# Patient Record
Sex: MALE | Race: WHITE | Employment: OTHER
[De-identification: names, ages, dates, MRNs, and addresses within clinical notes are randomized per-mention and may not be internally consistent; named-entity substitution may affect disease eponyms.]

---

## 2017-01-13 ENCOUNTER — CARE COORDINATION (OUTPATIENT)
Dept: CARE COORDINATION | Facility: CLINIC | Age: 62
End: 2017-01-13

## 2017-01-26 RX ORDER — AMLODIPINE BESYLATE 10 MG/1
TABLET ORAL
Qty: 30 TABLET | Refills: 11 | Status: SHIPPED | OUTPATIENT
Start: 2017-01-26 | End: 2017-12-01 | Stop reason: SDUPTHER

## 2017-01-26 RX ORDER — BENAZEPRIL HYDROCHLORIDE 10 MG/1
TABLET ORAL
Qty: 30 TABLET | Refills: 11 | Status: SHIPPED | OUTPATIENT
Start: 2017-01-26 | End: 2017-12-01 | Stop reason: SDUPTHER

## 2017-02-08 ENCOUNTER — CARE COORDINATION (OUTPATIENT)
Dept: CARE COORDINATION | Facility: CLINIC | Age: 62
End: 2017-02-08

## 2017-02-15 ENCOUNTER — HOSPITAL ENCOUNTER (OUTPATIENT)
Dept: INFUSION THERAPY | Facility: MEDICAL CENTER | Age: 62
Discharge: HOME | End: 2017-02-15
Payer: MEDICARE

## 2017-02-15 VITALS
DIASTOLIC BLOOD PRESSURE: 71 MMHG | HEART RATE: 84 BPM | TEMPERATURE: 98 F | RESPIRATION RATE: 20 BRPM | SYSTOLIC BLOOD PRESSURE: 114 MMHG

## 2017-02-15 DIAGNOSIS — D83.9 COMMON VARIABLE IMMUNODEFICIENCY (HCC): ICD-10-CM

## 2017-02-15 DIAGNOSIS — D80.1 COMMON VARIABLE HYPOGAMMAGLOBULINEMIA (HCC): ICD-10-CM

## 2017-02-15 PROCEDURE — 96365 THER/PROPH/DIAG IV INF INIT: CPT

## 2017-02-15 PROCEDURE — 2580000003 HC RX 258: Performed by: ALLERGY & IMMUNOLOGY

## 2017-02-15 PROCEDURE — 96366 THER/PROPH/DIAG IV INF ADDON: CPT

## 2017-02-15 PROCEDURE — 6370000000 HC RX 637 (ALT 250 FOR IP): Performed by: ALLERGY & IMMUNOLOGY

## 2017-02-15 PROCEDURE — 96360 HYDRATION IV INFUSION INIT: CPT

## 2017-02-15 PROCEDURE — 6360000002 HC RX W HCPCS: Performed by: ALLERGY & IMMUNOLOGY

## 2017-02-15 PROCEDURE — 96361 HYDRATE IV INFUSION ADD-ON: CPT

## 2017-02-15 RX ORDER — 0.9 % SODIUM CHLORIDE 0.9 %
10 VIAL (ML) INJECTION PRN
Status: CANCELLED | OUTPATIENT
Start: 2017-02-15

## 2017-02-15 RX ORDER — DIPHENHYDRAMINE HCL 25 MG
25 TABLET ORAL ONCE
Status: CANCELLED | OUTPATIENT
Start: 2017-02-15 | End: 2017-02-15

## 2017-02-15 RX ORDER — HEPARIN SODIUM (PORCINE) LOCK FLUSH IV SOLN 100 UNIT/ML 100 UNIT/ML
500 SOLUTION INTRAVENOUS PRN
Status: DISCONTINUED | OUTPATIENT
Start: 2017-02-15 | End: 2017-02-16 | Stop reason: HOSPADM

## 2017-02-15 RX ORDER — HEPARIN SODIUM (PORCINE) LOCK FLUSH IV SOLN 100 UNIT/ML 100 UNIT/ML
500 SOLUTION INTRAVENOUS PRN
Status: CANCELLED
Start: 2017-02-15

## 2017-02-15 RX ORDER — DEXTROSE MONOHYDRATE 50 MG/ML
INJECTION, SOLUTION INTRAVENOUS CONTINUOUS
Status: DISCONTINUED | OUTPATIENT
Start: 2017-02-15 | End: 2017-02-16 | Stop reason: HOSPADM

## 2017-02-15 RX ORDER — DIPHENHYDRAMINE HCL 25 MG
25 TABLET ORAL EVERY 4 HOURS PRN
Status: DISCONTINUED | OUTPATIENT
Start: 2017-02-15 | End: 2017-02-16 | Stop reason: HOSPADM

## 2017-02-15 RX ORDER — DEXTROSE AND SODIUM CHLORIDE 5; .33 G/100ML; G/100ML
INJECTION, SOLUTION INTRAVENOUS CONTINUOUS
Status: CANCELLED
Start: 2017-02-15

## 2017-02-15 RX ORDER — DEXTROSE MONOHYDRATE 50 MG/ML
INJECTION, SOLUTION INTRAVENOUS CONTINUOUS
Status: CANCELLED
Start: 2017-02-15

## 2017-02-15 RX ORDER — DEXTROSE AND SODIUM CHLORIDE 5; .33 G/100ML; G/100ML
INJECTION, SOLUTION INTRAVENOUS CONTINUOUS
Status: DISCONTINUED | OUTPATIENT
Start: 2017-02-15 | End: 2017-02-16 | Stop reason: HOSPADM

## 2017-02-15 RX ORDER — DIPHENHYDRAMINE HCL 25 MG
25 TABLET ORAL EVERY 4 HOURS PRN
Status: CANCELLED
Start: 2017-02-15

## 2017-02-15 RX ORDER — 0.9 % SODIUM CHLORIDE 0.9 %
10 VIAL (ML) INJECTION PRN
Status: DISCONTINUED | OUTPATIENT
Start: 2017-02-15 | End: 2017-02-16 | Stop reason: HOSPADM

## 2017-02-15 RX ORDER — DIPHENHYDRAMINE HCL 25 MG
25 TABLET ORAL ONCE
Status: COMPLETED | OUTPATIENT
Start: 2017-02-15 | End: 2017-02-15

## 2017-02-15 RX ADMIN — Medication 20 ML: at 15:38

## 2017-02-15 RX ADMIN — DIPHENHYDRAMINE HCL 25 MG: 25 TABLET ORAL at 14:12

## 2017-02-15 RX ADMIN — DEXTROSE AND SODIUM CHLORIDE: 5; 330 INJECTION, SOLUTION INTRAVENOUS at 08:59

## 2017-02-15 RX ADMIN — DEXTROSE MONOHYDRATE: 50 INJECTION, SOLUTION INTRAVENOUS at 09:21

## 2017-02-15 RX ADMIN — SODIUM CHLORIDE, PRESERVATIVE FREE 500 UNITS: 5 INJECTION INTRAVENOUS at 15:39

## 2017-02-15 RX ADMIN — IMMUNE GLOBULIN INTRAVENOUS (HUMAN): 5 INJECTION, SOLUTION INTRAVENOUS at 09:21

## 2017-02-15 RX ADMIN — DIPHENHYDRAMINE HCL 25 MG: 25 TABLET ORAL at 09:01

## 2017-02-15 NOTE — IP AVS SNAPSHOT
After Visit Summary    Medication List for Home    Based on the information you provided to us as well as any changes during this visit, the following is your updated medication list.  Compare this with your prescription bottles at home. If you have any questions or concerns, contact your primary care physician's office. Daily Medication List (This medication list can be shared with any healthcare provider who is helping you manage your medications)      ASK your doctor about these medications if you have questions        Last Dose    Next Dose Due AM NOON PM NIGHT    ALPRAZolam 0.5 MG tablet   Commonly known as:  XANAX   TAKE 1 TABLET 3 to 4 TIMES A DAY AS NEEDED FOR SLEEP or anxiety                                         amLODIPine 10 MG tablet   Commonly known as:  NORVASC   take 1 tablet by mouth once daily                                         benazepril 10 MG tablet   Commonly known as:  LOTENSIN   take 1 tablet by mouth once daily                                         EPINEPHRINE IJ   Inject 0.3 mLs as directed. escitalopram 10 MG tablet   Commonly known as:  LEXAPRO   Take 1 tablet by mouth daily                                         fluticasone 50 MCG/ACT nasal spray   Commonly known as:  FLONASE   1 spray by Nasal route daily. ibuprofen 800 MG tablet   Commonly known as:  ADVIL;MOTRIN   Take 800 mg by mouth every 6 hours as needed for Pain Pt would like refill                                         Immune Globulin (Human) 10 GM/100ML Soln IV solution   Infuse 10 g intravenously every 21 days                2/15/2017  9:21 AM                            montelukast 10 MG tablet   Commonly known as:  SINGULAIR   Take 1 tablet by mouth nightly.                                          omega-3 acid ethyl esters 1 G capsule   Commonly known as:  LOVAZA   take 2 capsules twice a day omeprazole 20 MG delayed release capsule   Commonly known as:  PRILOSEC   take 1 capsule by mouth once daily                                         predniSONE 10 MG tablet   Commonly known as:  DELTASONE                                         predniSONE 10 MG tablet   Commonly known as:  DELTASONE   TAKE 40MGS FOR 3 DAYS, 30MGS FOR 3 DAYS, 20MGS FOR 3 DAYS AND THEN 10MGS FOR 3 DAY. PROAIR HFA IN   Inhale 2 puffs into the lungs every 4 hours as needed. albuterol (2.5 MG/3ML) 0.083% nebulizer solution   Commonly known as:  PROVENTIL   Take 3 mLs by nebulization every 4 hours as needed for Wheezing. PROAIR  (90 BASE) MCG/ACT inhaler   Generic drug:  albuterol sulfate HFA                                         SYMBICORT 160-4.5 MCG/ACT Aero   Generic drug:  budesonide-formoterol   INHALE 2 PUFFS TWICE A DAY AS DIRECTED                                         tiotropium 18 MCG inhalation capsule   Commonly known as:  SPIRIVA   Inhale 18 mcg into the lungs daily. Allergies as of 2/15/2017     No Known Allergies      Immunizations as of 2/15/2017     Name Date Dose VIS Date Route    Influenza Virus Vaccine 10/5/2015 -- -- --    External: Patient reported    Influenza Virus Vaccine 10/7/2014 0.5 mL 8/19/2014 Intramuscular    Pneumococcal 13-valent Conjugate (Verneice Nicholas) 10/5/2015 -- -- --    External: Patient reported    Pneumococcal Polysaccharide ([id number]) 12/2/2016 0.5 mL 4/24/2015 Intramuscular      Last Vitals          Most Recent Value    Temp  98 °F (36.7 °C)    Pulse  84    Resp  20    BP  114/71         After Visit Summary    This summary was created for you. Thank you for entrusting your care to us.   The following information includes details about your hospital/visit stay along with steps you should take to help with your recovery once you leave the hospital.  In this packet, you will find information about the topics listed below:    · Instructions about your medications including a list of your home medications  · A summary of your hospital visit  · Follow-up appointments once you have left the hospital  · Your care plan at home      You may receive a survey regarding the care you received during your stay. Your input is valuable to us. We encourage you to complete and return your survey in the envelope provided. We hope you will choose us in the future for your healthcare needs. Patient Information     Patient Name NOLBERTO Joshi Cough 1955      Care Provided at:     Name             [address] [facility] [address]              Your Visit    Here you will find information about your visit, including the reason for your visit. Please take this sheet with you when you visit your doctor or other health care provider in the future. It will help determine the best possible medical care for you at that time. If you have any questions once you leave the hospital, please call the department phone number listed below. Why you were here     Your primary diagnosis was:  Not on File      Visit Information     Date & Time Department Dept. Phone    2/15/2017 Roxy Cook 47 [phone]       Follow-up Appointments    Below is a list of your follow-up and future appointments. This may not be a complete list as you may have made appointments directly with providers that we are not aware of or your providers may have made some for you. Please call your providers to confirm appointments. It is important to keep your appointments. Please bring your current insurance card, photo ID, co-pay, and all medication bottles to your appointment. If self-pay, payment is expected at the time of service.         Future Appointments     3/8/2017 8:30 AM Appointment with CLEMENTINA STA CHAIR 15 at [facility] [place] [address] [phone] [place]       3/8/2017 2:45 PM     Appointment with Margarita Downs MD at [address] [facility] [id number] ([phone])   Please arrive 15 minutes prior to appointment time, bring insurance card and photo ID.    [address].  [address] [place] [address]       3/29/2017 8:30 AM     Appointment with CLEMENTINA STA CHAIR 15 at . [place] [address] ([phone])   [facility] Once You Return Home    This section includes instructions you will need to follow once you leave the hospital.  Your care team will discuss these with you, so you and those caring for you know how to best care for your health needs at home. This section may also include educational information about certain health topics that may be of help to you. Important information for a smoker       SMOKING: QUIT SMOKING. THIS IS THE MOST IMPORTANT ACTION YOU CAN TAKE TO IMPROVE YOUR CURRENT AND FUTURE HEALTH. Call the [address] [place] [address] at [place] NOW ([phone])    Smoking harms nonsmokers. When nonsmokers are around people who smoke, they absorb nicotine, carbon monoxide, and other ingredients of tobacco smoke. DO NOT SMOKE AROUND CHILDREN     Children exposed to secondhand smoke are at an increased risk of:  Sudden Infant Death Syndrome (SIDS), acute respiratory infections, inflammation of the middle ear, and severe asthma. Over a longer time, it causes heart disease and lung cancer. There is no safe level of exposure to secondhand smoke. [employer] Signup     Our records indicate that you have an active [facility] account. You can view your After Visit Summary by going to [email or web address] and logging in with your [facility] username and password.       If you don't have a [facility] username and password but a parent or guardian has access to your record, the parent or guardian should login with their own [employer] username and password and access your record to view the After Visit Summary. Additional Information  If you have questions, please contact the physician practice where you receive care. Remember, [employer] is NOT to be used for urgent needs. For medical emergencies, dial 911. For questions regarding your MyChart account call [phone]. If you have a clinical question, please call your doctor's office. View your information online  ? Review your current list of  medications, immunization, and allergies. ? Review your future test results online . ? Review your discharge instructions provided by your caregivers at discharge    Certain functionality such as prescription refills, scheduling appointments or sending messages to your provider are not activated if your provider does not use [employer] in his/her office    For questions regarding your MyChart account call [phone]. If you have a clinical question, please call your doctor's office. The information on all pages of the After Visit Summary has been reviewed with me, the patient and/or responsible adult, by my health care provider(s). I had the opportunity to ask questions regarding this information. I understand I should dispose of my armband safely at home to protect my health information. A complete copy of the After Visit Summary has been given to me, the patient and/or responsible adult.            Patient Signature/Responsible Adult:____________________    Clinician Signature:_____________________    Date:_____________________    Time:_____________________

## 2017-02-15 NOTE — PROGRESS NOTES
Arrives  For infusion see mar. Port accessed and patient hydrated with d5 -1/3 250 ml  Prior to infusion , and post infusion 250 ml. .  2861 IVIG  Hung  And  infusing at 20 ml/hr for 20 minutes. V/s 114/71, 84, 18, 98.0.  0940 IVIG increased To 40 ml/hr For 20 minutes. V/s 133/79, 83.  1000 IVIG increased To 80 ml/hr For 20 minutes. V/s 111/69, 77.  1020 IVIG increased To 160 ml/hr For 20 minutes. V/s109/63, 72.  1040 IVIG increased To 250 ml/hr For remainder of infusion. V/s119/72, 81.  1100 V/s 107/66, 91.  1130 v/s 114/60, 84.  1200 v/s 121/70, 78  1230 v/s 108/63, 93.  1300 v/s 113/68, 87.  1330 v/s 124/59, 93.  1400 v/s 126/63, 92.  1430 v/s 126/64, 94.  1511 IVIG finished infusing  And  Line flushed with D5-1/3  For 30 minutes and Patient discharged to  Home per self. Next  R T C is 3/8/17.

## 2017-02-15 NOTE — IP AVS SNAPSHOT
Patient Information     Patient Name NOLBERTO Lucio 1955         This is your updated medication list to keep with you all times      ASK your doctor about these medications     ALPRAZolam 0.5 MG tablet   Commonly known as:  XANAX   TAKE 1 TABLET 3 to 4 TIMES A DAY AS NEEDED FOR SLEEP or anxiety       amLODIPine 10 MG tablet   Commonly known as:  NORVASC   take 1 tablet by mouth once daily       benazepril 10 MG tablet   Commonly known as:  LOTENSIN   take 1 tablet by mouth once daily       EPINEPHRINE IJ       escitalopram 10 MG tablet   Commonly known as:  LEXAPRO   Take 1 tablet by mouth daily       fluticasone 50 MCG/ACT nasal spray   Commonly known as:  FLONASE       ibuprofen 800 MG tablet   Commonly known as:  ADVIL;MOTRIN       Immune Globulin (Human) 10 GM/100ML Soln IV solution       montelukast 10 MG tablet   Commonly known as:  SINGULAIR   Take 1 tablet by mouth nightly. omega-3 acid ethyl esters 1 G capsule   Commonly known as:  LOVAZA   take 2 capsules twice a day       omeprazole 20 MG delayed release capsule   Commonly known as:  PRILOSEC   take 1 capsule by mouth once daily       * predniSONE 10 MG tablet   Commonly known as:  DELTASONE       * predniSONE 10 MG tablet   Commonly known as:  DELTASONE   TAKE 40MGS FOR 3 DAYS, 30MGS FOR 3 DAYS, 20MGS FOR 3 DAYS AND THEN 10MGS FOR 3 DAY. * PROAIR HFA IN       * albuterol (2.5 MG/3ML) 0.083% nebulizer solution   Commonly known as:  PROVENTIL   Take 3 mLs by nebulization every 4 hours as needed for Wheezing. * PROAIR  (90 BASE) MCG/ACT inhaler   Generic drug:  albuterol sulfate HFA       SYMBICORT 160-4.5 MCG/ACT Aero   Generic drug:  budesonide-formoterol   INHALE 2 PUFFS TWICE A DAY AS DIRECTED       tiotropium 18 MCG inhalation capsule   Commonly known as:  SPIRIVA       * Notice: This list has 5 medication(s) that are the same as other medications prescribed for you.  Read the directions carefully, and ask your doctor or other care provider to review them with you.

## 2017-03-08 ENCOUNTER — HOSPITAL ENCOUNTER (OUTPATIENT)
Dept: INFUSION THERAPY | Facility: MEDICAL CENTER | Age: 62
Discharge: HOME | End: 2017-03-08
Payer: MEDICARE

## 2017-03-08 VITALS
HEART RATE: 68 BPM | SYSTOLIC BLOOD PRESSURE: 109 MMHG | TEMPERATURE: 97.5 F | RESPIRATION RATE: 20 BRPM | DIASTOLIC BLOOD PRESSURE: 66 MMHG

## 2017-03-08 DIAGNOSIS — D83.9 COMMON VARIABLE IMMUNODEFICIENCY (HCC): ICD-10-CM

## 2017-03-08 DIAGNOSIS — D80.1 COMMON VARIABLE HYPOGAMMAGLOBULINEMIA (HCC): ICD-10-CM

## 2017-03-08 PROCEDURE — 96361 HYDRATE IV INFUSION ADD-ON: CPT

## 2017-03-08 PROCEDURE — 96366 THER/PROPH/DIAG IV INF ADDON: CPT

## 2017-03-08 PROCEDURE — 96365 THER/PROPH/DIAG IV INF INIT: CPT

## 2017-03-08 PROCEDURE — 6370000000 HC RX 637 (ALT 250 FOR IP): Performed by: ALLERGY & IMMUNOLOGY

## 2017-03-08 PROCEDURE — 2580000003 HC RX 258: Performed by: ALLERGY & IMMUNOLOGY

## 2017-03-08 PROCEDURE — 6360000002 HC RX W HCPCS: Performed by: ALLERGY & IMMUNOLOGY

## 2017-03-08 RX ORDER — DIPHENHYDRAMINE HCL 25 MG
25 TABLET ORAL EVERY 4 HOURS PRN
Status: DISCONTINUED | OUTPATIENT
Start: 2017-03-08 | End: 2017-03-09 | Stop reason: HOSPADM

## 2017-03-08 RX ORDER — HEPARIN SODIUM (PORCINE) LOCK FLUSH IV SOLN 100 UNIT/ML 100 UNIT/ML
500 SOLUTION INTRAVENOUS PRN
Status: CANCELLED
Start: 2017-03-08

## 2017-03-08 RX ORDER — DEXTROSE MONOHYDRATE 50 MG/ML
INJECTION, SOLUTION INTRAVENOUS CONTINUOUS
Status: DISCONTINUED | OUTPATIENT
Start: 2017-03-08 | End: 2017-03-09 | Stop reason: HOSPADM

## 2017-03-08 RX ORDER — HEPARIN SODIUM (PORCINE) LOCK FLUSH IV SOLN 100 UNIT/ML 100 UNIT/ML
500 SOLUTION INTRAVENOUS PRN
Status: DISCONTINUED | OUTPATIENT
Start: 2017-03-08 | End: 2017-03-09 | Stop reason: HOSPADM

## 2017-03-08 RX ORDER — DEXTROSE AND SODIUM CHLORIDE 5; .33 G/100ML; G/100ML
INJECTION, SOLUTION INTRAVENOUS CONTINUOUS
Status: CANCELLED
Start: 2017-03-08

## 2017-03-08 RX ORDER — 0.9 % SODIUM CHLORIDE 0.9 %
10 VIAL (ML) INJECTION PRN
Status: DISCONTINUED | OUTPATIENT
Start: 2017-03-08 | End: 2017-03-09 | Stop reason: HOSPADM

## 2017-03-08 RX ORDER — DIPHENHYDRAMINE HCL 25 MG
25 TABLET ORAL ONCE
Status: CANCELLED | OUTPATIENT
Start: 2017-03-08 | End: 2017-03-08

## 2017-03-08 RX ORDER — DIPHENHYDRAMINE HCL 25 MG
25 TABLET ORAL EVERY 4 HOURS PRN
Status: CANCELLED
Start: 2017-03-08

## 2017-03-08 RX ORDER — 0.9 % SODIUM CHLORIDE 0.9 %
10 VIAL (ML) INJECTION PRN
Status: CANCELLED | OUTPATIENT
Start: 2017-03-08

## 2017-03-08 RX ORDER — DEXTROSE MONOHYDRATE 50 MG/ML
INJECTION, SOLUTION INTRAVENOUS CONTINUOUS
Status: CANCELLED
Start: 2017-03-08

## 2017-03-08 RX ORDER — DEXTROSE AND SODIUM CHLORIDE 5; .33 G/100ML; G/100ML
INJECTION, SOLUTION INTRAVENOUS CONTINUOUS
Status: DISCONTINUED | OUTPATIENT
Start: 2017-03-08 | End: 2017-03-09 | Stop reason: HOSPADM

## 2017-03-08 RX ORDER — DIPHENHYDRAMINE HCL 25 MG
25 TABLET ORAL ONCE
Status: COMPLETED | OUTPATIENT
Start: 2017-03-08 | End: 2017-03-08

## 2017-03-08 RX ADMIN — DIPHENHYDRAMINE HCL 25 MG: 25 TABLET ORAL at 08:48

## 2017-03-08 RX ADMIN — SODIUM CHLORIDE, PRESERVATIVE FREE 500 UNITS: 5 INJECTION INTRAVENOUS at 16:24

## 2017-03-08 RX ADMIN — Medication 10 ML: at 16:24

## 2017-03-08 RX ADMIN — IMMUNE GLOBULIN INTRAVENOUS (HUMAN): 5 INJECTION, SOLUTION INTRAVENOUS at 09:35

## 2017-03-08 RX ADMIN — DIPHENHYDRAMINE HCL 25 MG: 25 TABLET ORAL at 14:00

## 2017-03-08 RX ADMIN — Medication 10 ML: at 09:06

## 2017-03-08 RX ADMIN — DEXTROSE MONOHYDRATE: 50 INJECTION, SOLUTION INTRAVENOUS at 09:06

## 2017-03-08 RX ADMIN — DEXTROSE AND SODIUM CHLORIDE: 5; 330 INJECTION, SOLUTION INTRAVENOUS at 09:02

## 2017-03-08 NOTE — FLOWSHEET NOTE
rounding; offered support and presence. Chaplains will remain available to offer spiritual and emotional support as needed.      03/08/17 1414   Encounter Summary   Services provided to: Patient   Referral/Consult From: Rounding   Continue Visiting (03/08/17)   Complexity of Encounter Low   Length of Encounter 15 minutes   Routine   Type Follow up   Assessment Approachable   Intervention Active listening;Explored feelings, thoughts, concerns;Explored coping resources;Nurtured hope;Sustaining presence/ Ministry of presence   Outcome Engaged in conversation;Expressed feelings/needs/concerns;Coping

## 2017-03-08 NOTE — PROGRESS NOTES
Pt arrives and orders reviewed , labs due with next infusion. Pt given calendars with appts to June. Pt notified of next appts and labs with Q 4 th infusion. Pt tolerated premeds and D5 1/3  ml prior and 250 ml post and IVIG well. Blood return present throughout infusions. IVIG infusing at 20 ml/hr for 20 min, 40 ml/hr for 20 min, 80 ml/hr for 20 min, 160 ml/hr for 30 min, and 250 ml/hr for remainder. BP checked Q 15 min as follows: 109/66, 111/58, 115/61, 111/61, 114/68, 115/62, 111/66, 112/59, 115/66, 122/81, 147/59, 113/61, 102/66, 111/68, 118/71, 124/67, 117/70, 118/74, 107/62, 116/73, 116/57, 111/65, 124/66, 119/65, 132/71. No reactions or complaints. Pt discharged per ambulatory per self without problem and o2 in use at 3L/NC throughout day.

## 2017-03-24 ENCOUNTER — OFFICE VISIT (OUTPATIENT)
Dept: FAMILY MEDICINE CLINIC | Age: 62
End: 2017-03-24
Payer: MEDICARE

## 2017-03-24 VITALS
WEIGHT: 277 LBS | HEART RATE: 70 BPM | BODY MASS INDEX: 39.65 KG/M2 | OXYGEN SATURATION: 97 % | SYSTOLIC BLOOD PRESSURE: 138 MMHG | HEIGHT: 70 IN | DIASTOLIC BLOOD PRESSURE: 72 MMHG

## 2017-03-24 DIAGNOSIS — F41.9 ANXIETY: Primary | Chronic | ICD-10-CM

## 2017-03-24 DIAGNOSIS — H61.21 IMPACTED CERUMEN OF RIGHT EAR: ICD-10-CM

## 2017-03-24 PROCEDURE — 99213 OFFICE O/P EST LOW 20 MIN: CPT | Performed by: PHYSICIAN ASSISTANT

## 2017-03-24 PROCEDURE — 69210 REMOVE IMPACTED EAR WAX UNI: CPT | Performed by: PHYSICIAN ASSISTANT

## 2017-03-24 PROCEDURE — 3017F COLORECTAL CA SCREEN DOC REV: CPT | Performed by: PHYSICIAN ASSISTANT

## 2017-03-24 PROCEDURE — G8419 CALC BMI OUT NRM PARAM NOF/U: HCPCS | Performed by: PHYSICIAN ASSISTANT

## 2017-03-24 PROCEDURE — G8484 FLU IMMUNIZE NO ADMIN: HCPCS | Performed by: PHYSICIAN ASSISTANT

## 2017-03-24 PROCEDURE — 1036F TOBACCO NON-USER: CPT | Performed by: PHYSICIAN ASSISTANT

## 2017-03-24 PROCEDURE — G8427 DOCREV CUR MEDS BY ELIG CLIN: HCPCS | Performed by: PHYSICIAN ASSISTANT

## 2017-03-24 ASSESSMENT — ENCOUNTER SYMPTOMS
SINUS PRESSURE: 0
COLOR CHANGE: 0
COUGH: 0
VOMITING: 0
WHEEZING: 0
SORE THROAT: 0
CONSTIPATION: 0
DIARRHEA: 0
NAUSEA: 0
SHORTNESS OF BREATH: 0
ABDOMINAL PAIN: 0
RHINORRHEA: 0

## 2017-03-29 ENCOUNTER — HOSPITAL ENCOUNTER (OUTPATIENT)
Dept: INFUSION THERAPY | Facility: MEDICAL CENTER | Age: 62
Discharge: HOME | End: 2017-03-29
Payer: MEDICARE

## 2017-03-29 VITALS
HEART RATE: 80 BPM | TEMPERATURE: 97.5 F | RESPIRATION RATE: 18 BRPM | DIASTOLIC BLOOD PRESSURE: 77 MMHG | SYSTOLIC BLOOD PRESSURE: 127 MMHG

## 2017-03-29 DIAGNOSIS — D83.9 COMMON VARIABLE IMMUNODEFICIENCY (HCC): Primary | Chronic | ICD-10-CM

## 2017-03-29 DIAGNOSIS — D83.9 COMMON VARIABLE IMMUNODEFICIENCY (HCC): Chronic | ICD-10-CM

## 2017-03-29 DIAGNOSIS — D80.1 COMMON VARIABLE HYPOGAMMAGLOBULINEMIA (HCC): ICD-10-CM

## 2017-03-29 LAB
ABSOLUTE EOS #: 0.1 K/UL (ref 0–0.4)
ABSOLUTE LYMPH #: 2.5 K/UL (ref 1–4.8)
ABSOLUTE MONO #: 0.5 K/UL (ref 0.2–0.8)
ALT SERPL-CCNC: 34 U/L (ref 5–41)
BASOPHILS # BLD: 0 % (ref 0–2)
BASOPHILS ABSOLUTE: 0 K/UL (ref 0–0.2)
CREAT SERPL-MCNC: 0.94 MG/DL (ref 0.7–1.2)
DIFFERENTIAL TYPE: NORMAL
EOSINOPHILS RELATIVE PERCENT: 1 % (ref 1–4)
GFR AFRICAN AMERICAN: >60 ML/MIN
GFR NON-AFRICAN AMERICAN: >60 ML/MIN
GFR SERPL CREATININE-BSD FRML MDRD: NORMAL ML/MIN/{1.73_M2}
GFR SERPL CREATININE-BSD FRML MDRD: NORMAL ML/MIN/{1.73_M2}
HCT VFR BLD CALC: 45.6 % (ref 41–53)
HEMOGLOBIN: 15.2 G/DL (ref 13.5–17.5)
IGG: 924 MG/DL (ref 700–1600)
LYMPHOCYTES # BLD: 30 % (ref 24–44)
MCH RBC QN AUTO: 28.3 PG (ref 26–34)
MCHC RBC AUTO-ENTMCNC: 33.4 G/DL (ref 31–37)
MCV RBC AUTO: 84.6 FL (ref 80–100)
MONOCYTES # BLD: 6 % (ref 1–7)
PDW BLD-RTO: 12.4 % (ref 11.5–14.5)
PLATELET # BLD: 273 K/UL (ref 130–400)
PLATELET ESTIMATE: NORMAL
PMV BLD AUTO: NORMAL FL (ref 6–12)
RBC # BLD: 5.39 M/UL (ref 4.5–5.9)
RBC # BLD: NORMAL 10*6/UL
SEG NEUTROPHILS: 63 % (ref 36–66)
SEGMENTED NEUTROPHILS ABSOLUTE COUNT: 5.3 K/UL (ref 1.8–7.7)
WBC # BLD: 8.4 K/UL (ref 3.5–11)
WBC # BLD: NORMAL 10*3/UL

## 2017-03-29 PROCEDURE — 96365 THER/PROPH/DIAG IV INF INIT: CPT

## 2017-03-29 PROCEDURE — 82565 ASSAY OF CREATININE: CPT

## 2017-03-29 PROCEDURE — 6360000002 HC RX W HCPCS: Performed by: ALLERGY & IMMUNOLOGY

## 2017-03-29 PROCEDURE — 85025 COMPLETE CBC W/AUTO DIFF WBC: CPT

## 2017-03-29 PROCEDURE — 84460 ALANINE AMINO (ALT) (SGPT): CPT

## 2017-03-29 PROCEDURE — 96366 THER/PROPH/DIAG IV INF ADDON: CPT

## 2017-03-29 PROCEDURE — 6370000000 HC RX 637 (ALT 250 FOR IP): Performed by: ALLERGY & IMMUNOLOGY

## 2017-03-29 PROCEDURE — 82784 ASSAY IGA/IGD/IGG/IGM EACH: CPT

## 2017-03-29 PROCEDURE — 2580000003 HC RX 258: Performed by: ALLERGY & IMMUNOLOGY

## 2017-03-29 RX ORDER — HEPARIN SODIUM (PORCINE) LOCK FLUSH IV SOLN 100 UNIT/ML 100 UNIT/ML
500 SOLUTION INTRAVENOUS PRN
Status: CANCELLED
Start: 2017-03-29

## 2017-03-29 RX ORDER — DEXTROSE AND SODIUM CHLORIDE 5; .33 G/100ML; G/100ML
INJECTION, SOLUTION INTRAVENOUS CONTINUOUS
Status: CANCELLED
Start: 2017-03-29

## 2017-03-29 RX ORDER — 0.9 % SODIUM CHLORIDE 0.9 %
10 VIAL (ML) INJECTION PRN
Status: DISCONTINUED | OUTPATIENT
Start: 2017-03-29 | End: 2017-03-30 | Stop reason: HOSPADM

## 2017-03-29 RX ORDER — DIPHENHYDRAMINE HCL 25 MG
25 TABLET ORAL ONCE
Status: CANCELLED | OUTPATIENT
Start: 2017-03-29 | End: 2017-03-29

## 2017-03-29 RX ORDER — DEXTROSE AND SODIUM CHLORIDE 5; .33 G/100ML; G/100ML
INJECTION, SOLUTION INTRAVENOUS CONTINUOUS
Status: DISCONTINUED | OUTPATIENT
Start: 2017-03-29 | End: 2017-03-30 | Stop reason: HOSPADM

## 2017-03-29 RX ORDER — DIPHENHYDRAMINE HCL 25 MG
25 TABLET ORAL EVERY 4 HOURS PRN
Status: CANCELLED
Start: 2017-03-29

## 2017-03-29 RX ORDER — DIPHENHYDRAMINE HCL 25 MG
25 TABLET ORAL EVERY 4 HOURS PRN
Status: DISCONTINUED | OUTPATIENT
Start: 2017-03-29 | End: 2017-03-30 | Stop reason: HOSPADM

## 2017-03-29 RX ORDER — 0.9 % SODIUM CHLORIDE 0.9 %
10 VIAL (ML) INJECTION PRN
Status: CANCELLED | OUTPATIENT
Start: 2017-03-29

## 2017-03-29 RX ORDER — DEXTROSE MONOHYDRATE 50 MG/ML
INJECTION, SOLUTION INTRAVENOUS CONTINUOUS
Status: DISCONTINUED | OUTPATIENT
Start: 2017-03-29 | End: 2017-03-30 | Stop reason: HOSPADM

## 2017-03-29 RX ORDER — DEXTROSE MONOHYDRATE 50 MG/ML
INJECTION, SOLUTION INTRAVENOUS CONTINUOUS
Status: CANCELLED
Start: 2017-03-29

## 2017-03-29 RX ORDER — HEPARIN SODIUM (PORCINE) LOCK FLUSH IV SOLN 100 UNIT/ML 100 UNIT/ML
500 SOLUTION INTRAVENOUS PRN
Status: DISCONTINUED | OUTPATIENT
Start: 2017-03-29 | End: 2017-03-30 | Stop reason: HOSPADM

## 2017-03-29 RX ORDER — DIPHENHYDRAMINE HCL 25 MG
25 TABLET ORAL ONCE
Status: COMPLETED | OUTPATIENT
Start: 2017-03-29 | End: 2017-03-29

## 2017-03-29 RX ADMIN — SODIUM CHLORIDE, PRESERVATIVE FREE 500 UNITS: 5 INJECTION INTRAVENOUS at 16:21

## 2017-03-29 RX ADMIN — DIPHENHYDRAMINE HCL 25 MG: 25 TABLET ORAL at 09:02

## 2017-03-29 RX ADMIN — Medication 10 ML: at 16:21

## 2017-03-29 RX ADMIN — Medication 10 ML: at 09:02

## 2017-03-29 RX ADMIN — IMMUNE GLOBULIN INTRAVENOUS (HUMAN): 5 INJECTION, SOLUTION INTRAVENOUS at 09:39

## 2017-03-29 RX ADMIN — DEXTROSE AND SODIUM CHLORIDE: 5; 330 INJECTION, SOLUTION INTRAVENOUS at 09:02

## 2017-03-29 RX ADMIN — DEXTROSE MONOHYDRATE: 50 INJECTION, SOLUTION INTRAVENOUS at 09:01

## 2017-03-29 RX ADMIN — DIPHENHYDRAMINE HCL 25 MG: 25 TABLET ORAL at 14:21

## 2017-03-29 NOTE — PROGRESS NOTES
Pt arrives and Orders reviewed and labs today (after Q 4th infusion) . Pt tolerated blood draw well and hydration and premed. Blood return present throughout infusion. No reactions or complaints. Pt tolerated hydration well also with D5 1/3NS 250 ml prior and 250 ml after. Benadryl 30 min prior and about 4 hours into infusion. States no headache with last infusion. IVIG at 20 ml/hr for 20 min, 40 ml/hr for 20 min, 80 ml/hr for 20 min, 160 ml/hr for 30 min, 250 ml/hr for remainder. Bp checked Q 15 min as follows : 120/70, 122/70, 120/67, 118/67, 115/75, 108/71, 112/71, Q 30 min as follows: 117/76, 113/70, 119/65, 115/65, 118/69, 130/70, 130/77. Pt discharged per ambulatory with O2 per self. Pt has next appts. Pt discharged per ambulatory per self with O2. Emily Hendricks calendar with next appt.

## 2017-03-30 NOTE — FLOWSHEET NOTE
Patient alert and engaging. Patient shared that he had spouse were preparing to go out of town for a short vacation.  offered support and presence. Chaplains will remain available to offer spiritual and emotional support as needed.      03/29/17 1011   Encounter Summary   Services provided to: Patient   Referral/Consult From: Johny Ball Visiting (03/29/17)   Complexity of Encounter Low   Length of Encounter 15 minutes   Routine   Type Follow up   Assessment Approachable   Intervention Active listening;Explored feelings, thoughts, concerns;Explored coping resources;Nurtured hope;Provided reading materials/devotional materials;Sustaining presence/ Ministry of presence;[place]   Outcome Engaged in conversation;Expressed feelings/needs/concerns;Coping

## 2017-04-10 ENCOUNTER — TELEPHONE (OUTPATIENT)
Dept: PULMONOLOGY | Age: 62
End: 2017-04-10

## 2017-04-10 RX ORDER — BENZONATATE 100 MG/1
100 CAPSULE ORAL 3 TIMES DAILY PRN
Qty: 30 CAPSULE | Refills: 1 | Status: SHIPPED | OUTPATIENT
Start: 2017-04-10 | End: 2019-01-11 | Stop reason: SDUPTHER

## 2017-04-10 RX ORDER — LEVOFLOXACIN 500 MG/1
500 TABLET, FILM COATED ORAL DAILY
Qty: 10 TABLET | Refills: 0 | Status: SHIPPED | OUTPATIENT
Start: 2017-04-10 | End: 2017-04-20

## 2017-04-19 ENCOUNTER — HOSPITAL ENCOUNTER (OUTPATIENT)
Dept: INFUSION THERAPY | Facility: MEDICAL CENTER | Age: 62
Discharge: HOME | End: 2017-04-19
Payer: MEDICARE

## 2017-04-19 VITALS
SYSTOLIC BLOOD PRESSURE: 125 MMHG | DIASTOLIC BLOOD PRESSURE: 76 MMHG | TEMPERATURE: 97.5 F | HEART RATE: 72 BPM | RESPIRATION RATE: 20 BRPM

## 2017-04-19 DIAGNOSIS — D80.1 COMMON VARIABLE HYPOGAMMAGLOBULINEMIA (HCC): ICD-10-CM

## 2017-04-19 DIAGNOSIS — D83.9 COMMON VARIABLE IMMUNODEFICIENCY (HCC): ICD-10-CM

## 2017-04-19 PROCEDURE — 96361 HYDRATE IV INFUSION ADD-ON: CPT

## 2017-04-19 PROCEDURE — 6370000000 HC RX 637 (ALT 250 FOR IP): Performed by: ALLERGY & IMMUNOLOGY

## 2017-04-19 PROCEDURE — 96366 THER/PROPH/DIAG IV INF ADDON: CPT

## 2017-04-19 PROCEDURE — 2580000003 HC RX 258: Performed by: ALLERGY & IMMUNOLOGY

## 2017-04-19 PROCEDURE — 96365 THER/PROPH/DIAG IV INF INIT: CPT

## 2017-04-19 PROCEDURE — 6360000002 HC RX W HCPCS: Performed by: ALLERGY & IMMUNOLOGY

## 2017-04-19 PROCEDURE — 96523 IRRIG DRUG DELIVERY DEVICE: CPT

## 2017-04-19 RX ORDER — DEXTROSE AND SODIUM CHLORIDE 5; .33 G/100ML; G/100ML
INJECTION, SOLUTION INTRAVENOUS CONTINUOUS
Status: CANCELLED
Start: 2017-04-19

## 2017-04-19 RX ORDER — 0.9 % SODIUM CHLORIDE 0.9 %
10 VIAL (ML) INJECTION PRN
Status: DISCONTINUED | OUTPATIENT
Start: 2017-04-19 | End: 2017-04-20 | Stop reason: HOSPADM

## 2017-04-19 RX ORDER — DIPHENHYDRAMINE HCL 25 MG
25 TABLET ORAL ONCE
Status: CANCELLED | OUTPATIENT
Start: 2017-04-19 | End: 2017-04-19

## 2017-04-19 RX ORDER — DEXTROSE MONOHYDRATE 50 MG/ML
INJECTION, SOLUTION INTRAVENOUS CONTINUOUS
Status: CANCELLED
Start: 2017-04-19

## 2017-04-19 RX ORDER — DIPHENHYDRAMINE HCL 25 MG
25 TABLET ORAL ONCE
Status: COMPLETED | OUTPATIENT
Start: 2017-04-19 | End: 2017-04-19

## 2017-04-19 RX ORDER — DIPHENHYDRAMINE HCL 25 MG
25 TABLET ORAL EVERY 4 HOURS PRN
Status: DISCONTINUED | OUTPATIENT
Start: 2017-04-19 | End: 2017-04-20 | Stop reason: HOSPADM

## 2017-04-19 RX ORDER — DIPHENHYDRAMINE HCL 25 MG
25 TABLET ORAL EVERY 4 HOURS PRN
Status: CANCELLED
Start: 2017-04-19

## 2017-04-19 RX ORDER — 0.9 % SODIUM CHLORIDE 0.9 %
10 VIAL (ML) INJECTION PRN
Status: CANCELLED | OUTPATIENT
Start: 2017-04-19

## 2017-04-19 RX ORDER — HEPARIN SODIUM (PORCINE) LOCK FLUSH IV SOLN 100 UNIT/ML 100 UNIT/ML
500 SOLUTION INTRAVENOUS PRN
Status: DISCONTINUED | OUTPATIENT
Start: 2017-04-19 | End: 2017-04-20 | Stop reason: HOSPADM

## 2017-04-19 RX ORDER — DEXTROSE MONOHYDRATE 50 MG/ML
INJECTION, SOLUTION INTRAVENOUS CONTINUOUS
Status: DISCONTINUED | OUTPATIENT
Start: 2017-04-19 | End: 2017-04-20 | Stop reason: HOSPADM

## 2017-04-19 RX ORDER — DEXTROSE AND SODIUM CHLORIDE 5; .33 G/100ML; G/100ML
INJECTION, SOLUTION INTRAVENOUS CONTINUOUS
Status: DISCONTINUED | OUTPATIENT
Start: 2017-04-19 | End: 2017-04-20 | Stop reason: HOSPADM

## 2017-04-19 RX ORDER — HEPARIN SODIUM (PORCINE) LOCK FLUSH IV SOLN 100 UNIT/ML 100 UNIT/ML
500 SOLUTION INTRAVENOUS PRN
Status: CANCELLED
Start: 2017-04-19

## 2017-04-19 RX ADMIN — IMMUNE GLOBULIN INTRAVENOUS (HUMAN): 5 INJECTION, SOLUTION INTRAVENOUS at 09:23

## 2017-04-19 RX ADMIN — SODIUM CHLORIDE 10 ML: 9 INJECTION, SOLUTION INTRAMUSCULAR; INTRAVENOUS; SUBCUTANEOUS at 16:00

## 2017-04-19 RX ADMIN — DIPHENHYDRAMINE HCL 25 MG: 25 TABLET ORAL at 13:01

## 2017-04-19 RX ADMIN — DEXTROSE MONOHYDRATE: 50 INJECTION, SOLUTION INTRAVENOUS at 09:24

## 2017-04-19 RX ADMIN — DEXTROSE AND SODIUM CHLORIDE: 5; 330 INJECTION, SOLUTION INTRAVENOUS at 08:53

## 2017-04-19 RX ADMIN — DEXTROSE AND SODIUM CHLORIDE: 5; 330 INJECTION, SOLUTION INTRAVENOUS at 09:24

## 2017-04-19 RX ADMIN — DIPHENHYDRAMINE HCL 25 MG: 25 TABLET ORAL at 08:54

## 2017-04-19 RX ADMIN — SODIUM CHLORIDE, PRESERVATIVE FREE 500 UNITS: 5 INJECTION INTRAVENOUS at 16:00

## 2017-04-19 NOTE — PROGRESS NOTES
Patient arrives to the clinic for IVIG. Patient denies any discomforts today. Port accessed per policy   Pre hydration administered. Pre medication administered. IVIG initiated at 20 ml x 20 min. No adverse effects noted. Rate increased to 40 mlx 20 min. No adverse effects noted. Rate increased to 80 ml x 20 min. No adverse effects noted. Rates increased to 160 ml x 30 min. No adverse effects noted. Rate increased to 250 ml remainder of infusion. Patient tolerated. Post hydration 250 ml over 30 min. Vital signs per policy: 311/51, hr 64. 116/65, hr 60. 110/66, hr 64. 103/61, hr 70. 107/62, hr 66. 120/87, hr 78. 113/65, hr 73. 112/69, hr 72. 111/63, hr 75. 113/63, hr 76. 108/58, hr 79. 121/70, hr 72. 124/68, hr 68. Port flushed per policy and gripper needle removed. Patient leaves in stable condition.

## 2017-04-26 ENCOUNTER — OFFICE VISIT (OUTPATIENT)
Dept: PULMONOLOGY | Age: 62
End: 2017-04-26
Payer: MEDICARE

## 2017-04-26 VITALS
HEART RATE: 93 BPM | BODY MASS INDEX: 38.45 KG/M2 | WEIGHT: 268 LBS | RESPIRATION RATE: 20 BRPM | TEMPERATURE: 97.3 F | OXYGEN SATURATION: 97 % | DIASTOLIC BLOOD PRESSURE: 80 MMHG | SYSTOLIC BLOOD PRESSURE: 124 MMHG

## 2017-04-26 DIAGNOSIS — G47.33 OSA (OBSTRUCTIVE SLEEP APNEA): ICD-10-CM

## 2017-04-26 DIAGNOSIS — D83.9 CVID (COMMON VARIABLE IMMUNODEFICIENCY) (HCC): ICD-10-CM

## 2017-04-26 DIAGNOSIS — J45.50 UNCOMPLICATED SEVERE PERSISTENT ASTHMA: ICD-10-CM

## 2017-04-26 DIAGNOSIS — J44.9 CHRONIC OBSTRUCTIVE PULMONARY DISEASE, UNSPECIFIED COPD TYPE (HCC): Primary | ICD-10-CM

## 2017-04-26 DIAGNOSIS — K21.9 GASTROESOPHAGEAL REFLUX DISEASE WITHOUT ESOPHAGITIS: ICD-10-CM

## 2017-04-26 DIAGNOSIS — J30.9 ALLERGIC RHINITIS, UNSPECIFIED ALLERGIC RHINITIS TRIGGER, UNSPECIFIED RHINITIS SEASONALITY: ICD-10-CM

## 2017-04-26 PROCEDURE — 99214 OFFICE O/P EST MOD 30 MIN: CPT | Performed by: INTERNAL MEDICINE

## 2017-04-26 PROCEDURE — G8427 DOCREV CUR MEDS BY ELIG CLIN: HCPCS | Performed by: INTERNAL MEDICINE

## 2017-04-26 PROCEDURE — 3017F COLORECTAL CA SCREEN DOC REV: CPT | Performed by: INTERNAL MEDICINE

## 2017-04-26 PROCEDURE — G8926 SPIRO NO PERF OR DOC: HCPCS | Performed by: INTERNAL MEDICINE

## 2017-04-26 PROCEDURE — 1036F TOBACCO NON-USER: CPT | Performed by: INTERNAL MEDICINE

## 2017-04-26 PROCEDURE — G8417 CALC BMI ABV UP PARAM F/U: HCPCS | Performed by: INTERNAL MEDICINE

## 2017-04-26 PROCEDURE — 3023F SPIROM DOC REV: CPT | Performed by: INTERNAL MEDICINE

## 2017-04-26 RX ORDER — PREDNISONE 10 MG/1
40 TABLET ORAL DAILY
Qty: 28 TABLET | Refills: 0 | Status: SHIPPED | OUTPATIENT
Start: 2017-04-26 | End: 2017-05-03

## 2017-04-26 RX ORDER — ALBUTEROL SULFATE 2.5 MG/3ML
2.5 SOLUTION RESPIRATORY (INHALATION) EVERY 4 HOURS PRN
Qty: 120 EACH | Refills: 5 | Status: SHIPPED | OUTPATIENT
Start: 2017-04-26 | End: 2018-10-03 | Stop reason: SDUPTHER

## 2017-05-10 ENCOUNTER — HOSPITAL ENCOUNTER (OUTPATIENT)
Dept: INFUSION THERAPY | Facility: MEDICAL CENTER | Age: 62
Discharge: HOME | End: 2017-05-10
Payer: MEDICARE

## 2017-05-10 VITALS
TEMPERATURE: 97.9 F | HEART RATE: 74 BPM | SYSTOLIC BLOOD PRESSURE: 116 MMHG | RESPIRATION RATE: 20 BRPM | DIASTOLIC BLOOD PRESSURE: 67 MMHG

## 2017-05-10 DIAGNOSIS — D83.9 COMMON VARIABLE IMMUNODEFICIENCY (HCC): ICD-10-CM

## 2017-05-10 DIAGNOSIS — D80.1 COMMON VARIABLE HYPOGAMMAGLOBULINEMIA (HCC): ICD-10-CM

## 2017-05-10 PROCEDURE — 96365 THER/PROPH/DIAG IV INF INIT: CPT

## 2017-05-10 PROCEDURE — 96366 THER/PROPH/DIAG IV INF ADDON: CPT

## 2017-05-10 PROCEDURE — 6360000002 HC RX W HCPCS: Performed by: ALLERGY & IMMUNOLOGY

## 2017-05-10 PROCEDURE — 96361 HYDRATE IV INFUSION ADD-ON: CPT

## 2017-05-10 PROCEDURE — 6370000000 HC RX 637 (ALT 250 FOR IP): Performed by: ALLERGY & IMMUNOLOGY

## 2017-05-10 PROCEDURE — 2580000003 HC RX 258: Performed by: ALLERGY & IMMUNOLOGY

## 2017-05-10 RX ORDER — DEXTROSE AND SODIUM CHLORIDE 5; .33 G/100ML; G/100ML
INJECTION, SOLUTION INTRAVENOUS CONTINUOUS
Status: DISCONTINUED | OUTPATIENT
Start: 2017-05-10 | End: 2017-05-11 | Stop reason: HOSPADM

## 2017-05-10 RX ORDER — DEXTROSE MONOHYDRATE 50 MG/ML
INJECTION, SOLUTION INTRAVENOUS CONTINUOUS
Status: CANCELLED
Start: 2017-05-10

## 2017-05-10 RX ORDER — DIPHENHYDRAMINE HCL 25 MG
25 TABLET ORAL EVERY 4 HOURS PRN
Status: DISCONTINUED | OUTPATIENT
Start: 2017-05-10 | End: 2017-05-11 | Stop reason: HOSPADM

## 2017-05-10 RX ORDER — HEPARIN SODIUM (PORCINE) LOCK FLUSH IV SOLN 100 UNIT/ML 100 UNIT/ML
500 SOLUTION INTRAVENOUS PRN
Status: CANCELLED
Start: 2017-05-10

## 2017-05-10 RX ORDER — HEPARIN SODIUM (PORCINE) LOCK FLUSH IV SOLN 100 UNIT/ML 100 UNIT/ML
500 SOLUTION INTRAVENOUS PRN
Status: DISCONTINUED | OUTPATIENT
Start: 2017-05-10 | End: 2017-05-11 | Stop reason: HOSPADM

## 2017-05-10 RX ORDER — DIPHENHYDRAMINE HCL 25 MG
25 TABLET ORAL EVERY 4 HOURS PRN
Status: CANCELLED
Start: 2017-05-10

## 2017-05-10 RX ORDER — 0.9 % SODIUM CHLORIDE 0.9 %
10 VIAL (ML) INJECTION PRN
Status: CANCELLED | OUTPATIENT
Start: 2017-05-10

## 2017-05-10 RX ORDER — DIPHENHYDRAMINE HCL 25 MG
25 TABLET ORAL ONCE
Status: CANCELLED | OUTPATIENT
Start: 2017-05-10 | End: 2017-05-10

## 2017-05-10 RX ORDER — DIPHENHYDRAMINE HCL 25 MG
25 TABLET ORAL ONCE
Status: COMPLETED | OUTPATIENT
Start: 2017-05-10 | End: 2017-05-10

## 2017-05-10 RX ORDER — DEXTROSE AND SODIUM CHLORIDE 5; .33 G/100ML; G/100ML
INJECTION, SOLUTION INTRAVENOUS CONTINUOUS
Status: CANCELLED
Start: 2017-05-10

## 2017-05-10 RX ORDER — 0.9 % SODIUM CHLORIDE 0.9 %
10 VIAL (ML) INJECTION PRN
Status: DISCONTINUED | OUTPATIENT
Start: 2017-05-10 | End: 2017-05-11 | Stop reason: HOSPADM

## 2017-05-10 RX ADMIN — DIPHENHYDRAMINE HCL 25 MG: 25 TABLET ORAL at 13:47

## 2017-05-10 RX ADMIN — Medication 10 ML: at 16:25

## 2017-05-10 RX ADMIN — DEXTROSE AND SODIUM CHLORIDE: 5; 330 INJECTION, SOLUTION INTRAVENOUS at 09:00

## 2017-05-10 RX ADMIN — SODIUM CHLORIDE, PRESERVATIVE FREE 500 UNITS: 5 INJECTION INTRAVENOUS at 16:25

## 2017-05-10 RX ADMIN — IMMUNE GLOBULIN INTRAVENOUS (HUMAN): 5 INJECTION, SOLUTION INTRAVENOUS at 09:36

## 2017-05-10 RX ADMIN — DIPHENHYDRAMINE HCL 25 MG: 25 TABLET ORAL at 09:01

## 2017-05-10 NOTE — FLOWSHEET NOTE
rounding; offered support and presence. Patient alert and engaging. Patient shared that he had a good time in [place] over the Easter vacation and is looking forward to another trip at the end of the month. Patient shared that he is feeling pretty good and will probably host a bbq for his wife and children this weekend. Chaplains will remain available to offer spiritual and emotional support as needed.      05/10/17 1220   Encounter Summary   Services provided to: Patient   Referral/Consult From: Rounding   Continue Visiting (05/10/17)   Complexity of Encounter Low   Length of Encounter 30 minutes   Routine   Type Follow up   Assessment Approachable   Intervention Active listening;Explored feelings, thoughts, concerns;Explored coping resources;Nurtured hope;Sustaining presence/ Ministry of presence   Outcome Engaged in conversation;Expressed feelings/needs/concerns;Receptive

## 2017-05-10 NOTE — PROGRESS NOTES
Pt. Arrives for IVIG infusion, denies c/o. IV started without difficulty. Pt. Premedicated. IVIG started and titrated as follows; 20ml x 20 min, 40ml x 20min, 80ml x 20min, 160 x 20 min, 250ml for remaining infusion.   Vitalts throughout as follows:  138/69  122/89413/67  110/47  104/62  117/64  117/58  117/51  103/61  117/70  IVIG completed without diifficulty

## 2017-05-23 RX ORDER — ALPRAZOLAM 0.5 MG/1
TABLET ORAL
Qty: 90 TABLET | Refills: 1 | Status: SHIPPED | OUTPATIENT
Start: 2017-05-23 | End: 2017-07-25 | Stop reason: SDUPTHER

## 2017-06-01 ENCOUNTER — HOSPITAL ENCOUNTER (OUTPATIENT)
Dept: INFUSION THERAPY | Facility: MEDICAL CENTER | Age: 62
Discharge: HOME | End: 2017-06-01
Payer: MEDICARE

## 2017-06-01 VITALS
DIASTOLIC BLOOD PRESSURE: 69 MMHG | SYSTOLIC BLOOD PRESSURE: 115 MMHG | HEART RATE: 57 BPM | TEMPERATURE: 97.5 F | RESPIRATION RATE: 16 BRPM

## 2017-06-01 DIAGNOSIS — D80.1 COMMON VARIABLE HYPOGAMMAGLOBULINEMIA (HCC): ICD-10-CM

## 2017-06-01 DIAGNOSIS — D83.9 COMMON VARIABLE IMMUNODEFICIENCY (HCC): ICD-10-CM

## 2017-06-01 PROCEDURE — 96361 HYDRATE IV INFUSION ADD-ON: CPT

## 2017-06-01 PROCEDURE — 96366 THER/PROPH/DIAG IV INF ADDON: CPT

## 2017-06-01 PROCEDURE — 6360000002 HC RX W HCPCS: Performed by: ALLERGY & IMMUNOLOGY

## 2017-06-01 PROCEDURE — 2580000003 HC RX 258: Performed by: ALLERGY & IMMUNOLOGY

## 2017-06-01 PROCEDURE — 6370000000 HC RX 637 (ALT 250 FOR IP): Performed by: ALLERGY & IMMUNOLOGY

## 2017-06-01 PROCEDURE — 96365 THER/PROPH/DIAG IV INF INIT: CPT

## 2017-06-01 RX ORDER — DIPHENHYDRAMINE HCL 25 MG
25 TABLET ORAL EVERY 4 HOURS PRN
Status: CANCELLED
Start: 2017-06-01

## 2017-06-01 RX ORDER — DIPHENHYDRAMINE HCL 25 MG
25 TABLET ORAL ONCE
Status: COMPLETED | OUTPATIENT
Start: 2017-06-01 | End: 2017-06-01

## 2017-06-01 RX ORDER — DEXTROSE MONOHYDRATE 50 MG/ML
INJECTION, SOLUTION INTRAVENOUS CONTINUOUS
Status: CANCELLED
Start: 2017-06-01

## 2017-06-01 RX ORDER — HEPARIN SODIUM (PORCINE) LOCK FLUSH IV SOLN 100 UNIT/ML 100 UNIT/ML
500 SOLUTION INTRAVENOUS PRN
Status: CANCELLED
Start: 2017-06-01

## 2017-06-01 RX ORDER — DIPHENHYDRAMINE HCL 25 MG
25 TABLET ORAL EVERY 4 HOURS PRN
Status: DISCONTINUED | OUTPATIENT
Start: 2017-06-01 | End: 2017-06-02 | Stop reason: HOSPADM

## 2017-06-01 RX ORDER — HEPARIN SODIUM (PORCINE) LOCK FLUSH IV SOLN 100 UNIT/ML 100 UNIT/ML
500 SOLUTION INTRAVENOUS PRN
Status: DISCONTINUED | OUTPATIENT
Start: 2017-06-01 | End: 2017-06-02 | Stop reason: HOSPADM

## 2017-06-01 RX ORDER — DEXTROSE AND SODIUM CHLORIDE 5; .33 G/100ML; G/100ML
INJECTION, SOLUTION INTRAVENOUS CONTINUOUS
Status: CANCELLED
Start: 2017-06-01

## 2017-06-01 RX ORDER — DEXTROSE AND SODIUM CHLORIDE 5; .33 G/100ML; G/100ML
INJECTION, SOLUTION INTRAVENOUS CONTINUOUS
Status: DISCONTINUED | OUTPATIENT
Start: 2017-06-01 | End: 2017-06-02 | Stop reason: HOSPADM

## 2017-06-01 RX ORDER — DIPHENHYDRAMINE HCL 25 MG
25 TABLET ORAL ONCE
Status: CANCELLED | OUTPATIENT
Start: 2017-06-01 | End: 2017-06-01

## 2017-06-01 RX ORDER — 0.9 % SODIUM CHLORIDE 0.9 %
10 VIAL (ML) INJECTION PRN
Status: CANCELLED | OUTPATIENT
Start: 2017-06-01

## 2017-06-01 RX ORDER — 0.9 % SODIUM CHLORIDE 0.9 %
10 VIAL (ML) INJECTION PRN
Status: DISCONTINUED | OUTPATIENT
Start: 2017-06-01 | End: 2017-06-02 | Stop reason: HOSPADM

## 2017-06-01 RX ADMIN — Medication 10 ML: at 16:09

## 2017-06-01 RX ADMIN — DIPHENHYDRAMINE HCL 25 MG: 25 TABLET ORAL at 13:59

## 2017-06-01 RX ADMIN — IMMUNE GLOBULIN INTRAVENOUS (HUMAN): 5 INJECTION, SOLUTION INTRAVENOUS at 09:27

## 2017-06-01 RX ADMIN — DEXTROSE AND SODIUM CHLORIDE: 5; 330 INJECTION, SOLUTION INTRAVENOUS at 08:58

## 2017-06-01 RX ADMIN — SODIUM CHLORIDE, PRESERVATIVE FREE 500 UNITS: 5 INJECTION INTRAVENOUS at 16:09

## 2017-06-01 RX ADMIN — DIPHENHYDRAMINE HCL 25 MG: 25 TABLET ORAL at 08:59

## 2017-06-01 ASSESSMENT — PAIN SCALES - GENERAL: PAINLEVEL_OUTOF10: 0

## 2017-06-01 NOTE — PLAN OF CARE
Problem: Safety:  Goal: Free from accidental physical injury  Free from accidental physical injury  Outcome: Completed Date Met:  06/01/17

## 2017-06-01 NOTE — IP AVS SNAPSHOT
After Visit Summary  (Discharge Instructions)    Medication List for Home    Based on the information you provided to us as well as any changes during this visit, the following is your updated medication list.  Compare this with your prescription bottles at home. If you have any questions or concerns, contact your primary care physician's office. Daily Medication List (This medication list can be shared with any healthcare provider who is helping you manage your medications)      ASK your doctor about these medications if you have questions        Last Dose    Next Dose Due AM NOON PM NIGHT    ALPRAZolam 0.5 MG tablet   Commonly known as:  XANAX   TAKE 1 TABLET BY MOUTH 3 TIMES A DAY AS NEEDED                                         amLODIPine 10 MG tablet   Commonly known as:  NORVASC   take 1 tablet by mouth once daily                                         benazepril 10 MG tablet   Commonly known as:  LOTENSIN   take 1 tablet by mouth once daily                                         EPINEPHRINE IJ   Inject 0.3 mLs as directed. fluticasone 50 MCG/ACT nasal spray   Commonly known as:  FLONASE   1 spray by Nasal route daily. ibuprofen 800 MG tablet   Commonly known as:  ADVIL;MOTRIN   Take 800 mg by mouth every 6 hours as needed for Pain Pt would like refill                                         Immune Globulin (Human) 10 GM/100ML Soln IV solution   Infuse 10 g intravenously every 21 days                                         montelukast 10 MG tablet   Commonly known as:  SINGULAIR   Take 1 tablet by mouth nightly.                                          omega-3 acid ethyl esters 1 G capsule   Commonly known as:  LOVAZA   take 2 capsules twice a day                                         omeprazole 20 MG delayed release capsule   Commonly known as:  PRILOSEC   take 1 capsule by mouth once daily PROAIR HFA IN   Inhale 2 puffs into the lungs every 4 hours as needed. PROAIR  (90 BASE) MCG/ACT inhaler   Generic drug:  albuterol sulfate HFA                                         albuterol (2.5 MG/3ML) 0.083% nebulizer solution   Commonly known as:  PROVENTIL   Take 3 mLs by nebulization every 4 hours as needed for Wheezing                                         SYMBICORT 160-4.5 MCG/ACT Aero   Generic drug:  budesonide-formoterol   INHALE 2 PUFFS TWICE A DAY AS DIRECTED                                         tiotropium 18 MCG inhalation capsule   Commonly known as:  SPIRIVA   Inhale 18 mcg into the lungs daily. Allergies as of 6/1/2017     No Known Allergies      Immunizations as of 6/1/2017     Name Date Dose VIS Date Route    Influenza Virus Vaccine 10/5/2015 -- -- --    External: Patient reported    Influenza Virus Vaccine 10/7/2014 0.5 mL 8/19/2014 Intramuscular    Pneumococcal 13-valent Conjugate (Anand Monique) 10/5/2015 -- -- --    External: Patient reported    Pneumococcal Polysaccharide ([id number]) 12/2/2016 0.5 mL 4/24/2015 Intramuscular      Last Vitals          Most Recent Value    Temp  97.5 °F (36.4 °C)    Pulse  57    Resp  16    BP  115/69         After Visit Summary    This summary was created for you. Thank you for entrusting your care to us. The following information includes details about your hospital/visit stay along with steps you should take to help with your recovery once you leave the hospital.  In this packet, you will find information about the topics listed below:    · Instructions about your medications including a list of your home medications  · A summary of your hospital visit  · Follow-up appointments once you have left the hospital  · Your care plan at home      You may receive a survey regarding the care you received during your stay. Your input is valuable to us. We encourage you to complete and return your survey in the envelope provided. We hope you will choose us in the future for your healthcare needs. Patient Information     Patient Name NOLBERTO Ramírez 1955      Care Provided at:     Name             [facility]              Your Visit    Here you will find information about your visit, including the reason for your visit. Please take this sheet with you when you visit your doctor or other health care provider in the future. It will help determine the best possible medical care for you at that time. If you have any questions once you leave the hospital, please call the department phone number listed below. Why you were here     Your primary diagnosis was:  Not on File      Visit Information     Date & Time Department Dept. Phone    2017 [facility]  [phone]       Follow-up Appointments    Below is a list of your follow-up and future appointments. This may not be a complete list as you may have made appointments directly with providers that we are not aware of or your providers may have made some for you. Please call your providers to confirm appointments. It is important to keep your appointments. Please bring your current insurance card, photo ID, co-pay, and all medication bottles to your appointment. If self-pay, payment is expected at the time of service.         Future Appointments     2017 8:30 AM     Appointment with CLEMENTINA STA CHAIR 18 at [facility]  [phone] [facility]       2017 8:30 AM     [facility]oint[facility] with [facility] ([phone][facility]   [address] [place] [address]       2017 9:45 AM     Appointment with Sheila Hawkins MD at [address] [facility] [address] ([phone])   Please arrive 15 minutes prior to appointment time, bring insurance card and photo ID.    [address] [facility] Once You Return Home    This section includes instructions you will need to follow once you leave the hospital.  Your care team will discuss these with you, so you and those caring for you know how to best care for your health needs at home. This section may also include educational information about certain health topics that may be of help to you. Discharge Instructions       Verbalized understanding of discharge instructions, follow-up appointments, and when to call the physician. Important information for a smoker       SMOKING: QUIT SMOKING. THIS IS THE MOST IMPORTANT ACTION YOU CAN TAKE TO IMPROVE YOUR CURRENT AND FUTURE HEALTH. Call the [address] [place] [address] at [place] NOW ([phone])    Smoking harms nonsmokers. When nonsmokers are around people who smoke, they absorb nicotine, carbon monoxide, and other ingredients of tobacco smoke. DO NOT SMOKE AROUND CHILDREN     Children exposed to secondhand smoke are at an increased risk of:  Sudden Infant Death Syndrome (SIDS), acute respiratory infections, inflammation of the middle ear, and severe asthma. Over a longer time, it causes heart disease and lung cancer. There is no safe level of exposure to secondhand smoke. [employer] Signup     Our records indicate that you have an active [employer] account. You can view your After Visit Summary by going to [email or web address] and logging in with your [employer] username and password. If you don't have a [employer] username and password but a parent or guardian has access to your record, the parent or guardian should login with their own [employer] username and password and access your record to view the After Visit Summary.      Additional Information  If you have questions, please contact the physician practice where you receive care. Remember, MyChart is NOT to be used for urgent needs. For medical emergencies, dial 911. For questions regarding your MyChart account call [phone]. If you have a clinical question, please call your doctor's office. View your information online  ? Review your current list of  medications, immunization, and allergies. ? Review your future test results online . ? Review your discharge instructions provided by your caregivers at discharge    Certain functionality such as prescription refills, scheduling appointments or sending messages to your provider are not activated if your provider does not use [employer] in his/her office    For questions regarding your MyChart account call [phone]. If you have a clinical question, please call your doctor's office. The information on all pages of the After Visit Summary has been reviewed with me, the patient and/or responsible adult, by my health care provider(s). I had the opportunity to ask questions regarding this information. I understand I should dispose of my armband safely at home to protect my health information. A complete copy of the After Visit Summary has been given to me, the patient and/or responsible adult.            Patient Signature/Responsible Adult:____________________    Clinician Signature:_____________________    Date:_____________________    Time:_____________________

## 2017-06-01 NOTE — IP AVS SNAPSHOT
Patient Information     Patient Name NOLBERTO Mike 1955         This is your updated medication list to keep with you all times      ASK your doctor about these medications     ALPRAZolam 0.5 MG tablet   Commonly known as:  XANAX   TAKE 1 TABLET BY MOUTH 3 TIMES A DAY AS NEEDED       amLODIPine 10 MG tablet   Commonly known as:  NORVASC   take 1 tablet by mouth once daily       benazepril 10 MG tablet   Commonly known as:  LOTENSIN   take 1 tablet by mouth once daily       EPINEPHRINE IJ       fluticasone 50 MCG/ACT nasal spray   Commonly known as:  FLONASE       ibuprofen 800 MG tablet   Commonly known as:  ADVIL;MOTRIN       Immune Globulin (Human) 10 GM/100ML Soln IV solution       montelukast 10 MG tablet   Commonly known as:  SINGULAIR   Take 1 tablet by mouth nightly. omega-3 acid ethyl esters 1 G capsule   Commonly known as:  LOVAZA   take 2 capsules twice a day       omeprazole 20 MG delayed release capsule   Commonly known as:  PRILOSEC   take 1 capsule by mouth once daily       * PROAIR HFA IN       * PROAIR  (90 BASE) MCG/ACT inhaler   Generic drug:  albuterol sulfate HFA       * albuterol (2.5 MG/3ML) 0.083% nebulizer solution   Commonly known as:  PROVENTIL   Take 3 mLs by nebulization every 4 hours as needed for Wheezing       SYMBICORT 160-4.5 MCG/ACT Aero   Generic drug:  budesonide-formoterol   INHALE 2 PUFFS TWICE A DAY AS DIRECTED       tiotropium 18 MCG inhalation capsule   Commonly known as:  SPIRIVA       * Notice: This list has 3 medication(s) that are the same as other medications prescribed for you. Read the directions carefully, and ask your doctor or other care provider to review them with you.

## 2017-06-01 NOTE — PROGRESS NOTES
Patient arrives per ambulation for IVIG infusion. Orders reviewed. Right chest port accessed as per policy without difficulty with brisk, excellent blood return, no signs of infections, flushes easily. No complaints voiced. Denies recent fever. D5 1/3 normal saline hung and 250 ml infused over 30 minutes pre- IVIG. See MAR for pre-medication. 0927-IVIG 60 grams hung. Rate initiated at 20 ml for 20 minutes, then 40 ml for 20 minutes, then 80 ml for 20 minutes, then 160 ml for 20 minutes, then increased to and maintained at 250 ml until completed at 1532. Tolerated IVIG well, no complaints voiced, no reaction. D5 1/3 normal saline, 250 ml infused over 30 minutes post-IVIG. Vital signs every 15 minutes as follows until 1056:  116/62   71  115/69   73  110/68   70  117/95   78  120/82   83  129/66   55  108/57   67  109/66   70  Vital signs every 30 minutes starting at 1057 as follows:  111/69   71  114/77   91  113/85   87  120/59   83  111/70   79  120/71   84  125/79   85  119/68   83  115/66   91  132/80   88  Discharged per ambulation.   RTC 6/21/17

## 2017-06-08 RX ORDER — TRAMADOL HYDROCHLORIDE 50 MG/1
50 TABLET ORAL EVERY 6 HOURS PRN
Qty: 120 TABLET | Refills: 1 | OUTPATIENT
Start: 2017-06-08 | End: 2017-07-08

## 2017-06-13 RX ORDER — TRAMADOL HYDROCHLORIDE 50 MG/1
50 TABLET ORAL EVERY 6 HOURS PRN
Qty: 120 TABLET | Refills: 1 | Status: SHIPPED | OUTPATIENT
Start: 2017-06-13 | End: 2017-07-13

## 2017-06-16 DIAGNOSIS — D83.9 COMMON VARIABLE IMMUNODEFICIENCY (HCC): Primary | Chronic | ICD-10-CM

## 2017-06-21 ENCOUNTER — HOSPITAL ENCOUNTER (OUTPATIENT)
Dept: INFUSION THERAPY | Facility: MEDICAL CENTER | Age: 62
Discharge: HOME | End: 2017-06-21
Payer: MEDICARE

## 2017-06-21 VITALS
TEMPERATURE: 97.7 F | HEART RATE: 79 BPM | RESPIRATION RATE: 16 BRPM | DIASTOLIC BLOOD PRESSURE: 66 MMHG | SYSTOLIC BLOOD PRESSURE: 122 MMHG

## 2017-06-21 DIAGNOSIS — D80.1 COMMON VARIABLE HYPOGAMMAGLOBULINEMIA (HCC): ICD-10-CM

## 2017-06-21 DIAGNOSIS — D83.9 COMMON VARIABLE IMMUNODEFICIENCY (HCC): Chronic | ICD-10-CM

## 2017-06-21 LAB
ABSOLUTE EOS #: 0.1 K/UL (ref 0–0.4)
ABSOLUTE LYMPH #: 1.5 K/UL (ref 1–4.8)
ABSOLUTE MONO #: 0.3 K/UL (ref 0.2–0.8)
ALT SERPL-CCNC: 31 U/L (ref 5–41)
BASOPHILS # BLD: 1 %
BASOPHILS ABSOLUTE: 0 K/UL (ref 0–0.2)
CREAT SERPL-MCNC: 0.93 MG/DL (ref 0.7–1.2)
DIFFERENTIAL TYPE: NORMAL
EOSINOPHILS RELATIVE PERCENT: 2 %
GFR AFRICAN AMERICAN: >60 ML/MIN
GFR NON-AFRICAN AMERICAN: >60 ML/MIN
GFR SERPL CREATININE-BSD FRML MDRD: NORMAL ML/MIN/{1.73_M2}
GFR SERPL CREATININE-BSD FRML MDRD: NORMAL ML/MIN/{1.73_M2}
HCT VFR BLD CALC: 42.2 % (ref 41–53)
HEMOGLOBIN: 14.5 G/DL (ref 13.5–17.5)
IGA: 72 MG/DL (ref 70–400)
IGG: 787 MG/DL (ref 700–1600)
IGM: 99 MG/DL (ref 40–230)
LYMPHOCYTES # BLD: 22 %
MCH RBC QN AUTO: 29.4 PG (ref 26–34)
MCHC RBC AUTO-ENTMCNC: 34.4 G/DL (ref 31–37)
MCV RBC AUTO: 85.6 FL (ref 80–100)
MONOCYTES # BLD: 5 %
PDW BLD-RTO: 13.2 % (ref 11.5–14.5)
PLATELET # BLD: 247 K/UL (ref 130–400)
PLATELET ESTIMATE: NORMAL
PMV BLD AUTO: 8.1 FL (ref 6–12)
RBC # BLD: 4.93 M/UL (ref 4.5–5.9)
RBC # BLD: NORMAL 10*6/UL
SEG NEUTROPHILS: 70 %
SEGMENTED NEUTROPHILS ABSOLUTE COUNT: 4.8 K/UL (ref 1.8–7.7)
WBC # BLD: 6.7 K/UL (ref 3.5–11)
WBC # BLD: NORMAL 10*3/UL

## 2017-06-21 PROCEDURE — 2580000003 HC RX 258: Performed by: ALLERGY & IMMUNOLOGY

## 2017-06-21 PROCEDURE — 36591 DRAW BLOOD OFF VENOUS DEVICE: CPT

## 2017-06-21 PROCEDURE — 96361 HYDRATE IV INFUSION ADD-ON: CPT

## 2017-06-21 PROCEDURE — 82784 ASSAY IGA/IGD/IGG/IGM EACH: CPT

## 2017-06-21 PROCEDURE — 96366 THER/PROPH/DIAG IV INF ADDON: CPT

## 2017-06-21 PROCEDURE — 6360000002 HC RX W HCPCS: Performed by: ALLERGY & IMMUNOLOGY

## 2017-06-21 PROCEDURE — 82565 ASSAY OF CREATININE: CPT

## 2017-06-21 PROCEDURE — 85025 COMPLETE CBC W/AUTO DIFF WBC: CPT

## 2017-06-21 PROCEDURE — 84460 ALANINE AMINO (ALT) (SGPT): CPT

## 2017-06-21 PROCEDURE — 96360 HYDRATION IV INFUSION INIT: CPT

## 2017-06-21 PROCEDURE — 6370000000 HC RX 637 (ALT 250 FOR IP): Performed by: ALLERGY & IMMUNOLOGY

## 2017-06-21 PROCEDURE — 96365 THER/PROPH/DIAG IV INF INIT: CPT

## 2017-06-21 RX ORDER — 0.9 % SODIUM CHLORIDE 0.9 %
10 VIAL (ML) INJECTION PRN
Status: DISCONTINUED | OUTPATIENT
Start: 2017-06-21 | End: 2017-06-22 | Stop reason: HOSPADM

## 2017-06-21 RX ORDER — DEXTROSE MONOHYDRATE 50 MG/ML
INJECTION, SOLUTION INTRAVENOUS CONTINUOUS
Status: CANCELLED
Start: 2017-06-21

## 2017-06-21 RX ORDER — DIPHENHYDRAMINE HCL 25 MG
25 TABLET ORAL ONCE
Status: COMPLETED | OUTPATIENT
Start: 2017-06-21 | End: 2017-06-21

## 2017-06-21 RX ORDER — 0.9 % SODIUM CHLORIDE 0.9 %
10 VIAL (ML) INJECTION PRN
Status: CANCELLED | OUTPATIENT
Start: 2017-06-21

## 2017-06-21 RX ORDER — DEXTROSE AND SODIUM CHLORIDE 5; .33 G/100ML; G/100ML
INJECTION, SOLUTION INTRAVENOUS CONTINUOUS
Status: CANCELLED
Start: 2017-06-21

## 2017-06-21 RX ORDER — DEXTROSE AND SODIUM CHLORIDE 5; .33 G/100ML; G/100ML
INJECTION, SOLUTION INTRAVENOUS CONTINUOUS
Status: DISCONTINUED | OUTPATIENT
Start: 2017-06-21 | End: 2017-06-22 | Stop reason: HOSPADM

## 2017-06-21 RX ORDER — DIPHENHYDRAMINE HCL 25 MG
25 TABLET ORAL EVERY 4 HOURS PRN
Status: DISCONTINUED | OUTPATIENT
Start: 2017-06-21 | End: 2017-06-22 | Stop reason: HOSPADM

## 2017-06-21 RX ORDER — HEPARIN SODIUM (PORCINE) LOCK FLUSH IV SOLN 100 UNIT/ML 100 UNIT/ML
500 SOLUTION INTRAVENOUS PRN
Status: DISCONTINUED | OUTPATIENT
Start: 2017-06-21 | End: 2017-06-22 | Stop reason: HOSPADM

## 2017-06-21 RX ORDER — DIPHENHYDRAMINE HCL 25 MG
25 TABLET ORAL ONCE
Status: CANCELLED | OUTPATIENT
Start: 2017-06-21 | End: 2017-06-21

## 2017-06-21 RX ORDER — DIPHENHYDRAMINE HCL 25 MG
25 TABLET ORAL EVERY 4 HOURS PRN
Status: CANCELLED
Start: 2017-06-21

## 2017-06-21 RX ORDER — HEPARIN SODIUM (PORCINE) LOCK FLUSH IV SOLN 100 UNIT/ML 100 UNIT/ML
500 SOLUTION INTRAVENOUS PRN
Status: CANCELLED
Start: 2017-06-21

## 2017-06-21 RX ADMIN — DIPHENHYDRAMINE HCL 25 MG: 25 TABLET ORAL at 09:17

## 2017-06-21 RX ADMIN — Medication 10 ML: at 16:08

## 2017-06-21 RX ADMIN — SODIUM CHLORIDE, PRESERVATIVE FREE 500 UNITS: 5 INJECTION INTRAVENOUS at 16:08

## 2017-06-21 RX ADMIN — DIPHENHYDRAMINE HCL 25 MG: 25 TABLET ORAL at 14:12

## 2017-06-21 RX ADMIN — DEXTROSE AND SODIUM CHLORIDE: 5; 330 INJECTION, SOLUTION INTRAVENOUS at 09:11

## 2017-06-21 RX ADMIN — IMMUNE GLOBULIN INTRAVENOUS (HUMAN): 5 INJECTION, SOLUTION INTRAVENOUS at 09:39

## 2017-06-21 NOTE — PLAN OF CARE
Problem: Safety:  Goal: Free from intentional harm  Free from intentional harm  Outcome: Met This Shift

## 2017-06-21 NOTE — PLAN OF CARE
Problem: Safety:  Goal: Free from accidental physical injury  Free from accidental physical injury  Outcome: Met This Shift

## 2017-06-21 NOTE — PROGRESS NOTES
Melinda Bowling arrives ambulatory alone  Order reviewed for IVIG infusion  Rt chest port accessed per protocol with #20 G 3/4 L Carlos needle   Good blood return noted and blood work obtained and sent to lab  Pre hydration completed  See MAR for pre medications  940 IVIG 60 gms initiated at 20ml/hr x 20 minutes  109/64  68  18   No reaction noted  955 iv rate increased to 40ml/hr x 20 minutes  109/67  67  18  1010 iv rate increased to 80ml/hr x 20 minutes  108/66  68  18  No reaction noted  1025 iv rate increased to 160ml/hr x 30 minutes  105/68  76  18     110/68  69  18   No reaction noted  1055 iv rate increased to 250ml/hr until completion of infusion  109/68  67  18  Vital signs taken every 15 minutes until completion of infusion and are:  105/69  65  18  114/71  65  18  126/65  83 18  117/78  89  18  115/71  85  18  101/61  74  18  111/79  86  18  120/73  77  16  112/68  76  16  123/67  69  16  127/71  76  18  144/81  76  18  129/68  77  18  120/69  78  18  131/73  74  18  116/64  74  18  134/74  89  18  119/71  75  18 at completion of infusion  Post hydration completed and carlos needle flushed per protocol  Carlos needle removed with needle intact  Band aid applied  Discharged per ambulatory

## 2017-06-22 ENCOUNTER — TELEPHONE (OUTPATIENT)
Dept: PULMONOLOGY | Age: 62
End: 2017-06-22

## 2017-07-09 DIAGNOSIS — J44.9 COPD (CHRONIC OBSTRUCTIVE PULMONARY DISEASE) (HCC): ICD-10-CM

## 2017-07-10 RX ORDER — BUDESONIDE AND FORMOTEROL FUMARATE DIHYDRATE 160; 4.5 UG/1; UG/1
AEROSOL RESPIRATORY (INHALATION)
Qty: 10.2 INHALER | Refills: 11 | Status: SHIPPED | OUTPATIENT
Start: 2017-07-10 | End: 2018-07-20 | Stop reason: ALTCHOICE

## 2017-07-10 RX ORDER — FLUTICASONE PROPIONATE 50 MCG
SPRAY, SUSPENSION (ML) NASAL
Qty: 1 BOTTLE | Refills: 11 | Status: SHIPPED | OUTPATIENT
Start: 2017-07-10 | End: 2018-10-30 | Stop reason: SDUPTHER

## 2017-07-10 RX ORDER — TIOTROPIUM BROMIDE 18 UG/1
CAPSULE ORAL; RESPIRATORY (INHALATION)
Qty: 30 CAPSULE | Refills: 11 | Status: SHIPPED | OUTPATIENT
Start: 2017-07-10 | End: 2020-01-02 | Stop reason: SDUPTHER

## 2017-07-10 RX ORDER — MONTELUKAST SODIUM 10 MG/1
TABLET ORAL
Qty: 30 TABLET | Refills: 11 | Status: SHIPPED | OUTPATIENT
Start: 2017-07-10 | End: 2018-05-08 | Stop reason: ALTCHOICE

## 2017-07-11 ENCOUNTER — TELEPHONE (OUTPATIENT)
Dept: ONCOLOGY | Age: 62
End: 2017-07-11

## 2017-07-11 DIAGNOSIS — J98.4 LUNG DISEASE: Chronic | ICD-10-CM

## 2017-07-11 DIAGNOSIS — R89.4 ABNORMALITY OF IMMUNE SYSTEM: Chronic | ICD-10-CM

## 2017-07-12 ENCOUNTER — HOSPITAL ENCOUNTER (OUTPATIENT)
Dept: INFUSION THERAPY | Facility: MEDICAL CENTER | Age: 62
Discharge: HOME | End: 2017-07-12
Payer: MEDICARE

## 2017-07-12 VITALS
HEART RATE: 76 BPM | SYSTOLIC BLOOD PRESSURE: 131 MMHG | DIASTOLIC BLOOD PRESSURE: 78 MMHG | RESPIRATION RATE: 20 BRPM | TEMPERATURE: 97.4 F

## 2017-07-12 DIAGNOSIS — D83.9 COMMON VARIABLE IMMUNODEFICIENCY (HCC): Chronic | ICD-10-CM

## 2017-07-12 DIAGNOSIS — D80.1 COMMON VARIABLE HYPOGAMMAGLOBULINEMIA (HCC): ICD-10-CM

## 2017-07-12 LAB
IGA: 76 MG/DL (ref 70–400)
IGG: 839 MG/DL (ref 700–1600)
IGM: 112 MG/DL (ref 40–230)

## 2017-07-12 PROCEDURE — 6370000000 HC RX 637 (ALT 250 FOR IP): Performed by: ALLERGY & IMMUNOLOGY

## 2017-07-12 PROCEDURE — 6360000002 HC RX W HCPCS: Performed by: ALLERGY & IMMUNOLOGY

## 2017-07-12 PROCEDURE — 2580000003 HC RX 258: Performed by: ALLERGY & IMMUNOLOGY

## 2017-07-12 PROCEDURE — 82784 ASSAY IGA/IGD/IGG/IGM EACH: CPT

## 2017-07-12 PROCEDURE — 96365 THER/PROPH/DIAG IV INF INIT: CPT

## 2017-07-12 PROCEDURE — 36591 DRAW BLOOD OFF VENOUS DEVICE: CPT

## 2017-07-12 PROCEDURE — 96366 THER/PROPH/DIAG IV INF ADDON: CPT

## 2017-07-12 RX ORDER — DIPHENHYDRAMINE HCL 25 MG
25 TABLET ORAL ONCE
Status: COMPLETED | OUTPATIENT
Start: 2017-07-12 | End: 2017-07-12

## 2017-07-12 RX ORDER — 0.9 % SODIUM CHLORIDE 0.9 %
10 VIAL (ML) INJECTION PRN
Status: CANCELLED | OUTPATIENT
Start: 2017-07-12

## 2017-07-12 RX ORDER — DEXTROSE AND SODIUM CHLORIDE 5; .33 G/100ML; G/100ML
INJECTION, SOLUTION INTRAVENOUS CONTINUOUS
Status: DISCONTINUED | OUTPATIENT
Start: 2017-07-12 | End: 2017-07-13 | Stop reason: HOSPADM

## 2017-07-12 RX ORDER — HEPARIN SODIUM (PORCINE) LOCK FLUSH IV SOLN 100 UNIT/ML 100 UNIT/ML
500 SOLUTION INTRAVENOUS PRN
Status: DISCONTINUED | OUTPATIENT
Start: 2017-07-12 | End: 2017-07-13 | Stop reason: HOSPADM

## 2017-07-12 RX ORDER — 0.9 % SODIUM CHLORIDE 0.9 %
10 VIAL (ML) INJECTION PRN
Status: DISCONTINUED | OUTPATIENT
Start: 2017-07-12 | End: 2017-07-13 | Stop reason: HOSPADM

## 2017-07-12 RX ORDER — DIPHENHYDRAMINE HCL 25 MG
25 TABLET ORAL EVERY 4 HOURS PRN
Status: DISCONTINUED | OUTPATIENT
Start: 2017-07-12 | End: 2017-07-13 | Stop reason: HOSPADM

## 2017-07-12 RX ORDER — DIPHENHYDRAMINE HCL 25 MG
25 TABLET ORAL ONCE
Status: CANCELLED | OUTPATIENT
Start: 2017-07-12 | End: 2017-07-12

## 2017-07-12 RX ORDER — DIPHENHYDRAMINE HCL 25 MG
25 TABLET ORAL EVERY 4 HOURS PRN
Status: CANCELLED
Start: 2017-07-12

## 2017-07-12 RX ORDER — HEPARIN SODIUM (PORCINE) LOCK FLUSH IV SOLN 100 UNIT/ML 100 UNIT/ML
500 SOLUTION INTRAVENOUS PRN
Status: CANCELLED
Start: 2017-07-12

## 2017-07-12 RX ORDER — DEXTROSE MONOHYDRATE 50 MG/ML
INJECTION, SOLUTION INTRAVENOUS CONTINUOUS
Status: CANCELLED
Start: 2017-07-12

## 2017-07-12 RX ORDER — DEXTROSE AND SODIUM CHLORIDE 5; .33 G/100ML; G/100ML
INJECTION, SOLUTION INTRAVENOUS CONTINUOUS
Status: CANCELLED
Start: 2017-07-12

## 2017-07-12 RX ADMIN — DEXTROSE AND SODIUM CHLORIDE: 5; 330 INJECTION, SOLUTION INTRAVENOUS at 08:54

## 2017-07-12 RX ADMIN — SODIUM CHLORIDE, PRESERVATIVE FREE 500 UNITS: 5 INJECTION INTRAVENOUS at 16:03

## 2017-07-12 RX ADMIN — Medication 20 ML: at 08:38

## 2017-07-12 RX ADMIN — DIPHENHYDRAMINE HCL 25 MG: 25 TABLET ORAL at 08:54

## 2017-07-12 RX ADMIN — DIPHENHYDRAMINE HCL 25 MG: 25 TABLET ORAL at 13:59

## 2017-07-12 RX ADMIN — IMMUNE GLOBULIN INTRAVENOUS (HUMAN): 5 INJECTION, SOLUTION INTRAVENOUS at 09:15

## 2017-07-12 NOTE — IP AVS SNAPSHOT
After Visit Summary  (Discharge Instructions)    Medication List for Home    Based on the information you provided to us as well as any changes during this visit, the following is your updated medication list.  Compare this with your prescription bottles at home. If you have any questions or concerns, contact your primary care physician's office. Daily Medication List (This medication list can be shared with any healthcare provider who is helping you manage your medications)      ASK your doctor about these medications if you have questions        Last Dose    Next Dose Due AM NOON PM NIGHT    ALPRAZolam 0.5 MG tablet   Commonly known as:  XANAX   TAKE 1 TABLET BY MOUTH 3 TIMES A DAY AS NEEDED                                         amLODIPine 10 MG tablet   Commonly known as:  NORVASC   take 1 tablet by mouth once daily                                         benazepril 10 MG tablet   Commonly known as:  LOTENSIN   take 1 tablet by mouth once daily                                         EPINEPHRINE IJ   Inject 0.3 mLs as directed.                                          fluticasone 50 MCG/ACT nasal spray   Commonly known as:  FLONASE   instill 2 sprays into each nostril twice a day                                         ibuprofen 800 MG tablet   Commonly known as:  ADVIL;MOTRIN   Take 800 mg by mouth every 6 hours as needed for Pain Pt would like refill                                         Immune Globulin (Human) 10 GM/100ML Soln IV solution   Infuse 10 g intravenously every 21 days                7/12/2017  9:15 AM                            montelukast 10 MG tablet   Commonly known as:  SINGULAIR   take 1 tablet by mouth at bedtime                                         omega-3 acid ethyl esters 1 g capsule   Commonly known as:  LOVAZA   take 2 capsules twice a day                                         omeprazole 20 MG delayed release capsule   Commonly known as:  PRILOSEC take 1 capsule by mouth once daily                                         PROAIR HFA IN   Inhale 2 puffs into the lungs every 4 hours as needed. PROAIR  (90 Base) MCG/ACT inhaler   Generic drug:  albuterol sulfate HFA                                         albuterol (2.5 MG/3ML) 0.083% nebulizer solution   Commonly known as:  PROVENTIL   Take 3 mLs by nebulization every 4 hours as needed for Wheezing                                         SPIRIVA HANDIHALER 18 MCG inhalation capsule   Generic drug:  tiotropium   inhale contents of 1 capsule by mouth once daily                                         SYMBICORT 160-4.5 MCG/ACT Aero   Generic drug:  budesonide-formoterol   inhale 2 puffs by mouth twice a day as directed                                         theophylline 300 MG extended release capsule   Commonly known as:  YOLANDA-24   Take 300 mg by mouth daily                                         traMADol 50 MG tablet   Commonly known as:  ULTRAM   Take 1 tablet by mouth every 6 hours as needed for Pain                                                 Allergies as of 7/12/2017     No Known Allergies      Immunizations as of 7/12/2017     Name Date Dose VIS Date Route    Influenza Virus Vaccine 10/5/2015 -- -- --    External: Patient reported    Influenza Virus Vaccine 10/7/2014 0.5 mL 8/19/2014 Intramuscular    Pneumococcal 13-valent Conjugate (Moe Reyes) 10/5/2015 -- -- --    External: Patient reported    Pneumococcal Polysaccharide ([id number]) 12/2/2016 0.5 mL 4/24/2015 Intramuscular      Last Vitals          Most Recent Value    Temp  97.4 °F (36.3 °C)    Pulse  76    Resp  20    BP  131/78         After Visit Summary    This summary was created for you. Thank you for entrusting your care to us.   The following information includes details about your hospital/visit stay along with steps you should take to help with your recovery once you leave the hospital.  In this packet, you will find information about the topics listed below:    · Instructions about your medications including a list of your home medications  · A summary of your hospital visit  · Follow-up appointments once you have left the hospital  · Your care plan at home      You may receive a survey regarding the care you received during your stay. Your input is valuable to us. We encourage you to complete and return your survey in the envelope provided. We hope you will choose us in the future for your healthcare needs. Patient Information     Patient Name NOLBERTO Lauren 1955      Care Provided at:     Name             [address] [facility]              Your Visit    Here you will find information about your visit, including the reason for your visit. Please take this sheet with you when you visit your doctor or other health care provider in the future. It will help determine the best possible medical care for you at that time. If you have any questions once you leave the hospital, please call the department phone number listed below. Why you were here     Your primary diagnosis was:  Not on File      Visit Information     Date & Time Department Dept. Phone    2017 Roxy Cook 47 [phone]       Follow-up Appointments    Below is a list of your follow-up and future appointments. This may not be a complete list as you may have made appointments directly with providers that we are not aware of or your providers may have made some for you. Please call your providers to confirm appointments. It is important to keep your appointments. Please bring your current insurance card, photo ID, co-pay, and all medication bottles to your appointment. If self-pay, payment is expected at the time of service.         Future Appointments     2017 3:45 PM Appointment with Samuel Mercado MD at [address] [facility] [address] ([phone])   Please arrive 15 minutes prior to appointment time, bring insurance card and photo ID.    Earnest Friedman.  [address] [place] [address] [address]       8/2/2017 8:30 AM     Appointment with STV STA CHAIR 11 at [place] [address] [phone] [facility]       8/23/2017 8:30 AM     Appointment with STV STA CHAIR 07 at . [place] [address] ([phone])   [facility]        Date Due    Tetanus Combination Vaccine (1 - Tdap) 8/29/1976    Yearly Flu Vaccine (1) 8/1/2017    Colonoscopy 1/1/2019    Diabetes Screening 3/1/2019    Cholesterol Screening 3/4/2020    Pneumococcal Vaccines (two) for adults aged 19-64  years who are immunocompromised or whose spleen is missing or not working  (3 of 3 - PPSV23) 12/2/2021                 Care Plan Once You Return Home    This section includes instructions you will need to follow once you leave the hospital.  Your care team will discuss these with you, so you and those caring for you know how to best care for your health needs at home. This section may also include educational information about certain health topics that may be of help to you. Discharge Instructions       Verbalized understanding of discharge instructions, follow-up appointments, and when to call the physician. Important information for a smoker       SMOKING: QUIT SMOKING. THIS IS THE MOST IMPORTANT ACTION YOU CAN TAKE TO IMPROVE YOUR CURRENT AND FUTURE HEALTH. Call the [facility] NOW ([phone])    Smoking harms nonsmokers. When nonsmokers are around people who smoke, they absorb nicotine, carbon monoxide, and other ingredients of tobacco smoke.      DO NOT SMOKE AROUND CHILDREN     Children exposed to secondhand smoke are at an increased risk of:  Sudden Infant Death Syndrome (SIDS), acute respiratory infections, inflammation of the middle ear, and severe asthma. Over a longer time, it causes heart disease and lung cancer. There is no safe level of exposure to secondhand smoke. MyChart Signup     Our records indicate that you have an active MyChart account. You can view your After Visit Summary by going to [email or web address] and logging in with your [employer] username and password. If you don't have a [employer] username and password but a parent or guardian has access to your record, the parent or guardian should login with their own [employer] username and password and access your record to view the After Visit Summary. Additional Information  If you have questions, please contact the physician practice where you receive care. Remember, MyChart is NOT to be used for urgent needs. For medical emergencies, dial 911. For questions regarding your MyChart account call [phone]. If you have a clinical question, please call your doctor's office. View your information online  ? Review your current list of  medications, immunization, and allergies. ? Review your future test results online . ? Review your discharge instructions provided by your caregivers at discharge    Certain functionality such as prescription refills, scheduling appointments or sending messages to your provider are not activated if your provider does not use [employer] in his/her office    For questions regarding your MyChart account call [phone]. If you have a clinical question, please call your doctor's office. The information on all pages of the After Visit Summary has been reviewed with me, the patient and/or responsible adult, by my health care provider(s). I had the opportunity to ask questions regarding this information.  I understand I should dispose of my armband safely at home to protect my health information. A complete copy of the After Visit Summary has been given to me, the patient and/or responsible adult.            Patient Signature/Responsible Adult:____________________    Clinician Signature:_____________________    Date:_____________________    Time:_____________________

## 2017-07-12 NOTE — IP AVS SNAPSHOT
Patient Information     Patient Name NOLBERTO Bernal 1955         This is your updated medication list to keep with you all times      ASK your doctor about these medications     ALPRAZolam 0.5 MG tablet   Commonly known as:  XANAX   TAKE 1 TABLET BY MOUTH 3 TIMES A DAY AS NEEDED       amLODIPine 10 MG tablet   Commonly known as:  NORVASC   take 1 tablet by mouth once daily       benazepril 10 MG tablet   Commonly known as:  LOTENSIN   take 1 tablet by mouth once daily       EPINEPHRINE IJ       fluticasone 50 MCG/ACT nasal spray   Commonly known as:  FLONASE   instill 2 sprays into each nostril twice a day       ibuprofen 800 MG tablet   Commonly known as:  ADVIL;MOTRIN       Immune Globulin (Human) 10 GM/100ML Soln IV solution       montelukast 10 MG tablet   Commonly known as:  SINGULAIR   take 1 tablet by mouth at bedtime       omega-3 acid ethyl esters 1 g capsule   Commonly known as:  LOVAZA   take 2 capsules twice a day       omeprazole 20 MG delayed release capsule   Commonly known as:  PRILOSEC   take 1 capsule by mouth once daily       * PROAIR HFA IN       * PROAIR  (90 Base) MCG/ACT inhaler   Generic drug:  albuterol sulfate HFA       * albuterol (2.5 MG/3ML) 0.083% nebulizer solution   Commonly known as:  PROVENTIL   Take 3 mLs by nebulization every 4 hours as needed for Wheezing       SPIRIVA HANDIHALER 18 MCG inhalation capsule   Generic drug:  tiotropium   inhale contents of 1 capsule by mouth once daily       SYMBICORT 160-4.5 MCG/ACT Aero   Generic drug:  budesonide-formoterol   inhale 2 puffs by mouth twice a day as directed       theophylline 300 MG extended release capsule   Commonly known as:  YOLANDA-24       traMADol 50 MG tablet   Commonly known as:  ULTRAM   Take 1 tablet by mouth every 6 hours as needed for Pain       * Notice: This list has 3 medication(s) that are the same as other medications prescribed for you.  Read the directions carefully, and ask your doctor or other care provider to review them with you.

## 2017-07-12 NOTE — PROGRESS NOTES
Patient here for labs and IVIG infusion. Feels well today. Vitals obtained. Port accessed and labs drawn and sent. IV hydration hung. Premeds given per [facility]. IVIG hung at 20 ml/hr x 20 minutes, AS=325/74. Rate increased to 40 ml/hr x 20 minutes, FO=177/69. Rate increased to 80 ml/hr x 20 minutes, UR=918/73. Patient sleeping. Rate increased to 160 ml/hr x 30 minutes, XJ=617/67. Rate increased to 250 ml/hr for remainder of the infusion. BP's as follows:  117/67.  111/69.  137/68.  114/62.  121/66.  125/68.  122/59.  113/68. Post hydration infused. Port flushed and gripper removed intact, band aid to site. Has a return visit scheduled. Discharged ambulatory per self.

## 2017-07-12 NOTE — FLOWSHEET NOTE
07/12/17 1405   Encounter Summary   Services provided to: Patient   Referral/Consult From: Rounding   Continue Visiting (07/12/17)   Complexity of Encounter Low   Length of Encounter 15 minutes   Routine   Type Follow up   Assessment Approachable   Intervention Active listening;Explored feelings, thoughts, concerns;Explored coping resources;Nurtured hope;Sustaining presence/ Ministry of presence   Outcome Engaged in conversation    rounding; offered support and presence. Chaplains will remain available to offer spiritual and emotional support as needed.

## 2017-07-19 ENCOUNTER — OFFICE VISIT (OUTPATIENT)
Dept: PULMONOLOGY | Age: 62
End: 2017-07-19
Payer: MEDICARE

## 2017-07-19 VITALS
HEART RATE: 91 BPM | RESPIRATION RATE: 15 BRPM | WEIGHT: 216 LBS | TEMPERATURE: 97.1 F | DIASTOLIC BLOOD PRESSURE: 82 MMHG | BODY MASS INDEX: 30.92 KG/M2 | SYSTOLIC BLOOD PRESSURE: 141 MMHG | HEIGHT: 70 IN | OXYGEN SATURATION: 98 %

## 2017-07-19 DIAGNOSIS — D83.9 CVID (COMMON VARIABLE IMMUNODEFICIENCY) (HCC): ICD-10-CM

## 2017-07-19 DIAGNOSIS — J44.9 CHRONIC OBSTRUCTIVE PULMONARY DISEASE, UNSPECIFIED COPD TYPE (HCC): Primary | ICD-10-CM

## 2017-07-19 DIAGNOSIS — G47.33 OSA ON CPAP: ICD-10-CM

## 2017-07-19 DIAGNOSIS — Z99.89 OSA ON CPAP: ICD-10-CM

## 2017-07-19 DIAGNOSIS — J45.40 MODERATE PERSISTENT ASTHMA WITHOUT COMPLICATION: ICD-10-CM

## 2017-07-19 DIAGNOSIS — R91.1 LUNG NODULE: ICD-10-CM

## 2017-07-19 DIAGNOSIS — R06.09 DYSPNEA ON EXERTION: ICD-10-CM

## 2017-07-19 PROCEDURE — G8926 SPIRO NO PERF OR DOC: HCPCS | Performed by: INTERNAL MEDICINE

## 2017-07-19 PROCEDURE — 3023F SPIROM DOC REV: CPT | Performed by: INTERNAL MEDICINE

## 2017-07-19 PROCEDURE — 1036F TOBACCO NON-USER: CPT | Performed by: INTERNAL MEDICINE

## 2017-07-19 PROCEDURE — 3017F COLORECTAL CA SCREEN DOC REV: CPT | Performed by: INTERNAL MEDICINE

## 2017-07-19 PROCEDURE — G8428 CUR MEDS NOT DOCUMENT: HCPCS | Performed by: INTERNAL MEDICINE

## 2017-07-19 PROCEDURE — G8417 CALC BMI ABV UP PARAM F/U: HCPCS | Performed by: INTERNAL MEDICINE

## 2017-07-19 PROCEDURE — 99214 OFFICE O/P EST MOD 30 MIN: CPT | Performed by: INTERNAL MEDICINE

## 2017-07-24 ENCOUNTER — HOSPITAL ENCOUNTER (OUTPATIENT)
Dept: NON INVASIVE DIAGNOSTICS | Age: 62
Discharge: HOME | End: 2017-07-24
Payer: MEDICARE

## 2017-07-24 ENCOUNTER — OFFICE VISIT (OUTPATIENT)
Dept: PULMONOLOGY | Age: 62
End: 2017-07-24
Payer: MEDICARE

## 2017-07-24 VITALS — HEIGHT: 68 IN | WEIGHT: 236 LBS | OXYGEN SATURATION: 99 % | HEART RATE: 84 BPM | BODY MASS INDEX: 35.77 KG/M2

## 2017-07-24 DIAGNOSIS — R06.09 DYSPNEA ON EXERTION: ICD-10-CM

## 2017-07-24 DIAGNOSIS — R09.02 HYPOXIA: ICD-10-CM

## 2017-07-24 DIAGNOSIS — J98.4 LUNG DISEASE: Primary | ICD-10-CM

## 2017-07-24 LAB
LV EF: 65 %
LVEF MODALITY: NORMAL

## 2017-07-24 PROCEDURE — 94375 RESPIRATORY FLOW VOLUME LOOP: CPT | Performed by: INTERNAL MEDICINE

## 2017-07-24 PROCEDURE — 93306 TTE W/DOPPLER COMPLETE: CPT

## 2017-07-24 PROCEDURE — 94726 PLETHYSMOGRAPHY LUNG VOLUMES: CPT | Performed by: INTERNAL MEDICINE

## 2017-07-24 PROCEDURE — 1036F TOBACCO NON-USER: CPT | Performed by: INTERNAL MEDICINE

## 2017-07-24 PROCEDURE — 94729 DIFFUSING CAPACITY: CPT | Performed by: INTERNAL MEDICINE

## 2017-07-25 ENCOUNTER — TELEPHONE (OUTPATIENT)
Dept: PULMONOLOGY | Age: 62
End: 2017-07-25

## 2017-07-27 RX ORDER — ALPRAZOLAM 0.5 MG/1
TABLET ORAL
Qty: 90 TABLET | Refills: 1 | Status: SHIPPED | OUTPATIENT
Start: 2017-07-27 | End: 2017-09-15 | Stop reason: SDUPTHER

## 2017-08-02 ENCOUNTER — HOSPITAL ENCOUNTER (OUTPATIENT)
Dept: INFUSION THERAPY | Facility: MEDICAL CENTER | Age: 62
Discharge: HOME | End: 2017-08-02
Payer: MEDICARE

## 2017-08-02 VITALS
TEMPERATURE: 97.8 F | RESPIRATION RATE: 18 BRPM | HEART RATE: 82 BPM | DIASTOLIC BLOOD PRESSURE: 70 MMHG | SYSTOLIC BLOOD PRESSURE: 126 MMHG

## 2017-08-02 DIAGNOSIS — D80.1 COMMON VARIABLE HYPOGAMMAGLOBULINEMIA (HCC): ICD-10-CM

## 2017-08-02 DIAGNOSIS — D83.9 COMMON VARIABLE IMMUNODEFICIENCY (HCC): ICD-10-CM

## 2017-08-02 PROCEDURE — 96366 THER/PROPH/DIAG IV INF ADDON: CPT

## 2017-08-02 PROCEDURE — 96361 HYDRATE IV INFUSION ADD-ON: CPT

## 2017-08-02 PROCEDURE — 96365 THER/PROPH/DIAG IV INF INIT: CPT

## 2017-08-02 PROCEDURE — 2580000003 HC RX 258: Performed by: ALLERGY & IMMUNOLOGY

## 2017-08-02 PROCEDURE — 6370000000 HC RX 637 (ALT 250 FOR IP): Performed by: ALLERGY & IMMUNOLOGY

## 2017-08-02 PROCEDURE — 6360000002 HC RX W HCPCS: Performed by: ALLERGY & IMMUNOLOGY

## 2017-08-02 RX ORDER — DIPHENHYDRAMINE HCL 25 MG
25 TABLET ORAL EVERY 4 HOURS PRN
Status: CANCELLED
Start: 2017-08-02

## 2017-08-02 RX ORDER — DEXTROSE AND SODIUM CHLORIDE 5; .33 G/100ML; G/100ML
INJECTION, SOLUTION INTRAVENOUS CONTINUOUS
Status: DISCONTINUED | OUTPATIENT
Start: 2017-08-02 | End: 2017-08-03 | Stop reason: HOSPADM

## 2017-08-02 RX ORDER — PREDNISONE 10 MG/1
10 TABLET ORAL SEE ADMIN INSTRUCTIONS
COMMUNITY
End: 2019-02-13

## 2017-08-02 RX ORDER — DIPHENHYDRAMINE HCL 25 MG
25 TABLET ORAL EVERY 4 HOURS PRN
Status: DISCONTINUED | OUTPATIENT
Start: 2017-08-02 | End: 2017-08-03 | Stop reason: HOSPADM

## 2017-08-02 RX ORDER — DIPHENHYDRAMINE HCL 25 MG
25 TABLET ORAL ONCE
Status: COMPLETED | OUTPATIENT
Start: 2017-08-02 | End: 2017-08-02

## 2017-08-02 RX ORDER — DEXTROSE MONOHYDRATE 50 MG/ML
INJECTION, SOLUTION INTRAVENOUS CONTINUOUS
Status: CANCELLED
Start: 2017-08-02

## 2017-08-02 RX ORDER — 0.9 % SODIUM CHLORIDE 0.9 %
10 VIAL (ML) INJECTION PRN
Status: DISCONTINUED | OUTPATIENT
Start: 2017-08-02 | End: 2017-08-03 | Stop reason: HOSPADM

## 2017-08-02 RX ORDER — 0.9 % SODIUM CHLORIDE 0.9 %
10 VIAL (ML) INJECTION PRN
Status: CANCELLED | OUTPATIENT
Start: 2017-08-02

## 2017-08-02 RX ORDER — DEXTROSE AND SODIUM CHLORIDE 5; .33 G/100ML; G/100ML
INJECTION, SOLUTION INTRAVENOUS CONTINUOUS
Status: CANCELLED
Start: 2017-08-02

## 2017-08-02 RX ORDER — HEPARIN SODIUM (PORCINE) LOCK FLUSH IV SOLN 100 UNIT/ML 100 UNIT/ML
500 SOLUTION INTRAVENOUS PRN
Status: CANCELLED
Start: 2017-08-02

## 2017-08-02 RX ORDER — DIPHENHYDRAMINE HCL 25 MG
25 TABLET ORAL ONCE
Status: CANCELLED | OUTPATIENT
Start: 2017-08-02 | End: 2017-08-02

## 2017-08-02 RX ORDER — HEPARIN SODIUM (PORCINE) LOCK FLUSH IV SOLN 100 UNIT/ML 100 UNIT/ML
500 SOLUTION INTRAVENOUS PRN
Status: DISCONTINUED | OUTPATIENT
Start: 2017-08-02 | End: 2017-08-03 | Stop reason: HOSPADM

## 2017-08-02 NOTE — PROGRESS NOTES
Pt arrives and Orders reviewed , no labs due today and pt tolerated prehydration, D5 1/3/ ml prior and premed well. O2 at 3L/NC. IVIG infusing at 20 ml/hr for 20 min, 40 ml/hr for 20 min, 80 ml/hr for 20 min, 160 ml/hr for 30 min, 250 ml/hr for remainder. BP checked Q 30 min as follows:  121/63, 115/71, 105/69, 134/72, 110/64, 112/80, 126/75, 125/72, 136/67, 113/70, 125/65, 126/73, 115/67, 124/79. Blood return present throughout infusion. No reactions or complaints. D5 1/3 NS  250 ml infusing after and pt observed for 30 min an dthen discharged per ambulatory per self with o2, portable.

## 2017-08-23 ENCOUNTER — HOSPITAL ENCOUNTER (OUTPATIENT)
Dept: INFUSION THERAPY | Facility: MEDICAL CENTER | Age: 62
Discharge: HOME | End: 2017-08-23
Payer: MEDICARE

## 2017-08-23 VITALS
TEMPERATURE: 98.1 F | SYSTOLIC BLOOD PRESSURE: 121 MMHG | DIASTOLIC BLOOD PRESSURE: 77 MMHG | RESPIRATION RATE: 18 BRPM | HEART RATE: 80 BPM

## 2017-08-23 DIAGNOSIS — D83.9 COMMON VARIABLE IMMUNODEFICIENCY (HCC): ICD-10-CM

## 2017-08-23 DIAGNOSIS — D80.1 COMMON VARIABLE HYPOGAMMAGLOBULINEMIA (HCC): ICD-10-CM

## 2017-08-23 PROCEDURE — 96368 THER/DIAG CONCURRENT INF: CPT

## 2017-08-23 PROCEDURE — 96360 HYDRATION IV INFUSION INIT: CPT

## 2017-08-23 PROCEDURE — 96365 THER/PROPH/DIAG IV INF INIT: CPT

## 2017-08-23 PROCEDURE — 6370000000 HC RX 637 (ALT 250 FOR IP): Performed by: ALLERGY & IMMUNOLOGY

## 2017-08-23 PROCEDURE — 96366 THER/PROPH/DIAG IV INF ADDON: CPT

## 2017-08-23 PROCEDURE — 6360000002 HC RX W HCPCS: Performed by: ALLERGY & IMMUNOLOGY

## 2017-08-23 PROCEDURE — 2580000003 HC RX 258: Performed by: ALLERGY & IMMUNOLOGY

## 2017-08-23 RX ORDER — DIPHENHYDRAMINE HCL 25 MG
25 TABLET ORAL EVERY 4 HOURS PRN
Status: CANCELLED
Start: 2017-08-23

## 2017-08-23 RX ORDER — HEPARIN SODIUM (PORCINE) LOCK FLUSH IV SOLN 100 UNIT/ML 100 UNIT/ML
500 SOLUTION INTRAVENOUS PRN
Status: DISCONTINUED | OUTPATIENT
Start: 2017-08-23 | End: 2017-08-24 | Stop reason: HOSPADM

## 2017-08-23 RX ORDER — DEXTROSE AND SODIUM CHLORIDE 5; .33 G/100ML; G/100ML
INJECTION, SOLUTION INTRAVENOUS CONTINUOUS
Status: DISCONTINUED | OUTPATIENT
Start: 2017-08-23 | End: 2017-08-24 | Stop reason: HOSPADM

## 2017-08-23 RX ORDER — DIPHENHYDRAMINE HCL 25 MG
25 TABLET ORAL ONCE
Status: COMPLETED | OUTPATIENT
Start: 2017-08-23 | End: 2017-08-23

## 2017-08-23 RX ORDER — DIPHENHYDRAMINE HCL 25 MG
25 TABLET ORAL ONCE
Status: CANCELLED | OUTPATIENT
Start: 2017-08-23 | End: 2017-08-23

## 2017-08-23 RX ORDER — DEXTROSE MONOHYDRATE 50 MG/ML
INJECTION, SOLUTION INTRAVENOUS CONTINUOUS
Status: CANCELLED
Start: 2017-08-23

## 2017-08-23 RX ORDER — DIPHENHYDRAMINE HCL 25 MG
25 TABLET ORAL EVERY 4 HOURS PRN
Status: DISCONTINUED | OUTPATIENT
Start: 2017-08-23 | End: 2017-08-24 | Stop reason: HOSPADM

## 2017-08-23 RX ORDER — 0.9 % SODIUM CHLORIDE 0.9 %
10 VIAL (ML) INJECTION PRN
Status: DISCONTINUED | OUTPATIENT
Start: 2017-08-23 | End: 2017-08-24 | Stop reason: HOSPADM

## 2017-08-23 RX ORDER — HEPARIN SODIUM (PORCINE) LOCK FLUSH IV SOLN 100 UNIT/ML 100 UNIT/ML
500 SOLUTION INTRAVENOUS PRN
Status: CANCELLED
Start: 2017-08-23

## 2017-08-23 RX ORDER — 0.9 % SODIUM CHLORIDE 0.9 %
10 VIAL (ML) INJECTION PRN
Status: CANCELLED | OUTPATIENT
Start: 2017-08-23

## 2017-08-23 RX ORDER — DEXTROSE AND SODIUM CHLORIDE 5; .33 G/100ML; G/100ML
INJECTION, SOLUTION INTRAVENOUS CONTINUOUS
Status: CANCELLED
Start: 2017-08-23

## 2017-08-23 RX ADMIN — DIPHENHYDRAMINE HCL 25 MG: 25 TABLET ORAL at 08:56

## 2017-08-23 RX ADMIN — DIPHENHYDRAMINE HCL 25 MG: 25 TABLET ORAL at 12:59

## 2017-08-23 RX ADMIN — Medication 10 ML: at 16:10

## 2017-08-23 RX ADMIN — DEXTROSE AND SODIUM CHLORIDE: 5; 330 INJECTION, SOLUTION INTRAVENOUS at 08:54

## 2017-08-23 RX ADMIN — IMMUNE GLOBULIN INTRAVENOUS (HUMAN): 5 INJECTION, SOLUTION INTRAVENOUS at 09:27

## 2017-08-23 RX ADMIN — SODIUM CHLORIDE, PRESERVATIVE FREE 500 UNITS: 5 INJECTION INTRAVENOUS at 16:10

## 2017-08-23 RX ADMIN — Medication 10 ML: at 08:30

## 2017-08-23 ASSESSMENT — PAIN SCALES - GENERAL: PAINLEVEL_OUTOF10: 0

## 2017-08-23 NOTE — FLOWSHEET NOTE
rounding; offered support and presence. Chaplains will remain available to offer spiritual and emotional support as needed.      08/23/17 1441   Encounter Summary   Services provided to: Patient   Referral/Consult From: Rounding   Continue Visiting (08/23/17)   Complexity of Encounter Low   Length of Encounter 15 minutes   Routine   Type Follow up   Assessment Approachable   Intervention Active listening;Explored feelings, thoughts, concerns;Explored coping resources;Nurtured hope;Sustaining presence/ Ministry of presence   Outcome Engaged in conversation;Expressed feelings/needs/concerns;Coping

## 2017-08-23 NOTE — IP AVS SNAPSHOT
Patient Information     Patient Name NOLBERTO Tompkins 1955         This is your updated medication list to keep with you all times      ASK your doctor about these medications     ALPRAZolam 0.5 MG tablet   Commonly known as:  XANAX   TAKE 1 TABLET BY MOUTH 3 TIMES A DAY AS NEEDED       amLODIPine 10 MG tablet   Commonly known as:  NORVASC   take 1 tablet by mouth once daily       benazepril 10 MG tablet   Commonly known as:  LOTENSIN   take 1 tablet by mouth once daily       fluticasone 50 MCG/ACT nasal spray   Commonly known as:  FLONASE   instill 2 sprays into each nostril twice a day       ibuprofen 800 MG tablet   Commonly known as:  ADVIL;MOTRIN       Immune Globulin (Human) 10 GM/100ML Soln IV solution       montelukast 10 MG tablet   Commonly known as:  SINGULAIR   take 1 tablet by mouth at bedtime       omega-3 acid ethyl esters 1 g capsule   Commonly known as:  LOVAZA   take 2 capsules twice a day       omeprazole 20 MG delayed release capsule   Commonly known as:  PRILOSEC   take 1 capsule by mouth once daily       predniSONE 10 MG tablet   Commonly known as:  DELTASONE       * PROAIR HFA IN       * albuterol (2.5 MG/3ML) 0.083% nebulizer solution   Commonly known as:  PROVENTIL   Take 3 mLs by nebulization every 4 hours as needed for Wheezing       * PROAIR  (90 Base) MCG/ACT inhaler   Generic drug:  albuterol sulfate HFA   inhale 2 puffs by mouth if needed       SPIRIVA HANDIHALER 18 MCG inhalation capsule   Generic drug:  tiotropium   inhale contents of 1 capsule by mouth once daily       SYMBICORT 160-4.5 MCG/ACT Aero   Generic drug:  budesonide-formoterol   inhale 2 puffs by mouth twice a day as directed       * Notice: This list has 3 medication(s) that are the same as other medications prescribed for you. Read the directions carefully, and ask your doctor or other care provider to review them with you.

## 2017-08-23 NOTE — IP AVS SNAPSHOT
After Visit Summary  (Discharge Instructions)    Medication List for Home    Based on the information you provided to us as well as any changes during this visit, the following is your updated medication list.  Compare this with your prescription bottles at home. If you have any questions or concerns, contact your primary care physician's office.              Daily Medication List (This medication list can be shared with any healthcare provider who is helping you manage your medications)      ASK your doctor about these medications if you have questions        Last Dose    Next Dose Due AM NOON PM NIGHT    ALPRAZolam 0.5 MG tablet   Commonly known as:  XANAX   TAKE 1 TABLET BY MOUTH 3 TIMES A DAY AS NEEDED                                         amLODIPine 10 MG tablet   Commonly known as:  NORVASC   take 1 tablet by mouth once daily                                         benazepril 10 MG tablet   Commonly known as:  LOTENSIN   take 1 tablet by mouth once daily                                         fluticasone 50 MCG/ACT nasal spray   Commonly known as:  FLONASE   instill 2 sprays into each nostril twice a day                                         ibuprofen 800 MG tablet   Commonly known as:  ADVIL;MOTRIN   Take 800 mg by mouth every 6 hours as needed for Pain Pt would like refill                                         Immune Globulin (Human) 10 GM/100ML Soln IV solution   Infuse 10 g intravenously every 21 days                8/23/2017  9:27 AM                            montelukast 10 MG tablet   Commonly known as:  SINGULAIR   take 1 tablet by mouth at bedtime                                         omega-3 acid ethyl esters 1 g capsule   Commonly known as:  LOVAZA   take 2 capsules twice a day                                         omeprazole 20 MG delayed release capsule   Commonly known as:  PRILOSEC   take 1 capsule by mouth once daily predniSONE 10 MG tablet   Commonly known as:  DELTASONE   Take 10 mg by mouth 2 times daily Taper dose for 3days                                         PROAIR HFA IN   Inhale 2 puffs into the lungs every 4 hours as needed. albuterol (2.5 MG/3ML) 0.083% nebulizer solution   Commonly known as:  PROVENTIL   Take 3 mLs by nebulization every 4 hours as needed for Wheezing                                         PROAIR  (90 Base) MCG/ACT inhaler   Generic drug:  albuterol sulfate HFA   inhale 2 puffs by mouth if needed                                         SPIRIVA HANDIHALER 18 MCG inhalation capsule   Generic drug:  tiotropium   inhale contents of 1 capsule by mouth once daily                                         SYMBICORT 160-4.5 MCG/ACT Aero   Generic drug:  budesonide-formoterol   inhale 2 puffs by mouth twice a day as directed                                                 Allergies as of 8/23/2017     No Known Allergies      Immunizations as of 8/23/2017     Name Date Dose VIS Date Route    Influenza Virus Vaccine 10/5/2015 -- -- --    External: Patient reported    Influenza Virus Vaccine 10/7/2014 0.5 mL 8/19/2014 Intramuscular    Pneumococcal 13-valent Conjugate ([place]) 10/5/2015 -- -- --    External: Patient reported    Pneumococcal Polysaccharide ([id number]) 12/2/2016 0.5 mL 4/24/2015 Intramuscular      Last Vitals          Most Recent Value    Temp  98.1 °F (36.7 °C)    Pulse  80    Resp  18    BP  121/77         After Visit Summary    This summary was created for you. Thank you for entrusting your care to us.   The following information includes details about your hospital/visit stay along with steps you should take to help with your recovery once you leave the hospital.  In this packet, you will find information about the topics listed below:    · Instructions about your medications including a list of your home medications NPO TO SOLIDS 2 HOURS PRIOR TO STUDY. DRINKING CLEAR FLUIDS, WATER, TEA, BLACK COFFEE IS   ALLOWED AND ENCOURAGED UP TO 1HR PRIOR TO EXAM.    PATIENT REPORTS TO REGISTRATION 15MINS PRIOR TO EXAM.    DEPT CONTACT: [address] [address]       10/17/2017 10:30 AM     Appointment with Kathren Brunner, MD at [address] [facility] [id number] ([phone])   Please arrive 15 minutes prior to appointment time, bring insurance card and photo ID.    Earnest Friedman.  [facility] [address]         Preventive Care        Date Due    Tetanus Combination Vaccine (1 - Tdap) 8/29/1976    Yearly Flu Vaccine (1) 8/1/2017    Colonoscopy 1/1/2019    Diabetes Screening 3/1/2019    Cholesterol Screening 3/4/2020    Pneumococcal Vaccines (two) for adults aged 19-64  years who are immunocompromised or whose spleen is missing or not working  (3 of 3 - PPSV23) 12/2/2021                 Care Plan Once You Return Home    This section includes instructions you will need to follow once you leave the hospital.  Your care team will discuss these with you, so you and those caring for you know how to best care for your health needs at home. This section may also include educational information about certain health topics that may be of help to you. Important information for a smoker       SMOKING: QUIT SMOKING. THIS IS THE MOST IMPORTANT ACTION YOU CAN TAKE TO IMPROVE YOUR CURRENT AND FUTURE HEALTH. Call the [address] [place] [address] at [place] NOW ([phone])    Smoking harms nonsmokers. When nonsmokers are around people who smoke, they absorb nicotine, carbon monoxide, and other ingredients of tobacco smoke. DO NOT SMOKE AROUND CHILDREN     Children exposed to secondhand smoke are at an increased risk of:  Sudden Infant Death Syndrome (SIDS), acute respiratory infections, inflammation of the middle ear, and severe asthma.   Over a longer time, it causes heart disease and lung cancer. There is no safe level of exposure to secondhand smoke. MyChart Signup     Our records indicate that you have an active [employer] account. You can view your After Visit Summary by going to [email or web address] and logging in with your [employer] username and password. If you don't have a [employer] username and password but a parent or guardian has access to your record, the parent or guardian should login with their own [employer] username and password and access your record to view the After Visit Summary. Additional Information  If you have questions, please contact the physician practice where you receive care. Remember, [employer] is NOT to be used for urgent needs. For medical emergencies, dial 911. For questions regarding your [employer] account call [phone]. If you have a clinical question, please call your doctor's office. View your information online  ? Review your current list of  medications, immunization, and allergies. ? Review your future test results online . ? Review your discharge instructions provided by your caregivers at discharge    Certain functionality such as prescription refills, scheduling appointments or sending messages to your provider are not activated if your provider does not use [employer] in his/her office    For questions regarding your MyChart account call [phone]. If you have a clinical question, please call your doctor's office. The information on all pages of the After Visit Summary has been reviewed with me, the patient and/or responsible adult, by my health care provider(s). I had the opportunity to ask questions regarding this information. I understand I should dispose of my armband safely at home to protect my health information. A complete copy of the After Visit Summary has been given to me, the patient and/or responsible adult. Patient Signature/Responsible Adult:____________________    Clinician Signature:_____________________    Date:_____________________    Time:_____________________

## 2017-08-23 NOTE — PROGRESS NOTES
Arrives  For ivig infusion see mar. Patient to  Treatment area for  IVIG. Vitals  Obtained  And patient connected  To  D 5 -1/3 250 ml pre and post infusion over 1/2 hour each pre and post infusion. Patient premedicated with see mar. d5-1/3 to run continuously during infusion at 20 ml/hr via pump. 0930 IVIG Hung  And  infusing at 20 ml/hr for 20 minutes. V/s 121/77, 18, 80, 98.1.  0950 IVIG increased To 40 ml/hr For 20 minutes. V/s 138/68, 77.  1020 IVIG increased To 80 ml/hr For 20 minutes. V/s 106/66, 72.  1050 IVIG increased To 160 ml/hr For 30 minutes. V/s97/67, 72.  1120 IVIG increased To 250 ml/hr For remainder of infusion. V/s 109/68, 72.  1135  V/s 114/64, 65.  1150 v/s 122/78, 78.  1205 v/s 120/71, 76.  1220 v/s 119/66, 77.   1235 v/s 108/68,75.  1250 v/s 112/65, 77.  1305 v/s 126/76, 79.  1320 v/s 121/70, 78.  1335 v/s 130/72, 81.  1350 v/s 113/66, 77.  1405 v/s 109/66, 73.  1420 v/s 108/63, 75.  1435 v/s 111/58, 71.  1450 v/s 112/65, 76.  1505 v/s 100/61, 80.  1520 v/s 111/66, 79.  1535 v/s 115/60, 76. IVIG finished infusing  And  Line flushed with D5- 1/3  For 30 minutes   And  Patient discharged to  Home . Next  R T C is 09/13/17 at 0830 am..

## 2017-09-12 ENCOUNTER — HOSPITAL ENCOUNTER (OUTPATIENT)
Facility: MEDICAL CENTER | Age: 62
End: 2017-09-12
Payer: MEDICARE

## 2017-09-13 ENCOUNTER — HOSPITAL ENCOUNTER (OUTPATIENT)
Dept: INFUSION THERAPY | Facility: MEDICAL CENTER | Age: 62
Discharge: HOME | End: 2017-09-13
Payer: MEDICARE

## 2017-09-13 VITALS
RESPIRATION RATE: 18 BRPM | DIASTOLIC BLOOD PRESSURE: 57 MMHG | SYSTOLIC BLOOD PRESSURE: 111 MMHG | HEART RATE: 67 BPM | TEMPERATURE: 97.6 F

## 2017-09-13 DIAGNOSIS — D80.1 COMMON VARIABLE HYPOGAMMAGLOBULINEMIA (HCC): ICD-10-CM

## 2017-09-13 DIAGNOSIS — D83.9 COMMON VARIABLE IMMUNODEFICIENCY (HCC): Chronic | ICD-10-CM

## 2017-09-13 LAB
ABSOLUTE EOS #: 0 K/UL (ref 0–0.4)
ABSOLUTE LYMPH #: 1 K/UL (ref 1–4.8)
ABSOLUTE MONO #: 0.2 K/UL (ref 0.2–0.8)
ALT SERPL-CCNC: 28 U/L (ref 5–41)
BASOPHILS # BLD: 0 %
BASOPHILS ABSOLUTE: 0 K/UL (ref 0–0.2)
CREAT SERPL-MCNC: 0.82 MG/DL (ref 0.7–1.2)
DIFFERENTIAL TYPE: NORMAL
EOSINOPHILS RELATIVE PERCENT: 1 %
GFR AFRICAN AMERICAN: >60 ML/MIN
GFR NON-AFRICAN AMERICAN: >60 ML/MIN
GFR SERPL CREATININE-BSD FRML MDRD: NORMAL ML/MIN/{1.73_M2}
GFR SERPL CREATININE-BSD FRML MDRD: NORMAL ML/MIN/{1.73_M2}
HCT VFR BLD CALC: 41.3 % (ref 41–53)
HEMOGLOBIN: 14.1 G/DL (ref 13.5–17.5)
IGA: 73 MG/DL (ref 70–400)
IGG: 795 MG/DL (ref 700–1600)
IGM: 102 MG/DL (ref 40–230)
LYMPHOCYTES # BLD: 17 %
MCH RBC QN AUTO: 29.2 PG (ref 26–34)
MCHC RBC AUTO-ENTMCNC: 34 G/DL (ref 31–37)
MCV RBC AUTO: 85.9 FL (ref 80–100)
MONOCYTES # BLD: 4 %
PDW BLD-RTO: 13.4 % (ref 11.5–14.5)
PLATELET # BLD: 230 K/UL (ref 130–400)
PLATELET ESTIMATE: NORMAL
PMV BLD AUTO: 8.5 FL (ref 6–12)
RBC # BLD: 4.81 M/UL (ref 4.5–5.9)
RBC # BLD: NORMAL 10*6/UL
SEG NEUTROPHILS: 78 %
SEGMENTED NEUTROPHILS ABSOLUTE COUNT: 4.7 K/UL (ref 1.8–7.7)
WBC # BLD: 5.9 K/UL (ref 3.5–11)
WBC # BLD: NORMAL 10*3/UL

## 2017-09-13 PROCEDURE — 82784 ASSAY IGA/IGD/IGG/IGM EACH: CPT

## 2017-09-13 PROCEDURE — 2580000003 HC RX 258: Performed by: ALLERGY & IMMUNOLOGY

## 2017-09-13 PROCEDURE — 36591 DRAW BLOOD OFF VENOUS DEVICE: CPT

## 2017-09-13 PROCEDURE — 6370000000 HC RX 637 (ALT 250 FOR IP): Performed by: ALLERGY & IMMUNOLOGY

## 2017-09-13 PROCEDURE — 85025 COMPLETE CBC W/AUTO DIFF WBC: CPT

## 2017-09-13 PROCEDURE — 96361 HYDRATE IV INFUSION ADD-ON: CPT

## 2017-09-13 PROCEDURE — 82565 ASSAY OF CREATININE: CPT

## 2017-09-13 PROCEDURE — 96365 THER/PROPH/DIAG IV INF INIT: CPT

## 2017-09-13 PROCEDURE — 96366 THER/PROPH/DIAG IV INF ADDON: CPT

## 2017-09-13 PROCEDURE — 6360000002 HC RX W HCPCS: Performed by: ALLERGY & IMMUNOLOGY

## 2017-09-13 PROCEDURE — 84460 ALANINE AMINO (ALT) (SGPT): CPT

## 2017-09-13 RX ORDER — DIPHENHYDRAMINE HCL 25 MG
25 TABLET ORAL EVERY 4 HOURS PRN
Status: CANCELLED
Start: 2017-09-13

## 2017-09-13 RX ORDER — HEPARIN SODIUM (PORCINE) LOCK FLUSH IV SOLN 100 UNIT/ML 100 UNIT/ML
500 SOLUTION INTRAVENOUS PRN
Status: DISCONTINUED | OUTPATIENT
Start: 2017-09-13 | End: 2017-09-14 | Stop reason: HOSPADM

## 2017-09-13 RX ORDER — HEPARIN SODIUM (PORCINE) LOCK FLUSH IV SOLN 100 UNIT/ML 100 UNIT/ML
500 SOLUTION INTRAVENOUS PRN
Status: CANCELLED
Start: 2017-09-13

## 2017-09-13 RX ORDER — DEXTROSE MONOHYDRATE 50 MG/ML
INJECTION, SOLUTION INTRAVENOUS CONTINUOUS
Status: CANCELLED
Start: 2017-09-13

## 2017-09-13 RX ORDER — DEXTROSE AND SODIUM CHLORIDE 5; .33 G/100ML; G/100ML
INJECTION, SOLUTION INTRAVENOUS CONTINUOUS
Status: CANCELLED
Start: 2017-09-13

## 2017-09-13 RX ORDER — 0.9 % SODIUM CHLORIDE 0.9 %
10 VIAL (ML) INJECTION PRN
Status: DISCONTINUED | OUTPATIENT
Start: 2017-09-13 | End: 2017-09-14 | Stop reason: HOSPADM

## 2017-09-13 RX ORDER — DIPHENHYDRAMINE HCL 25 MG
25 TABLET ORAL ONCE
Status: CANCELLED | OUTPATIENT
Start: 2017-09-13 | End: 2017-09-13

## 2017-09-13 RX ORDER — 0.9 % SODIUM CHLORIDE 0.9 %
10 VIAL (ML) INJECTION PRN
Status: CANCELLED | OUTPATIENT
Start: 2017-09-13

## 2017-09-13 RX ORDER — DIPHENHYDRAMINE HCL 25 MG
25 TABLET ORAL EVERY 4 HOURS PRN
Status: DISCONTINUED | OUTPATIENT
Start: 2017-09-13 | End: 2017-09-14 | Stop reason: HOSPADM

## 2017-09-13 RX ORDER — DEXTROSE AND SODIUM CHLORIDE 5; .33 G/100ML; G/100ML
INJECTION, SOLUTION INTRAVENOUS CONTINUOUS
Status: DISCONTINUED | OUTPATIENT
Start: 2017-09-13 | End: 2017-09-14 | Stop reason: HOSPADM

## 2017-09-13 RX ORDER — DIPHENHYDRAMINE HCL 25 MG
25 TABLET ORAL ONCE
Status: COMPLETED | OUTPATIENT
Start: 2017-09-13 | End: 2017-09-13

## 2017-09-13 RX ADMIN — Medication 10 ML: at 16:41

## 2017-09-13 RX ADMIN — SODIUM CHLORIDE, PRESERVATIVE FREE 500 UNITS: 5 INJECTION INTRAVENOUS at 16:41

## 2017-09-13 RX ADMIN — ALTEPLASE 2 MG: 2.2 INJECTION, POWDER, LYOPHILIZED, FOR SOLUTION INTRAVENOUS at 09:36

## 2017-09-13 RX ADMIN — WATER 2.2 ML: 1 INJECTION INTRAMUSCULAR; INTRAVENOUS; SUBCUTANEOUS at 09:36

## 2017-09-13 RX ADMIN — DEXTROSE AND SODIUM CHLORIDE: 5; 330 INJECTION, SOLUTION INTRAVENOUS at 09:15

## 2017-09-13 RX ADMIN — Medication 10 ML: at 08:30

## 2017-09-13 RX ADMIN — IMMUNE GLOBULIN INTRAVENOUS (HUMAN): 5 INJECTION, SOLUTION INTRAVENOUS at 10:00

## 2017-09-13 RX ADMIN — DIPHENHYDRAMINE HCL 25 MG: 25 TABLET ORAL at 09:26

## 2017-09-13 RX ADMIN — DIPHENHYDRAMINE HCL 25 MG: 25 TABLET ORAL at 14:47

## 2017-09-13 NOTE — FLOWSHEET NOTE
rounding; offered support and presence. Patient talked about going fishing in [place], [place] for his birthday ( a present from his son). Patient stated that he feels \"really good. \" Trina Ritesh will remain available to offer spiritual and emotional support as needed.      09/13/17 1303   Encounter Summary   Services provided to: Patient   Referral/Consult From: Johny   Continue Visiting (09/13/17)   Complexity of Encounter Low   Length of Encounter 15 minutes   Routine   Type Follow up   Assessment Approachable   Intervention Active listening;Explored feelings, thoughts, concerns;Explored coping resources;Nurtured hope;Provided reading materials/devotional materials;Sustaining presence/ Ministry of presence   Outcome Engaged in conversation;Expressed feelings/needs/concerns;Coping

## 2017-09-14 ENCOUNTER — HOSPITAL ENCOUNTER (OUTPATIENT)
Facility: MEDICAL CENTER | Age: 62
End: 2017-09-14

## 2017-09-19 ENCOUNTER — HOSPITAL ENCOUNTER (OUTPATIENT)
Dept: CT IMAGING | Age: 62
Discharge: HOME | End: 2017-09-19
Payer: MEDICARE

## 2017-09-19 DIAGNOSIS — R91.1 LUNG NODULE: ICD-10-CM

## 2017-09-19 PROCEDURE — 71250 CT THORAX DX C-: CPT

## 2017-09-19 RX ORDER — ALPRAZOLAM 0.5 MG/1
TABLET ORAL
Qty: 90 TABLET | Refills: 1 | Status: SHIPPED | OUTPATIENT
Start: 2017-09-19 | End: 2017-11-15 | Stop reason: SDUPTHER

## 2017-10-02 ENCOUNTER — HOSPITAL ENCOUNTER (OUTPATIENT)
Facility: MEDICAL CENTER | Age: 62
End: 2017-10-02
Payer: MEDICARE

## 2017-10-04 ENCOUNTER — HOSPITAL ENCOUNTER (OUTPATIENT)
Dept: INFUSION THERAPY | Facility: MEDICAL CENTER | Age: 62
Discharge: HOME | End: 2017-10-04
Payer: MEDICARE

## 2017-10-04 VITALS
SYSTOLIC BLOOD PRESSURE: 128 MMHG | DIASTOLIC BLOOD PRESSURE: 71 MMHG | RESPIRATION RATE: 20 BRPM | TEMPERATURE: 97.6 F | HEART RATE: 69 BPM

## 2017-10-04 DIAGNOSIS — D83.9 COMMON VARIABLE IMMUNODEFICIENCY (HCC): ICD-10-CM

## 2017-10-04 DIAGNOSIS — D80.1 COMMON VARIABLE HYPOGAMMAGLOBULINEMIA (HCC): ICD-10-CM

## 2017-10-04 PROCEDURE — 96366 THER/PROPH/DIAG IV INF ADDON: CPT

## 2017-10-04 PROCEDURE — 2580000003 HC RX 258: Performed by: ALLERGY & IMMUNOLOGY

## 2017-10-04 PROCEDURE — 6370000000 HC RX 637 (ALT 250 FOR IP): Performed by: ALLERGY & IMMUNOLOGY

## 2017-10-04 PROCEDURE — 96523 IRRIG DRUG DELIVERY DEVICE: CPT

## 2017-10-04 PROCEDURE — 6360000002 HC RX W HCPCS: Performed by: ALLERGY & IMMUNOLOGY

## 2017-10-04 PROCEDURE — 96361 HYDRATE IV INFUSION ADD-ON: CPT

## 2017-10-04 PROCEDURE — 96365 THER/PROPH/DIAG IV INF INIT: CPT

## 2017-10-04 RX ORDER — DEXTROSE AND SODIUM CHLORIDE 5; .33 G/100ML; G/100ML
INJECTION, SOLUTION INTRAVENOUS CONTINUOUS
Status: DISCONTINUED | OUTPATIENT
Start: 2017-10-04 | End: 2017-10-05 | Stop reason: HOSPADM

## 2017-10-04 RX ORDER — DIPHENHYDRAMINE HCL 25 MG
25 TABLET ORAL ONCE
Status: CANCELLED | OUTPATIENT
Start: 2017-10-04 | End: 2017-10-04

## 2017-10-04 RX ORDER — DIPHENHYDRAMINE HCL 25 MG
25 TABLET ORAL EVERY 4 HOURS PRN
Status: DISCONTINUED | OUTPATIENT
Start: 2017-10-04 | End: 2017-10-05 | Stop reason: HOSPADM

## 2017-10-04 RX ORDER — HEPARIN SODIUM (PORCINE) LOCK FLUSH IV SOLN 100 UNIT/ML 100 UNIT/ML
500 SOLUTION INTRAVENOUS PRN
Status: CANCELLED
Start: 2017-10-04

## 2017-10-04 RX ORDER — 0.9 % SODIUM CHLORIDE 0.9 %
10 VIAL (ML) INJECTION PRN
Status: DISCONTINUED | OUTPATIENT
Start: 2017-10-04 | End: 2017-10-05 | Stop reason: HOSPADM

## 2017-10-04 RX ORDER — HEPARIN SODIUM (PORCINE) LOCK FLUSH IV SOLN 100 UNIT/ML 100 UNIT/ML
500 SOLUTION INTRAVENOUS PRN
Status: DISCONTINUED | OUTPATIENT
Start: 2017-10-04 | End: 2017-10-05 | Stop reason: HOSPADM

## 2017-10-04 RX ORDER — 0.9 % SODIUM CHLORIDE 0.9 %
10 VIAL (ML) INJECTION PRN
Status: CANCELLED | OUTPATIENT
Start: 2017-10-04

## 2017-10-04 RX ORDER — DEXTROSE MONOHYDRATE 50 MG/ML
INJECTION, SOLUTION INTRAVENOUS CONTINUOUS
Status: CANCELLED
Start: 2017-10-04

## 2017-10-04 RX ORDER — DIPHENHYDRAMINE HCL 25 MG
25 TABLET ORAL EVERY 4 HOURS PRN
Status: CANCELLED
Start: 2017-10-04

## 2017-10-04 RX ORDER — DEXTROSE AND SODIUM CHLORIDE 5; .33 G/100ML; G/100ML
INJECTION, SOLUTION INTRAVENOUS CONTINUOUS
Status: CANCELLED
Start: 2017-10-04

## 2017-10-04 RX ORDER — DIPHENHYDRAMINE HCL 25 MG
25 TABLET ORAL ONCE
Status: COMPLETED | OUTPATIENT
Start: 2017-10-04 | End: 2017-10-04

## 2017-10-04 RX ADMIN — SODIUM CHLORIDE, PRESERVATIVE FREE 500 UNITS: 5 INJECTION INTRAVENOUS at 15:51

## 2017-10-04 RX ADMIN — DIPHENHYDRAMINE HCL 25 MG: 25 TABLET ORAL at 08:51

## 2017-10-04 RX ADMIN — DIPHENHYDRAMINE HCL 25 MG: 25 TABLET ORAL at 14:03

## 2017-10-04 RX ADMIN — DEXTROSE AND SODIUM CHLORIDE: 5; 330 INJECTION, SOLUTION INTRAVENOUS at 08:48

## 2017-10-04 RX ADMIN — Medication 10 ML: at 15:51

## 2017-10-04 RX ADMIN — IMMUNE GLOBULIN INTRAVENOUS (HUMAN): 5 INJECTION, SOLUTION INTRAVENOUS at 09:18

## 2017-10-04 NOTE — FLOWSHEET NOTE
rounding; offered support, presence, and hospitality. Chaplains will remain available to offer spiritual and emotional support as needed.      10/04/17 1308   Encounter Summary   Services provided to: Patient   Referral/Consult From: Rounding   Continue Visiting (10/4/17)   Complexity of Encounter Low   Length of Encounter 15 minutes   Routine   Type Follow up   Assessment Approachable   Intervention Active listening;Explored feelings, thoughts, concerns;Explored coping resources;Nurtured hope;Sustaining presence/ Ministry of presence   Outcome Engaged in conversation;Expressed feelings/needs/concerns;Coping

## 2017-10-04 NOTE — PROGRESS NOTES
Patient arrives to the clinic for infusion. No complaints. Patient is wearing oxygen today. Port accessed per policy. Good blood return obtained. Pre hydration administered. Pre medication administered. IVIG started at 20 ml x 20 min. Rate increased to 40 ml x 20 min. Rate increased to 80 ml x 20 min. Patient tolerated. Rate increased to 160 ml x 30 min. Patient tolerated. Rate increased to 250 ml remainder of infusion. Patient tolerated. Post hydration administered over 30 min. Port flushed per policy and gripper needle removed. Vital signs per policy:  506/89, hr 56. 121/68, hr 63. 108/59, hr 79. 111/65, hr 72. 108/67, hr 69.94/63, hr 67. 102/63, hr 72. 105/66, hr 66. 109/65, hr 72. 112/71, hr 67. 110/65, hr 63. Patient leaves in stable condition.

## 2017-10-17 ENCOUNTER — TELEPHONE (OUTPATIENT)
Dept: PULMONOLOGY | Age: 62
End: 2017-10-17

## 2017-10-17 ENCOUNTER — OFFICE VISIT (OUTPATIENT)
Dept: PULMONOLOGY | Age: 62
End: 2017-10-17
Payer: MEDICARE

## 2017-10-17 VITALS
HEIGHT: 68 IN | DIASTOLIC BLOOD PRESSURE: 75 MMHG | OXYGEN SATURATION: 98 % | SYSTOLIC BLOOD PRESSURE: 116 MMHG | HEART RATE: 74 BPM | BODY MASS INDEX: 39.25 KG/M2 | WEIGHT: 259 LBS | RESPIRATION RATE: 20 BRPM

## 2017-10-17 DIAGNOSIS — G47.33 OBSTRUCTIVE SLEEP APNEA: ICD-10-CM

## 2017-10-17 DIAGNOSIS — J45.40 MODERATE PERSISTENT ASTHMA WITHOUT COMPLICATION: ICD-10-CM

## 2017-10-17 DIAGNOSIS — D83.9 CVID (COMMON VARIABLE IMMUNODEFICIENCY) (HCC): ICD-10-CM

## 2017-10-17 DIAGNOSIS — J44.9 CHRONIC OBSTRUCTIVE PULMONARY DISEASE, UNSPECIFIED COPD TYPE (HCC): Primary | ICD-10-CM

## 2017-10-17 DIAGNOSIS — J30.89 NON-SEASONAL ALLERGIC RHINITIS, UNSPECIFIED CHRONICITY, UNSPECIFIED TRIGGER: ICD-10-CM

## 2017-10-17 DIAGNOSIS — K21.9 GASTROESOPHAGEAL REFLUX DISEASE, ESOPHAGITIS PRESENCE NOT SPECIFIED: ICD-10-CM

## 2017-10-17 PROCEDURE — 3017F COLORECTAL CA SCREEN DOC REV: CPT | Performed by: INTERNAL MEDICINE

## 2017-10-17 PROCEDURE — 3023F SPIROM DOC REV: CPT | Performed by: INTERNAL MEDICINE

## 2017-10-17 PROCEDURE — 1036F TOBACCO NON-USER: CPT | Performed by: INTERNAL MEDICINE

## 2017-10-17 PROCEDURE — G8417 CALC BMI ABV UP PARAM F/U: HCPCS | Performed by: INTERNAL MEDICINE

## 2017-10-17 PROCEDURE — 99214 OFFICE O/P EST MOD 30 MIN: CPT | Performed by: INTERNAL MEDICINE

## 2017-10-17 PROCEDURE — G8427 DOCREV CUR MEDS BY ELIG CLIN: HCPCS | Performed by: INTERNAL MEDICINE

## 2017-10-17 PROCEDURE — G8484 FLU IMMUNIZE NO ADMIN: HCPCS | Performed by: INTERNAL MEDICINE

## 2017-10-17 PROCEDURE — G8926 SPIRO NO PERF OR DOC: HCPCS | Performed by: INTERNAL MEDICINE

## 2017-10-17 RX ORDER — LEVOFLOXACIN 500 MG/1
500 TABLET, FILM COATED ORAL DAILY
Qty: 7 TABLET | Refills: 0 | Status: SHIPPED | OUTPATIENT
Start: 2017-10-17 | End: 2017-10-27

## 2017-10-17 NOTE — TELEPHONE ENCOUNTER
[facility] CALLED WANTING TO VERIFY LEVAQUIN MEDICATION. IT IS FOR 7 TABLETS BUT LENGTH IS 10 DAYS. PLEASE VERIFY.

## 2017-10-17 NOTE — PROGRESS NOTES
(1.727 m)   Wt 259 lb (117.5 kg)   SpO2 98% Comment: 3 lpm pulse  BMI 39.38 kg/m²   Last 3 weights: Wt Readings from Last 3 Encounters:   10/17/17 259 lb (117.5 kg)   07/24/17 236 lb (107 kg)   07/19/17 216 lb (98 kg)     Body mass index is 39.38 kg/m². Physical Examination:   General appearance - alert, over weight not tachypnic and in no distress  Mental status - alert, oriented to person, place, and time  Eyes - pupils equal and reactive, extraocular eye movements intact  Nose - normal and patent, no erythema, discharge or polyps  Mouth - mucous membranes moist, pharynx normal without lesions  Neck - supple, no significant adenopathy, very short and thick  Chest - Bilateral symmetrical chest movements, distant breath sounds bilaterally but fair air entry, no wheezes, rales or rhonchi, symmetric air entry  Heart - normal rate, regular rhythm, normal S1, S2, no murmurs, rubs, clicks or gallops  Abdomen - soft, nontender, nondistended, no masses or organomegaly  Neurological - alert, oriented, normal speech, no focal findings or movement disorder noted  Extremities - peripheral pulses normal, no pedal edema, no clubbing or cyanosis  Skin - normal coloration and turgor, no rashes, no suspicious skin lesions noted     Labs:  None    CBC:   WBC   Date Value Ref Range Status   09/13/2017 5.9 3.5 - 11.0 k/uL Final     Hemoglobin   Date Value Ref Range Status   09/13/2017 14.1 13.5 - 17.5 g/dL Final     Platelet Count   Date Value Ref Range Status   03/01/2012 202 140 - 450 k/uL Final     Platelets   Date Value Ref Range Status   09/13/2017 230 130 - 400 k/uL Final     BMP:   Sodium   Date Value Ref Range Status   03/04/2016 131 (L) 135 - 144 mmol/L Final     Potassium   Date Value Ref Range Status   03/04/2016 4.8 3.7 - 5.3 mmol/L Final     Comment:     SPECIMEN SLIGHTLY HEMOLYZED, RESULTS MAY BE ADVERSELY AFFECTED.      Chloride   Date Value Ref Range Status   03/04/2016 96 (L) 98 - 107 mmol/L Final     CO2 Date Value Ref Range Status   03/04/2016 20 20 - 31 mmol/L Final     BUN   Date Value Ref Range Status   03/04/2016 27 (H) 6 - 20 mg/dL Final     CREATININE   Date Value Ref Range Status   09/13/2017 0.82 0.70 - 1.20 mg/dL Final   06/21/2017 0.93 0.70 - 1.20 mg/dL Final   03/29/2017 0.94 0.70 - 1.20 mg/dL Final     Glucose   Date Value Ref Range Status   03/04/2016 163 (H) 70 - 99 mg/dL Final   06/04/2012 86 74 - 106 mg/dL Final     Comment:     [facility] [address] [place] [address] 3 [phone]     S. Calcium:   Calcium   Date Value Ref Range Status   03/04/2016 8.8 8.6 - 10.4 mg/dL Final     S. Ionized Calcium: No results found for: IONCA  S. Magnesium:   Magnesium   Date Value Ref Range Status   06/12/2014 2.7 (H) 1.6 - 2.6 mg/dL Final     Comment:     [address] 3 [phone]     S. Phosphorus:   Phosphorus   Date Value Ref Range Status   01/08/2013 3.4 2.5 - 4.5 mg/dL Final     Comment:     [address] 3 [phone]     S. Glucose:   POC Glucose   Date Value Ref Range Status   03/04/2016 175 (H) 75 - 110 mg/dL Final   03/04/2016 162 (H) 75 - 110 mg/dL Final   03/04/2016 176 (H) 75 - 110 mg/dL Final     Glycosylated hemoglobin A1C:   Hemoglobin A1C   Date Value Ref Range Status   03/01/2016 5.7 4.0 - 6.0 % Final       Pulmonary Functions Testing Results:    9/6/2016:  FEV1 2.78 86%, FVC 3.64 90%, CCP1DGM 95% , TLC 7.02  114%, DLCO 20.32  77%  8/26/2015: FEV1 2.30 69%, FVC 3.00 64%, COA6VYF, TLC 6.64  95%, DLCO 21.54  79%    Blood Gases: No results found for: PH, PCO2, PO2, HCO3, O2SAT    Radiological reports:    CXR ( seen)  From feb 2016 seen and no new infiltrates      CT Scans 5/3/15 ( SEEN)  Seen and no infiltrates and no acute changes at that time    CT Scan Low dose     9/19/17  *Stable 5 mm nodular thickening along the minor fissure.  No new or enlarging   nodule. *Paraseptal emphysema.        9/27/16  Mild

## 2017-10-23 ENCOUNTER — OFFICE VISIT (OUTPATIENT)
Dept: FAMILY MEDICINE CLINIC | Age: 62
End: 2017-10-23
Payer: MEDICARE

## 2017-10-23 VITALS
SYSTOLIC BLOOD PRESSURE: 136 MMHG | HEIGHT: 68 IN | DIASTOLIC BLOOD PRESSURE: 72 MMHG | WEIGHT: 255.6 LBS | HEART RATE: 74 BPM | BODY MASS INDEX: 38.74 KG/M2 | OXYGEN SATURATION: 99 % | TEMPERATURE: 97.4 F

## 2017-10-23 DIAGNOSIS — M54.50 ACUTE RIGHT-SIDED LOW BACK PAIN WITHOUT SCIATICA: Primary | ICD-10-CM

## 2017-10-23 PROCEDURE — 1036F TOBACCO NON-USER: CPT | Performed by: PHYSICIAN ASSISTANT

## 2017-10-23 PROCEDURE — 99213 OFFICE O/P EST LOW 20 MIN: CPT | Performed by: PHYSICIAN ASSISTANT

## 2017-10-23 PROCEDURE — G8484 FLU IMMUNIZE NO ADMIN: HCPCS | Performed by: PHYSICIAN ASSISTANT

## 2017-10-23 PROCEDURE — G8427 DOCREV CUR MEDS BY ELIG CLIN: HCPCS | Performed by: PHYSICIAN ASSISTANT

## 2017-10-23 PROCEDURE — 3017F COLORECTAL CA SCREEN DOC REV: CPT | Performed by: PHYSICIAN ASSISTANT

## 2017-10-23 PROCEDURE — G8417 CALC BMI ABV UP PARAM F/U: HCPCS | Performed by: PHYSICIAN ASSISTANT

## 2017-10-23 ASSESSMENT — ENCOUNTER SYMPTOMS
SHORTNESS OF BREATH: 0
DIARRHEA: 0
ABDOMINAL PAIN: 0
COUGH: 0
VOMITING: 0
BACK PAIN: 1
CONSTIPATION: 0
COLOR CHANGE: 0
NAUSEA: 0
BOWEL INCONTINENCE: 0

## 2017-10-23 NOTE — PROGRESS NOTES
nebulizer solution Take 3 mLs by nebulization every 4 hours as needed for Wheezing 120 each 5    amLODIPine (NORVASC) 10 MG tablet take 1 tablet by mouth once daily 30 tablet 11    benazepril (LOTENSIN) 10 MG tablet take 1 tablet by mouth once daily 30 tablet 11    omeprazole (PRILOSEC) 20 MG delayed release capsule take 1 capsule by mouth once daily 30 capsule 11    Immune Globulin, Human, 10 GM/100ML SOLN IV solution Infuse 10 g intravenously every 21 days       Albuterol Sulfate (PROAIR HFA IN) Inhale 2 puffs into the lungs every 4 hours as needed. No current facility-administered medications for this visit. No Known Allergies    Health Maintenance   Topic Date Due    DTaP/Tdap/Td vaccine (1 - Tdap) 08/29/1976    Colon cancer screen colonoscopy  01/01/2019    Diabetes screen  03/01/2019    Lipid screen  03/04/2020    Pneumococcal highest risk (3 of 3 - PPSV23) 12/02/2021    Flu vaccine  Completed    Hepatitis C screen  Completed    HIV screen  Completed       Subjective:      Review of Systems   Constitutional: Negative for activity change, appetite change, fatigue and fever. /72 (Site: Left Arm, Position: Sitting, Cuff Size: Medium Adult)   Pulse 74   Temp 97.4 °F (36.3 °C) (Tympanic)   Ht 5' 7.99\" (1.727 m)   Wt 255 lb 9.6 oz (115.9 kg)   SpO2 99%   BMI 38.87 kg/m²    Respiratory: Negative for cough and shortness of breath. Cardiovascular: Negative for chest pain. Gastrointestinal: Negative for abdominal pain, bowel incontinence, constipation, diarrhea, nausea and vomiting. Genitourinary: Negative for bladder incontinence and dysuria. Musculoskeletal: Positive for back pain and myalgias. Negative for arthralgias, gait problem and joint swelling. Skin: Negative for color change, pallor, rash and wound. Neurological: Negative for tingling, weakness and numbness. Hematological: Negative for adenopathy.    Psychiatric/Behavioral: The patient is not

## 2017-10-25 ENCOUNTER — HOSPITAL ENCOUNTER (OUTPATIENT)
Dept: INFUSION THERAPY | Facility: MEDICAL CENTER | Age: 62
Discharge: HOME | End: 2017-10-25
Payer: MEDICARE

## 2017-10-25 VITALS
TEMPERATURE: 97.6 F | HEART RATE: 62 BPM | DIASTOLIC BLOOD PRESSURE: 67 MMHG | RESPIRATION RATE: 16 BRPM | SYSTOLIC BLOOD PRESSURE: 99 MMHG

## 2017-10-25 DIAGNOSIS — D80.1 COMMON VARIABLE HYPOGAMMAGLOBULINEMIA (HCC): ICD-10-CM

## 2017-10-25 DIAGNOSIS — D83.9 COMMON VARIABLE IMMUNODEFICIENCY (HCC): ICD-10-CM

## 2017-10-25 PROCEDURE — 2580000003 HC RX 258: Performed by: ALLERGY & IMMUNOLOGY

## 2017-10-25 PROCEDURE — 6370000000 HC RX 637 (ALT 250 FOR IP): Performed by: ALLERGY & IMMUNOLOGY

## 2017-10-25 PROCEDURE — 96361 HYDRATE IV INFUSION ADD-ON: CPT

## 2017-10-25 PROCEDURE — 36591 DRAW BLOOD OFF VENOUS DEVICE: CPT

## 2017-10-25 PROCEDURE — 96366 THER/PROPH/DIAG IV INF ADDON: CPT

## 2017-10-25 PROCEDURE — 6360000002 HC RX W HCPCS: Performed by: ALLERGY & IMMUNOLOGY

## 2017-10-25 PROCEDURE — 96365 THER/PROPH/DIAG IV INF INIT: CPT

## 2017-10-25 RX ORDER — DIPHENHYDRAMINE HCL 25 MG
25 TABLET ORAL ONCE
Status: COMPLETED | OUTPATIENT
Start: 2017-10-25 | End: 2017-10-25

## 2017-10-25 RX ORDER — HEPARIN SODIUM (PORCINE) LOCK FLUSH IV SOLN 100 UNIT/ML 100 UNIT/ML
500 SOLUTION INTRAVENOUS PRN
Status: CANCELLED
Start: 2017-10-25

## 2017-10-25 RX ORDER — DIPHENHYDRAMINE HCL 25 MG
25 TABLET ORAL EVERY 4 HOURS PRN
Status: CANCELLED
Start: 2017-10-25

## 2017-10-25 RX ORDER — DIPHENHYDRAMINE HCL 25 MG
25 TABLET ORAL ONCE
Status: CANCELLED | OUTPATIENT
Start: 2017-10-25 | End: 2017-10-25

## 2017-10-25 RX ORDER — DIPHENHYDRAMINE HCL 25 MG
25 TABLET ORAL EVERY 4 HOURS PRN
Status: DISCONTINUED | OUTPATIENT
Start: 2017-10-25 | End: 2017-10-26 | Stop reason: HOSPADM

## 2017-10-25 RX ORDER — 0.9 % SODIUM CHLORIDE 0.9 %
10 VIAL (ML) INJECTION PRN
Status: CANCELLED | OUTPATIENT
Start: 2017-10-25

## 2017-10-25 RX ORDER — DEXTROSE MONOHYDRATE 50 MG/ML
INJECTION, SOLUTION INTRAVENOUS CONTINUOUS
Status: DISCONTINUED | OUTPATIENT
Start: 2017-10-25 | End: 2017-10-26 | Stop reason: HOSPADM

## 2017-10-25 RX ORDER — DEXTROSE AND SODIUM CHLORIDE 5; .33 G/100ML; G/100ML
INJECTION, SOLUTION INTRAVENOUS CONTINUOUS
Status: DISCONTINUED | OUTPATIENT
Start: 2017-10-25 | End: 2017-10-26 | Stop reason: HOSPADM

## 2017-10-25 RX ORDER — DEXTROSE MONOHYDRATE 50 MG/ML
INJECTION, SOLUTION INTRAVENOUS CONTINUOUS
Status: CANCELLED
Start: 2017-10-25

## 2017-10-25 RX ORDER — HEPARIN SODIUM (PORCINE) LOCK FLUSH IV SOLN 100 UNIT/ML 100 UNIT/ML
500 SOLUTION INTRAVENOUS PRN
Status: DISCONTINUED | OUTPATIENT
Start: 2017-10-25 | End: 2017-10-26 | Stop reason: HOSPADM

## 2017-10-25 RX ORDER — DEXTROSE AND SODIUM CHLORIDE 5; .33 G/100ML; G/100ML
INJECTION, SOLUTION INTRAVENOUS CONTINUOUS
Status: CANCELLED
Start: 2017-10-25

## 2017-10-25 RX ORDER — SODIUM CHLORIDE 0.9 % (FLUSH) 0.9 %
10 SYRINGE (ML) INJECTION PRN
Status: DISCONTINUED | OUTPATIENT
Start: 2017-10-25 | End: 2017-10-26 | Stop reason: HOSPADM

## 2017-10-25 RX ADMIN — DIPHENHYDRAMINE HCL 25 MG: 25 TABLET ORAL at 13:43

## 2017-10-25 RX ADMIN — DIPHENHYDRAMINE HCL 25 MG: 25 TABLET ORAL at 08:37

## 2017-10-25 RX ADMIN — DEXTROSE AND SODIUM CHLORIDE: 5; 330 INJECTION, SOLUTION INTRAVENOUS at 08:32

## 2017-10-25 RX ADMIN — DEXTROSE MONOHYDRATE: 50 INJECTION, SOLUTION INTRAVENOUS at 09:01

## 2017-10-25 RX ADMIN — IMMUNE GLOBULIN INTRAVENOUS (HUMAN): 5 INJECTION, SOLUTION INTRAVENOUS at 09:01

## 2017-10-25 NOTE — FLOWSHEET NOTE
rounding; patient alert and engaging.  offered support and presence. Chaplains will remain available to offer spiritual and emotional support as needed.      10/25/17 1437   Encounter Summary   Services provided to: Patient   Referral/Consult From: Rounding   Continue Visiting (10/25/17)   Complexity of Encounter Low   Length of Encounter 30 minutes   Routine   Type Follow up   Assessment Approachable   Intervention Active listening;Explored feelings, thoughts, concerns;Explored coping resources;Nurtured hope;Sustaining presence/ Ministry of presence   Outcome Engaged in conversation;Expressed feelings/needs/concerns;Coping;Receptive

## 2017-10-25 NOTE — PROGRESS NOTES
Janice Dennis arrives ambulatory alone  Orders reviewed for ivig infusion  Rt chest port accessed per protocol with #20 G 3/4 L Carlos needle   Good blood return noted  See [facility] for pre medications  Pre hydration completed  900 IVIG 60 gm initiated at 20 ml x 20 minutes  112/64  60  18  920 iv rate increased to 40ml x 20 minutes  106/79  65  18  940 iv rate increased to 80ml x 20 minutes  106/63  58  18   No reaction noted  1000 iv rate increased to 160ml x 20 minutes  100/66  64  18  1020 iv rate increased to 250ml until completion of infusion  Vital signs taken every 15 minutes and are as follows:  118/64  58  18  119/78  69  18  102/45  62  18  117/67  68  18  117/54  64  18  127/64  63  18  122/55  68  18  120/66  68  18  119/61  67  18  108/62 69  18  119/68  65  18  116/68 65  18  127/74  82  18  115/68  65  18  121/71   80  18  121/66  73  18 at completion of infusion  Post hydration completed   Carlos needle flushed and carlos needle removed with needle intact  Band aid applied  Discharged per ambulatory

## 2017-10-31 ENCOUNTER — TELEPHONE (OUTPATIENT)
Dept: FAMILY MEDICINE CLINIC | Age: 62
End: 2017-10-31

## 2017-10-31 DIAGNOSIS — R10.11 RIGHT UPPER QUADRANT ABDOMINAL PAIN: Primary | ICD-10-CM

## 2017-10-31 NOTE — TELEPHONE ENCOUNTER
Patient informed and will call and schedule a follow up   appointment as soon as he gets the us scheduled

## 2017-10-31 NOTE — TELEPHONE ENCOUNTER
Patient has lost 21 lbs since March  and he is having upper quadrant pain, nausea,explosive diarrhea and the same symptoms as she had with her gall bladder. Do you think it could be gb and would you order gb us  He is not eating very much. You saw him last week so they are asking for your advice.   Please call them at [phone]

## 2017-11-02 ENCOUNTER — HOSPITAL ENCOUNTER (OUTPATIENT)
Dept: ULTRASOUND IMAGING | Facility: CLINIC | Age: 62
Discharge: HOME | End: 2017-11-02
Payer: MEDICARE

## 2017-11-02 DIAGNOSIS — R10.11 RIGHT UPPER QUADRANT ABDOMINAL PAIN: ICD-10-CM

## 2017-11-02 PROCEDURE — 76705 ECHO EXAM OF ABDOMEN: CPT

## 2017-11-13 RX ORDER — ALPRAZOLAM 0.5 MG/1
TABLET ORAL
Qty: 90 TABLET | Refills: 1 | OUTPATIENT
Start: 2017-11-13

## 2017-11-15 ENCOUNTER — HOSPITAL ENCOUNTER (OUTPATIENT)
Dept: INFUSION THERAPY | Facility: MEDICAL CENTER | Age: 62
Discharge: HOME | End: 2017-11-15
Payer: MEDICARE

## 2017-11-15 VITALS
RESPIRATION RATE: 71 BRPM | HEART RATE: 71 BPM | DIASTOLIC BLOOD PRESSURE: 72 MMHG | SYSTOLIC BLOOD PRESSURE: 110 MMHG | TEMPERATURE: 97.5 F

## 2017-11-15 DIAGNOSIS — D80.1 COMMON VARIABLE HYPOGAMMAGLOBULINEMIA (HCC): ICD-10-CM

## 2017-11-15 DIAGNOSIS — D83.9 COMMON VARIABLE IMMUNODEFICIENCY (HCC): Chronic | ICD-10-CM

## 2017-11-15 PROCEDURE — 96365 THER/PROPH/DIAG IV INF INIT: CPT

## 2017-11-15 PROCEDURE — 96366 THER/PROPH/DIAG IV INF ADDON: CPT

## 2017-11-15 PROCEDURE — 6360000002 HC RX W HCPCS: Performed by: ALLERGY & IMMUNOLOGY

## 2017-11-15 PROCEDURE — 96368 THER/DIAG CONCURRENT INF: CPT

## 2017-11-15 PROCEDURE — 6370000000 HC RX 637 (ALT 250 FOR IP): Performed by: ALLERGY & IMMUNOLOGY

## 2017-11-15 PROCEDURE — 2580000003 HC RX 258: Performed by: ALLERGY & IMMUNOLOGY

## 2017-11-15 RX ORDER — DIPHENHYDRAMINE HCL 25 MG
25 TABLET ORAL EVERY 4 HOURS PRN
Status: CANCELLED
Start: 2017-11-15

## 2017-11-15 RX ORDER — DEXTROSE AND SODIUM CHLORIDE 5; .33 G/100ML; G/100ML
INJECTION, SOLUTION INTRAVENOUS CONTINUOUS
Status: DISCONTINUED | OUTPATIENT
Start: 2017-11-15 | End: 2017-11-16 | Stop reason: HOSPADM

## 2017-11-15 RX ORDER — HEPARIN SODIUM (PORCINE) LOCK FLUSH IV SOLN 100 UNIT/ML 100 UNIT/ML
500 SOLUTION INTRAVENOUS PRN
Status: CANCELLED
Start: 2017-11-15

## 2017-11-15 RX ORDER — DIPHENHYDRAMINE HCL 25 MG
25 TABLET ORAL ONCE
Status: CANCELLED | OUTPATIENT
Start: 2017-11-15 | End: 2017-11-15

## 2017-11-15 RX ORDER — DIPHENHYDRAMINE HCL 25 MG
25 TABLET ORAL EVERY 4 HOURS PRN
Status: DISCONTINUED | OUTPATIENT
Start: 2017-11-15 | End: 2017-11-16 | Stop reason: HOSPADM

## 2017-11-15 RX ORDER — DEXTROSE MONOHYDRATE 50 MG/ML
INJECTION, SOLUTION INTRAVENOUS CONTINUOUS
Status: CANCELLED
Start: 2017-11-15

## 2017-11-15 RX ORDER — DIPHENHYDRAMINE HCL 25 MG
25 TABLET ORAL ONCE
Status: COMPLETED | OUTPATIENT
Start: 2017-11-15 | End: 2017-11-15

## 2017-11-15 RX ORDER — 0.9 % SODIUM CHLORIDE 0.9 %
10 VIAL (ML) INJECTION PRN
Status: CANCELLED | OUTPATIENT
Start: 2017-11-15

## 2017-11-15 RX ORDER — DEXTROSE AND SODIUM CHLORIDE 5; .33 G/100ML; G/100ML
INJECTION, SOLUTION INTRAVENOUS CONTINUOUS
Status: CANCELLED
Start: 2017-11-15

## 2017-11-15 RX ORDER — HEPARIN SODIUM (PORCINE) LOCK FLUSH IV SOLN 100 UNIT/ML 100 UNIT/ML
500 SOLUTION INTRAVENOUS PRN
Status: DISCONTINUED | OUTPATIENT
Start: 2017-11-15 | End: 2017-11-16 | Stop reason: HOSPADM

## 2017-11-15 RX ORDER — 0.9 % SODIUM CHLORIDE 0.9 %
10 VIAL (ML) INJECTION PRN
Status: DISCONTINUED | OUTPATIENT
Start: 2017-11-15 | End: 2017-11-16 | Stop reason: HOSPADM

## 2017-11-15 RX ADMIN — SODIUM CHLORIDE, PRESERVATIVE FREE 500 UNITS: 5 INJECTION INTRAVENOUS at 15:41

## 2017-11-15 RX ADMIN — DIPHENHYDRAMINE HCL 25 MG: 25 TABLET ORAL at 08:35

## 2017-11-15 RX ADMIN — IMMUNE GLOBULIN INTRAVENOUS (HUMAN): 5 INJECTION, SOLUTION INTRAVENOUS at 09:07

## 2017-11-15 RX ADMIN — DEXTROSE AND SODIUM CHLORIDE: 5; 330 INJECTION, SOLUTION INTRAVENOUS at 08:31

## 2017-11-15 RX ADMIN — DIPHENHYDRAMINE HCL 25 MG: 25 TABLET ORAL at 12:57

## 2017-11-15 NOTE — PROGRESS NOTES
Patient here for IVIG infusion. No complaints today. Vitals obtained. Port accessed with brisk blood flow obtained. Premeds given per MAr.  IVIG hung at 20 ml/hr x 30 minutes, PN=028/73. Rate increased to 40 ml/hr x 30 minutes, LF=741/79. Rate increased to 80 ml/hr x 30 minuts, FG=631/75. Rate increased to 160 ml/hr  30 minutes, YT=757/80. Rate increased to and maintained at 250 ml/hr throughout remainder of infusion. Patient sleeping on and off. Completed. Tolerated well. Final  EA=754/72. Port flushed and gripper removed intact, band aid to site. Has a return visit scheduled. Discharged ambulatory per self.

## 2017-11-16 RX ORDER — ALPRAZOLAM 0.5 MG/1
TABLET ORAL
Qty: 90 TABLET | Refills: 0 | Status: SHIPPED | OUTPATIENT
Start: 2017-11-16 | End: 2017-12-14 | Stop reason: SDUPTHER

## 2017-12-01 RX ORDER — BENAZEPRIL HYDROCHLORIDE 10 MG/1
TABLET ORAL
Qty: 90 TABLET | Refills: 3 | Status: SHIPPED | OUTPATIENT
Start: 2017-12-01 | End: 2018-12-14 | Stop reason: SDUPTHER

## 2017-12-01 RX ORDER — OMEPRAZOLE 20 MG/1
CAPSULE, DELAYED RELEASE ORAL
Qty: 90 CAPSULE | Refills: 3 | Status: SHIPPED | OUTPATIENT
Start: 2017-12-01 | End: 2018-12-07 | Stop reason: SDUPTHER

## 2017-12-01 RX ORDER — AMLODIPINE BESYLATE 10 MG/1
TABLET ORAL
Qty: 90 TABLET | Refills: 3 | Status: SHIPPED | OUTPATIENT
Start: 2017-12-01 | End: 2018-12-07 | Stop reason: SDUPTHER

## 2017-12-04 ENCOUNTER — HOSPITAL ENCOUNTER (OUTPATIENT)
Facility: MEDICAL CENTER | Age: 62
End: 2017-12-04
Payer: MEDICARE

## 2017-12-06 ENCOUNTER — HOSPITAL ENCOUNTER (OUTPATIENT)
Dept: INFUSION THERAPY | Facility: MEDICAL CENTER | Age: 62
Discharge: HOME | End: 2017-12-06
Payer: MEDICARE

## 2017-12-06 VITALS
RESPIRATION RATE: 18 BRPM | DIASTOLIC BLOOD PRESSURE: 79 MMHG | SYSTOLIC BLOOD PRESSURE: 128 MMHG | HEART RATE: 70 BPM | TEMPERATURE: 97.8 F

## 2017-12-06 DIAGNOSIS — D83.9 COMMON VARIABLE IMMUNODEFICIENCY (HCC): Primary | Chronic | ICD-10-CM

## 2017-12-06 DIAGNOSIS — D80.1 COMMON VARIABLE HYPOGAMMAGLOBULINEMIA (HCC): ICD-10-CM

## 2017-12-06 DIAGNOSIS — D83.9 COMMON VARIABLE IMMUNODEFICIENCY (HCC): ICD-10-CM

## 2017-12-06 LAB
ABSOLUTE EOS #: 0.1 K/UL (ref 0–0.4)
ABSOLUTE IMMATURE GRANULOCYTE: ABNORMAL K/UL (ref 0–0.3)
ABSOLUTE LYMPH #: 1.1 K/UL (ref 1–4.8)
ABSOLUTE MONO #: 0.3 K/UL (ref 0.2–0.8)
ALT SERPL-CCNC: 24 U/L (ref 5–41)
BASOPHILS # BLD: 0 % (ref 0–2)
BASOPHILS ABSOLUTE: 0 K/UL (ref 0–0.2)
CREAT SERPL-MCNC: 0.81 MG/DL (ref 0.7–1.2)
DIFFERENTIAL TYPE: ABNORMAL
EOSINOPHILS RELATIVE PERCENT: 1 % (ref 1–4)
GFR AFRICAN AMERICAN: >60 ML/MIN
GFR NON-AFRICAN AMERICAN: >60 ML/MIN
GFR SERPL CREATININE-BSD FRML MDRD: NORMAL ML/MIN/{1.73_M2}
GFR SERPL CREATININE-BSD FRML MDRD: NORMAL ML/MIN/{1.73_M2}
HCT VFR BLD CALC: 43.4 % (ref 41–53)
HEMOGLOBIN: 14.4 G/DL (ref 13.5–17.5)
IGG: 839 MG/DL (ref 700–1600)
IMMATURE GRANULOCYTES: ABNORMAL %
LYMPHOCYTES # BLD: 17 % (ref 24–44)
MCH RBC QN AUTO: 29 PG (ref 26–34)
MCHC RBC AUTO-ENTMCNC: 33.3 G/DL (ref 31–37)
MCV RBC AUTO: 87.2 FL (ref 80–100)
MONOCYTES # BLD: 4 % (ref 1–7)
PDW BLD-RTO: 13 % (ref 11.5–14.5)
PLATELET # BLD: 258 K/UL (ref 130–400)
PLATELET ESTIMATE: ABNORMAL
PMV BLD AUTO: 8.2 FL (ref 6–12)
RBC # BLD: 4.97 M/UL (ref 4.5–5.9)
RBC # BLD: ABNORMAL 10*6/UL
SEG NEUTROPHILS: 78 % (ref 36–66)
SEGMENTED NEUTROPHILS ABSOLUTE COUNT: 5.2 K/UL (ref 1.8–7.7)
WBC # BLD: 6.7 K/UL (ref 3.5–11)
WBC # BLD: ABNORMAL 10*3/UL

## 2017-12-06 PROCEDURE — 84460 ALANINE AMINO (ALT) (SGPT): CPT

## 2017-12-06 PROCEDURE — 82784 ASSAY IGA/IGD/IGG/IGM EACH: CPT

## 2017-12-06 PROCEDURE — 96366 THER/PROPH/DIAG IV INF ADDON: CPT

## 2017-12-06 PROCEDURE — 2580000003 HC RX 258: Performed by: ALLERGY & IMMUNOLOGY

## 2017-12-06 PROCEDURE — 96361 HYDRATE IV INFUSION ADD-ON: CPT

## 2017-12-06 PROCEDURE — 85025 COMPLETE CBC W/AUTO DIFF WBC: CPT

## 2017-12-06 PROCEDURE — 36591 DRAW BLOOD OFF VENOUS DEVICE: CPT

## 2017-12-06 PROCEDURE — 96368 THER/DIAG CONCURRENT INF: CPT

## 2017-12-06 PROCEDURE — 6360000002 HC RX W HCPCS: Performed by: ALLERGY & IMMUNOLOGY

## 2017-12-06 PROCEDURE — 6370000000 HC RX 637 (ALT 250 FOR IP): Performed by: ALLERGY & IMMUNOLOGY

## 2017-12-06 PROCEDURE — 82565 ASSAY OF CREATININE: CPT

## 2017-12-06 PROCEDURE — 96365 THER/PROPH/DIAG IV INF INIT: CPT

## 2017-12-06 RX ORDER — DIPHENHYDRAMINE HCL 25 MG
25 TABLET ORAL EVERY 4 HOURS PRN
Status: CANCELLED
Start: 2017-12-06

## 2017-12-06 RX ORDER — HEPARIN SODIUM (PORCINE) LOCK FLUSH IV SOLN 100 UNIT/ML 100 UNIT/ML
500 SOLUTION INTRAVENOUS PRN
Status: DISCONTINUED | OUTPATIENT
Start: 2017-12-06 | End: 2017-12-07 | Stop reason: HOSPADM

## 2017-12-06 RX ORDER — DEXTROSE AND SODIUM CHLORIDE 5; .33 G/100ML; G/100ML
INJECTION, SOLUTION INTRAVENOUS CONTINUOUS
Status: DISCONTINUED | OUTPATIENT
Start: 2017-12-06 | End: 2017-12-07 | Stop reason: HOSPADM

## 2017-12-06 RX ORDER — 0.9 % SODIUM CHLORIDE 0.9 %
10 VIAL (ML) INJECTION PRN
Status: DISCONTINUED | OUTPATIENT
Start: 2017-12-06 | End: 2017-12-07 | Stop reason: HOSPADM

## 2017-12-06 RX ORDER — DIPHENHYDRAMINE HCL 25 MG
25 TABLET ORAL ONCE
Status: COMPLETED | OUTPATIENT
Start: 2017-12-06 | End: 2017-12-06

## 2017-12-06 RX ORDER — HEPARIN SODIUM (PORCINE) LOCK FLUSH IV SOLN 100 UNIT/ML 100 UNIT/ML
500 SOLUTION INTRAVENOUS PRN
Status: CANCELLED
Start: 2017-12-06

## 2017-12-06 RX ORDER — 0.9 % SODIUM CHLORIDE 0.9 %
10 VIAL (ML) INJECTION PRN
Status: CANCELLED | OUTPATIENT
Start: 2017-12-06

## 2017-12-06 RX ORDER — DEXTROSE MONOHYDRATE 50 MG/ML
INJECTION, SOLUTION INTRAVENOUS CONTINUOUS
Status: CANCELLED
Start: 2017-12-06

## 2017-12-06 RX ORDER — DIPHENHYDRAMINE HCL 25 MG
25 TABLET ORAL EVERY 4 HOURS PRN
Status: DISCONTINUED | OUTPATIENT
Start: 2017-12-06 | End: 2017-12-07 | Stop reason: HOSPADM

## 2017-12-06 RX ORDER — DEXTROSE AND SODIUM CHLORIDE 5; .33 G/100ML; G/100ML
INJECTION, SOLUTION INTRAVENOUS CONTINUOUS
Status: CANCELLED
Start: 2017-12-06

## 2017-12-06 RX ORDER — DIPHENHYDRAMINE HCL 25 MG
25 TABLET ORAL ONCE
Status: CANCELLED | OUTPATIENT
Start: 2017-12-06 | End: 2017-12-06

## 2017-12-06 RX ADMIN — DIPHENHYDRAMINE HCL 25 MG: 25 TABLET ORAL at 08:53

## 2017-12-06 RX ADMIN — DEXTROSE AND SODIUM CHLORIDE: 5; 330 INJECTION, SOLUTION INTRAVENOUS at 08:53

## 2017-12-06 RX ADMIN — DIPHENHYDRAMINE HCL 25 MG: 25 TABLET ORAL at 14:33

## 2017-12-06 RX ADMIN — Medication 20 ML: at 16:27

## 2017-12-06 RX ADMIN — IMMUNE GLOBULIN INTRAVENOUS (HUMAN): 5 INJECTION, SOLUTION INTRAVENOUS at 09:35

## 2017-12-06 RX ADMIN — SODIUM CHLORIDE, PRESERVATIVE FREE 500 UNITS: 5 INJECTION INTRAVENOUS at 16:27

## 2017-12-06 RX ADMIN — Medication 10 ML: at 08:51

## 2017-12-06 NOTE — PLAN OF CARE
Problem: Safety:  Intervention: Ensure Patient identification  Lala Ila wheels locked and no injuries, pt uses home o2 and brought with him and then to 3L/NC per wall o2.

## 2017-12-06 NOTE — PROGRESS NOTES
Pt arrives and orders reviewed and labs sent today. D5 1/3 NS infusing at 500 for 30 min, to infuse 250 ml prior and benadryl 25 mg po. Then after 30 min , IVIG infusing at 20 ml/hr for 20 min, 40 ml/hr for 20 min, 80 ml/hr for 20 min, 160 ml/hr for 30 min, and 250 ml/hr for remainder. BP checked Q 15 min and then 30 min as follows: 119/70, 114/68, 119/64, 120/72, 108/68, 104/71, 117/75, 123/69, 112/70, 111/69, 111/69, 113/65, 111/72, 114/68, 137/71. Then 250 ml D5 1/3 NS given post IVIG. No reactions or complaints and blood return present throughout infusion. Pt has next appt per calendar. Pt discharged pre ambulatory per self.

## 2017-12-11 DIAGNOSIS — J42 CHRONIC BRONCHITIS, UNSPECIFIED CHRONIC BRONCHITIS TYPE (HCC): ICD-10-CM

## 2017-12-11 RX ORDER — BUDESONIDE AND FORMOTEROL FUMARATE DIHYDRATE 160; 4.5 UG/1; UG/1
2 AEROSOL RESPIRATORY (INHALATION) 2 TIMES DAILY
Qty: 3 INHALER | Refills: 3 | Status: SHIPPED | OUTPATIENT
Start: 2017-12-11 | End: 2018-10-05 | Stop reason: CLARIF

## 2017-12-11 RX ORDER — MONTELUKAST SODIUM 10 MG/1
10 TABLET ORAL DAILY
Qty: 90 TABLET | Refills: 3 | Status: SHIPPED | OUTPATIENT
Start: 2017-12-11 | End: 2019-07-31

## 2017-12-11 RX ORDER — ALBUTEROL SULFATE 90 UG/1
2 AEROSOL, METERED RESPIRATORY (INHALATION) EVERY 6 HOURS PRN
Qty: 3 INHALER | Refills: 3 | Status: SHIPPED | OUTPATIENT
Start: 2017-12-11 | End: 2019-07-31

## 2017-12-15 RX ORDER — ALPRAZOLAM 0.5 MG/1
TABLET ORAL
Qty: 90 TABLET | Refills: 0 | Status: SHIPPED | OUTPATIENT
Start: 2017-12-15 | End: 2018-01-19 | Stop reason: SDUPTHER

## 2017-12-27 ENCOUNTER — HOSPITAL ENCOUNTER (OUTPATIENT)
Dept: INFUSION THERAPY | Facility: MEDICAL CENTER | Age: 62
Discharge: HOME | End: 2017-12-27
Payer: MEDICARE

## 2017-12-27 VITALS
DIASTOLIC BLOOD PRESSURE: 66 MMHG | SYSTOLIC BLOOD PRESSURE: 120 MMHG | TEMPERATURE: 98.4 F | RESPIRATION RATE: 20 BRPM | HEART RATE: 75 BPM

## 2017-12-27 DIAGNOSIS — D80.1 COMMON VARIABLE HYPOGAMMAGLOBULINEMIA (HCC): ICD-10-CM

## 2017-12-27 DIAGNOSIS — D83.9 COMMON VARIABLE IMMUNODEFICIENCY (HCC): Chronic | ICD-10-CM

## 2017-12-27 LAB
ABSOLUTE EOS #: 0.1 K/UL (ref 0–0.4)
ABSOLUTE IMMATURE GRANULOCYTE: ABNORMAL K/UL (ref 0–0.3)
ABSOLUTE LYMPH #: 1.5 K/UL (ref 1–4.8)
ABSOLUTE MONO #: 0.3 K/UL (ref 0.2–0.8)
ALT SERPL-CCNC: 27 U/L (ref 5–41)
BASOPHILS # BLD: 1 % (ref 0–2)
BASOPHILS ABSOLUTE: 0.1 K/UL (ref 0–0.2)
CREAT SERPL-MCNC: 0.84 MG/DL (ref 0.7–1.2)
DIFFERENTIAL TYPE: ABNORMAL
EOSINOPHILS RELATIVE PERCENT: 1 % (ref 1–4)
GFR AFRICAN AMERICAN: >60 ML/MIN
GFR NON-AFRICAN AMERICAN: >60 ML/MIN
GFR SERPL CREATININE-BSD FRML MDRD: NORMAL ML/MIN/{1.73_M2}
GFR SERPL CREATININE-BSD FRML MDRD: NORMAL ML/MIN/{1.73_M2}
HCT VFR BLD CALC: 43.2 % (ref 41–53)
HEMOGLOBIN: 14.5 G/DL (ref 13.5–17.5)
IGA: 83 MG/DL (ref 70–400)
IGG: 796 MG/DL (ref 700–1600)
IGM: 109 MG/DL (ref 40–230)
IMMATURE GRANULOCYTES: ABNORMAL %
LYMPHOCYTES # BLD: 18 % (ref 24–44)
MCH RBC QN AUTO: 28.6 PG (ref 26–34)
MCHC RBC AUTO-ENTMCNC: 33.6 G/DL (ref 31–37)
MCV RBC AUTO: 85.2 FL (ref 80–100)
MONOCYTES # BLD: 4 % (ref 1–7)
PDW BLD-RTO: 12.6 % (ref 11.5–14.5)
PLATELET # BLD: 235 K/UL (ref 130–400)
PLATELET ESTIMATE: ABNORMAL
PMV BLD AUTO: ABNORMAL FL (ref 6–12)
RBC # BLD: 5.07 M/UL (ref 4.5–5.9)
RBC # BLD: ABNORMAL 10*6/UL
SEG NEUTROPHILS: 76 % (ref 36–66)
SEGMENTED NEUTROPHILS ABSOLUTE COUNT: 6.1 K/UL (ref 1.8–7.7)
WBC # BLD: 8.1 K/UL (ref 3.5–11)
WBC # BLD: ABNORMAL 10*3/UL

## 2017-12-27 PROCEDURE — 82565 ASSAY OF CREATININE: CPT

## 2017-12-27 PROCEDURE — 36591 DRAW BLOOD OFF VENOUS DEVICE: CPT

## 2017-12-27 PROCEDURE — 2580000003 HC RX 258: Performed by: ALLERGY & IMMUNOLOGY

## 2017-12-27 PROCEDURE — 84460 ALANINE AMINO (ALT) (SGPT): CPT

## 2017-12-27 PROCEDURE — 85025 COMPLETE CBC W/AUTO DIFF WBC: CPT

## 2017-12-27 PROCEDURE — 96368 THER/DIAG CONCURRENT INF: CPT

## 2017-12-27 PROCEDURE — 82784 ASSAY IGA/IGD/IGG/IGM EACH: CPT

## 2017-12-27 PROCEDURE — 6370000000 HC RX 637 (ALT 250 FOR IP): Performed by: ALLERGY & IMMUNOLOGY

## 2017-12-27 PROCEDURE — 96365 THER/PROPH/DIAG IV INF INIT: CPT

## 2017-12-27 PROCEDURE — 96366 THER/PROPH/DIAG IV INF ADDON: CPT

## 2017-12-27 PROCEDURE — 6360000002 HC RX W HCPCS: Performed by: ALLERGY & IMMUNOLOGY

## 2017-12-27 RX ORDER — DEXTROSE MONOHYDRATE 50 MG/ML
INJECTION, SOLUTION INTRAVENOUS CONTINUOUS
Status: CANCELLED
Start: 2017-12-27

## 2017-12-27 RX ORDER — 0.9 % SODIUM CHLORIDE 0.9 %
10 VIAL (ML) INJECTION PRN
Status: CANCELLED | OUTPATIENT
Start: 2017-12-27

## 2017-12-27 RX ORDER — DIPHENHYDRAMINE HCL 25 MG
25 TABLET ORAL ONCE
Status: COMPLETED | OUTPATIENT
Start: 2017-12-27 | End: 2017-12-27

## 2017-12-27 RX ORDER — DEXTROSE AND SODIUM CHLORIDE 5; .33 G/100ML; G/100ML
INJECTION, SOLUTION INTRAVENOUS CONTINUOUS
Status: CANCELLED
Start: 2017-12-27

## 2017-12-27 RX ORDER — HEPARIN SODIUM (PORCINE) LOCK FLUSH IV SOLN 100 UNIT/ML 100 UNIT/ML
500 SOLUTION INTRAVENOUS PRN
Status: CANCELLED
Start: 2017-12-27

## 2017-12-27 RX ORDER — DIPHENHYDRAMINE HCL 25 MG
25 TABLET ORAL EVERY 4 HOURS PRN
Status: DISCONTINUED | OUTPATIENT
Start: 2017-12-27 | End: 2017-12-28 | Stop reason: HOSPADM

## 2017-12-27 RX ORDER — DEXTROSE AND SODIUM CHLORIDE 5; .33 G/100ML; G/100ML
INJECTION, SOLUTION INTRAVENOUS CONTINUOUS
Status: DISCONTINUED | OUTPATIENT
Start: 2017-12-27 | End: 2017-12-28 | Stop reason: HOSPADM

## 2017-12-27 RX ORDER — DIPHENHYDRAMINE HCL 25 MG
25 TABLET ORAL EVERY 4 HOURS PRN
Status: CANCELLED
Start: 2017-12-27

## 2017-12-27 RX ORDER — DIPHENHYDRAMINE HCL 25 MG
25 TABLET ORAL ONCE
Status: CANCELLED | OUTPATIENT
Start: 2017-12-27 | End: 2017-12-27

## 2017-12-27 RX ADMIN — IMMUNE GLOBULIN INTRAVENOUS (HUMAN): 5 INJECTION, SOLUTION INTRAVENOUS at 09:27

## 2017-12-27 RX ADMIN — DEXTROSE AND SODIUM CHLORIDE: 5; 330 INJECTION, SOLUTION INTRAVENOUS at 08:44

## 2017-12-27 RX ADMIN — DIPHENHYDRAMINE HCL 25 MG: 25 TABLET ORAL at 08:44

## 2017-12-27 RX ADMIN — DIPHENHYDRAMINE HCL 25 MG: 25 TABLET ORAL at 14:10

## 2017-12-27 NOTE — PROGRESS NOTES
Patient here for IVIG and labs. Feels well today. Port accessed and labs drawn and sent. IV hydration hung per MAR. Premeds given per [facility]. IVIG hung at 20 ml/hr x 30 minutes, UB=865/77. Rate increased to 40 ml/hr x 30 minutes, EC=738/67. Rate increased to 80 ml/hr x 30 minutes,LA=427/65. Rate increased to 160 ml/hr x 30 minutes, TS=031/68. Sleeping on and off. Rate increased to and maintained at 250 ml/hr throughout remainder of infusion. .  BP's as follows;  110/71  122/65  121/65  114/67  122/68  123/69  122/63  120/62  Final BP at lvwajluxjk=334/68. Post hydration completed as ordered. Port flushed and gripper removed intact, band aid to site. Has a return visit scheduled. Discharged ambulatory per self.

## 2018-01-17 ENCOUNTER — HOSPITAL ENCOUNTER (OUTPATIENT)
Dept: INFUSION THERAPY | Facility: MEDICAL CENTER | Age: 63
Discharge: HOME | End: 2018-01-17
Payer: MEDICARE

## 2018-01-17 VITALS
HEART RATE: 67 BPM | TEMPERATURE: 97.6 F | SYSTOLIC BLOOD PRESSURE: 121 MMHG | RESPIRATION RATE: 20 BRPM | DIASTOLIC BLOOD PRESSURE: 70 MMHG

## 2018-01-17 DIAGNOSIS — D83.9 COMMON VARIABLE IMMUNODEFICIENCY (HCC): ICD-10-CM

## 2018-01-17 DIAGNOSIS — D80.1 COMMON VARIABLE HYPOGAMMAGLOBULINEMIA (HCC): ICD-10-CM

## 2018-01-17 PROCEDURE — 96366 THER/PROPH/DIAG IV INF ADDON: CPT

## 2018-01-17 PROCEDURE — 96365 THER/PROPH/DIAG IV INF INIT: CPT

## 2018-01-17 PROCEDURE — 2580000003 HC RX 258: Performed by: ALLERGY & IMMUNOLOGY

## 2018-01-17 PROCEDURE — 6360000002 HC RX W HCPCS: Performed by: ALLERGY & IMMUNOLOGY

## 2018-01-17 PROCEDURE — 96361 HYDRATE IV INFUSION ADD-ON: CPT

## 2018-01-17 PROCEDURE — 6370000000 HC RX 637 (ALT 250 FOR IP): Performed by: ALLERGY & IMMUNOLOGY

## 2018-01-17 RX ORDER — DIPHENHYDRAMINE HCL 25 MG
25 TABLET ORAL ONCE
Status: CANCELLED | OUTPATIENT
Start: 2018-01-17 | End: 2018-01-17

## 2018-01-17 RX ORDER — DIPHENHYDRAMINE HCL 25 MG
25 TABLET ORAL EVERY 4 HOURS PRN
Status: CANCELLED
Start: 2018-01-17

## 2018-01-17 RX ORDER — DEXTROSE MONOHYDRATE 50 MG/ML
INJECTION, SOLUTION INTRAVENOUS CONTINUOUS
Status: CANCELLED
Start: 2018-01-17

## 2018-01-17 RX ORDER — HEPARIN SODIUM (PORCINE) LOCK FLUSH IV SOLN 100 UNIT/ML 100 UNIT/ML
500 SOLUTION INTRAVENOUS PRN
Status: DISCONTINUED | OUTPATIENT
Start: 2018-01-17 | End: 2018-01-18 | Stop reason: HOSPADM

## 2018-01-17 RX ORDER — 0.9 % SODIUM CHLORIDE 0.9 %
10 VIAL (ML) INJECTION PRN
Status: ACTIVE | OUTPATIENT
Start: 2018-01-17 | End: 2018-01-18

## 2018-01-17 RX ORDER — DIPHENHYDRAMINE HCL 25 MG
25 TABLET ORAL ONCE
Status: COMPLETED | OUTPATIENT
Start: 2018-01-17 | End: 2018-01-17

## 2018-01-17 RX ORDER — DIPHENHYDRAMINE HCL 25 MG
25 TABLET ORAL EVERY 4 HOURS PRN
Status: DISCONTINUED | OUTPATIENT
Start: 2018-01-17 | End: 2018-01-18 | Stop reason: HOSPADM

## 2018-01-17 RX ORDER — DEXTROSE MONOHYDRATE 50 MG/ML
INJECTION, SOLUTION INTRAVENOUS CONTINUOUS
Status: DISCONTINUED | OUTPATIENT
Start: 2018-01-17 | End: 2018-01-18 | Stop reason: HOSPADM

## 2018-01-17 RX ORDER — DEXTROSE AND SODIUM CHLORIDE 5; .33 G/100ML; G/100ML
INJECTION, SOLUTION INTRAVENOUS CONTINUOUS
Status: CANCELLED
Start: 2018-01-17

## 2018-01-17 RX ORDER — 0.9 % SODIUM CHLORIDE 0.9 %
10 VIAL (ML) INJECTION PRN
Status: CANCELLED | OUTPATIENT
Start: 2018-01-17

## 2018-01-17 RX ORDER — DEXTROSE AND SODIUM CHLORIDE 5; .33 G/100ML; G/100ML
INJECTION, SOLUTION INTRAVENOUS CONTINUOUS
Status: DISCONTINUED | OUTPATIENT
Start: 2018-01-17 | End: 2018-01-18 | Stop reason: HOSPADM

## 2018-01-17 RX ORDER — HEPARIN SODIUM (PORCINE) LOCK FLUSH IV SOLN 100 UNIT/ML 100 UNIT/ML
500 SOLUTION INTRAVENOUS PRN
Status: CANCELLED
Start: 2018-01-17

## 2018-01-17 RX ADMIN — DEXTROSE MONOHYDRATE: 50 INJECTION, SOLUTION INTRAVENOUS at 08:49

## 2018-01-17 RX ADMIN — DIPHENHYDRAMINE HCL 25 MG: 25 TABLET ORAL at 08:45

## 2018-01-17 RX ADMIN — SODIUM CHLORIDE, PRESERVATIVE FREE 500 UNITS: 5 INJECTION INTRAVENOUS at 17:39

## 2018-01-17 RX ADMIN — IMMUNE GLOBULIN INTRAVENOUS (HUMAN): 5 INJECTION, SOLUTION INTRAVENOUS at 09:27

## 2018-01-17 RX ADMIN — Medication 10 ML: at 08:49

## 2018-01-17 RX ADMIN — Medication 10 ML: at 17:39

## 2018-01-17 RX ADMIN — DIPHENHYDRAMINE HCL 25 MG: 25 TABLET ORAL at 13:40

## 2018-01-17 RX ADMIN — DEXTROSE AND SODIUM CHLORIDE: 5; 330 INJECTION, SOLUTION INTRAVENOUS at 08:49

## 2018-01-17 NOTE — PROGRESS NOTES
Pt arrives per ambulatory per self and last labs done 12/27/18, needed Q 4th infusion, not due until March. Pt given appts per calendar. Pt given premeds and prehydration and tolerated well and then IVIG infusing at 20 ml/hr for 20 min, 40 ml/hr for 20 min , 80 ml/hr for 20 min, 160 ml/hr for 30 min, then 250 ml/hr for remainder and then pt given posthydration, pt tolerated well and no reactions or complaints Blood return present throughout infusion. BP checked Q 30 min as follows: 104/66, 113/65, 109/66, 120/84, 123/65, 126/67, 118/70, 130/78, 126/82, 119/71, 115/66, 111/63, 112/66, 129/82, 121/69, 125/70. Then pt discharged per ambulatory per self .

## 2018-01-19 RX ORDER — ALPRAZOLAM 0.5 MG/1
TABLET ORAL
Qty: 90 TABLET | Refills: 0 | Status: SHIPPED | OUTPATIENT
Start: 2018-01-19 | End: 2018-02-14 | Stop reason: SDUPTHER

## 2018-01-19 NOTE — TELEPHONE ENCOUNTER
I sent over xanax refill, use sparingly. Recommend apt next month if more able. [id number] [address] for note stating wife is caretaker.

## 2018-02-07 ENCOUNTER — HOSPITAL ENCOUNTER (OUTPATIENT)
Dept: INFUSION THERAPY | Facility: MEDICAL CENTER | Age: 63
Discharge: HOME | End: 2018-02-07
Payer: MEDICARE

## 2018-02-07 VITALS
HEART RATE: 76 BPM | TEMPERATURE: 98.6 F | RESPIRATION RATE: 20 BRPM | DIASTOLIC BLOOD PRESSURE: 66 MMHG | SYSTOLIC BLOOD PRESSURE: 128 MMHG

## 2018-02-07 DIAGNOSIS — D83.9 COMMON VARIABLE IMMUNODEFICIENCY (HCC): ICD-10-CM

## 2018-02-07 DIAGNOSIS — D80.1 COMMON VARIABLE HYPOGAMMAGLOBULINEMIA (HCC): ICD-10-CM

## 2018-02-07 PROCEDURE — 6370000000 HC RX 637 (ALT 250 FOR IP): Performed by: ALLERGY & IMMUNOLOGY

## 2018-02-07 PROCEDURE — 96365 THER/PROPH/DIAG IV INF INIT: CPT

## 2018-02-07 PROCEDURE — 96361 HYDRATE IV INFUSION ADD-ON: CPT

## 2018-02-07 PROCEDURE — 2580000003 HC RX 258: Performed by: ALLERGY & IMMUNOLOGY

## 2018-02-07 PROCEDURE — 6360000002 HC RX W HCPCS: Performed by: ALLERGY & IMMUNOLOGY

## 2018-02-07 PROCEDURE — 96366 THER/PROPH/DIAG IV INF ADDON: CPT

## 2018-02-07 RX ORDER — DIPHENHYDRAMINE HCL 25 MG
25 TABLET ORAL EVERY 4 HOURS PRN
Status: DISCONTINUED | OUTPATIENT
Start: 2018-02-07 | End: 2018-02-08 | Stop reason: HOSPADM

## 2018-02-07 RX ORDER — DEXTROSE MONOHYDRATE 50 MG/ML
INJECTION, SOLUTION INTRAVENOUS CONTINUOUS
Status: DISCONTINUED | OUTPATIENT
Start: 2018-02-07 | End: 2018-02-08 | Stop reason: HOSPADM

## 2018-02-07 RX ORDER — DEXTROSE AND SODIUM CHLORIDE 5; .33 G/100ML; G/100ML
INJECTION, SOLUTION INTRAVENOUS CONTINUOUS
Status: DISCONTINUED | OUTPATIENT
Start: 2018-02-07 | End: 2018-02-08 | Stop reason: HOSPADM

## 2018-02-07 RX ORDER — DEXTROSE AND SODIUM CHLORIDE 5; .33 G/100ML; G/100ML
INJECTION, SOLUTION INTRAVENOUS CONTINUOUS
Status: CANCELLED
Start: 2018-02-07

## 2018-02-07 RX ORDER — 0.9 % SODIUM CHLORIDE 0.9 %
10 VIAL (ML) INJECTION PRN
Status: DISCONTINUED | OUTPATIENT
Start: 2018-02-07 | End: 2018-02-08 | Stop reason: HOSPADM

## 2018-02-07 RX ORDER — DEXTROSE MONOHYDRATE 50 MG/ML
INJECTION, SOLUTION INTRAVENOUS CONTINUOUS
Status: CANCELLED
Start: 2018-02-07

## 2018-02-07 RX ORDER — DIPHENHYDRAMINE HCL 25 MG
25 TABLET ORAL ONCE
Status: COMPLETED | OUTPATIENT
Start: 2018-02-07 | End: 2018-02-07

## 2018-02-07 RX ORDER — DIPHENHYDRAMINE HCL 25 MG
25 TABLET ORAL ONCE
Status: CANCELLED | OUTPATIENT
Start: 2018-02-07 | End: 2018-02-07

## 2018-02-07 RX ORDER — DIPHENHYDRAMINE HCL 25 MG
25 TABLET ORAL EVERY 4 HOURS PRN
Status: CANCELLED
Start: 2018-02-07

## 2018-02-07 RX ORDER — HEPARIN SODIUM (PORCINE) LOCK FLUSH IV SOLN 100 UNIT/ML 100 UNIT/ML
500 SOLUTION INTRAVENOUS PRN
Status: DISCONTINUED | OUTPATIENT
Start: 2018-02-07 | End: 2018-02-08 | Stop reason: HOSPADM

## 2018-02-07 RX ORDER — HEPARIN SODIUM (PORCINE) LOCK FLUSH IV SOLN 100 UNIT/ML 100 UNIT/ML
500 SOLUTION INTRAVENOUS PRN
Status: CANCELLED
Start: 2018-02-07

## 2018-02-07 RX ORDER — 0.9 % SODIUM CHLORIDE 0.9 %
10 VIAL (ML) INJECTION PRN
Status: CANCELLED | OUTPATIENT
Start: 2018-02-07

## 2018-02-07 RX ADMIN — Medication 10 ML: at 16:07

## 2018-02-07 RX ADMIN — IMMUNE GLOBULIN INTRAVENOUS (HUMAN): 5 INJECTION, SOLUTION INTRAVENOUS at 09:26

## 2018-02-07 RX ADMIN — DEXTROSE AND SODIUM CHLORIDE: 5; 330 INJECTION, SOLUTION INTRAVENOUS at 09:01

## 2018-02-07 RX ADMIN — SODIUM CHLORIDE, PRESERVATIVE FREE 500 UNITS: 5 INJECTION INTRAVENOUS at 16:07

## 2018-02-07 RX ADMIN — DIPHENHYDRAMINE HCL 25 MG: 25 TABLET ORAL at 13:01

## 2018-02-07 RX ADMIN — DIPHENHYDRAMINE HCL 25 MG: 25 TABLET ORAL at 09:03

## 2018-02-07 NOTE — FLOWSHEET NOTE
rounding; offered support and presence. Chaplains will remain available to offer spiritual and emotional support as needed.      02/07/18 1446   Encounter Summary   Services provided to: Patient   Referral/Consult From: Rounding   Continue Visiting (02/07/18)   Complexity of Encounter Low   Length of Encounter 15 minutes   Routine   Type Follow up   Assessment Approachable   Intervention Active listening;Explored feelings, thoughts, concerns;Explored coping resources;Nurtured hope;Sustaining presence/ Ministry of presence   Outcome Engaged in conversation;Expressed feelings/needs/concerns;Coping

## 2018-02-07 NOTE — PROGRESS NOTES
Frances Hem here per ambulatory with O2 at 3 liters per portable oxygen tamk per nasal cannula. Pt is infection free, accessed port per policy, excellent blood return noted, started hydration of D 5 1/3 NS at 500 ml/hr for 250 ml. Benadryl 25 mg po, given. Started IVIG 60 grams at 20 ml/hr for 20 minutes after completion of pre hydration, b/p 135/73. Increased rate to 40 ml/hr for 20 min utes , b/p 116/ 68. Rate increased to 60 ml/hr for 20 minutes, b/p 121/70. Increased rate to 80 ml/hr for 20 minutes, 115/53. Increased rate to 160 for 20 minutes, 116/71 for 20 min  Utes. Increased rate to 250 ml/hr for the remainder of the treatment. 120/6, 119/70, 127/77, 108/68, 112/72, 129/73, 125/72, 115/75, 119/82,   126/67, 111/64, 109/66. Removed b/p cuff, treatment continues at 250 ml/hr. Pt completed IVIG at 1540, started post hydration of 250 ml for 30 minutes. Finished hydration, b/p 133/67. Flushed line and heparinized port. Pt back in three weeks for next treatment.

## 2018-02-14 RX ORDER — ALPRAZOLAM 0.5 MG/1
TABLET ORAL
Qty: 90 TABLET | Refills: 0 | Status: SHIPPED | OUTPATIENT
Start: 2018-02-14 | End: 2018-03-13 | Stop reason: SDUPTHER

## 2018-02-14 NOTE — TELEPHONE ENCOUNTER
LOV 10/23. Patients wife called stating she knows patient needs to be seen, but according to his lung doctor he states he doesn't want him to go anywhere because the flu is bad and he is doing really well. Please advise.  Thank you

## 2018-02-17 ENCOUNTER — APPOINTMENT (OUTPATIENT)
Dept: GENERAL RADIOLOGY | Age: 63
End: 2018-02-17
Payer: MEDICARE

## 2018-02-17 ENCOUNTER — HOSPITAL ENCOUNTER (EMERGENCY)
Age: 63
Discharge: HOME | End: 2018-02-17
Attending: EMERGENCY MEDICINE
Payer: MEDICARE

## 2018-02-17 VITALS
HEART RATE: 88 BPM | WEIGHT: 260 LBS | TEMPERATURE: 97.8 F | OXYGEN SATURATION: 98 % | DIASTOLIC BLOOD PRESSURE: 73 MMHG | BODY MASS INDEX: 38.51 KG/M2 | HEIGHT: 69 IN | SYSTOLIC BLOOD PRESSURE: 130 MMHG | RESPIRATION RATE: 20 BRPM

## 2018-02-17 DIAGNOSIS — M87.00 AVASCULAR NECROSIS (HCC): Primary | ICD-10-CM

## 2018-02-17 PROCEDURE — 73502 X-RAY EXAM HIP UNI 2-3 VIEWS: CPT

## 2018-02-17 PROCEDURE — 96372 THER/PROPH/DIAG INJ SC/IM: CPT

## 2018-02-17 PROCEDURE — 99283 EMERGENCY DEPT VISIT LOW MDM: CPT

## 2018-02-17 PROCEDURE — 72100 X-RAY EXAM L-S SPINE 2/3 VWS: CPT

## 2018-02-17 PROCEDURE — 6360000002 HC RX W HCPCS: Performed by: EMERGENCY MEDICINE

## 2018-02-17 RX ORDER — TRAMADOL HYDROCHLORIDE 50 MG/1
50 TABLET ORAL EVERY 6 HOURS PRN
Qty: 20 TABLET | Refills: 0 | Status: SHIPPED | OUTPATIENT
Start: 2018-02-17 | End: 2018-02-22

## 2018-02-17 RX ORDER — KETOROLAC TROMETHAMINE 30 MG/ML
60 INJECTION, SOLUTION INTRAMUSCULAR; INTRAVENOUS ONCE
Status: COMPLETED | OUTPATIENT
Start: 2018-02-17 | End: 2018-02-17

## 2018-02-17 RX ADMIN — KETOROLAC TROMETHAMINE 60 MG: 30 INJECTION, SOLUTION INTRAMUSCULAR at 14:13

## 2018-02-17 ASSESSMENT — ENCOUNTER SYMPTOMS
COLOR CHANGE: 0
CONSTIPATION: 0
FACIAL SWELLING: 0
EYE DISCHARGE: 0
COUGH: 0
EYE REDNESS: 0
SHORTNESS OF BREATH: 0
DIARRHEA: 0
VOMITING: 0
BACK PAIN: 1
ABDOMINAL PAIN: 0

## 2018-02-17 ASSESSMENT — PAIN SCALES - GENERAL: PAINLEVEL_OUTOF10: 10

## 2018-02-17 NOTE — ED PROVIDER NOTES
[address] [place] [address] ED  eMERGENCY dEPARTMENT eNCOUnter      Pt Name: Yaritza Mcnamara  MRN: [id number]  [place] 1955  Date of evaluation: 2/17/2018  Provider: Kelsey Peck MD    [address] [place] [address]       Chief Complaint   Patient presents with    Back Pain    Leg Pain         HISTORY OF PRESENT ILLNESS  (Location/Symptom, Timing/Onset, Context/Setting, Quality, Duration, Modifying Factors, Severity.)   Yaritza Mcnamara is a 63 y.o. male who presents to the emergency department For pain in his lower back, left hip, and left groin. He has fallen a few times since December, the last 2 months. He has been able to walk. His wife was concerned about a fracture. He rated the pain as a 10 and it's worse when he moves. Sometimes it goes down the left leg as well. Nursing Notes were reviewed. ALLERGIES     Review of patient's allergies indicates no known allergies. CURRENT MEDICATIONS       Previous Medications    ALBUTEROL (PROVENTIL) (2.5 MG/3ML) 0.083% NEBULIZER SOLUTION    Take 3 mLs by nebulization every 4 hours as needed for Wheezing    ALBUTEROL SULFATE (PROAIR HFA IN)    Inhale 2 puffs into the lungs every 4 hours as needed.     ALBUTEROL SULFATE HFA (PROAIR HFA) 108 (90 BASE) MCG/ACT INHALER    Inhale 2 puffs into the lungs every 6 hours as needed for Wheezing    ALPRAZOLAM (XANAX) 0.5 MG TABLET    take 1 tablet by mouth three times a day if needed    AMLODIPINE (NORVASC) 10 MG TABLET    take 1 tablet by mouth once daily    BENAZEPRIL (LOTENSIN) 10 MG TABLET    take 1 tablet by mouth once daily    BUDESONIDE-FORMOTEROL (SYMBICORT) 160-4.5 MCG/ACT AERO    Inhale 2 puffs into the lungs 2 times daily    FLUTICASONE (FLONASE) 50 MCG/ACT NASAL SPRAY    instill 2 sprays into each nostril twice a day    IMMUNE GLOBULIN, HUMAN, 10 GM/100ML SOLN IV SOLUTION    Infuse 10 g intravenously every 21 days     MONTELUKAST (SINGULAIR) 10 MG TABLET    take 1 tablet by mouth at bedtime    MONTELUKAST (SINGULAIR) 10 MG which may represent underlying AVN of the femoral heads. Both femoral heads project over the bilateral acetabula. No acute fracture or gross dislocation identified at the left hip. Phleboliths project over the pelvis. Iliac wings and pubic rami symmetric in appearance. Mild stool burden. Mild degenerative changes in the lower lumbar/lumbosacral spine. Visualized sacral arcuate lines appear intact. 1. Findings suspicious for bilateral femoral head AVN, left greater than right. This should be further assessed with nonemergent MR. 2. No acute fracture or gross dislocation. LABS:  Labs Reviewed - No data to display    All other labs were within normal range or not returned as of this dictation. EMERGENCY DEPARTMENT COURSE and DIFFERENTIAL DIAGNOSIS/MDM:   Vitals:    Vitals:    02/17/18 1221   BP: 130/73   Pulse: 88   Resp: 20   Temp: 97.8 °F (36.6 °C)   TempSrc: Oral   SpO2: 98%   Weight: 260 lb (117.9 kg)   Height: 5' 9\" (1.753 m)       Orders Placed This Encounter   Medications    ketorolac (TORADOL) injection 60 mg    traMADol (ULTRAM) 50 MG tablet     Sig: Take 1 tablet by mouth every 6 hours as needed for Pain for up to 5 days. Dispense:  20 tablet     Refill:  0       Medical Decision Making: The patient appears to have avascular necrosis, symptomatic and the left hip. I spoken to the patient regarding his diagnosis and he will be discharged home. He was given a prescription for a walker and as well as Ultram which she can tolerate. He cannot tolerate oral narcotics. I've also spoken to Dr. Janie Mcgarry on-call for orthopedics and follow-up is arranged. Treatment diagnosis and follow up were discussed with the patient and his wife. CONSULTS:  None    PROCEDURES:  None    FINAL IMPRESSION      1. Avascular necrosis [facility])          DISPOSITION/PLAN   DISPOSITION Decision To Discharge 02/17/2018 02:09:18 PM      PATIENT REFERRED TO:   Gio Welsh PA-C  [id number]

## 2018-02-17 NOTE — ED NOTES
Pt to room by wheelchair. Pt c/o back pain that radiates down left leg causing his leg to give out on him. Pts family states pt feel about 2 months ago and has had pain and difficulty walking since. Pt denies any recent injury. Pt A&O x3, skin warm and dry, respirations equal and unlabored bilat, tenderness to back.      Kathleen Sanabria RN  02/17/18 6085

## 2018-02-17 NOTE — ED NOTES
Bed: 11  Expected date: 2/17/18  Expected time: 11:23 AM  Means of arrival:   Comments:  M 20     Alley Diaz RN  02/17/18 1200

## 2018-02-19 ENCOUNTER — TELEPHONE (OUTPATIENT)
Dept: PULMONOLOGY | Age: 63
End: 2018-02-19

## 2018-02-28 ENCOUNTER — HOSPITAL ENCOUNTER (OUTPATIENT)
Dept: INFUSION THERAPY | Facility: MEDICAL CENTER | Age: 63
Discharge: HOME | End: 2018-02-28
Payer: MEDICARE

## 2018-02-28 ENCOUNTER — HOSPITAL ENCOUNTER (OUTPATIENT)
Age: 63
Discharge: HOME | End: 2018-02-28
Payer: MEDICARE

## 2018-02-28 VITALS
RESPIRATION RATE: 20 BRPM | SYSTOLIC BLOOD PRESSURE: 130 MMHG | HEART RATE: 79 BPM | TEMPERATURE: 98.1 F | DIASTOLIC BLOOD PRESSURE: 83 MMHG

## 2018-02-28 DIAGNOSIS — D83.9 COMMON VARIABLE IMMUNODEFICIENCY (HCC): ICD-10-CM

## 2018-02-28 DIAGNOSIS — D80.1 COMMON VARIABLE HYPOGAMMAGLOBULINEMIA (HCC): ICD-10-CM

## 2018-02-28 LAB
CALCIUM SERPL-MCNC: 9.1 MG/DL (ref 8.6–10.4)
VITAMIN D 25-HYDROXY: 17.4 NG/ML (ref 30–100)

## 2018-02-28 PROCEDURE — 82306 VITAMIN D 25 HYDROXY: CPT

## 2018-02-28 PROCEDURE — 36591 DRAW BLOOD OFF VENOUS DEVICE: CPT

## 2018-02-28 PROCEDURE — 96366 THER/PROPH/DIAG IV INF ADDON: CPT

## 2018-02-28 PROCEDURE — 6360000002 HC RX W HCPCS: Performed by: ALLERGY & IMMUNOLOGY

## 2018-02-28 PROCEDURE — 82310 ASSAY OF CALCIUM: CPT

## 2018-02-28 PROCEDURE — 6370000000 HC RX 637 (ALT 250 FOR IP): Performed by: ALLERGY & IMMUNOLOGY

## 2018-02-28 PROCEDURE — 96361 HYDRATE IV INFUSION ADD-ON: CPT

## 2018-02-28 PROCEDURE — 2580000003 HC RX 258: Performed by: ALLERGY & IMMUNOLOGY

## 2018-02-28 PROCEDURE — 96365 THER/PROPH/DIAG IV INF INIT: CPT

## 2018-02-28 RX ORDER — DEXTROSE MONOHYDRATE 50 MG/ML
INJECTION, SOLUTION INTRAVENOUS CONTINUOUS
Status: CANCELLED
Start: 2018-02-28

## 2018-02-28 RX ORDER — 0.9 % SODIUM CHLORIDE 0.9 %
10 VIAL (ML) INJECTION PRN
Status: DISCONTINUED | OUTPATIENT
Start: 2018-02-28 | End: 2018-03-01 | Stop reason: HOSPADM

## 2018-02-28 RX ORDER — TRAMADOL HYDROCHLORIDE 50 MG/1
50 TABLET ORAL EVERY 6 HOURS PRN
COMMUNITY
End: 2018-04-09 | Stop reason: SDUPTHER

## 2018-02-28 RX ORDER — IBUPROFEN 600 MG/1
600 TABLET ORAL EVERY 6 HOURS PRN
COMMUNITY
End: 2018-05-08 | Stop reason: ALTCHOICE

## 2018-02-28 RX ORDER — DEXTROSE MONOHYDRATE 50 MG/ML
INJECTION, SOLUTION INTRAVENOUS CONTINUOUS
Status: DISCONTINUED | OUTPATIENT
Start: 2018-02-28 | End: 2018-03-01 | Stop reason: HOSPADM

## 2018-02-28 RX ORDER — DIPHENHYDRAMINE HCL 25 MG
25 TABLET ORAL EVERY 4 HOURS PRN
Status: CANCELLED
Start: 2018-02-28

## 2018-02-28 RX ORDER — HEPARIN SODIUM (PORCINE) LOCK FLUSH IV SOLN 100 UNIT/ML 100 UNIT/ML
500 SOLUTION INTRAVENOUS PRN
Status: CANCELLED
Start: 2018-02-28

## 2018-02-28 RX ORDER — HEPARIN SODIUM (PORCINE) LOCK FLUSH IV SOLN 100 UNIT/ML 100 UNIT/ML
500 SOLUTION INTRAVENOUS PRN
Status: DISCONTINUED | OUTPATIENT
Start: 2018-02-28 | End: 2018-03-01 | Stop reason: HOSPADM

## 2018-02-28 RX ORDER — DEXTROSE AND SODIUM CHLORIDE 5; .33 G/100ML; G/100ML
INJECTION, SOLUTION INTRAVENOUS CONTINUOUS
Status: DISCONTINUED | OUTPATIENT
Start: 2018-02-28 | End: 2018-03-01 | Stop reason: HOSPADM

## 2018-02-28 RX ORDER — DEXTROSE AND SODIUM CHLORIDE 5; .33 G/100ML; G/100ML
INJECTION, SOLUTION INTRAVENOUS CONTINUOUS
Status: CANCELLED
Start: 2018-02-28

## 2018-02-28 RX ORDER — DIPHENHYDRAMINE HCL 25 MG
25 TABLET ORAL EVERY 4 HOURS PRN
Status: DISCONTINUED | OUTPATIENT
Start: 2018-02-28 | End: 2018-03-01 | Stop reason: HOSPADM

## 2018-02-28 RX ORDER — DIPHENHYDRAMINE HCL 25 MG
25 TABLET ORAL ONCE
Status: CANCELLED | OUTPATIENT
Start: 2018-02-28 | End: 2018-02-28

## 2018-02-28 RX ORDER — DIPHENHYDRAMINE HCL 25 MG
25 TABLET ORAL ONCE
Status: COMPLETED | OUTPATIENT
Start: 2018-02-28 | End: 2018-02-28

## 2018-02-28 RX ORDER — 0.9 % SODIUM CHLORIDE 0.9 %
10 VIAL (ML) INJECTION PRN
Status: CANCELLED | OUTPATIENT
Start: 2018-02-28

## 2018-02-28 RX ADMIN — DEXTROSE AND SODIUM CHLORIDE: 5; 330 INJECTION, SOLUTION INTRAVENOUS at 08:45

## 2018-02-28 RX ADMIN — DIPHENHYDRAMINE HCL 25 MG: 25 TABLET ORAL at 08:45

## 2018-02-28 RX ADMIN — Medication 10 ML: at 16:14

## 2018-02-28 RX ADMIN — SODIUM CHLORIDE, PRESERVATIVE FREE 500 UNITS: 5 INJECTION INTRAVENOUS at 16:15

## 2018-02-28 RX ADMIN — DIPHENHYDRAMINE HCL 25 MG: 25 TABLET ORAL at 13:18

## 2018-02-28 RX ADMIN — DEXTROSE MONOHYDRATE: 50 INJECTION, SOLUTION INTRAVENOUS at 08:45

## 2018-02-28 RX ADMIN — IMMUNE GLOBULIN INTRAVENOUS (HUMAN): 5 INJECTION, SOLUTION INTRAVENOUS at 09:22

## 2018-02-28 ASSESSMENT — PAIN SCALES - GENERAL: PAINLEVEL_OUTOF10: 8

## 2018-02-28 ASSESSMENT — PAIN DESCRIPTION - ORIENTATION: ORIENTATION: LEFT

## 2018-02-28 ASSESSMENT — PAIN DESCRIPTION - LOCATION: LOCATION: HIP

## 2018-02-28 ASSESSMENT — PAIN DESCRIPTION - PAIN TYPE: TYPE: CHRONIC PAIN

## 2018-02-28 NOTE — PROGRESS NOTES
Pt arrives per wheelchair with O 2 at 3 L/NC for IVIG. Order reviewed and pt has order from Dr Axel Robertson for lab draw today. Writer spoke per phone with registration and order faxed to them and then stickers for lab tubes brought to unit per registration. At end of treatment labs sent . Pt tolerated premeds well. D5W with 1/3 NS infusing at 500 ml/hr for 30 min prior and post IVIG. Pt tolerated IVIG well and infusing at 20 ml/hr for 20 min, 40 ml/hr for 20 min, 80 ml/hr for 20 min, 160 ml/hr for 30 min, 250 ml/hr for remainder. Pt observed and no reactions or complaints and blood return present throughout infusions. BP checked Q 30 min as follows : 122/64, 115/70, 107/75, 117/88, 118/77, 110/66, 112/68, 119/70, 114/72, 111/67, 122/73, 122/69. Pt voiding per urinal today due to states not allowed to bear weight to lt hip due to avascular necrosis, r/t steroid use for his COPD. Pt transported to front door per wheelchair per writer and assisted to car, pt has walker with him. WIfe driving pt home. O2 in use throughout treatment today and then returned to home O2 tank for trip home. Pt has calendar with next appt.

## 2018-02-28 NOTE — FLOWSHEET NOTE
Patient shared that he has some \"hip problems\" and is unable to walk without the aid of assistive device. Patient shared that he was told to [facility] off for three weeks. \" Patient also stated that he may have to have surgery and he expressed deep concern over his health being \"good enough\" or him being \"up to surgery. \" Valdez Encarnacion was a listening presence. Patient also shared some life adjustment issues regarding his move, selling his home in [place] and moving into a rented house in [place]. Patient states he knows that he was not \"up to the work\" in his home and that they \"were living in only 1/3 of the home. \" Patient shared that he needed to downsize but is missing having his [place] property, yard work, etc.\"     Patient expressed hope that his hip would improve and that he will be able to plant flowers and do gardening. Patient shared that his landlord is very kind and is giving him creative freedom at his new home. Patient also expressed a desire to put in a deck with his son and other family members.  offered listening presence and support.  will remain available to offer support as needed.  will check in with patient on next visit (in three weeks). 02/28/18 1514   Encounter Summary   Services provided to: Patient   Referral/Consult From: Johny Ball Visiting (02/28/18)   Complexity of Encounter High   Length of Encounter 30 minutes   Spiritual/Nondenominational   Type Spiritual support   Assessment Anxious; Fearful;Coping   Intervention Active listening;Explored feelings, thoughts, concerns;Nurtured hope;Sustaining presence/ Ministry of presence; Discussed illness/injury and it's impact; Discussed relationship with God;Discussed meaning/purpose   Outcome Grieving;Coping;Expressed feelings/needs/concerns; Connection/belonging;Engaged in conversation

## 2018-03-05 ENCOUNTER — TELEPHONE (OUTPATIENT)
Dept: PULMONOLOGY | Age: 63
End: 2018-03-05

## 2018-03-06 ENCOUNTER — TELEPHONE (OUTPATIENT)
Dept: FAMILY MEDICINE CLINIC | Age: 63
End: 2018-03-06

## 2018-03-06 DIAGNOSIS — E55.9 VITAMIN D DEFICIENCY: Primary | ICD-10-CM

## 2018-03-06 RX ORDER — ERGOCALCIFEROL 1.25 MG/1
50000 CAPSULE ORAL WEEKLY
Qty: 4 CAPSULE | Refills: 1 | Status: SHIPPED | OUTPATIENT
Start: 2018-03-06 | End: 2018-05-08 | Stop reason: ALTCHOICE

## 2018-03-06 NOTE — TELEPHONE ENCOUNTER
Dr Klarissa Mcdaniel 's office called them and told them that they sent over a request that Dorothy Velasquez be put on Vit D because his d25 is low  She stated that they faxed over a report.   Please send script to pharmacy

## 2018-03-06 NOTE — TELEPHONE ENCOUNTER
Advise patient/wife we will start 1 pill a week for 8 weeks then lets plan to repeat lab level.  rx sent to pharmacy

## 2018-03-08 ENCOUNTER — TELEPHONE (OUTPATIENT)
Dept: PULMONOLOGY | Age: 63
End: 2018-03-08

## 2018-03-14 ENCOUNTER — HOSPITAL ENCOUNTER (OUTPATIENT)
Dept: MAMMOGRAPHY | Age: 63
Discharge: HOME | End: 2018-03-16
Payer: MEDICARE

## 2018-03-14 DIAGNOSIS — M87.052 AVASCULAR NECROSIS OF HIP, LEFT (HCC): ICD-10-CM

## 2018-03-14 PROCEDURE — 77080 DXA BONE DENSITY AXIAL: CPT

## 2018-03-14 RX ORDER — ALPRAZOLAM 0.5 MG/1
TABLET ORAL
Qty: 90 TABLET | Refills: 0 | Status: SHIPPED | OUTPATIENT
Start: 2018-03-14 | End: 2018-04-09 | Stop reason: SDUPTHER

## 2018-03-20 ENCOUNTER — HOSPITAL ENCOUNTER (OUTPATIENT)
Facility: MEDICAL CENTER | Age: 63
End: 2018-03-20
Payer: MEDICARE

## 2018-03-21 ENCOUNTER — HOSPITAL ENCOUNTER (OUTPATIENT)
Dept: INFUSION THERAPY | Facility: MEDICAL CENTER | Age: 63
Discharge: HOME | End: 2018-03-21
Payer: MEDICARE

## 2018-03-21 DIAGNOSIS — D83.9 COMMON VARIABLE IMMUNODEFICIENCY (HCC): Chronic | ICD-10-CM

## 2018-03-21 DIAGNOSIS — D80.1 COMMON VARIABLE HYPOGAMMAGLOBULINEMIA (HCC): ICD-10-CM

## 2018-03-21 LAB
ABSOLUTE EOS #: 0.2 K/UL (ref 0–0.4)
ABSOLUTE IMMATURE GRANULOCYTE: ABNORMAL K/UL (ref 0–0.3)
ABSOLUTE LYMPH #: 2.3 K/UL (ref 1–4.8)
ABSOLUTE MONO #: 0.5 K/UL (ref 0.2–0.8)
ALT SERPL-CCNC: 29 U/L (ref 5–41)
BASOPHILS # BLD: 1 % (ref 0–2)
BASOPHILS ABSOLUTE: 0 K/UL (ref 0–0.2)
CREAT SERPL-MCNC: 0.83 MG/DL (ref 0.7–1.2)
DIFFERENTIAL TYPE: ABNORMAL
EOSINOPHILS RELATIVE PERCENT: 2 % (ref 1–4)
GFR AFRICAN AMERICAN: >60 ML/MIN
GFR NON-AFRICAN AMERICAN: >60 ML/MIN
GFR SERPL CREATININE-BSD FRML MDRD: NORMAL ML/MIN/{1.73_M2}
GFR SERPL CREATININE-BSD FRML MDRD: NORMAL ML/MIN/{1.73_M2}
HCT VFR BLD CALC: 42.5 % (ref 41–53)
HEMOGLOBIN: 14.4 G/DL (ref 13.5–17.5)
IGA: 81 MG/DL (ref 70–400)
IGG: 801 MG/DL (ref 700–1600)
IGM: 118 MG/DL (ref 40–230)
IMMATURE GRANULOCYTES: ABNORMAL %
LYMPHOCYTES # BLD: 32 % (ref 24–44)
MCH RBC QN AUTO: 29.1 PG (ref 26–34)
MCHC RBC AUTO-ENTMCNC: 33.8 G/DL (ref 31–37)
MCV RBC AUTO: 86.1 FL (ref 80–100)
MONOCYTES # BLD: 8 % (ref 1–7)
NRBC AUTOMATED: ABNORMAL PER 100 WBC
PDW BLD-RTO: 13.5 % (ref 11.5–14.5)
PLATELET # BLD: 247 K/UL (ref 130–400)
PLATELET ESTIMATE: ABNORMAL
PMV BLD AUTO: 8.1 FL (ref 6–12)
RBC # BLD: 4.94 M/UL (ref 4.5–5.9)
RBC # BLD: ABNORMAL 10*6/UL
SEG NEUTROPHILS: 57 % (ref 36–66)
SEGMENTED NEUTROPHILS ABSOLUTE COUNT: 4.2 K/UL (ref 1.8–7.7)
WBC # BLD: 7.2 K/UL (ref 3.5–11)
WBC # BLD: ABNORMAL 10*3/UL

## 2018-03-21 PROCEDURE — 2580000003 HC RX 258: Performed by: ALLERGY & IMMUNOLOGY

## 2018-03-21 PROCEDURE — 96365 THER/PROPH/DIAG IV INF INIT: CPT

## 2018-03-21 PROCEDURE — 6370000000 HC RX 637 (ALT 250 FOR IP): Performed by: ALLERGY & IMMUNOLOGY

## 2018-03-21 PROCEDURE — 82784 ASSAY IGA/IGD/IGG/IGM EACH: CPT

## 2018-03-21 PROCEDURE — 82565 ASSAY OF CREATININE: CPT

## 2018-03-21 PROCEDURE — 84460 ALANINE AMINO (ALT) (SGPT): CPT

## 2018-03-21 PROCEDURE — 85025 COMPLETE CBC W/AUTO DIFF WBC: CPT

## 2018-03-21 PROCEDURE — 96368 THER/DIAG CONCURRENT INF: CPT

## 2018-03-21 PROCEDURE — 36591 DRAW BLOOD OFF VENOUS DEVICE: CPT

## 2018-03-21 PROCEDURE — 6360000002 HC RX W HCPCS: Performed by: ALLERGY & IMMUNOLOGY

## 2018-03-21 PROCEDURE — 96366 THER/PROPH/DIAG IV INF ADDON: CPT

## 2018-03-21 RX ORDER — DEXTROSE AND SODIUM CHLORIDE 5; .33 G/100ML; G/100ML
INJECTION, SOLUTION INTRAVENOUS CONTINUOUS
Status: DISCONTINUED | OUTPATIENT
Start: 2018-03-21 | End: 2018-03-22 | Stop reason: HOSPADM

## 2018-03-21 RX ORDER — DIPHENHYDRAMINE HCL 25 MG
25 TABLET ORAL EVERY 4 HOURS PRN
Status: CANCELLED
Start: 2018-03-21

## 2018-03-21 RX ORDER — DEXTROSE AND SODIUM CHLORIDE 5; .33 G/100ML; G/100ML
INJECTION, SOLUTION INTRAVENOUS CONTINUOUS
Status: CANCELLED
Start: 2018-03-21

## 2018-03-21 RX ORDER — HEPARIN SODIUM (PORCINE) LOCK FLUSH IV SOLN 100 UNIT/ML 100 UNIT/ML
500 SOLUTION INTRAVENOUS PRN
Status: CANCELLED
Start: 2018-03-21

## 2018-03-21 RX ORDER — DIPHENHYDRAMINE HCL 25 MG
25 TABLET ORAL ONCE
Status: COMPLETED | OUTPATIENT
Start: 2018-03-21 | End: 2018-03-21

## 2018-03-21 RX ORDER — DIPHENHYDRAMINE HCL 25 MG
25 TABLET ORAL EVERY 4 HOURS PRN
Status: DISCONTINUED | OUTPATIENT
Start: 2018-03-21 | End: 2018-03-22 | Stop reason: HOSPADM

## 2018-03-21 RX ORDER — 0.9 % SODIUM CHLORIDE 0.9 %
10 VIAL (ML) INJECTION PRN
Status: CANCELLED | OUTPATIENT
Start: 2018-03-21

## 2018-03-21 RX ORDER — 0.9 % SODIUM CHLORIDE 0.9 %
10 VIAL (ML) INJECTION PRN
Status: DISCONTINUED | OUTPATIENT
Start: 2018-03-21 | End: 2018-03-22 | Stop reason: HOSPADM

## 2018-03-21 RX ORDER — DIPHENHYDRAMINE HCL 25 MG
25 TABLET ORAL ONCE
Status: CANCELLED | OUTPATIENT
Start: 2018-03-21 | End: 2018-03-21

## 2018-03-21 RX ORDER — HEPARIN SODIUM (PORCINE) LOCK FLUSH IV SOLN 100 UNIT/ML 100 UNIT/ML
500 SOLUTION INTRAVENOUS PRN
Status: DISCONTINUED | OUTPATIENT
Start: 2018-03-21 | End: 2018-03-22 | Stop reason: HOSPADM

## 2018-03-21 RX ORDER — DEXTROSE MONOHYDRATE 50 MG/ML
INJECTION, SOLUTION INTRAVENOUS CONTINUOUS
Status: CANCELLED
Start: 2018-03-21

## 2018-03-21 RX ADMIN — DIPHENHYDRAMINE HCL 25 MG: 25 TABLET ORAL at 09:07

## 2018-03-21 RX ADMIN — Medication 10 ML: at 16:10

## 2018-03-21 RX ADMIN — DIPHENHYDRAMINE HCL 25 MG: 25 TABLET ORAL at 15:12

## 2018-03-21 RX ADMIN — DEXTROSE AND SODIUM CHLORIDE: 5; 330 INJECTION, SOLUTION INTRAVENOUS at 08:49

## 2018-03-21 RX ADMIN — SODIUM CHLORIDE, PRESERVATIVE FREE 500 UNITS: 5 INJECTION INTRAVENOUS at 16:10

## 2018-03-21 RX ADMIN — IMMUNE GLOBULIN INTRAVENOUS (HUMAN): 5 INJECTION, SOLUTION INTRAVENOUS at 09:41

## 2018-03-21 NOTE — PROGRESS NOTES
Pt. Arrives for IVIG infusion, several visits to doctors lately due to left hip problems. Pt. Unable to bare weight in WC. O2 placed on pt. @ 3 liters per NC. Pt. Given urinal. Port with blood return,but not enough for lab sample. Hydration started then attempted to get blood sample and blood retrun brisk. Hydration completed . Pt. premedicated and prehydration completed. IVIG started @ 20ml x 20, 40mlx 20, 80mlx 20, then 160ml x 30 and 250ml for remaining infusion. Benadryl repeated x one  Port flushed per policy.

## 2018-03-21 NOTE — PROGRESS NOTES
Vitals throughout IVIG infusion  121/78-75  109/67-60  110/70-67  114/72-61  125/74-68  124/63-70  726/48-17  121/67-80  103/62-75  115/71-75  879/62-49  118/70-69

## 2018-03-27 ENCOUNTER — OFFICE VISIT (OUTPATIENT)
Dept: ORTHOPEDIC SURGERY | Age: 63
End: 2018-03-27
Payer: MEDICARE

## 2018-03-27 VITALS — HEIGHT: 69 IN | BODY MASS INDEX: 38.5 KG/M2 | WEIGHT: 259.92 LBS

## 2018-03-27 DIAGNOSIS — M87.052 AVASCULAR NECROSIS OF BONE OF LEFT HIP (HCC): Primary | ICD-10-CM

## 2018-03-27 PROCEDURE — G8484 FLU IMMUNIZE NO ADMIN: HCPCS | Performed by: ORTHOPAEDIC SURGERY

## 2018-03-27 PROCEDURE — G8427 DOCREV CUR MEDS BY ELIG CLIN: HCPCS | Performed by: ORTHOPAEDIC SURGERY

## 2018-03-27 PROCEDURE — 3017F COLORECTAL CA SCREEN DOC REV: CPT | Performed by: ORTHOPAEDIC SURGERY

## 2018-03-27 PROCEDURE — G8417 CALC BMI ABV UP PARAM F/U: HCPCS | Performed by: ORTHOPAEDIC SURGERY

## 2018-03-27 PROCEDURE — 1036F TOBACCO NON-USER: CPT | Performed by: ORTHOPAEDIC SURGERY

## 2018-03-27 PROCEDURE — 99204 OFFICE O/P NEW MOD 45 MIN: CPT | Performed by: ORTHOPAEDIC SURGERY

## 2018-03-27 NOTE — PROGRESS NOTES
This 68-year-old patient is seen here complaining of pain in the left hip. The patient has previously been seen by Nikky Veloz and was diagnosed as having avascular necrosis of the left femoral head. Patient has had MRI done there. Initial conservative treatment needs to improve his situation. He was not even able to flex his hip before but this was able to do now. He has been using a walker for ambulation. He says that if he has been sitting this okay but when he gets up he is a startup pain and then it takes a little while before he is able to walk little normally. The patient has had DEXA scan done and was found to have osteoporosis. He was told that he needs to have the hip fixed so to prevent any possibility of for him falling and sustaining fracture. Apparently the patient has fallen before. The patient has continued pain in the left hip and only slight pain in the right side. The pain in the left leg he feels mostly in the buttock which is almost like a constant ache but then the pain radiates down towards the medial as side of the knee as well as the above the patella. Occasionally the pain also radiates down the lateral aspect of the left leg distal to the knee. He denies any numbness or tingling associated with this. Patient denies having any significant pain in his back and says that he has had x-rays carried out which showed no abnormality and he says he also has had an MRI of the lumbar spine showing no obvious gross abnormality. The patient is walking with a walker. His gait is slightly antalgic. The major concern the patient are those of severe respiratory compromise with him having to use the oxygen tank all the time. The second concern is that patient has a immune deficiency. His hip range of motion is are slightly painful.     I reviewed the report of the MRI from the [facility]  facilities which show that the patient has a grade 3 AVN left side and between grade 2 and 3

## 2018-03-28 ENCOUNTER — OFFICE VISIT (OUTPATIENT)
Dept: PULMONOLOGY | Age: 63
End: 2018-03-28
Payer: MEDICARE

## 2018-03-28 VITALS
TEMPERATURE: 95.7 F | DIASTOLIC BLOOD PRESSURE: 68 MMHG | SYSTOLIC BLOOD PRESSURE: 110 MMHG | BODY MASS INDEX: 41.04 KG/M2 | HEART RATE: 92 BPM | OXYGEN SATURATION: 99 % | HEIGHT: 68 IN | WEIGHT: 270.8 LBS | RESPIRATION RATE: 16 BRPM

## 2018-03-28 DIAGNOSIS — G47.33 OSA ON CPAP: ICD-10-CM

## 2018-03-28 DIAGNOSIS — Z99.89 OSA ON CPAP: ICD-10-CM

## 2018-03-28 DIAGNOSIS — J44.9 CHRONIC OBSTRUCTIVE PULMONARY DISEASE, UNSPECIFIED COPD TYPE (HCC): ICD-10-CM

## 2018-03-28 DIAGNOSIS — J30.9 ALLERGIC RHINITIS, UNSPECIFIED CHRONICITY, UNSPECIFIED SEASONALITY, UNSPECIFIED TRIGGER: ICD-10-CM

## 2018-03-28 DIAGNOSIS — K21.9 GASTROESOPHAGEAL REFLUX DISEASE WITHOUT ESOPHAGITIS: ICD-10-CM

## 2018-03-28 DIAGNOSIS — D83.9 CVID (COMMON VARIABLE IMMUNODEFICIENCY) (HCC): ICD-10-CM

## 2018-03-28 DIAGNOSIS — J45.50 SEVERE PERSISTENT ASTHMA WITHOUT COMPLICATION: Primary | ICD-10-CM

## 2018-03-28 PROCEDURE — G8484 FLU IMMUNIZE NO ADMIN: HCPCS | Performed by: INTERNAL MEDICINE

## 2018-03-28 PROCEDURE — 99215 OFFICE O/P EST HI 40 MIN: CPT | Performed by: INTERNAL MEDICINE

## 2018-03-28 PROCEDURE — G8427 DOCREV CUR MEDS BY ELIG CLIN: HCPCS | Performed by: INTERNAL MEDICINE

## 2018-03-28 PROCEDURE — 3023F SPIROM DOC REV: CPT | Performed by: INTERNAL MEDICINE

## 2018-03-28 PROCEDURE — G8926 SPIRO NO PERF OR DOC: HCPCS | Performed by: INTERNAL MEDICINE

## 2018-03-28 PROCEDURE — 3017F COLORECTAL CA SCREEN DOC REV: CPT | Performed by: INTERNAL MEDICINE

## 2018-03-28 PROCEDURE — 1036F TOBACCO NON-USER: CPT | Performed by: INTERNAL MEDICINE

## 2018-03-28 PROCEDURE — G8417 CALC BMI ABV UP PARAM F/U: HCPCS | Performed by: INTERNAL MEDICINE

## 2018-03-28 ASSESSMENT — SLEEP AND FATIGUE QUESTIONNAIRES
HOW LIKELY ARE YOU TO NOD OFF OR FALL ASLEEP WHILE SITTING AND READING: 0
HOW LIKELY ARE YOU TO NOD OFF OR FALL ASLEEP WHILE SITTING AND TALKING TO SOMEONE: 0
HOW LIKELY ARE YOU TO NOD OFF OR FALL ASLEEP IN A CAR, WHILE STOPPED FOR A FEW MINUTES IN TRAFFIC: 0
ESS TOTAL SCORE: 2
HOW LIKELY ARE YOU TO NOD OFF OR FALL ASLEEP WHEN YOU ARE A PASSENGER IN A CAR FOR AN HOUR WITHOUT A BREAK: 0
HOW LIKELY ARE YOU TO NOD OFF OR FALL ASLEEP WHILE SITTING INACTIVE IN A PUBLIC PLACE: 0
HOW LIKELY ARE YOU TO NOD OFF OR FALL ASLEEP WHILE WATCHING TV: 0
HOW LIKELY ARE YOU TO NOD OFF OR FALL ASLEEP WHILE SITTING QUIETLY AFTER LUNCH WITHOUT ALCOHOL: 0
HOW LIKELY ARE YOU TO NOD OFF OR FALL ASLEEP WHILE LYING DOWN TO REST IN THE AFTERNOON WHEN CIRCUMSTANCES PERMIT: 2

## 2018-03-28 NOTE — PROGRESS NOTES
weakness, dizziness, diplopia, syncope  Hem/Onc: negative for bleeding and bruising, petechiae   Skin: negative for rash and skin lesions  : negative for hematuria, dysuria    Sleep Medicine 3/28/2018 12/6/2016   Sitting and reading 0 3   Watching TV 0 0   Sitting, inactive in a public place (e.g. a theatre or a meeting) 0 0   As a passenger in a car for an hour without a break 0 2   Lying down to rest in the afternoon when circumstances permit 2 3   Sitting and talking to someone 0 0   Sitting quietly after a lunch without alcohol 0 3   In a car, while stopped for a few minutes in traffic 0 0   Total score 2 11       Allergies:  No Known Allergies    Medications:  Outpatient Encounter Prescriptions as of 3/28/2018   Medication Sig Dispense Refill    ALPRAZolam (XANAX) 0.5 MG tablet take 1 tablet by mouth three times a day if needed 90 tablet 0    vitamin D (ERGOCALCIFEROL) 11392 units CAPS capsule Take 1 capsule by mouth once a week 4 capsule 1    traMADol (ULTRAM) 50 MG tablet Take 50 mg by mouth every 6 hours as needed for Pain.       ibuprofen (ADVIL;MOTRIN) 600 MG tablet Take 600 mg by mouth every 6 hours as needed for Pain      omeprazole (PRILOSEC) 20 MG delayed release capsule take 1 capsule by mouth once daily 90 capsule 3    amLODIPine (NORVASC) 10 MG tablet take 1 tablet by mouth once daily 90 tablet 3    benazepril (LOTENSIN) 10 MG tablet take 1 tablet by mouth once daily 90 tablet 3    predniSONE (DELTASONE) 10 MG tablet Take 10 mg by mouth 2 times daily Taper dose for 3days      SYMBICORT 160-4.5 MCG/ACT AERO inhale 2 puffs by mouth twice a day as directed 10.2 Inhaler 11    SPIRIVA HANDIHALER 18 MCG inhalation capsule inhale contents of 1 capsule by mouth once daily 30 capsule 11    montelukast (SINGULAIR) 10 MG tablet take 1 tablet by mouth at bedtime 30 tablet 11    fluticasone (FLONASE) 50 MCG/ACT nasal spray instill 2 sprays into each nostril twice a day 1 Bottle 11    albuterol

## 2018-04-09 ENCOUNTER — OFFICE VISIT (OUTPATIENT)
Dept: FAMILY MEDICINE CLINIC | Age: 63
End: 2018-04-09
Payer: MEDICARE

## 2018-04-09 ENCOUNTER — TELEPHONE (OUTPATIENT)
Dept: FAMILY MEDICINE CLINIC | Age: 63
End: 2018-04-09

## 2018-04-09 VITALS
SYSTOLIC BLOOD PRESSURE: 128 MMHG | HEIGHT: 68 IN | WEIGHT: 271 LBS | TEMPERATURE: 98 F | BODY MASS INDEX: 41.07 KG/M2 | DIASTOLIC BLOOD PRESSURE: 72 MMHG | HEART RATE: 94 BPM | OXYGEN SATURATION: 99 %

## 2018-04-09 DIAGNOSIS — E66.01 MORBID OBESITY WITH BMI OF 40.0-44.9, ADULT (HCC): ICD-10-CM

## 2018-04-09 DIAGNOSIS — J96.11 HYPOXEMIC RESPIRATORY FAILURE, CHRONIC (HCC): ICD-10-CM

## 2018-04-09 DIAGNOSIS — E55.9 VITAMIN D DEFICIENCY: ICD-10-CM

## 2018-04-09 DIAGNOSIS — E55.9 VITAMIN D DEFICIENCY: Primary | ICD-10-CM

## 2018-04-09 DIAGNOSIS — M25.552 CHRONIC LEFT HIP PAIN: ICD-10-CM

## 2018-04-09 DIAGNOSIS — F41.9 ANXIETY: Chronic | ICD-10-CM

## 2018-04-09 DIAGNOSIS — M81.0 AGE-RELATED OSTEOPOROSIS WITHOUT CURRENT PATHOLOGICAL FRACTURE: Primary | ICD-10-CM

## 2018-04-09 DIAGNOSIS — M81.6 LOCALIZED OSTEOPOROSIS WITHOUT CURRENT PATHOLOGICAL FRACTURE: ICD-10-CM

## 2018-04-09 DIAGNOSIS — G89.29 CHRONIC LEFT HIP PAIN: ICD-10-CM

## 2018-04-09 PROCEDURE — 99213 OFFICE O/P EST LOW 20 MIN: CPT | Performed by: PHYSICIAN ASSISTANT

## 2018-04-09 PROCEDURE — G8417 CALC BMI ABV UP PARAM F/U: HCPCS | Performed by: PHYSICIAN ASSISTANT

## 2018-04-09 PROCEDURE — G8427 DOCREV CUR MEDS BY ELIG CLIN: HCPCS | Performed by: PHYSICIAN ASSISTANT

## 2018-04-09 PROCEDURE — 3017F COLORECTAL CA SCREEN DOC REV: CPT | Performed by: PHYSICIAN ASSISTANT

## 2018-04-09 PROCEDURE — 1036F TOBACCO NON-USER: CPT | Performed by: PHYSICIAN ASSISTANT

## 2018-04-09 RX ORDER — TRAMADOL HYDROCHLORIDE 50 MG/1
50 TABLET ORAL EVERY 6 HOURS PRN
Qty: 60 TABLET | Refills: 0 | Status: SHIPPED | OUTPATIENT
Start: 2018-04-09 | End: 2018-05-07 | Stop reason: SDUPTHER

## 2018-04-09 RX ORDER — ALPRAZOLAM 0.5 MG/1
TABLET ORAL
Qty: 90 TABLET | Refills: 0 | Status: SHIPPED | OUTPATIENT
Start: 2018-04-09 | End: 2018-05-07 | Stop reason: SDUPTHER

## 2018-04-09 ASSESSMENT — ENCOUNTER SYMPTOMS
COUGH: 0
NAUSEA: 0
ABDOMINAL PAIN: 0
CONSTIPATION: 0
WHEEZING: 0
VOMITING: 0
DIARRHEA: 0
SHORTNESS OF BREATH: 0
COLOR CHANGE: 0

## 2018-04-09 ASSESSMENT — PATIENT HEALTH QUESTIONNAIRE - PHQ9
SUM OF ALL RESPONSES TO PHQ9 QUESTIONS 1 & 2: 0
1. LITTLE INTEREST OR PLEASURE IN DOING THINGS: 0
SUM OF ALL RESPONSES TO PHQ QUESTIONS 1-9: 0
2. FEELING DOWN, DEPRESSED OR HOPELESS: 0

## 2018-04-10 ENCOUNTER — HOSPITAL ENCOUNTER (OUTPATIENT)
Facility: MEDICAL CENTER | Age: 63
End: 2018-04-10
Payer: MEDICARE

## 2018-04-11 ENCOUNTER — HOSPITAL ENCOUNTER (OUTPATIENT)
Dept: INFUSION THERAPY | Facility: MEDICAL CENTER | Age: 63
Discharge: HOME | End: 2018-04-11
Payer: MEDICARE

## 2018-04-11 VITALS
HEART RATE: 80 BPM | SYSTOLIC BLOOD PRESSURE: 135 MMHG | RESPIRATION RATE: 20 BRPM | DIASTOLIC BLOOD PRESSURE: 74 MMHG | TEMPERATURE: 98 F

## 2018-04-11 DIAGNOSIS — D80.1 COMMON VARIABLE HYPOGAMMAGLOBULINEMIA (HCC): ICD-10-CM

## 2018-04-11 DIAGNOSIS — D83.9 COMMON VARIABLE IMMUNODEFICIENCY (HCC): ICD-10-CM

## 2018-04-11 PROCEDURE — 96366 THER/PROPH/DIAG IV INF ADDON: CPT

## 2018-04-11 PROCEDURE — 96361 HYDRATE IV INFUSION ADD-ON: CPT

## 2018-04-11 PROCEDURE — 6370000000 HC RX 637 (ALT 250 FOR IP): Performed by: ALLERGY & IMMUNOLOGY

## 2018-04-11 PROCEDURE — 2580000003 HC RX 258: Performed by: ALLERGY & IMMUNOLOGY

## 2018-04-11 PROCEDURE — 96365 THER/PROPH/DIAG IV INF INIT: CPT

## 2018-04-11 PROCEDURE — 96523 IRRIG DRUG DELIVERY DEVICE: CPT

## 2018-04-11 PROCEDURE — 6360000002 HC RX W HCPCS: Performed by: ALLERGY & IMMUNOLOGY

## 2018-04-11 RX ORDER — DIPHENHYDRAMINE HCL 25 MG
25 TABLET ORAL ONCE
Status: COMPLETED | OUTPATIENT
Start: 2018-04-11 | End: 2018-04-11

## 2018-04-11 RX ORDER — DEXTROSE AND SODIUM CHLORIDE 5; .33 G/100ML; G/100ML
INJECTION, SOLUTION INTRAVENOUS CONTINUOUS
Status: DISCONTINUED | OUTPATIENT
Start: 2018-04-11 | End: 2018-04-12 | Stop reason: HOSPADM

## 2018-04-11 RX ORDER — DIPHENHYDRAMINE HCL 25 MG
25 TABLET ORAL EVERY 4 HOURS PRN
Status: DISCONTINUED | OUTPATIENT
Start: 2018-04-11 | End: 2018-04-12 | Stop reason: HOSPADM

## 2018-04-11 RX ORDER — 0.9 % SODIUM CHLORIDE 0.9 %
10 VIAL (ML) INJECTION PRN
Status: DISCONTINUED | OUTPATIENT
Start: 2018-04-11 | End: 2018-04-12 | Stop reason: HOSPADM

## 2018-04-11 RX ORDER — HEPARIN SODIUM (PORCINE) LOCK FLUSH IV SOLN 100 UNIT/ML 100 UNIT/ML
500 SOLUTION INTRAVENOUS PRN
Status: DISCONTINUED | OUTPATIENT
Start: 2018-04-11 | End: 2018-04-12 | Stop reason: HOSPADM

## 2018-04-11 RX ORDER — DIPHENHYDRAMINE HCL 25 MG
25 TABLET ORAL EVERY 4 HOURS PRN
Status: CANCELLED
Start: 2018-04-11

## 2018-04-11 RX ORDER — DEXTROSE MONOHYDRATE 50 MG/ML
INJECTION, SOLUTION INTRAVENOUS CONTINUOUS
Status: CANCELLED
Start: 2018-04-11

## 2018-04-11 RX ORDER — DIPHENHYDRAMINE HCL 25 MG
25 TABLET ORAL ONCE
Status: CANCELLED | OUTPATIENT
Start: 2018-04-11 | End: 2018-04-11

## 2018-04-11 RX ORDER — DEXTROSE AND SODIUM CHLORIDE 5; .33 G/100ML; G/100ML
INJECTION, SOLUTION INTRAVENOUS CONTINUOUS
Status: CANCELLED
Start: 2018-04-11

## 2018-04-11 RX ORDER — 0.9 % SODIUM CHLORIDE 0.9 %
10 VIAL (ML) INJECTION PRN
Status: CANCELLED | OUTPATIENT
Start: 2018-04-11

## 2018-04-11 RX ORDER — HEPARIN SODIUM (PORCINE) LOCK FLUSH IV SOLN 100 UNIT/ML 100 UNIT/ML
500 SOLUTION INTRAVENOUS PRN
Status: CANCELLED
Start: 2018-04-11

## 2018-04-11 RX ADMIN — DIPHENHYDRAMINE HCL 25 MG: 25 TABLET ORAL at 08:50

## 2018-04-11 RX ADMIN — Medication 10 ML: at 16:10

## 2018-04-11 RX ADMIN — DIPHENHYDRAMINE HCL 25 MG: 25 TABLET ORAL at 13:32

## 2018-04-11 RX ADMIN — SODIUM CHLORIDE, PRESERVATIVE FREE 500 UNITS: 5 INJECTION INTRAVENOUS at 16:10

## 2018-04-11 RX ADMIN — IMMUNE GLOBULIN INTRAVENOUS (HUMAN): 5 INJECTION, SOLUTION INTRAVENOUS at 09:20

## 2018-04-11 RX ADMIN — DEXTROSE AND SODIUM CHLORIDE: 5; 330 INJECTION, SOLUTION INTRAVENOUS at 08:50

## 2018-04-11 NOTE — PROGRESS NOTES
Patient arrives to the clinic for infusion today. Patient states that he had a recent hospitalization. Patient voices recent diagnosis of osteoporosis. Port accessed per policy, good blood return obtained. Patient has o2 on. Patient put to wall oxygen at 3 liters per minute. Pre hydration administered over D5 1/3 solution 250 ml infused over 30 min. Pre medications administered. IVIG initiated at 20 ml x 20 min. Patient tolerated. Rate increased to 40 ml x 20 min. Patient tolerated. Rate increased to 80 ml x 20 min. Patient tolerated. Rate increased to 160 ml x 30 min. Patient tolerated rate increased to 160 ml x 30 min. Rate increased to 250 ml remainder of infusion. Patient tolerated. Post hydration administered 250 ml over 30 min. Port flushed per policy and gripper needle removed. Vital sign per policy:  877/00. Hr 76. 117/76, hr 78. 110/69, hr 79. 106/68, hr 72. 130/80, hr 80. 114/69, hr 83. 136/70, hr 85. 114/73, hr 85. 126/78, hr 87. 124/75, hr 98. 133/74, hr 91. 128/70, hr 91. 133/75, hr 85. Patient leaves in stable condition.

## 2018-04-17 DIAGNOSIS — M25.552 CHRONIC LEFT HIP PAIN: ICD-10-CM

## 2018-04-17 DIAGNOSIS — F41.9 ANXIETY: Chronic | ICD-10-CM

## 2018-04-17 DIAGNOSIS — G89.29 CHRONIC LEFT HIP PAIN: ICD-10-CM

## 2018-04-23 DIAGNOSIS — M25.552 LEFT HIP PAIN: Primary | ICD-10-CM

## 2018-04-25 ENCOUNTER — OFFICE VISIT (OUTPATIENT)
Dept: ORTHOPEDIC SURGERY | Age: 63
End: 2018-04-25
Payer: MEDICARE

## 2018-04-25 VITALS
DIASTOLIC BLOOD PRESSURE: 76 MMHG | WEIGHT: 272 LBS | SYSTOLIC BLOOD PRESSURE: 134 MMHG | HEIGHT: 69 IN | BODY MASS INDEX: 40.29 KG/M2 | HEART RATE: 86 BPM

## 2018-04-25 DIAGNOSIS — M87.052 AVASCULAR NECROSIS OF BONE OF LEFT HIP (HCC): Primary | ICD-10-CM

## 2018-04-25 DIAGNOSIS — D80.1 COMMON VARIABLE HYPOGAMMAGLOBULINEMIA (HCC): ICD-10-CM

## 2018-04-25 DIAGNOSIS — R89.4 ABNORMALITY OF IMMUNE SYSTEM: ICD-10-CM

## 2018-04-25 PROCEDURE — 99213 OFFICE O/P EST LOW 20 MIN: CPT | Performed by: ORTHOPAEDIC SURGERY

## 2018-04-25 PROCEDURE — G8417 CALC BMI ABV UP PARAM F/U: HCPCS | Performed by: ORTHOPAEDIC SURGERY

## 2018-04-25 PROCEDURE — 3017F COLORECTAL CA SCREEN DOC REV: CPT | Performed by: ORTHOPAEDIC SURGERY

## 2018-04-25 PROCEDURE — G8427 DOCREV CUR MEDS BY ELIG CLIN: HCPCS | Performed by: ORTHOPAEDIC SURGERY

## 2018-04-25 ASSESSMENT — ENCOUNTER SYMPTOMS
COUGH: 0
CONSTIPATION: 0
NAUSEA: 0
DIARRHEA: 0

## 2018-04-30 RX ORDER — ALENDRONATE SODIUM 70 MG/1
70 TABLET ORAL
Qty: 4 TABLET | Refills: 3 | Status: SHIPPED | OUTPATIENT
Start: 2018-04-30 | End: 2019-08-28 | Stop reason: ALTCHOICE

## 2018-05-02 ENCOUNTER — HOSPITAL ENCOUNTER (OUTPATIENT)
Dept: INFUSION THERAPY | Facility: MEDICAL CENTER | Age: 63
Discharge: HOME | End: 2018-05-02
Payer: MEDICARE

## 2018-05-02 VITALS
SYSTOLIC BLOOD PRESSURE: 120 MMHG | TEMPERATURE: 98.4 F | RESPIRATION RATE: 20 BRPM | DIASTOLIC BLOOD PRESSURE: 76 MMHG | HEART RATE: 93 BPM

## 2018-05-02 DIAGNOSIS — D83.9 COMMON VARIABLE IMMUNODEFICIENCY (HCC): Chronic | ICD-10-CM

## 2018-05-02 DIAGNOSIS — D80.1 COMMON VARIABLE HYPOGAMMAGLOBULINEMIA (HCC): ICD-10-CM

## 2018-05-02 PROCEDURE — 96361 HYDRATE IV INFUSION ADD-ON: CPT

## 2018-05-02 PROCEDURE — 6360000002 HC RX W HCPCS: Performed by: ALLERGY & IMMUNOLOGY

## 2018-05-02 PROCEDURE — 6370000000 HC RX 637 (ALT 250 FOR IP): Performed by: ALLERGY & IMMUNOLOGY

## 2018-05-02 PROCEDURE — 96366 THER/PROPH/DIAG IV INF ADDON: CPT

## 2018-05-02 PROCEDURE — 2580000003 HC RX 258: Performed by: ALLERGY & IMMUNOLOGY

## 2018-05-02 PROCEDURE — 96365 THER/PROPH/DIAG IV INF INIT: CPT

## 2018-05-02 RX ORDER — DIPHENHYDRAMINE HCL 25 MG
25 TABLET ORAL EVERY 4 HOURS PRN
Status: CANCELLED
Start: 2018-05-02

## 2018-05-02 RX ORDER — DIPHENHYDRAMINE HCL 25 MG
25 TABLET ORAL EVERY 4 HOURS PRN
Status: DISCONTINUED | OUTPATIENT
Start: 2018-05-02 | End: 2018-05-03 | Stop reason: HOSPADM

## 2018-05-02 RX ORDER — 0.9 % SODIUM CHLORIDE 0.9 %
10 VIAL (ML) INJECTION PRN
Status: DISCONTINUED | OUTPATIENT
Start: 2018-05-02 | End: 2018-05-03 | Stop reason: HOSPADM

## 2018-05-02 RX ORDER — DEXTROSE AND SODIUM CHLORIDE 5; .33 G/100ML; G/100ML
INJECTION, SOLUTION INTRAVENOUS CONTINUOUS
Status: CANCELLED
Start: 2018-05-02

## 2018-05-02 RX ORDER — DEXTROSE MONOHYDRATE 50 MG/ML
INJECTION, SOLUTION INTRAVENOUS CONTINUOUS
Status: DISCONTINUED | OUTPATIENT
Start: 2018-05-02 | End: 2018-05-03 | Stop reason: HOSPADM

## 2018-05-02 RX ORDER — DEXTROSE AND SODIUM CHLORIDE 5; .33 G/100ML; G/100ML
INJECTION, SOLUTION INTRAVENOUS CONTINUOUS
Status: DISCONTINUED | OUTPATIENT
Start: 2018-05-02 | End: 2018-05-03 | Stop reason: HOSPADM

## 2018-05-02 RX ORDER — DIPHENHYDRAMINE HCL 25 MG
25 TABLET ORAL ONCE
Status: CANCELLED | OUTPATIENT
Start: 2018-05-02 | End: 2018-05-02

## 2018-05-02 RX ORDER — DEXTROSE MONOHYDRATE 50 MG/ML
INJECTION, SOLUTION INTRAVENOUS CONTINUOUS
Status: CANCELLED
Start: 2018-05-02

## 2018-05-02 RX ORDER — 0.9 % SODIUM CHLORIDE 0.9 %
10 VIAL (ML) INJECTION PRN
Status: CANCELLED | OUTPATIENT
Start: 2018-05-02

## 2018-05-02 RX ORDER — DIPHENHYDRAMINE HCL 25 MG
25 TABLET ORAL ONCE
Status: COMPLETED | OUTPATIENT
Start: 2018-05-02 | End: 2018-05-02

## 2018-05-02 RX ORDER — HEPARIN SODIUM (PORCINE) LOCK FLUSH IV SOLN 100 UNIT/ML 100 UNIT/ML
500 SOLUTION INTRAVENOUS PRN
Status: CANCELLED
Start: 2018-05-02

## 2018-05-02 RX ORDER — HEPARIN SODIUM (PORCINE) LOCK FLUSH IV SOLN 100 UNIT/ML 100 UNIT/ML
500 SOLUTION INTRAVENOUS PRN
Status: DISCONTINUED | OUTPATIENT
Start: 2018-05-02 | End: 2018-05-03 | Stop reason: HOSPADM

## 2018-05-02 RX ADMIN — DEXTROSE AND SODIUM CHLORIDE: 5; 330 INJECTION, SOLUTION INTRAVENOUS at 08:35

## 2018-05-02 RX ADMIN — DEXTROSE MONOHYDRATE: 50 INJECTION, SOLUTION INTRAVENOUS at 09:15

## 2018-05-02 RX ADMIN — Medication 10 ML: at 16:00

## 2018-05-02 RX ADMIN — DIPHENHYDRAMINE HCL 25 MG: 25 TABLET ORAL at 14:19

## 2018-05-02 RX ADMIN — IMMUNE GLOBULIN INTRAVENOUS (HUMAN): 5 INJECTION, SOLUTION INTRAVENOUS at 09:15

## 2018-05-02 RX ADMIN — HEPARIN SODIUM (PORCINE) LOCK FLUSH IV SOLN 100 UNIT/ML 500 UNITS: 100 SOLUTION at 16:00

## 2018-05-02 RX ADMIN — DIPHENHYDRAMINE HCL 25 MG: 25 TABLET ORAL at 08:45

## 2018-05-02 ASSESSMENT — PAIN DESCRIPTION - DESCRIPTORS: DESCRIPTORS: ACHING

## 2018-05-02 ASSESSMENT — PAIN DESCRIPTION - ORIENTATION: ORIENTATION: RIGHT

## 2018-05-02 ASSESSMENT — PROMIS GLOBAL HEALTH SCALE
WHO IS THE PERSON COMPLETING THE PROMIS V1.1 SURVEY?: 1
IN THE PAST 7 DAYS, HOW OFTEN HAVE YOU BEEN BOTHERED BY EMOTIONAL PROBLEMS, SUCH AS FEELING ANXIOUS, DEPRESSED, OR IRRITABLE [ON A SCALE FROM 1 (NEVER) TO 5 (ALWAYS)]?: 4
IN GENERAL, HOW WOULD YOU RATE YOUR MENTAL HEALTH, INCLUDING YOUR MOOD AND YOUR ABILITY TO THINK [ON A SCALE OF 1 (POOR) TO 5 (EXCELLENT)]?: 4
IN GENERAL, WOULD YOU SAY YOUR QUALITY OF LIFE IS...[ON A SCALE OF 1 (POOR) TO 5 (EXCELLENT)]: 5
IN GENERAL, PLEASE RATE HOW WELL YOU CARRY OUT YOUR USUAL SOCIAL ACTIVITIES (INCLUDES ACTIVITIES AT HOME, AT WORK, AND IN YOUR COMMUNITY, AND RESPONSIBILITIES AS A PARENT, CHILD, SPOUSE, EMPLOYEE, FRIEND, ETC) [ON A SCALE OF 1 (POOR) TO 5 (EXCELLENT)]?: 2
IN GENERAL, HOW WOULD YOU RATE YOUR SATISFACTION WITH YOUR SOCIAL ACTIVITIES AND RELATIONSHIPS [ON A SCALE OF 1 (POOR) TO 5 (EXCELLENT)]?: 4
IN THE PAST 7 DAYS, HOW WOULD YOU RATE YOUR PAIN ON AVERAGE [ON A SCALE FROM 0 (NO PAIN) TO 10 (WORST IMAGINABLE PAIN)]?: 5
HOW IS THE PROMIS V1.1 BEING ADMINISTERED?: 2
IN GENERAL, HOW WOULD YOU RATE YOUR PHYSICAL HEALTH [ON A SCALE OF 1 (POOR) TO 5 (EXCELLENT)]?: 2
TO WHAT EXTENT ARE YOU ABLE TO CARRY OUT YOUR EVERYDAY PHYSICAL ACTIVITIES SUCH AS WALKING, CLIMBING STAIRS, CARRYING GROCERIES, OR MOVING A CHAIR [ON A SCALE OF 1 (NOT AT ALL) TO 5 (COMPLETELY)]?: 2
IN GENERAL, WOULD YOU SAY YOUR HEALTH IS...[ON A SCALE OF 1 (POOR) TO 5 (EXCELLENT)]: 1
IN THE PAST 7 DAYS, HOW WOULD YOU RATE YOUR FATIGUE ON AVERAGE [ON A SCALE FROM 1 (NONE) TO 5 (VERY SEVERE)]?: 3

## 2018-05-02 ASSESSMENT — PAIN SCALES - GENERAL: PAINLEVEL_OUTOF10: 2

## 2018-05-02 ASSESSMENT — PAIN DESCRIPTION - ONSET: ONSET: ON-GOING

## 2018-05-02 ASSESSMENT — HOOS JR
SITTING: 1
LYING IN BED (TURNING OVER, MAINTAINING HIP POSITION): 2
RISING FROM SITTING: 3
BENDING TO THE FLOOR TO PICK UP OBJECT: 4
HOOS JR RAW SCORE: 15
GOING UP OR DOWN STAIRS: 3
WALKING ON UNEVEN SURFACE: 2

## 2018-05-02 ASSESSMENT — PAIN DESCRIPTION - LOCATION: LOCATION: HIP

## 2018-05-02 ASSESSMENT — PAIN DESCRIPTION - FREQUENCY: FREQUENCY: CONTINUOUS

## 2018-05-02 ASSESSMENT — PAIN DESCRIPTION - PAIN TYPE: TYPE: CHRONIC PAIN

## 2018-05-02 ASSESSMENT — PAIN DESCRIPTION - PROGRESSION: CLINICAL_PROGRESSION: NOT CHANGED

## 2018-05-02 NOTE — PLAN OF CARE
Problem: Pain:  Goal: Control of chronic pain  Control of chronic pain   Outcome: Met This Shift  Pt reports \"I have chronic right hip pain and I'm supposed to have surgery for this on May 15th. \"  Pt rates a \"2\" on a 0-10 pain scale and denies need for pain medications. Pt takes Tramadol at home already for this.

## 2018-05-02 NOTE — PROGRESS NOTES
0825:  Pt arrives ambulatory with cane assist on supplemental [email or web address] via nasal cannula in no apparent resp distress for IVIG infuson. Pt c/o's mild Rt hip pain but denies need for pain medication. Orders reviewed. 3609:  D5W0.33%NaCl IVF begun @500ml/hr for x30min for prehydration as per MD orders. See MAR for pre-medications given to patient. 0915:  IVIG infusion 60g hung. Rate initiated @20ml/hr for x20min, then rate increased to 40ml/hr x20min, then rate increased to 80ml/hr x20min, then rate increased to 160ml/hr x30min, then rate increased to and maintained @250ml/hr until completion time of 1525. VS taken at start of infusion, before each infusion rate increase, and then every 30min during infusion and 30min after infusion completed as follows:     120/76   93     103/57   95     109/62   96     111/64   89     105/64   89     112/63   83     125/70   97     113/67   95     100/60   89     100/66   88     118/73   89     111/67   64     116/77   92     111/71   90     108/65   90     121/67   98     119/66   92     114/72   90     125/72   90     128/70   93  Pt tolerated IVIG infusion well without complaints. No s/s adverse reactions noted. D5W0.33%NaCl IVF restarted @500ml/hr for x30min as ordered for posthydration post IVIG infusion as per MD orders. Appointment calendar given to patient. RTC on 5/23/18. Pt denies headaches. Pt discharged home ambulatory without complaints and/or no complaints voiced.

## 2018-05-04 DIAGNOSIS — Z01.818 PRE-OP TESTING: Primary | ICD-10-CM

## 2018-05-04 DIAGNOSIS — M87.052 AVASCULAR NECROSIS OF BONE OF LEFT HIP (HCC): ICD-10-CM

## 2018-05-07 DIAGNOSIS — F41.9 ANXIETY: Chronic | ICD-10-CM

## 2018-05-07 DIAGNOSIS — M25.552 CHRONIC LEFT HIP PAIN: ICD-10-CM

## 2018-05-07 DIAGNOSIS — G89.29 CHRONIC LEFT HIP PAIN: ICD-10-CM

## 2018-05-08 ENCOUNTER — OFFICE VISIT (OUTPATIENT)
Dept: INFECTIOUS DISEASES | Age: 63
End: 2018-05-08
Payer: MEDICARE

## 2018-05-08 ENCOUNTER — HOSPITAL ENCOUNTER (OUTPATIENT)
Age: 63
Discharge: HOME | End: 2018-05-08
Payer: MEDICARE

## 2018-05-08 ENCOUNTER — HOSPITAL ENCOUNTER (OUTPATIENT)
Dept: GENERAL RADIOLOGY | Age: 63
Discharge: HOME | End: 2018-05-10
Payer: MEDICARE

## 2018-05-08 ENCOUNTER — HOSPITAL ENCOUNTER (OUTPATIENT)
Dept: PREADMISSION TESTING | Age: 63
Discharge: HOME | End: 2018-05-12
Payer: MEDICARE

## 2018-05-08 VITALS
OXYGEN SATURATION: 96 % | TEMPERATURE: 97 F | WEIGHT: 266 LBS | HEIGHT: 68 IN | SYSTOLIC BLOOD PRESSURE: 131 MMHG | HEART RATE: 85 BPM | BODY MASS INDEX: 40.32 KG/M2 | RESPIRATION RATE: 14 BRPM | DIASTOLIC BLOOD PRESSURE: 77 MMHG

## 2018-05-08 VITALS
WEIGHT: 266 LBS | RESPIRATION RATE: 16 BRPM | BODY MASS INDEX: 40.32 KG/M2 | OXYGEN SATURATION: 98 % | HEIGHT: 68 IN | DIASTOLIC BLOOD PRESSURE: 72 MMHG | HEART RATE: 76 BPM | SYSTOLIC BLOOD PRESSURE: 115 MMHG | TEMPERATURE: 97.6 F

## 2018-05-08 DIAGNOSIS — M87.052 AVASCULAR NECROSIS OF BONE OF HIP, LEFT (HCC): Primary | ICD-10-CM

## 2018-05-08 DIAGNOSIS — D81.9 COMBINED VARIABLE IMMUNODEFICIENCY (HCC): ICD-10-CM

## 2018-05-08 DIAGNOSIS — E55.9 VITAMIN D DEFICIENCY: ICD-10-CM

## 2018-05-08 DIAGNOSIS — M81.0 AGE-RELATED OSTEOPOROSIS WITHOUT CURRENT PATHOLOGICAL FRACTURE: ICD-10-CM

## 2018-05-08 DIAGNOSIS — D84.9 IMMUNE DEFICIENCY DISORDER (HCC): ICD-10-CM

## 2018-05-08 DIAGNOSIS — Z01.818 PRE-OP TESTING: ICD-10-CM

## 2018-05-08 DIAGNOSIS — J42 CHRONIC BRONCHITIS, UNSPECIFIED CHRONIC BRONCHITIS TYPE (HCC): ICD-10-CM

## 2018-05-08 DIAGNOSIS — M81.6 LOCALIZED OSTEOPOROSIS WITHOUT CURRENT PATHOLOGICAL FRACTURE: ICD-10-CM

## 2018-05-08 PROBLEM — M87.051 AVASCULAR NECROSIS OF BONE OF HIP, RIGHT (HCC): Status: ACTIVE | Noted: 2018-05-08

## 2018-05-08 LAB
ALBUMIN SERPL-MCNC: 4.1 G/DL (ref 3.5–5.2)
ALBUMIN/GLOBULIN RATIO: 1.1 (ref 1–2.5)
ALP BLD-CCNC: 100 U/L (ref 40–129)
ALT SERPL-CCNC: 36 U/L (ref 5–41)
ANION GAP SERPL CALCULATED.3IONS-SCNC: 11 MMOL/L (ref 9–17)
AST SERPL-CCNC: 41 U/L
BILIRUB SERPL-MCNC: 0.4 MG/DL (ref 0.3–1.2)
BILIRUBIN URINE: NEGATIVE
BUN BLDV-MCNC: 12 MG/DL (ref 8–23)
BUN/CREAT BLD: ABNORMAL (ref 9–20)
CALCIUM SERPL-MCNC: 8.9 MG/DL (ref 8.6–10.4)
CHLORIDE BLD-SCNC: 98 MMOL/L (ref 98–107)
CO2: 25 MMOL/L (ref 20–31)
COLOR: YELLOW
COMMENT UA: NORMAL
CREAT SERPL-MCNC: 0.79 MG/DL (ref 0.7–1.2)
EKG ATRIAL RATE: 66 BPM
EKG P AXIS: 45 DEGREES
EKG P-R INTERVAL: 152 MS
EKG Q-T INTERVAL: 404 MS
EKG QRS DURATION: 90 MS
EKG QTC CALCULATION (BAZETT): 423 MS
EKG R AXIS: 53 DEGREES
EKG T AXIS: 30 DEGREES
EKG VENTRICULAR RATE: 66 BPM
GFR AFRICAN AMERICAN: >60 ML/MIN
GFR NON-AFRICAN AMERICAN: >60 ML/MIN
GFR SERPL CREATININE-BSD FRML MDRD: ABNORMAL ML/MIN/{1.73_M2}
GFR SERPL CREATININE-BSD FRML MDRD: ABNORMAL ML/MIN/{1.73_M2}
GLUCOSE BLD-MCNC: 96 MG/DL (ref 70–99)
GLUCOSE URINE: NEGATIVE
HCT VFR BLD CALC: 43.9 % (ref 40.7–50.3)
HEMOGLOBIN: 14.3 G/DL (ref 13–17)
KETONES, URINE: NEGATIVE
LEUKOCYTE ESTERASE, URINE: NEGATIVE
MCH RBC QN AUTO: 28.2 PG (ref 25.2–33.5)
MCHC RBC AUTO-ENTMCNC: 32.6 G/DL (ref 28.4–34.8)
MCV RBC AUTO: 86.6 FL (ref 82.6–102.9)
MRSA, DNA, NASAL: NORMAL
NITRITE, URINE: NEGATIVE
NRBC AUTOMATED: 0 PER 100 WBC
PDW BLD-RTO: 13 % (ref 11.8–14.4)
PH UA: 8 (ref 5–8)
PLATELET # BLD: 247 K/UL (ref 138–453)
PMV BLD AUTO: 10.5 FL (ref 8.1–13.5)
POTASSIUM SERPL-SCNC: 4.5 MMOL/L (ref 3.7–5.3)
PROTEIN UA: NEGATIVE
RBC # BLD: 5.07 M/UL (ref 4.21–5.77)
SODIUM BLD-SCNC: 134 MMOL/L (ref 135–144)
SPECIFIC GRAVITY UA: 1.01 (ref 1–1.03)
SPECIMEN DESCRIPTION: NORMAL
TOTAL PROTEIN: 7.7 G/DL (ref 6.4–8.3)
TURBIDITY: CLEAR
URINE HGB: NEGATIVE
UROBILINOGEN, URINE: NORMAL
VITAMIN D 25-HYDROXY: 20.8 NG/ML (ref 30–100)
WBC # BLD: 6.5 K/UL (ref 3.5–11.3)

## 2018-05-08 PROCEDURE — 81003 URINALYSIS AUTO W/O SCOPE: CPT

## 2018-05-08 PROCEDURE — 87641 MR-STAPH DNA AMP PROBE: CPT

## 2018-05-08 PROCEDURE — 3017F COLORECTAL CA SCREEN DOC REV: CPT | Performed by: INTERNAL MEDICINE

## 2018-05-08 PROCEDURE — G8417 CALC BMI ABV UP PARAM F/U: HCPCS | Performed by: INTERNAL MEDICINE

## 2018-05-08 PROCEDURE — 80053 COMPREHEN METABOLIC PANEL: CPT

## 2018-05-08 PROCEDURE — G8926 SPIRO NO PERF OR DOC: HCPCS | Performed by: INTERNAL MEDICINE

## 2018-05-08 PROCEDURE — 71046 X-RAY EXAM CHEST 2 VIEWS: CPT

## 2018-05-08 PROCEDURE — 99205 OFFICE O/P NEW HI 60 MIN: CPT | Performed by: INTERNAL MEDICINE

## 2018-05-08 PROCEDURE — 82306 VITAMIN D 25 HYDROXY: CPT

## 2018-05-08 PROCEDURE — G8427 DOCREV CUR MEDS BY ELIG CLIN: HCPCS | Performed by: INTERNAL MEDICINE

## 2018-05-08 PROCEDURE — 3023F SPIROM DOC REV: CPT | Performed by: INTERNAL MEDICINE

## 2018-05-08 PROCEDURE — 1036F TOBACCO NON-USER: CPT | Performed by: INTERNAL MEDICINE

## 2018-05-08 PROCEDURE — 85027 COMPLETE CBC AUTOMATED: CPT

## 2018-05-08 PROCEDURE — 93005 ELECTROCARDIOGRAM TRACING: CPT

## 2018-05-08 PROCEDURE — 36415 COLL VENOUS BLD VENIPUNCTURE: CPT

## 2018-05-08 RX ORDER — SODIUM CHLORIDE, SODIUM LACTATE, POTASSIUM CHLORIDE, CALCIUM CHLORIDE 600; 310; 30; 20 MG/100ML; MG/100ML; MG/100ML; MG/100ML
1000 INJECTION, SOLUTION INTRAVENOUS CONTINUOUS
Status: CANCELLED | OUTPATIENT
Start: 2018-05-08

## 2018-05-08 ASSESSMENT — PAIN SCALES - GENERAL: PAINLEVEL_OUTOF10: 6

## 2018-05-08 ASSESSMENT — PAIN DESCRIPTION - ORIENTATION: ORIENTATION: LEFT

## 2018-05-08 ASSESSMENT — PAIN DESCRIPTION - LOCATION: LOCATION: HIP

## 2018-05-08 ASSESSMENT — PAIN DESCRIPTION - PROGRESSION: CLINICAL_PROGRESSION: GRADUALLY WORSENING

## 2018-05-08 ASSESSMENT — PAIN DESCRIPTION - PAIN TYPE: TYPE: CHRONIC PAIN

## 2018-05-08 ASSESSMENT — PAIN DESCRIPTION - DESCRIPTORS: DESCRIPTORS: CONSTANT;ACHING;BURNING

## 2018-05-08 ASSESSMENT — PAIN DESCRIPTION - FREQUENCY: FREQUENCY: CONTINUOUS

## 2018-05-08 ASSESSMENT — PAIN DESCRIPTION - ONSET: ONSET: ON-GOING

## 2018-05-09 ENCOUNTER — TELEPHONE (OUTPATIENT)
Dept: FAMILY MEDICINE CLINIC | Age: 63
End: 2018-05-09

## 2018-05-09 DIAGNOSIS — E55.9 VITAMIN D DEFICIENCY: ICD-10-CM

## 2018-05-09 RX ORDER — ERGOCALCIFEROL 1.25 MG/1
50000 CAPSULE ORAL WEEKLY
Qty: 4 CAPSULE | Refills: 1 | Status: ON HOLD | OUTPATIENT
Start: 2018-05-09 | End: 2019-01-14

## 2018-05-10 ENCOUNTER — CLINICAL DOCUMENTATION (OUTPATIENT)
Dept: INFECTIOUS DISEASES | Age: 63
End: 2018-05-10

## 2018-05-14 ENCOUNTER — TELEPHONE (OUTPATIENT)
Dept: ONCOLOGY | Age: 63
End: 2018-05-14

## 2018-05-14 RX ORDER — ALPRAZOLAM 0.5 MG/1
TABLET ORAL
Qty: 90 TABLET | Refills: 0 | Status: SHIPPED | OUTPATIENT
Start: 2018-05-14 | End: 2018-06-12 | Stop reason: SDUPTHER

## 2018-05-14 RX ORDER — TRAMADOL HYDROCHLORIDE 50 MG/1
TABLET ORAL
Qty: 60 TABLET | Refills: 0 | Status: SHIPPED | OUTPATIENT
Start: 2018-05-14 | End: 2018-06-14

## 2018-05-16 ENCOUNTER — TELEPHONE (OUTPATIENT)
Dept: INFECTIOUS DISEASES | Age: 63
End: 2018-05-16

## 2018-05-17 ENCOUNTER — OFFICE VISIT (OUTPATIENT)
Dept: ORTHOPEDIC SURGERY | Age: 63
End: 2018-05-17

## 2018-05-17 VITALS — BODY MASS INDEX: 39.98 KG/M2 | WEIGHT: 263.8 LBS | HEIGHT: 68 IN

## 2018-05-17 DIAGNOSIS — J96.22 ACUTE ON CHRONIC RESPIRATORY FAILURE WITH HYPOXIA AND HYPERCAPNIA (HCC): ICD-10-CM

## 2018-05-17 DIAGNOSIS — J96.21 ACUTE ON CHRONIC RESPIRATORY FAILURE WITH HYPOXIA AND HYPERCAPNIA (HCC): ICD-10-CM

## 2018-05-17 DIAGNOSIS — M87.052 AVASCULAR NECROSIS OF BONE OF LEFT HIP (HCC): Primary | ICD-10-CM

## 2018-05-17 DIAGNOSIS — G47.30 SLEEP APNEA, UNSPECIFIED TYPE: ICD-10-CM

## 2018-05-17 DIAGNOSIS — D80.1 COMMON VARIABLE HYPOGAMMAGLOBULINEMIA (HCC): ICD-10-CM

## 2018-05-17 DIAGNOSIS — J98.4 LUNG DISEASE: ICD-10-CM

## 2018-05-17 PROCEDURE — 99024 POSTOP FOLLOW-UP VISIT: CPT | Performed by: ORTHOPAEDIC SURGERY

## 2018-05-17 ASSESSMENT — ENCOUNTER SYMPTOMS
NAUSEA: 0
DIARRHEA: 0
CONSTIPATION: 0
COUGH: 0

## 2018-05-18 ENCOUNTER — HOSPITAL ENCOUNTER (OUTPATIENT)
Facility: MEDICAL CENTER | Age: 63
End: 2018-05-18
Payer: MEDICARE

## 2018-05-21 ENCOUNTER — HOSPITAL ENCOUNTER (OUTPATIENT)
Dept: INFUSION THERAPY | Facility: MEDICAL CENTER | Age: 63
Discharge: HOME | End: 2018-05-21
Payer: MEDICARE

## 2018-05-21 ENCOUNTER — TELEPHONE (OUTPATIENT)
Dept: FAMILY MEDICINE CLINIC | Age: 63
End: 2018-05-21

## 2018-05-21 DIAGNOSIS — D83.9 COMMON VARIABLE IMMUNODEFICIENCY (HCC): ICD-10-CM

## 2018-05-21 DIAGNOSIS — D80.1 COMMON VARIABLE HYPOGAMMAGLOBULINEMIA (HCC): ICD-10-CM

## 2018-05-21 PROCEDURE — 96365 THER/PROPH/DIAG IV INF INIT: CPT

## 2018-05-21 PROCEDURE — 96375 TX/PRO/DX INJ NEW DRUG ADDON: CPT

## 2018-05-21 PROCEDURE — 96366 THER/PROPH/DIAG IV INF ADDON: CPT

## 2018-05-21 PROCEDURE — 6370000000 HC RX 637 (ALT 250 FOR IP): Performed by: ALLERGY & IMMUNOLOGY

## 2018-05-21 PROCEDURE — 96361 HYDRATE IV INFUSION ADD-ON: CPT

## 2018-05-21 PROCEDURE — 96360 HYDRATION IV INFUSION INIT: CPT

## 2018-05-21 PROCEDURE — 6360000002 HC RX W HCPCS: Performed by: ALLERGY & IMMUNOLOGY

## 2018-05-21 PROCEDURE — 2580000003 HC RX 258: Performed by: ALLERGY & IMMUNOLOGY

## 2018-05-21 RX ORDER — HEPARIN SODIUM (PORCINE) LOCK FLUSH IV SOLN 100 UNIT/ML 100 UNIT/ML
500 SOLUTION INTRAVENOUS PRN
Status: DISCONTINUED | OUTPATIENT
Start: 2018-05-21 | End: 2018-05-22 | Stop reason: HOSPADM

## 2018-05-21 RX ORDER — DIPHENHYDRAMINE HCL 25 MG
25 TABLET ORAL ONCE
Status: COMPLETED | OUTPATIENT
Start: 2018-05-21 | End: 2018-05-21

## 2018-05-21 RX ORDER — DIPHENHYDRAMINE HCL 25 MG
25 TABLET ORAL EVERY 4 HOURS PRN
Status: CANCELLED
Start: 2018-05-21

## 2018-05-21 RX ORDER — DEXTROSE AND SODIUM CHLORIDE 5; .33 G/100ML; G/100ML
INJECTION, SOLUTION INTRAVENOUS CONTINUOUS
Status: DISCONTINUED | OUTPATIENT
Start: 2018-05-21 | End: 2018-05-22 | Stop reason: HOSPADM

## 2018-05-21 RX ORDER — DEXTROSE MONOHYDRATE 50 MG/ML
INJECTION, SOLUTION INTRAVENOUS CONTINUOUS
Status: CANCELLED
Start: 2018-05-21

## 2018-05-21 RX ORDER — DIPHENHYDRAMINE HCL 25 MG
25 TABLET ORAL ONCE
Status: CANCELLED | OUTPATIENT
Start: 2018-05-21 | End: 2018-05-21

## 2018-05-21 RX ORDER — DIPHENHYDRAMINE HCL 25 MG
25 TABLET ORAL EVERY 4 HOURS PRN
Status: DISCONTINUED | OUTPATIENT
Start: 2018-05-21 | End: 2018-05-22 | Stop reason: HOSPADM

## 2018-05-21 RX ORDER — HEPARIN SODIUM (PORCINE) LOCK FLUSH IV SOLN 100 UNIT/ML 100 UNIT/ML
500 SOLUTION INTRAVENOUS PRN
Status: CANCELLED
Start: 2018-05-21

## 2018-05-21 RX ORDER — 0.9 % SODIUM CHLORIDE 0.9 %
10 VIAL (ML) INJECTION PRN
Status: CANCELLED | OUTPATIENT
Start: 2018-05-21

## 2018-05-21 RX ORDER — 0.9 % SODIUM CHLORIDE 0.9 %
10 VIAL (ML) INJECTION PRN
Status: DISCONTINUED | OUTPATIENT
Start: 2018-05-21 | End: 2018-05-22 | Stop reason: HOSPADM

## 2018-05-21 RX ORDER — DEXTROSE AND SODIUM CHLORIDE 5; .33 G/100ML; G/100ML
INJECTION, SOLUTION INTRAVENOUS CONTINUOUS
Status: CANCELLED
Start: 2018-05-21

## 2018-05-21 RX ADMIN — DIPHENHYDRAMINE HCL 25 MG: 25 TABLET ORAL at 09:13

## 2018-05-21 RX ADMIN — HEPARIN SODIUM (PORCINE) LOCK FLUSH IV SOLN 100 UNIT/ML 500 UNITS: 100 SOLUTION at 16:39

## 2018-05-21 RX ADMIN — DIPHENHYDRAMINE HCL 25 MG: 25 TABLET ORAL at 14:40

## 2018-05-21 RX ADMIN — Medication 10 ML: at 16:39

## 2018-05-21 RX ADMIN — DEXTROSE AND SODIUM CHLORIDE: 5; 330 INJECTION, SOLUTION INTRAVENOUS at 09:08

## 2018-05-21 RX ADMIN — IMMUNE GLOBULIN INTRAVENOUS (HUMAN): 5 INJECTION, SOLUTION INTRAVENOUS at 10:00

## 2018-05-21 NOTE — PROGRESS NOTES
Pt. Arrives for IVIG infusion, denies c/o. Pt. Having hip surgery in AM.  Pt. Premedicated  IVIG started and titrated as follows:   20ml x 20, 40ml x 20, 80ml x 20, 160ml x 30, 240ml for duration. Vitals throughout infusion as follows:  535/38-94  813/70-92  055/61-78  126/65-72  615/02-10  105/68-79  974/17-67  892/10-26  110/60-79  106/61  362/49-58  927/59-74  Pre and post hydration completed.

## 2018-05-22 ENCOUNTER — APPOINTMENT (OUTPATIENT)
Dept: GENERAL RADIOLOGY | Age: 63
DRG: 470 | End: 2018-05-22
Attending: ORTHOPAEDIC SURGERY
Payer: MEDICARE

## 2018-05-22 ENCOUNTER — HOSPITAL ENCOUNTER (INPATIENT)
Age: 63
LOS: 2 days | Discharge: HOME HEALTH | DRG: 470 | End: 2018-05-24
Attending: ORTHOPAEDIC SURGERY | Admitting: ORTHOPAEDIC SURGERY
Payer: MEDICARE

## 2018-05-22 ENCOUNTER — ANESTHESIA EVENT (OUTPATIENT)
Dept: OPERATING ROOM | Age: 63
DRG: 470 | End: 2018-05-22
Payer: MEDICARE

## 2018-05-22 ENCOUNTER — ANESTHESIA (OUTPATIENT)
Dept: OPERATING ROOM | Age: 63
DRG: 470 | End: 2018-05-22
Payer: MEDICARE

## 2018-05-22 VITALS — SYSTOLIC BLOOD PRESSURE: 145 MMHG | TEMPERATURE: 88.8 F | DIASTOLIC BLOOD PRESSURE: 96 MMHG | OXYGEN SATURATION: 97 %

## 2018-05-22 DIAGNOSIS — Z96.642 STATUS POST TOTAL REPLACEMENT OF LEFT HIP: Primary | ICD-10-CM

## 2018-05-22 PROBLEM — M87.052 AVASCULAR NECROSIS OF BONE OF LEFT HIP (HCC): Status: ACTIVE | Noted: 2018-05-22

## 2018-05-22 PROCEDURE — G8979 MOBILITY GOAL STATUS: HCPCS

## 2018-05-22 PROCEDURE — 6360000002 HC RX W HCPCS: Performed by: INTERNAL MEDICINE

## 2018-05-22 PROCEDURE — 97166 OT EVAL MOD COMPLEX 45 MIN: CPT

## 2018-05-22 PROCEDURE — 2580000003 HC RX 258: Performed by: NURSE ANESTHETIST, CERTIFIED REGISTERED

## 2018-05-22 PROCEDURE — 7100000001 HC PACU RECOVERY - ADDTL 15 MIN: Performed by: ORTHOPAEDIC SURGERY

## 2018-05-22 PROCEDURE — 6370000000 HC RX 637 (ALT 250 FOR IP): Performed by: STUDENT IN AN ORGANIZED HEALTH CARE EDUCATION/TRAINING PROGRAM

## 2018-05-22 PROCEDURE — 2500000003 HC RX 250 WO HCPCS: Performed by: ANESTHESIOLOGY

## 2018-05-22 PROCEDURE — 97530 THERAPEUTIC ACTIVITIES: CPT

## 2018-05-22 PROCEDURE — 0SRB02A REPLACEMENT OF LEFT HIP JOINT WITH METAL ON POLYETHYLENE SYNTHETIC SUBSTITUTE, UNCEMENTED, OPEN APPROACH: ICD-10-PCS | Performed by: ORTHOPAEDIC SURGERY

## 2018-05-22 PROCEDURE — 73502 X-RAY EXAM HIP UNI 2-3 VIEWS: CPT

## 2018-05-22 PROCEDURE — 2500000003 HC RX 250 WO HCPCS: Performed by: NURSE ANESTHETIST, CERTIFIED REGISTERED

## 2018-05-22 PROCEDURE — 6360000002 HC RX W HCPCS

## 2018-05-22 PROCEDURE — C1776 JOINT DEVICE (IMPLANTABLE): HCPCS | Performed by: ORTHOPAEDIC SURGERY

## 2018-05-22 PROCEDURE — 2580000003 HC RX 258: Performed by: STUDENT IN AN ORGANIZED HEALTH CARE EDUCATION/TRAINING PROGRAM

## 2018-05-22 PROCEDURE — 6360000002 HC RX W HCPCS: Performed by: NURSE ANESTHETIST, CERTIFIED REGISTERED

## 2018-05-22 PROCEDURE — 6360000002 HC RX W HCPCS: Performed by: ANESTHESIOLOGY

## 2018-05-22 PROCEDURE — 2500000003 HC RX 250 WO HCPCS: Performed by: STUDENT IN AN ORGANIZED HEALTH CARE EDUCATION/TRAINING PROGRAM

## 2018-05-22 PROCEDURE — G8978 MOBILITY CURRENT STATUS: HCPCS

## 2018-05-22 PROCEDURE — 64450 NJX AA&/STRD OTHER PN/BRANCH: CPT | Performed by: ANESTHESIOLOGY

## 2018-05-22 PROCEDURE — 2580000003 HC RX 258: Performed by: ANESTHESIOLOGY

## 2018-05-22 PROCEDURE — 99222 1ST HOSP IP/OBS MODERATE 55: CPT | Performed by: INTERNAL MEDICINE

## 2018-05-22 PROCEDURE — G8988 SELF CARE GOAL STATUS: HCPCS

## 2018-05-22 PROCEDURE — G8987 SELF CARE CURRENT STATUS: HCPCS

## 2018-05-22 PROCEDURE — 3E0T3BZ INTRODUCTION OF ANESTHETIC AGENT INTO PERIPHERAL NERVES AND PLEXI, PERCUTANEOUS APPROACH: ICD-10-PCS | Performed by: ANESTHESIOLOGY

## 2018-05-22 PROCEDURE — 97162 PT EVAL MOD COMPLEX 30 MIN: CPT

## 2018-05-22 PROCEDURE — 2720000010 HC SURG SUPPLY STERILE: Performed by: ORTHOPAEDIC SURGERY

## 2018-05-22 PROCEDURE — 2580000003 HC RX 258: Performed by: ORTHOPAEDIC SURGERY

## 2018-05-22 PROCEDURE — 3700000000 HC ANESTHESIA ATTENDED CARE: Performed by: ORTHOPAEDIC SURGERY

## 2018-05-22 PROCEDURE — 3600000014 HC SURGERY LEVEL 4 ADDTL 15MIN: Performed by: ORTHOPAEDIC SURGERY

## 2018-05-22 PROCEDURE — 3600000004 HC SURGERY LEVEL 4 BASE: Performed by: ORTHOPAEDIC SURGERY

## 2018-05-22 PROCEDURE — 27130 TOTAL HIP ARTHROPLASTY: CPT | Performed by: ORTHOPAEDIC SURGERY

## 2018-05-22 PROCEDURE — 97535 SELF CARE MNGMENT TRAINING: CPT

## 2018-05-22 PROCEDURE — 2580000003 HC RX 258: Performed by: INTERNAL MEDICINE

## 2018-05-22 PROCEDURE — 6360000002 HC RX W HCPCS: Performed by: STUDENT IN AN ORGANIZED HEALTH CARE EDUCATION/TRAINING PROGRAM

## 2018-05-22 PROCEDURE — 3700000001 HC ADD 15 MINUTES (ANESTHESIA): Performed by: ORTHOPAEDIC SURGERY

## 2018-05-22 PROCEDURE — 7100000000 HC PACU RECOVERY - FIRST 15 MIN: Performed by: ORTHOPAEDIC SURGERY

## 2018-05-22 PROCEDURE — 1200000000 HC SEMI PRIVATE

## 2018-05-22 DEVICE — FLAT PE  HC LINER Ø 36 / F
Type: IMPLANTABLE DEVICE | Site: KNEE | Status: FUNCTIONAL
Brand: MPACT ACETABULAR SYSTEM

## 2018-05-22 DEVICE — ACETABULAR SHELL Ø56 TWO HOLES
Type: IMPLANTABLE DEVICE | Site: KNEE | Status: FUNCTIONAL
Brand: MPACT ACETABULAR SYSTEM

## 2018-05-22 DEVICE — CEMENTLESS ANATOMICAL STEM LEFT SIZE 6
Type: IMPLANTABLE DEVICE | Site: KNEE | Status: FUNCTIONAL
Brand: MINIMAX STEMS

## 2018-05-22 DEVICE — FEMORAL HEAD Ø 36 SIZE M
Type: IMPLANTABLE DEVICE | Site: KNEE | Status: FUNCTIONAL
Brand: COCR FEMORAL BALL HEAD

## 2018-05-22 DEVICE — COMPONENT TOT HIP CAPPED STD HD LNR COCR POLYETH: Type: IMPLANTABLE DEVICE | Status: FUNCTIONAL

## 2018-05-22 RX ORDER — AMLODIPINE BESYLATE 10 MG/1
10 TABLET ORAL DAILY
Status: DISCONTINUED | OUTPATIENT
Start: 2018-05-22 | End: 2018-05-24 | Stop reason: HOSPADM

## 2018-05-22 RX ORDER — OXYCODONE HYDROCHLORIDE 5 MG/1
5 TABLET ORAL EVERY 4 HOURS PRN
Status: DISCONTINUED | OUTPATIENT
Start: 2018-05-22 | End: 2018-05-24 | Stop reason: HOSPADM

## 2018-05-22 RX ORDER — MONTELUKAST SODIUM 10 MG/1
10 TABLET ORAL NIGHTLY
Status: DISCONTINUED | OUTPATIENT
Start: 2018-05-22 | End: 2018-05-24 | Stop reason: HOSPADM

## 2018-05-22 RX ORDER — DEXAMETHASONE SODIUM PHOSPHATE 10 MG/ML
10 INJECTION INTRAMUSCULAR; INTRAVENOUS ONCE
Status: CANCELLED | OUTPATIENT
Start: 2018-05-22 | End: 2018-05-22

## 2018-05-22 RX ORDER — PREDNISONE 10 MG/1
10 TABLET ORAL
Status: DISCONTINUED | OUTPATIENT
Start: 2018-06-11 | End: 2018-05-24 | Stop reason: HOSPADM

## 2018-05-22 RX ORDER — ONDANSETRON 2 MG/ML
INJECTION INTRAMUSCULAR; INTRAVENOUS PRN
Status: DISCONTINUED | OUTPATIENT
Start: 2018-05-22 | End: 2018-05-22 | Stop reason: SDUPTHER

## 2018-05-22 RX ORDER — KETOROLAC TROMETHAMINE 30 MG/ML
30 INJECTION, SOLUTION INTRAMUSCULAR; INTRAVENOUS EVERY 6 HOURS
Status: DISPENSED | OUTPATIENT
Start: 2018-05-22 | End: 2018-05-24

## 2018-05-22 RX ORDER — KETAMINE HYDROCHLORIDE 10 MG/ML
INJECTION, SOLUTION INTRAMUSCULAR; INTRAVENOUS PRN
Status: DISCONTINUED | OUTPATIENT
Start: 2018-05-22 | End: 2018-05-22 | Stop reason: SDUPTHER

## 2018-05-22 RX ORDER — FENTANYL CITRATE 50 UG/ML
100 INJECTION, SOLUTION INTRAMUSCULAR; INTRAVENOUS ONCE
Status: COMPLETED | OUTPATIENT
Start: 2018-05-22 | End: 2018-05-22

## 2018-05-22 RX ORDER — FENTANYL CITRATE 50 UG/ML
25 INJECTION, SOLUTION INTRAMUSCULAR; INTRAVENOUS EVERY 5 MIN PRN
Status: DISCONTINUED | OUTPATIENT
Start: 2018-05-22 | End: 2018-05-22 | Stop reason: HOSPADM

## 2018-05-22 RX ORDER — GABAPENTIN 600 MG/1
600 TABLET ORAL ONCE
Status: CANCELLED | OUTPATIENT
Start: 2018-05-22 | End: 2018-05-22

## 2018-05-22 RX ORDER — SODIUM CHLORIDE 0.9 % (FLUSH) 0.9 %
10 SYRINGE (ML) INJECTION EVERY 12 HOURS SCHEDULED
Status: DISCONTINUED | OUTPATIENT
Start: 2018-05-22 | End: 2018-05-24 | Stop reason: HOSPADM

## 2018-05-22 RX ORDER — MORPHINE SULFATE 2 MG/ML
INJECTION, SOLUTION INTRAMUSCULAR; INTRAVENOUS
Status: COMPLETED
Start: 2018-05-22 | End: 2018-05-22

## 2018-05-22 RX ORDER — LIDOCAINE HYDROCHLORIDE 10 MG/ML
INJECTION, SOLUTION EPIDURAL; INFILTRATION; INTRACAUDAL; PERINEURAL PRN
Status: DISCONTINUED | OUTPATIENT
Start: 2018-05-22 | End: 2018-05-22 | Stop reason: SDUPTHER

## 2018-05-22 RX ORDER — PROPOFOL 10 MG/ML
INJECTION, EMULSION INTRAVENOUS PRN
Status: DISCONTINUED | OUTPATIENT
Start: 2018-05-22 | End: 2018-05-22 | Stop reason: SDUPTHER

## 2018-05-22 RX ORDER — ALENDRONATE SODIUM 70 MG/1
70 TABLET ORAL
Status: DISCONTINUED | OUTPATIENT
Start: 2018-05-22 | End: 2018-05-22

## 2018-05-22 RX ORDER — CEFEPIME HYDROCHLORIDE 2 G/1
INJECTION, POWDER, FOR SOLUTION INTRAVENOUS PRN
Status: DISCONTINUED | OUTPATIENT
Start: 2018-05-22 | End: 2018-05-22 | Stop reason: SDUPTHER

## 2018-05-22 RX ORDER — SODIUM CHLORIDE, SODIUM LACTATE, POTASSIUM CHLORIDE, CALCIUM CHLORIDE 600; 310; 30; 20 MG/100ML; MG/100ML; MG/100ML; MG/100ML
1000 INJECTION, SOLUTION INTRAVENOUS CONTINUOUS
Status: DISCONTINUED | OUTPATIENT
Start: 2018-05-22 | End: 2018-05-22

## 2018-05-22 RX ORDER — FLUTICASONE PROPIONATE 50 MCG
2 SPRAY, SUSPENSION (ML) NASAL 2 TIMES DAILY
Status: DISCONTINUED | OUTPATIENT
Start: 2018-05-22 | End: 2018-05-24 | Stop reason: HOSPADM

## 2018-05-22 RX ORDER — GABAPENTIN 300 MG/1
300 CAPSULE ORAL NIGHTLY
Status: DISCONTINUED | OUTPATIENT
Start: 2018-05-22 | End: 2018-05-24 | Stop reason: HOSPADM

## 2018-05-22 RX ORDER — ALBUTEROL SULFATE 2.5 MG/3ML
2.5 SOLUTION RESPIRATORY (INHALATION) EVERY 4 HOURS PRN
Status: DISCONTINUED | OUTPATIENT
Start: 2018-05-22 | End: 2018-05-24 | Stop reason: HOSPADM

## 2018-05-22 RX ORDER — KETOROLAC TROMETHAMINE 30 MG/ML
INJECTION, SOLUTION INTRAMUSCULAR; INTRAVENOUS
Status: DISPENSED
Start: 2018-05-22 | End: 2018-05-23

## 2018-05-22 RX ORDER — OXYCODONE HYDROCHLORIDE 5 MG/1
10 TABLET ORAL EVERY 4 HOURS PRN
Status: DISCONTINUED | OUTPATIENT
Start: 2018-05-22 | End: 2018-05-24 | Stop reason: HOSPADM

## 2018-05-22 RX ORDER — ACETAMINOPHEN 500 MG
1000 TABLET ORAL ONCE
Status: COMPLETED | OUTPATIENT
Start: 2018-05-22 | End: 2018-05-22

## 2018-05-22 RX ORDER — PROPOFOL 10 MG/ML
INJECTION, EMULSION INTRAVENOUS CONTINUOUS PRN
Status: DISCONTINUED | OUTPATIENT
Start: 2018-05-22 | End: 2018-05-22 | Stop reason: SDUPTHER

## 2018-05-22 RX ORDER — ALPRAZOLAM 0.5 MG/1
0.5 TABLET ORAL 3 TIMES DAILY PRN
Status: DISCONTINUED | OUTPATIENT
Start: 2018-05-22 | End: 2018-05-24 | Stop reason: HOSPADM

## 2018-05-22 RX ORDER — DOCUSATE SODIUM 100 MG/1
100 CAPSULE, LIQUID FILLED ORAL 2 TIMES DAILY
Status: DISCONTINUED | OUTPATIENT
Start: 2018-05-22 | End: 2018-05-24 | Stop reason: HOSPADM

## 2018-05-22 RX ORDER — GABAPENTIN 400 MG/1
800 CAPSULE ORAL ONCE
Status: COMPLETED | OUTPATIENT
Start: 2018-05-22 | End: 2018-05-22

## 2018-05-22 RX ORDER — MORPHINE SULFATE 2 MG/ML
INJECTION, SOLUTION INTRAMUSCULAR; INTRAVENOUS
Status: DISPENSED
Start: 2018-05-22 | End: 2018-05-23

## 2018-05-22 RX ORDER — MIDAZOLAM HYDROCHLORIDE 1 MG/ML
2 INJECTION INTRAMUSCULAR; INTRAVENOUS ONCE
Status: COMPLETED | OUTPATIENT
Start: 2018-05-22 | End: 2018-05-22

## 2018-05-22 RX ORDER — SODIUM CHLORIDE 0.9 % (FLUSH) 0.9 %
10 SYRINGE (ML) INJECTION PRN
Status: DISCONTINUED | OUTPATIENT
Start: 2018-05-22 | End: 2018-05-24 | Stop reason: HOSPADM

## 2018-05-22 RX ORDER — MORPHINE SULFATE 2 MG/ML
4 INJECTION, SOLUTION INTRAMUSCULAR; INTRAVENOUS
Status: DISCONTINUED | OUTPATIENT
Start: 2018-05-22 | End: 2018-05-22

## 2018-05-22 RX ORDER — SODIUM CHLORIDE 9 MG/ML
INJECTION, SOLUTION INTRAVENOUS CONTINUOUS
Status: DISCONTINUED | OUTPATIENT
Start: 2018-05-22 | End: 2018-05-24 | Stop reason: HOSPADM

## 2018-05-22 RX ORDER — LISINOPRIL 10 MG/1
10 TABLET ORAL DAILY
Status: DISCONTINUED | OUTPATIENT
Start: 2018-05-22 | End: 2018-05-24 | Stop reason: HOSPADM

## 2018-05-22 RX ORDER — MAGNESIUM HYDROXIDE 1200 MG/15ML
LIQUID ORAL CONTINUOUS PRN
Status: COMPLETED | OUTPATIENT
Start: 2018-05-22 | End: 2018-05-22

## 2018-05-22 RX ORDER — ACETAMINOPHEN 500 MG
1000 TABLET ORAL EVERY 8 HOURS SCHEDULED
Status: DISCONTINUED | OUTPATIENT
Start: 2018-05-22 | End: 2018-05-24 | Stop reason: HOSPADM

## 2018-05-22 RX ORDER — FENTANYL CITRATE 50 UG/ML
INJECTION, SOLUTION INTRAMUSCULAR; INTRAVENOUS
Status: DISPENSED
Start: 2018-05-22 | End: 2018-05-22

## 2018-05-22 RX ORDER — FENTANYL CITRATE 50 UG/ML
25 INJECTION, SOLUTION INTRAMUSCULAR; INTRAVENOUS
Status: DISCONTINUED | OUTPATIENT
Start: 2018-05-22 | End: 2018-05-24 | Stop reason: HOSPADM

## 2018-05-22 RX ORDER — ALBUTEROL SULFATE 90 UG/1
2 AEROSOL, METERED RESPIRATORY (INHALATION) EVERY 6 HOURS PRN
Status: DISCONTINUED | OUTPATIENT
Start: 2018-05-22 | End: 2018-05-24 | Stop reason: HOSPADM

## 2018-05-22 RX ORDER — SODIUM CHLORIDE 9 MG/ML
INJECTION, SOLUTION INTRAVENOUS CONTINUOUS PRN
Status: DISCONTINUED | OUTPATIENT
Start: 2018-05-22 | End: 2018-05-22 | Stop reason: SDUPTHER

## 2018-05-22 RX ORDER — BUPIVACAINE HYDROCHLORIDE 2.5 MG/ML
30 INJECTION, SOLUTION INFILTRATION; PERINEURAL ONCE
Status: COMPLETED | OUTPATIENT
Start: 2018-05-22 | End: 2018-05-22

## 2018-05-22 RX ORDER — PROMETHAZINE HYDROCHLORIDE 25 MG/ML
12.5 INJECTION, SOLUTION INTRAMUSCULAR; INTRAVENOUS ONCE
Status: COMPLETED | OUTPATIENT
Start: 2018-05-22 | End: 2018-05-22

## 2018-05-22 RX ORDER — MORPHINE SULFATE 2 MG/ML
2 INJECTION, SOLUTION INTRAMUSCULAR; INTRAVENOUS
Status: DISCONTINUED | OUTPATIENT
Start: 2018-05-22 | End: 2018-05-22

## 2018-05-22 RX ORDER — BUDESONIDE AND FORMOTEROL FUMARATE DIHYDRATE 160; 4.5 UG/1; UG/1
2 AEROSOL RESPIRATORY (INHALATION) 2 TIMES DAILY
Status: DISCONTINUED | OUTPATIENT
Start: 2018-05-22 | End: 2018-05-22

## 2018-05-22 RX ORDER — FENTANYL CITRATE 50 UG/ML
50 INJECTION, SOLUTION INTRAMUSCULAR; INTRAVENOUS EVERY 5 MIN PRN
Status: COMPLETED | OUTPATIENT
Start: 2018-05-22 | End: 2018-05-22

## 2018-05-22 RX ORDER — FENTANYL CITRATE 50 UG/ML
50 INJECTION, SOLUTION INTRAMUSCULAR; INTRAVENOUS
Status: DISCONTINUED | OUTPATIENT
Start: 2018-05-22 | End: 2018-05-24 | Stop reason: HOSPADM

## 2018-05-22 RX ORDER — ONDANSETRON 2 MG/ML
4 INJECTION INTRAMUSCULAR; INTRAVENOUS EVERY 6 HOURS PRN
Status: DISCONTINUED | OUTPATIENT
Start: 2018-05-22 | End: 2018-05-24 | Stop reason: HOSPADM

## 2018-05-22 RX ORDER — DEXAMETHASONE SODIUM PHOSPHATE 10 MG/ML
10 INJECTION INTRAMUSCULAR; INTRAVENOUS ONCE
Status: COMPLETED | OUTPATIENT
Start: 2018-05-22 | End: 2018-05-22

## 2018-05-22 RX ORDER — PROMETHAZINE HYDROCHLORIDE 25 MG/ML
INJECTION, SOLUTION INTRAMUSCULAR; INTRAVENOUS
Status: DISPENSED
Start: 2018-05-22 | End: 2018-05-23

## 2018-05-22 RX ORDER — SCOLOPAMINE TRANSDERMAL SYSTEM 1 MG/1
1 PATCH, EXTENDED RELEASE TRANSDERMAL ONCE
Status: DISCONTINUED | OUTPATIENT
Start: 2018-05-22 | End: 2018-05-22

## 2018-05-22 RX ADMIN — FENTANYL CITRATE 50 MCG: 50 INJECTION INTRAMUSCULAR; INTRAVENOUS at 12:00

## 2018-05-22 RX ADMIN — KETOROLAC TROMETHAMINE 30 MG: 30 INJECTION, SOLUTION INTRAMUSCULAR at 13:25

## 2018-05-22 RX ADMIN — PROPOFOL 30 MCG/KG/MIN: 10 INJECTION, EMULSION INTRAVENOUS at 09:16

## 2018-05-22 RX ADMIN — VANCOMYCIN HYDROCHLORIDE 1750 MG: 1 INJECTION, POWDER, LYOPHILIZED, FOR SOLUTION INTRAVENOUS at 08:47

## 2018-05-22 RX ADMIN — ACETAMINOPHEN 1000 MG: 500 TABLET ORAL at 07:15

## 2018-05-22 RX ADMIN — MONTELUKAST SODIUM 10 MG: 10 TABLET, FILM COATED ORAL at 21:08

## 2018-05-22 RX ADMIN — OXYCODONE HYDROCHLORIDE 10 MG: 5 TABLET ORAL at 16:29

## 2018-05-22 RX ADMIN — ONDANSETRON 4 MG: 2 INJECTION, SOLUTION INTRAMUSCULAR; INTRAVENOUS at 09:42

## 2018-05-22 RX ADMIN — SODIUM CHLORIDE: 9 INJECTION, SOLUTION INTRAVENOUS at 16:26

## 2018-05-22 RX ADMIN — PROMETHAZINE HYDROCHLORIDE 12.5 MG: 25 INJECTION INTRAMUSCULAR; INTRAVENOUS at 15:20

## 2018-05-22 RX ADMIN — ACETAMINOPHEN 1000 MG: 500 TABLET ORAL at 16:26

## 2018-05-22 RX ADMIN — FLUTICASONE PROPIONATE 2 SPRAY: 50 SPRAY, METERED NASAL at 21:07

## 2018-05-22 RX ADMIN — MIDAZOLAM HYDROCHLORIDE 2 MG: 1 INJECTION, SOLUTION INTRAMUSCULAR; INTRAVENOUS at 08:14

## 2018-05-22 RX ADMIN — LIDOCAINE HYDROCHLORIDE 50 MG: 10 INJECTION, SOLUTION EPIDURAL; INFILTRATION; INTRACAUDAL; PERINEURAL at 09:16

## 2018-05-22 RX ADMIN — TRANEXAMIC ACID 1000 MG: 100 INJECTION, SOLUTION INTRAVENOUS at 07:22

## 2018-05-22 RX ADMIN — CEFEPIME HYDROCHLORIDE 2 G: 2 INJECTION, POWDER, FOR SOLUTION INTRAVENOUS at 23:12

## 2018-05-22 RX ADMIN — CEFEPIME 1 G: 2 INJECTION, POWDER, FOR SOLUTION INTRAVENOUS at 09:28

## 2018-05-22 RX ADMIN — KETAMINE HYDROCHLORIDE 59 MG: 10 INJECTION, SOLUTION INTRAMUSCULAR; INTRAVENOUS at 11:27

## 2018-05-22 RX ADMIN — PROPOFOL 50 MG: 10 INJECTION, EMULSION INTRAVENOUS at 10:23

## 2018-05-22 RX ADMIN — KETAMINE HYDROCHLORIDE 30 MG: 10 INJECTION, SOLUTION INTRAMUSCULAR; INTRAVENOUS at 09:25

## 2018-05-22 RX ADMIN — GABAPENTIN 300 MG: 300 CAPSULE ORAL at 21:08

## 2018-05-22 RX ADMIN — PHENYLEPHRINE HYDROCHLORIDE 100 MCG: 10 INJECTION INTRAVENOUS at 10:41

## 2018-05-22 RX ADMIN — DEXAMETHASONE SODIUM PHOSPHATE 10 MG: 10 INJECTION INTRAMUSCULAR; INTRAVENOUS at 07:16

## 2018-05-22 RX ADMIN — OXYCODONE HYDROCHLORIDE 5 MG: 5 TABLET ORAL at 22:51

## 2018-05-22 RX ADMIN — BUPIVACAINE HYDROCHLORIDE 40 ML: 2.5 INJECTION, SOLUTION INFILTRATION; PERINEURAL at 08:40

## 2018-05-22 RX ADMIN — FENTANYL CITRATE 100 MCG: 50 INJECTION, SOLUTION INTRAMUSCULAR; INTRAVENOUS at 08:14

## 2018-05-22 RX ADMIN — FENTANYL CITRATE 100 MCG: 50 INJECTION, SOLUTION INTRAMUSCULAR; INTRAVENOUS at 08:28

## 2018-05-22 RX ADMIN — DOCUSATE SODIUM 100 MG: 100 CAPSULE ORAL at 21:08

## 2018-05-22 RX ADMIN — FENTANYL CITRATE 50 MCG: 50 INJECTION INTRAMUSCULAR; INTRAVENOUS at 12:45

## 2018-05-22 RX ADMIN — FENTANYL CITRATE 50 MCG: 50 INJECTION INTRAMUSCULAR; INTRAVENOUS at 13:07

## 2018-05-22 RX ADMIN — MORPHINE SULFATE 2 MG: 2 INJECTION, SOLUTION INTRAMUSCULAR; INTRAVENOUS at 14:57

## 2018-05-22 RX ADMIN — TRANEXAMIC ACID 1 G: 100 INJECTION, SOLUTION INTRAVENOUS at 10:49

## 2018-05-22 RX ADMIN — KETOROLAC TROMETHAMINE 30 MG: 30 INJECTION, SOLUTION INTRAMUSCULAR at 21:04

## 2018-05-22 RX ADMIN — ALPRAZOLAM 0.5 MG: 0.5 TABLET ORAL at 22:51

## 2018-05-22 RX ADMIN — KETAMINE HYDROCHLORIDE 1 MG: 10 INJECTION, SOLUTION INTRAMUSCULAR; INTRAVENOUS at 09:16

## 2018-05-22 RX ADMIN — FENTANYL CITRATE 50 MCG: 50 INJECTION INTRAMUSCULAR; INTRAVENOUS at 12:25

## 2018-05-22 RX ADMIN — ACETAMINOPHEN 1000 MG: 500 TABLET ORAL at 21:08

## 2018-05-22 RX ADMIN — SODIUM CHLORIDE, POTASSIUM CHLORIDE, SODIUM LACTATE AND CALCIUM CHLORIDE 1000 ML: 600; 310; 30; 20 INJECTION, SOLUTION INTRAVENOUS at 08:14

## 2018-05-22 RX ADMIN — SODIUM CHLORIDE: 9 INJECTION, SOLUTION INTRAVENOUS at 11:29

## 2018-05-22 RX ADMIN — SODIUM CHLORIDE: 9 INJECTION, SOLUTION INTRAVENOUS at 09:22

## 2018-05-22 RX ADMIN — VANCOMYCIN HYDROCHLORIDE 1750 MG: 10 INJECTION, POWDER, LYOPHILIZED, FOR SOLUTION INTRAVENOUS at 21:10

## 2018-05-22 RX ADMIN — GABAPENTIN 800 MG: 400 CAPSULE ORAL at 07:15

## 2018-05-22 ASSESSMENT — PULMONARY FUNCTION TESTS
PIF_VALUE: 0
PIF_VALUE: 1
PIF_VALUE: 0
PIF_VALUE: 1
PIF_VALUE: 0
PIF_VALUE: 1
PIF_VALUE: 0
PIF_VALUE: 1
PIF_VALUE: 0
PIF_VALUE: 1
PIF_VALUE: 0
PIF_VALUE: 1
PIF_VALUE: 0
PIF_VALUE: 1
PIF_VALUE: 3
PIF_VALUE: 1
PIF_VALUE: 0
PIF_VALUE: 1
PIF_VALUE: 0
PIF_VALUE: 1
PIF_VALUE: 0
PIF_VALUE: 1
PIF_VALUE: 0
PIF_VALUE: 1
PIF_VALUE: 0
PIF_VALUE: 0
PIF_VALUE: 1
PIF_VALUE: 0
PIF_VALUE: 1
PIF_VALUE: 0
PIF_VALUE: 1
PIF_VALUE: 0
PIF_VALUE: 1
PIF_VALUE: 0
PIF_VALUE: 0
PIF_VALUE: 1
PIF_VALUE: 0
PIF_VALUE: 1
PIF_VALUE: 0
PIF_VALUE: 1
PIF_VALUE: 0
PIF_VALUE: 1
PIF_VALUE: 0
PIF_VALUE: 1
PIF_VALUE: 2
PIF_VALUE: 1
PIF_VALUE: 1
PIF_VALUE: 0
PIF_VALUE: 1

## 2018-05-22 ASSESSMENT — PAIN DESCRIPTION - ORIENTATION
ORIENTATION: LEFT

## 2018-05-22 ASSESSMENT — PAIN DESCRIPTION - PROGRESSION
CLINICAL_PROGRESSION: GRADUALLY IMPROVING

## 2018-05-22 ASSESSMENT — PAIN SCALES - GENERAL
PAINLEVEL_OUTOF10: 3
PAINLEVEL_OUTOF10: 3
PAINLEVEL_OUTOF10: 8
PAINLEVEL_OUTOF10: 6
PAINLEVEL_OUTOF10: 8
PAINLEVEL_OUTOF10: 7
PAINLEVEL_OUTOF10: 5
PAINLEVEL_OUTOF10: 3
PAINLEVEL_OUTOF10: 8
PAINLEVEL_OUTOF10: 7
PAINLEVEL_OUTOF10: 3
PAINLEVEL_OUTOF10: 10
PAINLEVEL_OUTOF10: 5
PAINLEVEL_OUTOF10: 6

## 2018-05-22 ASSESSMENT — PAIN DESCRIPTION - ONSET: ONSET: ON-GOING

## 2018-05-22 ASSESSMENT — PAIN DESCRIPTION - PAIN TYPE
TYPE: SURGICAL PAIN
TYPE: SURGICAL PAIN

## 2018-05-22 ASSESSMENT — ENCOUNTER SYMPTOMS: SHORTNESS OF BREATH: 1

## 2018-05-22 ASSESSMENT — PAIN DESCRIPTION - LOCATION
LOCATION: HIP

## 2018-05-22 ASSESSMENT — PAIN DESCRIPTION - DESCRIPTORS: DESCRIPTORS: ACHING

## 2018-05-22 ASSESSMENT — PAIN - FUNCTIONAL ASSESSMENT: PAIN_FUNCTIONAL_ASSESSMENT: 0-10

## 2018-05-22 ASSESSMENT — PAIN DESCRIPTION - FREQUENCY: FREQUENCY: CONTINUOUS

## 2018-05-23 ENCOUNTER — HOSPITAL ENCOUNTER (OUTPATIENT)
Dept: INFUSION THERAPY | Facility: MEDICAL CENTER | Age: 63
End: 2018-05-23

## 2018-05-23 LAB
ABSOLUTE EOS #: <0.03 K/UL (ref 0–0.44)
ABSOLUTE IMMATURE GRANULOCYTE: 0.05 K/UL (ref 0–0.3)
ABSOLUTE LYMPH #: 0.87 K/UL (ref 1.1–3.7)
ABSOLUTE MONO #: 0.6 K/UL (ref 0.1–1.2)
ANION GAP SERPL CALCULATED.3IONS-SCNC: 12 MMOL/L (ref 9–17)
BASOPHILS # BLD: 0 % (ref 0–2)
BASOPHILS ABSOLUTE: <0.03 K/UL (ref 0–0.2)
BUN BLDV-MCNC: 17 MG/DL (ref 8–23)
BUN/CREAT BLD: ABNORMAL (ref 9–20)
CALCIUM SERPL-MCNC: 8.3 MG/DL (ref 8.6–10.4)
CHLORIDE BLD-SCNC: 105 MMOL/L (ref 98–107)
CO2: 20 MMOL/L (ref 20–31)
CREAT SERPL-MCNC: 0.85 MG/DL (ref 0.7–1.2)
DIFFERENTIAL TYPE: ABNORMAL
EOSINOPHILS RELATIVE PERCENT: 0 % (ref 1–4)
GFR AFRICAN AMERICAN: >60 ML/MIN
GFR NON-AFRICAN AMERICAN: >60 ML/MIN
GFR SERPL CREATININE-BSD FRML MDRD: ABNORMAL ML/MIN/{1.73_M2}
GFR SERPL CREATININE-BSD FRML MDRD: ABNORMAL ML/MIN/{1.73_M2}
GLUCOSE BLD-MCNC: 134 MG/DL (ref 75–110)
GLUCOSE BLD-MCNC: 185 MG/DL (ref 70–99)
HCT VFR BLD CALC: 35.3 % (ref 40.7–50.3)
HEMOGLOBIN: 11.5 G/DL (ref 13–17)
IMMATURE GRANULOCYTES: 1 %
LYMPHOCYTES # BLD: 10 % (ref 24–43)
MCH RBC QN AUTO: 28.8 PG (ref 25.2–33.5)
MCHC RBC AUTO-ENTMCNC: 32.6 G/DL (ref 28.4–34.8)
MCV RBC AUTO: 88.3 FL (ref 82.6–102.9)
MONOCYTES # BLD: 7 % (ref 3–12)
NRBC AUTOMATED: 0 PER 100 WBC
PDW BLD-RTO: 13.1 % (ref 11.8–14.4)
PLATELET # BLD: 206 K/UL (ref 138–453)
PLATELET ESTIMATE: ABNORMAL
PMV BLD AUTO: 10.9 FL (ref 8.1–13.5)
POTASSIUM SERPL-SCNC: 4.2 MMOL/L (ref 3.7–5.3)
RBC # BLD: 4 M/UL (ref 4.21–5.77)
RBC # BLD: ABNORMAL 10*6/UL
SEG NEUTROPHILS: 82 % (ref 36–65)
SEGMENTED NEUTROPHILS ABSOLUTE COUNT: 7.43 K/UL (ref 1.5–8.1)
SODIUM BLD-SCNC: 137 MMOL/L (ref 135–144)
WBC # BLD: 9 K/UL (ref 3.5–11.3)
WBC # BLD: ABNORMAL 10*3/UL

## 2018-05-23 PROCEDURE — 94640 AIRWAY INHALATION TREATMENT: CPT

## 2018-05-23 PROCEDURE — 2500000003 HC RX 250 WO HCPCS: Performed by: INTERNAL MEDICINE

## 2018-05-23 PROCEDURE — 6370000000 HC RX 637 (ALT 250 FOR IP): Performed by: STUDENT IN AN ORGANIZED HEALTH CARE EDUCATION/TRAINING PROGRAM

## 2018-05-23 PROCEDURE — 97535 SELF CARE MNGMENT TRAINING: CPT

## 2018-05-23 PROCEDURE — 2580000003 HC RX 258: Performed by: INTERNAL MEDICINE

## 2018-05-23 PROCEDURE — 85025 COMPLETE CBC W/AUTO DIFF WBC: CPT

## 2018-05-23 PROCEDURE — 6360000002 HC RX W HCPCS: Performed by: STUDENT IN AN ORGANIZED HEALTH CARE EDUCATION/TRAINING PROGRAM

## 2018-05-23 PROCEDURE — 97116 GAIT TRAINING THERAPY: CPT

## 2018-05-23 PROCEDURE — 80048 BASIC METABOLIC PNL TOTAL CA: CPT

## 2018-05-23 PROCEDURE — 97110 THERAPEUTIC EXERCISES: CPT

## 2018-05-23 PROCEDURE — 99232 SBSQ HOSP IP/OBS MODERATE 35: CPT | Performed by: INTERNAL MEDICINE

## 2018-05-23 PROCEDURE — 97530 THERAPEUTIC ACTIVITIES: CPT

## 2018-05-23 PROCEDURE — 94762 N-INVAS EAR/PLS OXIMTRY CONT: CPT

## 2018-05-23 PROCEDURE — 36415 COLL VENOUS BLD VENIPUNCTURE: CPT

## 2018-05-23 PROCEDURE — 2580000003 HC RX 258: Performed by: STUDENT IN AN ORGANIZED HEALTH CARE EDUCATION/TRAINING PROGRAM

## 2018-05-23 PROCEDURE — 1200000000 HC SEMI PRIVATE

## 2018-05-23 PROCEDURE — 6360000002 HC RX W HCPCS: Performed by: INTERNAL MEDICINE

## 2018-05-23 PROCEDURE — 6370000000 HC RX 637 (ALT 250 FOR IP): Performed by: INTERNAL MEDICINE

## 2018-05-23 PROCEDURE — 99233 SBSQ HOSP IP/OBS HIGH 50: CPT | Performed by: INTERNAL MEDICINE

## 2018-05-23 PROCEDURE — 82947 ASSAY GLUCOSE BLOOD QUANT: CPT

## 2018-05-23 RX ORDER — DEXTROSE MONOHYDRATE 50 MG/ML
100 INJECTION, SOLUTION INTRAVENOUS PRN
Status: DISCONTINUED | OUTPATIENT
Start: 2018-05-23 | End: 2018-05-24 | Stop reason: HOSPADM

## 2018-05-23 RX ORDER — DEXTROSE MONOHYDRATE 25 G/50ML
12.5 INJECTION, SOLUTION INTRAVENOUS PRN
Status: DISCONTINUED | OUTPATIENT
Start: 2018-05-23 | End: 2018-05-24 | Stop reason: HOSPADM

## 2018-05-23 RX ORDER — PREDNISONE 20 MG/1
40 TABLET ORAL DAILY
Status: DISCONTINUED | OUTPATIENT
Start: 2018-05-23 | End: 2018-05-24 | Stop reason: HOSPADM

## 2018-05-23 RX ORDER — DOCUSATE SODIUM 100 MG/1
100 CAPSULE, LIQUID FILLED ORAL 2 TIMES DAILY PRN
Qty: 60 CAPSULE | Refills: 0 | Status: SHIPPED | OUTPATIENT
Start: 2018-05-23 | End: 2018-07-20 | Stop reason: ALTCHOICE

## 2018-05-23 RX ORDER — GLUCAGON 1 MG/ML
1 KIT INJECTION PRN
Status: DISCONTINUED | OUTPATIENT
Start: 2018-05-23 | End: 2018-05-24 | Stop reason: HOSPADM

## 2018-05-23 RX ORDER — PANTOPRAZOLE SODIUM 40 MG/1
40 TABLET, DELAYED RELEASE ORAL
Status: DISCONTINUED | OUTPATIENT
Start: 2018-05-24 | End: 2018-05-24 | Stop reason: HOSPADM

## 2018-05-23 RX ORDER — OXYCODONE HYDROCHLORIDE AND ACETAMINOPHEN 5; 325 MG/1; MG/1
1-2 TABLET ORAL EVERY 6 HOURS PRN
Qty: 42 TABLET | Refills: 0 | Status: SHIPPED | OUTPATIENT
Start: 2018-05-23 | End: 2018-05-30

## 2018-05-23 RX ORDER — SENNOSIDES 8.6 MG
1 TABLET ORAL 2 TIMES DAILY
Qty: 60 TABLET | Refills: 0 | Status: SHIPPED | OUTPATIENT
Start: 2018-05-23 | End: 2018-07-20 | Stop reason: ALTCHOICE

## 2018-05-23 RX ORDER — NICOTINE POLACRILEX 4 MG
15 LOZENGE BUCCAL PRN
Status: DISCONTINUED | OUTPATIENT
Start: 2018-05-23 | End: 2018-05-24 | Stop reason: HOSPADM

## 2018-05-23 RX ADMIN — TIOTROPIUM BROMIDE 18 MCG: 18 CAPSULE ORAL; RESPIRATORY (INHALATION) at 08:49

## 2018-05-23 RX ADMIN — ACETAMINOPHEN 1000 MG: 500 TABLET ORAL at 04:50

## 2018-05-23 RX ADMIN — OXYCODONE HYDROCHLORIDE 5 MG: 5 TABLET ORAL at 17:03

## 2018-05-23 RX ADMIN — GABAPENTIN 300 MG: 300 CAPSULE ORAL at 20:47

## 2018-05-23 RX ADMIN — VANCOMYCIN HYDROCHLORIDE 1750 MG: 1 INJECTION, POWDER, LYOPHILIZED, FOR SOLUTION INTRAVENOUS at 08:46

## 2018-05-23 RX ADMIN — CEFEPIME HYDROCHLORIDE 2 G: 2 INJECTION, POWDER, FOR SOLUTION INTRAVENOUS at 12:17

## 2018-05-23 RX ADMIN — DOCUSATE SODIUM 100 MG: 100 CAPSULE ORAL at 09:03

## 2018-05-23 RX ADMIN — ACETAMINOPHEN 1000 MG: 500 TABLET ORAL at 22:16

## 2018-05-23 RX ADMIN — APIXABAN 2.5 MG: 2.5 TABLET, FILM COATED ORAL at 09:04

## 2018-05-23 RX ADMIN — APIXABAN 2.5 MG: 2.5 TABLET, FILM COATED ORAL at 20:47

## 2018-05-23 RX ADMIN — MOMETASONE FUROATE AND FORMOTEROL FUMARATE DIHYDRATE 2 PUFF: 200; 5 AEROSOL RESPIRATORY (INHALATION) at 21:32

## 2018-05-23 RX ADMIN — PREDNISONE 40 MG: 20 TABLET ORAL at 17:18

## 2018-05-23 RX ADMIN — FLUTICASONE PROPIONATE 2 SPRAY: 50 SPRAY, METERED NASAL at 20:48

## 2018-05-23 RX ADMIN — SODIUM CHLORIDE: 9 INJECTION, SOLUTION INTRAVENOUS at 04:55

## 2018-05-23 RX ADMIN — VANCOMYCIN HYDROCHLORIDE 1750 MG: 1 INJECTION, POWDER, LYOPHILIZED, FOR SOLUTION INTRAVENOUS at 20:47

## 2018-05-23 RX ADMIN — ACETAMINOPHEN 1000 MG: 500 TABLET ORAL at 13:16

## 2018-05-23 RX ADMIN — ALPRAZOLAM 0.5 MG: 0.5 TABLET ORAL at 22:15

## 2018-05-23 RX ADMIN — OXYCODONE HYDROCHLORIDE 10 MG: 5 TABLET ORAL at 07:31

## 2018-05-23 RX ADMIN — MOMETASONE FUROATE AND FORMOTEROL FUMARATE DIHYDRATE 2 PUFF: 200; 5 AEROSOL RESPIRATORY (INHALATION) at 08:49

## 2018-05-23 RX ADMIN — FLUTICASONE PROPIONATE 2 SPRAY: 50 SPRAY, METERED NASAL at 09:04

## 2018-05-23 RX ADMIN — KETOROLAC TROMETHAMINE 30 MG: 30 INJECTION, SOLUTION INTRAMUSCULAR at 09:02

## 2018-05-23 RX ADMIN — LISINOPRIL 10 MG: 10 TABLET ORAL at 09:03

## 2018-05-23 RX ADMIN — KETOROLAC TROMETHAMINE 30 MG: 30 INJECTION, SOLUTION INTRAMUSCULAR at 20:47

## 2018-05-23 RX ADMIN — MONTELUKAST SODIUM 10 MG: 10 TABLET, FILM COATED ORAL at 20:47

## 2018-05-23 RX ADMIN — AMLODIPINE BESYLATE 10 MG: 10 TABLET ORAL at 09:03

## 2018-05-23 RX ADMIN — OXYCODONE HYDROCHLORIDE 10 MG: 5 TABLET ORAL at 20:48

## 2018-05-23 RX ADMIN — DOCUSATE SODIUM 100 MG: 100 CAPSULE ORAL at 20:47

## 2018-05-23 RX ADMIN — OXYCODONE HYDROCHLORIDE 10 MG: 5 TABLET ORAL at 13:15

## 2018-05-23 RX ADMIN — KETOROLAC TROMETHAMINE 30 MG: 30 INJECTION, SOLUTION INTRAMUSCULAR at 01:40

## 2018-05-23 RX ADMIN — KETOROLAC TROMETHAMINE 30 MG: 30 INJECTION, SOLUTION INTRAMUSCULAR at 13:17

## 2018-05-23 ASSESSMENT — PAIN DESCRIPTION - LOCATION
LOCATION: HIP
LOCATION: HIP

## 2018-05-23 ASSESSMENT — PAIN SCALES - GENERAL
PAINLEVEL_OUTOF10: 5
PAINLEVEL_OUTOF10: 5
PAINLEVEL_OUTOF10: 4
PAINLEVEL_OUTOF10: 6
PAINLEVEL_OUTOF10: 5
PAINLEVEL_OUTOF10: 7
PAINLEVEL_OUTOF10: 5
PAINLEVEL_OUTOF10: 7
PAINLEVEL_OUTOF10: 4
PAINLEVEL_OUTOF10: 5
PAINLEVEL_OUTOF10: 5
PAINLEVEL_OUTOF10: 7

## 2018-05-23 ASSESSMENT — PAIN DESCRIPTION - FREQUENCY
FREQUENCY: CONTINUOUS
FREQUENCY: CONTINUOUS

## 2018-05-23 ASSESSMENT — PAIN DESCRIPTION - PAIN TYPE: TYPE: SURGICAL PAIN

## 2018-05-23 ASSESSMENT — PAIN DESCRIPTION - ORIENTATION
ORIENTATION: LEFT
ORIENTATION: LEFT

## 2018-05-23 ASSESSMENT — PAIN DESCRIPTION - DESCRIPTORS
DESCRIPTORS: DISCOMFORT
DESCRIPTORS: ACHING;DISCOMFORT

## 2018-05-24 VITALS
DIASTOLIC BLOOD PRESSURE: 74 MMHG | SYSTOLIC BLOOD PRESSURE: 111 MMHG | HEART RATE: 89 BPM | TEMPERATURE: 97.6 F | HEIGHT: 68 IN | BODY MASS INDEX: 39.86 KG/M2 | OXYGEN SATURATION: 96 % | WEIGHT: 263 LBS | RESPIRATION RATE: 16 BRPM

## 2018-05-24 LAB
ABSOLUTE EOS #: <0.03 K/UL (ref 0–0.44)
ABSOLUTE IMMATURE GRANULOCYTE: 0.11 K/UL (ref 0–0.3)
ABSOLUTE LYMPH #: 1.31 K/UL (ref 1.1–3.7)
ABSOLUTE MONO #: 0.99 K/UL (ref 0.1–1.2)
ANION GAP SERPL CALCULATED.3IONS-SCNC: 8 MMOL/L (ref 9–17)
BASOPHILS # BLD: 0 % (ref 0–2)
BASOPHILS ABSOLUTE: <0.03 K/UL (ref 0–0.2)
BUN BLDV-MCNC: 16 MG/DL (ref 8–23)
BUN/CREAT BLD: ABNORMAL (ref 9–20)
CALCIUM SERPL-MCNC: 8.4 MG/DL (ref 8.6–10.4)
CHLORIDE BLD-SCNC: 103 MMOL/L (ref 98–107)
CO2: 25 MMOL/L (ref 20–31)
CREAT SERPL-MCNC: 0.77 MG/DL (ref 0.7–1.2)
DIFFERENTIAL TYPE: ABNORMAL
EOSINOPHILS RELATIVE PERCENT: 0 % (ref 1–4)
GFR AFRICAN AMERICAN: >60 ML/MIN
GFR NON-AFRICAN AMERICAN: >60 ML/MIN
GFR SERPL CREATININE-BSD FRML MDRD: ABNORMAL ML/MIN/{1.73_M2}
GFR SERPL CREATININE-BSD FRML MDRD: ABNORMAL ML/MIN/{1.73_M2}
GLUCOSE BLD-MCNC: 111 MG/DL (ref 75–110)
GLUCOSE BLD-MCNC: 146 MG/DL (ref 70–99)
GLUCOSE BLD-MCNC: 150 MG/DL (ref 75–110)
GLUCOSE BLD-MCNC: 175 MG/DL (ref 75–110)
HCT VFR BLD CALC: 34.5 % (ref 40.7–50.3)
HEMOGLOBIN: 11 G/DL (ref 13–17)
IMMATURE GRANULOCYTES: 1 %
LYMPHOCYTES # BLD: 12 % (ref 24–43)
MCH RBC QN AUTO: 28.4 PG (ref 25.2–33.5)
MCHC RBC AUTO-ENTMCNC: 31.9 G/DL (ref 28.4–34.8)
MCV RBC AUTO: 89.1 FL (ref 82.6–102.9)
MONOCYTES # BLD: 9 % (ref 3–12)
NRBC AUTOMATED: 0 PER 100 WBC
PDW BLD-RTO: 13.4 % (ref 11.8–14.4)
PLATELET # BLD: 193 K/UL (ref 138–453)
PLATELET ESTIMATE: ABNORMAL
PMV BLD AUTO: 11.2 FL (ref 8.1–13.5)
POTASSIUM SERPL-SCNC: 4.5 MMOL/L (ref 3.7–5.3)
RBC # BLD: 3.87 M/UL (ref 4.21–5.77)
RBC # BLD: ABNORMAL 10*6/UL
SEG NEUTROPHILS: 78 % (ref 36–65)
SEGMENTED NEUTROPHILS ABSOLUTE COUNT: 8.16 K/UL (ref 1.5–8.1)
SODIUM BLD-SCNC: 136 MMOL/L (ref 135–144)
WBC # BLD: 10.6 K/UL (ref 3.5–11.3)
WBC # BLD: ABNORMAL 10*3/UL

## 2018-05-24 PROCEDURE — 85025 COMPLETE CBC W/AUTO DIFF WBC: CPT

## 2018-05-24 PROCEDURE — 97535 SELF CARE MNGMENT TRAINING: CPT

## 2018-05-24 PROCEDURE — 80048 BASIC METABOLIC PNL TOTAL CA: CPT

## 2018-05-24 PROCEDURE — 6370000000 HC RX 637 (ALT 250 FOR IP): Performed by: INTERNAL MEDICINE

## 2018-05-24 PROCEDURE — 94762 N-INVAS EAR/PLS OXIMTRY CONT: CPT

## 2018-05-24 PROCEDURE — 82947 ASSAY GLUCOSE BLOOD QUANT: CPT

## 2018-05-24 PROCEDURE — 97530 THERAPEUTIC ACTIVITIES: CPT

## 2018-05-24 PROCEDURE — 94640 AIRWAY INHALATION TREATMENT: CPT

## 2018-05-24 PROCEDURE — 2500000003 HC RX 250 WO HCPCS: Performed by: INTERNAL MEDICINE

## 2018-05-24 PROCEDURE — 6370000000 HC RX 637 (ALT 250 FOR IP): Performed by: STUDENT IN AN ORGANIZED HEALTH CARE EDUCATION/TRAINING PROGRAM

## 2018-05-24 PROCEDURE — 6360000002 HC RX W HCPCS: Performed by: INTERNAL MEDICINE

## 2018-05-24 PROCEDURE — 2580000003 HC RX 258: Performed by: STUDENT IN AN ORGANIZED HEALTH CARE EDUCATION/TRAINING PROGRAM

## 2018-05-24 PROCEDURE — 99231 SBSQ HOSP IP/OBS SF/LOW 25: CPT | Performed by: INTERNAL MEDICINE

## 2018-05-24 PROCEDURE — 6360000002 HC RX W HCPCS: Performed by: STUDENT IN AN ORGANIZED HEALTH CARE EDUCATION/TRAINING PROGRAM

## 2018-05-24 PROCEDURE — 99233 SBSQ HOSP IP/OBS HIGH 50: CPT | Performed by: INTERNAL MEDICINE

## 2018-05-24 PROCEDURE — 6360000002 HC RX W HCPCS: Performed by: ORTHOPAEDIC SURGERY

## 2018-05-24 PROCEDURE — 97116 GAIT TRAINING THERAPY: CPT

## 2018-05-24 PROCEDURE — 36415 COLL VENOUS BLD VENIPUNCTURE: CPT

## 2018-05-24 RX ORDER — DOXYCYCLINE HYCLATE 100 MG
100 TABLET ORAL EVERY 12 HOURS SCHEDULED
Qty: 20 TABLET | Refills: 0 | Status: SHIPPED | OUTPATIENT
Start: 2018-05-25 | End: 2018-06-04

## 2018-05-24 RX ORDER — DOXYCYCLINE HYCLATE 100 MG
100 TABLET ORAL EVERY 12 HOURS SCHEDULED
Status: DISCONTINUED | OUTPATIENT
Start: 2018-05-25 | End: 2018-05-24 | Stop reason: HOSPADM

## 2018-05-24 RX ORDER — HEPARIN SODIUM (PORCINE) LOCK FLUSH IV SOLN 100 UNIT/ML 100 UNIT/ML
300 SOLUTION INTRAVENOUS PRN
Status: DISCONTINUED | OUTPATIENT
Start: 2018-05-24 | End: 2018-05-24 | Stop reason: HOSPADM

## 2018-05-24 RX ADMIN — SODIUM CHLORIDE, PRESERVATIVE FREE 300 UNITS: 5 INJECTION INTRAVENOUS at 15:12

## 2018-05-24 RX ADMIN — CEFEPIME HYDROCHLORIDE 2 G: 2 INJECTION, POWDER, FOR SOLUTION INTRAVENOUS at 00:28

## 2018-05-24 RX ADMIN — Medication 10 ML: at 09:23

## 2018-05-24 RX ADMIN — OXYCODONE HYDROCHLORIDE 10 MG: 5 TABLET ORAL at 00:36

## 2018-05-24 RX ADMIN — ALPRAZOLAM 0.5 MG: 0.5 TABLET ORAL at 10:37

## 2018-05-24 RX ADMIN — PREDNISONE 40 MG: 20 TABLET ORAL at 09:21

## 2018-05-24 RX ADMIN — MOMETASONE FUROATE AND FORMOTEROL FUMARATE DIHYDRATE 2 PUFF: 200; 5 AEROSOL RESPIRATORY (INHALATION) at 09:53

## 2018-05-24 RX ADMIN — OXYCODONE HYDROCHLORIDE 10 MG: 5 TABLET ORAL at 04:45

## 2018-05-24 RX ADMIN — TIOTROPIUM BROMIDE 18 MCG: 18 CAPSULE ORAL; RESPIRATORY (INHALATION) at 09:53

## 2018-05-24 RX ADMIN — KETOROLAC TROMETHAMINE 30 MG: 30 INJECTION, SOLUTION INTRAMUSCULAR at 09:22

## 2018-05-24 RX ADMIN — ACETAMINOPHEN 1000 MG: 500 TABLET ORAL at 07:37

## 2018-05-24 RX ADMIN — OXYCODONE HYDROCHLORIDE 10 MG: 5 TABLET ORAL at 09:21

## 2018-05-24 RX ADMIN — FLUTICASONE PROPIONATE 2 SPRAY: 50 SPRAY, METERED NASAL at 09:23

## 2018-05-24 RX ADMIN — APIXABAN 2.5 MG: 2.5 TABLET, FILM COATED ORAL at 13:18

## 2018-05-24 RX ADMIN — DOCUSATE SODIUM 100 MG: 100 CAPSULE ORAL at 09:21

## 2018-05-24 RX ADMIN — ACETAMINOPHEN 1000 MG: 500 TABLET ORAL at 13:22

## 2018-05-24 RX ADMIN — PANTOPRAZOLE SODIUM 40 MG: 40 TABLET, DELAYED RELEASE ORAL at 07:37

## 2018-05-24 RX ADMIN — OXYCODONE HYDROCHLORIDE 10 MG: 5 TABLET ORAL at 13:21

## 2018-05-24 RX ADMIN — LISINOPRIL 10 MG: 10 TABLET ORAL at 09:22

## 2018-05-24 RX ADMIN — AMLODIPINE BESYLATE 10 MG: 10 TABLET ORAL at 09:22

## 2018-05-24 ASSESSMENT — PAIN SCALES - GENERAL
PAINLEVEL_OUTOF10: 4
PAINLEVEL_OUTOF10: 4
PAINLEVEL_OUTOF10: 5
PAINLEVEL_OUTOF10: 5
PAINLEVEL_OUTOF10: 7
PAINLEVEL_OUTOF10: 5
PAINLEVEL_OUTOF10: 5
PAINLEVEL_OUTOF10: 7
PAINLEVEL_OUTOF10: 8
PAINLEVEL_OUTOF10: 8
PAINLEVEL_OUTOF10: 7
PAINLEVEL_OUTOF10: 7
PAINLEVEL_OUTOF10: 4
PAINLEVEL_OUTOF10: 7
PAINLEVEL_OUTOF10: 5

## 2018-05-24 ASSESSMENT — PAIN DESCRIPTION - DESCRIPTORS: DESCRIPTORS: DISCOMFORT

## 2018-05-24 ASSESSMENT — PAIN DESCRIPTION - PAIN TYPE
TYPE: SURGICAL PAIN

## 2018-05-24 ASSESSMENT — PAIN DESCRIPTION - ORIENTATION
ORIENTATION: RIGHT
ORIENTATION: LEFT

## 2018-05-24 ASSESSMENT — PAIN DESCRIPTION - LOCATION
LOCATION: HIP
LOCATION: HIP

## 2018-05-24 ASSESSMENT — PAIN DESCRIPTION - FREQUENCY: FREQUENCY: CONTINUOUS

## 2018-05-25 ENCOUNTER — TELEPHONE (OUTPATIENT)
Dept: CASE MANAGEMENT | Age: 63
End: 2018-05-25

## 2018-05-25 ENCOUNTER — CARE COORDINATION (OUTPATIENT)
Dept: CASE MANAGEMENT | Age: 63
End: 2018-05-25

## 2018-05-25 DIAGNOSIS — Z96.642 STATUS POST TOTAL REPLACEMENT OF LEFT HIP: Primary | ICD-10-CM

## 2018-05-25 PROCEDURE — 1111F DSCHRG MED/CURRENT MED MERGE: CPT | Performed by: PHYSICIAN ASSISTANT

## 2018-05-27 ENCOUNTER — HOSPITAL ENCOUNTER (EMERGENCY)
Age: 63
Discharge: HOME | End: 2018-05-27
Attending: EMERGENCY MEDICINE
Payer: MEDICARE

## 2018-05-27 ENCOUNTER — APPOINTMENT (OUTPATIENT)
Dept: GENERAL RADIOLOGY | Age: 63
End: 2018-05-27
Payer: MEDICARE

## 2018-05-27 VITALS
SYSTOLIC BLOOD PRESSURE: 149 MMHG | RESPIRATION RATE: 19 BRPM | HEART RATE: 98 BPM | DIASTOLIC BLOOD PRESSURE: 83 MMHG | TEMPERATURE: 98.5 F | BODY MASS INDEX: 39.99 KG/M2 | WEIGHT: 263 LBS | OXYGEN SATURATION: 97 %

## 2018-05-27 DIAGNOSIS — M25.552 PAIN OF LEFT HIP JOINT: ICD-10-CM

## 2018-05-27 DIAGNOSIS — G89.18 POST-OP PAIN: Primary | ICD-10-CM

## 2018-05-27 LAB
ABSOLUTE EOS #: 0.13 K/UL (ref 0–0.44)
ABSOLUTE IMMATURE GRANULOCYTE: 0.12 K/UL (ref 0–0.3)
ABSOLUTE LYMPH #: 1.72 K/UL (ref 1.1–3.7)
ABSOLUTE MONO #: 0.8 K/UL (ref 0.1–1.2)
ANION GAP SERPL CALCULATED.3IONS-SCNC: 10 MMOL/L (ref 9–17)
BASOPHILS # BLD: 1 % (ref 0–2)
BASOPHILS ABSOLUTE: 0.04 K/UL (ref 0–0.2)
BUN BLDV-MCNC: 15 MG/DL (ref 8–23)
BUN/CREAT BLD: ABNORMAL (ref 9–20)
C-REACTIVE PROTEIN: 108.8 MG/L (ref 0–5)
CALCIUM SERPL-MCNC: 8.3 MG/DL (ref 8.6–10.4)
CHLORIDE BLD-SCNC: 98 MMOL/L (ref 98–107)
CO2: 25 MMOL/L (ref 20–31)
CREAT SERPL-MCNC: 0.72 MG/DL (ref 0.7–1.2)
DIFFERENTIAL TYPE: ABNORMAL
EOSINOPHILS RELATIVE PERCENT: 2 % (ref 1–4)
GFR AFRICAN AMERICAN: >60 ML/MIN
GFR NON-AFRICAN AMERICAN: >60 ML/MIN
GFR SERPL CREATININE-BSD FRML MDRD: ABNORMAL ML/MIN/{1.73_M2}
GFR SERPL CREATININE-BSD FRML MDRD: ABNORMAL ML/MIN/{1.73_M2}
GLUCOSE BLD-MCNC: 81 MG/DL (ref 70–99)
HCT VFR BLD CALC: 35.9 % (ref 40.7–50.3)
HEMOGLOBIN: 11.7 G/DL (ref 13–17)
IMMATURE GRANULOCYTES: 2 %
LYMPHOCYTES # BLD: 22 % (ref 24–43)
MCH RBC QN AUTO: 28.9 PG (ref 25.2–33.5)
MCHC RBC AUTO-ENTMCNC: 32.6 G/DL (ref 28.4–34.8)
MCV RBC AUTO: 88.6 FL (ref 82.6–102.9)
MONOCYTES # BLD: 10 % (ref 3–12)
NRBC AUTOMATED: 0 PER 100 WBC
PDW BLD-RTO: 13.2 % (ref 11.8–14.4)
PLATELET # BLD: 253 K/UL (ref 138–453)
PLATELET ESTIMATE: ABNORMAL
PMV BLD AUTO: 10.2 FL (ref 8.1–13.5)
POTASSIUM SERPL-SCNC: 4.2 MMOL/L (ref 3.7–5.3)
RBC # BLD: 4.05 M/UL (ref 4.21–5.77)
RBC # BLD: ABNORMAL 10*6/UL
SEDIMENTATION RATE, ERYTHROCYTE: 55 MM (ref 0–10)
SEG NEUTROPHILS: 63 % (ref 36–65)
SEGMENTED NEUTROPHILS ABSOLUTE COUNT: 5.01 K/UL (ref 1.5–8.1)
SODIUM BLD-SCNC: 133 MMOL/L (ref 135–144)
WBC # BLD: 7.8 K/UL (ref 3.5–11.3)
WBC # BLD: ABNORMAL 10*3/UL

## 2018-05-27 PROCEDURE — 99283 EMERGENCY DEPT VISIT LOW MDM: CPT

## 2018-05-27 PROCEDURE — 85651 RBC SED RATE NONAUTOMATED: CPT

## 2018-05-27 PROCEDURE — 85025 COMPLETE CBC W/AUTO DIFF WBC: CPT

## 2018-05-27 PROCEDURE — 73502 X-RAY EXAM HIP UNI 2-3 VIEWS: CPT

## 2018-05-27 PROCEDURE — 96374 THER/PROPH/DIAG INJ IV PUSH: CPT

## 2018-05-27 PROCEDURE — 6360000002 HC RX W HCPCS: Performed by: EMERGENCY MEDICINE

## 2018-05-27 PROCEDURE — 86140 C-REACTIVE PROTEIN: CPT

## 2018-05-27 PROCEDURE — 80048 BASIC METABOLIC PNL TOTAL CA: CPT

## 2018-05-27 RX ORDER — HEPARIN SODIUM (PORCINE) LOCK FLUSH IV SOLN 100 UNIT/ML 100 UNIT/ML
300 SOLUTION INTRAVENOUS ONCE
Status: COMPLETED | OUTPATIENT
Start: 2018-05-27 | End: 2018-05-27

## 2018-05-27 RX ORDER — MORPHINE SULFATE 4 MG/ML
4 INJECTION, SOLUTION INTRAMUSCULAR; INTRAVENOUS ONCE
Status: COMPLETED | OUTPATIENT
Start: 2018-05-27 | End: 2018-05-27

## 2018-05-27 RX ORDER — MORPHINE SULFATE 30 MG/1
30 TABLET, FILM COATED, EXTENDED RELEASE ORAL 2 TIMES DAILY
Qty: 10 TABLET | Refills: 0 | Status: SHIPPED | OUTPATIENT
Start: 2018-05-27 | End: 2018-06-01

## 2018-05-27 RX ADMIN — SODIUM CHLORIDE, PRESERVATIVE FREE 300 UNITS: 5 INJECTION INTRAVENOUS at 16:10

## 2018-05-27 RX ADMIN — MORPHINE SULFATE 4 MG: 4 INJECTION INTRAVENOUS at 14:33

## 2018-05-27 ASSESSMENT — ENCOUNTER SYMPTOMS
SHORTNESS OF BREATH: 0
ABDOMINAL PAIN: 0
NAUSEA: 0
COLOR CHANGE: 1
EYE DISCHARGE: 0
DIARRHEA: 0
COUGH: 0
SORE THROAT: 0
VOMITING: 0

## 2018-05-27 ASSESSMENT — PAIN SCALES - GENERAL
PAINLEVEL_OUTOF10: 6
PAINLEVEL_OUTOF10: 6

## 2018-05-27 ASSESSMENT — PAIN DESCRIPTION - ORIENTATION: ORIENTATION: LEFT

## 2018-05-27 ASSESSMENT — PAIN DESCRIPTION - DESCRIPTORS: DESCRIPTORS: BURNING

## 2018-05-27 ASSESSMENT — PAIN DESCRIPTION - LOCATION: LOCATION: HIP

## 2018-05-29 ENCOUNTER — TELEPHONE (OUTPATIENT)
Dept: ORTHOPEDIC SURGERY | Age: 63
End: 2018-05-29

## 2018-05-29 DIAGNOSIS — M87.052 AVASCULAR NECROSIS OF BONE OF LEFT HIP (HCC): Primary | ICD-10-CM

## 2018-05-29 DIAGNOSIS — Z96.642 STATUS POST TOTAL HIP REPLACEMENT, LEFT: ICD-10-CM

## 2018-05-29 RX ORDER — OXYCODONE HYDROCHLORIDE AND ACETAMINOPHEN 5; 325 MG/1; MG/1
1 TABLET ORAL EVERY 4 HOURS PRN
Qty: 42 TABLET | Refills: 0 | Status: SHIPPED | OUTPATIENT
Start: 2018-05-29 | End: 2018-06-05

## 2018-06-01 DIAGNOSIS — M87.052 AVASCULAR NECROSIS OF BONE OF LEFT HIP (HCC): Primary | ICD-10-CM

## 2018-06-04 ENCOUNTER — OFFICE VISIT (OUTPATIENT)
Dept: ORTHOPEDIC SURGERY | Age: 63
End: 2018-06-04

## 2018-06-04 VITALS
HEIGHT: 68 IN | WEIGHT: 263.01 LBS | SYSTOLIC BLOOD PRESSURE: 112 MMHG | DIASTOLIC BLOOD PRESSURE: 63 MMHG | HEART RATE: 89 BPM | BODY MASS INDEX: 39.86 KG/M2

## 2018-06-04 DIAGNOSIS — M87.052 AVASCULAR NECROSIS OF BONE OF LEFT HIP (HCC): Primary | ICD-10-CM

## 2018-06-04 DIAGNOSIS — Z96.642 STATUS POST TOTAL HIP REPLACEMENT, LEFT: ICD-10-CM

## 2018-06-04 PROCEDURE — 99024 POSTOP FOLLOW-UP VISIT: CPT | Performed by: ORTHOPAEDIC SURGERY

## 2018-06-04 RX ORDER — TRAMADOL HYDROCHLORIDE 50 MG/1
50 TABLET ORAL EVERY 6 HOURS PRN
Qty: 28 TABLET | Refills: 0 | Status: SHIPPED | OUTPATIENT
Start: 2018-06-04 | End: 2018-06-28 | Stop reason: SDUPTHER

## 2018-06-04 ASSESSMENT — ENCOUNTER SYMPTOMS
APNEA: 0
VOMITING: 0
COUGH: 0
ABDOMINAL PAIN: 0
CHEST TIGHTNESS: 0

## 2018-06-07 ENCOUNTER — CARE COORDINATION (OUTPATIENT)
Dept: CASE MANAGEMENT | Age: 63
End: 2018-06-07

## 2018-06-11 ENCOUNTER — HOSPITAL ENCOUNTER (OUTPATIENT)
Facility: MEDICAL CENTER | Age: 63
End: 2018-06-11
Payer: MEDICARE

## 2018-06-12 ENCOUNTER — HOSPITAL ENCOUNTER (OUTPATIENT)
Dept: PHYSICAL THERAPY | Facility: CLINIC | Age: 63
Setting detail: THERAPIES SERIES
Discharge: HOME | End: 2018-06-12
Payer: MEDICARE

## 2018-06-12 DIAGNOSIS — F41.9 ANXIETY: Chronic | ICD-10-CM

## 2018-06-12 PROCEDURE — 97161 PT EVAL LOW COMPLEX 20 MIN: CPT

## 2018-06-12 PROCEDURE — G8978 MOBILITY CURRENT STATUS: HCPCS

## 2018-06-12 PROCEDURE — 97110 THERAPEUTIC EXERCISES: CPT

## 2018-06-12 PROCEDURE — G8979 MOBILITY GOAL STATUS: HCPCS

## 2018-06-13 ENCOUNTER — HOSPITAL ENCOUNTER (OUTPATIENT)
Dept: INFUSION THERAPY | Facility: MEDICAL CENTER | Age: 63
Discharge: HOME | End: 2018-06-13
Payer: MEDICARE

## 2018-06-13 VITALS
RESPIRATION RATE: 20 BRPM | TEMPERATURE: 98.3 F | HEART RATE: 90 BPM | DIASTOLIC BLOOD PRESSURE: 84 MMHG | SYSTOLIC BLOOD PRESSURE: 146 MMHG

## 2018-06-13 DIAGNOSIS — D83.9 COMMON VARIABLE IMMUNODEFICIENCY (HCC): Chronic | ICD-10-CM

## 2018-06-13 DIAGNOSIS — D80.1 COMMON VARIABLE HYPOGAMMAGLOBULINEMIA (HCC): ICD-10-CM

## 2018-06-13 DIAGNOSIS — F41.9 ANXIETY: Chronic | ICD-10-CM

## 2018-06-13 DIAGNOSIS — D83.9 COMMON VARIABLE IMMUNODEFICIENCY (HCC): Primary | ICD-10-CM

## 2018-06-13 LAB
ABSOLUTE EOS #: 0.1 K/UL (ref 0–0.4)
ABSOLUTE IMMATURE GRANULOCYTE: ABNORMAL K/UL (ref 0–0.3)
ABSOLUTE LYMPH #: 1.3 K/UL (ref 1–4.8)
ABSOLUTE MONO #: 0.3 K/UL (ref 0.2–0.8)
ALT SERPL-CCNC: 17 U/L (ref 5–41)
BASOPHILS # BLD: 0 % (ref 0–2)
BASOPHILS ABSOLUTE: 0 K/UL (ref 0–0.2)
CREAT SERPL-MCNC: 0.83 MG/DL (ref 0.7–1.2)
DIFFERENTIAL TYPE: ABNORMAL
EOSINOPHILS RELATIVE PERCENT: 1 % (ref 1–4)
GFR AFRICAN AMERICAN: >60 ML/MIN
GFR NON-AFRICAN AMERICAN: >60 ML/MIN
GFR SERPL CREATININE-BSD FRML MDRD: NORMAL ML/MIN/{1.73_M2}
GFR SERPL CREATININE-BSD FRML MDRD: NORMAL ML/MIN/{1.73_M2}
HCT VFR BLD CALC: 39 % (ref 41–53)
HEMOGLOBIN: 12.8 G/DL (ref 13.5–17.5)
IGA: 117 MG/DL (ref 70–400)
IGG: 737 MG/DL (ref 700–1600)
IGM: 95 MG/DL (ref 40–230)
IMMATURE GRANULOCYTES: ABNORMAL %
LYMPHOCYTES # BLD: 15 % (ref 24–44)
MCH RBC QN AUTO: 28.2 PG (ref 26–34)
MCHC RBC AUTO-ENTMCNC: 32.8 G/DL (ref 31–37)
MCV RBC AUTO: 86 FL (ref 80–100)
MONOCYTES # BLD: 4 % (ref 1–7)
NRBC AUTOMATED: ABNORMAL PER 100 WBC
PDW BLD-RTO: 13.2 % (ref 11.5–14.5)
PLATELET # BLD: 482 K/UL (ref 130–400)
PLATELET ESTIMATE: ABNORMAL
PMV BLD AUTO: 7.9 FL (ref 6–12)
RBC # BLD: 4.53 M/UL (ref 4.5–5.9)
RBC # BLD: ABNORMAL 10*6/UL
SEG NEUTROPHILS: 80 % (ref 36–66)
SEGMENTED NEUTROPHILS ABSOLUTE COUNT: 7.4 K/UL (ref 1.8–7.7)
WBC # BLD: 9.1 K/UL (ref 3.5–11)
WBC # BLD: ABNORMAL 10*3/UL

## 2018-06-13 PROCEDURE — 96366 THER/PROPH/DIAG IV INF ADDON: CPT

## 2018-06-13 PROCEDURE — 82565 ASSAY OF CREATININE: CPT

## 2018-06-13 PROCEDURE — 96365 THER/PROPH/DIAG IV INF INIT: CPT

## 2018-06-13 PROCEDURE — 82784 ASSAY IGA/IGD/IGG/IGM EACH: CPT

## 2018-06-13 PROCEDURE — 2580000003 HC RX 258: Performed by: ALLERGY & IMMUNOLOGY

## 2018-06-13 PROCEDURE — 96361 HYDRATE IV INFUSION ADD-ON: CPT

## 2018-06-13 PROCEDURE — 6360000002 HC RX W HCPCS: Performed by: ALLERGY & IMMUNOLOGY

## 2018-06-13 PROCEDURE — 85025 COMPLETE CBC W/AUTO DIFF WBC: CPT

## 2018-06-13 PROCEDURE — 36591 DRAW BLOOD OFF VENOUS DEVICE: CPT

## 2018-06-13 PROCEDURE — 6370000000 HC RX 637 (ALT 250 FOR IP): Performed by: ALLERGY & IMMUNOLOGY

## 2018-06-13 PROCEDURE — 84460 ALANINE AMINO (ALT) (SGPT): CPT

## 2018-06-13 RX ORDER — DEXTROSE AND SODIUM CHLORIDE 5; .33 G/100ML; G/100ML
INJECTION, SOLUTION INTRAVENOUS CONTINUOUS
Status: CANCELLED
Start: 2018-06-13

## 2018-06-13 RX ORDER — HEPARIN SODIUM (PORCINE) LOCK FLUSH IV SOLN 100 UNIT/ML 100 UNIT/ML
500 SOLUTION INTRAVENOUS PRN
Status: CANCELLED
Start: 2018-06-13

## 2018-06-13 RX ORDER — DEXTROSE AND SODIUM CHLORIDE 5; .33 G/100ML; G/100ML
INJECTION, SOLUTION INTRAVENOUS CONTINUOUS
Status: DISCONTINUED | OUTPATIENT
Start: 2018-06-13 | End: 2018-06-14 | Stop reason: HOSPADM

## 2018-06-13 RX ORDER — DIPHENHYDRAMINE HCL 25 MG
25 TABLET ORAL ONCE
Status: COMPLETED | OUTPATIENT
Start: 2018-06-13 | End: 2018-06-13

## 2018-06-13 RX ORDER — ALPRAZOLAM 0.5 MG/1
TABLET ORAL
Qty: 90 TABLET | Refills: 0 | OUTPATIENT
Start: 2018-06-13 | End: 2018-07-13

## 2018-06-13 RX ORDER — DIPHENHYDRAMINE HCL 25 MG
25 TABLET ORAL EVERY 4 HOURS PRN
Status: DISCONTINUED | OUTPATIENT
Start: 2018-06-13 | End: 2018-06-14 | Stop reason: HOSPADM

## 2018-06-13 RX ORDER — DEXTROSE MONOHYDRATE 50 MG/ML
INJECTION, SOLUTION INTRAVENOUS CONTINUOUS
Status: DISCONTINUED | OUTPATIENT
Start: 2018-06-13 | End: 2018-06-14 | Stop reason: HOSPADM

## 2018-06-13 RX ORDER — ALPRAZOLAM 0.5 MG/1
TABLET ORAL
Qty: 90 TABLET | Refills: 0 | Status: SHIPPED | OUTPATIENT
Start: 2018-06-13 | End: 2018-07-11 | Stop reason: SDUPTHER

## 2018-06-13 RX ORDER — 0.9 % SODIUM CHLORIDE 0.9 %
10 VIAL (ML) INJECTION PRN
Status: CANCELLED | OUTPATIENT
Start: 2018-06-13

## 2018-06-13 RX ORDER — HEPARIN SODIUM (PORCINE) LOCK FLUSH IV SOLN 100 UNIT/ML 100 UNIT/ML
500 SOLUTION INTRAVENOUS PRN
Status: DISCONTINUED | OUTPATIENT
Start: 2018-06-13 | End: 2018-06-14 | Stop reason: HOSPADM

## 2018-06-13 RX ORDER — 0.9 % SODIUM CHLORIDE 0.9 %
10 VIAL (ML) INJECTION PRN
Status: DISCONTINUED | OUTPATIENT
Start: 2018-06-13 | End: 2018-06-14 | Stop reason: HOSPADM

## 2018-06-13 RX ORDER — DEXTROSE MONOHYDRATE 50 MG/ML
INJECTION, SOLUTION INTRAVENOUS CONTINUOUS
Status: CANCELLED
Start: 2018-06-13

## 2018-06-13 RX ORDER — DIPHENHYDRAMINE HCL 25 MG
25 TABLET ORAL ONCE
Status: CANCELLED | OUTPATIENT
Start: 2018-06-13 | End: 2018-06-13

## 2018-06-13 RX ORDER — DIPHENHYDRAMINE HCL 25 MG
25 TABLET ORAL EVERY 4 HOURS PRN
Status: CANCELLED
Start: 2018-06-13

## 2018-06-13 RX ADMIN — DEXTROSE MONOHYDRATE: 50 INJECTION, SOLUTION INTRAVENOUS at 08:55

## 2018-06-13 RX ADMIN — DIPHENHYDRAMINE HCL 25 MG: 25 TABLET ORAL at 09:10

## 2018-06-13 RX ADMIN — Medication 10 ML: at 16:20

## 2018-06-13 RX ADMIN — DEXTROSE AND SODIUM CHLORIDE: 5; 330 INJECTION, SOLUTION INTRAVENOUS at 08:55

## 2018-06-13 RX ADMIN — IMMUNE GLOBULIN INTRAVENOUS (HUMAN): 5 INJECTION, SOLUTION INTRAVENOUS at 09:30

## 2018-06-13 RX ADMIN — DIPHENHYDRAMINE HCL 25 MG: 25 TABLET ORAL at 13:15

## 2018-06-13 RX ADMIN — HEPARIN SODIUM (PORCINE) LOCK FLUSH IV SOLN 100 UNIT/ML 500 UNITS: 100 SOLUTION at 16:20

## 2018-06-13 ASSESSMENT — PAIN DESCRIPTION - PROGRESSION: CLINICAL_PROGRESSION: GRADUALLY IMPROVING

## 2018-06-13 ASSESSMENT — PAIN DESCRIPTION - ONSET: ONSET: ON-GOING

## 2018-06-13 ASSESSMENT — PAIN SCALES - GENERAL: PAINLEVEL_OUTOF10: 3

## 2018-06-13 ASSESSMENT — PAIN DESCRIPTION - ORIENTATION: ORIENTATION: LEFT

## 2018-06-13 ASSESSMENT — PAIN DESCRIPTION - PAIN TYPE: TYPE: SURGICAL PAIN

## 2018-06-13 ASSESSMENT — PAIN DESCRIPTION - FREQUENCY: FREQUENCY: CONTINUOUS

## 2018-06-13 ASSESSMENT — PAIN DESCRIPTION - LOCATION: LOCATION: HIP

## 2018-06-13 ASSESSMENT — PAIN DESCRIPTION - DESCRIPTORS: DESCRIPTORS: ACHING;DISCOMFORT;CONSTANT

## 2018-06-13 NOTE — FLOWSHEET NOTE
06/13/18 1212   Encounter Summary   Services provided to: Patient   Referral/Consult From: Rounding   Continue Visiting (06/13/18)   Complexity of Encounter Low   Length of Encounter 15 minutes   Routine   Type Follow up   Assessment Approachable   Intervention Active listening;Explored feelings, thoughts, concerns;Explored coping resources;Nurtured hope;Sustaining presence/ Ministry of presence   Spiritual/Uatsdin   Type Spiritual support

## 2018-06-13 NOTE — PROGRESS NOTES
2794:   Pt arrives ambulatory with walker on wheels and portable [email or web address] via nasal cannula for RN draw and IVIG infusion. Pt connected to wall O2 [email or web address] via nasal cannula and pt's portable tank turned off. Orders reviewed. 6684:  CDP, ALT, serum Cr, and Immunoglobulin panel levels drawn and sent to lab pending as ordered with every 4th infusion due today 6/13/18. D5W 0.33%NaCl IVF's 250ml started as ordered for pre-hydration and post-hydration with IVIG infusion. Lab results reviewed and copy of labs given to patient as requested. See MAR for pre-medications given to patient. 0930:  IVIG 60g IV hung. Rate initiated at 20ml/hr for x20min, then rate increased to 40ml/hr for x20min, then rate increased to 80ml/hr for x20min, then rate increased to 160ml/hr for x30min, then rate increased to and maintained at 250ml/hr until completion time of 1550. VS taken baseline, before each infusion rate increase, and then every 30 minutes during infusion, and 30 min after infusion completed as follows:   146/84   90   128/69   77   122/73   77   116/70   79   118/72   79   115/79   83   122/65   78   143/86   77   130/71   80   130/69   75   142/77   77   128/75   78   123/73   81   128/75   85   150/76   80   128/76   77   121/77   83   137/72   75  Pt tolerated IVIG infusion well without complaints. Denies headaches throughout. Pt observed for x30 minutes after IVIG infusion completed. No s/s adverse reactions noted. Pt tolerated IV Hydration well pre and post IVIG infusion without complaints. Appointment calendar given to patient. RTC on 7/2/18. Pt discharged home ambulatory using walker assist with own portable [email or web address] via nasal cannula without complaints and/or no new concerns voiced.

## 2018-06-14 ENCOUNTER — HOSPITAL ENCOUNTER (OUTPATIENT)
Dept: PHYSICAL THERAPY | Facility: CLINIC | Age: 63
Setting detail: THERAPIES SERIES
End: 2018-06-14
Payer: MEDICARE

## 2018-06-15 ENCOUNTER — HOSPITAL ENCOUNTER (OUTPATIENT)
Dept: PHYSICAL THERAPY | Facility: CLINIC | Age: 63
Setting detail: THERAPIES SERIES
Discharge: HOME | End: 2018-06-15
Payer: MEDICARE

## 2018-06-15 ENCOUNTER — OFFICE VISIT (OUTPATIENT)
Dept: ORTHOPEDIC SURGERY | Age: 63
End: 2018-06-15

## 2018-06-15 VITALS — WEIGHT: 263 LBS | BODY MASS INDEX: 39.86 KG/M2 | HEIGHT: 68 IN

## 2018-06-15 DIAGNOSIS — M87.052 AVASCULAR NECROSIS OF BONE OF LEFT HIP (HCC): Primary | ICD-10-CM

## 2018-06-15 DIAGNOSIS — Z96.642 STATUS POST TOTAL HIP REPLACEMENT, LEFT: ICD-10-CM

## 2018-06-15 PROCEDURE — 99024 POSTOP FOLLOW-UP VISIT: CPT | Performed by: ORTHOPAEDIC SURGERY

## 2018-06-15 ASSESSMENT — ENCOUNTER SYMPTOMS
COUGH: 0
NAUSEA: 0
DIARRHEA: 0
CONSTIPATION: 0

## 2018-06-21 ENCOUNTER — HOSPITAL ENCOUNTER (OUTPATIENT)
Dept: PHYSICAL THERAPY | Facility: CLINIC | Age: 63
Setting detail: THERAPIES SERIES
Discharge: HOME | End: 2018-06-21
Payer: MEDICARE

## 2018-06-21 PROCEDURE — 97110 THERAPEUTIC EXERCISES: CPT

## 2018-06-21 PROCEDURE — 97016 VASOPNEUMATIC DEVICE THERAPY: CPT

## 2018-06-22 ENCOUNTER — APPOINTMENT (OUTPATIENT)
Dept: PHYSICAL THERAPY | Facility: CLINIC | Age: 63
End: 2018-06-22
Payer: MEDICARE

## 2018-06-25 ENCOUNTER — CARE COORDINATION (OUTPATIENT)
Dept: CASE MANAGEMENT | Age: 63
End: 2018-06-25

## 2018-06-26 ENCOUNTER — HOSPITAL ENCOUNTER (OUTPATIENT)
Dept: PHYSICAL THERAPY | Facility: CLINIC | Age: 63
Setting detail: THERAPIES SERIES
Discharge: HOME | End: 2018-06-26
Payer: MEDICARE

## 2018-06-26 PROCEDURE — 97110 THERAPEUTIC EXERCISES: CPT

## 2018-06-26 PROCEDURE — 97016 VASOPNEUMATIC DEVICE THERAPY: CPT

## 2018-06-28 ENCOUNTER — TELEPHONE (OUTPATIENT)
Dept: ORTHOPEDIC SURGERY | Age: 63
End: 2018-06-28

## 2018-06-28 ENCOUNTER — HOSPITAL ENCOUNTER (OUTPATIENT)
Dept: PHYSICAL THERAPY | Facility: CLINIC | Age: 63
Setting detail: THERAPIES SERIES
Discharge: HOME | End: 2018-06-28
Payer: MEDICARE

## 2018-06-28 ENCOUNTER — HOSPITAL ENCOUNTER (OUTPATIENT)
Facility: MEDICAL CENTER | Age: 63
End: 2018-06-28
Payer: MEDICARE

## 2018-06-28 DIAGNOSIS — M87.052 AVASCULAR NECROSIS OF BONE OF LEFT HIP (HCC): ICD-10-CM

## 2018-06-28 DIAGNOSIS — Z96.642 STATUS POST TOTAL HIP REPLACEMENT, LEFT: ICD-10-CM

## 2018-06-28 PROCEDURE — 97016 VASOPNEUMATIC DEVICE THERAPY: CPT

## 2018-06-28 PROCEDURE — 97110 THERAPEUTIC EXERCISES: CPT

## 2018-06-28 RX ORDER — TRAMADOL HYDROCHLORIDE 50 MG/1
50 TABLET ORAL EVERY 6 HOURS PRN
Qty: 28 TABLET | Refills: 0 | Status: SHIPPED | OUTPATIENT
Start: 2018-06-28 | End: 2018-07-05

## 2018-06-29 ENCOUNTER — HOSPITAL ENCOUNTER (OUTPATIENT)
Dept: PHYSICAL THERAPY | Facility: CLINIC | Age: 63
Setting detail: THERAPIES SERIES
Discharge: HOME | End: 2018-06-29
Payer: MEDICARE

## 2018-06-29 PROCEDURE — 97016 VASOPNEUMATIC DEVICE THERAPY: CPT

## 2018-06-29 PROCEDURE — 97110 THERAPEUTIC EXERCISES: CPT

## 2018-07-02 ENCOUNTER — HOSPITAL ENCOUNTER (OUTPATIENT)
Dept: INFUSION THERAPY | Facility: MEDICAL CENTER | Age: 63
Discharge: HOME | End: 2018-07-02
Payer: MEDICARE

## 2018-07-02 VITALS
HEART RATE: 80 BPM | SYSTOLIC BLOOD PRESSURE: 140 MMHG | RESPIRATION RATE: 20 BRPM | TEMPERATURE: 98.7 F | DIASTOLIC BLOOD PRESSURE: 79 MMHG

## 2018-07-02 DIAGNOSIS — D83.9 COMMON VARIABLE IMMUNODEFICIENCY (HCC): ICD-10-CM

## 2018-07-02 DIAGNOSIS — D80.1 COMMON VARIABLE HYPOGAMMAGLOBULINEMIA (HCC): ICD-10-CM

## 2018-07-02 PROCEDURE — 6370000000 HC RX 637 (ALT 250 FOR IP): Performed by: ALLERGY & IMMUNOLOGY

## 2018-07-02 PROCEDURE — 96366 THER/PROPH/DIAG IV INF ADDON: CPT

## 2018-07-02 PROCEDURE — 2580000003 HC RX 258: Performed by: ALLERGY & IMMUNOLOGY

## 2018-07-02 PROCEDURE — 96365 THER/PROPH/DIAG IV INF INIT: CPT

## 2018-07-02 PROCEDURE — 96361 HYDRATE IV INFUSION ADD-ON: CPT

## 2018-07-02 PROCEDURE — 6360000002 HC RX W HCPCS: Performed by: ALLERGY & IMMUNOLOGY

## 2018-07-02 RX ORDER — 0.9 % SODIUM CHLORIDE 0.9 %
10 VIAL (ML) INJECTION PRN
Status: DISCONTINUED | OUTPATIENT
Start: 2018-07-02 | End: 2018-07-03 | Stop reason: HOSPADM

## 2018-07-02 RX ORDER — DIPHENHYDRAMINE HCL 25 MG
25 TABLET ORAL ONCE
Status: CANCELLED | OUTPATIENT
Start: 2018-07-02 | End: 2018-07-02

## 2018-07-02 RX ORDER — DIPHENHYDRAMINE HCL 25 MG
25 TABLET ORAL EVERY 4 HOURS PRN
Status: DISCONTINUED | OUTPATIENT
Start: 2018-07-02 | End: 2018-07-03 | Stop reason: HOSPADM

## 2018-07-02 RX ORDER — HEPARIN SODIUM (PORCINE) LOCK FLUSH IV SOLN 100 UNIT/ML 100 UNIT/ML
500 SOLUTION INTRAVENOUS PRN
Status: DISCONTINUED | OUTPATIENT
Start: 2018-07-02 | End: 2018-07-03 | Stop reason: HOSPADM

## 2018-07-02 RX ORDER — DIPHENHYDRAMINE HCL 25 MG
25 TABLET ORAL EVERY 4 HOURS PRN
Status: CANCELLED
Start: 2018-07-02

## 2018-07-02 RX ORDER — DEXTROSE AND SODIUM CHLORIDE 5; .33 G/100ML; G/100ML
INJECTION, SOLUTION INTRAVENOUS CONTINUOUS
Status: CANCELLED
Start: 2018-07-02

## 2018-07-02 RX ORDER — DIPHENHYDRAMINE HCL 25 MG
25 TABLET ORAL ONCE
Status: COMPLETED | OUTPATIENT
Start: 2018-07-02 | End: 2018-07-02

## 2018-07-02 RX ORDER — DEXTROSE AND SODIUM CHLORIDE 5; .33 G/100ML; G/100ML
INJECTION, SOLUTION INTRAVENOUS CONTINUOUS
Status: DISCONTINUED | OUTPATIENT
Start: 2018-07-02 | End: 2018-07-03 | Stop reason: HOSPADM

## 2018-07-02 RX ORDER — HEPARIN SODIUM (PORCINE) LOCK FLUSH IV SOLN 100 UNIT/ML 100 UNIT/ML
500 SOLUTION INTRAVENOUS PRN
Status: CANCELLED
Start: 2018-07-02

## 2018-07-02 RX ORDER — DEXTROSE MONOHYDRATE 50 MG/ML
INJECTION, SOLUTION INTRAVENOUS CONTINUOUS
Status: CANCELLED
Start: 2018-07-02

## 2018-07-02 RX ORDER — 0.9 % SODIUM CHLORIDE 0.9 %
10 VIAL (ML) INJECTION PRN
Status: CANCELLED | OUTPATIENT
Start: 2018-07-02

## 2018-07-02 RX ADMIN — DEXTROSE AND SODIUM CHLORIDE: 5; 330 INJECTION, SOLUTION INTRAVENOUS at 09:35

## 2018-07-02 RX ADMIN — Medication 10 ML: at 16:45

## 2018-07-02 RX ADMIN — HEPARIN SODIUM (PORCINE) LOCK FLUSH IV SOLN 100 UNIT/ML 500 UNITS: 100 SOLUTION at 16:45

## 2018-07-02 RX ADMIN — IMMUNE GLOBULIN INTRAVENOUS (HUMAN): 5 INJECTION, SOLUTION INTRAVENOUS at 09:59

## 2018-07-02 RX ADMIN — Medication 10 ML: at 09:12

## 2018-07-02 RX ADMIN — DIPHENHYDRAMINE HCL 25 MG: 25 TABLET ORAL at 09:35

## 2018-07-02 RX ADMIN — DIPHENHYDRAMINE HCL 25 MG: 25 TABLET ORAL at 13:06

## 2018-07-02 NOTE — FLOWSHEET NOTE
07/02/18 1419   Encounter Summary   Services provided to: Patient   Referral/Consult From: [facility] (07/02/18)   Complexity of Encounter Moderate   Length of Encounter 15 minutes   Routine   Type Follow up   Assessment Approachable   Intervention Active listening;Explored coping resources; Explored feelings, thoughts, concerns;Nurtured hope;Sustaining presence/ Ministry of presence   Outcome Engaged in conversation;Expressed feelings/needs/concerns;Coping   Spiritual/Restoration   Type Spiritual support   Assessment Approachable   Intervention Active listening;Explored feelings, thoughts, concerns;Explored coping resources;Nurtured hope   Outcome Engaged in conversation;Expressed feelings/needs/concerns;Coping; Hopeful;Receptive   Patient alert and engaging. Patient shared that he is feeling better since his surgery and is \"on the mend. \" Patient [place].  offered support and presence. Chaplains will remain available to offer spiritual and emotional support as needed.

## 2018-07-02 NOTE — PROGRESS NOTES
Pt here per ambulatory for every 3 week IVIG, checked lab work draw at the beginning of June. Accessed port no blood return, pushes easilty, drips to gravity. Ran D5 1/3 ns at 300 ml/hr for 250 ml/hr. Benadryl 25 mg po given. Started IVIG 60 grams at 20 ml/hr for 20 minuts. 112/64, 75 bpm.  Increased to 40 ml /hr for 20 minutes. 119/71, 69 bpm.  Increased to 80 ml/hr for 20 minutes, 114/72, 77 bpm.  Increased to 160 ml/hr for 30 minutes, 137/70, 107/60. Increased rate to 250 ml/hr for the rest of the infusion, 120/66, 75 bpm.  116/71, 128/98, 139/71, 122/66, 127/77, 134/75, 118/65, 121/68,122/69. 119/72 taken every 15 minutes. Pt sleeping up to restroom several times with assistance. Pt completed without difficulty. Started D5 1/3 NS at 500 ml/hr completed there 250 ml of D5 1/3 NS. Flushed port with 20 ml of normal saline, a flash of blood noted.

## 2018-07-05 ENCOUNTER — APPOINTMENT (OUTPATIENT)
Dept: PHYSICAL THERAPY | Facility: CLINIC | Age: 63
End: 2018-07-05
Payer: MEDICARE

## 2018-07-05 ENCOUNTER — HOSPITAL ENCOUNTER (OUTPATIENT)
Dept: PHYSICAL THERAPY | Facility: CLINIC | Age: 63
Setting detail: THERAPIES SERIES
Discharge: HOME | End: 2018-07-05
Payer: MEDICARE

## 2018-07-06 ENCOUNTER — HOSPITAL ENCOUNTER (OUTPATIENT)
Dept: PHYSICAL THERAPY | Facility: CLINIC | Age: 63
Setting detail: THERAPIES SERIES
Discharge: HOME | End: 2018-07-06
Payer: MEDICARE

## 2018-07-06 ENCOUNTER — APPOINTMENT (OUTPATIENT)
Dept: PHYSICAL THERAPY | Facility: CLINIC | Age: 63
End: 2018-07-06
Payer: MEDICARE

## 2018-07-06 ENCOUNTER — OFFICE VISIT (OUTPATIENT)
Dept: ORTHOPEDIC SURGERY | Age: 63
End: 2018-07-06

## 2018-07-06 VITALS — BODY MASS INDEX: 39.86 KG/M2 | HEIGHT: 68 IN | WEIGHT: 263 LBS

## 2018-07-06 DIAGNOSIS — M87.052 AVASCULAR NECROSIS OF BONE OF LEFT HIP (HCC): Primary | ICD-10-CM

## 2018-07-06 DIAGNOSIS — Z96.642 STATUS POST TOTAL HIP REPLACEMENT, LEFT: ICD-10-CM

## 2018-07-06 PROCEDURE — 97110 THERAPEUTIC EXERCISES: CPT

## 2018-07-06 PROCEDURE — 99024 POSTOP FOLLOW-UP VISIT: CPT | Performed by: ORTHOPAEDIC SURGERY

## 2018-07-06 ASSESSMENT — ENCOUNTER SYMPTOMS
CHEST TIGHTNESS: 0
COUGH: 0
APNEA: 0
VOMITING: 0
ABDOMINAL PAIN: 0

## 2018-07-06 NOTE — PROGRESS NOTES
[address] [place] [address] [address] [place] [address]  Dept: [phone]  Dept Fax: [phone]        Postoperative follow-up note    Subjective:   Franklin Scanlon is a 63y.o. year old male who presents to our office today for postoperative followup regarding his   1. Avascular necrosis of bone of left hip (HCC)    2. Status post total hip replacement, left    . Chief Complaint   Patient presents with    Post-Op Check     left LANRE 5/23/18       Franklin Scanlon  is a 63y.o. year old male who presents to our office today for postoperative follow up after having undergone a left total hip arthroplasty on 5/22/18. The patient denies fevers, chills, nausea, vomiting, diarrhea. The patient has started physical therapy. Patient walks with a cane during therapy but uses a walker when out in public. The patient states that his limp is getting better. Patient works on his own HEP at home as well. The patient has pain in his hip when he works hard at therapy. The patient states that his pain is substantially better compared to before having his hip replaced. Review of Systems   Constitutional: Negative for chills and fever. Respiratory: Negative for apnea, cough and chest tightness. Cardiovascular: Negative for chest pain and palpitations. Gastrointestinal: Negative for abdominal pain and vomiting. Genitourinary: Negative for difficulty urinating. Musculoskeletal: Positive for arthralgias (Left hip). Negative for gait problem, joint swelling and myalgias. Neurological: Negative for dizziness, weakness and numbness. I have reviewed the CC, HPI, ROS, PMH, FHX, Social History. I agree with the documentation provided by other staff, residents,and have reviewed their documentation prior to providing my signature indicating agreement.     Objective :   General: Franklin Scanlon is a 63 y.o. male who is alert and oriented and sitting comfortably in our extrapolated by contextual diversion      Electronically signed by Abraham Johns DO, [place] on 7/7/2018 at 5:26 AM

## 2018-07-06 NOTE — FLOWSHEET NOTE
[] [facility]       Outpatient Physical        Therapy       [address]       Phone: [phone]       Fax: [phone] [x] [facility] at [address]       Phone: [phone]       Fax: [phone] [] [facility]wen. [facility]   Phone: [phone]   Fax:  [phone]     Physical Therapy Daily Treatment Note    Date:  2018  Patient Name:  Caroline Sal     :  1955  MRN: [id number]  Physician: Dr. Raul Hickey: Medicare  Medical Diagnosis: s/p L LANRE                      Rehab Codes: R60.0/M25.452, M25.552, M25.652, R26.2NEC, M62.552, H12.931  Onset Date: 18                 Next 's appt: 18  Visit# / total visits:    Cancels/No Shows:     Subjective:    Pain:  [x] Yes  [] No  Location: L hip   Pain Rating: (0-10 scale) 1/10  Pain altered Tx:  [x] No  [] Yes  Action:    Comments: Pt notes the past few days the L hip has felt pretty good. Pt notes he followed up with his surgeon this date and reports he is pleased with his progress. Pt reports he is allowed to do as much activity as he can tolerate. Pt reports he is allowed to lay on his L side and also sleep with his legs crossed. Pt notes the surgeon believes he may have a lingering limp with ambulation due to having to shave down more femur than anticipated, however, the surgeon believes some of his limp may be due to continued L hip weakness. Objective:  Modalities: Vasocompression 15' after exercises, 34 deg, LOW compression, with towel roll under knee. - deferred   Precautions: pt wears O2, monitor sats.  NO SLR - anterior hip precautions  Exercises:  Exercise Reps/ Time Weight/ Level Comments   Nu-step 10' L1 S: 10  A: 8   SAQ 30x 2# progressed weight 7/   Heel slides 15 AA Orange slide assist, do not pass 90 deg   Supine hip abd  10x AA [place] slide assist    Supine hip ADD 20x  Added 6/26   Hook lying hip abd 20x Red tband Added 6/28 Increased 6/29   Bridges  2x10  Added 7/6   LAQ 20x 2# Added weight 6/29   Seated marches  15x  Added 7/9  Pain-free ROM   Hamstring curls 20x red Added 6/26   gait 1 lap Cane Focused on heel to toe gait and step through gait pattern with cane this date 6/28   Standing          Heel raises  25x       Toe raises 25x     Squat  10x  Added 7/6   Hamstring curls 20x  1# Added weight 7/6   Alternating marches 15xea  Progressed 7/6   2 way hip 10x 1# abd Flex/abd Added 6/28   Step up 15x 4\" Added 6/28 Increased 6/29   Lateral step ups 15x 4\" Added 7/6   Hip hikes 10x  Added 7/6   Tandem balance 20\"x2  each  Each each leg. More difficult with left foot in back. Added 6/28   Other:      Specific Instructions for next treatment: LANRE protocol INCREASE tband and weight       Treatment Charges: Mins Units   []  Modalities     [x]  Ther Exercise 47 3   []  Manual Therapy     []  Ther Activities     []  Aquatics     [x]  Vasocompression     []  Other     Total Treatment time 47 3   Medicare estimate as of 7/6/18= $493.08      Assessment: [x] Progressing toward goals. Pt reports the L hip feels pretty good at the end of the session and defers the use of vasocompression at the end of the session. Pt was advised to ice the L hip when he returns home and to monitor L hip soreness/pain levels the next few days as progressions were made to improve strength. Pt was also educated he is able to ambulate around the house and on concrete/deck with the use of his cane but is encouraged to continue using his FWW when ambulating long distances, over grass, and up the dock at the [address] [facility]. Pt was able to progress strengthening exercises with good tolerance, however, hip flexion activities continue to result in 3-4/10 L thigh and groin pain. Pt was advised to control the amount of movement to prevent moving the L hip into a pain-free range.  Pt also demonstrates improved hip flexion in supine to ~80° without the presence of L hip pain, however, when moving from flexion to extension, pt notes some discomfort. Pt continues to benefit from skilled therapeutic interventions in order to improve L hip pain, A/PROM, strength, and balance in order to progress gait and return to prior level of activity. [] No change. [] Other:    Short Term Goals: MEET IN 8 VISITS Status   Pain: Pt will report less than or equal to 1-2/10 L hip pain at rest and less than or equal to 4-5/10 L hip pain with therapeutic strengthening and ROM exercises in order to improve tolerance to performing transfers, bed mobility, and ambulation. Ongoing   ROM: Pt will improve L hip  AROM to greater than or equal to 75° flexion and 25° PROM abduction in order to promote an efficient gait pattern and improve ability to ascend and descend stairs, get in and out of bed, and perform bed mobility. Ongoing   Strength: Pt will will be able to demosntrate a 3/5 L hip strength in order to demonstrate improved ability to ascend stairs and complete bed mobility. Ongoing   Function: Pt will score greater than or equal to 25% on the LEFI in order to demonstrate improved function with ADLs and work related tasks. Ongoing   Gait: Pt will be able to complete a TUG in less than or equal to 20\" seconds with the least restrictive AD in order to promote improved balance and gait mechanics when ambulating and home and in the community. Ongoing    HEP: Pt will be independent in with HEP. Ongoing   Fall Prevention: Pt will acknowledge fall prevention interventions in order to prevent falls at home, work, or in the community.  Ongoing    Long Term Goal: MEET IN 16 VISITS     Pain: Pt will report less than or equal to 0-1/10 L hip pain at rest and less than or equal to 2-3/10 L hip pain in order to improve tolerance to walking on even and uneven ground, negotiating stairs, performing repeated sit to stands, and completing ADLs in order to progress to prior level of activity Ongoing   ROM: Pt will improve L hip AROM to greater than or equal to 90° flexion and 30° PROM abduction  in order to climb stairs, ambulate, and progress to prior level of activity. Ongoing   Strength: Pt will demonstrate improved BLE strength in order to promote improved dynamic stability, force absorption mechanism, and overall strength in order to improve tolerance to transfers, bed mobility, ambulation, and stair negotiation. L hip flexion: 4-/5  L hip abd: good (sitting)  L hip add: good (sitting)  L hip ext: good (sitting)  L knee extension: 5/5  L knee flexion: 4+/5  Ongoing   Gait: Pt will be able to complete a TUG in less than or equal to 16\" seconds without an AD in order to promote improved balance and gait mechanics when ambulating and home and in the community. Ongoing    Outcome Measure: Pt will report greater than or equal to 40% on the LEFI in order to demonstrate improved function. Ongoing                     Patient goals: ambulate without cane or walker     G-Codes: Initial  Functional Limitation: Mobility: moving and walking around  Functional Assessment Used: TU\"  Current Status Modifier: CN  Goal Status Modifier: CK      Pt. Education:  [x] Yes  [] No  [x] Reviewed Prior HEP/Ed  Method of Education: [x] Verbal: see above  [] Demo  [] Written  Comprehension of Education:  [x] Verbalizes understanding. [] Demonstrates understanding. [] Needs review. [x] Demonstrates/verbalizes HEP/Ed previously given. Plan: [x] Continue per plan of care.    [] Other:      Time In: 11:26 am             Time Out: 12:23 pm    Electronically signed by:  Felisa Liz PT

## 2018-07-11 DIAGNOSIS — F41.9 ANXIETY: Chronic | ICD-10-CM

## 2018-07-11 RX ORDER — ALPRAZOLAM 0.5 MG/1
TABLET ORAL
Qty: 90 TABLET | Refills: 0 | Status: SHIPPED | OUTPATIENT
Start: 2018-07-11 | End: 2018-08-06 | Stop reason: SDUPTHER

## 2018-07-12 ENCOUNTER — HOSPITAL ENCOUNTER (OUTPATIENT)
Dept: PHYSICAL THERAPY | Facility: CLINIC | Age: 63
Setting detail: THERAPIES SERIES
Discharge: HOME | End: 2018-07-12
Payer: MEDICARE

## 2018-07-12 PROCEDURE — 97110 THERAPEUTIC EXERCISES: CPT

## 2018-07-12 NOTE — FLOWSHEET NOTE
order to promote improved balance and gait mechanics when ambulating and home and in the community. Ongoing    HEP: Pt will be independent in with HEP. Ongoing   Fall Prevention: Pt will acknowledge fall prevention interventions in order to prevent falls at home, work, or in the community. Ongoing    Long Term Goal: MEET IN 16 VISITS     Pain: Pt will report less than or equal to 0-1/10 L hip pain at rest and less than or equal to 2-3/10 L hip pain in order to improve tolerance to walking on even and uneven ground, negotiating stairs, performing repeated sit to stands, and completing ADLs in order to progress to prior level of activity Ongoing   ROM: Pt will improve L hip AROM to greater than or equal to 90° flexion and 30° PROM abduction  in order to climb stairs, ambulate, and progress to prior level of activity. Ongoing   Strength: Pt will demonstrate improved BLE strength in order to promote improved dynamic stability, force absorption mechanism, and overall strength in order to improve tolerance to transfers, bed mobility, ambulation, and stair negotiation. L hip flexion: 4-/5  L hip abd: good (sitting)  L hip add: good (sitting)  L hip ext: good (sitting)  L knee extension: 5/5  L knee flexion: 4+/5  Ongoing   Gait: Pt will be able to complete a TUG in less than or equal to 16\" seconds without an AD in order to promote improved balance and gait mechanics when ambulating and home and in the community. Ongoing    Outcome Measure: Pt will report greater than or equal to 40% on the LEFI in order to demonstrate improved function. Ongoing                     Patient goals: ambulate without cane or walker     G-Codes: Initial  Functional Limitation: Mobility: moving and walking around  Functional Assessment Used: TU\"  Current Status Modifier: CN  Goal Status Modifier: CK      Pt.  Education:  [x] Yes  [] No  [x] Reviewed Prior HEP/Ed  Method of Education: [x] Verbal:cane ambulation  [] Demo  [] Written  Comprehension of Education:  [] Verbalizes understanding. [] Demonstrates understanding. [] Needs review. [x] Demonstrates/verbalizes HEP/Ed previously given. Plan: [x] Continue per plan of care.    [] Other:      Time In: 8:30am             Time Out: 9:30am    Electronically signed by:  Tyesha Stevens PTA

## 2018-07-13 ENCOUNTER — HOSPITAL ENCOUNTER (OUTPATIENT)
Dept: PHYSICAL THERAPY | Facility: CLINIC | Age: 63
Setting detail: THERAPIES SERIES
Discharge: HOME | End: 2018-07-13
Payer: MEDICARE

## 2018-07-13 PROCEDURE — G8979 MOBILITY GOAL STATUS: HCPCS

## 2018-07-13 PROCEDURE — G8978 MOBILITY CURRENT STATUS: HCPCS

## 2018-07-13 PROCEDURE — 97110 THERAPEUTIC EXERCISES: CPT

## 2018-07-13 NOTE — FLOWSHEET NOTE
[] [facility]       Outpatient Physical        Therapy       [address]       Phone: [phone]       Fax: [phone] [x] [facility] at [address] [facility]       Phone: [phone]       Fax: [phone] [] [facility]  [address] [facility]   Phone: [phone]   Fax:  [phone]     Physical Therapy Daily Treatment Note    Date:  2018  Patient Name:  Lula Obando     :  1955  MRN: [id number]  Physician: Dr. Aleena Hsieh: Medicare  Medical Diagnosis: s/p L LANRE                      Rehab Codes: R60.0/M25.452, M25.552, M25.652, R26.2NEC, M62.552, S38.107  Onset Date: 18                 Next 's appt: 18  Visit# / total visits:    Cancels/No Shows:     Subjective:    Pain:  [x] Yes  [] No  Location: L hip   Pain Rating: (0-10 scale) mild/10  Pain altered Tx:  [x] No  [] Yes  Action:    Comments: Pt reports he is having soreness in the front of the L thigh. Pt reports he is taking pain medication as needed (tramadol). Pt reports he has always been on tramadol due to his infusions. Objective:  Modalities: Vasocompression 15' after exercises, 34 deg, LOW compression, with towel roll under knee. - deferred   Precautions: pt wears O2.  NO SLR - anterior hip precautions  Exercises:  Exercise Reps/ Time Weight/ Level Comments   Nu-step 10' L2 S: 10  A: 8   SAQ 30x 3# progressed weight 7/10   Heel slides 15 AA Orange slide assist, do not pass 90 deg   Supine hip flexor stretch 2x30\"  Added - felt stretch in the quad   Supine hip abd  2x10 AA [place] slide assist    Supine hip ADD 20x3\"  Added    Hook lying hip abd 20x Green  tband Added  Increased 7/10   Bridges  2x10  Added    LAQ 20x 3# Added weight 7/10   Seated marches  15x  Added   Pain-free ROM   Hamstring curls 20x red Added    gait 1 lap Cane in order to improve tolerance to transfers, bed mobility, ambulation, and stair negotiation. L hip flexion: 4-/5  L hip abd: good (sitting)  L hip add: good (sitting)  L hip ext: good (sitting)  L knee extension: 5/5  L knee flexion: 4+/5  Ongoing   Gait: Pt will be able to complete a TUG in less than or equal to 16\" seconds without an AD in order to promote improved balance and gait mechanics when ambulating and home and in the community. Pt will be able to complete a TUG in less than or equal to 12\" seconds without an AD in order to promote improved balance and gait mechanics when ambulating and home and in the community. Met 18   Outcome Measure: Pt will report greater than or equal to 40% on the LEFI in order to demonstrate improved function. Pt will report greater than or equal to 95% on the LEFI in order to demonstrate improved function. MET 18  89% max function                     Patient goals: ambulate without cane or walker     G-Codes: Updated Visit 9  Functional Limitation: Mobility: moving and walking around  Functional Assessment Used: TUG: 15\"  Current Status Modifier: CK  Goal Status Modifier: CI    G-Codes: Initial  Functional Limitation: Mobility: moving and walking around  Functional Assessment Used: TU\"  Current Status Modifier: CN  Goal Status Modifier: CK      Pt. Education:  [x] Yes  [] No  [x] Reviewed Prior HEP/Ed  Method of Education: [x] Verbal: discussed calling the surgeon's office to get clearance to enter the pool; due to pt's full closure of incision and stairs vs ladder, it is appropriate from the therapy stand point, however, due to higher risk surgery, pt instructed to call surgeon's off.  [] Demo  [] Written  Comprehension of Education:  [] Verbalizes understanding. [] Demonstrates understanding. [] Needs review. [x] Demonstrates/verbalizes HEP/Ed previously given. Plan: [x] Continue per plan of care.    [] Other:      Time In: 8:03 am Time Out: 9:06am    Electronically signed by:  Jasper Schwartz, PT

## 2018-07-16 ENCOUNTER — HOSPITAL ENCOUNTER (OUTPATIENT)
Dept: PHYSICAL THERAPY | Facility: CLINIC | Age: 63
Setting detail: THERAPIES SERIES
Discharge: HOME | End: 2018-07-16
Payer: MEDICARE

## 2018-07-16 PROCEDURE — 97110 THERAPEUTIC EXERCISES: CPT

## 2018-07-18 ENCOUNTER — HOSPITAL ENCOUNTER (OUTPATIENT)
Dept: PHYSICAL THERAPY | Facility: CLINIC | Age: 63
Setting detail: THERAPIES SERIES
Discharge: HOME | End: 2018-07-18
Payer: MEDICARE

## 2018-07-18 PROCEDURE — 97110 THERAPEUTIC EXERCISES: CPT

## 2018-07-18 NOTE — FLOWSHEET NOTE
[] [facility]       Outpatient Physical        Therapy       [address]       Phone: [phone]       Fax: [phone] [x] [facility] at [address] [facility]       Phone: [phone]       Fax: [phone] [] [facility]  [address] [facility]   Phone: [phone]   Fax:  [phone]     Physical Therapy Daily Treatment Note    Date:  2018  Patient Name:  Lula Obando     :  1955  MRN: [id number]  Physician: Dr. Aleena Hsieh: Medicare  Medical Diagnosis: s/p L LANRE                      Rehab Codes: R60.0/M25.452, M25.552, M25.652, R26.2NEC, M62.552, E67.520  Onset Date: 18                 Next 's appt: 18  Visit# / total visits:    Cancels/No Shows:     Subjective:    Pain:  [x] Yes  [] No  Location: L hip   Pain Rating: (0-10 scale) mild/10  Pain altered Tx:  [x] No  [] Yes  Action:    Comments: Pt states low pain again today. No other complaints. Objective:  Modalities: Vasocompression 15' after exercises, 34 deg, LOW compression, with towel roll under knee. - deferred   Precautions: pt wears O2.  NO SLR - anterior hip precautions  Exercises:  Exercise Reps/ Time Weight/ Level Comments   Nu-step 10' L2 S: 10  A: 8   SAQ 30x 3# progressed weight 7/10   Heel slides 15 AA Orange slide assist, do not pass 90 deg   Supine hip flexor stretch 2x30\"  Added - felt stretch in the quad   Supine hip abd  2x10 AA [place] slide assist    Supine hip ADD 20x3\"  Added    Hook lying hip abd 20x Green  tband Added  Increased 7/10   Bridges  2x10  Added          SIDE LYING      Hip abduction  2x10  Added          SEATED      LAQ 30x 3# Added weight 7/10   Seated marches  20 x 3# Added   Pain-free ROM   Hamstring curls 20x red Added    gait 1 lap Cane    Standing          Heel raises  25x       Toe raises 25x

## 2018-07-20 ENCOUNTER — HOSPITAL ENCOUNTER (OUTPATIENT)
Dept: PHYSICAL THERAPY | Facility: CLINIC | Age: 63
Setting detail: THERAPIES SERIES
Discharge: HOME | End: 2018-07-20
Payer: MEDICARE

## 2018-07-20 ENCOUNTER — HOSPITAL ENCOUNTER (OUTPATIENT)
Facility: MEDICAL CENTER | Age: 63
End: 2018-07-20
Payer: MEDICARE

## 2018-07-20 ENCOUNTER — HOSPITAL ENCOUNTER (OUTPATIENT)
Age: 63
Setting detail: SPECIMEN
Discharge: HOME | End: 2018-07-20
Payer: MEDICARE

## 2018-07-20 ENCOUNTER — CARE COORDINATION (OUTPATIENT)
Dept: CASE MANAGEMENT | Age: 63
End: 2018-07-20

## 2018-07-20 ENCOUNTER — OFFICE VISIT (OUTPATIENT)
Dept: FAMILY MEDICINE CLINIC | Age: 63
End: 2018-07-20
Payer: MEDICARE

## 2018-07-20 VITALS
TEMPERATURE: 97.6 F | BODY MASS INDEX: 39.1 KG/M2 | SYSTOLIC BLOOD PRESSURE: 136 MMHG | DIASTOLIC BLOOD PRESSURE: 78 MMHG | HEIGHT: 68 IN | WEIGHT: 258 LBS | OXYGEN SATURATION: 96 % | HEART RATE: 91 BPM | RESPIRATION RATE: 16 BRPM

## 2018-07-20 DIAGNOSIS — Z86.14 HISTORY OF MRSA INFECTION: ICD-10-CM

## 2018-07-20 DIAGNOSIS — M87.052 AVASCULAR NECROSIS OF BONE OF LEFT HIP (HCC): ICD-10-CM

## 2018-07-20 DIAGNOSIS — J34.89 NASAL LESION: Primary | ICD-10-CM

## 2018-07-20 PROCEDURE — 99213 OFFICE O/P EST LOW 20 MIN: CPT | Performed by: INTERNAL MEDICINE

## 2018-07-20 PROCEDURE — 3017F COLORECTAL CA SCREEN DOC REV: CPT | Performed by: INTERNAL MEDICINE

## 2018-07-20 PROCEDURE — 1036F TOBACCO NON-USER: CPT | Performed by: INTERNAL MEDICINE

## 2018-07-20 PROCEDURE — 97110 THERAPEUTIC EXERCISES: CPT

## 2018-07-20 PROCEDURE — G8427 DOCREV CUR MEDS BY ELIG CLIN: HCPCS | Performed by: INTERNAL MEDICINE

## 2018-07-20 PROCEDURE — G8417 CALC BMI ABV UP PARAM F/U: HCPCS | Performed by: INTERNAL MEDICINE

## 2018-07-20 RX ORDER — TRAMADOL HYDROCHLORIDE 50 MG/1
50 TABLET ORAL EVERY 8 HOURS PRN
Qty: 60 TABLET | Refills: 0 | Status: SHIPPED | OUTPATIENT
Start: 2018-07-20 | End: 2018-09-12 | Stop reason: SDUPTHER

## 2018-07-20 RX ORDER — TRAMADOL HYDROCHLORIDE 50 MG/1
50 TABLET ORAL PRN
COMMUNITY
End: 2018-07-20 | Stop reason: SDUPTHER

## 2018-07-20 RX ORDER — MUPIROCIN CALCIUM 20 MG/G
CREAM TOPICAL
Qty: 30 G | Refills: 1 | Status: SHIPPED | OUTPATIENT
Start: 2018-07-20 | End: 2018-08-19

## 2018-07-20 ASSESSMENT — PATIENT HEALTH QUESTIONNAIRE - PHQ9
SUM OF ALL RESPONSES TO PHQ QUESTIONS 1-9: 0
SUM OF ALL RESPONSES TO PHQ9 QUESTIONS 1 & 2: 0
2. FEELING DOWN, DEPRESSED OR HOPELESS: 0
1. LITTLE INTEREST OR PLEASURE IN DOING THINGS: 0

## 2018-07-21 ASSESSMENT — ENCOUNTER SYMPTOMS
COUGH: 0
CHEST TIGHTNESS: 0
BLOOD IN STOOL: 0
TROUBLE SWALLOWING: 0
SINUS PAIN: 0
ABDOMINAL PAIN: 0
SINUS PRESSURE: 0
BLURRED VISION: 0
FACIAL SWELLING: 0
DIARRHEA: 0
CONSTIPATION: 0
SORE THROAT: 0
VOICE CHANGE: 0
CHOKING: 0
SHORTNESS OF BREATH: 0

## 2018-07-21 NOTE — PROGRESS NOTES
bone of hip, left ([facility] 04/2018    CAD (coronary artery disease)     mild on cath, no stents    Common variable immunodeficiency ([facility] 12/4/2013    COPD (chronic obstructive pulmonary disease) (HCC)     Deviated nasal septum     Dyspnea     with exertion    HTN (hypertension)     Immune deficiency disorder ([facility] 2012    IGIV every 3 weeks with prednisone    Obesity     On supplemental oxygen therapy     PRECIOUS (obstructive sleep apnea) 2012    CPAP nightly    Osteoporosis     Reflux     Respiratory failure ([facility] 2014    Oxygen on    Spondylosis of cervical spine     TIA (transient ischemic attack) 2005    Tobacco use     Vitamin D deficiency 3/6/2018    Wears glasses       Past Surgical History:   Procedure Laterality Date    CARDIAC CATHETERIZATION  2005    no stents    COLONOSCOPY  2003    FINGER SURGERY Left 1997    reattachment, index finger    HIP ARTHROPLASTY Left 05/22/2018    total    NASAL ENDOSCOPY      IN REVISE TOTAL HIP REPLACEMENT Left 5/22/2018    HIP TOTAL ARTHROPLASTY ANTERIOR APPROACH  ([facility]) performed by Reed Silveira DO at [facility]  2014    lesion excision    TONSILLECTOMY      TUNNELED VENOUS PORT PLACEMENT Right 11/25/2015    Rt.  chest       Family History   Problem Relation Age of Onset   [facility] COPD Father     Coronary Art Dis Father     Heart Attack Father     Lung Cancer Mother     Breast Cancer Sister     No Known Problems Maternal Grandmother     No Known Problems Maternal Grandfather     Cancer Paternal Grandmother     Stroke Paternal Grandfather     Heart Attack Paternal Grandfather     Cancer Paternal Grandfather     Breast Cancer Sister     Cancer Sister         Throat       Social History   Substance Use Topics    Smoking status: Former Smoker     Packs/day: 1.00     Types: Cigarettes     Start date: 0     Quit date: 10/10/2003    Smokeless tobacco: Never Used      Comment: smoke 4ppd for many yrs; quit 8 yrs ago   [facility] No current facility-administered medications for this visit. No Known Allergies    Health Maintenance   Topic Date Due    DTaP/Tdap/Td vaccine (1 - Tdap) 08/29/1976    A1C test (Diabetic or Prediabetic)  03/01/2017    Flu vaccine (1) 09/01/2018    Colon cancer screen colonoscopy  01/01/2019    Potassium monitoring  05/27/2019    Creatinine monitoring  06/13/2019    Lipid screen  03/04/2020    Pneumococcal highest risk (3 of 3 - PPSV23) 12/02/2021    Hepatitis C screen  Completed    HIV screen  Completed       Subjective:      Review of Systems   Constitutional: Negative for activity change, appetite change, chills, diaphoresis, fatigue, fever and unexpected weight change. HENT: Negative for congestion, facial swelling, hearing loss, mouth sores, nosebleeds, postnasal drip, sinus pain, sinus pressure, sneezing, sore throat, tinnitus, trouble swallowing and voice change. Eyes: Negative for blurred vision and visual disturbance. Respiratory: Negative for cough, choking, chest tightness and shortness of breath. Cardiovascular: Negative for chest pain, palpitations and leg swelling. Gastrointestinal: Negative for abdominal pain, blood in stool, constipation and diarrhea. Musculoskeletal: Negative for arthralgias. Skin: Negative for rash. Neurological: Negative for headaches. Psychiatric/Behavioral: Negative for memory loss and sleep disturbance. Objective:     Physical Exam   Constitutional: He is oriented to person, place, and time. He appears well-developed and well-nourished. He is active. Non-toxic appearance. He does not have a sickly appearance. He does not appear ill. No distress. Chronically ill    HENT:   Head: Normocephalic and atraumatic. Not macrocephalic and not microcephalic. Right Ear: Tympanic membrane, external ear and ear canal normal.   Left Ear: Tympanic membrane, external ear and ear canal normal.   Nose: Mucosal edema present.  No rhinorrhea, sinus needed. Patient also requesting refill of chronic medication tramadol he uses before his infusions /after infusions he gets due to immunodeficiency disorder. Will call with culture results. Depending on results will then send oral antibiotic per sensitives for one week . Follow up for chronic conditions as scheduled. Call with questions or concerns. Patient given educational materials - see patient instructions. Discussed use, benefit, and side effects of prescribed medications. All patient questions answered. Pt voiced understanding. Reviewed health maintenance. Instructed to continue current medications, diet and exercise. Patient agreed with treatment plan. Follow up as directed.      Electronically signed by Angel Morales DO on 7/21/2018 at 2:03 PM

## 2018-07-22 LAB
CULTURE: ABNORMAL
DIRECT EXAM: ABNORMAL
DIRECT EXAM: ABNORMAL
Lab: ABNORMAL
SPECIMEN DESCRIPTION: ABNORMAL
STATUS: ABNORMAL

## 2018-07-23 ENCOUNTER — HOSPITAL ENCOUNTER (OUTPATIENT)
Dept: PHYSICAL THERAPY | Facility: CLINIC | Age: 63
Setting detail: THERAPIES SERIES
Discharge: HOME | End: 2018-07-23
Payer: MEDICARE

## 2018-07-23 PROCEDURE — 97110 THERAPEUTIC EXERCISES: CPT

## 2018-07-23 NOTE — FLOWSHEET NOTE
Added weight on 7/23/18         SEATED      LAQ 30x 3# Added weight 7/10   Seated marches  20 x 3# Added 7/9  Pain-free ROM   Hamstring curls 20x red Added 6/26   gait 1 lap  Pt didn't use assistive device    Standing          Heel raises  25x airex Progressed to on foam on 7/20/18    Toe raises 25x airex Progressed to on foam on 7/20/18   Squat  10x2 airex Increased reps 7/12/18, added airex on 7/23/18   Hamstring curls (L) 20x 2# Added weight 7/10   Alternating marches 15xea Airex, #2 Progressed 7/20/18 progressed to on foam, added weight on 7/23/18   3 way hip (B)- flex/abd/ext 20xea airex Flex/abd, added in ext and progressed to on foam 7/20/18   Step up 20x 6\", #2 Increased step height and weight 7/12/18   Lateral step ups 20x 6\", #2 Increased step height and weight 7/20/18   Step downs 15x 6\", #2 Increased step height and weight 7/20/18   Hip hikes (B) 10x ea 6\" step, #2 Added weight on 7/20/18   Tandem balance 20\"x2  each  Each leg. More difficult with left foot in back. Added 6/28   Other:     7/13/18: TUG: 15\"      Specific Instructions for next treatment: LANRE protocol       Treatment Charges: Mins Units   []  Modalities     [x]  Ther Exercise 50 3   []  Manual Therapy     []  Ther Activities     []  Aquatics     []  Vasocompression     []  Other     Total Treatment time 50 3   Medicare estimate as of (7/23/18) =  $987.33     Assessment: [x] Progressing toward goals. Pt had a great tolerance to treatment on this date even with slight pain in his anterior thigh and with progression of standing and mat exercises. Pt needed instruction on proper technique/form when performing standing exercises with newer progress on airex. Pt had noted fatigue and was instructed to take rest breaks when needed. Pt needed cueing during treatment to continue exercises when carrying conversation during treatment. Pt reported when ending treatment that he, \"he always feels so much better after therapy. \" and that's why pt deferred and progress to prior level of activity. Ongoing   Strength: Pt will demonstrate improved BLE strength in order to promote improved dynamic stability, force absorption mechanism, and overall strength in order to improve tolerance to transfers, bed mobility, ambulation, and stair negotiation. L hip flexion: 4-/5  L hip abd: good (sitting)  L hip add: good (sitting)  L hip ext: good (sitting)  L knee extension: 5/5  L knee flexion: 4+/5  Ongoing   Gait: Pt will be able to complete a TUG in less than or equal to 16\" seconds without an AD in order to promote improved balance and gait mechanics when ambulating and home and in the community. Pt will be able to complete a TUG in less than or equal to 12\" seconds without an AD in order to promote improved balance and gait mechanics when ambulating and home and in the community. Met 18   Outcome Measure: Pt will report greater than or equal to 40% on the LEFI in order to demonstrate improved function. Pt will report greater than or equal to 95% on the LEFI in order to demonstrate improved function. MET 18  89% max function                     Patient goals: ambulate without cane or walker     G-Codes: Updated Visit 9  Functional Limitation: Mobility: moving and walking around  Functional Assessment Used: TUG: 15\"  Current Status Modifier: CK  Goal Status Modifier: CI    G-Codes: Initial  Functional Limitation: Mobility: moving and walking around  Functional Assessment Used: TU\"  Current Status Modifier: CN  Goal Status Modifier: CK      Pt. Education:  [] Yes  [x] No  [] Reviewed Prior HEP/Ed  Method of Education: [] Verbal:   [] Demo  [] Written  Comprehension of Education:  [] Verbalizes understanding. [] Demonstrates understanding. [] Needs review. [] Demonstrates/verbalizes HEP/Ed previously given. Plan: [x] Continue per plan of care.    [] Other:      Time In: 9:20 am             Time Out: 10:20 am    Electronically signed by:

## 2018-07-24 ENCOUNTER — HOSPITAL ENCOUNTER (OUTPATIENT)
Dept: PHYSICAL THERAPY | Facility: CLINIC | Age: 63
Setting detail: THERAPIES SERIES
Discharge: HOME | End: 2018-07-24
Payer: MEDICARE

## 2018-07-24 PROCEDURE — 97110 THERAPEUTIC EXERCISES: CPT

## 2018-07-24 NOTE — FLOWSHEET NOTE
[] [facility]       Outpatient Physical        Therapy       [address]       Phone: [phone]        Fax: [phone] [x] [facility] at [address]       Phone: [phone]       Fax: [phone] [] [facility]. [address] [place] [address] [facility]  [address] [place] [address]   Phone: [phone]   Fax:  [phone]     Physical Therapy Daily Treatment Note    Date:  2018  Patient Name:  Caroline Sal     :  1955  MRN: [id number]  Physician: Dr. Raul Hickey: Medicare  Medical Diagnosis: s/p L LANRE                      Rehab Codes: R60.0/M25.452, M25.552, M25.652, R26.2NEC, M62.552, F22.821  Onset Date: 18                 Next 's appt: 18  Visit# / total visits:    Cancels/No Shows:     Subjective:    Pain:  [x] Yes  [] No  Location: L hip   Pain Rating: (0-10 scale) 1/10  Pain altered Tx:  [x] No  [] Yes  Action:    Comments: Pt states he is doing good today. Objective:  Modalities: Vasocompression 15' after exercises, 34 deg, LOW compression, with towel roll under knee. - deferred 18  Precautions: pt wears O2.  NO SLR - anterior hip precautions  Exercises:  Exercise Reps/ Time Weight/ Level Comments   Nu-step 10 mins L3 S: 10   A: 8   SAQ 30x 3# progressed weight 7/10   Heel slides 15 AA Orange slide assist, do not pass 90 deg   Supine hip flexor stretch 2x30\"  Added - felt stretch in the quad   Supine hip abd  2x10 AA [place] slide assist    Supine hip ADD 20x3\"  Added    Hook lying hip abd 20x Green  tband Added  Increased 7/10   Bridges  2x10 Green tband Added , Added resistance on 18         SIDE LYING      Hip abduction  2x10 #3  Added , Added weight on 18         SEATED      LAQ 30x 3# Added weight 7/10   Seated marches  20 x 3# Added   Pain-free ROM   Hamstring curls 20x red Added    gait 1 lap  Pt didn't use assistive device    Standing          Heel raises  25x airex Progressed to on foam on 7/20/18    Toe raises 25x airex Progressed to on foam on 7/20/18   Squat  10x2 airex Increased reps 7/12/18, added airex on 7/23/18   Hamstring curls (L) 20x 2# Added weight 7/10   Alternating marches 20xea Airex, #2 Progressed 7/20/18 progressed to on foam, added weight on 7/23/18   3 way hip (B)- flex/abd/ext 20xea airex Flex/abd, added in ext and progressed to on foam 7/20/18   Step up 20x 6\", #2 Increased step height and weight 7/12/18   Lateral step ups 20x 6\", #2 Increased step height and weight 7/20/18   Step downs 15x 6\", #2 Increased step height and weight 7/20/18   Hip hikes (B) 10x ea 6\" step, #2 Added weight on 7/20/18   Tandem balance 20\"x2  each  Each leg. More difficult with left foot in back. Added 6/28   Other:     7/13/18: TUG: 15\"      Specific Instructions for next treatment: LANRE protocol       Treatment Charges: Mins Units   []  Modalities     [x]  Ther Exercise 40 3   []  Manual Therapy     []  Ther Activities     []  Aquatics     []  Vasocompression     []  Other     Total Treatment time 40 3   Medicare estimate as of (7/24/18) =  $1,390.81     Assessment: [x] Progressing toward goals. Continued with charted exercises, pt again with good tolerance. No complaints of pain throughout. [] No change. [] Other:     Short Term Goals: MEET IN 8 VISITS Status   Pain: Pt will report less than or equal to 1-2/10 L hip pain at rest and less than or equal to 4-5/10 L hip pain with therapeutic strengthening and ROM exercises in order to improve tolerance to performing transfers, bed mobility, and ambulation. MET 7/13/18   ROM: Pt will improve L hip  AROM to greater than or equal to 75° flexion and 25° PROM abduction in order to promote an efficient gait pattern and improve ability to ascend and descend stairs, get in and out of bed, and perform bed mobility.   MET 7/13/18  76° flexion  30° abd   Strength: Pt will will be able to demosntrate a 3/5 L hip strength in order to demonstrate improved ability to ascend stairs and complete bed mobility. Ongoing 7/13/18  3-/5- posterior trunk lean noted in sitting   Function: Pt will score greater than or equal to 25% on the LEFI in order to demonstrate improved function with ADLs and work related tasks. MET 7/13/18  89% max function   Gait: Pt will be able to complete a TUG in less than or equal to 20\" seconds with the least restrictive AD in order to promote improved balance and gait mechanics when ambulating and home and in the community. MET 7/13/18   HEP: Pt will be independent in with HEP. MET 7/13/18   Fall Prevention: Pt will acknowledge fall prevention interventions in order to prevent falls at home, work, or in the community. MET 7/13/18   Long Term Goal: MEET IN 16 VISITS     Pain: Pt will report less than or equal to 0-1/10 L hip pain at rest and less than or equal to 2-3/10 L hip pain in order to improve tolerance to walking on even and uneven ground, negotiating stairs, performing repeated sit to stands, and completing ADLs in order to progress to prior level of activity Ongoing   ROM: Pt will improve L hip AROM to greater than or equal to 90° flexion and 30° PROM abduction  in order to climb stairs, ambulate, and progress to prior level of activity. Ongoing   Strength: Pt will demonstrate improved BLE strength in order to promote improved dynamic stability, force absorption mechanism, and overall strength in order to improve tolerance to transfers, bed mobility, ambulation, and stair negotiation. L hip flexion: 4-/5  L hip abd: good (sitting)  L hip add: good (sitting)  L hip ext: good (sitting)  L knee extension: 5/5  L knee flexion: 4+/5  Ongoing   Gait: Pt will be able to complete a TUG in less than or equal to 16\" seconds without an AD in order to promote improved balance and gait mechanics when ambulating and home and in the community.      Pt will be able

## 2018-07-25 ENCOUNTER — HOSPITAL ENCOUNTER (OUTPATIENT)
Dept: INFUSION THERAPY | Facility: MEDICAL CENTER | Age: 63
Discharge: HOME | End: 2018-07-25
Payer: MEDICARE

## 2018-07-25 VITALS
RESPIRATION RATE: 18 BRPM | DIASTOLIC BLOOD PRESSURE: 59 MMHG | TEMPERATURE: 97.8 F | SYSTOLIC BLOOD PRESSURE: 129 MMHG | HEART RATE: 68 BPM

## 2018-07-25 DIAGNOSIS — D80.1 COMMON VARIABLE HYPOGAMMAGLOBULINEMIA (HCC): ICD-10-CM

## 2018-07-25 DIAGNOSIS — D83.9 COMMON VARIABLE IMMUNODEFICIENCY (HCC): ICD-10-CM

## 2018-07-25 PROCEDURE — 96365 THER/PROPH/DIAG IV INF INIT: CPT

## 2018-07-25 PROCEDURE — 96361 HYDRATE IV INFUSION ADD-ON: CPT

## 2018-07-25 PROCEDURE — 6360000002 HC RX W HCPCS: Performed by: ALLERGY & IMMUNOLOGY

## 2018-07-25 PROCEDURE — 96366 THER/PROPH/DIAG IV INF ADDON: CPT

## 2018-07-25 PROCEDURE — 6370000000 HC RX 637 (ALT 250 FOR IP): Performed by: ALLERGY & IMMUNOLOGY

## 2018-07-25 PROCEDURE — 2580000003 HC RX 258: Performed by: ALLERGY & IMMUNOLOGY

## 2018-07-25 RX ORDER — DIPHENHYDRAMINE HCL 25 MG
25 TABLET ORAL EVERY 4 HOURS PRN
Status: DISCONTINUED | OUTPATIENT
Start: 2018-07-25 | End: 2018-07-26 | Stop reason: HOSPADM

## 2018-07-25 RX ORDER — DEXTROSE MONOHYDRATE 50 MG/ML
INJECTION, SOLUTION INTRAVENOUS CONTINUOUS
Status: CANCELLED
Start: 2018-07-25

## 2018-07-25 RX ORDER — DIPHENHYDRAMINE HCL 25 MG
25 TABLET ORAL EVERY 4 HOURS PRN
Status: CANCELLED
Start: 2018-07-25

## 2018-07-25 RX ORDER — DIPHENHYDRAMINE HCL 25 MG
25 TABLET ORAL ONCE
Status: CANCELLED | OUTPATIENT
Start: 2018-07-25 | End: 2018-07-25

## 2018-07-25 RX ORDER — 0.9 % SODIUM CHLORIDE 0.9 %
10 VIAL (ML) INJECTION PRN
Status: CANCELLED | OUTPATIENT
Start: 2018-07-25

## 2018-07-25 RX ORDER — DIPHENHYDRAMINE HCL 25 MG
25 TABLET ORAL ONCE
Status: COMPLETED | OUTPATIENT
Start: 2018-07-25 | End: 2018-07-25

## 2018-07-25 RX ORDER — 0.9 % SODIUM CHLORIDE 0.9 %
20 VIAL (ML) INJECTION PRN
Status: DISCONTINUED | OUTPATIENT
Start: 2018-07-25 | End: 2018-07-26 | Stop reason: HOSPADM

## 2018-07-25 RX ORDER — DEXTROSE AND SODIUM CHLORIDE 5; .33 G/100ML; G/100ML
INJECTION, SOLUTION INTRAVENOUS CONTINUOUS
Status: CANCELLED
Start: 2018-07-25

## 2018-07-25 RX ORDER — DEXTROSE MONOHYDRATE 50 MG/ML
INJECTION, SOLUTION INTRAVENOUS CONTINUOUS
Status: DISCONTINUED | OUTPATIENT
Start: 2018-07-25 | End: 2018-07-26 | Stop reason: HOSPADM

## 2018-07-25 RX ORDER — DEXTROSE AND SODIUM CHLORIDE 5; .33 G/100ML; G/100ML
INJECTION, SOLUTION INTRAVENOUS CONTINUOUS
Status: DISCONTINUED | OUTPATIENT
Start: 2018-07-25 | End: 2018-07-26 | Stop reason: HOSPADM

## 2018-07-25 RX ORDER — HEPARIN SODIUM (PORCINE) LOCK FLUSH IV SOLN 100 UNIT/ML 100 UNIT/ML
500 SOLUTION INTRAVENOUS PRN
Status: CANCELLED
Start: 2018-07-25

## 2018-07-25 RX ADMIN — DEXTROSE MONOHYDRATE: 50 INJECTION, SOLUTION INTRAVENOUS at 08:46

## 2018-07-25 RX ADMIN — DIPHENHYDRAMINE HCL 25 MG: 25 TABLET ORAL at 13:30

## 2018-07-25 RX ADMIN — SODIUM CHLORIDE, PRESERVATIVE FREE 500 UNITS: 5 INJECTION INTRAVENOUS at 16:17

## 2018-07-25 RX ADMIN — Medication 20 ML: at 16:17

## 2018-07-25 RX ADMIN — DIPHENHYDRAMINE HCL 25 MG: 25 TABLET ORAL at 09:01

## 2018-07-25 RX ADMIN — DEXTROSE AND SODIUM CHLORIDE: 5; 330 INJECTION, SOLUTION INTRAVENOUS at 08:46

## 2018-07-25 RX ADMIN — IMMUNE GLOBULIN INTRAVENOUS (HUMAN): 5 INJECTION, SOLUTION INTRAVENOUS at 09:29

## 2018-07-25 RX ADMIN — Medication 20 ML: at 08:45

## 2018-07-25 ASSESSMENT — PAIN SCALES - GENERAL: PAINLEVEL_OUTOF10: 1

## 2018-07-25 ASSESSMENT — PAIN DESCRIPTION - LOCATION: LOCATION: HIP

## 2018-07-25 ASSESSMENT — PAIN DESCRIPTION - PAIN TYPE: TYPE: CHRONIC PAIN

## 2018-07-25 ASSESSMENT — PAIN DESCRIPTION - ORIENTATION: ORIENTATION: LEFT

## 2018-07-25 NOTE — PROGRESS NOTES
Pt arrives per ambulatory per self with cane and states doing well. Orders reviewed and next labs due on 9/5/18. Pt tolerated benadryl well and D5 1/3 NS infusing at 250 ml prior and post IVIG. IVIG infusing at 20 ml/hr for 20 min, 40 ml/hr for 20 min, 80 ml/hr for 20 min, 160 ml/hr for 30 min, 250 ml/hr for remainder. BP checked Q 30 min as follows:. 105/61, 107/60, 114/69, 102/52, 107/61, 100/65, 107/67, 95/53, 96/59, 98/60, 111/64, 120/75, 119/68. No reactions or complaints and blood return present throughout infusion. Pt discharged per ambulatory per self with cane and calendar with next appts.

## 2018-07-27 ENCOUNTER — HOSPITAL ENCOUNTER (OUTPATIENT)
Dept: PHYSICAL THERAPY | Facility: CLINIC | Age: 63
Setting detail: THERAPIES SERIES
Discharge: HOME | End: 2018-07-27
Payer: MEDICARE

## 2018-07-27 PROCEDURE — 97110 THERAPEUTIC EXERCISES: CPT

## 2018-07-27 NOTE — FLOWSHEET NOTE
good tolerance to the challenge with minimal fatigue and good balance strategies. Pt needed moderate verbal and tactile cueing for proper technique/form when performing both mat and standing exercises. [] No change. [] Other:     Short Term Goals: MEET IN 8 VISITS Status   Pain: Pt will report less than or equal to 1-2/10 L hip pain at rest and less than or equal to 4-5/10 L hip pain with therapeutic strengthening and ROM exercises in order to improve tolerance to performing transfers, bed mobility, and ambulation. MET 7/13/18   ROM: Pt will improve L hip  AROM to greater than or equal to 75° flexion and 25° PROM abduction in order to promote an efficient gait pattern and improve ability to ascend and descend stairs, get in and out of bed, and perform bed mobility. MET 7/13/18  76° flexion  30° abd   Strength: Pt will will be able to demosntrate a 3/5 L hip strength in order to demonstrate improved ability to ascend stairs and complete bed mobility. Ongoing 7/13/18  3-/5- posterior trunk lean noted in sitting   Function: Pt will score greater than or equal to 25% on the LEFI in order to demonstrate improved function with ADLs and work related tasks. MET 7/13/18  89% max function   Gait: Pt will be able to complete a TUG in less than or equal to 20\" seconds with the least restrictive AD in order to promote improved balance and gait mechanics when ambulating and home and in the community. MET 7/13/18   HEP: Pt will be independent in with HEP. MET 7/13/18   Fall Prevention: Pt will acknowledge fall prevention interventions in order to prevent falls at home, work, or in the community.  MET 7/13/18   Long Term Goal: MEET IN 16 VISITS     Pain: Pt will report less than or equal to 0-1/10 L hip pain at rest and less than or equal to 2-3/10 L hip pain in order to improve tolerance to walking on even and uneven ground, negotiating stairs, performing repeated sit to stands, and completing ADLs in order to
community. Pt will be able to complete a TUG in less than or equal to 12\" seconds without an AD in order to promote improved balance and gait mechanics when ambulating and home and in the community. Met 18   Outcome Measure: Pt will report greater than or equal to 40% on the LEFI in order to demonstrate improved function. Pt will report greater than or equal to 95% on the LEFI in order to demonstrate improved function. MET 18  89% max function                     Patient goals: ambulate without cane or walker     G-Codes: Updated Visit 9  Functional Limitation: Mobility: moving and walking around  Functional Assessment Used: TUG: 15\"  Current Status Modifier: CK  Goal Status Modifier: CI    G-Codes: Initial  Functional Limitation: Mobility: moving and walking around  Functional Assessment Used: TU\"  Current Status Modifier: CN  Goal Status Modifier: CK      Pt. Education:  [] Yes  [x] No  [] Reviewed Prior HEP/Ed  Method of Education: [] Verbal:   [] Demo  [] Written  Comprehension of Education:  [] Verbalizes understanding. [] Demonstrates understanding. [] Needs review. [] Demonstrates/verbalizes HEP/Ed previously given. Plan: [x] Continue per plan of care. [] Other:      Time In: 8:05 am             Time Out: 8:55 am    Electronically signed by:   ARTURO Ortiz

## 2018-07-30 ENCOUNTER — HOSPITAL ENCOUNTER (OUTPATIENT)
Dept: PHYSICAL THERAPY | Facility: CLINIC | Age: 63
Setting detail: THERAPIES SERIES
Discharge: HOME | End: 2018-07-30
Payer: MEDICARE

## 2018-07-30 PROCEDURE — G8979 MOBILITY GOAL STATUS: HCPCS

## 2018-07-30 PROCEDURE — 97110 THERAPEUTIC EXERCISES: CPT

## 2018-07-30 PROCEDURE — G8980 MOBILITY D/C STATUS: HCPCS

## 2018-07-30 NOTE — FLOWSHEET NOTE
[] [facility]       Outpatient Physical        Therapy       [address]       Phone: [phone]        Fax: [phone] [x] [facility] at [address] [facility]       Phone: [phone]       Fax: [phone] [] [facility]  [address] [facility]   Phone: [phone]   Fax:  [phone]     Physical Therapy Daily Treatment Note    Date:  2018  Patient Name:  Mejia Henson     :  1955  MRN: [id number]  Physician: Dr. Ely Rebollar: Medicare  Medical Diagnosis: s/p L LANRE                      Rehab Codes: R60.0/M25.452, M25.552, M25.652, R26.2NEC, M62.552, P59.447  Onset Date: 18                 Next 's appt: 18  Visit# / total visits:    Cancels/No Shows:      Subjective:    Pain:  [x] Yes  [] No  Location: L hip   Pain Rating: (0-10 scale) 1-2/10  Pain altered Tx:  [x] No  [] Yes  Action:    Comments: Pt reports he has mild soreness in the front of his hip. Pt reports the pain remains the same without any significant fluctuations in pain. Pt reports he was able to wear jeans the other day without any issue. Pt reports he rolled the R ankle when he was walking down the driveway but did fall. Pt reports her L hip hurts only in the mornign and is described as stiffness which improves with movement. Pt notes his L lateral knee pain is very intermittent now. Pt states he is taking Tylenol and Tramadol as needed. Pt notes he does get sore while doing activities as home but then sits down and relaxes. Pt notes he is able to get on the floor and sit on his knee without pain. Objective:  Modalities: Vasocompression 15' after exercises, 34 deg, LOW compression, with towel roll under knee. - deferred 18  Precautions: pt wears O2.  NO SLR - anterior hip precautions  Exercises:  Exercise Reps/ Time Weight/ Level Comments   Nu-step 10 MET 7/13/18   Fall Prevention: Pt will acknowledge fall prevention interventions in order to prevent falls at home, work, or in the community. MET 7/13/18   Long Term Goal: MEET IN 16 VISITS     Pain: Pt will report less than or equal to 0-1/10 L hip pain at rest and less than or equal to 2-3/10 L hip pain in order to improve tolerance to walking on even and uneven ground, negotiating stairs, performing repeated sit to stands, and completing ADLs in order to progress to prior level of activity MET 7/30/18   ROM: Pt will improve L hip AROM to greater than or equal to 90° flexion and 30° PROM abduction  in order to climb stairs, ambulate, and progress to prior level of activity. MET 7/30/18   Strength: Pt will demonstrate improved BLE strength in order to promote improved dynamic stability, force absorption mechanism, and overall strength in order to improve tolerance to transfers, bed mobility, ambulation, and stair negotiation. L hip flexion: 4-/5- 4-/5  L hip abd: good (sitting)- 4/5, sidelying  L hip add: good (sitting)- good  L hip ext: good (sitting)- good  L knee extension: 5/5- 5/5  L knee flexion: 4+/5- 4+/5  MET 7/30/18   Gait: Pt will be able to complete a TUG in less than or equal to 16\" seconds without an AD in order to promote improved balance and gait mechanics when ambulating and home and in the community. Pt will be able to complete a TUG in less than or equal to 12\" seconds without an AD in order to promote improved balance and gait mechanics when ambulating and home and in the community. Met 7/13/18          MET 7/30/18  10\" without AD   Outcome Measure: Pt will report greater than or equal to 40% on the LEFI in order to demonstrate improved function. Pt will report greater than or equal to 95% on the LEFI in order to demonstrate improved function.      MET 7/13/18  89% max function    NOT MET 7/30/18                       Patient goals: ambulate without cane or walker     G-Codes: Updated Visit 16  Functional Limitation: Mobility: moving and walking around  Functional Assessment Used: TUG: 10\"  Discharge Status Modifier: CI  Goal Status Modifier: CI    G-Codes: Updated Visit 9  Functional Limitation: Mobility: moving and walking around  Functional Assessment Used: TUG: 15\"  Current Status Modifier: CK  Goal Status Modifier: CI    G-Codes: Initial  Functional Limitation: Mobility: moving and walking around  Functional Assessment Used: TU\"  Current Status Modifier: CN  Goal Status Modifier: CK      Pt. Education:  [x] Yes  [] No  [] Reviewed Prior HEP/Ed  Method of Education: [x] Verbal:   [] Demo  [x] Written  Comprehension of Education:  [x] Verbalizes understanding. [x] Demonstrates understanding. [] Needs review. [] Demonstrates/verbalizes HEP/Ed previously given. Plan: [] Continue per plan of care.    [x] Other: Discharge 18      Time In: 9:00 am             Time Out: 10:00 am    Electronically signed by:  Lucita Sweet

## 2018-07-30 NOTE — DISCHARGE SUMMARY
[] [facility]        Outpatient Physical                Therapy       [address]       Phone: [phone]       Fax: [phone] [x] [facility] at [address] [facility]       Phone: [phone]       Fax: [phone] [] [place]. [address] [place] [address]     Phone: [phone]     Fax:  [phone]     Physical Therapy Discharge Note    Date: 2018      Patient: Justin Sheriff  : 1955  MRN: [id number]    Physician: Dr. Yvan Jarvis: Medicare  Medical Diagnosis: s/p L LANRE                      Rehab Codes: R60.0/M25.452, M25.552, M25.652, R26.2NEC, M62.552, A65.964  Onset Date: 18                              Next 's appt: 18  Visit# / total visits:                               Cancels/No Shows:   Date of initial visit: 18                Date of final visit: 18      Subjective:    Pain:  [x] Yes  [] No                      Location: L hip                         Pain Rating: (0-10 scale) 1-2/10  Pain altered Tx:  [x] No  [] Yes  Action:     Comments: Pt reports he has mild soreness in the front of his hip. Pt reports the pain remains the same without any significant fluctuations in pain. Pt reports he was able to wear jeans the other day without any issue. Pt reports he rolled the R ankle when he was walking down the driveway but did fall. Pt reports her L hip hurts only in the mornign and is described as stiffness which improves with movement. Pt notes his L lateral knee pain is very intermittent now. Pt states he is taking Tylenol and Tramadol as needed. Pt notes he does get sore while doing activities as home but then sits down and relaxes. Pt notes he is able to get on the floor and sit on his knee without pain.       Objective:  18: TUG: 10\" (wihtout an assistive device)  L strength assessment  Hip Modifier: CI      Treatment to Date:  [x] Therapeutic Exercise    [] Modalities:  [] Therapeutic Activity    [] Ultrasound  [] Electrical Stimulation  [x] Gait Training     [] Massage       [] Lumbar/Cervical Traction  [] Neuromuscular Re-education [x] Cold/hotpack [] Iontophoresis: 4 mg/mL  [x] Instruction in Home Exercise Program                     Dexamethasone Sodium  [x] Manual Therapy             Phosphate 40-80 mAmin  [] Aquatic Therapy                   [x] Vasocompression/    [] Other:             Game Ready    Discharge Status:     [x] Pt recovered from conditions. Treatment goals were met. [x] Pt to continue exercise/home instructions independently. [x] Pt has completed prescribed number of treatment sessions. Electronically signed by Raiza Fuller PT on 7/30/2018 at 12:50 PM      If you have any questions or concerns, please don't hesitate to call.   Thank you for your referral.

## 2018-08-06 DIAGNOSIS — F41.9 ANXIETY: Chronic | ICD-10-CM

## 2018-08-07 ENCOUNTER — CARE COORDINATION (OUTPATIENT)
Dept: CASE MANAGEMENT | Age: 63
End: 2018-08-07

## 2018-08-07 RX ORDER — ALPRAZOLAM 0.5 MG/1
TABLET ORAL
Qty: 90 TABLET | Refills: 0 | Status: SHIPPED | OUTPATIENT
Start: 2018-08-07 | End: 2018-09-10 | Stop reason: SDUPTHER

## 2018-08-15 ENCOUNTER — HOSPITAL ENCOUNTER (OUTPATIENT)
Dept: INFUSION THERAPY | Facility: MEDICAL CENTER | Age: 63
Discharge: HOME | End: 2018-08-15
Payer: MEDICARE

## 2018-08-15 VITALS
HEART RATE: 79 BPM | DIASTOLIC BLOOD PRESSURE: 52 MMHG | TEMPERATURE: 97.8 F | RESPIRATION RATE: 18 BRPM | SYSTOLIC BLOOD PRESSURE: 112 MMHG

## 2018-08-15 DIAGNOSIS — D80.1 COMMON VARIABLE HYPOGAMMAGLOBULINEMIA (HCC): ICD-10-CM

## 2018-08-15 DIAGNOSIS — D83.9 COMMON VARIABLE IMMUNODEFICIENCY (HCC): ICD-10-CM

## 2018-08-15 PROCEDURE — 2580000003 HC RX 258: Performed by: ALLERGY & IMMUNOLOGY

## 2018-08-15 PROCEDURE — 96366 THER/PROPH/DIAG IV INF ADDON: CPT

## 2018-08-15 PROCEDURE — 6360000002 HC RX W HCPCS: Performed by: ALLERGY & IMMUNOLOGY

## 2018-08-15 PROCEDURE — 96361 HYDRATE IV INFUSION ADD-ON: CPT

## 2018-08-15 PROCEDURE — 6370000000 HC RX 637 (ALT 250 FOR IP): Performed by: ALLERGY & IMMUNOLOGY

## 2018-08-15 PROCEDURE — 96365 THER/PROPH/DIAG IV INF INIT: CPT

## 2018-08-15 RX ORDER — DEXTROSE MONOHYDRATE 50 MG/ML
INJECTION, SOLUTION INTRAVENOUS CONTINUOUS
Status: DISCONTINUED | OUTPATIENT
Start: 2018-08-15 | End: 2018-08-16 | Stop reason: HOSPADM

## 2018-08-15 RX ORDER — DEXTROSE AND SODIUM CHLORIDE 5; .33 G/100ML; G/100ML
INJECTION, SOLUTION INTRAVENOUS CONTINUOUS
Status: CANCELLED
Start: 2018-08-15

## 2018-08-15 RX ORDER — DEXTROSE MONOHYDRATE 50 MG/ML
INJECTION, SOLUTION INTRAVENOUS CONTINUOUS
Status: CANCELLED
Start: 2018-08-15

## 2018-08-15 RX ORDER — HEPARIN SODIUM (PORCINE) LOCK FLUSH IV SOLN 100 UNIT/ML 100 UNIT/ML
500 SOLUTION INTRAVENOUS PRN
Status: CANCELLED
Start: 2018-08-15

## 2018-08-15 RX ORDER — DIPHENHYDRAMINE HCL 25 MG
25 TABLET ORAL ONCE
Status: COMPLETED | OUTPATIENT
Start: 2018-08-15 | End: 2018-08-15

## 2018-08-15 RX ORDER — DIPHENHYDRAMINE HCL 25 MG
25 TABLET ORAL EVERY 4 HOURS PRN
Status: CANCELLED
Start: 2018-08-15

## 2018-08-15 RX ORDER — 0.9 % SODIUM CHLORIDE 0.9 %
10 VIAL (ML) INJECTION PRN
Status: DISCONTINUED | OUTPATIENT
Start: 2018-08-15 | End: 2018-08-16 | Stop reason: HOSPADM

## 2018-08-15 RX ORDER — HEPARIN SODIUM (PORCINE) LOCK FLUSH IV SOLN 100 UNIT/ML 100 UNIT/ML
500 SOLUTION INTRAVENOUS PRN
Status: DISCONTINUED | OUTPATIENT
Start: 2018-08-15 | End: 2018-08-16 | Stop reason: HOSPADM

## 2018-08-15 RX ORDER — 0.9 % SODIUM CHLORIDE 0.9 %
10 VIAL (ML) INJECTION PRN
Status: CANCELLED | OUTPATIENT
Start: 2018-08-15

## 2018-08-15 RX ORDER — DEXTROSE AND SODIUM CHLORIDE 5; .33 G/100ML; G/100ML
INJECTION, SOLUTION INTRAVENOUS CONTINUOUS
Status: DISCONTINUED | OUTPATIENT
Start: 2018-08-15 | End: 2018-08-16 | Stop reason: HOSPADM

## 2018-08-15 RX ORDER — DIPHENHYDRAMINE HCL 25 MG
25 TABLET ORAL EVERY 4 HOURS PRN
Status: DISCONTINUED | OUTPATIENT
Start: 2018-08-15 | End: 2018-08-16 | Stop reason: HOSPADM

## 2018-08-15 RX ORDER — DIPHENHYDRAMINE HCL 25 MG
25 TABLET ORAL ONCE
Status: CANCELLED | OUTPATIENT
Start: 2018-08-15 | End: 2018-08-15

## 2018-08-15 RX ADMIN — DEXTROSE MONOHYDRATE: 50 INJECTION, SOLUTION INTRAVENOUS at 08:40

## 2018-08-15 RX ADMIN — Medication 10 ML: at 15:52

## 2018-08-15 RX ADMIN — SODIUM CHLORIDE, PRESERVATIVE FREE 500 UNITS: 5 INJECTION INTRAVENOUS at 15:52

## 2018-08-15 RX ADMIN — DIPHENHYDRAMINE HCL 25 MG: 25 TABLET ORAL at 13:28

## 2018-08-15 RX ADMIN — DIPHENHYDRAMINE HCL 25 MG: 25 TABLET ORAL at 08:45

## 2018-08-15 RX ADMIN — IMMUNE GLOBULIN (HUMAN): 0.05 INJECTION, SOLUTION INTRAVENOUS at 09:12

## 2018-08-15 RX ADMIN — DEXTROSE AND SODIUM CHLORIDE: 5; 330 INJECTION, SOLUTION INTRAVENOUS at 08:40

## 2018-08-15 ASSESSMENT — PAIN DESCRIPTION - ORIENTATION: ORIENTATION: LEFT

## 2018-08-15 ASSESSMENT — PAIN DESCRIPTION - PAIN TYPE: TYPE: SURGICAL PAIN

## 2018-08-15 ASSESSMENT — PAIN DESCRIPTION - LOCATION: LOCATION: HIP

## 2018-08-15 NOTE — PROGRESS NOTES
60Patient arrives per ambulation for IVIG infusion. Orders reviewed. Wearing oxygen at 3L/NC. Denies any recent illness or fever. Right chest port accessed as per policy without difficulty with brisk, excellent blood return, no sign of infection, flushes easily. Pre-hydration of 250 ml of  D5 with 1/3 normal saline given as per order. See MAR for pre-medication. 0912-IVIG 60 grams IV hung. Rate initiated at 20 ml per hour for 20 minutes, then 40 ml per hour for 20 minutes, then 80 ml per hour for 20 minutes, then 160 ml per hour for 20 minutes, then increased to and maintained at 250 ml per hour until completed at 1524. Post hydration of 250 ml of D5 with 1/3 normal saline given as ordered. Tolerated IVIG infusion well, no complaints voiced, no reaction. Vital signs every 15 minutes:  102/63   71  104/65   74  104/62   73  90/60   78  109/70   79  110/65   75  106/67   78  102/54   77  109/66   76  108/63   79  109/67   77  100/60   76  97/55   76  108/53   76  112/60   75  109/67   78  111/63   75  Vital signs every 30 minutes:  114/56   81  110/60   79  103/61   78  107/65   79  107/57   76  Appointment calendar given with instructions with understanding voiced. RTC 9/5/18.

## 2018-08-17 ENCOUNTER — OFFICE VISIT (OUTPATIENT)
Dept: ORTHOPEDIC SURGERY | Age: 63
End: 2018-08-17

## 2018-08-17 VITALS — WEIGHT: 263 LBS | HEIGHT: 68 IN | BODY MASS INDEX: 39.86 KG/M2

## 2018-08-17 DIAGNOSIS — Z96.642 STATUS POST TOTAL HIP REPLACEMENT, LEFT: ICD-10-CM

## 2018-08-17 DIAGNOSIS — M87.052 AVASCULAR NECROSIS OF BONE OF LEFT HIP (HCC): Primary | ICD-10-CM

## 2018-08-17 PROCEDURE — 99024 POSTOP FOLLOW-UP VISIT: CPT | Performed by: ORTHOPAEDIC SURGERY

## 2018-08-17 ASSESSMENT — ENCOUNTER SYMPTOMS
COUGH: 0
CONSTIPATION: 0
DIARRHEA: 0
NAUSEA: 0

## 2018-08-17 NOTE — PROGRESS NOTES
[address] [place] [address] [address] [place] [address]  Dept: [phone]  Dept Fax: [phone]        Postoperative follow-up note    Subjective:   Ginny Akins is a 63y.o. year old male who presents to our office today for postoperative followup regarding his   1. Avascular necrosis of bone of left hip (HCC)    2. Status post total hip replacement, left    . Chief Complaint   Patient presents with    Hip Pain     left LANRE DOS:05/23/2018     Ginny Akins  is a 63y.o. year old male who presents to our office today for postoperative follow up after having undergone a left total hip arthroplasty on 5/22/18. The patient denies fevers, chills, nausea, vomiting, diarrhea. The patient has started physical therapy. Patient states that he feels like he is doing well. He does have some pain in the groin area at times. Review of Systems   Constitutional: Negative for chills and fever. Respiratory: Negative for cough. Gastrointestinal: Negative for constipation, diarrhea and nausea. Musculoskeletal: Negative for arthralgias, gait problem, joint swelling and myalgias. Neurological: Negative for dizziness, weakness and numbness. I have reviewed the CC, HPI, ROS, PMH, FHX, Social History. I agree with the documentation provided by other staff, residents and have reviewed their documentation prior to providing my signature indicating agreement. Objective :   General: Ginny Akins is a 63 y.o. male who is alert and oriented and sitting comfortably in our office. Ortho Exam  MS:   Minimal limp with ambulation is noted. Patient has negative hip logroll and Stinchfield test on the left. Motor, sensory, vascular examination left lower extremity is grossly intact without focal deficits. Neuro: alert. oriented  Eyes: Extra-ocular muscles intact  Mouth: Oral mucosa moist. No perioral lesions  Pulm: Respirations unlabored and regular.   Skin: warm, well

## 2018-08-29 ENCOUNTER — HOSPITAL ENCOUNTER (OUTPATIENT)
Facility: MEDICAL CENTER | Age: 63
End: 2018-08-29
Payer: MEDICARE

## 2018-09-05 ENCOUNTER — HOSPITAL ENCOUNTER (OUTPATIENT)
Dept: INFUSION THERAPY | Facility: MEDICAL CENTER | Age: 63
Discharge: HOME | End: 2018-09-05
Payer: MEDICARE

## 2018-09-05 VITALS
HEART RATE: 77 BPM | TEMPERATURE: 97.8 F | RESPIRATION RATE: 18 BRPM | SYSTOLIC BLOOD PRESSURE: 125 MMHG | DIASTOLIC BLOOD PRESSURE: 74 MMHG

## 2018-09-05 DIAGNOSIS — D83.9 COMMON VARIABLE IMMUNODEFICIENCY (HCC): ICD-10-CM

## 2018-09-05 DIAGNOSIS — D83.9 COMMON VARIABLE IMMUNODEFICIENCY (HCC): Primary | Chronic | ICD-10-CM

## 2018-09-05 DIAGNOSIS — D80.1 COMMON VARIABLE HYPOGAMMAGLOBULINEMIA (HCC): ICD-10-CM

## 2018-09-05 LAB
ABSOLUTE EOS #: 0 K/UL (ref 0–0.4)
ABSOLUTE IMMATURE GRANULOCYTE: ABNORMAL K/UL (ref 0–0.3)
ABSOLUTE LYMPH #: 1.6 K/UL (ref 1–4.8)
ABSOLUTE MONO #: 0.4 K/UL (ref 0.2–0.8)
ALT SERPL-CCNC: 29 U/L (ref 5–41)
BASOPHILS # BLD: 0 % (ref 0–2)
BASOPHILS ABSOLUTE: 0 K/UL (ref 0–0.2)
CREAT SERPL-MCNC: 0.89 MG/DL (ref 0.7–1.2)
DIFFERENTIAL TYPE: ABNORMAL
EOSINOPHILS RELATIVE PERCENT: 1 % (ref 1–4)
GFR AFRICAN AMERICAN: >60 ML/MIN
GFR NON-AFRICAN AMERICAN: >60 ML/MIN
GFR SERPL CREATININE-BSD FRML MDRD: NORMAL ML/MIN/{1.73_M2}
GFR SERPL CREATININE-BSD FRML MDRD: NORMAL ML/MIN/{1.73_M2}
HCT VFR BLD CALC: 42.8 % (ref 41–53)
HEMOGLOBIN: 14.3 G/DL (ref 13.5–17.5)
IGA: 93 MG/DL (ref 70–400)
IGG: 921 MG/DL (ref 700–1600)
IGM: 121 MG/DL (ref 40–230)
IMMATURE GRANULOCYTES: ABNORMAL %
LYMPHOCYTES # BLD: 20 % (ref 24–44)
MCH RBC QN AUTO: 28.5 PG (ref 26–34)
MCHC RBC AUTO-ENTMCNC: 33.5 G/DL (ref 31–37)
MCV RBC AUTO: 85.2 FL (ref 80–100)
MONOCYTES # BLD: 4 % (ref 1–7)
NRBC AUTOMATED: ABNORMAL PER 100 WBC
PDW BLD-RTO: 14.7 % (ref 11.5–14.5)
PLATELET # BLD: 269 K/UL (ref 130–400)
PLATELET ESTIMATE: ABNORMAL
PMV BLD AUTO: 8.3 FL (ref 6–12)
RBC # BLD: 5.03 M/UL (ref 4.5–5.9)
RBC # BLD: ABNORMAL 10*6/UL
SEG NEUTROPHILS: 75 % (ref 36–66)
SEGMENTED NEUTROPHILS ABSOLUTE COUNT: 6 K/UL (ref 1.8–7.7)
WBC # BLD: 8 K/UL (ref 3.5–11)
WBC # BLD: ABNORMAL 10*3/UL

## 2018-09-05 PROCEDURE — 36591 DRAW BLOOD OFF VENOUS DEVICE: CPT

## 2018-09-05 PROCEDURE — 96361 HYDRATE IV INFUSION ADD-ON: CPT

## 2018-09-05 PROCEDURE — 96360 HYDRATION IV INFUSION INIT: CPT

## 2018-09-05 PROCEDURE — 96366 THER/PROPH/DIAG IV INF ADDON: CPT

## 2018-09-05 PROCEDURE — 85025 COMPLETE CBC W/AUTO DIFF WBC: CPT

## 2018-09-05 PROCEDURE — 96365 THER/PROPH/DIAG IV INF INIT: CPT

## 2018-09-05 PROCEDURE — 82565 ASSAY OF CREATININE: CPT

## 2018-09-05 PROCEDURE — 6370000000 HC RX 637 (ALT 250 FOR IP): Performed by: ALLERGY & IMMUNOLOGY

## 2018-09-05 PROCEDURE — 36415 COLL VENOUS BLD VENIPUNCTURE: CPT

## 2018-09-05 PROCEDURE — 84460 ALANINE AMINO (ALT) (SGPT): CPT

## 2018-09-05 PROCEDURE — 6360000002 HC RX W HCPCS: Performed by: ALLERGY & IMMUNOLOGY

## 2018-09-05 PROCEDURE — 82784 ASSAY IGA/IGD/IGG/IGM EACH: CPT

## 2018-09-05 PROCEDURE — 2580000003 HC RX 258: Performed by: ALLERGY & IMMUNOLOGY

## 2018-09-05 RX ORDER — DIPHENHYDRAMINE HCL 25 MG
25 TABLET ORAL ONCE
Status: COMPLETED | OUTPATIENT
Start: 2018-09-05 | End: 2018-09-05

## 2018-09-05 RX ORDER — DEXTROSE AND SODIUM CHLORIDE 5; .33 G/100ML; G/100ML
INJECTION, SOLUTION INTRAVENOUS CONTINUOUS
Status: DISCONTINUED | OUTPATIENT
Start: 2018-09-05 | End: 2018-09-06 | Stop reason: HOSPADM

## 2018-09-05 RX ORDER — DEXTROSE MONOHYDRATE 50 MG/ML
INJECTION, SOLUTION INTRAVENOUS CONTINUOUS
Status: DISCONTINUED | OUTPATIENT
Start: 2018-09-05 | End: 2018-09-06 | Stop reason: HOSPADM

## 2018-09-05 RX ORDER — HEPARIN SODIUM (PORCINE) LOCK FLUSH IV SOLN 100 UNIT/ML 100 UNIT/ML
500 SOLUTION INTRAVENOUS PRN
Status: CANCELLED
Start: 2018-09-05

## 2018-09-05 RX ORDER — DIPHENHYDRAMINE HCL 25 MG
25 TABLET ORAL EVERY 4 HOURS PRN
Status: CANCELLED
Start: 2018-09-05

## 2018-09-05 RX ORDER — DIPHENHYDRAMINE HCL 25 MG
25 TABLET ORAL ONCE
Status: CANCELLED | OUTPATIENT
Start: 2018-09-05 | End: 2018-09-05

## 2018-09-05 RX ORDER — HEPARIN SODIUM (PORCINE) LOCK FLUSH IV SOLN 100 UNIT/ML 100 UNIT/ML
500 SOLUTION INTRAVENOUS PRN
Status: DISCONTINUED | OUTPATIENT
Start: 2018-09-05 | End: 2018-09-06 | Stop reason: HOSPADM

## 2018-09-05 RX ORDER — DIPHENHYDRAMINE HCL 25 MG
25 TABLET ORAL EVERY 4 HOURS PRN
Status: DISCONTINUED | OUTPATIENT
Start: 2018-09-05 | End: 2018-09-06 | Stop reason: HOSPADM

## 2018-09-05 RX ORDER — DEXTROSE AND SODIUM CHLORIDE 5; .33 G/100ML; G/100ML
INJECTION, SOLUTION INTRAVENOUS CONTINUOUS
Status: CANCELLED
Start: 2018-09-05

## 2018-09-05 RX ORDER — 0.9 % SODIUM CHLORIDE 0.9 %
10 VIAL (ML) INJECTION PRN
Status: DISCONTINUED | OUTPATIENT
Start: 2018-09-05 | End: 2018-09-06 | Stop reason: HOSPADM

## 2018-09-05 RX ORDER — DEXTROSE MONOHYDRATE 50 MG/ML
INJECTION, SOLUTION INTRAVENOUS CONTINUOUS
Status: CANCELLED
Start: 2018-09-05

## 2018-09-05 RX ORDER — 0.9 % SODIUM CHLORIDE 0.9 %
10 VIAL (ML) INJECTION PRN
Status: CANCELLED | OUTPATIENT
Start: 2018-09-05

## 2018-09-05 RX ADMIN — DIPHENHYDRAMINE HCL 25 MG: 25 TABLET ORAL at 08:52

## 2018-09-05 RX ADMIN — DIPHENHYDRAMINE HCL 25 MG: 25 TABLET ORAL at 12:52

## 2018-09-05 RX ADMIN — DEXTROSE MONOHYDRATE: 50 INJECTION, SOLUTION INTRAVENOUS at 08:40

## 2018-09-05 RX ADMIN — DEXTROSE AND SODIUM CHLORIDE: 5; 330 INJECTION, SOLUTION INTRAVENOUS at 08:40

## 2018-09-05 RX ADMIN — IMMUNE GLOBULIN (HUMAN): 0.05 INJECTION, SOLUTION INTRAVENOUS at 09:20

## 2018-09-05 RX ADMIN — Medication 10 ML: at 16:05

## 2018-09-05 RX ADMIN — HEPARIN 500 UNITS: 100 SYRINGE at 16:05

## 2018-09-05 ASSESSMENT — PAIN SCALES - GENERAL: PAINLEVEL_OUTOF10: 0

## 2018-09-05 NOTE — PROGRESS NOTES
0830:  Pt arrives ambulatory on own portable [email or web address] via nasal cannula for IVIG infusion. Dr. Ju Rai orders from 5/14/18 reviewed. Pt denies any recent fever/chills/illnesses or infections. Pt denies any pain or other complaints. 0840:  CDP, ALT, Creatinine and Immunoglobulin panel drawn and sent to labs pending and IVF's D5W 0.33% NaCl begun at 500ml/hr to infuse 250ml before starting IVIG infusion as ordered for pre-hydration. See MAR for pre meds given to patient. 0920:  Flebogamma 5% 60Gm IVIG hung. Rate initiated at 20ml/hr for the first 20 minutes. Pt tolerated well. No s/s adverse reaction noted. Rate increased to 40ml/hr for the next 20 minutes. Pt tolerated well. No s/s adverse reaction noted. Rate increased to 80ml/hr for the next 20 minutes. Pt tolerated well. No s/s adverse reaction noted. Rate increased to 160ml/hr for the next 30 minutes. Pt tolerated well. No s/s adverse reaction noted. Rate increased to and maintained at 250ml./hr until completion time of 1535. VS taken before infusion start, before each infusion rate increase, then every 30 minutes during infusion, and then 30 minutes post IVIG infusion as follows:    Baseline VS: 125/74   77   18   97.8F PO  0920:  114/66   72  0940:  118/68   62  1000:  111/71   67  1020:  107/63   71  1050:  115/68   70  1120:  121/74   80  1150:  106/62   72  1220:  110/66   69  1250:  115/67   73  1320:  116/81   85  1350:  110/70   72  1420:    131/71   70    1450:  125/69   77  1520:  114/63   68  1535:  118/63   69  1605:  114/63   70    Pt tolerated IVIG infusion well without complaints. Pt observed for 30 minutes post IVIG infusion while getting IVF's D5W 0.33% NaCl infusing at 500ml/hr to infuse 250ml as ordered for post hydration. No s/s adverse reactions noted. Pt tolerated both pre-hydration and post-hydration well without complaints. Appointment calendar given to patient. RTC on 9/26/18.   Pt discharged home ambulatory on own portable [email or web address] via nasal cannula without complaints and/or no new concerns voiced.

## 2018-09-06 ENCOUNTER — CARE COORDINATION (OUTPATIENT)
Dept: CASE MANAGEMENT | Age: 63
End: 2018-09-06

## 2018-09-10 DIAGNOSIS — F41.9 ANXIETY: Chronic | ICD-10-CM

## 2018-09-10 RX ORDER — ALPRAZOLAM 0.5 MG/1
TABLET ORAL
Qty: 90 TABLET | Refills: 0 | Status: SHIPPED | OUTPATIENT
Start: 2018-09-10 | End: 2018-10-05 | Stop reason: SDUPTHER

## 2018-09-12 ENCOUNTER — OFFICE VISIT (OUTPATIENT)
Dept: FAMILY MEDICINE CLINIC | Age: 63
End: 2018-09-12
Payer: MEDICARE

## 2018-09-12 VITALS
HEIGHT: 68 IN | WEIGHT: 263.6 LBS | OXYGEN SATURATION: 98 % | HEART RATE: 87 BPM | DIASTOLIC BLOOD PRESSURE: 70 MMHG | SYSTOLIC BLOOD PRESSURE: 128 MMHG | BODY MASS INDEX: 39.95 KG/M2

## 2018-09-12 DIAGNOSIS — J34.89 NASAL LESION: Primary | ICD-10-CM

## 2018-09-12 DIAGNOSIS — Z23 NEED FOR IMMUNIZATION AGAINST INFLUENZA: ICD-10-CM

## 2018-09-12 DIAGNOSIS — L85.3 DRY SKIN: ICD-10-CM

## 2018-09-12 DIAGNOSIS — G89.29 OTHER CHRONIC PAIN: ICD-10-CM

## 2018-09-12 PROCEDURE — 1036F TOBACCO NON-USER: CPT | Performed by: PHYSICIAN ASSISTANT

## 2018-09-12 PROCEDURE — 3017F COLORECTAL CA SCREEN DOC REV: CPT | Performed by: PHYSICIAN ASSISTANT

## 2018-09-12 PROCEDURE — G0008 ADMIN INFLUENZA VIRUS VAC: HCPCS | Performed by: PHYSICIAN ASSISTANT

## 2018-09-12 PROCEDURE — G8417 CALC BMI ABV UP PARAM F/U: HCPCS | Performed by: PHYSICIAN ASSISTANT

## 2018-09-12 PROCEDURE — 90686 IIV4 VACC NO PRSV 0.5 ML IM: CPT | Performed by: PHYSICIAN ASSISTANT

## 2018-09-12 PROCEDURE — 99213 OFFICE O/P EST LOW 20 MIN: CPT | Performed by: PHYSICIAN ASSISTANT

## 2018-09-12 PROCEDURE — G8427 DOCREV CUR MEDS BY ELIG CLIN: HCPCS | Performed by: PHYSICIAN ASSISTANT

## 2018-09-12 RX ORDER — TRAMADOL HYDROCHLORIDE 50 MG/1
50 TABLET ORAL EVERY 8 HOURS PRN
Qty: 60 TABLET | Refills: 0 | Status: SHIPPED | OUTPATIENT
Start: 2018-09-12 | End: 2018-11-30 | Stop reason: SDUPTHER

## 2018-09-12 RX ORDER — CLOTRIMAZOLE AND BETAMETHASONE DIPROPIONATE 10; .64 MG/G; MG/G
CREAM TOPICAL
Qty: 60 G | Refills: 0 | Status: ON HOLD | OUTPATIENT
Start: 2018-09-12 | End: 2019-01-14

## 2018-09-12 ASSESSMENT — ENCOUNTER SYMPTOMS
SHORTNESS OF BREATH: 0
SINUS PAIN: 0
COLOR CHANGE: 0
SORE THROAT: 0
SINUS PRESSURE: 0
RHINORRHEA: 0
COUGH: 0

## 2018-09-12 NOTE — PROGRESS NOTES
AS NEEDED 90 tablet 0    mupirocin (BACTROBAN NASAL) 2 % nasal ointment Take by Nasal route 2 times daily. 1 Tube 3    vitamin D (ERGOCALCIFEROL) 14986 units CAPS capsule Take 1 capsule by mouth once a week 4 capsule 1    alendronate (FOSAMAX) 70 MG tablet Take 1 tablet by mouth every 7 days (Patient taking differently: Take 70 mg by mouth every 7 days ) 4 tablet 3    albuterol sulfate HFA (PROAIR HFA) 108 (90 Base) MCG/ACT inhaler Inhale 2 puffs into the lungs every 6 hours as needed for Wheezing 3 Inhaler 3    omeprazole (PRILOSEC) 20 MG delayed release capsule take 1 capsule by mouth once daily 90 capsule 3    amLODIPine (NORVASC) 10 MG tablet take 1 tablet by mouth once daily 90 tablet 3    benazepril (LOTENSIN) 10 MG tablet take 1 tablet by mouth once daily 90 tablet 3    predniSONE (DELTASONE) 10 MG tablet Take 10 mg by mouth See Admin Instructions Taper dose for 3days       SPIRIVA HANDIHALER 18 MCG inhalation capsule inhale contents of 1 capsule by mouth once daily 30 capsule 11    fluticasone (FLONASE) 50 MCG/ACT nasal spray instill 2 sprays into each nostril twice a day 1 Bottle 11    albuterol (PROVENTIL) (2.5 MG/3ML) 0.083% nebulizer solution Take 3 mLs by nebulization every 4 hours as needed for Wheezing 120 each 5    Immune Globulin, Human, 10 GM/100ML SOLN IV solution Infuse 60 g intravenously every 21 days       budesonide-formoterol (SYMBICORT) 160-4.5 MCG/ACT AERO Inhale 2 puffs into the lungs 2 times daily 3 Inhaler 3    montelukast (SINGULAIR) 10 MG tablet Take 1 tablet by mouth daily 90 tablet 3     No current facility-administered medications for this visit.       No Known Allergies    Health Maintenance   Topic Date Due    DTaP/Tdap/Td vaccine (1 - Tdap) 08/29/1976    A1C test (Diabetic or Prediabetic)  03/01/2017    Colon cancer screen colonoscopy  01/01/2019    Potassium monitoring  05/27/2019    Creatinine monitoring  09/05/2019    Lipid screen  03/04/2020    Pneumococcal highest risk (3 of 3 - PPSV23) 12/02/2021    Flu vaccine  Completed    Hepatitis C screen  Completed    HIV screen  Completed       Subjective:      Review of Systems   Constitutional: Negative for activity change, appetite change, fatigue and fever. /70   Pulse 87   Ht 5' 8\" (1.727 m)   Wt 263 lb 9.6 oz (119.6 kg)   SpO2 98%   BMI 40.08 kg/m²    HENT: Negative for congestion, nosebleeds, postnasal drip, rhinorrhea, sinus pain, sinus pressure, sneezing and sore throat. Respiratory: Negative for cough and shortness of breath. Cardiovascular: Negative for chest pain. Skin: Negative for color change, pallor, rash and wound. Hematological: Negative for adenopathy. Psychiatric/Behavioral: Negative for sleep disturbance. The patient is not nervous/anxious. Objective:     Physical Exam   Constitutional: He is oriented to person, place, and time. He appears well-developed and well-nourished. HENT:   Head: Normocephalic and atraumatic. Nose: No rhinorrhea, sinus tenderness or nasal deformity. Neurological: He is alert and oriented to person, place, and time. Skin: Skin is warm and dry. No rash noted. No erythema. No pallor. Psychiatric: He has a normal mood and affect. His behavior is normal. Judgment and thought content normal.   Nursing note and vitals reviewed. /70   Pulse 87   Ht 5' 8\" (1.727 m)   Wt 263 lb 9.6 oz (119.6 kg)   SpO2 98%   BMI 40.08 kg/m²     Assessment:       Diagnosis Orders   1. Nasal lesion  clotrimazole-betamethasone (LOTRISONE) 1-0.05 % cream    GABI Davis MD   2. Need for immunization against influenza  INFLUENZA, QUADV, 3 YRS AND OLDER, IM, PF, PREFILL SYR OR SDV, 0.5ML (FLUZONE QUADV, PF)   3. Dry skin  clotrimazole-betamethasone (LOTRISONE) 1-0.05 % cream   4.  Other chronic pain  traMADol (ULTRAM) 50 MG tablet             Plan:      Return if symptoms worsen or fail to

## 2018-09-21 ENCOUNTER — OFFICE VISIT (OUTPATIENT)
Dept: ORTHOPEDIC SURGERY | Age: 63
End: 2018-09-21
Payer: MEDICARE

## 2018-09-21 VITALS — HEIGHT: 68 IN | BODY MASS INDEX: 39.86 KG/M2 | WEIGHT: 263 LBS

## 2018-09-21 DIAGNOSIS — M48.061 SPINAL STENOSIS OF LUMBAR REGION, UNSPECIFIED WHETHER NEUROGENIC CLAUDICATION PRESENT: ICD-10-CM

## 2018-09-21 DIAGNOSIS — M87.051 AVASCULAR NECROSIS OF BONE OF HIP, RIGHT (HCC): Primary | ICD-10-CM

## 2018-09-21 PROCEDURE — 3017F COLORECTAL CA SCREEN DOC REV: CPT | Performed by: ORTHOPAEDIC SURGERY

## 2018-09-21 PROCEDURE — G8427 DOCREV CUR MEDS BY ELIG CLIN: HCPCS | Performed by: ORTHOPAEDIC SURGERY

## 2018-09-21 PROCEDURE — G8417 CALC BMI ABV UP PARAM F/U: HCPCS | Performed by: ORTHOPAEDIC SURGERY

## 2018-09-21 PROCEDURE — 1036F TOBACCO NON-USER: CPT | Performed by: ORTHOPAEDIC SURGERY

## 2018-09-21 PROCEDURE — 99213 OFFICE O/P EST LOW 20 MIN: CPT | Performed by: ORTHOPAEDIC SURGERY

## 2018-09-21 ASSESSMENT — ENCOUNTER SYMPTOMS
DIARRHEA: 0
COUGH: 0
NAUSEA: 0
CONSTIPATION: 0

## 2018-09-21 NOTE — PROGRESS NOTES
[address] [place] [address] [address] [place] [address]  Dept: [phone]  Dept Fax: [phone]        Ambulatory Follow Up      Subjective:   Christina Churchill is a 66y.o. year old male who presents to our office today for routine followup regarding his   1. Avascular necrosis of bone of hip, right ([address]    2. Spinal stenosis of lumbar region, unspecified whether neurogenic claudication present    . Chief Complaint   Patient presents with    Hip Pain     bilateral, Left LANRE:5/22/18       HPI Christina Churchill  is a 66 y.o.   male who presents today in follow for left hip follow. The patient was last seen on 8/17/2018 and underwent treatment in the form of HEP. The patient notes improvement with the previous treatment but is now having issues with falling. Patient and wife notes many recent falls. The patient had a MRI of lumbar in 2016 with notes of some narrowing of the spine. The patient notices that he falls in the evening more so than the morning. He does have a cane but doesn't like to use it. He notes that he is active for a while then needs to sit for a while. The patient notes he is easily fatigued. Review of Systems   Constitutional: Negative for chills and fever. Respiratory: Negative for cough. Gastrointestinal: Negative for constipation, diarrhea and nausea. Musculoskeletal: Positive for arthralgias (left hip). Negative for gait problem, joint swelling and myalgias. Neurological: Negative for dizziness, weakness and numbness. I have reviewed the CC, HPI, ROS, PMH, FHX, Social History. I agree with the documentation provided by other staff, residents, and/or medical students and have reviewed their documentation prior to providing my signature indicating agreement. Objective :   General: Christina Churchill is a 66 y.o. male who is alert and oriented and sitting comfortably in our office.   Ortho Exam  MS:  Evaluation and exam of patient and falls, because of this issue the patient would like to proceed with MRI of lumbar w/o contrast and physical therapy. The patient will follow up when MRI of lumbar is obtained. We discussed that the patient should call us with any concerns or questions. Follow up:Return when MRI is obtained. NUNU Moreau am scribing for and in the presence of Dr. Rohini Garcia. 9/23/2018 2:24 PM    I have reviewed and made changes accordingly to the work scribed by Reggie Marin [facility] The documentation accurately reflects work and decisions made by me. I have also reviewed documentation completed by clinical staff. Rohini Garcia DO, [facility]  9/23/2018 2:26 PM     This note is created with the assistance of a speech recognition program.  While intending to generate a document that actually reflects the content of the visit, the document can still have some errors including those of syntax and sound a like substitutions which may escape proof reading.  In such instances, actual meaning can be extrapolated by contextual diversion            No orders of the defined types were placed in this encounter.          Orders Placed This Encounter   Procedures    XR HIP RIGHT (1 VIEW)     Standing Status:   Future     Number of Occurrences:   1     Standing Expiration Date:   9/21/2019     Order Specific Question:   Reason for exam:     Answer:   pain    XR HIP LEFT (2-3 VIEWS)     Standing Status:   Future     Number of Occurrences:   1     Standing Expiration Date:   9/21/2019     Order Specific Question:   Reason for exam:     Answer:   pain    MRI LUMBAR SPINE WO CONTRAST     Standing Status:   Future     Standing Expiration Date:   9/21/2019    Ambulatory referral to Physical Therapy     Referral Priority:   Routine     Referral Type:   Eval and Treat     Referral Reason:   Specialty Services Required     Requested Specialty:   Physical Therapy     Number of Visits Requested:   1       Electronically signed by Erika Avelar DO, Allana Rav on 9/23/2018 at 2:24 PM

## 2018-09-25 ENCOUNTER — HOSPITAL ENCOUNTER (OUTPATIENT)
Dept: MRI IMAGING | Age: 63
Discharge: HOME | End: 2018-09-27
Payer: MEDICARE

## 2018-09-25 DIAGNOSIS — M48.061 SPINAL STENOSIS OF LUMBAR REGION, UNSPECIFIED WHETHER NEUROGENIC CLAUDICATION PRESENT: ICD-10-CM

## 2018-09-25 DIAGNOSIS — M87.051 AVASCULAR NECROSIS OF BONE OF HIP, RIGHT (HCC): ICD-10-CM

## 2018-09-25 PROCEDURE — 72148 MRI LUMBAR SPINE W/O DYE: CPT

## 2018-09-26 ENCOUNTER — HOSPITAL ENCOUNTER (OUTPATIENT)
Dept: INFUSION THERAPY | Facility: MEDICAL CENTER | Age: 63
Discharge: HOME | End: 2018-09-26
Payer: MEDICARE

## 2018-09-26 VITALS
SYSTOLIC BLOOD PRESSURE: 103 MMHG | DIASTOLIC BLOOD PRESSURE: 67 MMHG | HEART RATE: 79 BPM | RESPIRATION RATE: 20 BRPM | TEMPERATURE: 97.5 F

## 2018-09-26 DIAGNOSIS — D80.1 COMMON VARIABLE HYPOGAMMAGLOBULINEMIA (HCC): ICD-10-CM

## 2018-09-26 DIAGNOSIS — D83.9 COMMON VARIABLE IMMUNODEFICIENCY (HCC): ICD-10-CM

## 2018-09-26 PROCEDURE — 96365 THER/PROPH/DIAG IV INF INIT: CPT

## 2018-09-26 PROCEDURE — 96366 THER/PROPH/DIAG IV INF ADDON: CPT

## 2018-09-26 PROCEDURE — 6370000000 HC RX 637 (ALT 250 FOR IP): Performed by: ALLERGY & IMMUNOLOGY

## 2018-09-26 PROCEDURE — 2580000003 HC RX 258: Performed by: ALLERGY & IMMUNOLOGY

## 2018-09-26 PROCEDURE — 96361 HYDRATE IV INFUSION ADD-ON: CPT

## 2018-09-26 PROCEDURE — 6360000002 HC RX W HCPCS: Performed by: ALLERGY & IMMUNOLOGY

## 2018-09-26 RX ORDER — 0.9 % SODIUM CHLORIDE 0.9 %
10 VIAL (ML) INJECTION PRN
Status: CANCELLED | OUTPATIENT
Start: 2018-09-26

## 2018-09-26 RX ORDER — DEXTROSE AND SODIUM CHLORIDE 5; .33 G/100ML; G/100ML
INJECTION, SOLUTION INTRAVENOUS CONTINUOUS
Status: CANCELLED
Start: 2018-09-26

## 2018-09-26 RX ORDER — DIPHENHYDRAMINE HCL 25 MG
25 TABLET ORAL ONCE
Status: CANCELLED | OUTPATIENT
Start: 2018-09-26 | End: 2018-09-26

## 2018-09-26 RX ORDER — DEXTROSE MONOHYDRATE 50 MG/ML
INJECTION, SOLUTION INTRAVENOUS CONTINUOUS
Status: DISCONTINUED | OUTPATIENT
Start: 2018-09-26 | End: 2018-09-27 | Stop reason: HOSPADM

## 2018-09-26 RX ORDER — 0.9 % SODIUM CHLORIDE 0.9 %
10 VIAL (ML) INJECTION PRN
Status: DISCONTINUED | OUTPATIENT
Start: 2018-09-26 | End: 2018-09-27 | Stop reason: HOSPADM

## 2018-09-26 RX ORDER — DIPHENHYDRAMINE HCL 25 MG
25 TABLET ORAL ONCE
Status: COMPLETED | OUTPATIENT
Start: 2018-09-26 | End: 2018-09-26

## 2018-09-26 RX ORDER — DIPHENHYDRAMINE HCL 25 MG
25 TABLET ORAL EVERY 4 HOURS PRN
Status: CANCELLED
Start: 2018-09-26

## 2018-09-26 RX ORDER — DEXTROSE AND SODIUM CHLORIDE 5; .33 G/100ML; G/100ML
INJECTION, SOLUTION INTRAVENOUS CONTINUOUS
Status: DISCONTINUED | OUTPATIENT
Start: 2018-09-26 | End: 2018-09-27 | Stop reason: HOSPADM

## 2018-09-26 RX ORDER — HEPARIN SODIUM (PORCINE) LOCK FLUSH IV SOLN 100 UNIT/ML 100 UNIT/ML
500 SOLUTION INTRAVENOUS PRN
Status: DISCONTINUED | OUTPATIENT
Start: 2018-09-26 | End: 2018-09-27 | Stop reason: HOSPADM

## 2018-09-26 RX ORDER — DEXTROSE MONOHYDRATE 50 MG/ML
INJECTION, SOLUTION INTRAVENOUS CONTINUOUS
Status: CANCELLED
Start: 2018-09-26

## 2018-09-26 RX ORDER — DIPHENHYDRAMINE HCL 25 MG
25 TABLET ORAL EVERY 4 HOURS PRN
Status: DISCONTINUED | OUTPATIENT
Start: 2018-09-26 | End: 2018-09-27 | Stop reason: HOSPADM

## 2018-09-26 RX ORDER — HEPARIN SODIUM (PORCINE) LOCK FLUSH IV SOLN 100 UNIT/ML 100 UNIT/ML
500 SOLUTION INTRAVENOUS PRN
Status: CANCELLED
Start: 2018-09-26

## 2018-09-26 RX ADMIN — HEPARIN 500 UNITS: 100 SYRINGE at 16:00

## 2018-09-26 RX ADMIN — DIPHENHYDRAMINE HCL 25 MG: 25 TABLET ORAL at 14:30

## 2018-09-26 RX ADMIN — Medication 10 ML: at 16:00

## 2018-09-26 RX ADMIN — IMMUNE GLOBULIN INTRAVENOUS (HUMAN): 5 INJECTION, SOLUTION INTRAVENOUS at 09:25

## 2018-09-26 RX ADMIN — DEXTROSE MONOHYDRATE: 50 INJECTION, SOLUTION INTRAVENOUS at 08:45

## 2018-09-26 RX ADMIN — DEXTROSE AND SODIUM CHLORIDE: 5; 330 INJECTION, SOLUTION INTRAVENOUS at 08:45

## 2018-09-26 RX ADMIN — DIPHENHYDRAMINE HCL 25 MG: 25 TABLET ORAL at 09:00

## 2018-09-26 ASSESSMENT — PAIN SCALES - GENERAL: PAINLEVEL_OUTOF10: 0

## 2018-09-26 NOTE — PROGRESS NOTES
0268:  Pt arrives ambulatory on own portable [email or web address] via nasal cannula for IVIG infusion. Pt connected to [email or web address] via nasal cannula and pt turned off his own portable O2 tank. Dr. Hayward Spurling orders from 5/14/18 reviewed. Pt denies fever/chills/illnesses or infections. Pt denies pain or other complaints. Baseline VS obtained. 0845:  Pt's right chest mediport accessed without difficulty with brisk blood return noted and IVF's D5W 0.33% NaCl begun at 500ml/hr to infuse 250ml over 30 min for pre-hydration before IVIG infusion as per MD orders and IVF's D5W also begun at 20ml/hr for med line to flush before and after IVIG infusion. Pt tolerated well without complaints. See MAR for pre-medications given to patient. 3466:  Flebogamma 5% 60Gm IVIG hung. Rate initiated at 20ml/hr for 20 min. Pt tolerated well without complaints. No s/s adverse reactions noted. Rate increased to 40ml/hr for 20 min. Pt tolerated well without complaints. No s/s adverse reactions noted. Rate increased to 80ml/hr for 20 min. Pt tolerated well without complaints. No s/s adverse reactions noted. Rate increased to 160ml/hr for 30 min. Pt tolerated well without complaints. No s/s adverse reactions noted. Rate increased to and maintained at 250ml/hr until completion time of 1530. VS taken baseline, then with each infusion rate increase, and then every 30 min during infusion, and 30 min after IVIG is completed as follows:  Baseline VS: 103/67   79   20   97.5F  PO  0925:  112/69   78    0945:  112/61   86  1005:  106/59   75  1025:  107/59   76  1055:  102/66   76  1125:  117/59   81  1155:  104/53   76  1225:  108/62   82  1255:         109/61   77  1325:  114/73   82  1355:  116/66   87  1425:  125/64   89  1455:  116/65   83  1525:  114/69   83   1555:  130/68   77    Pt tolerated IVIG well without complaints. Pt denied headaches throughout.   Pt observed for 30 min post IVIG while getting his post-hydration IVF's D5W 0.33% NaCl resumed at 500ml/hr to infuse 250ml over 30min. Pt tolerated well without complaints. No s/s adverse reactions noted. Appointment calendar given to patient. RTC on 10/17/18. Pt discharged home ambulatory on his own portable [email or web address] via nasal cannula without complaints and/or no new concerns voiced.

## 2018-09-28 ENCOUNTER — OFFICE VISIT (OUTPATIENT)
Dept: ORTHOPEDIC SURGERY | Age: 63
End: 2018-09-28
Payer: MEDICARE

## 2018-09-28 VITALS — BODY MASS INDEX: 39.86 KG/M2 | WEIGHT: 263.01 LBS | HEIGHT: 68 IN

## 2018-09-28 DIAGNOSIS — M48.061 SPINAL STENOSIS OF LUMBAR REGION, UNSPECIFIED WHETHER NEUROGENIC CLAUDICATION PRESENT: Primary | ICD-10-CM

## 2018-09-28 DIAGNOSIS — M54.30 SCIATICA, UNSPECIFIED LATERALITY: ICD-10-CM

## 2018-09-28 PROCEDURE — 99213 OFFICE O/P EST LOW 20 MIN: CPT | Performed by: ORTHOPAEDIC SURGERY

## 2018-09-28 PROCEDURE — 3017F COLORECTAL CA SCREEN DOC REV: CPT | Performed by: ORTHOPAEDIC SURGERY

## 2018-09-28 PROCEDURE — 1036F TOBACCO NON-USER: CPT | Performed by: ORTHOPAEDIC SURGERY

## 2018-09-28 PROCEDURE — G8417 CALC BMI ABV UP PARAM F/U: HCPCS | Performed by: ORTHOPAEDIC SURGERY

## 2018-09-28 PROCEDURE — G8427 DOCREV CUR MEDS BY ELIG CLIN: HCPCS | Performed by: ORTHOPAEDIC SURGERY

## 2018-09-28 ASSESSMENT — ENCOUNTER SYMPTOMS
CONSTIPATION: 0
DIARRHEA: 0
COUGH: 0
NAUSEA: 0
BACK PAIN: 1

## 2018-09-28 NOTE — PROGRESS NOTES
[facility]  10/2/2018 2:06 PM     This note is created with the assistance of a speech recognition program.  While intending to generate a document that actually reflects the content of the visit, the document can still have some errors including those of syntax and sound a like substitutions which may escape proof reading.  In such instances, actual meaning can be extrapolated by contextual diversion            Electronically signed by Varinder Levy DO, FAOAO on 10/2/2018 at 2:06 PM

## 2018-10-02 ENCOUNTER — HOSPITAL ENCOUNTER (OUTPATIENT)
Dept: PHYSICAL THERAPY | Facility: CLINIC | Age: 63
Setting detail: THERAPIES SERIES
Discharge: HOME | End: 2018-10-02
Payer: MEDICARE

## 2018-10-02 PROCEDURE — G8979 MOBILITY GOAL STATUS: HCPCS

## 2018-10-02 PROCEDURE — G8978 MOBILITY CURRENT STATUS: HCPCS

## 2018-10-02 PROCEDURE — 97110 THERAPEUTIC EXERCISES: CPT

## 2018-10-02 PROCEDURE — 97161 PT EVAL LOW COMPLEX 20 MIN: CPT

## 2018-10-02 NOTE — CONSULTS
[] [facility]        Outpatient Physical                Therapy       [address]       Phone: [phone]       Fax: [phone] [x] [facility] at [address]       Phone: [phone]       Fax: [phone] [] [place]. [employer]     Phone: [phone]     Fax:  [phone]     Physical Therapy Spine Evaluation    Date:  10/2/2018  Patient: Clarita Garcia  : 1955  MRN: [id number]  Physician: [place], DO   Insurance: MEDICARE, BMN  Medical Diagnosis:   M87.051 (ICD-10-CM) - Avascular necrosis of bone of hip, right ([facility]   M48.061 (ICD-10-CM) - Spinal stenosis of lumbar region, unspecified whether neurogenic claudication present   M53.86 (ICD-10-CM) - Sciatica associated with disorder of lumbar spine     Rehab Codes: M25.551, M25.552, M25.651, M25.652, M54.5, M54.9  Onset Date: 2017   Next 's appt.: /[id number]    Subjective:   CC: Low back pain, R hip pain     HPI: Pt reports a chronic history of low back pain and bilateral hip pain. Pt has history of left LANRE in 2017, states this is when his low back and R hip pain significantly worsened. Pt also reports a 3-4 week stay in a nursing home last year d/t COPD exacerbation where he was put on \"many steroids\"-pt attributes this to causing the R hip AVN- which is now in stage 3. Pt states that he is unable to stand or walk for prolonged periods of time d/t LBP; has to take frequent sitting rest breaks in order to reduce pain. Tramodol seems to help relieve pain. Pt reports his low back pain is more problematic that R hip pain; reports intermittent radiating pain down RLE, however not present currently and unable to be reproduced during evaluation. Pt states that R hip pain is usually at a low level.      PMHx:  [x] HTN  [x] Arthritis [x] Other: COPD, L LANRE, receives IVIG every 3 weeks Shoulders [] [x] []    Kyphosis [] [x] []    Lordosis [x] [] []    Lateral Shift [x] [] []    Scoliosis [] [] [x]    Iliac Crest [x] [] []    PSIS [x] [] []    ASIS [x] [] []    Genu Valgus [x] [] []    Genu Varus [x] [] []    Genu Recurvatum [x] [] [] L knee flexed in standing   Pronation [x] [] []    Supination [x] [] []    Leg Length Discrp [] [] [x]    Slumped Sitting [] [x] []    Palpation [] [x] [] TTP L4-L5, L5-S1, base of sacrum    Sensation [x] [] [] Pt denies N/T   Edema [x] [] []    Neurological [x] [] []        FUNCTION Normal Difficult Unable   Sitting [x] [] []   Standing [] [x] []   Ambulation [] [x] []   Groom/Dress [x] [] []   Lift/Carry [] [x] []   Stairs [] [x] []   Bending [] [x] []   OH reach [x] [] []   Sit to Stand [x] [] []     Comments: Oswestry Low Back Pain Scale: 16/50, 32% impairment     Assessment:  Problems:    [x] ? Back Pain: 7/10 Low back pain with ambulation, standing     [x] ? R Hip Pain: 1/0 R hip pain on average    [x] ? ROM: Decreased lumbar extension: 6 °     [x] ? Strength: Bilateral hip weakness, 2-/5 abdominal strength   [x] ? Function: Oswestry LBP scale score of 32% impairment   [x] Postural Deviations: forward head, rounded shoulders, kyphotic posture in sitting   [x] Gait Deviations: flexed at hips, decreased shlomo   [x] Other: Increased pain with prolonged ambulation, standing       STG: (to be met in 6 treatments)  1. ? Pain: Pt will report <6/10 LBP at worst, <1/0 R hip pain on average in order to improve quality of life   2. ? ROM: Pt will improve Lumbar extension ROM to 10° in order to improve lumbar mobility   3. ? Strength: Pt to improve  hip strength to the following:  i. Left hip flexion to 4+/5  ii. Bilateral hip extension to 4/5  iii. Bilateral hip abduction to 4+/5  iv. Hip Adduction to 4+/5 on L, 4/5 on R  v. R hip ER to 4+/5  vi.  Abdominals to 3/5  4. ? Function: Pt will improved ability to ambulate or walk for > 10 minutes without increases in

## 2018-10-04 RX ORDER — ALBUTEROL SULFATE 2.5 MG/3ML
2.5 SOLUTION RESPIRATORY (INHALATION) EVERY 6 HOURS PRN
Qty: 120 VIAL | Refills: 0 | Status: SHIPPED | OUTPATIENT
Start: 2018-10-04 | End: 2019-01-02 | Stop reason: SDUPTHER

## 2018-10-05 ENCOUNTER — HOSPITAL ENCOUNTER (OUTPATIENT)
Dept: PHYSICAL THERAPY | Facility: CLINIC | Age: 63
Setting detail: THERAPIES SERIES
Discharge: HOME | End: 2018-10-05
Payer: MEDICARE

## 2018-10-05 ENCOUNTER — TELEPHONE (OUTPATIENT)
Dept: PULMONOLOGY | Age: 63
End: 2018-10-05

## 2018-10-05 DIAGNOSIS — F41.9 ANXIETY: Chronic | ICD-10-CM

## 2018-10-05 PROCEDURE — 97110 THERAPEUTIC EXERCISES: CPT

## 2018-10-05 RX ORDER — FLUTICASONE FUROATE AND VILANTEROL 200; 25 UG/1; UG/1
1 POWDER RESPIRATORY (INHALATION) DAILY
Qty: 1 EACH | Refills: 11 | Status: SHIPPED | OUTPATIENT
Start: 2018-10-05 | End: 2018-11-04

## 2018-10-08 RX ORDER — ALPRAZOLAM 0.5 MG/1
TABLET ORAL
Qty: 90 TABLET | Refills: 0 | Status: SHIPPED | OUTPATIENT
Start: 2018-10-08 | End: 2018-11-05 | Stop reason: SDUPTHER

## 2018-10-09 ENCOUNTER — HOSPITAL ENCOUNTER (OUTPATIENT)
Dept: PHYSICAL THERAPY | Facility: CLINIC | Age: 63
Setting detail: THERAPIES SERIES
Discharge: HOME | End: 2018-10-09
Payer: MEDICARE

## 2018-10-09 PROCEDURE — 97110 THERAPEUTIC EXERCISES: CPT

## 2018-10-09 NOTE — FLOWSHEET NOTE
[] Luis Jones       Outpatient Physical        Therapy       [address]       Phone: [phone]       Fax: [phone] [x] [facility] at [address]       Phone: [phone]       Fax: [phone] [] [place]. [address] [place] [address] [facility]  [address] [place] [address]   Phone: [phone]   Fax:  [phone]     Physical Therapy Daily Treatment Note    Date:  10/9/2018  Patient Name:  Haider Lau    :  1955  MRN: [id number]   Physician: Earline Hwang DO                                   Insurance: MEDICARE, N  Medical Diagnosis:   M87.051 (ICD-10-CM) - Avascular necrosis of bone of hip, right ([facility]   M48.061 (ICD-10-CM) - Spinal stenosis of lumbar region, unspecified whether neurogenic claudication present   M53.86 (ICD-10-CM) - Sciatica associated with disorder of lumbar spine     Rehab Codes: M25.551, M25.552, M25.651, M25.652, M54.5, M54.9  Onset Date: 2017              Next 's appt.: /[id number]    Visit# / total visits: 3/12  Cancels/No Shows: 0/0    Subjective:    Pain:  [x] Yes  [] No Location: low back, left hip Pain Rating: (0-10 scale) 2/10  Pain altered Tx:  [x] No  [] Yes  Action:    Comments: pt reports feeling improved since starting PT. Describes that his legs feel \"heavy\" which causes him to experience falls. Pt reports that since starting PT he will feel good until about noon and then the \"heaviness\" returns.     Objective:  Modalities: Hot pack to low back in supine with LEs elevated on wedge x 15 minutes   Precautions: L TKA   Exercises:  Exercise Reps/ Time Weight/ Level Comments   Supine          PPT 20 x 5\"       LTR 20 x 5\"   Within pain free range    DKTC 12 x 5\"        Supine clamshells  20 x  Green      Bridges  2x10   Increased reps 10/9/18   Hip adduction iso  20 x 5\"   With ball    SLR  15x  2#    Abdominal iso + marches 15x 2# Increased reps 10/9/18         Seated HS stretch 2x ea 30\" Foot propped on stool- added 10/9/18         Standing       Calf stretches  3 x 30\"      Marches  40x AROM     4-way hip  15x ea  Green  Bilaterally- increased reps 10/9/18               Seated on Red T-ball       Pelvic tilts  20x   Anterior, posterior    Lateral shifts  20x   Keeping upper body upright, shifting hips  bilaterally    Marches  30x   Cues for core engagement    Other:       Specific Instructions for next treatment: hamstring stretching, core stabilization, bilateral hip strengthening, modalities as needed. Treatment Charges: Mins Units   [x]  Modalities- HP 15 0   [x]  Ther Exercise 50 3   []  Manual Therapy     []  Ther Activities     []  Aquatics     []  Vasocompression     []  Other     Total Treatment time 50 3   Estimated Medicare cost as of 10/9/18: $1796.25    Assessment: [x] Progressing toward goals. Good tolerance to exercises with no c/o increased pain. Some fatigue noted, especially during standing exercises. Pt requires stand by assist while on stability ball for safety. Verbal cuing given for proper technique during exercises. [] No change. [] Other:    STG: (to be met in 6 treatments)  1. ? Pain: Pt will report <6/10 LBP at worst, <1/0 R hip pain on average in order to improve quality of life   2. ? ROM: Pt will improve Lumbar extension ROM to 10° in order to improve lumbar mobility   3. ? Strength: Pt to improve  hip strength to the following:  i. Left hip flexion to 4+/5  ii. Bilateral hip extension to 4/5  iii. Bilateral hip abduction to 4+/5  iv. Hip Adduction to 4+/5 on L, 4/5 on R  v. R hip ER to 4+/5  vi. Abdominals to 3/5  4. ? Function: Pt will improved ability to ambulate or walk for > 10 minutes without increases in pain/symptoms to improve ability to participate in community   5. Independent with Home Exercise Programs  6.  Demonstrate Knowledge of fall prevention  LTG: (to be met in 12 treatments)  1.  ? Function: Pt will report <22% impairment on the Oswestry to improve overall functional mobility  2. Pt will demonstrate at least 4+/5 strength in bilateral hips to improve gait mechanics, abdominals to 4-/5 to increase lumbar stabilization    3. ? ROM: Pt will improve Lumbar extension ROM to >15° in order to improve lumbar mobility  4. Pt will demonstrate improved hamstring flexibility as indicated by lacking less than 5 ° on LLE and lacking less than 20 ° on the R for improved hip and knee mobility.        Pt. Education:  [x] Yes  [] No  [x] Reviewed Prior HEP/Ed  Method of Education: [x] Verbal  [x] Demo  [] Written  Comprehension of Education:  [] Verbalizes understanding. [] Demonstrates understanding. [x] Needs review. to ensure proper technique   [] Demonstrates/verbalizes HEP/Ed previously given. Plan: [x] Continue per plan of care.    [] Other:      Time In: 8:30am           Time Out: 9:50am    Electronically signed by:  Joseph Mckeon PTA

## 2018-10-10 NOTE — FLOWSHEET NOTE
[] [address]  [address]    [phone]  F: [phone] [] [address]  [facility]   [address]  P: [phone]  F: [phone] [] [place]  [address] [place] [address]  P: [phone]  F: [phone] [] [address] [facility]   [address]   [place]: [phone]  F: [phone] [] [address]  [place]: [phone]   F: [phone]      Physical Therapy Cancel/No Show note    Date: 10/10/2018  Patient: Skylar Islas  : 1955  MRN: [id number]    Cancels/No Shows to date:     For  appointment patient:  [x]  Cancelled  []  Rescheduled appointment  []  No-show     Reason given by patient:  []  Patient ill  []  Conflicting appointment  []  No transportation    []  Conflict with work  [x]  No reason given  []  Weather related  []  Other:      Comments:      [x]  Next appointment was confirmed    Electronically signed by: Liz Ibarra PTA

## 2018-10-11 ENCOUNTER — APPOINTMENT (OUTPATIENT)
Dept: PHYSICAL THERAPY | Facility: CLINIC | Age: 63
End: 2018-10-11
Payer: MEDICARE

## 2018-10-11 ENCOUNTER — HOSPITAL ENCOUNTER (OUTPATIENT)
Dept: PAIN MANAGEMENT | Age: 63
Discharge: HOME | End: 2018-10-11
Payer: MEDICARE

## 2018-10-11 VITALS
WEIGHT: 265 LBS | HEART RATE: 73 BPM | DIASTOLIC BLOOD PRESSURE: 76 MMHG | BODY MASS INDEX: 40.16 KG/M2 | TEMPERATURE: 98.1 F | RESPIRATION RATE: 16 BRPM | HEIGHT: 68 IN | SYSTOLIC BLOOD PRESSURE: 123 MMHG

## 2018-10-11 PROCEDURE — 99203 OFFICE O/P NEW LOW 30 MIN: CPT

## 2018-10-11 PROCEDURE — 99204 OFFICE O/P NEW MOD 45 MIN: CPT | Performed by: PAIN MEDICINE

## 2018-10-11 ASSESSMENT — PAIN DESCRIPTION - PAIN TYPE: TYPE: CHRONIC PAIN

## 2018-10-11 ASSESSMENT — ENCOUNTER SYMPTOMS
BACK PAIN: 1
NAIL CHANGES: 0
BLURRED VISION: 0
BOWEL INCONTINENCE: 0
COUGH: 0

## 2018-10-11 ASSESSMENT — PAIN DESCRIPTION - ONSET: ONSET: ON-GOING

## 2018-10-11 ASSESSMENT — PAIN SCALES - GENERAL: PAINLEVEL_OUTOF10: 2

## 2018-10-11 ASSESSMENT — PAIN DESCRIPTION - ORIENTATION: ORIENTATION: LOWER

## 2018-10-11 ASSESSMENT — PAIN DESCRIPTION - LOCATION: LOCATION: BACK

## 2018-10-11 ASSESSMENT — PAIN DESCRIPTION - PROGRESSION: CLINICAL_PROGRESSION: GRADUALLY IMPROVING

## 2018-10-11 ASSESSMENT — PAIN DESCRIPTION - FREQUENCY: FREQUENCY: CONTINUOUS

## 2018-10-11 ASSESSMENT — PAIN DESCRIPTION - DESCRIPTORS: DESCRIPTORS: CONSTANT;ACHING

## 2018-10-11 NOTE — PROGRESS NOTES
HPI:     Back Pain   This is a chronic problem. The current episode started more than 1 year ago. The problem occurs constantly. The problem has been gradually improving since onset. The pain is present in the lumbar spine. The quality of the pain is described as aching. The pain radiates to the left foot and right foot. The pain is at a severity of 2/10. The pain is moderate. The pain is worse during the day. Exacerbated by: activity makes the pain worse. Stiffness is present in the morning. Pertinent negatives include no bladder incontinence, bowel incontinence or chest pain. He has tried heat, muscle relaxant, analgesics and bed rest for the symptoms. The treatment provided moderate relief. MRI lumbar spine with disc bulging at L4 5 and L5-S1 with degenerative changes and L5-S1 right-sided neural foraminal narrowing. 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 Recent left hip replacement. Recovering from this. Has been doing physical therapy for the low back with some benefit. Patient denies any new neurological symptoms. Nobowel or bladder incontinence, no weakness, and no falling. Review of OARRS does not show any aberrant prescription behavior. Medication is helping the patient stay active. Patient denies any side effects and reportsadequate analgesia. No sign of misuse/abuse.     Past Medical History:   Diagnosis Date    Allergic rhinitis     Anxiety     Aseptic meningitis     Asthma     Avascular necrosis of bone of hip, left ([facility] [address] 04/2018    CAD (coronary artery disease)     mild on cath, no stents    Common variable immunodeficiency ([facility] [address] 12/4/2013    COPD (chronic obstructive pulmonary disease) (HCC)     Deviated nasal septum     Dyspnea     with exertion    HTN (hypertension)     Immune deficiency disorder ([address] 2012    IGIV every 3 weeks with prednisone    Obesity     On supplemental oxygen therapy     PRECIOUS (obstructive sleep apnea) 2012    CPAP nightly    daily, Disp: 90 tablet, Rfl: 3    albuterol sulfate HFA (PROAIR HFA) 108 (90 Base) MCG/ACT inhaler, Inhale 2 puffs into the lungs every 6 hours as needed for Wheezing, Disp: 3 Inhaler, Rfl: 3    omeprazole (PRILOSEC) 20 MG delayed release capsule, take 1 capsule by mouth once daily, Disp: 90 capsule, Rfl: 3    amLODIPine (NORVASC) 10 MG tablet, take 1 tablet by mouth once daily, Disp: 90 tablet, Rfl: 3    benazepril (LOTENSIN) 10 MG tablet, take 1 tablet by mouth once daily, Disp: 90 tablet, Rfl: 3    predniSONE (DELTASONE) 10 MG tablet, Take 10 mg by mouth See Admin Instructions Taper dose for 3days , Disp: , Rfl:     SPIRIVA HANDIHALER 18 MCG inhalation capsule, inhale contents of 1 capsule by mouth once daily, Disp: 30 capsule, Rfl: 11    fluticasone (FLONASE) 50 MCG/ACT nasal spray, instill 2 sprays into each nostril twice a day, Disp: 1 Bottle, Rfl: 11    Immune Globulin, Human, 10 GM/100ML SOLN IV solution, Infuse 60 g intravenously every 21 days , Disp: , Rfl:     Family History   Problem Relation Age of Onset    COPD Father     Coronary Art Dis Father     Heart Attack Father     Lung Cancer Mother     Breast Cancer Sister     No Known Problems Maternal Grandmother     No Known Problems Maternal Grandfather     Cancer Paternal Grandmother     Stroke Paternal Grandfather     Heart Attack Paternal Grandfather     Cancer Paternal Grandfather     Breast Cancer Sister     Cancer Sister         Throat       Social History     Social History    Marital status:      Spouse name: Oneta Done Number of children: 3    Years of education: N/A     Occupational History          now on disability     Social History Main Topics    Smoking status: Former Smoker     Packs/day: 1.00     Types: Cigarettes     Start date: 1972     Quit date: 10/10/2003    Smokeless tobacco: Never Used      Comment: smoke 4ppd for many yrs; quit 8 yrs ago    Alcohol use 1.2 oz/week     2 Cans of beer per

## 2018-10-12 ENCOUNTER — HOSPITAL ENCOUNTER (OUTPATIENT)
Dept: PHYSICAL THERAPY | Facility: CLINIC | Age: 63
Setting detail: THERAPIES SERIES
Discharge: HOME | End: 2018-10-12
Payer: MEDICARE

## 2018-10-16 ENCOUNTER — HOSPITAL ENCOUNTER (OUTPATIENT)
Dept: PHYSICAL THERAPY | Facility: CLINIC | Age: 63
Setting detail: THERAPIES SERIES
Discharge: HOME | End: 2018-10-16
Payer: MEDICARE

## 2018-10-16 PROCEDURE — 97110 THERAPEUTIC EXERCISES: CPT

## 2018-10-16 NOTE — FLOWSHEET NOTE
[] Silvia Vilchis       Outpatient Physical        Therapy       [address]       Phone: [phone]       Fax: [phone] [x] [facility] at [address] [facility] [address] [facility]       Phone: [phone]       Fax: [phone] [] [facility]  [address]   Phone: [phone]   Fax:  [phone]     Physical Therapy Daily Treatment Note    Date:  10/16/2018  Patient Name:  Zeina Tellez    :  1955  MRN: [id number]   Physician: Christopher Lees DO                                   Insurance: MEDICARE, N  Medical Diagnosis:   M87.051 (ICD-10-CM) - Avascular necrosis of bone of hip, right ([facility] [address]   M48.061 (ICD-10-CM) - Spinal stenosis of lumbar region, unspecified whether neurogenic claudication present   M53.86 (ICD-10-CM) - Sciatica associated with disorder of lumbar spine     Rehab Codes: M25.551, M25.552, M25.651, M25.652, M54.5, M54.9  Onset Date: 2017              Next 's appt.: [id number]    Visit# / total visits:   Cancels/No Shows: 1/0    Subjective:    Pain:  [x] Yes  [] No Location: low back, left hip Pain Rating: (0-10 scale) 1-2/10  Pain altered Tx:  [x] No  [] Yes  Action:    Comments: pt with low level pain at this time.  Had an appt with a specialist last week, and was advised to cont with PT.     Objective:  Modalities: Hot pack to low back in supine with LEs elevated on wedge x 15 minutes- pt declined the need for heat this date   Precautions: L TKA   Exercises:  Exercise Reps/ Time Weight/ Level Comments   Supine          PPT 20 x 5\"       LTR 20 x 5\"   Within pain free range    DKTC 12 x 5\"        Supine clamshells  20 x  Green      Bridges  2x10   Increased reps 10/9/18   Hip adduction iso  20 x 5\"   With ball    SLR  15x  2#    Abdominal iso + marches 15x 2# Increased reps 10/9/18         Seated       HS stretch 2x ea 30\" Foot propped on stool- added 10/9/18 Standing       Calf stretches  3 x 30\"      Marches  40x AROM     4-way hip  15x ea  Green  Bilaterally- increased reps 10/9/18               Seated on Red T-ball       Pelvic tilts  20x   Anterior, posterior    Lateral shifts  20x   Keeping upper body upright, shifting hips  bilaterally    Marches  30x   Cues for core engagement    Other:       Specific Instructions for next treatment: hamstring stretching, core stabilization, bilateral hip strengthening, modalities as needed. Treatment Charges: Mins Units   []  Modalities- HP     [x]  Ther Exercise 50 3   []  Manual Therapy     []  Ther Activities     []  Aquatics     []  Vasocompression     []  Other     Total Treatment time 50 3   Estimated Medicare cost as of 10/16/18: $1871.72    Assessment: [x] Progressing toward goals. Pt completes all exercises with no c/o increased pain. Pt states he is feeling good, and declines HP to end session. Some cuing required for pt to be more efficient with exercises. [] No change. [] Other:    STG: (to be met in 6 treatments)  1. ? Pain: Pt will report <6/10 LBP at worst, <1/0 R hip pain on average in order to improve quality of life   2. ? ROM: Pt will improve Lumbar extension ROM to 10° in order to improve lumbar mobility   3. ? Strength: Pt to improve  hip strength to the following:  i. Left hip flexion to 4+/5  ii. Bilateral hip extension to 4/5  iii. Bilateral hip abduction to 4+/5  iv. Hip Adduction to 4+/5 on L, 4/5 on R  v. R hip ER to 4+/5  vi. Abdominals to 3/5  4. ? Function: Pt will improved ability to ambulate or walk for > 10 minutes without increases in pain/symptoms to improve ability to participate in community   5. Independent with Home Exercise Programs  6. Demonstrate Knowledge of fall prevention  LTG: (to be met in 12 treatments)  1.  ? Function: Pt will report <22% impairment on the Oswestry to improve overall functional mobility  2.  Pt will demonstrate at least 4+/5 strength in bilateral

## 2018-10-17 ENCOUNTER — HOSPITAL ENCOUNTER (OUTPATIENT)
Dept: INFUSION THERAPY | Facility: MEDICAL CENTER | Age: 63
Discharge: HOME | End: 2018-10-17
Payer: MEDICARE

## 2018-10-17 VITALS
RESPIRATION RATE: 22 BRPM | DIASTOLIC BLOOD PRESSURE: 71 MMHG | TEMPERATURE: 97.7 F | HEART RATE: 66 BPM | SYSTOLIC BLOOD PRESSURE: 140 MMHG

## 2018-10-17 DIAGNOSIS — D83.9 COMMON VARIABLE IMMUNODEFICIENCY (HCC): ICD-10-CM

## 2018-10-17 DIAGNOSIS — D80.1 COMMON VARIABLE HYPOGAMMAGLOBULINEMIA (HCC): ICD-10-CM

## 2018-10-17 PROCEDURE — 2580000003 HC RX 258: Performed by: ALLERGY & IMMUNOLOGY

## 2018-10-17 PROCEDURE — 96365 THER/PROPH/DIAG IV INF INIT: CPT

## 2018-10-17 PROCEDURE — 6360000002 HC RX W HCPCS: Performed by: ALLERGY & IMMUNOLOGY

## 2018-10-17 PROCEDURE — 6370000000 HC RX 637 (ALT 250 FOR IP): Performed by: ALLERGY & IMMUNOLOGY

## 2018-10-17 PROCEDURE — 96366 THER/PROPH/DIAG IV INF ADDON: CPT

## 2018-10-17 RX ORDER — DIPHENHYDRAMINE HCL 25 MG
25 TABLET ORAL EVERY 4 HOURS PRN
Status: DISCONTINUED | OUTPATIENT
Start: 2018-10-17 | End: 2018-10-18 | Stop reason: HOSPADM

## 2018-10-17 RX ORDER — HEPARIN SODIUM (PORCINE) LOCK FLUSH IV SOLN 100 UNIT/ML 100 UNIT/ML
500 SOLUTION INTRAVENOUS PRN
Status: DISCONTINUED | OUTPATIENT
Start: 2018-10-17 | End: 2018-10-18 | Stop reason: HOSPADM

## 2018-10-17 RX ORDER — DEXTROSE MONOHYDRATE 50 MG/ML
INJECTION, SOLUTION INTRAVENOUS CONTINUOUS
Status: CANCELLED
Start: 2018-10-17

## 2018-10-17 RX ORDER — DIPHENHYDRAMINE HCL 25 MG
25 TABLET ORAL EVERY 4 HOURS PRN
Status: CANCELLED
Start: 2018-10-17

## 2018-10-17 RX ORDER — 0.9 % SODIUM CHLORIDE 0.9 %
10 VIAL (ML) INJECTION PRN
Status: DISCONTINUED | OUTPATIENT
Start: 2018-10-17 | End: 2018-10-18 | Stop reason: HOSPADM

## 2018-10-17 RX ORDER — DEXTROSE AND SODIUM CHLORIDE 5; .33 G/100ML; G/100ML
INJECTION, SOLUTION INTRAVENOUS CONTINUOUS
Status: CANCELLED
Start: 2018-10-17

## 2018-10-17 RX ORDER — DIPHENHYDRAMINE HCL 25 MG
25 TABLET ORAL ONCE
Status: CANCELLED | OUTPATIENT
Start: 2018-10-17 | End: 2018-10-17

## 2018-10-17 RX ORDER — DIPHENHYDRAMINE HCL 25 MG
25 TABLET ORAL ONCE
Status: COMPLETED | OUTPATIENT
Start: 2018-10-17 | End: 2018-10-17

## 2018-10-17 RX ORDER — HEPARIN SODIUM (PORCINE) LOCK FLUSH IV SOLN 100 UNIT/ML 100 UNIT/ML
500 SOLUTION INTRAVENOUS PRN
Status: CANCELLED
Start: 2018-10-17

## 2018-10-17 RX ORDER — DEXTROSE AND SODIUM CHLORIDE 5; .33 G/100ML; G/100ML
INJECTION, SOLUTION INTRAVENOUS CONTINUOUS
Status: DISCONTINUED | OUTPATIENT
Start: 2018-10-17 | End: 2018-10-18 | Stop reason: HOSPADM

## 2018-10-17 RX ORDER — 0.9 % SODIUM CHLORIDE 0.9 %
10 VIAL (ML) INJECTION PRN
Status: CANCELLED | OUTPATIENT
Start: 2018-10-17

## 2018-10-17 RX ADMIN — DIPHENHYDRAMINE HCL 25 MG: 25 TABLET ORAL at 13:32

## 2018-10-17 RX ADMIN — IMMUNE GLOBULIN INTRAVENOUS (HUMAN): 5 INJECTION, SOLUTION INTRAVENOUS at 09:24

## 2018-10-17 RX ADMIN — DEXTROSE AND SODIUM CHLORIDE: 5; 330 INJECTION, SOLUTION INTRAVENOUS at 08:50

## 2018-10-17 RX ADMIN — HEPARIN SODIUM (PORCINE) LOCK FLUSH IV SOLN 100 UNIT/ML 500 UNITS: 100 SOLUTION at 15:45

## 2018-10-17 RX ADMIN — DIPHENHYDRAMINE HCL 25 MG: 25 TABLET ORAL at 08:51

## 2018-10-17 NOTE — PROGRESS NOTES
Patient here for IVIG infusion. No complaints of fever or infection. Feels well today. Vitals obtained. Port accessed per policy. Premed given per [facility]. Pre hydration hung per MAR. Completed. IVIG hung at 20 ml/hr x 20 minutes, LS=597/64  Rate increased to 40 ml/hr x 20 minutes, EG=541/70  Rate increased to 80 ml/hr x 20 minutes, MP=763/59  Rate increased to 150 ml/hr x 20 minutes, BU=373/77  Rate increased to and maintained at 250 ml/hr throughout remainder of the infusion. Sleeping on and off. Benadryl repeated x 1 per his request, on MAR.  BP's: 105/62, 106/60, 110/66, 103/62,108/68. Completed. Tolerated well. IV post hydration given. Port flushed per policy and gripper removed intact, band aid to site. Has a return visit scheduled. Discharged ambulatory per self.

## 2018-10-17 NOTE — FLOWSHEET NOTE
·  rounding on unit. · Patient engaged well in conversation. He spoke at length about his various health issues. Patient used humor throughout conversation and said that he learned that from his parents who were always sick. Patient seems to be an emotionally strong individual.  · Patient seems well supported by his wife and children. When asked if his lalit was important to him in helping him cope with his illnesses, he replied, \"Sometimes\". ·  provided a listening and supportive presence. Plan: Chaplains will remain available to offer spiritual and emotional support as needed. 10/17/18 1446   Encounter Summary   Services provided to: Patient   Referral/Consult From: Rounding   Support System Spouse; Children   Continue Visiting (10/17/18)   Complexity of Encounter Moderate   Length of Encounter 45 minutes   Spiritual Assessment Completed Yes   Spiritual/Mandaeism   Type Spiritual support   Assessment Calm; Approachable;Coping; Hopeful   Intervention Active listening;Explored feelings, thoughts, concerns;Explored coping resources; Discussed belief system/Presybeterian practices/lalit   Outcome Expressed gratitude;Engaged in conversation;Expressed feelings/needs/concerns;Receptive     Electronically signed by Myrtle Saravia on 10/17/2018 at 2:54 PM

## 2018-10-18 ENCOUNTER — HOSPITAL ENCOUNTER (OUTPATIENT)
Dept: PHYSICAL THERAPY | Facility: CLINIC | Age: 63
Setting detail: THERAPIES SERIES
Discharge: HOME | End: 2018-10-18
Payer: MEDICARE

## 2018-10-18 PROCEDURE — 97110 THERAPEUTIC EXERCISES: CPT

## 2018-10-23 ENCOUNTER — HOSPITAL ENCOUNTER (OUTPATIENT)
Dept: PHYSICAL THERAPY | Facility: CLINIC | Age: 63
Setting detail: THERAPIES SERIES
Discharge: HOME | End: 2018-10-23
Payer: MEDICARE

## 2018-10-23 PROCEDURE — 97110 THERAPEUTIC EXERCISES: CPT

## 2018-10-23 NOTE — FLOWSHEET NOTE
[] Elizabeth Rutherford       Outpatient Physical        Therapy       [address]       Phone: [phone]       Fax: [phone] [x] [facility] at [address]       Phone: [phone]       Fax: [phone] [] [place]. [address] [place] [address] [facility]  [address] [place] [address]   Phone: [phone]   Fax:  [phone]     Physical Therapy Daily Treatment Note    Date:  10/23/2018  Patient Name:  Stefany Gomez    :  1955  MRN: [id number]   Physician: Hector Villatoro DO                                   Insurance: MEDICARE, N  Medical Diagnosis:   M87.051 (ICD-10-CM) - Avascular necrosis of bone of hip, right ([facility]   M48.061 (ICD-10-CM) - Spinal stenosis of lumbar region, unspecified whether neurogenic claudication present   M53.86 (ICD-10-CM) - Sciatica associated with disorder of lumbar spine     Rehab Codes: M25.551, M25.552, M25.651, M25.652, M54.5, M54.9  Onset Date: 2017              Next 's appt.: /[id number]    Visit# / total visits: 12  Cancels/No Shows: 1/0    Subjective:    Pain:  [x] Yes  [] No Location: low back, left hip Pain Rating: (0-10 scale) 2/10  Pain altered Tx:  [x] No  [] Yes  Action:    Comments: pt states he is sore today from doing yard work.     Objective:  Modalities: Hot pack to low back in supine with LEs elevated on wedge x 15 minutes- pt declined the need for heat this date   Precautions: L TKA   Exercises:  Exercise Reps/ Time Weight/ Level Comments   Supine          PPT 20 x 5\"       LTR 20 x 5\"   Within pain free range    DKTC 10 x 5\"    Using towel for assist   Supine clamshells  20 x  Green      Bridges  2x10   Increased reps 10/9/18   Hip adduction iso  20 x 5\"   With ball    SLR  15x  3# Increased weight 10/18/18   Abdominal iso + marches 15x 3# Increased weight 10/18/18         Seated       HS stretch 2x ea 30\" Foot propped on stool- added 10/9/18         Standing

## 2018-10-25 ENCOUNTER — HOSPITAL ENCOUNTER (OUTPATIENT)
Dept: PHYSICAL THERAPY | Facility: CLINIC | Age: 63
Setting detail: THERAPIES SERIES
Discharge: HOME | End: 2018-10-25
Payer: MEDICARE

## 2018-10-25 PROCEDURE — 97110 THERAPEUTIC EXERCISES: CPT

## 2018-10-30 ENCOUNTER — HOSPITAL ENCOUNTER (OUTPATIENT)
Dept: PHYSICAL THERAPY | Facility: CLINIC | Age: 63
Setting detail: THERAPIES SERIES
Discharge: HOME | End: 2018-10-30
Payer: MEDICARE

## 2018-10-30 ENCOUNTER — HOSPITAL ENCOUNTER (OUTPATIENT)
Age: 63
Setting detail: SPECIMEN
Discharge: HOME | End: 2018-10-30
Payer: MEDICARE

## 2018-10-30 ENCOUNTER — OFFICE VISIT (OUTPATIENT)
Dept: PULMONOLOGY | Age: 63
End: 2018-10-30
Payer: MEDICARE

## 2018-10-30 VITALS
SYSTOLIC BLOOD PRESSURE: 130 MMHG | TEMPERATURE: 97.2 F | OXYGEN SATURATION: 98 % | BODY MASS INDEX: 39.86 KG/M2 | WEIGHT: 263 LBS | HEIGHT: 68 IN | HEART RATE: 74 BPM | DIASTOLIC BLOOD PRESSURE: 88 MMHG | RESPIRATION RATE: 12 BRPM

## 2018-10-30 DIAGNOSIS — J44.9 CHRONIC OBSTRUCTIVE PULMONARY DISEASE, UNSPECIFIED COPD TYPE (HCC): Primary | ICD-10-CM

## 2018-10-30 DIAGNOSIS — J06.9 VIRAL UPPER RESPIRATORY TRACT INFECTION: ICD-10-CM

## 2018-10-30 DIAGNOSIS — J96.11 CHRONIC RESPIRATORY FAILURE WITH HYPOXIA (HCC): ICD-10-CM

## 2018-10-30 DIAGNOSIS — J45.51 SEVERE PERSISTENT ASTHMA WITH ACUTE EXACERBATION: ICD-10-CM

## 2018-10-30 DIAGNOSIS — Z87.891 PERSONAL HISTORY OF SMOKING: ICD-10-CM

## 2018-10-30 DIAGNOSIS — D83.9 CVID (COMMON VARIABLE IMMUNODEFICIENCY) (HCC): ICD-10-CM

## 2018-10-30 DIAGNOSIS — Z99.89 OSA ON CPAP: ICD-10-CM

## 2018-10-30 DIAGNOSIS — G47.33 OSA ON CPAP: ICD-10-CM

## 2018-10-30 DIAGNOSIS — K21.9 GASTROESOPHAGEAL REFLUX DISEASE, ESOPHAGITIS PRESENCE NOT SPECIFIED: ICD-10-CM

## 2018-10-30 LAB
DIRECT EXAM: NORMAL
Lab: NORMAL
SPECIMEN DESCRIPTION: NORMAL
STATUS: NORMAL

## 2018-10-30 PROCEDURE — G8417 CALC BMI ABV UP PARAM F/U: HCPCS | Performed by: INTERNAL MEDICINE

## 2018-10-30 PROCEDURE — G8926 SPIRO NO PERF OR DOC: HCPCS | Performed by: INTERNAL MEDICINE

## 2018-10-30 PROCEDURE — 99215 OFFICE O/P EST HI 40 MIN: CPT | Performed by: INTERNAL MEDICINE

## 2018-10-30 PROCEDURE — 3017F COLORECTAL CA SCREEN DOC REV: CPT | Performed by: INTERNAL MEDICINE

## 2018-10-30 PROCEDURE — G8427 DOCREV CUR MEDS BY ELIG CLIN: HCPCS | Performed by: INTERNAL MEDICINE

## 2018-10-30 PROCEDURE — 97110 THERAPEUTIC EXERCISES: CPT

## 2018-10-30 PROCEDURE — 3023F SPIROM DOC REV: CPT | Performed by: INTERNAL MEDICINE

## 2018-10-30 PROCEDURE — G8482 FLU IMMUNIZE ORDER/ADMIN: HCPCS | Performed by: INTERNAL MEDICINE

## 2018-10-30 PROCEDURE — 1036F TOBACCO NON-USER: CPT | Performed by: INTERNAL MEDICINE

## 2018-10-30 RX ORDER — LEVOFLOXACIN 500 MG/1
500 TABLET, FILM COATED ORAL DAILY
Qty: 7 TABLET | Refills: 0 | Status: SHIPPED | OUTPATIENT
Start: 2018-10-30 | End: 2018-11-06

## 2018-10-30 RX ORDER — FLUTICASONE PROPIONATE 50 MCG
2 SPRAY, SUSPENSION (ML) NASAL DAILY
Qty: 1 BOTTLE | Refills: 11 | Status: SHIPPED | OUTPATIENT
Start: 2018-10-30 | End: 2019-10-08 | Stop reason: SDUPTHER

## 2018-10-30 RX ORDER — PREDNISONE 10 MG/1
TABLET ORAL
Qty: 30 TABLET | Refills: 0 | Status: ON HOLD | OUTPATIENT
Start: 2018-10-30 | End: 2019-01-18 | Stop reason: HOSPADM

## 2018-10-30 NOTE — PROGRESS NOTES
PULMONARY OP  PROGRESS NOTE      Patient:  Brigitte Cisneros  YOB: 1955    MRN: [id number]     Acct:        Pt seen and Chart reviewed. Mr/Ms Brigitte Cisneros is here in followup for    Diagnosis Orders   1. Chronic obstructive pulmonary disease, unspecified COPD type ([facility]     2. Severe persistent asthma with acute exacerbation     3. PRECIOUS on CPAP     4. Gastroesophageal reflux disease, esophagitis presence not specified     5. Chronic respiratory failure with hypoxia (HCC)     6. CVID (common variable immunodeficiency) ([facility]       He is here for follow up of COPD, PRECIOUS, CVID, severe persistent asthma, chronic respiratory failure on home O2     C/O post nasal drip and nasal congestion, slight yellow sputum starting last night/early morning, he thinks that he may have slight increased cough otherwise denies any change  No fever no N/V, no diarrhea, slight sore throat this morning, no headache no body ache or muscle aches  Had flu vaccine 3 weeks ago. He claimed that his breathing is stable he has chronic dyspnea on exertion which has been stable, he had not had any exacerbation since he was seen last time and he did not require any steroids and antibiotic therapy. Last flare up before hip surgery in early may and had abx and steroids at that time. Denies change in cough or increase sputum or change in color , no increase wheezing, ECKERT is stable. Denies chronic post nasal dripping and nasal congestion and obstruction   Denies chronic cough or daily wheezing, denies orthopnea and PND and or daily use of rescue inhalers/ aerosol  Denies heartburn or reflux symptoms  Using all his medicines as prescribed including Symbicort, Singulair, Spiriva, Flonase, albuterol  He is followed by allergist/immunologist and getting IvIgg every 3 weeks.     Brief previous history and previous workup  He has h/o CVID diagnosed as a work  Up for uncontrolled asthma and on

## 2018-10-31 ENCOUNTER — TELEPHONE (OUTPATIENT)
Dept: PULMONOLOGY | Age: 63
End: 2018-10-31

## 2018-11-01 ENCOUNTER — HOSPITAL ENCOUNTER (OUTPATIENT)
Dept: PHYSICAL THERAPY | Facility: CLINIC | Age: 63
Setting detail: THERAPIES SERIES
Discharge: HOME | End: 2018-11-01
Payer: MEDICARE

## 2018-11-05 DIAGNOSIS — F41.9 ANXIETY: Chronic | ICD-10-CM

## 2018-11-05 RX ORDER — ALPRAZOLAM 0.5 MG/1
TABLET ORAL
Qty: 90 TABLET | Refills: 0 | Status: SHIPPED | OUTPATIENT
Start: 2018-11-05 | End: 2018-12-03 | Stop reason: SDUPTHER

## 2018-11-06 ENCOUNTER — HOSPITAL ENCOUNTER (OUTPATIENT)
Dept: PHYSICAL THERAPY | Facility: CLINIC | Age: 63
Setting detail: THERAPIES SERIES
Discharge: HOME | End: 2018-11-06
Payer: MEDICARE

## 2018-11-06 PROCEDURE — 97110 THERAPEUTIC EXERCISES: CPT

## 2018-11-06 NOTE — FLOWSHEET NOTE
increases in pain/symptoms to improve ability to participate in community- MET   5. Independent with Home Exercise Programs- MET  6. Demonstrate Knowledge of fall prevention- MET  LTG: (to be met in 12 treatments)  1.  ? Function: Pt will report <22% impairment on the Oswestry to improve overall functional mobility  2. Pt will demonstrate at least 4+/5 strength in bilateral hips to improve gait mechanics, abdominals to 4-/5 to increase lumbar stabilization    3. ? ROM: Pt will improve Lumbar extension ROM to >15° in order to improve lumbar mobility  4. Pt will demonstrate improved hamstring flexibility as indicated by lacking less than 5 ° on LLE and lacking less than 20 ° on the R for improved hip and knee mobility.        Pt. Education:  [] Yes  [] No  [x] Reviewed Prior HEP/Ed  Method of Education: [x] Verbal  [] Demo  [] Written  Comprehension of Education:  [x] Verbalizes understanding. [x] Demonstrates understanding. [] Needs review. to ensure proper technique   [] Demonstrates/verbalizes HEP/Ed previously given. Plan: [x] Continue per plan of care. [] Other:      Time In: 9:40 am           Time Out: 10:35 am    Electronically signed by:   ARTURO Frederick with direct supervision Jenna Yan PTA

## 2018-11-07 ENCOUNTER — HOSPITAL ENCOUNTER (OUTPATIENT)
Dept: INFUSION THERAPY | Facility: MEDICAL CENTER | Age: 63
Discharge: HOME | End: 2018-11-07
Payer: MEDICARE

## 2018-11-07 VITALS
DIASTOLIC BLOOD PRESSURE: 78 MMHG | TEMPERATURE: 97.8 F | HEART RATE: 77 BPM | RESPIRATION RATE: 18 BRPM | SYSTOLIC BLOOD PRESSURE: 117 MMHG

## 2018-11-07 DIAGNOSIS — D83.9 COMMON VARIABLE IMMUNODEFICIENCY (HCC): ICD-10-CM

## 2018-11-07 DIAGNOSIS — D80.1 COMMON VARIABLE HYPOGAMMAGLOBULINEMIA (HCC): ICD-10-CM

## 2018-11-07 PROCEDURE — 6370000000 HC RX 637 (ALT 250 FOR IP): Performed by: ALLERGY & IMMUNOLOGY

## 2018-11-07 PROCEDURE — 2580000003 HC RX 258: Performed by: ALLERGY & IMMUNOLOGY

## 2018-11-07 PROCEDURE — 96365 THER/PROPH/DIAG IV INF INIT: CPT

## 2018-11-07 PROCEDURE — 96366 THER/PROPH/DIAG IV INF ADDON: CPT

## 2018-11-07 PROCEDURE — 6360000002 HC RX W HCPCS: Performed by: ALLERGY & IMMUNOLOGY

## 2018-11-07 RX ORDER — DIPHENHYDRAMINE HCL 25 MG
25 TABLET ORAL ONCE
Status: COMPLETED | OUTPATIENT
Start: 2018-11-07 | End: 2018-11-07

## 2018-11-07 RX ORDER — DIPHENHYDRAMINE HCL 25 MG
25 TABLET ORAL ONCE
Status: CANCELLED | OUTPATIENT
Start: 2018-11-07 | End: 2018-11-07

## 2018-11-07 RX ORDER — DEXTROSE AND SODIUM CHLORIDE 5; .33 G/100ML; G/100ML
INJECTION, SOLUTION INTRAVENOUS CONTINUOUS
Status: DISCONTINUED | OUTPATIENT
Start: 2018-11-07 | End: 2018-11-08 | Stop reason: HOSPADM

## 2018-11-07 RX ORDER — MONTELUKAST SODIUM 10 MG/1
10 TABLET ORAL NIGHTLY
COMMUNITY
End: 2018-12-14 | Stop reason: SDUPTHER

## 2018-11-07 RX ORDER — HEPARIN SODIUM (PORCINE) LOCK FLUSH IV SOLN 100 UNIT/ML 100 UNIT/ML
500 SOLUTION INTRAVENOUS PRN
Status: CANCELLED
Start: 2018-11-07

## 2018-11-07 RX ORDER — LEVOFLOXACIN 750 MG/1
750 TABLET ORAL DAILY
Status: ON HOLD | COMMUNITY
End: 2019-01-18 | Stop reason: HOSPADM

## 2018-11-07 RX ORDER — ALBUTEROL SULFATE 90 UG/1
2 AEROSOL, METERED RESPIRATORY (INHALATION) EVERY 6 HOURS PRN
COMMUNITY
End: 2020-04-03

## 2018-11-07 RX ORDER — DEXTROSE AND SODIUM CHLORIDE 5; .33 G/100ML; G/100ML
INJECTION, SOLUTION INTRAVENOUS CONTINUOUS
Status: CANCELLED
Start: 2018-11-07

## 2018-11-07 RX ORDER — DIPHENHYDRAMINE HCL 25 MG
25 TABLET ORAL EVERY 4 HOURS PRN
Status: CANCELLED
Start: 2018-11-07

## 2018-11-07 RX ORDER — DIPHENHYDRAMINE HCL 25 MG
25 TABLET ORAL EVERY 4 HOURS PRN
Status: DISCONTINUED | OUTPATIENT
Start: 2018-11-07 | End: 2018-11-08 | Stop reason: HOSPADM

## 2018-11-07 RX ORDER — SODIUM CHLORIDE 0.9 % (FLUSH) 0.9 %
20 SYRINGE (ML) INJECTION PRN
Status: DISCONTINUED | OUTPATIENT
Start: 2018-11-07 | End: 2018-11-08 | Stop reason: HOSPADM

## 2018-11-07 RX ORDER — HEPARIN SODIUM (PORCINE) LOCK FLUSH IV SOLN 100 UNIT/ML 100 UNIT/ML
500 SOLUTION INTRAVENOUS PRN
Status: DISCONTINUED | OUTPATIENT
Start: 2018-11-07 | End: 2018-11-08 | Stop reason: HOSPADM

## 2018-11-07 RX ORDER — DEXTROSE MONOHYDRATE 50 MG/ML
INJECTION, SOLUTION INTRAVENOUS CONTINUOUS
Status: CANCELLED
Start: 2018-11-07

## 2018-11-07 RX ORDER — DEXTROSE MONOHYDRATE 50 MG/ML
INJECTION, SOLUTION INTRAVENOUS CONTINUOUS
Status: DISCONTINUED | OUTPATIENT
Start: 2018-11-07 | End: 2018-11-08 | Stop reason: HOSPADM

## 2018-11-07 RX ORDER — 0.9 % SODIUM CHLORIDE 0.9 %
10 VIAL (ML) INJECTION PRN
Status: CANCELLED | OUTPATIENT
Start: 2018-11-07

## 2018-11-07 RX ADMIN — DIPHENHYDRAMINE HCL 25 MG: 25 TABLET ORAL at 08:49

## 2018-11-07 RX ADMIN — SODIUM CHLORIDE, PRESERVATIVE FREE 500 UNITS: 5 INJECTION INTRAVENOUS at 16:12

## 2018-11-07 RX ADMIN — Medication 20 ML: at 16:12

## 2018-11-07 RX ADMIN — DIPHENHYDRAMINE HCL 25 MG: 25 TABLET ORAL at 13:36

## 2018-11-07 RX ADMIN — IMMUNE GLOBULIN INTRAVENOUS (HUMAN): 5 INJECTION, SOLUTION INTRAVENOUS at 09:27

## 2018-11-07 RX ADMIN — DEXTROSE AND SODIUM CHLORIDE: 5; 330 INJECTION, SOLUTION INTRAVENOUS at 08:50

## 2018-11-07 RX ADMIN — DEXTROSE MONOHYDRATE: 50 INJECTION, SOLUTION INTRAVENOUS at 08:43

## 2018-11-07 RX ADMIN — Medication 20 ML: at 08:42

## 2018-11-07 NOTE — FLOWSHEET NOTE
spoke briefly with patient as he was getting coffee. He seemed to be in good spirits. Chaplains will remain available to offer spiritual and emotional support as needed. 11/07/18 1559   Encounter Summary   Services provided to: Patient   Referral/Consult From: Johny Ball Visiting (11/7/18)   Complexity of Encounter Low   Length of Encounter 15 minutes   Spiritual/Protestant   Type Spiritual support   Assessment Calm; Approachable   Intervention Active listening   Outcome Receptive     Electronically signed by Florentin Ibarra on 11/7/2018 at 4:01 PM

## 2018-11-07 NOTE — PROGRESS NOTES
Pt arrives per ambulatory per self and orders reviewed. No labs today. Notified pt will need labs at next appt. Pt states understanding. Pt tolerated premed well and D5W primed mainline of standard blood tubing. Pt tolerated benadryl well. Pt tolerated pre-hydration and post - hydration with D5 1/3  ml before and after. IVIG infusing at 20 ml/hr for 20 min, 40 ml/hr for 20 min, 80 ml/hr for 20 min, 160 ml/hr for 30 min, and then 250 ml/hr for remainder. BP checked Q 30 min as follows: 109/65, 110/68, 115/73, 109/65, 108/62, 110/66, 99/61, 107/66, 128/48, 109/56, 116/62, 122/70. Pt discharged per ambulatory per self and given appt per calendar. Pt using O2 at 3L/NC throughout today's visit. No reactions or complaints and blood return present throughout infusion.

## 2018-11-08 ENCOUNTER — HOSPITAL ENCOUNTER (OUTPATIENT)
Dept: CT IMAGING | Age: 63
Discharge: HOME | End: 2018-11-10
Payer: MEDICARE

## 2018-11-08 DIAGNOSIS — Z87.891 PERSONAL HISTORY OF SMOKING: ICD-10-CM

## 2018-11-08 PROCEDURE — G0297 LDCT FOR LUNG CA SCREEN: HCPCS

## 2018-11-09 ENCOUNTER — HOSPITAL ENCOUNTER (OUTPATIENT)
Dept: PHYSICAL THERAPY | Facility: CLINIC | Age: 63
Setting detail: THERAPIES SERIES
Discharge: HOME | End: 2018-11-09
Payer: MEDICARE

## 2018-11-09 PROCEDURE — G8978 MOBILITY CURRENT STATUS: HCPCS

## 2018-11-09 PROCEDURE — G8979 MOBILITY GOAL STATUS: HCPCS

## 2018-11-09 PROCEDURE — 97110 THERAPEUTIC EXERCISES: CPT

## 2018-11-09 NOTE — FLOWSHEET NOTE
pain/symptoms to improve ability to participate in community- MET   5. Independent with Home Exercise Programs- MET  6. Demonstrate Knowledge of fall prevention- MET  LTG: (to be met in 12 treatments)  1.  ? Function: Pt will report <22% impairment on the Oswestry to improve overall functional mobility  2. Pt will demonstrate at least 4+/5 strength in bilateral hips to improve gait mechanics, abdominals to 4-/5 to increase lumbar stabilization    3. ? ROM: Pt will improve Lumbar extension ROM to >15° in order to improve lumbar mobility  4. Pt will demonstrate improved hamstring flexibility as indicated by lacking less than 5 ° on LLE and lacking less than 20 ° on the R for improved hip and knee mobility.        Pt. Education:  [] Yes  [x] No  [] Reviewed Prior HEP/Ed  Method of Education: [] Verbal  [] Demo  [] Written  Comprehension of Education:  [] Verbalizes understanding. [] Demonstrates understanding. [] Needs review. to ensure proper technique   [] Demonstrates/verbalizes HEP/Ed previously given. Plan: [x] Continue per plan of care.    [] Other:      Time In: 9:25am           Time Out: 9:55am    Electronically signed by:  Nikita Gaines PTA

## 2018-11-13 ENCOUNTER — HOSPITAL ENCOUNTER (OUTPATIENT)
Dept: PHYSICAL THERAPY | Facility: CLINIC | Age: 63
Setting detail: THERAPIES SERIES
Discharge: HOME | End: 2018-11-13
Payer: MEDICARE

## 2018-11-13 ENCOUNTER — HOSPITAL ENCOUNTER (OUTPATIENT)
Facility: MEDICAL CENTER | Age: 63
End: 2018-11-13
Payer: MEDICARE

## 2018-11-13 PROCEDURE — 97110 THERAPEUTIC EXERCISES: CPT

## 2018-11-16 ENCOUNTER — HOSPITAL ENCOUNTER (OUTPATIENT)
Dept: PHYSICAL THERAPY | Facility: CLINIC | Age: 63
Setting detail: THERAPIES SERIES
Discharge: HOME | End: 2018-11-16
Payer: MEDICARE

## 2018-11-16 PROCEDURE — G8979 MOBILITY GOAL STATUS: HCPCS

## 2018-11-16 PROCEDURE — 97110 THERAPEUTIC EXERCISES: CPT

## 2018-11-16 PROCEDURE — G8980 MOBILITY D/C STATUS: HCPCS

## 2018-11-16 NOTE — FLOWSHEET NOTE
propped on stool- added 10/9/18   LAQ 20xea 3# Added 10/30         Standing       Calf stretches  3 x 30\"      Marches  20x ea 3#  Cuing for core stabilization   4-way hip  20x ea  Green  Bilaterally- increased reps 10/18/18   Step ups 15x ea  6\" Added 11/16   Step downs  15x ea  6\" Added 11/16               Seated on [employer]       Pelvic tilts  20x   Anterior, posterior    Lateral shifts  20x   Keeping upper body upright, shifting hips  bilaterally    Alt UE 20x ea  3#    Alt opposites UE/LE 20xea 3# Added 11.06.18 Marches  20xea  3# Cues for core engagement, increases 11.06.18    LAQ 20xea  3#    Other:     Specific Instructions for next treatment:     HS flexibility:   RLE- lacking 15 °  LLE-lacking 4 °     BLE strength globally 4+/5 or greater     Oswestry LBP score: 3/50, 6% impairment     Treatment Charges: Mins Units   []  Modalities- HP     [x]  Ther Exercise 41 3   []  Manual Therapy     []  Ther Activities     []  Aquatics     []  Vasocompression     []  Other     Total Treatment time 41 3   Estimated Medicare cost as of 11/16/18: $2521.46 KX modifier       Assessment: [x] Progressing toward goals. Since beginning therapy, patient has made significant improvements in BLE and core strength, hamstring flexibility, and pain levels which has vastly improved his overall functional mobility. Pt reports only a 6% functional impairment this date as indicated on the Oswestry LBP scale- a 26% improvement from his initial session. At this time, patient no longer has pain and feels comfortable being discharged and completing HEP as needed. [] No change.      [] Other:    STG: (to be met in 6 treatments)- assessed 10/30/18 by primary PT  1. ? Pain: Pt will report <6/10 LBP at worst, <1/0 R hip pain on average in order to improve quality of life- MET  2. ? ROM: Pt will improve Lumbar extension ROM to 10° in order to improve lumbar mobility- MET, 11 °    3. ? Strength: Pt to improve  hip strength to the following:  i. Left hip flexion to 4+/5 -MET   ii. Bilateral hip extension to 4/5- MET, 4/5 bilat  iii. Bilateral hip abduction to 4+/5-MET, 5/5 bilat   iv. Hip Adduction to 4+/5 on L, 4/5 on R- MET, 4+/5 bilat  v. R hip ER to 4+/5 - progress made, 4/5  vi. Abdominals to 3/5- progress made, 2/5  4. ? Function: Pt will improved ability to ambulate or walk for > 10 minutes without increases in pain/symptoms to improve ability to participate in community- MET   5. Independent with Home Exercise Programs- MET  6. Demonstrate Knowledge of fall prevention- MET  LTG: (to be met in 12 treatments)  1.  ? Function: Pt will report <22% impairment on the Oswestry to improve overall functional mobility- MET  2. Pt will demonstrate at least 4+/5 strength in bilateral hips to improve gait mechanics, abdominals to 4-/5 to increase lumbar stabilization- Partially met, abdominals 2/5    3. ? ROM: Pt will improve Lumbar extension ROM to >15° in order to improve lumbar mobility- Progress made, 11 °   Pt will demonstrate improved hamstring flexibility as indicated by lacking less than 5 ° on LLE and lacking less than 20 ° on the R for improved hip and knee mobility- MET     Pt. Education:  [] Yes  [x] No  [] Reviewed Prior HEP/Ed  Method of Education: [] Verbal  [] Demo  [] Written  Comprehension of Education:  [] Verbalizes understanding. [] Demonstrates understanding. [] Needs review. to ensure proper technique   [] Demonstrates/verbalizes HEP/Ed previously given. Plan: [x] Continue per plan of care.    [] Other:      Time In: 2:58 pm           Time Out: 3:45 pm    Electronically signed by:  Alica Halsted, PT

## 2018-11-28 ENCOUNTER — HOSPITAL ENCOUNTER (OUTPATIENT)
Dept: INFUSION THERAPY | Facility: MEDICAL CENTER | Age: 63
Discharge: HOME | End: 2018-11-28
Payer: MEDICARE

## 2018-11-28 VITALS
RESPIRATION RATE: 22 BRPM | HEART RATE: 73 BPM | SYSTOLIC BLOOD PRESSURE: 108 MMHG | DIASTOLIC BLOOD PRESSURE: 68 MMHG | TEMPERATURE: 97.4 F

## 2018-11-28 DIAGNOSIS — D80.1 COMMON VARIABLE HYPOGAMMAGLOBULINEMIA (HCC): ICD-10-CM

## 2018-11-28 DIAGNOSIS — D83.9 COMMON VARIABLE IMMUNODEFICIENCY (HCC): ICD-10-CM

## 2018-11-28 LAB
ABSOLUTE EOS #: 0.2 K/UL (ref 0–0.4)
ABSOLUTE IMMATURE GRANULOCYTE: ABNORMAL K/UL (ref 0–0.3)
ABSOLUTE LYMPH #: 1.7 K/UL (ref 1–4.8)
ABSOLUTE MONO #: 0.4 K/UL (ref 0.2–0.8)
ALT SERPL-CCNC: 29 U/L (ref 5–41)
BASOPHILS # BLD: 1 % (ref 0–2)
BASOPHILS ABSOLUTE: 0.1 K/UL (ref 0–0.2)
CREAT SERPL-MCNC: 0.91 MG/DL (ref 0.7–1.2)
DIFFERENTIAL TYPE: ABNORMAL
EOSINOPHILS RELATIVE PERCENT: 2 % (ref 1–4)
GFR AFRICAN AMERICAN: >60 ML/MIN
GFR NON-AFRICAN AMERICAN: >60 ML/MIN
GFR SERPL CREATININE-BSD FRML MDRD: NORMAL ML/MIN/{1.73_M2}
GFR SERPL CREATININE-BSD FRML MDRD: NORMAL ML/MIN/{1.73_M2}
HCT VFR BLD CALC: 43.5 % (ref 41–53)
HEMOGLOBIN: 14.3 G/DL (ref 13.5–17.5)
IGA: 91 MG/DL (ref 70–400)
IGG: 929 MG/DL (ref 700–1600)
IGM: 109 MG/DL (ref 40–230)
IMMATURE GRANULOCYTES: ABNORMAL %
LYMPHOCYTES # BLD: 21 % (ref 24–44)
MCH RBC QN AUTO: 28.7 PG (ref 26–34)
MCHC RBC AUTO-ENTMCNC: 32.8 G/DL (ref 31–37)
MCV RBC AUTO: 87.4 FL (ref 80–100)
MONOCYTES # BLD: 6 % (ref 1–7)
NRBC AUTOMATED: ABNORMAL PER 100 WBC
PDW BLD-RTO: 12.9 % (ref 11.5–14.5)
PLATELET # BLD: 272 K/UL (ref 130–400)
PLATELET ESTIMATE: ABNORMAL
PMV BLD AUTO: ABNORMAL FL (ref 6–12)
RBC # BLD: 4.97 M/UL (ref 4.5–5.9)
RBC # BLD: ABNORMAL 10*6/UL
SEG NEUTROPHILS: 70 % (ref 36–66)
SEGMENTED NEUTROPHILS ABSOLUTE COUNT: 5.6 K/UL (ref 1.8–7.7)
WBC # BLD: 8 K/UL (ref 3.5–11)
WBC # BLD: ABNORMAL 10*3/UL

## 2018-11-28 PROCEDURE — 2580000003 HC RX 258: Performed by: ALLERGY & IMMUNOLOGY

## 2018-11-28 PROCEDURE — 82784 ASSAY IGA/IGD/IGG/IGM EACH: CPT

## 2018-11-28 PROCEDURE — 96361 HYDRATE IV INFUSION ADD-ON: CPT

## 2018-11-28 PROCEDURE — 82565 ASSAY OF CREATININE: CPT

## 2018-11-28 PROCEDURE — 36591 DRAW BLOOD OFF VENOUS DEVICE: CPT

## 2018-11-28 PROCEDURE — 85025 COMPLETE CBC W/AUTO DIFF WBC: CPT

## 2018-11-28 PROCEDURE — 96365 THER/PROPH/DIAG IV INF INIT: CPT

## 2018-11-28 PROCEDURE — 96367 TX/PROPH/DG ADDL SEQ IV INF: CPT

## 2018-11-28 PROCEDURE — 96366 THER/PROPH/DIAG IV INF ADDON: CPT

## 2018-11-28 PROCEDURE — 6360000002 HC RX W HCPCS: Performed by: ALLERGY & IMMUNOLOGY

## 2018-11-28 PROCEDURE — 96360 HYDRATION IV INFUSION INIT: CPT

## 2018-11-28 PROCEDURE — 6370000000 HC RX 637 (ALT 250 FOR IP): Performed by: ALLERGY & IMMUNOLOGY

## 2018-11-28 PROCEDURE — 84460 ALANINE AMINO (ALT) (SGPT): CPT

## 2018-11-28 RX ORDER — DIPHENHYDRAMINE HCL 25 MG
25 TABLET ORAL ONCE
Status: CANCELLED | OUTPATIENT
Start: 2018-11-28 | End: 2018-11-28

## 2018-11-28 RX ORDER — DEXTROSE MONOHYDRATE 50 MG/ML
INJECTION, SOLUTION INTRAVENOUS CONTINUOUS
Status: CANCELLED
Start: 2018-11-28

## 2018-11-28 RX ORDER — DEXTROSE AND SODIUM CHLORIDE 5; .33 G/100ML; G/100ML
INJECTION, SOLUTION INTRAVENOUS CONTINUOUS
Status: CANCELLED
Start: 2018-11-28

## 2018-11-28 RX ORDER — HEPARIN SODIUM (PORCINE) LOCK FLUSH IV SOLN 100 UNIT/ML 100 UNIT/ML
500 SOLUTION INTRAVENOUS PRN
Status: DISCONTINUED | OUTPATIENT
Start: 2018-11-28 | End: 2018-11-29 | Stop reason: HOSPADM

## 2018-11-28 RX ORDER — 0.9 % SODIUM CHLORIDE 0.9 %
10 VIAL (ML) INJECTION PRN
Status: CANCELLED | OUTPATIENT
Start: 2018-11-28

## 2018-11-28 RX ORDER — DIPHENHYDRAMINE HCL 25 MG
25 TABLET ORAL EVERY 4 HOURS PRN
Status: DISCONTINUED | OUTPATIENT
Start: 2018-11-28 | End: 2018-11-29 | Stop reason: HOSPADM

## 2018-11-28 RX ORDER — DEXTROSE AND SODIUM CHLORIDE 5; .33 G/100ML; G/100ML
INJECTION, SOLUTION INTRAVENOUS CONTINUOUS
Status: DISCONTINUED | OUTPATIENT
Start: 2018-11-28 | End: 2018-11-29 | Stop reason: HOSPADM

## 2018-11-28 RX ORDER — HEPARIN SODIUM (PORCINE) LOCK FLUSH IV SOLN 100 UNIT/ML 100 UNIT/ML
500 SOLUTION INTRAVENOUS PRN
Status: CANCELLED
Start: 2018-11-28

## 2018-11-28 RX ORDER — DIPHENHYDRAMINE HCL 25 MG
25 TABLET ORAL EVERY 4 HOURS PRN
Status: CANCELLED
Start: 2018-11-28

## 2018-11-28 RX ORDER — 0.9 % SODIUM CHLORIDE 0.9 %
10 VIAL (ML) INJECTION PRN
Status: DISCONTINUED | OUTPATIENT
Start: 2018-11-28 | End: 2018-11-29 | Stop reason: HOSPADM

## 2018-11-28 RX ORDER — DIPHENHYDRAMINE HCL 25 MG
25 TABLET ORAL ONCE
Status: COMPLETED | OUTPATIENT
Start: 2018-11-28 | End: 2018-11-28

## 2018-11-28 RX ADMIN — DIPHENHYDRAMINE HCL 25 MG: 25 TABLET ORAL at 14:30

## 2018-11-28 RX ADMIN — Medication 10 ML: at 16:38

## 2018-11-28 RX ADMIN — DIPHENHYDRAMINE HCL 25 MG: 25 TABLET ORAL at 09:13

## 2018-11-28 RX ADMIN — DEXTROSE AND SODIUM CHLORIDE: 5; 330 INJECTION, SOLUTION INTRAVENOUS at 09:13

## 2018-11-28 RX ADMIN — IMMUNE GLOBULIN INTRAVENOUS (HUMAN): 5 INJECTION, SOLUTION INTRAVENOUS at 09:45

## 2018-11-28 RX ADMIN — SODIUM CHLORIDE, PRESERVATIVE FREE 500 UNITS: 5 INJECTION INTRAVENOUS at 16:38

## 2018-11-28 NOTE — PROGRESS NOTES
Patient arrive ambulatory per self for IVIG infusion. Orders reviewed. Patient denies complaints or concerns. Denies infection sign or symptoms. Port accessed with brisk blood return; specimen collected. Vitals as charted. Patient premedicated (see MAR). Patient pre hydrated (250 ml over 30 minutes). IVIG infusion begin at 20 ml/hr x 20 minutes  116/73  68  IVIG infusion increase to 40 ml/hr x 20 minutes  119/72  70  IVIG infusion increase to 80 ml/hr x 20 minutes  115/74  65  IVIG infusion increase to 160 ml/hr x 30 minutes  107/77  66  IVIG infusion increase to 250 ml/hr for remainder of infusion;  109/75  69  Additional BP q30 minutes during infusion; 116/71  87;  120/69  82; 117/68  83;  121/56  85; 132/83  87; 138/74  82; 131/70  80; 135/72  86;  137/77  81; 125/66  79; 116/69  79; 125/73  82. IVIG infusion completed with no sign of adverse reaction. Patient received post hydration (250 ml over 30 minutes) while monitoring patient; no sign of adverse reaction; BP 30 minutes post IVIG infusion; 130/75  80. Port flushed and heparinized with intact carlos needle removed per protocol. Patient ambulate off unit per self at discharge. RTC   12/19/18; calendar provided to patient.

## 2018-11-30 ENCOUNTER — OFFICE VISIT (OUTPATIENT)
Dept: ORTHOPEDIC SURGERY | Age: 63
End: 2018-11-30
Payer: MEDICARE

## 2018-11-30 VITALS — WEIGHT: 273 LBS | BODY MASS INDEX: 41.37 KG/M2 | HEIGHT: 68 IN

## 2018-11-30 DIAGNOSIS — M25.552 LEFT HIP PAIN: Primary | ICD-10-CM

## 2018-11-30 DIAGNOSIS — M48.061 SPINAL STENOSIS OF LUMBAR REGION, UNSPECIFIED WHETHER NEUROGENIC CLAUDICATION PRESENT: ICD-10-CM

## 2018-11-30 DIAGNOSIS — G89.29 OTHER CHRONIC PAIN: ICD-10-CM

## 2018-11-30 PROCEDURE — 99213 OFFICE O/P EST LOW 20 MIN: CPT | Performed by: ORTHOPAEDIC SURGERY

## 2018-11-30 PROCEDURE — G8427 DOCREV CUR MEDS BY ELIG CLIN: HCPCS | Performed by: ORTHOPAEDIC SURGERY

## 2018-11-30 PROCEDURE — G8482 FLU IMMUNIZE ORDER/ADMIN: HCPCS | Performed by: ORTHOPAEDIC SURGERY

## 2018-11-30 PROCEDURE — 1036F TOBACCO NON-USER: CPT | Performed by: ORTHOPAEDIC SURGERY

## 2018-11-30 PROCEDURE — G8417 CALC BMI ABV UP PARAM F/U: HCPCS | Performed by: ORTHOPAEDIC SURGERY

## 2018-11-30 PROCEDURE — 3017F COLORECTAL CA SCREEN DOC REV: CPT | Performed by: ORTHOPAEDIC SURGERY

## 2018-11-30 RX ORDER — TRAMADOL HYDROCHLORIDE 50 MG/1
TABLET ORAL
Qty: 90 TABLET | Refills: 0 | Status: SHIPPED | OUTPATIENT
Start: 2018-11-30 | End: 2018-12-30

## 2018-11-30 ASSESSMENT — ENCOUNTER SYMPTOMS
WHEEZING: 0
SHORTNESS OF BREATH: 0
BACK PAIN: 1

## 2018-11-30 NOTE — PROGRESS NOTES
accurately reflects work and decisions made by me. I have also reviewed documentation completed by clinical staff.     Anat Hilario DO, [facility]  12/5/2018 1:28 PM    This note is created with the assistance of a speech recognition program.  While intending to generate a document that actually reflects the content of the visit, the document can still have some errors including those of syntax and sound a like substitutions which may escape proof reading.  In such instances, actual meaning can be extrapolated by contextual diversion      Electronically signed by Kain Santaigo DO, FAOAO on 12/5/2018 at 1:28 PM

## 2018-12-03 DIAGNOSIS — F41.9 ANXIETY: Chronic | ICD-10-CM

## 2018-12-04 RX ORDER — ALPRAZOLAM 0.5 MG/1
TABLET ORAL
Qty: 90 TABLET | Refills: 0 | Status: SHIPPED | OUTPATIENT
Start: 2018-12-04 | End: 2018-12-31 | Stop reason: SDUPTHER

## 2018-12-14 RX ORDER — MONTELUKAST SODIUM 10 MG/1
10 TABLET ORAL NIGHTLY
Qty: 90 TABLET | Refills: 0 | Status: SHIPPED | OUTPATIENT
Start: 2018-12-14 | End: 2019-03-07 | Stop reason: SDUPTHER

## 2018-12-17 ENCOUNTER — HOSPITAL ENCOUNTER (OUTPATIENT)
Dept: PHYSICAL THERAPY | Facility: CLINIC | Age: 63
Setting detail: THERAPIES SERIES
Discharge: HOME | End: 2018-12-17
Payer: MEDICARE

## 2018-12-17 PROCEDURE — G8980 MOBILITY D/C STATUS: HCPCS

## 2018-12-17 PROCEDURE — G8978 MOBILITY CURRENT STATUS: HCPCS

## 2018-12-19 ENCOUNTER — HOSPITAL ENCOUNTER (OUTPATIENT)
Dept: INFUSION THERAPY | Facility: MEDICAL CENTER | Age: 63
Discharge: HOME | End: 2018-12-19
Payer: MEDICARE

## 2018-12-19 VITALS
SYSTOLIC BLOOD PRESSURE: 115 MMHG | HEART RATE: 67 BPM | DIASTOLIC BLOOD PRESSURE: 66 MMHG | TEMPERATURE: 98 F | RESPIRATION RATE: 22 BRPM

## 2018-12-19 DIAGNOSIS — D80.1 COMMON VARIABLE HYPOGAMMAGLOBULINEMIA (HCC): ICD-10-CM

## 2018-12-19 DIAGNOSIS — D83.9 COMMON VARIABLE IMMUNODEFICIENCY (HCC): ICD-10-CM

## 2018-12-19 PROCEDURE — 2580000003 HC RX 258: Performed by: ALLERGY & IMMUNOLOGY

## 2018-12-19 PROCEDURE — 96361 HYDRATE IV INFUSION ADD-ON: CPT

## 2018-12-19 PROCEDURE — 96366 THER/PROPH/DIAG IV INF ADDON: CPT

## 2018-12-19 PROCEDURE — 96365 THER/PROPH/DIAG IV INF INIT: CPT

## 2018-12-19 PROCEDURE — 6370000000 HC RX 637 (ALT 250 FOR IP): Performed by: ALLERGY & IMMUNOLOGY

## 2018-12-19 PROCEDURE — 6360000002 HC RX W HCPCS: Performed by: ALLERGY & IMMUNOLOGY

## 2018-12-19 PROCEDURE — 96360 HYDRATION IV INFUSION INIT: CPT

## 2018-12-19 RX ORDER — DEXTROSE AND SODIUM CHLORIDE 5; .33 G/100ML; G/100ML
INJECTION, SOLUTION INTRAVENOUS CONTINUOUS
Status: CANCELLED
Start: 2018-12-19

## 2018-12-19 RX ORDER — DIPHENHYDRAMINE HCL 25 MG
25 TABLET ORAL ONCE
Status: COMPLETED | OUTPATIENT
Start: 2018-12-19 | End: 2018-12-19

## 2018-12-19 RX ORDER — DEXTROSE MONOHYDRATE 50 MG/ML
INJECTION, SOLUTION INTRAVENOUS CONTINUOUS
Status: CANCELLED
Start: 2018-12-19

## 2018-12-19 RX ORDER — HEPARIN SODIUM (PORCINE) LOCK FLUSH IV SOLN 100 UNIT/ML 100 UNIT/ML
500 SOLUTION INTRAVENOUS PRN
Status: CANCELLED
Start: 2018-12-19

## 2018-12-19 RX ORDER — DIPHENHYDRAMINE HCL 25 MG
25 TABLET ORAL ONCE
Status: CANCELLED | OUTPATIENT
Start: 2018-12-19 | End: 2018-12-19

## 2018-12-19 RX ORDER — DEXTROSE AND SODIUM CHLORIDE 5; .33 G/100ML; G/100ML
INJECTION, SOLUTION INTRAVENOUS CONTINUOUS
Status: DISCONTINUED | OUTPATIENT
Start: 2018-12-19 | End: 2018-12-20 | Stop reason: HOSPADM

## 2018-12-19 RX ORDER — 0.9 % SODIUM CHLORIDE 0.9 %
10 VIAL (ML) INJECTION PRN
Status: CANCELLED | OUTPATIENT
Start: 2018-12-19

## 2018-12-19 RX ORDER — 0.9 % SODIUM CHLORIDE 0.9 %
10 VIAL (ML) INJECTION PRN
Status: DISCONTINUED | OUTPATIENT
Start: 2018-12-19 | End: 2018-12-20 | Stop reason: HOSPADM

## 2018-12-19 RX ORDER — DIPHENHYDRAMINE HCL 25 MG
25 TABLET ORAL EVERY 4 HOURS PRN
Status: CANCELLED
Start: 2018-12-19

## 2018-12-19 RX ORDER — HEPARIN SODIUM (PORCINE) LOCK FLUSH IV SOLN 100 UNIT/ML 100 UNIT/ML
500 SOLUTION INTRAVENOUS PRN
Status: DISCONTINUED | OUTPATIENT
Start: 2018-12-19 | End: 2018-12-20 | Stop reason: HOSPADM

## 2018-12-19 RX ORDER — DEXTROSE MONOHYDRATE 50 MG/ML
INJECTION, SOLUTION INTRAVENOUS CONTINUOUS
Status: DISCONTINUED | OUTPATIENT
Start: 2018-12-19 | End: 2018-12-20 | Stop reason: HOSPADM

## 2018-12-19 RX ADMIN — IMMUNE GLOBULIN INTRAVENOUS (HUMAN) 60 G: 5 INJECTION, SOLUTION INTRAVENOUS at 09:05

## 2018-12-19 RX ADMIN — DEXTROSE AND SODIUM CHLORIDE: 5; 330 INJECTION, SOLUTION INTRAVENOUS at 08:45

## 2018-12-19 RX ADMIN — DIPHENHYDRAMINE HCL 25 MG: 25 TABLET ORAL at 08:43

## 2018-12-19 RX ADMIN — Medication 10 ML: at 15:45

## 2018-12-19 RX ADMIN — SODIUM CHLORIDE, PRESERVATIVE FREE 500 UNITS: 5 INJECTION INTRAVENOUS at 15:45

## 2018-12-19 RX ADMIN — DIPHENHYDRAMINE HCL 25 MG: 25 TABLET ORAL at 13:17

## 2018-12-19 RX ADMIN — DEXTROSE MONOHYDRATE: 50 INJECTION, SOLUTION INTRAVENOUS at 08:45

## 2018-12-19 ASSESSMENT — PAIN SCALES - GENERAL: PAINLEVEL_OUTOF10: 0

## 2018-12-19 NOTE — PROGRESS NOTES
0825:  Pt arrives ambulatory on own portable [email or web address] via nasal cannula for IVIG infusion. Dr. Jessee Johnson orders from 05/14/2018 reviewed. Pt denies any recent fevers/chills/illnesses or infections. Pt denies pain or other discomfort. 9810:  Baseline VS as charted. 2158:  Pt's port accessed without difficulty as charted in LDA's section and then IVF's D5W0.33%NaCl begun at 500 ml/hr to infuse 250 ml over 30 minutes as ordered for IV Pre-Hydration. Pt tolerated IV Pre-Hydration well without complaints. See MAR for pre-medications given to the patient. 0767:  Flebogamma 5% 60 gm IVIG hung. Rate initiated at 20 ml/hr for 20 minutes. No adverse reactions noted. Rate increased to 40 ml/hr for 20 minutes. No adverse reactions noted. Rate increased to 80 ml/hr for 20 minutes. No adverse reactions noted. Rate increased to 160 ml/hr for 30 minutes. No adverse reactions noted. Rate increased to and maintained at 250 ml/hr until completion time of 1515. VS taken baseline, with every rate increase, then every 30 minutes until IVIG completed and 30 minutes post IVIG as follows:  Baseline VS:  115/66   67   22  98.0F PO  0905:  114/64   64   18  0925:  101/49   60  0945:  113/62   62  1005:  113/65   59  1035:  113/55   58  1105:  119/72   68  1135:  107/59   75  1205:  108/74   93  1235:  114/63   78  1305:  118/70   85  1335:  125/65   76  1405:  124/67   75  1435:  120/70   77  1505:  124/74   70  1515:  105/81   83  1545:  119/70   82    1515:  Pt tolerated IVIG infusion well without complaints. Pt denied headaches throughout. Pt observed for 30 minutes post IVIG while getting IV Post-Hydration   D5W 0.33%NaCl begun at 500 ml/hr to infuse 250 ml over 30 minutes. Pt tolerated IV Post-Hydration well without complaints. No adverse reactions noted. Appointment calendar given to the patient. RTC on 1/9/18. Pt discharged home ambulatory on own portable oxygen without complaints.

## 2018-12-19 NOTE — FLOWSHEET NOTE
greeted and introduced herself to Pt while rounding the unit. Pt reported doing [employer]. \"  informed Pt that she is available for support. Pt thanked .  commented on Pt's thermos and had a brief conversation about it. Visit was interrupted by need of another Pt. Valdez Encarnacion will attempt to return and engage in visit with Pt.

## 2018-12-31 DIAGNOSIS — F41.9 ANXIETY: Chronic | ICD-10-CM

## 2018-12-31 RX ORDER — TIOTROPIUM BROMIDE 18 UG/1
CAPSULE ORAL; RESPIRATORY (INHALATION)
Qty: 90 CAPSULE | Refills: 3 | Status: SHIPPED | OUTPATIENT
Start: 2018-12-31 | End: 2020-01-02

## 2018-12-31 NOTE — TELEPHONE ENCOUNTER
A request from pharmacy is coming over for Spiriva. If patient is to be on it please sign. Patient is current.

## 2019-01-02 RX ORDER — ALPRAZOLAM 0.5 MG/1
TABLET ORAL
Qty: 90 TABLET | Refills: 0 | Status: SHIPPED | OUTPATIENT
Start: 2019-01-02 | End: 2019-02-01

## 2019-01-02 RX ORDER — ALBUTEROL SULFATE 2.5 MG/3ML
SOLUTION RESPIRATORY (INHALATION)
Qty: 120 VIAL | Refills: 0 | Status: SHIPPED | OUTPATIENT
Start: 2019-01-02 | End: 2019-06-21 | Stop reason: SDUPTHER

## 2019-01-02 RX ORDER — BUDESONIDE AND FORMOTEROL FUMARATE DIHYDRATE 160; 4.5 UG/1; UG/1
AEROSOL RESPIRATORY (INHALATION)
Qty: 3 INHALER | Refills: 0 | Status: SHIPPED | OUTPATIENT
Start: 2019-01-02 | End: 2019-06-21 | Stop reason: SDUPTHER

## 2019-01-09 ENCOUNTER — HOSPITAL ENCOUNTER (OUTPATIENT)
Dept: INFUSION THERAPY | Facility: MEDICAL CENTER | Age: 64
Discharge: HOME | End: 2019-01-09
Payer: MEDICARE

## 2019-01-09 VITALS
HEART RATE: 77 BPM | SYSTOLIC BLOOD PRESSURE: 137 MMHG | DIASTOLIC BLOOD PRESSURE: 81 MMHG | RESPIRATION RATE: 20 BRPM | TEMPERATURE: 97.9 F

## 2019-01-09 DIAGNOSIS — D80.1 COMMON VARIABLE HYPOGAMMAGLOBULINEMIA (HCC): ICD-10-CM

## 2019-01-09 DIAGNOSIS — D83.9 COMMON VARIABLE IMMUNODEFICIENCY (HCC): ICD-10-CM

## 2019-01-09 PROCEDURE — 2580000003 HC RX 258: Performed by: ALLERGY & IMMUNOLOGY

## 2019-01-09 PROCEDURE — 6360000002 HC RX W HCPCS: Performed by: ALLERGY & IMMUNOLOGY

## 2019-01-09 PROCEDURE — 96365 THER/PROPH/DIAG IV INF INIT: CPT

## 2019-01-09 PROCEDURE — 96361 HYDRATE IV INFUSION ADD-ON: CPT

## 2019-01-09 PROCEDURE — 6370000000 HC RX 637 (ALT 250 FOR IP): Performed by: ALLERGY & IMMUNOLOGY

## 2019-01-09 PROCEDURE — 96366 THER/PROPH/DIAG IV INF ADDON: CPT

## 2019-01-09 RX ORDER — DEXTROSE MONOHYDRATE 50 MG/ML
INJECTION, SOLUTION INTRAVENOUS CONTINUOUS
Status: DISCONTINUED | OUTPATIENT
Start: 2019-01-09 | End: 2019-01-10 | Stop reason: HOSPADM

## 2019-01-09 RX ORDER — DIPHENHYDRAMINE HCL 25 MG
25 TABLET ORAL EVERY 4 HOURS PRN
Status: DISCONTINUED | OUTPATIENT
Start: 2019-01-09 | End: 2019-01-10 | Stop reason: HOSPADM

## 2019-01-09 RX ORDER — DEXTROSE AND SODIUM CHLORIDE 5; .33 G/100ML; G/100ML
INJECTION, SOLUTION INTRAVENOUS CONTINUOUS
Status: CANCELLED
Start: 2019-01-09

## 2019-01-09 RX ORDER — DEXTROSE MONOHYDRATE 50 MG/ML
INJECTION, SOLUTION INTRAVENOUS CONTINUOUS
Status: CANCELLED
Start: 2019-01-09

## 2019-01-09 RX ORDER — DEXTROSE AND SODIUM CHLORIDE 5; .33 G/100ML; G/100ML
INJECTION, SOLUTION INTRAVENOUS CONTINUOUS
Status: DISCONTINUED | OUTPATIENT
Start: 2019-01-09 | End: 2019-01-10 | Stop reason: HOSPADM

## 2019-01-09 RX ORDER — 0.9 % SODIUM CHLORIDE 0.9 %
10 VIAL (ML) INJECTION PRN
Status: CANCELLED | OUTPATIENT
Start: 2019-01-09

## 2019-01-09 RX ORDER — DIPHENHYDRAMINE HCL 25 MG
25 TABLET ORAL EVERY 4 HOURS PRN
Status: CANCELLED
Start: 2019-01-09

## 2019-01-09 RX ORDER — HEPARIN SODIUM (PORCINE) LOCK FLUSH IV SOLN 100 UNIT/ML 100 UNIT/ML
500 SOLUTION INTRAVENOUS PRN
Status: DISCONTINUED | OUTPATIENT
Start: 2019-01-09 | End: 2019-01-10 | Stop reason: HOSPADM

## 2019-01-09 RX ORDER — HEPARIN SODIUM (PORCINE) LOCK FLUSH IV SOLN 100 UNIT/ML 100 UNIT/ML
500 SOLUTION INTRAVENOUS PRN
Status: CANCELLED
Start: 2019-01-09

## 2019-01-09 RX ORDER — DIPHENHYDRAMINE HCL 25 MG
25 TABLET ORAL ONCE
Status: COMPLETED | OUTPATIENT
Start: 2019-01-09 | End: 2019-01-09

## 2019-01-09 RX ORDER — DIPHENHYDRAMINE HCL 25 MG
25 TABLET ORAL ONCE
Status: CANCELLED | OUTPATIENT
Start: 2019-01-09 | End: 2019-01-09

## 2019-01-09 RX ORDER — 0.9 % SODIUM CHLORIDE 0.9 %
10 VIAL (ML) INJECTION PRN
Status: DISCONTINUED | OUTPATIENT
Start: 2019-01-09 | End: 2019-01-10 | Stop reason: HOSPADM

## 2019-01-09 RX ADMIN — DIPHENHYDRAMINE HCL 25 MG: 25 TABLET ORAL at 14:10

## 2019-01-09 RX ADMIN — DEXTROSE AND SODIUM CHLORIDE: 5; 330 INJECTION, SOLUTION INTRAVENOUS at 09:04

## 2019-01-09 RX ADMIN — DIPHENHYDRAMINE HCL 25 MG: 25 TABLET ORAL at 09:06

## 2019-01-09 RX ADMIN — SODIUM CHLORIDE, PRESERVATIVE FREE 500 UNITS: 5 INJECTION INTRAVENOUS at 16:03

## 2019-01-09 RX ADMIN — IMMUNE GLOBULIN INTRAVENOUS (HUMAN): 5 INJECTION, SOLUTION INTRAVENOUS at 09:40

## 2019-01-09 NOTE — PROGRESS NOTES
Patient here for IVIG infusion. No complaints today. Vitals obtained. Port accessed per policy. Premeds given along with pre hydration as ordered. IVIG hung per MAR at 20 ml.hr x 20 minutes, LA=967/79. Rate increased to 40 ml/hr x 20 minutes, AB=535/72. Rate increased to 80 ml/hr x 20 minutes, NA=062/68. Rate increased to 160 ml/hr x 20 minutes, ND=155/71. Rate increased to and maintained at 250 ml/hr throughout remainder of the infusion. Sleeping on and off all day. Requested repeat Benadryl x 1 and given per MAR. BP's: 117/63, 118/77, 120/67, 137/63, 119/66. Final HALINA at completion =108/63. Post hydration completed as ordered. Port flushed per policy and gripper removed intact, band aid to site. Has a return visit scheduled. Discharged ambulatory per self.

## 2019-01-09 NOTE — FLOWSHEET NOTE
Assessment:  Patient was laughing and joking with RN prior to 's arrival. Pt greeted and welcomed 's visit. Pt accessed his humor and story telling while communicating. Pt identified his sources of support, including his  parents who raised him with strong values and work ethic, his wife, children, grandchildren, family and friends. Pt shared stories about his family, which indicated his strong ties with them and his role as leader. Pt illustrated and shared his personal strengths of humor, positive attitude, and a determination to \"not give up. \"     Pt listed his multiple health challenges and how they have impacted him physically, emotionally, financially and spiritually. One big change Pt noted has been to have to sell his home and move into a rental home. Pt admitted that there have been moments that he has struggled but that he tries to keep positive, especially with his wife and family. Pt expressed his desire to [employer] and his motivations for living, including his family. Pt expressed concerns about his brother-in-law who is receiving treatment for cancer and shared that he encourages him to \"not give up. \"     Pt also admitted being \"ready at any time\" and awareness of the seriousness of his disease. Pt voiced his belief in the importance of \"not giving up. \"      Pt shared that he was raised [address] [facility] and that his lalit supports him. Pt expressed desire to return to [employer] and receive the support. Pt voiced his beliefs in God, \"God is on my side. \" Pt voiced hopes that this year he will be able to \"slow down\" a bit. Intervention:   provided supportive presence, active listening, exploration of coping, needs, and resources, facilitation of story telling, words of encouragement and support, and affirmation of Pt's strengths. Outcome:  Patient engaged in visit with , sharing stories, his beliefs and values, and humor.  Pt voiced his hopes for the future. Pt thanked  for the visit. Recommendations:  Chaplains are available to provide emotional and spiritual support as needed. 01/09/19 1033   Encounter Summary   Services provided to: Patient   Referral/Consult From: [facility]   Support System Spouse; Children;Family members   Continue Visiting (1/9/19)   Complexity of Encounter Low   Length of Encounter 45 minutes   Spiritual Assessment Completed Yes   Spiritual/Oriental orthodox   Type Spiritual support   Assessment Approachable;Coping; Hopeful   Intervention Active listening;Explored feelings, thoughts, concerns;Explored coping resources;Sustaining presence/ Ministry of presence; Discussed relationship with God;Discussed meaning/purpose;Discussed illness/injury and it's impact   Outcome Expressed gratitude;Receptive;Encouraged;Coping     Electronically signed by Ben Casey, Oncology Outpatient [facility] 26, [id number] [facility]  1/9/2019  10:35 AM

## 2019-01-11 ENCOUNTER — TELEPHONE (OUTPATIENT)
Dept: PULMONOLOGY | Age: 64
End: 2019-01-11

## 2019-01-11 RX ORDER — BENZONATATE 100 MG/1
100 CAPSULE ORAL 3 TIMES DAILY PRN
Qty: 30 CAPSULE | Refills: 3 | Status: SHIPPED | OUTPATIENT
Start: 2019-01-11 | End: 2019-01-18

## 2019-01-14 ENCOUNTER — HOSPITAL ENCOUNTER (INPATIENT)
Age: 64
LOS: 4 days | Discharge: HOME HEALTH | DRG: 202 | End: 2019-01-18
Attending: EMERGENCY MEDICINE | Admitting: INTERNAL MEDICINE
Payer: MEDICARE

## 2019-01-14 ENCOUNTER — APPOINTMENT (OUTPATIENT)
Dept: GENERAL RADIOLOGY | Age: 64
DRG: 202 | End: 2019-01-14
Payer: MEDICARE

## 2019-01-14 DIAGNOSIS — R06.02 SOB (SHORTNESS OF BREATH): Primary | ICD-10-CM

## 2019-01-14 DIAGNOSIS — R05.9 COUGH: ICD-10-CM

## 2019-01-14 LAB
ABSOLUTE EOS #: 0.1 K/UL (ref 0–0.44)
ABSOLUTE IMMATURE GRANULOCYTE: 0.08 K/UL (ref 0–0.3)
ABSOLUTE LYMPH #: 2.07 K/UL (ref 1.1–3.7)
ABSOLUTE MONO #: 0.46 K/UL (ref 0.1–1.2)
ANION GAP SERPL CALCULATED.3IONS-SCNC: 17 MMOL/L (ref 9–17)
BASOPHILS # BLD: 1 % (ref 0–2)
BASOPHILS ABSOLUTE: 0.05 K/UL (ref 0–0.2)
BUN BLDV-MCNC: 13 MG/DL (ref 8–23)
BUN/CREAT BLD: ABNORMAL (ref 9–20)
CALCIUM SERPL-MCNC: 8.9 MG/DL (ref 8.6–10.4)
CHLORIDE BLD-SCNC: 103 MMOL/L (ref 98–107)
CO2: 21 MMOL/L (ref 20–31)
CREAT SERPL-MCNC: 0.76 MG/DL (ref 0.7–1.2)
DIFFERENTIAL TYPE: ABNORMAL
DIRECT EXAM: NORMAL
EOSINOPHILS RELATIVE PERCENT: 1 % (ref 1–4)
GFR AFRICAN AMERICAN: >60 ML/MIN
GFR NON-AFRICAN AMERICAN: >60 ML/MIN
GFR SERPL CREATININE-BSD FRML MDRD: ABNORMAL ML/MIN/{1.73_M2}
GFR SERPL CREATININE-BSD FRML MDRD: ABNORMAL ML/MIN/{1.73_M2}
GLUCOSE BLD-MCNC: 129 MG/DL (ref 70–99)
GLUCOSE BLD-MCNC: 172 MG/DL (ref 75–110)
HCT VFR BLD CALC: 45.3 % (ref 40.7–50.3)
HEMOGLOBIN: 14.9 G/DL (ref 13–17)
IMMATURE GRANULOCYTES: 1 %
LACTIC ACID, WHOLE BLOOD: 2 MMOL/L (ref 0.7–2.1)
LYMPHOCYTES # BLD: 24 % (ref 24–43)
Lab: NORMAL
MCH RBC QN AUTO: 29.1 PG (ref 25.2–33.5)
MCHC RBC AUTO-ENTMCNC: 32.9 G/DL (ref 28.4–34.8)
MCV RBC AUTO: 88.5 FL (ref 82.6–102.9)
MONOCYTES # BLD: 5 % (ref 3–12)
NRBC AUTOMATED: 0 PER 100 WBC
PDW BLD-RTO: 13.3 % (ref 11.8–14.4)
PLATELET # BLD: 215 K/UL (ref 138–453)
PLATELET ESTIMATE: ABNORMAL
PMV BLD AUTO: 10.5 FL (ref 8.1–13.5)
POTASSIUM SERPL-SCNC: 4.2 MMOL/L (ref 3.7–5.3)
RBC # BLD: 5.12 M/UL (ref 4.21–5.77)
RBC # BLD: ABNORMAL 10*6/UL
SEG NEUTROPHILS: 68 % (ref 36–65)
SEGMENTED NEUTROPHILS ABSOLUTE COUNT: 6.03 K/UL (ref 1.5–8.1)
SODIUM BLD-SCNC: 141 MMOL/L (ref 135–144)
SPECIMEN DESCRIPTION: NORMAL
STATUS: NORMAL
TROPONIN INTERP: NORMAL
TROPONIN T: NORMAL NG/ML
TROPONIN, HIGH SENSITIVITY: 13 NG/L (ref 0–22)
WBC # BLD: 8.8 K/UL (ref 3.5–11.3)
WBC # BLD: ABNORMAL 10*3/UL

## 2019-01-14 PROCEDURE — 2060000000 HC ICU INTERMEDIATE R&B

## 2019-01-14 PROCEDURE — 85025 COMPLETE CBC W/AUTO DIFF WBC: CPT

## 2019-01-14 PROCEDURE — 6360000002 HC RX W HCPCS: Performed by: STUDENT IN AN ORGANIZED HEALTH CARE EDUCATION/TRAINING PROGRAM

## 2019-01-14 PROCEDURE — 2700000000 HC OXYGEN THERAPY PER DAY

## 2019-01-14 PROCEDURE — 87040 BLOOD CULTURE FOR BACTERIA: CPT

## 2019-01-14 PROCEDURE — 94762 N-INVAS EAR/PLS OXIMTRY CONT: CPT

## 2019-01-14 PROCEDURE — 94640 AIRWAY INHALATION TREATMENT: CPT

## 2019-01-14 PROCEDURE — 6370000000 HC RX 637 (ALT 250 FOR IP): Performed by: STUDENT IN AN ORGANIZED HEALTH CARE EDUCATION/TRAINING PROGRAM

## 2019-01-14 PROCEDURE — 99285 EMERGENCY DEPT VISIT HI MDM: CPT

## 2019-01-14 PROCEDURE — 6360000002 HC RX W HCPCS: Performed by: EMERGENCY MEDICINE

## 2019-01-14 PROCEDURE — 71045 X-RAY EXAM CHEST 1 VIEW: CPT

## 2019-01-14 PROCEDURE — 96374 THER/PROPH/DIAG INJ IV PUSH: CPT

## 2019-01-14 PROCEDURE — 2580000003 HC RX 258: Performed by: INTERNAL MEDICINE

## 2019-01-14 PROCEDURE — 93005 ELECTROCARDIOGRAM TRACING: CPT

## 2019-01-14 PROCEDURE — 82947 ASSAY GLUCOSE BLOOD QUANT: CPT

## 2019-01-14 PROCEDURE — 80048 BASIC METABOLIC PNL TOTAL CA: CPT

## 2019-01-14 PROCEDURE — 87804 INFLUENZA ASSAY W/OPTIC: CPT

## 2019-01-14 PROCEDURE — 94645 CONT INHLJ TX EACH ADDL HOUR: CPT

## 2019-01-14 PROCEDURE — 2580000003 HC RX 258: Performed by: STUDENT IN AN ORGANIZED HEALTH CARE EDUCATION/TRAINING PROGRAM

## 2019-01-14 PROCEDURE — 6370000000 HC RX 637 (ALT 250 FOR IP): Performed by: EMERGENCY MEDICINE

## 2019-01-14 PROCEDURE — 83605 ASSAY OF LACTIC ACID: CPT

## 2019-01-14 PROCEDURE — 84484 ASSAY OF TROPONIN QUANT: CPT

## 2019-01-14 PROCEDURE — 94644 CONT INHLJ TX 1ST HOUR: CPT

## 2019-01-14 RX ORDER — ALBUTEROL SULFATE 2.5 MG/3ML
2.5 SOLUTION RESPIRATORY (INHALATION) EVERY 4 HOURS PRN
Status: DISCONTINUED | OUTPATIENT
Start: 2019-01-14 | End: 2019-01-14

## 2019-01-14 RX ORDER — ONDANSETRON 2 MG/ML
4 INJECTION INTRAMUSCULAR; INTRAVENOUS EVERY 6 HOURS PRN
Status: DISCONTINUED | OUTPATIENT
Start: 2019-01-14 | End: 2019-01-18 | Stop reason: HOSPADM

## 2019-01-14 RX ORDER — LEVOFLOXACIN 750 MG/1
750 TABLET ORAL ONCE
Status: DISCONTINUED | OUTPATIENT
Start: 2019-01-14 | End: 2019-01-14

## 2019-01-14 RX ORDER — ALBUTEROL SULFATE 90 UG/1
2 AEROSOL, METERED RESPIRATORY (INHALATION) EVERY 6 HOURS PRN
Status: DISCONTINUED | OUTPATIENT
Start: 2019-01-14 | End: 2019-01-14

## 2019-01-14 RX ORDER — ALBUTEROL SULFATE 2.5 MG/3ML
5 SOLUTION RESPIRATORY (INHALATION) EVERY 6 HOURS PRN
Status: DISCONTINUED | OUTPATIENT
Start: 2019-01-14 | End: 2019-01-14

## 2019-01-14 RX ORDER — ACETAMINOPHEN 325 MG/1
650 TABLET ORAL EVERY 4 HOURS PRN
Status: DISCONTINUED | OUTPATIENT
Start: 2019-01-14 | End: 2019-01-14

## 2019-01-14 RX ORDER — MONTELUKAST SODIUM 10 MG/1
10 TABLET ORAL DAILY
Status: DISCONTINUED | OUTPATIENT
Start: 2019-01-14 | End: 2019-01-18 | Stop reason: HOSPADM

## 2019-01-14 RX ORDER — METHYLPREDNISOLONE SODIUM SUCCINATE 40 MG/ML
40 INJECTION, POWDER, LYOPHILIZED, FOR SOLUTION INTRAMUSCULAR; INTRAVENOUS EVERY 8 HOURS
Status: DISCONTINUED | OUTPATIENT
Start: 2019-01-14 | End: 2019-01-17

## 2019-01-14 RX ORDER — FLUTICASONE PROPIONATE 50 MCG
2 SPRAY, SUSPENSION (ML) NASAL DAILY
Status: DISCONTINUED | OUTPATIENT
Start: 2019-01-14 | End: 2019-01-18 | Stop reason: HOSPADM

## 2019-01-14 RX ORDER — SODIUM CHLORIDE 0.9 % (FLUSH) 0.9 %
10 SYRINGE (ML) INJECTION EVERY 12 HOURS SCHEDULED
Status: DISCONTINUED | OUTPATIENT
Start: 2019-01-14 | End: 2019-01-18 | Stop reason: HOSPADM

## 2019-01-14 RX ORDER — SODIUM CHLORIDE 0.9 % (FLUSH) 0.9 %
10 SYRINGE (ML) INJECTION PRN
Status: DISCONTINUED | OUTPATIENT
Start: 2019-01-14 | End: 2019-01-18 | Stop reason: HOSPADM

## 2019-01-14 RX ORDER — POTASSIUM CHLORIDE 7.45 MG/ML
10 INJECTION INTRAVENOUS PRN
Status: DISCONTINUED | OUTPATIENT
Start: 2019-01-14 | End: 2019-01-18 | Stop reason: HOSPADM

## 2019-01-14 RX ORDER — ACETAMINOPHEN 325 MG/1
650 TABLET ORAL EVERY 4 HOURS PRN
Status: DISCONTINUED | OUTPATIENT
Start: 2019-01-14 | End: 2019-01-18 | Stop reason: HOSPADM

## 2019-01-14 RX ORDER — POTASSIUM CHLORIDE 20MEQ/15ML
40 LIQUID (ML) ORAL PRN
Status: DISCONTINUED | OUTPATIENT
Start: 2019-01-14 | End: 2019-01-18 | Stop reason: HOSPADM

## 2019-01-14 RX ORDER — METHYLPREDNISOLONE SODIUM SUCCINATE 125 MG/2ML
125 INJECTION, POWDER, LYOPHILIZED, FOR SOLUTION INTRAMUSCULAR; INTRAVENOUS ONCE
Status: COMPLETED | OUTPATIENT
Start: 2019-01-14 | End: 2019-01-14

## 2019-01-14 RX ORDER — ALBUTEROL SULFATE 2.5 MG/3ML
15 SOLUTION RESPIRATORY (INHALATION) CONTINUOUS
Status: DISCONTINUED | OUTPATIENT
Start: 2019-01-14 | End: 2019-01-14

## 2019-01-14 RX ORDER — PANTOPRAZOLE SODIUM 40 MG/1
40 TABLET, DELAYED RELEASE ORAL
Status: DISCONTINUED | OUTPATIENT
Start: 2019-01-15 | End: 2019-01-18 | Stop reason: HOSPADM

## 2019-01-14 RX ORDER — ALBUTEROL SULFATE 2.5 MG/3ML
2.5 SOLUTION RESPIRATORY (INHALATION)
Status: DISCONTINUED | OUTPATIENT
Start: 2019-01-14 | End: 2019-01-14

## 2019-01-14 RX ORDER — ALPRAZOLAM 0.25 MG/1
1 TABLET ORAL ONCE
Status: COMPLETED | OUTPATIENT
Start: 2019-01-14 | End: 2019-01-14

## 2019-01-14 RX ORDER — LEVOFLOXACIN 5 MG/ML
750 INJECTION, SOLUTION INTRAVENOUS EVERY 24 HOURS
Status: DISCONTINUED | OUTPATIENT
Start: 2019-01-14 | End: 2019-01-15

## 2019-01-14 RX ORDER — POTASSIUM CHLORIDE 20 MEQ/1
40 TABLET, EXTENDED RELEASE ORAL PRN
Status: DISCONTINUED | OUTPATIENT
Start: 2019-01-14 | End: 2019-01-18 | Stop reason: HOSPADM

## 2019-01-14 RX ORDER — ALBUTEROL SULFATE 2.5 MG/3ML
2.5 SOLUTION RESPIRATORY (INHALATION) EVERY 4 HOURS
Status: DISCONTINUED | OUTPATIENT
Start: 2019-01-15 | End: 2019-01-18

## 2019-01-14 RX ORDER — ONDANSETRON 2 MG/ML
4 INJECTION INTRAMUSCULAR; INTRAVENOUS EVERY 8 HOURS PRN
Status: DISCONTINUED | OUTPATIENT
Start: 2019-01-14 | End: 2019-01-14

## 2019-01-14 RX ORDER — BENZONATATE 100 MG/1
100 CAPSULE ORAL 3 TIMES DAILY PRN
Status: DISCONTINUED | OUTPATIENT
Start: 2019-01-14 | End: 2019-01-18 | Stop reason: HOSPADM

## 2019-01-14 RX ORDER — ALPRAZOLAM 0.5 MG/1
0.5 TABLET ORAL
Status: COMPLETED | OUTPATIENT
Start: 2019-01-14 | End: 2019-01-14

## 2019-01-14 RX ORDER — BENZONATATE 100 MG/1
200 CAPSULE ORAL 3 TIMES DAILY
Status: DISCONTINUED | OUTPATIENT
Start: 2019-01-14 | End: 2019-01-18 | Stop reason: HOSPADM

## 2019-01-14 RX ORDER — ALBUTEROL SULFATE 2.5 MG/3ML
2.5 SOLUTION RESPIRATORY (INHALATION) EVERY 6 HOURS PRN
Status: DISCONTINUED | OUTPATIENT
Start: 2019-01-14 | End: 2019-01-18 | Stop reason: HOSPADM

## 2019-01-14 RX ADMIN — LEVOFLOXACIN 750 MG: 5 INJECTION, SOLUTION INTRAVENOUS at 20:53

## 2019-01-14 RX ADMIN — ALBUTEROL SULFATE 15 MG/HR: 2.5 SOLUTION RESPIRATORY (INHALATION) at 07:28

## 2019-01-14 RX ADMIN — MOMETASONE FUROATE AND FORMOTEROL FUMARATE DIHYDRATE 2 PUFF: 200; 5 AEROSOL RESPIRATORY (INHALATION) at 20:59

## 2019-01-14 RX ADMIN — FLUTICASONE PROPIONATE 2 SPRAY: 50 SPRAY, METERED NASAL at 20:53

## 2019-01-14 RX ADMIN — METHYLPREDNISOLONE SODIUM SUCCINATE 125 MG: 125 INJECTION, POWDER, FOR SOLUTION INTRAMUSCULAR; INTRAVENOUS at 07:57

## 2019-01-14 RX ADMIN — BENZONATATE 200 MG: 100 CAPSULE ORAL at 20:53

## 2019-01-14 RX ADMIN — METHYLPREDNISOLONE SODIUM SUCCINATE 40 MG: 40 INJECTION, POWDER, FOR SOLUTION INTRAMUSCULAR; INTRAVENOUS at 20:53

## 2019-01-14 RX ADMIN — ALBUTEROL SULFATE 5 MG: 2.5 SOLUTION RESPIRATORY (INHALATION) at 07:19

## 2019-01-14 RX ADMIN — ALBUTEROL SULFATE 2.5 MG: 2.5 SOLUTION RESPIRATORY (INHALATION) at 20:59

## 2019-01-14 RX ADMIN — BENZONATATE 100 MG: 100 CAPSULE ORAL at 11:18

## 2019-01-14 RX ADMIN — ALPRAZOLAM 0.5 MG: 0.5 TABLET ORAL at 21:47

## 2019-01-14 RX ADMIN — ALPRAZOLAM 1 MG: 0.25 TABLET ORAL at 14:52

## 2019-01-14 RX ADMIN — Medication 10 ML: at 20:54

## 2019-01-14 RX ADMIN — ALBUTEROL SULFATE 5 MG: 2.5 SOLUTION RESPIRATORY (INHALATION) at 13:41

## 2019-01-14 RX ADMIN — IPRATROPIUM BROMIDE 0.5 MG: 0.5 SOLUTION RESPIRATORY (INHALATION) at 07:19

## 2019-01-14 ASSESSMENT — ENCOUNTER SYMPTOMS
DIARRHEA: 0
SORE THROAT: 1
COUGH: 1
BLOOD IN STOOL: 0
SHORTNESS OF BREATH: 1
RHINORRHEA: 1
EYE REDNESS: 0
EYE DISCHARGE: 0
VOMITING: 0
CHEST TIGHTNESS: 0
ABDOMINAL PAIN: 0
NAUSEA: 0

## 2019-01-14 ASSESSMENT — PAIN DESCRIPTION - LOCATION: LOCATION: THROAT

## 2019-01-14 ASSESSMENT — PAIN SCALES - GENERAL
PAINLEVEL_OUTOF10: 10
PAINLEVEL_OUTOF10: 0

## 2019-01-15 LAB
ABSOLUTE EOS #: <0.03 K/UL (ref 0–0.44)
ABSOLUTE IMMATURE GRANULOCYTE: 0.13 K/UL (ref 0–0.3)
ABSOLUTE LYMPH #: 0.97 K/UL (ref 1.1–3.7)
ABSOLUTE MONO #: 0.52 K/UL (ref 0.1–1.2)
ADENOVIRUS PCR: NOT DETECTED
BASOPHILS # BLD: 0 % (ref 0–2)
BASOPHILS ABSOLUTE: <0.03 K/UL (ref 0–0.2)
BORDETELLA PERTUSSIS PCR: NOT DETECTED
CHLAMYDIA PNEUMONIAE BY PCR: NOT DETECTED
CORONAVIRUS 229E PCR: NOT DETECTED
CORONAVIRUS HKU1 PCR: NOT DETECTED
CORONAVIRUS NL63 PCR: NOT DETECTED
CORONAVIRUS OC43 PCR: NOT DETECTED
DIFFERENTIAL TYPE: ABNORMAL
EKG ATRIAL RATE: 107 BPM
EKG ATRIAL RATE: 87 BPM
EKG P AXIS: 46 DEGREES
EKG P AXIS: 47 DEGREES
EKG P-R INTERVAL: 142 MS
EKG P-R INTERVAL: 148 MS
EKG Q-T INTERVAL: 366 MS
EKG Q-T INTERVAL: 372 MS
EKG QRS DURATION: 88 MS
EKG QRS DURATION: 92 MS
EKG QTC CALCULATION (BAZETT): 447 MS
EKG QTC CALCULATION (BAZETT): 488 MS
EKG R AXIS: 63 DEGREES
EKG R AXIS: 64 DEGREES
EKG T AXIS: 23 DEGREES
EKG T AXIS: 35 DEGREES
EKG VENTRICULAR RATE: 107 BPM
EKG VENTRICULAR RATE: 87 BPM
EOSINOPHILS RELATIVE PERCENT: 0 % (ref 1–4)
GLUCOSE BLD-MCNC: 153 MG/DL (ref 75–110)
GLUCOSE BLD-MCNC: 160 MG/DL (ref 75–110)
GLUCOSE BLD-MCNC: 179 MG/DL (ref 75–110)
HCT VFR BLD CALC: 47 % (ref 40.7–50.3)
HEMOGLOBIN: 14.9 G/DL (ref 13–17)
HUMAN METAPNEUMOVIRUS PCR: NOT DETECTED
IMMATURE GRANULOCYTES: 1 %
INFLUENZA A BY PCR: NOT DETECTED
INFLUENZA A H1 (2009) PCR: NORMAL
INFLUENZA A H1 PCR: NORMAL
INFLUENZA A H3 PCR: NORMAL
INFLUENZA B BY PCR: NOT DETECTED
LYMPHOCYTES # BLD: 7 % (ref 24–43)
MCH RBC QN AUTO: 28.4 PG (ref 25.2–33.5)
MCHC RBC AUTO-ENTMCNC: 31.7 G/DL (ref 28.4–34.8)
MCV RBC AUTO: 89.5 FL (ref 82.6–102.9)
MONOCYTES # BLD: 4 % (ref 3–12)
MYCOPLASMA PNEUMONIAE PCR: NOT DETECTED
NRBC AUTOMATED: 0 PER 100 WBC
PARAINFLUENZA 1 PCR: NOT DETECTED
PARAINFLUENZA 2 PCR: NOT DETECTED
PARAINFLUENZA 3 PCR: NOT DETECTED
PARAINFLUENZA 4 PCR: NOT DETECTED
PDW BLD-RTO: 13.3 % (ref 11.8–14.4)
PLATELET # BLD: 252 K/UL (ref 138–453)
PLATELET ESTIMATE: ABNORMAL
PMV BLD AUTO: 10.6 FL (ref 8.1–13.5)
RBC # BLD: 5.25 M/UL (ref 4.21–5.77)
RBC # BLD: ABNORMAL 10*6/UL
RESP SYNCYTIAL VIRUS PCR: NOT DETECTED
RHINO/ENTEROVIRUS PCR: NOT DETECTED
SEG NEUTROPHILS: 89 % (ref 36–65)
SEGMENTED NEUTROPHILS ABSOLUTE COUNT: 12.68 K/UL (ref 1.5–8.1)
SOURCE: NORMAL
WBC # BLD: 14.3 K/UL (ref 3.5–11.3)
WBC # BLD: ABNORMAL 10*3/UL

## 2019-01-15 PROCEDURE — 94762 N-INVAS EAR/PLS OXIMTRY CONT: CPT

## 2019-01-15 PROCEDURE — 6370000000 HC RX 637 (ALT 250 FOR IP): Performed by: STUDENT IN AN ORGANIZED HEALTH CARE EDUCATION/TRAINING PROGRAM

## 2019-01-15 PROCEDURE — 94640 AIRWAY INHALATION TREATMENT: CPT

## 2019-01-15 PROCEDURE — 2700000000 HC OXYGEN THERAPY PER DAY

## 2019-01-15 PROCEDURE — 99222 1ST HOSP IP/OBS MODERATE 55: CPT | Performed by: INTERNAL MEDICINE

## 2019-01-15 PROCEDURE — 87581 M.PNEUMON DNA AMP PROBE: CPT

## 2019-01-15 PROCEDURE — 6360000002 HC RX W HCPCS: Performed by: STUDENT IN AN ORGANIZED HEALTH CARE EDUCATION/TRAINING PROGRAM

## 2019-01-15 PROCEDURE — 85025 COMPLETE CBC W/AUTO DIFF WBC: CPT

## 2019-01-15 PROCEDURE — 6370000000 HC RX 637 (ALT 250 FOR IP): Performed by: EMERGENCY MEDICINE

## 2019-01-15 PROCEDURE — 87633 RESP VIRUS 12-25 TARGETS: CPT

## 2019-01-15 PROCEDURE — 2580000003 HC RX 258: Performed by: INTERNAL MEDICINE

## 2019-01-15 PROCEDURE — 87798 DETECT AGENT NOS DNA AMP: CPT

## 2019-01-15 PROCEDURE — 99223 1ST HOSP IP/OBS HIGH 75: CPT | Performed by: INTERNAL MEDICINE

## 2019-01-15 PROCEDURE — 82947 ASSAY GLUCOSE BLOOD QUANT: CPT

## 2019-01-15 PROCEDURE — 6360000002 HC RX W HCPCS: Performed by: INTERNAL MEDICINE

## 2019-01-15 PROCEDURE — 2060000000 HC ICU INTERMEDIATE R&B

## 2019-01-15 PROCEDURE — 87486 CHLMYD PNEUM DNA AMP PROBE: CPT

## 2019-01-15 PROCEDURE — 2580000003 HC RX 258: Performed by: STUDENT IN AN ORGANIZED HEALTH CARE EDUCATION/TRAINING PROGRAM

## 2019-01-15 RX ORDER — SODIUM CHLORIDE 9 MG/ML
INJECTION, SOLUTION INTRAVENOUS CONTINUOUS
Status: DISCONTINUED | OUTPATIENT
Start: 2019-01-15 | End: 2019-01-15

## 2019-01-15 RX ORDER — LEVOFLOXACIN 5 MG/ML
500 INJECTION, SOLUTION INTRAVENOUS EVERY 24 HOURS
Status: DISCONTINUED | OUTPATIENT
Start: 2019-01-15 | End: 2019-01-16

## 2019-01-15 RX ORDER — AMLODIPINE BESYLATE 10 MG/1
10 TABLET ORAL DAILY
Status: DISCONTINUED | OUTPATIENT
Start: 2019-01-15 | End: 2019-01-18 | Stop reason: HOSPADM

## 2019-01-15 RX ORDER — ALPRAZOLAM 0.5 MG/1
0.5 TABLET ORAL 3 TIMES DAILY PRN
Status: DISCONTINUED | OUTPATIENT
Start: 2019-01-15 | End: 2019-01-18 | Stop reason: HOSPADM

## 2019-01-15 RX ADMIN — MONTELUKAST SODIUM 10 MG: 10 TABLET, FILM COATED ORAL at 09:56

## 2019-01-15 RX ADMIN — SODIUM CHLORIDE: 9 INJECTION, SOLUTION INTRAVENOUS at 01:13

## 2019-01-15 RX ADMIN — FLUTICASONE PROPIONATE 2 SPRAY: 50 SPRAY, METERED NASAL at 09:56

## 2019-01-15 RX ADMIN — Medication 10 ML: at 20:59

## 2019-01-15 RX ADMIN — ALBUTEROL SULFATE 2.5 MG: 2.5 SOLUTION RESPIRATORY (INHALATION) at 08:08

## 2019-01-15 RX ADMIN — ENOXAPARIN SODIUM 40 MG: 40 INJECTION SUBCUTANEOUS at 10:01

## 2019-01-15 RX ADMIN — ALBUTEROL SULFATE 2.5 MG: 2.5 SOLUTION RESPIRATORY (INHALATION) at 12:15

## 2019-01-15 RX ADMIN — ALBUTEROL SULFATE 2.5 MG: 2.5 SOLUTION RESPIRATORY (INHALATION) at 20:08

## 2019-01-15 RX ADMIN — MOMETASONE FUROATE AND FORMOTEROL FUMARATE DIHYDRATE 2 PUFF: 200; 5 AEROSOL RESPIRATORY (INHALATION) at 20:08

## 2019-01-15 RX ADMIN — BENZONATATE 200 MG: 100 CAPSULE ORAL at 09:54

## 2019-01-15 RX ADMIN — ALBUTEROL SULFATE 2.5 MG: 2.5 SOLUTION RESPIRATORY (INHALATION) at 03:55

## 2019-01-15 RX ADMIN — MOMETASONE FUROATE AND FORMOTEROL FUMARATE DIHYDRATE 2 PUFF: 200; 5 AEROSOL RESPIRATORY (INHALATION) at 08:08

## 2019-01-15 RX ADMIN — ALPRAZOLAM 0.5 MG: 0.5 TABLET ORAL at 13:11

## 2019-01-15 RX ADMIN — AMLODIPINE BESYLATE 10 MG: 10 TABLET ORAL at 14:17

## 2019-01-15 RX ADMIN — PANTOPRAZOLE SODIUM 40 MG: 40 TABLET, DELAYED RELEASE ORAL at 05:40

## 2019-01-15 RX ADMIN — ALBUTEROL SULFATE 2.5 MG: 2.5 SOLUTION RESPIRATORY (INHALATION) at 16:53

## 2019-01-15 RX ADMIN — METHYLPREDNISOLONE SODIUM SUCCINATE 40 MG: 40 INJECTION, POWDER, FOR SOLUTION INTRAMUSCULAR; INTRAVENOUS at 11:29

## 2019-01-15 RX ADMIN — BENZONATATE 200 MG: 100 CAPSULE ORAL at 14:17

## 2019-01-15 RX ADMIN — LEVOFLOXACIN 500 MG: 5 INJECTION, SOLUTION INTRAVENOUS at 20:58

## 2019-01-15 RX ADMIN — METHYLPREDNISOLONE SODIUM SUCCINATE 40 MG: 40 INJECTION, POWDER, FOR SOLUTION INTRAMUSCULAR; INTRAVENOUS at 03:43

## 2019-01-15 RX ADMIN — METHYLPREDNISOLONE SODIUM SUCCINATE 40 MG: 40 INJECTION, POWDER, FOR SOLUTION INTRAMUSCULAR; INTRAVENOUS at 20:58

## 2019-01-15 RX ADMIN — TIOTROPIUM BROMIDE 18 MCG: 18 CAPSULE ORAL; RESPIRATORY (INHALATION) at 08:08

## 2019-01-15 RX ADMIN — BENZONATATE 100 MG: 100 CAPSULE ORAL at 05:40

## 2019-01-15 RX ADMIN — Medication 10 ML: at 20:58

## 2019-01-15 RX ADMIN — ENOXAPARIN SODIUM 30 MG: 30 INJECTION SUBCUTANEOUS at 20:58

## 2019-01-15 RX ADMIN — BENZONATATE 200 MG: 100 CAPSULE ORAL at 20:58

## 2019-01-16 LAB
ABSOLUTE EOS #: <0.03 K/UL (ref 0–0.44)
ABSOLUTE IMMATURE GRANULOCYTE: 0.09 K/UL (ref 0–0.3)
ABSOLUTE LYMPH #: 0.72 K/UL (ref 1.1–3.7)
ABSOLUTE MONO #: 0.23 K/UL (ref 0.1–1.2)
ANION GAP SERPL CALCULATED.3IONS-SCNC: 12 MMOL/L (ref 9–17)
BASOPHILS # BLD: 0 % (ref 0–2)
BASOPHILS ABSOLUTE: <0.03 K/UL (ref 0–0.2)
BUN BLDV-MCNC: 24 MG/DL (ref 8–23)
BUN/CREAT BLD: ABNORMAL (ref 9–20)
CALCIUM SERPL-MCNC: 9.5 MG/DL (ref 8.6–10.4)
CHLORIDE BLD-SCNC: 101 MMOL/L (ref 98–107)
CO2: 23 MMOL/L (ref 20–31)
CREAT SERPL-MCNC: 0.79 MG/DL (ref 0.7–1.2)
DIFFERENTIAL TYPE: ABNORMAL
EOSINOPHILS RELATIVE PERCENT: 0 % (ref 1–4)
GFR AFRICAN AMERICAN: >60 ML/MIN
GFR NON-AFRICAN AMERICAN: >60 ML/MIN
GFR SERPL CREATININE-BSD FRML MDRD: ABNORMAL ML/MIN/{1.73_M2}
GFR SERPL CREATININE-BSD FRML MDRD: ABNORMAL ML/MIN/{1.73_M2}
GLUCOSE BLD-MCNC: 142 MG/DL (ref 70–99)
GLUCOSE BLD-MCNC: 168 MG/DL (ref 75–110)
HCT VFR BLD CALC: 45.3 % (ref 40.7–50.3)
HEMOGLOBIN: 14.5 G/DL (ref 13–17)
IMMATURE GRANULOCYTES: 1 %
LYMPHOCYTES # BLD: 6 % (ref 24–43)
MCH RBC QN AUTO: 28.4 PG (ref 25.2–33.5)
MCHC RBC AUTO-ENTMCNC: 32 G/DL (ref 28.4–34.8)
MCV RBC AUTO: 88.6 FL (ref 82.6–102.9)
MONOCYTES # BLD: 2 % (ref 3–12)
NRBC AUTOMATED: 0 PER 100 WBC
PDW BLD-RTO: 13.2 % (ref 11.8–14.4)
PLATELET # BLD: 227 K/UL (ref 138–453)
PLATELET ESTIMATE: ABNORMAL
PMV BLD AUTO: 10.8 FL (ref 8.1–13.5)
POTASSIUM SERPL-SCNC: 4.5 MMOL/L (ref 3.7–5.3)
RBC # BLD: 5.11 M/UL (ref 4.21–5.77)
RBC # BLD: ABNORMAL 10*6/UL
SEG NEUTROPHILS: 92 % (ref 36–65)
SEGMENTED NEUTROPHILS ABSOLUTE COUNT: 11.63 K/UL (ref 1.5–8.1)
SODIUM BLD-SCNC: 136 MMOL/L (ref 135–144)
WBC # BLD: 12.7 K/UL (ref 3.5–11.3)
WBC # BLD: ABNORMAL 10*3/UL

## 2019-01-16 PROCEDURE — 85025 COMPLETE CBC W/AUTO DIFF WBC: CPT

## 2019-01-16 PROCEDURE — 6370000000 HC RX 637 (ALT 250 FOR IP): Performed by: STUDENT IN AN ORGANIZED HEALTH CARE EDUCATION/TRAINING PROGRAM

## 2019-01-16 PROCEDURE — 99233 SBSQ HOSP IP/OBS HIGH 50: CPT | Performed by: INTERNAL MEDICINE

## 2019-01-16 PROCEDURE — 2700000000 HC OXYGEN THERAPY PER DAY

## 2019-01-16 PROCEDURE — 2580000003 HC RX 258: Performed by: INTERNAL MEDICINE

## 2019-01-16 PROCEDURE — 97535 SELF CARE MNGMENT TRAINING: CPT

## 2019-01-16 PROCEDURE — 82947 ASSAY GLUCOSE BLOOD QUANT: CPT

## 2019-01-16 PROCEDURE — 99232 SBSQ HOSP IP/OBS MODERATE 35: CPT | Performed by: INTERNAL MEDICINE

## 2019-01-16 PROCEDURE — 97166 OT EVAL MOD COMPLEX 45 MIN: CPT

## 2019-01-16 PROCEDURE — 6360000002 HC RX W HCPCS: Performed by: STUDENT IN AN ORGANIZED HEALTH CARE EDUCATION/TRAINING PROGRAM

## 2019-01-16 PROCEDURE — 2060000000 HC ICU INTERMEDIATE R&B

## 2019-01-16 PROCEDURE — 1200000000 HC SEMI PRIVATE

## 2019-01-16 PROCEDURE — 2580000003 HC RX 258: Performed by: STUDENT IN AN ORGANIZED HEALTH CARE EDUCATION/TRAINING PROGRAM

## 2019-01-16 PROCEDURE — 94640 AIRWAY INHALATION TREATMENT: CPT

## 2019-01-16 PROCEDURE — 97162 PT EVAL MOD COMPLEX 30 MIN: CPT

## 2019-01-16 PROCEDURE — 97530 THERAPEUTIC ACTIVITIES: CPT

## 2019-01-16 PROCEDURE — 36415 COLL VENOUS BLD VENIPUNCTURE: CPT

## 2019-01-16 PROCEDURE — 80048 BASIC METABOLIC PNL TOTAL CA: CPT

## 2019-01-16 PROCEDURE — 94762 N-INVAS EAR/PLS OXIMTRY CONT: CPT

## 2019-01-16 RX ORDER — POLYETHYLENE GLYCOL 3350 17 G/17G
17 POWDER, FOR SOLUTION ORAL DAILY
Status: DISCONTINUED | OUTPATIENT
Start: 2019-01-16 | End: 2019-01-18 | Stop reason: HOSPADM

## 2019-01-16 RX ORDER — LEVOFLOXACIN 500 MG/1
500 TABLET, FILM COATED ORAL DAILY
Status: DISCONTINUED | OUTPATIENT
Start: 2019-01-16 | End: 2019-01-18 | Stop reason: HOSPADM

## 2019-01-16 RX ORDER — LISINOPRIL 10 MG/1
10 TABLET ORAL DAILY
Status: DISCONTINUED | OUTPATIENT
Start: 2019-01-16 | End: 2019-01-18 | Stop reason: HOSPADM

## 2019-01-16 RX ADMIN — METHYLPREDNISOLONE SODIUM SUCCINATE 40 MG: 40 INJECTION, POWDER, FOR SOLUTION INTRAMUSCULAR; INTRAVENOUS at 20:32

## 2019-01-16 RX ADMIN — ALBUTEROL SULFATE 2.5 MG: 2.5 SOLUTION RESPIRATORY (INHALATION) at 08:11

## 2019-01-16 RX ADMIN — FLUTICASONE PROPIONATE 2 SPRAY: 50 SPRAY, METERED NASAL at 08:29

## 2019-01-16 RX ADMIN — ALPRAZOLAM 0.5 MG: 0.5 TABLET ORAL at 16:38

## 2019-01-16 RX ADMIN — MOMETASONE FUROATE AND FORMOTEROL FUMARATE DIHYDRATE 2 PUFF: 200; 5 AEROSOL RESPIRATORY (INHALATION) at 20:16

## 2019-01-16 RX ADMIN — ALBUTEROL SULFATE 2.5 MG: 2.5 SOLUTION RESPIRATORY (INHALATION) at 11:40

## 2019-01-16 RX ADMIN — ALPRAZOLAM 0.5 MG: 0.5 TABLET ORAL at 08:27

## 2019-01-16 RX ADMIN — LEVOFLOXACIN 500 MG: 500 TABLET, FILM COATED ORAL at 20:31

## 2019-01-16 RX ADMIN — ALBUTEROL SULFATE 2.5 MG: 2.5 SOLUTION RESPIRATORY (INHALATION) at 16:26

## 2019-01-16 RX ADMIN — AMLODIPINE BESYLATE 10 MG: 10 TABLET ORAL at 08:27

## 2019-01-16 RX ADMIN — MONTELUKAST SODIUM 10 MG: 10 TABLET, FILM COATED ORAL at 08:27

## 2019-01-16 RX ADMIN — LISINOPRIL 10 MG: 10 TABLET ORAL at 16:35

## 2019-01-16 RX ADMIN — Medication 10 ML: at 08:27

## 2019-01-16 RX ADMIN — MOMETASONE FUROATE AND FORMOTEROL FUMARATE DIHYDRATE 2 PUFF: 200; 5 AEROSOL RESPIRATORY (INHALATION) at 08:11

## 2019-01-16 RX ADMIN — Medication 10 ML: at 20:32

## 2019-01-16 RX ADMIN — BENZONATATE 200 MG: 100 CAPSULE ORAL at 08:27

## 2019-01-16 RX ADMIN — METHYLPREDNISOLONE SODIUM SUCCINATE 40 MG: 40 INJECTION, POWDER, FOR SOLUTION INTRAMUSCULAR; INTRAVENOUS at 03:49

## 2019-01-16 RX ADMIN — BENZONATATE 200 MG: 100 CAPSULE ORAL at 20:32

## 2019-01-16 RX ADMIN — ENOXAPARIN SODIUM 30 MG: 30 INJECTION SUBCUTANEOUS at 08:27

## 2019-01-16 RX ADMIN — ENOXAPARIN SODIUM 30 MG: 30 INJECTION SUBCUTANEOUS at 20:32

## 2019-01-16 RX ADMIN — ALBUTEROL SULFATE 2.5 MG: 2.5 SOLUTION RESPIRATORY (INHALATION) at 20:16

## 2019-01-16 RX ADMIN — PANTOPRAZOLE SODIUM 40 MG: 40 TABLET, DELAYED RELEASE ORAL at 05:30

## 2019-01-16 RX ADMIN — POLYETHYLENE GLYCOL 3350 17 G: 17 POWDER, FOR SOLUTION ORAL at 10:00

## 2019-01-16 RX ADMIN — METHYLPREDNISOLONE SODIUM SUCCINATE 40 MG: 40 INJECTION, POWDER, FOR SOLUTION INTRAMUSCULAR; INTRAVENOUS at 11:56

## 2019-01-16 RX ADMIN — BENZONATATE 200 MG: 100 CAPSULE ORAL at 13:10

## 2019-01-16 RX ADMIN — TIOTROPIUM BROMIDE 18 MCG: 18 CAPSULE ORAL; RESPIRATORY (INHALATION) at 08:11

## 2019-01-16 NOTE — FLOWSHEET NOTE
hip ADD 20x3\"  Added 6/26   Hook lying hip abd 20x Green  tband Added 6/28 Increased 7/10   Bridges  2x10  Added 7/6         SIDE LYING      Hip abduction  2x10  Added 7/18         SEATED      LAQ 30x 3# Added weight 7/10   Seated marches  20 x 3# Added 7/9  Pain-free ROM   Hamstring curls 20x red Added 6/26   gait 1 lap  Pt didn't use assistive device on today's date 7/20/18   Standing          Heel raises  25x airex Progressed to on foam on 7/20/18    Toe raises 25x airex Progressed to on foam on 7/20/18   Squat  10x2  Increased reps 7/12/18   Hamstring curls (L) 20x  2# Added weight 7/10   Alternating marches 15xea airex Progressed 7/20/18 progressed to on foam    3 way hip (B)- flex/abd/ext 20xea   airex Flex/abd, added in ext and progressed to on foam 7/20/18   Step up 20x 6\", #2 Increased step height and weight 7/12/18   Lateral step ups 20x 6\", #2 Increased step height and weight 7/20/18   Step downs 15x 6\", #2 Increased step height and weight 7/20/18   Hip hikes (B) 10x ea 6\" step, #2 Added weight on 7/20/18   Tandem balance 20\"x2  each  Each leg. More difficult with left foot in back. Added 6/28   Other:     7/13/18: TUG: 15\"     Specific Instructions for next treatment: LANRE protocol       Treatment Charges: Mins Units   []  Modalities     [x]  Ther Exercise 50 3   []  Manual Therapy     []  Ther Activities     []  Aquatics     []  Vasocompression     []  Other     Total Treatment time 50 3   Medicare estimate as of (7/20/18) =  $911.86    Assessment: [x] Progressing toward goals. Pt had a great tolerance to treatment and was able to perform all exercises therapist added weight or increased step height with min difficulty. Pt needed verbal and tactile cueing when performing standing exercises to avoid forward trunk lean and for more emphasize for eccentric control/lowering when performing LAQ.  Pt demonstrated for therapist what his forms looks like when working in his garden, pt has good form when demonstrated with keeping back upright when forward leaning and modifying his alignment of his hips having the R unaffected LE slightly in front of him and his L affected LE slight behind the R.       [] No change. [x] Other: LEFI: 57/64, 89% max function; see goals    Short Term Goals: MEET IN 8 VISITS Status   Pain: Pt will report less than or equal to 1-2/10 L hip pain at rest and less than or equal to 4-5/10 L hip pain with therapeutic strengthening and ROM exercises in order to improve tolerance to performing transfers, bed mobility, and ambulation. MET 7/13/18   ROM: Pt will improve L hip  AROM to greater than or equal to 75° flexion and 25° PROM abduction in order to promote an efficient gait pattern and improve ability to ascend and descend stairs, get in and out of bed, and perform bed mobility. MET 7/13/18  76° flexion  30° abd   Strength: Pt will will be able to demosntrate a 3/5 L hip strength in order to demonstrate improved ability to ascend stairs and complete bed mobility. Ongoing 7/13/18  3-/5- posterior trunk lean noted in sitting   Function: Pt will score greater than or equal to 25% on the LEFI in order to demonstrate improved function with ADLs and work related tasks. MET 7/13/18  89% max function   Gait: Pt will be able to complete a TUG in less than or equal to 20\" seconds with the least restrictive AD in order to promote improved balance and gait mechanics when ambulating and home and in the community. MET 7/13/18   HEP: Pt will be independent in with HEP. MET 7/13/18   Fall Prevention: Pt will acknowledge fall prevention interventions in order to prevent falls at home, work, or in the community.  MET 7/13/18   Long Term Goal: MEET IN 16 VISITS     Pain: Pt will report less than or equal to 0-1/10 L hip pain at rest and less than or equal to 2-3/10 L hip pain in order to improve tolerance to walking on even and uneven ground, negotiating stairs, performing repeated sit to stands, and Needs review. [x] Demonstrates/verbalizes HEP/Ed previously given. : pt demonstrated and verbalized his body mechanics when working on his garden. Plan: [x] Continue per plan of care.    [] Other:      Time In: 8:05 am             Time Out: 9:05 am    Electronically signed by:  Conchita Vigil PT 0 = independent

## 2019-01-17 LAB
ABSOLUTE EOS #: <0.03 K/UL (ref 0–0.44)
ABSOLUTE IMMATURE GRANULOCYTE: 0.21 K/UL (ref 0–0.3)
ABSOLUTE LYMPH #: 0.95 K/UL (ref 1.1–3.7)
ABSOLUTE MONO #: 0.34 K/UL (ref 0.1–1.2)
ANION GAP SERPL CALCULATED.3IONS-SCNC: 13 MMOL/L (ref 9–17)
BASOPHILS # BLD: 0 % (ref 0–2)
BASOPHILS ABSOLUTE: <0.03 K/UL (ref 0–0.2)
BUN BLDV-MCNC: 28 MG/DL (ref 8–23)
BUN/CREAT BLD: ABNORMAL (ref 9–20)
CALCIUM SERPL-MCNC: 9.3 MG/DL (ref 8.6–10.4)
CHLORIDE BLD-SCNC: 101 MMOL/L (ref 98–107)
CO2: 22 MMOL/L (ref 20–31)
CREAT SERPL-MCNC: 0.88 MG/DL (ref 0.7–1.2)
DIFFERENTIAL TYPE: ABNORMAL
EOSINOPHILS RELATIVE PERCENT: 0 % (ref 1–4)
GFR AFRICAN AMERICAN: >60 ML/MIN
GFR NON-AFRICAN AMERICAN: >60 ML/MIN
GFR SERPL CREATININE-BSD FRML MDRD: ABNORMAL ML/MIN/{1.73_M2}
GFR SERPL CREATININE-BSD FRML MDRD: ABNORMAL ML/MIN/{1.73_M2}
GLUCOSE BLD-MCNC: 136 MG/DL (ref 70–99)
HCT VFR BLD CALC: 44 % (ref 40.7–50.3)
HEMOGLOBIN: 14.4 G/DL (ref 13–17)
IMMATURE GRANULOCYTES: 2 %
LYMPHOCYTES # BLD: 8 % (ref 24–43)
MCH RBC QN AUTO: 28.8 PG (ref 25.2–33.5)
MCHC RBC AUTO-ENTMCNC: 32.7 G/DL (ref 28.4–34.8)
MCV RBC AUTO: 88 FL (ref 82.6–102.9)
MONOCYTES # BLD: 3 % (ref 3–12)
NRBC AUTOMATED: 0 PER 100 WBC
PDW BLD-RTO: 13.2 % (ref 11.8–14.4)
PLATELET # BLD: 282 K/UL (ref 138–453)
PLATELET ESTIMATE: ABNORMAL
PMV BLD AUTO: 10.8 FL (ref 8.1–13.5)
POTASSIUM SERPL-SCNC: 4.4 MMOL/L (ref 3.7–5.3)
RBC # BLD: 5 M/UL (ref 4.21–5.77)
RBC # BLD: ABNORMAL 10*6/UL
SEG NEUTROPHILS: 87 % (ref 36–65)
SEGMENTED NEUTROPHILS ABSOLUTE COUNT: 10.37 K/UL (ref 1.5–8.1)
SODIUM BLD-SCNC: 136 MMOL/L (ref 135–144)
WBC # BLD: 11.9 K/UL (ref 3.5–11.3)
WBC # BLD: ABNORMAL 10*3/UL

## 2019-01-17 PROCEDURE — 99232 SBSQ HOSP IP/OBS MODERATE 35: CPT | Performed by: INTERNAL MEDICINE

## 2019-01-17 PROCEDURE — 1200000000 HC SEMI PRIVATE

## 2019-01-17 PROCEDURE — 6370000000 HC RX 637 (ALT 250 FOR IP): Performed by: STUDENT IN AN ORGANIZED HEALTH CARE EDUCATION/TRAINING PROGRAM

## 2019-01-17 PROCEDURE — 94640 AIRWAY INHALATION TREATMENT: CPT

## 2019-01-17 PROCEDURE — 6360000002 HC RX W HCPCS: Performed by: STUDENT IN AN ORGANIZED HEALTH CARE EDUCATION/TRAINING PROGRAM

## 2019-01-17 PROCEDURE — 36415 COLL VENOUS BLD VENIPUNCTURE: CPT

## 2019-01-17 PROCEDURE — 2580000003 HC RX 258: Performed by: STUDENT IN AN ORGANIZED HEALTH CARE EDUCATION/TRAINING PROGRAM

## 2019-01-17 PROCEDURE — 97530 THERAPEUTIC ACTIVITIES: CPT

## 2019-01-17 PROCEDURE — 80048 BASIC METABOLIC PNL TOTAL CA: CPT

## 2019-01-17 PROCEDURE — 97110 THERAPEUTIC EXERCISES: CPT

## 2019-01-17 PROCEDURE — 94762 N-INVAS EAR/PLS OXIMTRY CONT: CPT

## 2019-01-17 PROCEDURE — 2700000000 HC OXYGEN THERAPY PER DAY

## 2019-01-17 PROCEDURE — 6370000000 HC RX 637 (ALT 250 FOR IP): Performed by: INTERNAL MEDICINE

## 2019-01-17 PROCEDURE — 85025 COMPLETE CBC W/AUTO DIFF WBC: CPT

## 2019-01-17 RX ORDER — METHYLPREDNISOLONE SODIUM SUCCINATE 40 MG/ML
40 INJECTION, POWDER, LYOPHILIZED, FOR SOLUTION INTRAMUSCULAR; INTRAVENOUS EVERY 8 HOURS
Status: DISCONTINUED | OUTPATIENT
Start: 2019-01-17 | End: 2019-01-18 | Stop reason: HOSPADM

## 2019-01-17 RX ORDER — PREDNISONE 20 MG/1
20 TABLET ORAL DAILY
Status: DISCONTINUED | OUTPATIENT
Start: 2019-01-23 | End: 2019-01-17

## 2019-01-17 RX ORDER — PREDNISONE 20 MG/1
40 TABLET ORAL DAILY
Status: DISCONTINUED | OUTPATIENT
Start: 2019-01-17 | End: 2019-01-17

## 2019-01-17 RX ORDER — CODEINE PHOSPHATE AND GUAIFENESIN 10; 100 MG/5ML; MG/5ML
5 SOLUTION ORAL EVERY 6 HOURS PRN
Status: DISCONTINUED | OUTPATIENT
Start: 2019-01-17 | End: 2019-01-18 | Stop reason: HOSPADM

## 2019-01-17 RX ORDER — PREDNISONE 10 MG/1
10 TABLET ORAL DAILY
Status: DISCONTINUED | OUTPATIENT
Start: 2019-01-26 | End: 2019-01-17

## 2019-01-17 RX ADMIN — TIOTROPIUM BROMIDE 18 MCG: 18 CAPSULE ORAL; RESPIRATORY (INHALATION) at 08:29

## 2019-01-17 RX ADMIN — PANTOPRAZOLE SODIUM 40 MG: 40 TABLET, DELAYED RELEASE ORAL at 07:09

## 2019-01-17 RX ADMIN — ALBUTEROL SULFATE 2.5 MG: 2.5 SOLUTION RESPIRATORY (INHALATION) at 21:02

## 2019-01-17 RX ADMIN — ALPRAZOLAM 0.5 MG: 0.5 TABLET ORAL at 00:13

## 2019-01-17 RX ADMIN — BENZONATATE 200 MG: 100 CAPSULE ORAL at 08:23

## 2019-01-17 RX ADMIN — ALBUTEROL SULFATE 2.5 MG: 2.5 SOLUTION RESPIRATORY (INHALATION) at 00:22

## 2019-01-17 RX ADMIN — BENZONATATE 200 MG: 100 CAPSULE ORAL at 16:01

## 2019-01-17 RX ADMIN — BENZONATATE 200 MG: 100 CAPSULE ORAL at 20:36

## 2019-01-17 RX ADMIN — METHYLPREDNISOLONE SODIUM SUCCINATE 40 MG: 40 INJECTION, POWDER, FOR SOLUTION INTRAMUSCULAR; INTRAVENOUS at 16:01

## 2019-01-17 RX ADMIN — ALBUTEROL SULFATE 2.5 MG: 2.5 SOLUTION RESPIRATORY (INHALATION) at 12:21

## 2019-01-17 RX ADMIN — METHYLPREDNISOLONE SODIUM SUCCINATE 40 MG: 40 INJECTION, POWDER, FOR SOLUTION INTRAMUSCULAR; INTRAVENOUS at 06:02

## 2019-01-17 RX ADMIN — ALBUTEROL SULFATE 2.5 MG: 2.5 SOLUTION RESPIRATORY (INHALATION) at 23:12

## 2019-01-17 RX ADMIN — ALBUTEROL SULFATE 2.5 MG: 2.5 SOLUTION RESPIRATORY (INHALATION) at 05:05

## 2019-01-17 RX ADMIN — ENOXAPARIN SODIUM 30 MG: 30 INJECTION SUBCUTANEOUS at 08:24

## 2019-01-17 RX ADMIN — MOMETASONE FUROATE AND FORMOTEROL FUMARATE DIHYDRATE 2 PUFF: 200; 5 AEROSOL RESPIRATORY (INHALATION) at 08:29

## 2019-01-17 RX ADMIN — LEVOFLOXACIN 500 MG: 500 TABLET, FILM COATED ORAL at 20:36

## 2019-01-17 RX ADMIN — GUAIFENESIN AND CODEINE PHOSPHATE 5 ML: 100; 10 SOLUTION ORAL at 20:36

## 2019-01-17 RX ADMIN — POLYETHYLENE GLYCOL 3350 17 G: 17 POWDER, FOR SOLUTION ORAL at 08:22

## 2019-01-17 RX ADMIN — ALPRAZOLAM 0.5 MG: 0.5 TABLET ORAL at 08:23

## 2019-01-17 RX ADMIN — LISINOPRIL 10 MG: 10 TABLET ORAL at 08:22

## 2019-01-17 RX ADMIN — ALPRAZOLAM 0.5 MG: 0.5 TABLET ORAL at 16:01

## 2019-01-17 RX ADMIN — MONTELUKAST SODIUM 10 MG: 10 TABLET, FILM COATED ORAL at 08:22

## 2019-01-17 RX ADMIN — Medication 10 ML: at 08:23

## 2019-01-17 RX ADMIN — Medication 10 ML: at 20:39

## 2019-01-17 RX ADMIN — FLUTICASONE PROPIONATE 2 SPRAY: 50 SPRAY, METERED NASAL at 08:22

## 2019-01-17 RX ADMIN — AMLODIPINE BESYLATE 10 MG: 10 TABLET ORAL at 08:23

## 2019-01-17 RX ADMIN — ALBUTEROL SULFATE 2.5 MG: 2.5 SOLUTION RESPIRATORY (INHALATION) at 15:03

## 2019-01-17 RX ADMIN — MOMETASONE FUROATE AND FORMOTEROL FUMARATE DIHYDRATE 2 PUFF: 200; 5 AEROSOL RESPIRATORY (INHALATION) at 21:07

## 2019-01-17 RX ADMIN — GUAIFENESIN AND CODEINE PHOSPHATE 5 ML: 100; 10 SOLUTION ORAL at 12:48

## 2019-01-17 RX ADMIN — ALBUTEROL SULFATE 2.5 MG: 2.5 SOLUTION RESPIRATORY (INHALATION) at 08:23

## 2019-01-17 RX ADMIN — METHYLPREDNISOLONE SODIUM SUCCINATE 40 MG: 40 INJECTION, POWDER, FOR SOLUTION INTRAMUSCULAR; INTRAVENOUS at 23:18

## 2019-01-17 RX ADMIN — ALPRAZOLAM 0.5 MG: 0.5 TABLET ORAL at 23:14

## 2019-01-18 VITALS
WEIGHT: 250.22 LBS | BODY MASS INDEX: 37.92 KG/M2 | OXYGEN SATURATION: 98 % | RESPIRATION RATE: 20 BRPM | TEMPERATURE: 97.4 F | HEIGHT: 68 IN | SYSTOLIC BLOOD PRESSURE: 130 MMHG | HEART RATE: 82 BPM | DIASTOLIC BLOOD PRESSURE: 69 MMHG

## 2019-01-18 LAB
ABSOLUTE EOS #: <0.03 K/UL (ref 0–0.44)
ABSOLUTE IMMATURE GRANULOCYTE: 0.26 K/UL (ref 0–0.3)
ABSOLUTE LYMPH #: 0.91 K/UL (ref 1.1–3.7)
ABSOLUTE MONO #: 0.36 K/UL (ref 0.1–1.2)
ANION GAP SERPL CALCULATED.3IONS-SCNC: 11 MMOL/L (ref 9–17)
BASOPHILS # BLD: 0 % (ref 0–2)
BASOPHILS ABSOLUTE: 0.04 K/UL (ref 0–0.2)
BUN BLDV-MCNC: 30 MG/DL (ref 8–23)
BUN/CREAT BLD: ABNORMAL (ref 9–20)
CALCIUM SERPL-MCNC: 9.2 MG/DL (ref 8.6–10.4)
CHLORIDE BLD-SCNC: 98 MMOL/L (ref 98–107)
CO2: 21 MMOL/L (ref 20–31)
CREAT SERPL-MCNC: 1 MG/DL (ref 0.7–1.2)
DIFFERENTIAL TYPE: ABNORMAL
EOSINOPHILS RELATIVE PERCENT: 0 % (ref 1–4)
GFR AFRICAN AMERICAN: >60 ML/MIN
GFR NON-AFRICAN AMERICAN: >60 ML/MIN
GFR SERPL CREATININE-BSD FRML MDRD: ABNORMAL ML/MIN/{1.73_M2}
GFR SERPL CREATININE-BSD FRML MDRD: ABNORMAL ML/MIN/{1.73_M2}
GLUCOSE BLD-MCNC: 156 MG/DL (ref 70–99)
HCT VFR BLD CALC: 46 % (ref 40.7–50.3)
HEMOGLOBIN: 14.7 G/DL (ref 13–17)
IMMATURE GRANULOCYTES: 2 %
LYMPHOCYTES # BLD: 8 % (ref 24–43)
MCH RBC QN AUTO: 28.4 PG (ref 25.2–33.5)
MCHC RBC AUTO-ENTMCNC: 32 G/DL (ref 28.4–34.8)
MCV RBC AUTO: 89 FL (ref 82.6–102.9)
MONOCYTES # BLD: 3 % (ref 3–12)
NRBC AUTOMATED: 0 PER 100 WBC
PDW BLD-RTO: 13.1 % (ref 11.8–14.4)
PLATELET # BLD: 244 K/UL (ref 138–453)
PLATELET ESTIMATE: ABNORMAL
PMV BLD AUTO: 10.7 FL (ref 8.1–13.5)
POTASSIUM SERPL-SCNC: 4.4 MMOL/L (ref 3.7–5.3)
RBC # BLD: 5.17 M/UL (ref 4.21–5.77)
RBC # BLD: ABNORMAL 10*6/UL
SEG NEUTROPHILS: 87 % (ref 36–65)
SEGMENTED NEUTROPHILS ABSOLUTE COUNT: 9.64 K/UL (ref 1.5–8.1)
SODIUM BLD-SCNC: 130 MMOL/L (ref 135–144)
WBC # BLD: 11.2 K/UL (ref 3.5–11.3)
WBC # BLD: ABNORMAL 10*3/UL

## 2019-01-18 PROCEDURE — 97530 THERAPEUTIC ACTIVITIES: CPT

## 2019-01-18 PROCEDURE — 2580000003 HC RX 258: Performed by: STUDENT IN AN ORGANIZED HEALTH CARE EDUCATION/TRAINING PROGRAM

## 2019-01-18 PROCEDURE — 6370000000 HC RX 637 (ALT 250 FOR IP): Performed by: STUDENT IN AN ORGANIZED HEALTH CARE EDUCATION/TRAINING PROGRAM

## 2019-01-18 PROCEDURE — 94762 N-INVAS EAR/PLS OXIMTRY CONT: CPT

## 2019-01-18 PROCEDURE — 6360000002 HC RX W HCPCS: Performed by: STUDENT IN AN ORGANIZED HEALTH CARE EDUCATION/TRAINING PROGRAM

## 2019-01-18 PROCEDURE — 85025 COMPLETE CBC W/AUTO DIFF WBC: CPT

## 2019-01-18 PROCEDURE — 97110 THERAPEUTIC EXERCISES: CPT

## 2019-01-18 PROCEDURE — 99239 HOSP IP/OBS DSCHRG MGMT >30: CPT | Performed by: INTERNAL MEDICINE

## 2019-01-18 PROCEDURE — 94640 AIRWAY INHALATION TREATMENT: CPT

## 2019-01-18 PROCEDURE — 80048 BASIC METABOLIC PNL TOTAL CA: CPT

## 2019-01-18 PROCEDURE — 6360000002 HC RX W HCPCS: Performed by: INTERNAL MEDICINE

## 2019-01-18 PROCEDURE — 36415 COLL VENOUS BLD VENIPUNCTURE: CPT

## 2019-01-18 PROCEDURE — 99232 SBSQ HOSP IP/OBS MODERATE 35: CPT | Performed by: INTERNAL MEDICINE

## 2019-01-18 RX ORDER — ALBUTEROL SULFATE 2.5 MG/3ML
2.5 SOLUTION RESPIRATORY (INHALATION) 4 TIMES DAILY
Status: DISCONTINUED | OUTPATIENT
Start: 2019-01-18 | End: 2019-01-18 | Stop reason: HOSPADM

## 2019-01-18 RX ORDER — LEVOFLOXACIN 500 MG/1
500 TABLET, FILM COATED ORAL DAILY
Qty: 2 TABLET | Refills: 0 | Status: SHIPPED | OUTPATIENT
Start: 2019-01-18 | End: 2019-01-20

## 2019-01-18 RX ORDER — PREDNISONE 10 MG/1
20 TABLET ORAL DAILY
Qty: 6 TABLET | Refills: 0 | Status: SHIPPED | OUTPATIENT
Start: 2019-01-26 | End: 2019-01-21

## 2019-01-18 RX ORDER — PREDNISONE 10 MG/1
30 TABLET ORAL DAILY
Qty: 9 TABLET | Refills: 0 | Status: SHIPPED | OUTPATIENT
Start: 2019-01-22 | End: 2019-01-21

## 2019-01-18 RX ORDER — GUAIFENESIN AND CODEINE PHOSPHATE 100; 10 MG/5ML; MG/5ML
5 SOLUTION ORAL 2 TIMES DAILY PRN
Qty: 1 BOTTLE | Refills: 3 | Status: SHIPPED | OUTPATIENT
Start: 2019-01-18 | End: 2019-01-21

## 2019-01-18 RX ORDER — PREDNISONE 1 MG/1
10 TABLET ORAL DAILY
Qty: 6 TABLET | Refills: 0 | Status: SHIPPED | OUTPATIENT
Start: 2019-01-30 | End: 2019-01-21

## 2019-01-18 RX ORDER — PREDNISONE 20 MG/1
40 TABLET ORAL DAILY
Qty: 6 TABLET | Refills: 0 | Status: SHIPPED | OUTPATIENT
Start: 2019-01-18 | End: 2019-01-21

## 2019-01-18 RX ADMIN — POLYETHYLENE GLYCOL 3350 17 G: 17 POWDER, FOR SOLUTION ORAL at 08:58

## 2019-01-18 RX ADMIN — AMLODIPINE BESYLATE 10 MG: 10 TABLET ORAL at 08:56

## 2019-01-18 RX ADMIN — MOMETASONE FUROATE AND FORMOTEROL FUMARATE DIHYDRATE 2 PUFF: 200; 5 AEROSOL RESPIRATORY (INHALATION) at 08:45

## 2019-01-18 RX ADMIN — ALBUTEROL SULFATE 2.5 MG: 2.5 SOLUTION RESPIRATORY (INHALATION) at 08:45

## 2019-01-18 RX ADMIN — TIOTROPIUM BROMIDE 18 MCG: 18 CAPSULE ORAL; RESPIRATORY (INHALATION) at 08:45

## 2019-01-18 RX ADMIN — PANTOPRAZOLE SODIUM 40 MG: 40 TABLET, DELAYED RELEASE ORAL at 05:26

## 2019-01-18 RX ADMIN — METHYLPREDNISOLONE SODIUM SUCCINATE 40 MG: 40 INJECTION, POWDER, FOR SOLUTION INTRAMUSCULAR; INTRAVENOUS at 08:56

## 2019-01-18 RX ADMIN — BENZONATATE 200 MG: 100 CAPSULE ORAL at 08:56

## 2019-01-18 RX ADMIN — MONTELUKAST SODIUM 10 MG: 10 TABLET, FILM COATED ORAL at 08:56

## 2019-01-18 RX ADMIN — ALPRAZOLAM 0.5 MG: 0.5 TABLET ORAL at 09:48

## 2019-01-18 RX ADMIN — FLUTICASONE PROPIONATE 2 SPRAY: 50 SPRAY, METERED NASAL at 08:57

## 2019-01-18 RX ADMIN — ALBUTEROL SULFATE 2.5 MG: 2.5 SOLUTION RESPIRATORY (INHALATION) at 12:37

## 2019-01-18 RX ADMIN — ALBUTEROL SULFATE 2.5 MG: 2.5 SOLUTION RESPIRATORY (INHALATION) at 03:00

## 2019-01-18 RX ADMIN — LISINOPRIL 10 MG: 10 TABLET ORAL at 08:56

## 2019-01-18 RX ADMIN — Medication 10 ML: at 08:56

## 2019-01-19 ENCOUNTER — CARE COORDINATION (OUTPATIENT)
Dept: CASE MANAGEMENT | Age: 64
End: 2019-01-19

## 2019-01-20 LAB
CULTURE: NORMAL
CULTURE: NORMAL
Lab: NORMAL
Lab: NORMAL
SPECIMEN DESCRIPTION: NORMAL
SPECIMEN DESCRIPTION: NORMAL
STATUS: NORMAL
STATUS: NORMAL

## 2019-01-21 ENCOUNTER — TELEPHONE (OUTPATIENT)
Dept: FAMILY MEDICINE CLINIC | Age: 64
End: 2019-01-21

## 2019-01-21 RX ORDER — PREDNISONE 10 MG/1
TABLET ORAL
Qty: 18 TABLET | Refills: 0 | Status: SHIPPED | OUTPATIENT
Start: 2019-01-21 | End: 2019-01-31

## 2019-01-22 ENCOUNTER — TELEPHONE (OUTPATIENT)
Dept: FAMILY MEDICINE CLINIC | Age: 64
End: 2019-01-22

## 2019-01-30 ENCOUNTER — HOSPITAL ENCOUNTER (OUTPATIENT)
Dept: INFUSION THERAPY | Facility: MEDICAL CENTER | Age: 64
Discharge: HOME | End: 2019-01-30
Payer: MEDICARE

## 2019-01-30 VITALS
TEMPERATURE: 98 F | HEART RATE: 59 BPM | DIASTOLIC BLOOD PRESSURE: 60 MMHG | RESPIRATION RATE: 18 BRPM | SYSTOLIC BLOOD PRESSURE: 103 MMHG

## 2019-01-30 DIAGNOSIS — D83.9 COMMON VARIABLE IMMUNODEFICIENCY (HCC): ICD-10-CM

## 2019-01-30 DIAGNOSIS — D80.1 COMMON VARIABLE HYPOGAMMAGLOBULINEMIA (HCC): ICD-10-CM

## 2019-01-30 PROCEDURE — 96361 HYDRATE IV INFUSION ADD-ON: CPT

## 2019-01-30 PROCEDURE — 6360000002 HC RX W HCPCS: Performed by: ALLERGY & IMMUNOLOGY

## 2019-01-30 PROCEDURE — 2580000003 HC RX 258: Performed by: ALLERGY & IMMUNOLOGY

## 2019-01-30 PROCEDURE — 96365 THER/PROPH/DIAG IV INF INIT: CPT

## 2019-01-30 PROCEDURE — 6370000000 HC RX 637 (ALT 250 FOR IP): Performed by: ALLERGY & IMMUNOLOGY

## 2019-01-30 PROCEDURE — 96366 THER/PROPH/DIAG IV INF ADDON: CPT

## 2019-01-30 RX ORDER — HEPARIN SODIUM (PORCINE) LOCK FLUSH IV SOLN 100 UNIT/ML 100 UNIT/ML
500 SOLUTION INTRAVENOUS PRN
Status: DISCONTINUED | OUTPATIENT
Start: 2019-01-30 | End: 2019-01-31 | Stop reason: HOSPADM

## 2019-01-30 RX ORDER — DIPHENHYDRAMINE HCL 25 MG
25 TABLET ORAL EVERY 4 HOURS PRN
Status: CANCELLED
Start: 2019-01-30

## 2019-01-30 RX ORDER — 0.9 % SODIUM CHLORIDE 0.9 %
10 VIAL (ML) INJECTION PRN
Status: CANCELLED | OUTPATIENT
Start: 2019-01-30

## 2019-01-30 RX ORDER — DIPHENHYDRAMINE HCL 25 MG
25 TABLET ORAL EVERY 4 HOURS PRN
Status: DISCONTINUED | OUTPATIENT
Start: 2019-01-30 | End: 2019-01-31 | Stop reason: HOSPADM

## 2019-01-30 RX ORDER — DEXTROSE MONOHYDRATE 50 MG/ML
INJECTION, SOLUTION INTRAVENOUS CONTINUOUS
Status: DISCONTINUED | OUTPATIENT
Start: 2019-01-30 | End: 2019-01-31 | Stop reason: HOSPADM

## 2019-01-30 RX ORDER — DIPHENHYDRAMINE HCL 25 MG
25 TABLET ORAL ONCE
Status: COMPLETED | OUTPATIENT
Start: 2019-01-30 | End: 2019-01-30

## 2019-01-30 RX ORDER — DEXTROSE AND SODIUM CHLORIDE 5; .33 G/100ML; G/100ML
INJECTION, SOLUTION INTRAVENOUS CONTINUOUS
Status: DISCONTINUED | OUTPATIENT
Start: 2019-01-30 | End: 2019-01-31 | Stop reason: HOSPADM

## 2019-01-30 RX ORDER — DEXTROSE MONOHYDRATE 50 MG/ML
INJECTION, SOLUTION INTRAVENOUS CONTINUOUS
Status: CANCELLED
Start: 2019-01-30

## 2019-01-30 RX ORDER — DEXTROSE AND SODIUM CHLORIDE 5; .33 G/100ML; G/100ML
INJECTION, SOLUTION INTRAVENOUS CONTINUOUS
Status: CANCELLED
Start: 2019-01-30

## 2019-01-30 RX ORDER — HEPARIN SODIUM (PORCINE) LOCK FLUSH IV SOLN 100 UNIT/ML 100 UNIT/ML
500 SOLUTION INTRAVENOUS PRN
Status: CANCELLED
Start: 2019-01-30

## 2019-01-30 RX ORDER — DIPHENHYDRAMINE HCL 25 MG
25 TABLET ORAL ONCE
Status: CANCELLED | OUTPATIENT
Start: 2019-01-30 | End: 2019-01-30

## 2019-01-30 RX ORDER — SODIUM CHLORIDE 0.9 % (FLUSH) 0.9 %
20 SYRINGE (ML) INJECTION PRN
Status: DISCONTINUED | OUTPATIENT
Start: 2019-01-30 | End: 2019-01-31 | Stop reason: HOSPADM

## 2019-01-30 RX ADMIN — IMMUNE GLOBULIN INTRAVENOUS (HUMAN): 5 INJECTION, SOLUTION INTRAVENOUS at 09:27

## 2019-01-30 RX ADMIN — DIPHENHYDRAMINE HCL 25 MG: 25 TABLET ORAL at 13:06

## 2019-01-30 RX ADMIN — Medication 20 ML: at 08:46

## 2019-01-30 RX ADMIN — SODIUM CHLORIDE, PRESERVATIVE FREE 500 UNITS: 5 INJECTION INTRAVENOUS at 16:22

## 2019-01-30 RX ADMIN — DIPHENHYDRAMINE HCL 25 MG: 25 TABLET ORAL at 08:52

## 2019-01-30 RX ADMIN — DEXTROSE MONOHYDRATE: 50 INJECTION, SOLUTION INTRAVENOUS at 08:51

## 2019-01-30 RX ADMIN — DEXTROSE AND SODIUM CHLORIDE: 5; 330 INJECTION, SOLUTION INTRAVENOUS at 08:51

## 2019-01-30 RX ADMIN — Medication 20 ML: at 16:22

## 2019-01-30 NOTE — PROGRESS NOTES
Pt arrives per ambulatory per self and orders reviewed. Pt tolerated D5 1/3  ml before and after. IVIG at 20 ml/hr for 20 min  40 ml/hr for 20 min, 117/63  80 ml/hr for 20 min,96/60  160 ml/hr for 20 min,107/59  250 ml/hr for remainder,105/69 BP checked Q 15 min and then once at max, Q 30 min as follows:114/61, 115/57, 123/72, 118/56, 121/62, 109/58, 122/59, 119/64, 125/62, 124/66. Pt given 2nd benadryl during treatment and on O2 at 3L/NC. No reactions or complaints and blood return present throughout infusions. Pt given calendar with next appts. , pt is blood draw for lab work with next appt. Pt discharged per ambulatory per self.

## 2019-02-03 ENCOUNTER — HOSPITAL ENCOUNTER (INPATIENT)
Age: 64
LOS: 5 days | Discharge: HOME HEALTH | DRG: 194 | End: 2019-02-08
Attending: EMERGENCY MEDICINE | Admitting: INTERNAL MEDICINE
Payer: MEDICARE

## 2019-02-03 ENCOUNTER — APPOINTMENT (OUTPATIENT)
Dept: GENERAL RADIOLOGY | Age: 64
DRG: 194 | End: 2019-02-03
Payer: MEDICARE

## 2019-02-03 DIAGNOSIS — J10.1 INFLUENZA A: Primary | ICD-10-CM

## 2019-02-03 DIAGNOSIS — D83.9 CVID (COMMON VARIABLE IMMUNODEFICIENCY) (HCC): ICD-10-CM

## 2019-02-03 DIAGNOSIS — J44.1 COPD WITH EXACERBATION (HCC): ICD-10-CM

## 2019-02-03 PROBLEM — J45.901 ASTHMA EXACERBATION WITH COPD (CHRONIC OBSTRUCTIVE PULMONARY DISEASE) (HCC): Status: ACTIVE | Noted: 2019-02-03

## 2019-02-03 LAB
ABSOLUTE EOS #: 0.04 K/UL (ref 0–0.44)
ABSOLUTE IMMATURE GRANULOCYTE: 0.06 K/UL (ref 0–0.3)
ABSOLUTE LYMPH #: 1.15 K/UL (ref 1.1–3.7)
ABSOLUTE MONO #: 0.74 K/UL (ref 0.1–1.2)
ANION GAP SERPL CALCULATED.3IONS-SCNC: 10 MMOL/L (ref 9–17)
BASOPHILS # BLD: 0 % (ref 0–2)
BASOPHILS ABSOLUTE: 0.03 K/UL (ref 0–0.2)
BUN BLDV-MCNC: 11 MG/DL (ref 8–23)
BUN/CREAT BLD: NORMAL (ref 9–20)
CALCIUM SERPL-MCNC: 9.2 MG/DL (ref 8.6–10.4)
CHLORIDE BLD-SCNC: 102 MMOL/L (ref 98–107)
CO2: 23 MMOL/L (ref 20–31)
CREAT SERPL-MCNC: 0.8 MG/DL (ref 0.7–1.2)
DIFFERENTIAL TYPE: ABNORMAL
DIRECT EXAM: ABNORMAL
DIRECT EXAM: ABNORMAL
EKG ATRIAL RATE: 112 BPM
EKG P AXIS: 62 DEGREES
EKG P-R INTERVAL: 138 MS
EKG Q-T INTERVAL: 324 MS
EKG QRS DURATION: 86 MS
EKG QTC CALCULATION (BAZETT): 442 MS
EKG R AXIS: 66 DEGREES
EKG T AXIS: 46 DEGREES
EKG VENTRICULAR RATE: 112 BPM
EOSINOPHILS RELATIVE PERCENT: 1 % (ref 1–4)
GFR AFRICAN AMERICAN: >60 ML/MIN
GFR NON-AFRICAN AMERICAN: >60 ML/MIN
GFR SERPL CREATININE-BSD FRML MDRD: NORMAL ML/MIN/{1.73_M2}
GFR SERPL CREATININE-BSD FRML MDRD: NORMAL ML/MIN/{1.73_M2}
GLUCOSE BLD-MCNC: 98 MG/DL (ref 70–99)
HCT VFR BLD CALC: 42.3 % (ref 40.7–50.3)
HEMOGLOBIN: 14.2 G/DL (ref 13–17)
IMMATURE GRANULOCYTES: 1 %
LACTIC ACID, WHOLE BLOOD: 1.4 MMOL/L (ref 0.7–2.1)
LACTIC ACID: NORMAL MMOL/L
LYMPHOCYTES # BLD: 15 % (ref 24–43)
Lab: ABNORMAL
MCH RBC QN AUTO: 29.6 PG (ref 25.2–33.5)
MCHC RBC AUTO-ENTMCNC: 33.6 G/DL (ref 28.4–34.8)
MCV RBC AUTO: 88.1 FL (ref 82.6–102.9)
MONOCYTES # BLD: 10 % (ref 3–12)
NRBC AUTOMATED: 0 PER 100 WBC
PDW BLD-RTO: 13.4 % (ref 11.8–14.4)
PLATELET # BLD: 180 K/UL (ref 138–453)
PLATELET ESTIMATE: ABNORMAL
PMV BLD AUTO: 9.7 FL (ref 8.1–13.5)
POTASSIUM SERPL-SCNC: 3.9 MMOL/L (ref 3.7–5.3)
PROCALCITONIN: 0.07 NG/ML
RBC # BLD: 4.8 M/UL (ref 4.21–5.77)
RBC # BLD: ABNORMAL 10*6/UL
SEG NEUTROPHILS: 73 % (ref 36–65)
SEGMENTED NEUTROPHILS ABSOLUTE COUNT: 5.64 K/UL (ref 1.5–8.1)
SODIUM BLD-SCNC: 135 MMOL/L (ref 135–144)
SPECIMEN DESCRIPTION: ABNORMAL
STATUS: ABNORMAL
WBC # BLD: 7.7 K/UL (ref 3.5–11.3)
WBC # BLD: ABNORMAL 10*3/UL

## 2019-02-03 PROCEDURE — 6360000002 HC RX W HCPCS: Performed by: STUDENT IN AN ORGANIZED HEALTH CARE EDUCATION/TRAINING PROGRAM

## 2019-02-03 PROCEDURE — 94664 DEMO&/EVAL PT USE INHALER: CPT

## 2019-02-03 PROCEDURE — 71046 X-RAY EXAM CHEST 2 VIEWS: CPT

## 2019-02-03 PROCEDURE — 6370000000 HC RX 637 (ALT 250 FOR IP): Performed by: STUDENT IN AN ORGANIZED HEALTH CARE EDUCATION/TRAINING PROGRAM

## 2019-02-03 PROCEDURE — 6360000002 HC RX W HCPCS: Performed by: INTERNAL MEDICINE

## 2019-02-03 PROCEDURE — 2700000000 HC OXYGEN THERAPY PER DAY

## 2019-02-03 PROCEDURE — 80048 BASIC METABOLIC PNL TOTAL CA: CPT

## 2019-02-03 PROCEDURE — 1200000000 HC SEMI PRIVATE

## 2019-02-03 PROCEDURE — 93005 ELECTROCARDIOGRAM TRACING: CPT

## 2019-02-03 PROCEDURE — 94640 AIRWAY INHALATION TREATMENT: CPT

## 2019-02-03 PROCEDURE — 6370000000 HC RX 637 (ALT 250 FOR IP): Performed by: INTERNAL MEDICINE

## 2019-02-03 PROCEDURE — 2580000003 HC RX 258: Performed by: INTERNAL MEDICINE

## 2019-02-03 PROCEDURE — 99285 EMERGENCY DEPT VISIT HI MDM: CPT

## 2019-02-03 PROCEDURE — 83605 ASSAY OF LACTIC ACID: CPT

## 2019-02-03 PROCEDURE — 85025 COMPLETE CBC W/AUTO DIFF WBC: CPT

## 2019-02-03 PROCEDURE — 99223 1ST HOSP IP/OBS HIGH 75: CPT | Performed by: INTERNAL MEDICINE

## 2019-02-03 PROCEDURE — 84145 PROCALCITONIN (PCT): CPT

## 2019-02-03 PROCEDURE — 94762 N-INVAS EAR/PLS OXIMTRY CONT: CPT

## 2019-02-03 PROCEDURE — 6370000000 HC RX 637 (ALT 250 FOR IP): Performed by: EMERGENCY MEDICINE

## 2019-02-03 PROCEDURE — 87804 INFLUENZA ASSAY W/OPTIC: CPT

## 2019-02-03 RX ORDER — IPRATROPIUM BROMIDE AND ALBUTEROL SULFATE 2.5; .5 MG/3ML; MG/3ML
1 SOLUTION RESPIRATORY (INHALATION)
Status: DISCONTINUED | OUTPATIENT
Start: 2019-02-03 | End: 2019-02-03

## 2019-02-03 RX ORDER — ALPRAZOLAM 0.25 MG/1
0.25 TABLET ORAL
Status: ACTIVE | OUTPATIENT
Start: 2019-02-03 | End: 2019-02-03

## 2019-02-03 RX ORDER — SODIUM CHLORIDE 0.9 % (FLUSH) 0.9 %
10 SYRINGE (ML) INJECTION PRN
Status: DISCONTINUED | OUTPATIENT
Start: 2019-02-03 | End: 2019-02-08 | Stop reason: HOSPADM

## 2019-02-03 RX ORDER — PREDNISONE 20 MG/1
60 TABLET ORAL ONCE
Status: COMPLETED | OUTPATIENT
Start: 2019-02-03 | End: 2019-02-03

## 2019-02-03 RX ORDER — CODEINE PHOSPHATE AND GUAIFENESIN 10; 100 MG/5ML; MG/5ML
5 SOLUTION ORAL EVERY 8 HOURS PRN
Status: DISCONTINUED | OUTPATIENT
Start: 2019-02-03 | End: 2019-02-03

## 2019-02-03 RX ORDER — ALBUTEROL SULFATE 90 UG/1
2 AEROSOL, METERED RESPIRATORY (INHALATION)
Status: DISCONTINUED | OUTPATIENT
Start: 2019-02-03 | End: 2019-02-03

## 2019-02-03 RX ORDER — ALBUTEROL SULFATE 2.5 MG/3ML
5 SOLUTION RESPIRATORY (INHALATION)
Status: DISCONTINUED | OUTPATIENT
Start: 2019-02-03 | End: 2019-02-03

## 2019-02-03 RX ORDER — BENAZEPRIL HYDROCHLORIDE 5 MG/1
10 TABLET, FILM COATED ORAL DAILY
Status: DISCONTINUED | OUTPATIENT
Start: 2019-02-04 | End: 2019-02-04

## 2019-02-03 RX ORDER — ALPRAZOLAM 0.5 MG/1
0.5 TABLET ORAL 3 TIMES DAILY PRN
COMMUNITY

## 2019-02-03 RX ORDER — FLUTICASONE PROPIONATE 50 MCG
2 SPRAY, SUSPENSION (ML) NASAL DAILY
Status: DISCONTINUED | OUTPATIENT
Start: 2019-02-03 | End: 2019-02-08 | Stop reason: HOSPADM

## 2019-02-03 RX ORDER — AMLODIPINE BESYLATE 10 MG/1
10 TABLET ORAL DAILY
Status: DISCONTINUED | OUTPATIENT
Start: 2019-02-03 | End: 2019-02-08 | Stop reason: HOSPADM

## 2019-02-03 RX ORDER — OSELTAMIVIR PHOSPHATE 75 MG/1
75 CAPSULE ORAL 2 TIMES DAILY
Status: DISCONTINUED | OUTPATIENT
Start: 2019-02-03 | End: 2019-02-08

## 2019-02-03 RX ORDER — LEVOFLOXACIN 750 MG/1
750 TABLET ORAL DAILY
Status: DISCONTINUED | OUTPATIENT
Start: 2019-02-03 | End: 2019-02-03

## 2019-02-03 RX ORDER — SODIUM CHLORIDE 0.9 % (FLUSH) 0.9 %
10 SYRINGE (ML) INJECTION EVERY 12 HOURS SCHEDULED
Status: DISCONTINUED | OUTPATIENT
Start: 2019-02-03 | End: 2019-02-08 | Stop reason: HOSPADM

## 2019-02-03 RX ORDER — ONDANSETRON 2 MG/ML
4 INJECTION INTRAMUSCULAR; INTRAVENOUS EVERY 6 HOURS PRN
Status: DISCONTINUED | OUTPATIENT
Start: 2019-02-03 | End: 2019-02-08 | Stop reason: HOSPADM

## 2019-02-03 RX ORDER — CODEINE PHOSPHATE AND GUAIFENESIN 10; 100 MG/5ML; MG/5ML
5 SOLUTION ORAL EVERY 6 HOURS PRN
Status: DISCONTINUED | OUTPATIENT
Start: 2019-02-03 | End: 2019-02-08 | Stop reason: HOSPADM

## 2019-02-03 RX ORDER — METHYLPREDNISOLONE SODIUM SUCCINATE 40 MG/ML
40 INJECTION, POWDER, LYOPHILIZED, FOR SOLUTION INTRAMUSCULAR; INTRAVENOUS EVERY 12 HOURS
Status: DISCONTINUED | OUTPATIENT
Start: 2019-02-03 | End: 2019-02-04

## 2019-02-03 RX ORDER — PANTOPRAZOLE SODIUM 40 MG/1
40 TABLET, DELAYED RELEASE ORAL
Status: DISCONTINUED | OUTPATIENT
Start: 2019-02-04 | End: 2019-02-08 | Stop reason: HOSPADM

## 2019-02-03 RX ORDER — ALPRAZOLAM 0.25 MG/1
0.5 TABLET ORAL ONCE
Status: COMPLETED | OUTPATIENT
Start: 2019-02-03 | End: 2019-02-03

## 2019-02-03 RX ORDER — ACETAMINOPHEN 325 MG/1
650 TABLET ORAL EVERY 6 HOURS PRN
Status: DISCONTINUED | OUTPATIENT
Start: 2019-02-03 | End: 2019-02-08 | Stop reason: HOSPADM

## 2019-02-03 RX ORDER — OSELTAMIVIR PHOSPHATE 75 MG/1
75 CAPSULE ORAL 2 TIMES DAILY
Status: DISCONTINUED | OUTPATIENT
Start: 2019-02-03 | End: 2019-02-03

## 2019-02-03 RX ORDER — IPRATROPIUM BROMIDE AND ALBUTEROL SULFATE 2.5; .5 MG/3ML; MG/3ML
1 SOLUTION RESPIRATORY (INHALATION)
Status: DISCONTINUED | OUTPATIENT
Start: 2019-02-03 | End: 2019-02-07

## 2019-02-03 RX ADMIN — METHYLPREDNISOLONE SODIUM SUCCINATE 40 MG: 40 INJECTION, POWDER, FOR SOLUTION INTRAMUSCULAR; INTRAVENOUS at 15:45

## 2019-02-03 RX ADMIN — ALBUTEROL SULFATE 10 MG: 2.5 SOLUTION RESPIRATORY (INHALATION) at 10:03

## 2019-02-03 RX ADMIN — IPRATROPIUM BROMIDE 0.5 MG: 0.5 SOLUTION RESPIRATORY (INHALATION) at 10:03

## 2019-02-03 RX ADMIN — IPRATROPIUM BROMIDE AND ALBUTEROL SULFATE 1 AMPULE: .5; 3 SOLUTION RESPIRATORY (INHALATION) at 20:06

## 2019-02-03 RX ADMIN — GUAIFENESIN AND CODEINE PHOSPHATE 5 ML: 100; 10 SOLUTION ORAL at 21:52

## 2019-02-03 RX ADMIN — IPRATROPIUM BROMIDE AND ALBUTEROL SULFATE 1 AMPULE: .5; 3 SOLUTION RESPIRATORY (INHALATION) at 23:39

## 2019-02-03 RX ADMIN — ALBUTEROL SULFATE 10 MG: 2.5 SOLUTION RESPIRATORY (INHALATION) at 10:27

## 2019-02-03 RX ADMIN — ACETAMINOPHEN 650 MG: 325 TABLET ORAL at 15:56

## 2019-02-03 RX ADMIN — ACETAMINOPHEN 650 MG: 325 TABLET ORAL at 21:53

## 2019-02-03 RX ADMIN — ENOXAPARIN SODIUM 40 MG: 40 INJECTION SUBCUTANEOUS at 15:45

## 2019-02-03 RX ADMIN — CEFEPIME 2 G: 2 INJECTION, POWDER, FOR SOLUTION INTRAVENOUS at 14:25

## 2019-02-03 RX ADMIN — OSELTAMIVIR PHOSPHATE 75 MG: 75 CAPSULE ORAL at 11:38

## 2019-02-03 RX ADMIN — OSELTAMIVIR PHOSPHATE 75 MG: 75 CAPSULE ORAL at 20:15

## 2019-02-03 RX ADMIN — GUAIFENESIN AND CODEINE PHOSPHATE 5 ML: 100; 10 SOLUTION ORAL at 15:23

## 2019-02-03 RX ADMIN — PREDNISONE 60 MG: 20 TABLET ORAL at 10:18

## 2019-02-03 RX ADMIN — ALPRAZOLAM 0.5 MG: 0.25 TABLET ORAL at 11:13

## 2019-02-03 RX ADMIN — VANCOMYCIN HYDROCHLORIDE 2000 MG: 1 INJECTION, POWDER, LYOPHILIZED, FOR SOLUTION INTRAVENOUS at 19:20

## 2019-02-03 ASSESSMENT — PAIN SCALES - WONG BAKER: WONGBAKER_NUMERICALRESPONSE: 4

## 2019-02-03 ASSESSMENT — ENCOUNTER SYMPTOMS
ABDOMINAL DISTENTION: 0
SHORTNESS OF BREATH: 1
CHEST TIGHTNESS: 1
VOMITING: 0
PHOTOPHOBIA: 0
ABDOMINAL PAIN: 0
CONSTIPATION: 0
WHEEZING: 1
NAUSEA: 0
COUGH: 1
DIARRHEA: 0

## 2019-02-03 ASSESSMENT — PAIN SCALES - GENERAL
PAINLEVEL_OUTOF10: 8
PAINLEVEL_OUTOF10: 6
PAINLEVEL_OUTOF10: 5
PAINLEVEL_OUTOF10: 6

## 2019-02-03 ASSESSMENT — PAIN DESCRIPTION - PAIN TYPE
TYPE: ACUTE PAIN

## 2019-02-03 ASSESSMENT — PAIN DESCRIPTION - LOCATION
LOCATION: CHEST;HEAD
LOCATION: CHEST;HEAD
LOCATION: CHEST

## 2019-02-03 ASSESSMENT — PAIN DESCRIPTION - ORIENTATION: ORIENTATION: LOWER

## 2019-02-04 ENCOUNTER — APPOINTMENT (OUTPATIENT)
Dept: GENERAL RADIOLOGY | Age: 64
DRG: 194 | End: 2019-02-04
Payer: MEDICARE

## 2019-02-04 LAB
ABSOLUTE EOS #: 0 K/UL (ref 0–0.44)
ABSOLUTE IMMATURE GRANULOCYTE: 0.07 K/UL (ref 0–0.3)
ABSOLUTE LYMPH #: 0.46 K/UL (ref 1.1–3.7)
ABSOLUTE MONO #: 0.26 K/UL (ref 0.1–1.2)
ALBUMIN SERPL-MCNC: 3.7 G/DL (ref 3.5–5.2)
ALBUMIN/GLOBULIN RATIO: 1 (ref 1–2.5)
ALP BLD-CCNC: 73 U/L (ref 40–129)
ALT SERPL-CCNC: 35 U/L (ref 5–41)
ANION GAP SERPL CALCULATED.3IONS-SCNC: 14 MMOL/L (ref 9–17)
AST SERPL-CCNC: 23 U/L
BASOPHILS # BLD: 0 % (ref 0–2)
BASOPHILS ABSOLUTE: 0 K/UL (ref 0–0.2)
BILIRUB SERPL-MCNC: 0.33 MG/DL (ref 0.3–1.2)
BUN BLDV-MCNC: 13 MG/DL (ref 8–23)
BUN/CREAT BLD: ABNORMAL (ref 9–20)
C-REACTIVE PROTEIN: 17.2 MG/L (ref 0–5)
CALCIUM SERPL-MCNC: 9.5 MG/DL (ref 8.6–10.4)
CHLORIDE BLD-SCNC: 99 MMOL/L (ref 98–107)
CO2: 21 MMOL/L (ref 20–31)
CREAT SERPL-MCNC: 0.78 MG/DL (ref 0.7–1.2)
DIFFERENTIAL TYPE: ABNORMAL
EOSINOPHILS RELATIVE PERCENT: 0 % (ref 1–4)
GFR AFRICAN AMERICAN: >60 ML/MIN
GFR NON-AFRICAN AMERICAN: >60 ML/MIN
GFR SERPL CREATININE-BSD FRML MDRD: ABNORMAL ML/MIN/{1.73_M2}
GFR SERPL CREATININE-BSD FRML MDRD: ABNORMAL ML/MIN/{1.73_M2}
GLUCOSE BLD-MCNC: 155 MG/DL (ref 70–99)
HCT VFR BLD CALC: 41.5 % (ref 40.7–50.3)
HEMOGLOBIN: 13.8 G/DL (ref 13–17)
IMMATURE GRANULOCYTES: 1 %
LYMPHOCYTES # BLD: 7 % (ref 24–43)
MCH RBC QN AUTO: 29.5 PG (ref 25.2–33.5)
MCHC RBC AUTO-ENTMCNC: 33.3 G/DL (ref 28.4–34.8)
MCV RBC AUTO: 88.7 FL (ref 82.6–102.9)
MONOCYTES # BLD: 4 % (ref 3–12)
MORPHOLOGY: NORMAL
NRBC AUTOMATED: 0 PER 100 WBC
PDW BLD-RTO: 13.2 % (ref 11.8–14.4)
PLATELET # BLD: 166 K/UL (ref 138–453)
PLATELET ESTIMATE: ABNORMAL
PMV BLD AUTO: 10 FL (ref 8.1–13.5)
POTASSIUM SERPL-SCNC: 4 MMOL/L (ref 3.7–5.3)
RBC # BLD: 4.68 M/UL (ref 4.21–5.77)
RBC # BLD: ABNORMAL 10*6/UL
SEG NEUTROPHILS: 88 % (ref 36–65)
SEGMENTED NEUTROPHILS ABSOLUTE COUNT: 5.81 K/UL (ref 1.5–8.1)
SODIUM BLD-SCNC: 134 MMOL/L (ref 135–144)
TOTAL PROTEIN: 7.3 G/DL (ref 6.4–8.3)
WBC # BLD: 6.6 K/UL (ref 3.5–11.3)
WBC # BLD: ABNORMAL 10*3/UL

## 2019-02-04 PROCEDURE — 1200000000 HC SEMI PRIVATE

## 2019-02-04 PROCEDURE — 6370000000 HC RX 637 (ALT 250 FOR IP): Performed by: STUDENT IN AN ORGANIZED HEALTH CARE EDUCATION/TRAINING PROGRAM

## 2019-02-04 PROCEDURE — 99223 1ST HOSP IP/OBS HIGH 75: CPT | Performed by: INTERNAL MEDICINE

## 2019-02-04 PROCEDURE — 6370000000 HC RX 637 (ALT 250 FOR IP): Performed by: INTERNAL MEDICINE

## 2019-02-04 PROCEDURE — 2580000003 HC RX 258: Performed by: STUDENT IN AN ORGANIZED HEALTH CARE EDUCATION/TRAINING PROGRAM

## 2019-02-04 PROCEDURE — 80053 COMPREHEN METABOLIC PANEL: CPT

## 2019-02-04 PROCEDURE — 71045 X-RAY EXAM CHEST 1 VIEW: CPT

## 2019-02-04 PROCEDURE — 2580000003 HC RX 258: Performed by: INTERNAL MEDICINE

## 2019-02-04 PROCEDURE — 94762 N-INVAS EAR/PLS OXIMTRY CONT: CPT

## 2019-02-04 PROCEDURE — 2700000000 HC OXYGEN THERAPY PER DAY

## 2019-02-04 PROCEDURE — 6360000002 HC RX W HCPCS: Performed by: INTERNAL MEDICINE

## 2019-02-04 PROCEDURE — 86140 C-REACTIVE PROTEIN: CPT

## 2019-02-04 PROCEDURE — 89220 SPUTUM SPECIMEN COLLECTION: CPT

## 2019-02-04 PROCEDURE — 6360000002 HC RX W HCPCS: Performed by: STUDENT IN AN ORGANIZED HEALTH CARE EDUCATION/TRAINING PROGRAM

## 2019-02-04 PROCEDURE — 36415 COLL VENOUS BLD VENIPUNCTURE: CPT

## 2019-02-04 PROCEDURE — 85025 COMPLETE CBC W/AUTO DIFF WBC: CPT

## 2019-02-04 PROCEDURE — 99222 1ST HOSP IP/OBS MODERATE 55: CPT | Performed by: INTERNAL MEDICINE

## 2019-02-04 PROCEDURE — 94640 AIRWAY INHALATION TREATMENT: CPT

## 2019-02-04 RX ORDER — ALPRAZOLAM 0.5 MG/1
0.5 TABLET ORAL 3 TIMES DAILY PRN
Status: DISCONTINUED | OUTPATIENT
Start: 2019-02-04 | End: 2019-02-04

## 2019-02-04 RX ORDER — METHYLPREDNISOLONE SODIUM SUCCINATE 40 MG/ML
40 INJECTION, POWDER, LYOPHILIZED, FOR SOLUTION INTRAMUSCULAR; INTRAVENOUS EVERY 8 HOURS
Status: DISCONTINUED | OUTPATIENT
Start: 2019-02-04 | End: 2019-02-05

## 2019-02-04 RX ORDER — LISINOPRIL 10 MG/1
10 TABLET ORAL 2 TIMES DAILY
Status: DISCONTINUED | OUTPATIENT
Start: 2019-02-04 | End: 2019-02-08 | Stop reason: HOSPADM

## 2019-02-04 RX ORDER — MONTELUKAST SODIUM 10 MG/1
10 TABLET ORAL DAILY
Status: DISCONTINUED | OUTPATIENT
Start: 2019-02-04 | End: 2019-02-08 | Stop reason: HOSPADM

## 2019-02-04 RX ORDER — BENAZEPRIL HYDROCHLORIDE 5 MG/1
10 TABLET, FILM COATED ORAL 2 TIMES DAILY
Status: DISCONTINUED | OUTPATIENT
Start: 2019-02-04 | End: 2019-02-04

## 2019-02-04 RX ORDER — ALPRAZOLAM 0.25 MG/1
0.25 TABLET ORAL 2 TIMES DAILY PRN
Status: DISCONTINUED | OUTPATIENT
Start: 2019-02-04 | End: 2019-02-06

## 2019-02-04 RX ORDER — ALPRAZOLAM 0.5 MG/1
0.5 TABLET ORAL ONCE
Status: COMPLETED | OUTPATIENT
Start: 2019-02-04 | End: 2019-02-04

## 2019-02-04 RX ORDER — BUDESONIDE AND FORMOTEROL FUMARATE DIHYDRATE 160; 4.5 UG/1; UG/1
2 AEROSOL RESPIRATORY (INHALATION) 2 TIMES DAILY
Status: DISCONTINUED | OUTPATIENT
Start: 2019-02-04 | End: 2019-02-04

## 2019-02-04 RX ORDER — LEVOFLOXACIN 500 MG/1
500 TABLET, FILM COATED ORAL DAILY
Status: DISCONTINUED | OUTPATIENT
Start: 2019-02-04 | End: 2019-02-08 | Stop reason: HOSPADM

## 2019-02-04 RX ORDER — TRAMADOL HYDROCHLORIDE 50 MG/1
50 TABLET ORAL EVERY 12 HOURS PRN
Status: DISCONTINUED | OUTPATIENT
Start: 2019-02-04 | End: 2019-02-08 | Stop reason: HOSPADM

## 2019-02-04 RX ORDER — HYDRALAZINE HYDROCHLORIDE 20 MG/ML
20 INJECTION INTRAMUSCULAR; INTRAVENOUS
Status: DISCONTINUED | OUTPATIENT
Start: 2019-02-04 | End: 2019-02-08 | Stop reason: HOSPADM

## 2019-02-04 RX ADMIN — METHYLPREDNISOLONE SODIUM SUCCINATE 40 MG: 40 INJECTION, POWDER, FOR SOLUTION INTRAMUSCULAR; INTRAVENOUS at 23:47

## 2019-02-04 RX ADMIN — METHYLPREDNISOLONE SODIUM SUCCINATE 40 MG: 40 INJECTION, POWDER, FOR SOLUTION INTRAMUSCULAR; INTRAVENOUS at 04:17

## 2019-02-04 RX ADMIN — IPRATROPIUM BROMIDE AND ALBUTEROL SULFATE 1 AMPULE: .5; 3 SOLUTION RESPIRATORY (INHALATION) at 13:46

## 2019-02-04 RX ADMIN — LEVOFLOXACIN 500 MG: 500 TABLET, FILM COATED ORAL at 23:46

## 2019-02-04 RX ADMIN — METHYLPREDNISOLONE SODIUM SUCCINATE 40 MG: 40 INJECTION, POWDER, FOR SOLUTION INTRAMUSCULAR; INTRAVENOUS at 14:02

## 2019-02-04 RX ADMIN — GUAIFENESIN AND CODEINE PHOSPHATE 5 ML: 100; 10 SOLUTION ORAL at 11:30

## 2019-02-04 RX ADMIN — TRAMADOL HYDROCHLORIDE 50 MG: 50 TABLET, FILM COATED ORAL at 15:10

## 2019-02-04 RX ADMIN — IPRATROPIUM BROMIDE AND ALBUTEROL SULFATE 1 AMPULE: .5; 3 SOLUTION RESPIRATORY (INHALATION) at 08:54

## 2019-02-04 RX ADMIN — ALPRAZOLAM 0.5 MG: 0.5 TABLET ORAL at 14:08

## 2019-02-04 RX ADMIN — OSELTAMIVIR PHOSPHATE 75 MG: 75 CAPSULE ORAL at 09:35

## 2019-02-04 RX ADMIN — OSELTAMIVIR PHOSPHATE 75 MG: 75 CAPSULE ORAL at 23:48

## 2019-02-04 RX ADMIN — HYDRALAZINE HYDROCHLORIDE 20 MG: 20 INJECTION INTRAMUSCULAR; INTRAVENOUS at 23:46

## 2019-02-04 RX ADMIN — ENOXAPARIN SODIUM 40 MG: 40 INJECTION SUBCUTANEOUS at 09:35

## 2019-02-04 RX ADMIN — FLUTICASONE PROPIONATE 2 SPRAY: 50 SPRAY, METERED NASAL at 09:35

## 2019-02-04 RX ADMIN — TIOTROPIUM BROMIDE 18 MCG: 18 CAPSULE ORAL; RESPIRATORY (INHALATION) at 14:58

## 2019-02-04 RX ADMIN — ALPRAZOLAM 0.25 MG: 0.25 TABLET ORAL at 10:35

## 2019-02-04 RX ADMIN — PANTOPRAZOLE SODIUM 40 MG: 40 TABLET, DELAYED RELEASE ORAL at 06:01

## 2019-02-04 RX ADMIN — LISINOPRIL 10 MG: 10 TABLET ORAL at 23:47

## 2019-02-04 RX ADMIN — IPRATROPIUM BROMIDE AND ALBUTEROL SULFATE 1 AMPULE: .5; 3 SOLUTION RESPIRATORY (INHALATION) at 21:14

## 2019-02-04 RX ADMIN — CEFEPIME 2 G: 2 INJECTION, POWDER, FOR SOLUTION INTRAVENOUS at 14:02

## 2019-02-04 RX ADMIN — METHYLPREDNISOLONE SODIUM SUCCINATE 40 MG: 40 INJECTION, POWDER, FOR SOLUTION INTRAMUSCULAR; INTRAVENOUS at 15:10

## 2019-02-04 RX ADMIN — IPRATROPIUM BROMIDE AND ALBUTEROL SULFATE 1 AMPULE: .5; 3 SOLUTION RESPIRATORY (INHALATION) at 15:29

## 2019-02-04 RX ADMIN — VANCOMYCIN HYDROCHLORIDE 1500 MG: 10 INJECTION, POWDER, LYOPHILIZED, FOR SOLUTION INTRAVENOUS at 06:01

## 2019-02-04 RX ADMIN — FLUTICASONE PROPIONATE 2 SPRAY: 50 SPRAY, METERED NASAL at 23:53

## 2019-02-04 RX ADMIN — MOMETASONE FUROATE AND FORMOTEROL FUMARATE DIHYDRATE 2 PUFF: 200; 5 AEROSOL RESPIRATORY (INHALATION) at 21:14

## 2019-02-04 RX ADMIN — LISINOPRIL 10 MG: 10 TABLET ORAL at 10:35

## 2019-02-04 RX ADMIN — MOMETASONE FUROATE AND FORMOTEROL FUMARATE DIHYDRATE 2 PUFF: 200; 5 AEROSOL RESPIRATORY (INHALATION) at 14:57

## 2019-02-04 RX ADMIN — AMLODIPINE BESYLATE 10 MG: 10 TABLET ORAL at 09:35

## 2019-02-04 RX ADMIN — MONTELUKAST SODIUM 10 MG: 10 TABLET, FILM COATED ORAL at 10:35

## 2019-02-04 RX ADMIN — Medication 10 ML: at 23:49

## 2019-02-04 RX ADMIN — ACETAMINOPHEN 650 MG: 325 TABLET ORAL at 10:40

## 2019-02-04 ASSESSMENT — ENCOUNTER SYMPTOMS
COLOR CHANGE: 0
PHOTOPHOBIA: 0
BACK PAIN: 0
SHORTNESS OF BREATH: 1
ABDOMINAL PAIN: 1
COUGH: 1
WHEEZING: 1

## 2019-02-04 ASSESSMENT — PAIN SCALES - GENERAL
PAINLEVEL_OUTOF10: 7
PAINLEVEL_OUTOF10: 5

## 2019-02-05 ENCOUNTER — APPOINTMENT (OUTPATIENT)
Dept: CT IMAGING | Age: 64
DRG: 194 | End: 2019-02-05
Payer: MEDICARE

## 2019-02-05 LAB
C-REACTIVE PROTEIN: 10.2 MG/L (ref 0–5)
VANCOMYCIN TROUGH DATE LAST DOSE: ABNORMAL
VANCOMYCIN TROUGH DOSE AMOUNT: ABNORMAL
VANCOMYCIN TROUGH TIME LAST DOSE: ABNORMAL
VANCOMYCIN TROUGH: <4 UG/ML (ref 10–20)

## 2019-02-05 PROCEDURE — 6360000002 HC RX W HCPCS: Performed by: STUDENT IN AN ORGANIZED HEALTH CARE EDUCATION/TRAINING PROGRAM

## 2019-02-05 PROCEDURE — 99233 SBSQ HOSP IP/OBS HIGH 50: CPT | Performed by: INTERNAL MEDICINE

## 2019-02-05 PROCEDURE — 99232 SBSQ HOSP IP/OBS MODERATE 35: CPT | Performed by: INTERNAL MEDICINE

## 2019-02-05 PROCEDURE — 6370000000 HC RX 637 (ALT 250 FOR IP): Performed by: INTERNAL MEDICINE

## 2019-02-05 PROCEDURE — 86140 C-REACTIVE PROTEIN: CPT

## 2019-02-05 PROCEDURE — 94640 AIRWAY INHALATION TREATMENT: CPT

## 2019-02-05 PROCEDURE — 36415 COLL VENOUS BLD VENIPUNCTURE: CPT

## 2019-02-05 PROCEDURE — 6370000000 HC RX 637 (ALT 250 FOR IP): Performed by: STUDENT IN AN ORGANIZED HEALTH CARE EDUCATION/TRAINING PROGRAM

## 2019-02-05 PROCEDURE — 87641 MR-STAPH DNA AMP PROBE: CPT

## 2019-02-05 PROCEDURE — 2060000000 HC ICU INTERMEDIATE R&B

## 2019-02-05 PROCEDURE — 2580000003 HC RX 258: Performed by: STUDENT IN AN ORGANIZED HEALTH CARE EDUCATION/TRAINING PROGRAM

## 2019-02-05 PROCEDURE — 76937 US GUIDE VASCULAR ACCESS: CPT

## 2019-02-05 PROCEDURE — 71250 CT THORAX DX C-: CPT

## 2019-02-05 PROCEDURE — 80202 ASSAY OF VANCOMYCIN: CPT

## 2019-02-05 PROCEDURE — 2700000000 HC OXYGEN THERAPY PER DAY

## 2019-02-05 RX ORDER — OSELTAMIVIR PHOSPHATE 75 MG/1
75 CAPSULE ORAL 2 TIMES DAILY
Qty: 8 CAPSULE | Refills: 0 | Status: SHIPPED | OUTPATIENT
Start: 2019-02-05 | End: 2019-02-09

## 2019-02-05 RX ORDER — LEVOFLOXACIN 500 MG/1
500 TABLET, FILM COATED ORAL DAILY
Qty: 7 TABLET | Refills: 0 | Status: SHIPPED | OUTPATIENT
Start: 2019-02-06 | End: 2019-02-13

## 2019-02-05 RX ORDER — ALBUTEROL SULFATE 2.5 MG/3ML
2.5 SOLUTION RESPIRATORY (INHALATION)
Status: DISCONTINUED | OUTPATIENT
Start: 2019-02-05 | End: 2019-02-05

## 2019-02-05 RX ORDER — ALBUTEROL SULFATE 2.5 MG/3ML
2.5 SOLUTION RESPIRATORY (INHALATION) 2 TIMES DAILY
Status: DISCONTINUED | OUTPATIENT
Start: 2019-02-06 | End: 2019-02-07

## 2019-02-05 RX ORDER — PREDNISONE 20 MG/1
40 TABLET ORAL DAILY
Status: DISCONTINUED | OUTPATIENT
Start: 2019-02-06 | End: 2019-02-08 | Stop reason: HOSPADM

## 2019-02-05 RX ADMIN — ENOXAPARIN SODIUM 30 MG: 30 INJECTION SUBCUTANEOUS at 21:24

## 2019-02-05 RX ADMIN — IPRATROPIUM BROMIDE AND ALBUTEROL SULFATE 1 AMPULE: .5; 3 SOLUTION RESPIRATORY (INHALATION) at 15:20

## 2019-02-05 RX ADMIN — MOMETASONE FUROATE AND FORMOTEROL FUMARATE DIHYDRATE 2 PUFF: 200; 5 AEROSOL RESPIRATORY (INHALATION) at 07:16

## 2019-02-05 RX ADMIN — ALPRAZOLAM 0.25 MG: 0.25 TABLET ORAL at 21:25

## 2019-02-05 RX ADMIN — IPRATROPIUM BROMIDE AND ALBUTEROL SULFATE 1 AMPULE: .5; 3 SOLUTION RESPIRATORY (INHALATION) at 07:16

## 2019-02-05 RX ADMIN — AMLODIPINE BESYLATE 10 MG: 10 TABLET ORAL at 08:30

## 2019-02-05 RX ADMIN — MOMETASONE FUROATE AND FORMOTEROL FUMARATE DIHYDRATE 2 PUFF: 200; 5 AEROSOL RESPIRATORY (INHALATION) at 20:20

## 2019-02-05 RX ADMIN — IPRATROPIUM BROMIDE AND ALBUTEROL SULFATE 1 AMPULE: .5; 3 SOLUTION RESPIRATORY (INHALATION) at 03:52

## 2019-02-05 RX ADMIN — FLUTICASONE PROPIONATE 2 SPRAY: 50 SPRAY, METERED NASAL at 08:30

## 2019-02-05 RX ADMIN — LISINOPRIL 10 MG: 10 TABLET ORAL at 21:24

## 2019-02-05 RX ADMIN — LEVOFLOXACIN 500 MG: 500 TABLET, FILM COATED ORAL at 08:30

## 2019-02-05 RX ADMIN — IPRATROPIUM BROMIDE AND ALBUTEROL SULFATE 1 AMPULE: .5; 3 SOLUTION RESPIRATORY (INHALATION) at 11:14

## 2019-02-05 RX ADMIN — IPRATROPIUM BROMIDE AND ALBUTEROL SULFATE 1 AMPULE: .5; 3 SOLUTION RESPIRATORY (INHALATION) at 20:20

## 2019-02-05 RX ADMIN — MONTELUKAST SODIUM 10 MG: 10 TABLET, FILM COATED ORAL at 08:29

## 2019-02-05 RX ADMIN — MAGNESIUM HYDROXIDE 30 ML: 400 SUSPENSION ORAL at 17:18

## 2019-02-05 RX ADMIN — TRAMADOL HYDROCHLORIDE 50 MG: 50 TABLET, FILM COATED ORAL at 05:44

## 2019-02-05 RX ADMIN — TIOTROPIUM BROMIDE 18 MCG: 18 CAPSULE ORAL; RESPIRATORY (INHALATION) at 07:16

## 2019-02-05 RX ADMIN — OSELTAMIVIR PHOSPHATE 75 MG: 75 CAPSULE ORAL at 08:32

## 2019-02-05 RX ADMIN — ALPRAZOLAM 0.25 MG: 0.25 TABLET ORAL at 00:11

## 2019-02-05 RX ADMIN — Medication 10 ML: at 21:25

## 2019-02-05 RX ADMIN — OSELTAMIVIR PHOSPHATE 75 MG: 75 CAPSULE ORAL at 21:25

## 2019-02-05 RX ADMIN — ENOXAPARIN SODIUM 40 MG: 40 INJECTION SUBCUTANEOUS at 08:32

## 2019-02-05 RX ADMIN — GUAIFENESIN AND CODEINE PHOSPHATE 5 ML: 100; 10 SOLUTION ORAL at 21:25

## 2019-02-05 RX ADMIN — PANTOPRAZOLE SODIUM 40 MG: 40 TABLET, DELAYED RELEASE ORAL at 08:29

## 2019-02-05 RX ADMIN — METHYLPREDNISOLONE SODIUM SUCCINATE 40 MG: 40 INJECTION, POWDER, FOR SOLUTION INTRAMUSCULAR; INTRAVENOUS at 08:29

## 2019-02-05 RX ADMIN — ALBUTEROL SULFATE 2.5 MG: 2.5 SOLUTION RESPIRATORY (INHALATION) at 23:57

## 2019-02-05 RX ADMIN — IPRATROPIUM BROMIDE AND ALBUTEROL SULFATE 1 AMPULE: .5; 3 SOLUTION RESPIRATORY (INHALATION) at 00:27

## 2019-02-05 RX ADMIN — Medication 10 ML: at 08:29

## 2019-02-05 RX ADMIN — LISINOPRIL 10 MG: 10 TABLET ORAL at 08:30

## 2019-02-05 RX ADMIN — GUAIFENESIN AND CODEINE PHOSPHATE 5 ML: 100; 10 SOLUTION ORAL at 05:44

## 2019-02-05 ASSESSMENT — ENCOUNTER SYMPTOMS
WHEEZING: 1
SHORTNESS OF BREATH: 1
ABDOMINAL PAIN: 0
ABDOMINAL DISTENTION: 0
EYE REDNESS: 0
COUGH: 1
CHEST TIGHTNESS: 1
BACK PAIN: 0
FACIAL SWELLING: 0
COLOR CHANGE: 0
PHOTOPHOBIA: 0

## 2019-02-05 ASSESSMENT — PAIN SCALES - GENERAL: PAINLEVEL_OUTOF10: 4

## 2019-02-06 LAB
C-REACTIVE PROTEIN: 2.9 MG/L (ref 0–5)
MRSA, DNA, NASAL: NORMAL
SPECIMEN DESCRIPTION: NORMAL

## 2019-02-06 PROCEDURE — 2700000000 HC OXYGEN THERAPY PER DAY

## 2019-02-06 PROCEDURE — 6370000000 HC RX 637 (ALT 250 FOR IP): Performed by: STUDENT IN AN ORGANIZED HEALTH CARE EDUCATION/TRAINING PROGRAM

## 2019-02-06 PROCEDURE — 99232 SBSQ HOSP IP/OBS MODERATE 35: CPT | Performed by: INTERNAL MEDICINE

## 2019-02-06 PROCEDURE — 6370000000 HC RX 637 (ALT 250 FOR IP): Performed by: INTERNAL MEDICINE

## 2019-02-06 PROCEDURE — 99233 SBSQ HOSP IP/OBS HIGH 50: CPT | Performed by: INTERNAL MEDICINE

## 2019-02-06 PROCEDURE — 6360000002 HC RX W HCPCS: Performed by: STUDENT IN AN ORGANIZED HEALTH CARE EDUCATION/TRAINING PROGRAM

## 2019-02-06 PROCEDURE — 2580000003 HC RX 258: Performed by: STUDENT IN AN ORGANIZED HEALTH CARE EDUCATION/TRAINING PROGRAM

## 2019-02-06 PROCEDURE — 36415 COLL VENOUS BLD VENIPUNCTURE: CPT

## 2019-02-06 PROCEDURE — 2060000000 HC ICU INTERMEDIATE R&B

## 2019-02-06 PROCEDURE — 86140 C-REACTIVE PROTEIN: CPT

## 2019-02-06 PROCEDURE — 94762 N-INVAS EAR/PLS OXIMTRY CONT: CPT

## 2019-02-06 PROCEDURE — 6370000000 HC RX 637 (ALT 250 FOR IP): Performed by: HOSPITALIST

## 2019-02-06 PROCEDURE — 94640 AIRWAY INHALATION TREATMENT: CPT

## 2019-02-06 RX ORDER — ALPRAZOLAM 0.5 MG/1
0.5 TABLET ORAL 3 TIMES DAILY PRN
Status: DISCONTINUED | OUTPATIENT
Start: 2019-02-06 | End: 2019-02-08 | Stop reason: HOSPADM

## 2019-02-06 RX ORDER — SENNA PLUS 8.6 MG/1
1 TABLET ORAL NIGHTLY
Status: DISCONTINUED | OUTPATIENT
Start: 2019-02-06 | End: 2019-02-08 | Stop reason: HOSPADM

## 2019-02-06 RX ORDER — ALPRAZOLAM 0.5 MG/1
0.5 TABLET ORAL 2 TIMES DAILY PRN
Status: DISCONTINUED | OUTPATIENT
Start: 2019-02-06 | End: 2019-02-06

## 2019-02-06 RX ORDER — GUAIFENESIN 600 MG/1
600 TABLET, EXTENDED RELEASE ORAL 2 TIMES DAILY
Status: DISCONTINUED | OUTPATIENT
Start: 2019-02-06 | End: 2019-02-08 | Stop reason: HOSPADM

## 2019-02-06 RX ORDER — POLYETHYLENE GLYCOL 3350 17 G/17G
17 POWDER, FOR SOLUTION ORAL DAILY
Status: DISCONTINUED | OUTPATIENT
Start: 2019-02-06 | End: 2019-02-08 | Stop reason: HOSPADM

## 2019-02-06 RX ADMIN — GUAIFENESIN 600 MG: 600 TABLET, EXTENDED RELEASE ORAL at 10:20

## 2019-02-06 RX ADMIN — OSELTAMIVIR PHOSPHATE 75 MG: 75 CAPSULE ORAL at 08:24

## 2019-02-06 RX ADMIN — GUAIFENESIN AND CODEINE PHOSPHATE 5 ML: 100; 10 SOLUTION ORAL at 21:06

## 2019-02-06 RX ADMIN — IPRATROPIUM BROMIDE AND ALBUTEROL SULFATE 1 AMPULE: .5; 3 SOLUTION RESPIRATORY (INHALATION) at 16:55

## 2019-02-06 RX ADMIN — LISINOPRIL 10 MG: 10 TABLET ORAL at 08:24

## 2019-02-06 RX ADMIN — LEVOFLOXACIN 500 MG: 500 TABLET, FILM COATED ORAL at 08:24

## 2019-02-06 RX ADMIN — TIOTROPIUM BROMIDE 18 MCG: 18 CAPSULE ORAL; RESPIRATORY (INHALATION) at 09:24

## 2019-02-06 RX ADMIN — MOMETASONE FUROATE AND FORMOTEROL FUMARATE DIHYDRATE 2 PUFF: 200; 5 AEROSOL RESPIRATORY (INHALATION) at 20:47

## 2019-02-06 RX ADMIN — PREDNISONE 40 MG: 20 TABLET ORAL at 08:24

## 2019-02-06 RX ADMIN — ALPRAZOLAM 0.5 MG: 0.5 TABLET ORAL at 21:06

## 2019-02-06 RX ADMIN — IPRATROPIUM BROMIDE AND ALBUTEROL SULFATE 1 AMPULE: .5; 3 SOLUTION RESPIRATORY (INHALATION) at 20:47

## 2019-02-06 RX ADMIN — Medication 10 ML: at 08:25

## 2019-02-06 RX ADMIN — ALBUTEROL SULFATE 2.5 MG: 2.5 SOLUTION RESPIRATORY (INHALATION) at 04:16

## 2019-02-06 RX ADMIN — ENOXAPARIN SODIUM 30 MG: 30 INJECTION SUBCUTANEOUS at 08:24

## 2019-02-06 RX ADMIN — LISINOPRIL 10 MG: 10 TABLET ORAL at 21:05

## 2019-02-06 RX ADMIN — FLUTICASONE PROPIONATE 2 SPRAY: 50 SPRAY, METERED NASAL at 10:20

## 2019-02-06 RX ADMIN — MONTELUKAST SODIUM 10 MG: 10 TABLET, FILM COATED ORAL at 08:24

## 2019-02-06 RX ADMIN — MOMETASONE FUROATE AND FORMOTEROL FUMARATE DIHYDRATE 2 PUFF: 200; 5 AEROSOL RESPIRATORY (INHALATION) at 09:24

## 2019-02-06 RX ADMIN — POLYETHYLENE GLYCOL 3350 17 G: 17 POWDER, FOR SOLUTION ORAL at 16:38

## 2019-02-06 RX ADMIN — Medication 10 ML: at 21:10

## 2019-02-06 RX ADMIN — SENNOSIDES 8.6 MG: 8.6 TABLET, FILM COATED ORAL at 21:05

## 2019-02-06 RX ADMIN — ALBUTEROL SULFATE 2.5 MG: 2.5 SOLUTION RESPIRATORY (INHALATION) at 23:57

## 2019-02-06 RX ADMIN — ENOXAPARIN SODIUM 30 MG: 30 INJECTION SUBCUTANEOUS at 21:06

## 2019-02-06 RX ADMIN — PANTOPRAZOLE SODIUM 40 MG: 40 TABLET, DELAYED RELEASE ORAL at 06:24

## 2019-02-06 RX ADMIN — IPRATROPIUM BROMIDE AND ALBUTEROL SULFATE 1 AMPULE: .5; 3 SOLUTION RESPIRATORY (INHALATION) at 09:24

## 2019-02-06 RX ADMIN — GUAIFENESIN 600 MG: 600 TABLET, EXTENDED RELEASE ORAL at 21:05

## 2019-02-06 RX ADMIN — ALPRAZOLAM 0.25 MG: 0.25 TABLET ORAL at 12:51

## 2019-02-06 RX ADMIN — IPRATROPIUM BROMIDE AND ALBUTEROL SULFATE 1 AMPULE: .5; 3 SOLUTION RESPIRATORY (INHALATION) at 11:56

## 2019-02-06 RX ADMIN — AMLODIPINE BESYLATE 10 MG: 10 TABLET ORAL at 08:24

## 2019-02-06 RX ADMIN — OSELTAMIVIR PHOSPHATE 75 MG: 75 CAPSULE ORAL at 21:05

## 2019-02-06 ASSESSMENT — ENCOUNTER SYMPTOMS
EYE REDNESS: 0
SHORTNESS OF BREATH: 1
WHEEZING: 1
FACIAL SWELLING: 0
PHOTOPHOBIA: 0
ABDOMINAL PAIN: 0
BACK PAIN: 0
APNEA: 0
ABDOMINAL DISTENTION: 0
CHEST TIGHTNESS: 1
COLOR CHANGE: 0
CHOKING: 0
COUGH: 1

## 2019-02-07 PROCEDURE — 6370000000 HC RX 637 (ALT 250 FOR IP): Performed by: INTERNAL MEDICINE

## 2019-02-07 PROCEDURE — 6370000000 HC RX 637 (ALT 250 FOR IP): Performed by: HOSPITALIST

## 2019-02-07 PROCEDURE — 99232 SBSQ HOSP IP/OBS MODERATE 35: CPT | Performed by: INTERNAL MEDICINE

## 2019-02-07 PROCEDURE — 6370000000 HC RX 637 (ALT 250 FOR IP): Performed by: STUDENT IN AN ORGANIZED HEALTH CARE EDUCATION/TRAINING PROGRAM

## 2019-02-07 PROCEDURE — 2580000003 HC RX 258: Performed by: STUDENT IN AN ORGANIZED HEALTH CARE EDUCATION/TRAINING PROGRAM

## 2019-02-07 PROCEDURE — 94640 AIRWAY INHALATION TREATMENT: CPT

## 2019-02-07 PROCEDURE — 6360000002 HC RX W HCPCS: Performed by: STUDENT IN AN ORGANIZED HEALTH CARE EDUCATION/TRAINING PROGRAM

## 2019-02-07 PROCEDURE — 2060000000 HC ICU INTERMEDIATE R&B

## 2019-02-07 RX ORDER — ALBUTEROL SULFATE 2.5 MG/3ML
2.5 SOLUTION RESPIRATORY (INHALATION) EVERY 4 HOURS
Status: DISCONTINUED | OUTPATIENT
Start: 2019-02-07 | End: 2019-02-08 | Stop reason: HOSPADM

## 2019-02-07 RX ADMIN — MONTELUKAST SODIUM 10 MG: 10 TABLET, FILM COATED ORAL at 08:31

## 2019-02-07 RX ADMIN — LEVOFLOXACIN 500 MG: 500 TABLET, FILM COATED ORAL at 08:31

## 2019-02-07 RX ADMIN — MOMETASONE FUROATE AND FORMOTEROL FUMARATE DIHYDRATE 2 PUFF: 200; 5 AEROSOL RESPIRATORY (INHALATION) at 07:52

## 2019-02-07 RX ADMIN — OSELTAMIVIR PHOSPHATE 75 MG: 75 CAPSULE ORAL at 20:56

## 2019-02-07 RX ADMIN — ALBUTEROL SULFATE 2.5 MG: 2.5 SOLUTION RESPIRATORY (INHALATION) at 16:05

## 2019-02-07 RX ADMIN — Medication 10 ML: at 08:30

## 2019-02-07 RX ADMIN — ALBUTEROL SULFATE 2.5 MG: 2.5 SOLUTION RESPIRATORY (INHALATION) at 07:52

## 2019-02-07 RX ADMIN — Medication 10 ML: at 20:57

## 2019-02-07 RX ADMIN — FLUTICASONE PROPIONATE 2 SPRAY: 50 SPRAY, METERED NASAL at 08:52

## 2019-02-07 RX ADMIN — MOMETASONE FUROATE AND FORMOTEROL FUMARATE DIHYDRATE 2 PUFF: 200; 5 AEROSOL RESPIRATORY (INHALATION) at 21:20

## 2019-02-07 RX ADMIN — GUAIFENESIN AND CODEINE PHOSPHATE 5 ML: 100; 10 SOLUTION ORAL at 21:59

## 2019-02-07 RX ADMIN — GUAIFENESIN 600 MG: 600 TABLET, EXTENDED RELEASE ORAL at 08:31

## 2019-02-07 RX ADMIN — TIOTROPIUM BROMIDE 18 MCG: 18 CAPSULE ORAL; RESPIRATORY (INHALATION) at 07:52

## 2019-02-07 RX ADMIN — LISINOPRIL 10 MG: 10 TABLET ORAL at 20:56

## 2019-02-07 RX ADMIN — LISINOPRIL 10 MG: 10 TABLET ORAL at 08:31

## 2019-02-07 RX ADMIN — ALPRAZOLAM 0.5 MG: 0.5 TABLET ORAL at 20:56

## 2019-02-07 RX ADMIN — PREDNISONE 40 MG: 20 TABLET ORAL at 08:31

## 2019-02-07 RX ADMIN — ENOXAPARIN SODIUM 30 MG: 30 INJECTION SUBCUTANEOUS at 08:30

## 2019-02-07 RX ADMIN — PANTOPRAZOLE SODIUM 40 MG: 40 TABLET, DELAYED RELEASE ORAL at 06:33

## 2019-02-07 RX ADMIN — SENNOSIDES 8.6 MG: 8.6 TABLET, FILM COATED ORAL at 20:56

## 2019-02-07 RX ADMIN — GUAIFENESIN 600 MG: 600 TABLET, EXTENDED RELEASE ORAL at 20:56

## 2019-02-07 RX ADMIN — ALBUTEROL SULFATE 2.5 MG: 2.5 SOLUTION RESPIRATORY (INHALATION) at 23:33

## 2019-02-07 RX ADMIN — OSELTAMIVIR PHOSPHATE 75 MG: 75 CAPSULE ORAL at 08:31

## 2019-02-07 RX ADMIN — ALBUTEROL SULFATE 2.5 MG: 2.5 SOLUTION RESPIRATORY (INHALATION) at 03:30

## 2019-02-07 RX ADMIN — AMLODIPINE BESYLATE 10 MG: 10 TABLET ORAL at 08:31

## 2019-02-07 RX ADMIN — ALBUTEROL SULFATE 2.5 MG: 2.5 SOLUTION RESPIRATORY (INHALATION) at 11:39

## 2019-02-07 RX ADMIN — POLYETHYLENE GLYCOL 3350 17 G: 17 POWDER, FOR SOLUTION ORAL at 08:31

## 2019-02-07 RX ADMIN — ALBUTEROL SULFATE 2.5 MG: 2.5 SOLUTION RESPIRATORY (INHALATION) at 21:20

## 2019-02-07 RX ADMIN — ENOXAPARIN SODIUM 30 MG: 30 INJECTION SUBCUTANEOUS at 21:59

## 2019-02-07 ASSESSMENT — ENCOUNTER SYMPTOMS
APNEA: 0
PHOTOPHOBIA: 0
COLOR CHANGE: 0
EYE REDNESS: 0
CHEST TIGHTNESS: 1
COUGH: 1
ABDOMINAL PAIN: 0
CHOKING: 0
BACK PAIN: 0
FACIAL SWELLING: 0
WHEEZING: 1
SHORTNESS OF BREATH: 1
ABDOMINAL DISTENTION: 0

## 2019-02-08 VITALS
DIASTOLIC BLOOD PRESSURE: 73 MMHG | RESPIRATION RATE: 20 BRPM | SYSTOLIC BLOOD PRESSURE: 132 MMHG | OXYGEN SATURATION: 98 % | TEMPERATURE: 98.6 F | WEIGHT: 273.15 LBS | HEIGHT: 68 IN | HEART RATE: 103 BPM | BODY MASS INDEX: 41.4 KG/M2

## 2019-02-08 PROCEDURE — 6370000000 HC RX 637 (ALT 250 FOR IP): Performed by: STUDENT IN AN ORGANIZED HEALTH CARE EDUCATION/TRAINING PROGRAM

## 2019-02-08 PROCEDURE — 99232 SBSQ HOSP IP/OBS MODERATE 35: CPT | Performed by: INTERNAL MEDICINE

## 2019-02-08 PROCEDURE — 6370000000 HC RX 637 (ALT 250 FOR IP): Performed by: INTERNAL MEDICINE

## 2019-02-08 PROCEDURE — 6360000002 HC RX W HCPCS: Performed by: STUDENT IN AN ORGANIZED HEALTH CARE EDUCATION/TRAINING PROGRAM

## 2019-02-08 PROCEDURE — 94640 AIRWAY INHALATION TREATMENT: CPT

## 2019-02-08 PROCEDURE — 99239 HOSP IP/OBS DSCHRG MGMT >30: CPT | Performed by: INTERNAL MEDICINE

## 2019-02-08 PROCEDURE — 6370000000 HC RX 637 (ALT 250 FOR IP): Performed by: HOSPITALIST

## 2019-02-08 PROCEDURE — 2580000003 HC RX 258: Performed by: STUDENT IN AN ORGANIZED HEALTH CARE EDUCATION/TRAINING PROGRAM

## 2019-02-08 RX ORDER — PREDNISONE 10 MG/1
30 TABLET ORAL DAILY
Qty: 9 TABLET | Refills: 0 | Status: SHIPPED | OUTPATIENT
Start: 2019-02-08 | End: 2019-02-11

## 2019-02-08 RX ORDER — PREDNISONE 20 MG/1
20 TABLET ORAL DAILY
Qty: 3 TABLET | Refills: 0 | Status: SHIPPED | OUTPATIENT
Start: 2019-02-11 | End: 2019-02-14

## 2019-02-08 RX ORDER — OSELTAMIVIR PHOSPHATE 75 MG/1
75 CAPSULE ORAL 2 TIMES DAILY
Status: DISCONTINUED | OUTPATIENT
Start: 2019-02-08 | End: 2019-02-08 | Stop reason: HOSPADM

## 2019-02-08 RX ADMIN — LEVOFLOXACIN 500 MG: 500 TABLET, FILM COATED ORAL at 08:48

## 2019-02-08 RX ADMIN — ALBUTEROL SULFATE 2.5 MG: 2.5 SOLUTION RESPIRATORY (INHALATION) at 03:30

## 2019-02-08 RX ADMIN — AMLODIPINE BESYLATE 10 MG: 10 TABLET ORAL at 08:48

## 2019-02-08 RX ADMIN — PANTOPRAZOLE SODIUM 40 MG: 40 TABLET, DELAYED RELEASE ORAL at 07:23

## 2019-02-08 RX ADMIN — LISINOPRIL 10 MG: 10 TABLET ORAL at 08:48

## 2019-02-08 RX ADMIN — PREDNISONE 40 MG: 20 TABLET ORAL at 08:48

## 2019-02-08 RX ADMIN — Medication 10 ML: at 08:49

## 2019-02-08 RX ADMIN — FLUTICASONE PROPIONATE 2 SPRAY: 50 SPRAY, METERED NASAL at 08:49

## 2019-02-08 RX ADMIN — POLYETHYLENE GLYCOL 3350 17 G: 17 POWDER, FOR SOLUTION ORAL at 10:23

## 2019-02-08 RX ADMIN — GUAIFENESIN 600 MG: 600 TABLET, EXTENDED RELEASE ORAL at 08:48

## 2019-02-08 RX ADMIN — ALBUTEROL SULFATE 2.5 MG: 2.5 SOLUTION RESPIRATORY (INHALATION) at 09:21

## 2019-02-08 RX ADMIN — MOMETASONE FUROATE AND FORMOTEROL FUMARATE DIHYDRATE 2 PUFF: 200; 5 AEROSOL RESPIRATORY (INHALATION) at 09:21

## 2019-02-08 RX ADMIN — OSELTAMIVIR PHOSPHATE 75 MG: 75 CAPSULE ORAL at 08:48

## 2019-02-08 RX ADMIN — MONTELUKAST SODIUM 10 MG: 10 TABLET, FILM COATED ORAL at 08:48

## 2019-02-08 RX ADMIN — TIOTROPIUM BROMIDE 18 MCG: 18 CAPSULE ORAL; RESPIRATORY (INHALATION) at 09:23

## 2019-02-08 RX ADMIN — ENOXAPARIN SODIUM 30 MG: 30 INJECTION SUBCUTANEOUS at 08:48

## 2019-02-09 ENCOUNTER — CARE COORDINATION (OUTPATIENT)
Dept: CASE MANAGEMENT | Age: 64
End: 2019-02-09

## 2019-02-10 ENCOUNTER — CARE COORDINATION (OUTPATIENT)
Dept: CASE MANAGEMENT | Age: 64
End: 2019-02-10

## 2019-02-13 ENCOUNTER — OFFICE VISIT (OUTPATIENT)
Dept: PULMONOLOGY | Age: 64
End: 2019-02-13
Payer: MEDICARE

## 2019-02-13 VITALS
RESPIRATION RATE: 14 BRPM | BODY MASS INDEX: 41.22 KG/M2 | SYSTOLIC BLOOD PRESSURE: 127 MMHG | HEART RATE: 97 BPM | DIASTOLIC BLOOD PRESSURE: 85 MMHG | OXYGEN SATURATION: 98 % | HEIGHT: 68 IN | WEIGHT: 272 LBS

## 2019-02-13 DIAGNOSIS — G47.33 OSA ON CPAP: ICD-10-CM

## 2019-02-13 DIAGNOSIS — J45.51 SEVERE PERSISTENT ASTHMA WITH ACUTE EXACERBATION: ICD-10-CM

## 2019-02-13 DIAGNOSIS — J01.00 ACUTE MAXILLARY SINUSITIS, RECURRENCE NOT SPECIFIED: ICD-10-CM

## 2019-02-13 DIAGNOSIS — J41.1 MUCOPURULENT CHRONIC BRONCHITIS (HCC): Primary | ICD-10-CM

## 2019-02-13 DIAGNOSIS — J44.9 CHRONIC OBSTRUCTIVE PULMONARY DISEASE, UNSPECIFIED COPD TYPE (HCC): ICD-10-CM

## 2019-02-13 DIAGNOSIS — Z99.89 OSA ON CPAP: ICD-10-CM

## 2019-02-13 DIAGNOSIS — J44.9 COPD, SEVERE (HCC): ICD-10-CM

## 2019-02-13 PROCEDURE — 3017F COLORECTAL CA SCREEN DOC REV: CPT | Performed by: INTERNAL MEDICINE

## 2019-02-13 PROCEDURE — G8427 DOCREV CUR MEDS BY ELIG CLIN: HCPCS | Performed by: INTERNAL MEDICINE

## 2019-02-13 PROCEDURE — G8417 CALC BMI ABV UP PARAM F/U: HCPCS | Performed by: INTERNAL MEDICINE

## 2019-02-13 PROCEDURE — 3023F SPIROM DOC REV: CPT | Performed by: INTERNAL MEDICINE

## 2019-02-13 PROCEDURE — 99215 OFFICE O/P EST HI 40 MIN: CPT | Performed by: INTERNAL MEDICINE

## 2019-02-13 PROCEDURE — G8926 SPIRO NO PERF OR DOC: HCPCS | Performed by: INTERNAL MEDICINE

## 2019-02-13 PROCEDURE — 1111F DSCHRG MED/CURRENT MED MERGE: CPT | Performed by: INTERNAL MEDICINE

## 2019-02-13 PROCEDURE — 1036F TOBACCO NON-USER: CPT | Performed by: INTERNAL MEDICINE

## 2019-02-13 PROCEDURE — G8482 FLU IMMUNIZE ORDER/ADMIN: HCPCS | Performed by: INTERNAL MEDICINE

## 2019-02-13 RX ORDER — PREDNISONE 10 MG/1
20 TABLET ORAL DAILY
Qty: 10 TABLET | Refills: 0 | Status: SHIPPED | OUTPATIENT
Start: 2019-02-13 | End: 2019-02-23

## 2019-02-13 RX ORDER — AMOXICILLIN AND CLAVULANATE POTASSIUM 875; 125 MG/1; MG/1
1 TABLET, FILM COATED ORAL 2 TIMES DAILY
Qty: 20 TABLET | Refills: 0 | Status: SHIPPED | OUTPATIENT
Start: 2019-02-13 | End: 2019-02-23

## 2019-02-14 ENCOUNTER — HOSPITAL ENCOUNTER (OUTPATIENT)
Facility: MEDICAL CENTER | Age: 64
End: 2019-02-14
Payer: MEDICARE

## 2019-02-20 ENCOUNTER — HOSPITAL ENCOUNTER (OUTPATIENT)
Dept: INFUSION THERAPY | Facility: MEDICAL CENTER | Age: 64
Discharge: HOME | End: 2019-02-20
Payer: MEDICARE

## 2019-02-20 VITALS
RESPIRATION RATE: 18 BRPM | HEART RATE: 80 BPM | TEMPERATURE: 97.6 F | DIASTOLIC BLOOD PRESSURE: 91 MMHG | SYSTOLIC BLOOD PRESSURE: 138 MMHG

## 2019-02-20 DIAGNOSIS — D83.9 COMMON VARIABLE IMMUNODEFICIENCY (HCC): ICD-10-CM

## 2019-02-20 DIAGNOSIS — D83.9 COMMON VARIABLE IMMUNODEFICIENCY (HCC): Primary | Chronic | ICD-10-CM

## 2019-02-20 DIAGNOSIS — D80.1 COMMON VARIABLE HYPOGAMMAGLOBULINEMIA (HCC): ICD-10-CM

## 2019-02-20 LAB
ABSOLUTE EOS #: 0.1 K/UL (ref 0–0.4)
ABSOLUTE IMMATURE GRANULOCYTE: ABNORMAL K/UL (ref 0–0.3)
ABSOLUTE LYMPH #: 1.1 K/UL (ref 1–4.8)
ABSOLUTE MONO #: 0.4 K/UL (ref 0.2–0.8)
ALT SERPL-CCNC: 39 U/L (ref 5–41)
BASOPHILS # BLD: 0 % (ref 0–2)
BASOPHILS ABSOLUTE: 0 K/UL (ref 0–0.2)
CREAT SERPL-MCNC: 0.81 MG/DL (ref 0.7–1.2)
DIFFERENTIAL TYPE: ABNORMAL
EOSINOPHILS RELATIVE PERCENT: 1 % (ref 1–4)
GFR AFRICAN AMERICAN: >60 ML/MIN
GFR NON-AFRICAN AMERICAN: >60 ML/MIN
GFR SERPL CREATININE-BSD FRML MDRD: NORMAL ML/MIN/{1.73_M2}
GFR SERPL CREATININE-BSD FRML MDRD: NORMAL ML/MIN/{1.73_M2}
HCT VFR BLD CALC: 39.4 % (ref 41–53)
HEMOGLOBIN: 13.5 G/DL (ref 13.5–17.5)
IGG: 726 MG/DL (ref 700–1600)
IMMATURE GRANULOCYTES: ABNORMAL %
LYMPHOCYTES # BLD: 13 % (ref 24–44)
MCH RBC QN AUTO: 29.3 PG (ref 26–34)
MCHC RBC AUTO-ENTMCNC: 34.3 G/DL (ref 31–37)
MCV RBC AUTO: 85.6 FL (ref 80–100)
MONOCYTES # BLD: 4 % (ref 1–7)
NRBC AUTOMATED: ABNORMAL PER 100 WBC
PDW BLD-RTO: 14.4 % (ref 11.5–14.5)
PLATELET # BLD: 214 K/UL (ref 130–400)
PLATELET ESTIMATE: ABNORMAL
PMV BLD AUTO: 7.2 FL (ref 6–12)
RBC # BLD: 4.6 M/UL (ref 4.5–5.9)
RBC # BLD: ABNORMAL 10*6/UL
SEG NEUTROPHILS: 82 % (ref 36–66)
SEGMENTED NEUTROPHILS ABSOLUTE COUNT: 7.2 K/UL (ref 1.8–7.7)
WBC # BLD: 8.8 K/UL (ref 3.5–11)
WBC # BLD: ABNORMAL 10*3/UL

## 2019-02-20 PROCEDURE — 96366 THER/PROPH/DIAG IV INF ADDON: CPT

## 2019-02-20 PROCEDURE — 2580000003 HC RX 258: Performed by: ALLERGY & IMMUNOLOGY

## 2019-02-20 PROCEDURE — 6370000000 HC RX 637 (ALT 250 FOR IP): Performed by: ALLERGY & IMMUNOLOGY

## 2019-02-20 PROCEDURE — 82784 ASSAY IGA/IGD/IGG/IGM EACH: CPT

## 2019-02-20 PROCEDURE — 85025 COMPLETE CBC W/AUTO DIFF WBC: CPT

## 2019-02-20 PROCEDURE — 6360000002 HC RX W HCPCS: Performed by: ALLERGY & IMMUNOLOGY

## 2019-02-20 PROCEDURE — 96365 THER/PROPH/DIAG IV INF INIT: CPT

## 2019-02-20 PROCEDURE — 96360 HYDRATION IV INFUSION INIT: CPT

## 2019-02-20 PROCEDURE — 82565 ASSAY OF CREATININE: CPT

## 2019-02-20 PROCEDURE — 84460 ALANINE AMINO (ALT) (SGPT): CPT

## 2019-02-20 PROCEDURE — 96361 HYDRATE IV INFUSION ADD-ON: CPT

## 2019-02-20 RX ORDER — SODIUM CHLORIDE 0.9 % (FLUSH) 0.9 %
20 SYRINGE (ML) INJECTION PRN
Status: DISCONTINUED | OUTPATIENT
Start: 2019-02-20 | End: 2019-02-21 | Stop reason: HOSPADM

## 2019-02-20 RX ORDER — DEXTROSE MONOHYDRATE 50 MG/ML
INJECTION, SOLUTION INTRAVENOUS CONTINUOUS
Status: CANCELLED
Start: 2019-02-20

## 2019-02-20 RX ORDER — HEPARIN SODIUM (PORCINE) LOCK FLUSH IV SOLN 100 UNIT/ML 100 UNIT/ML
500 SOLUTION INTRAVENOUS PRN
Status: CANCELLED
Start: 2019-02-20

## 2019-02-20 RX ORDER — DIPHENHYDRAMINE HCL 25 MG
25 TABLET ORAL EVERY 4 HOURS PRN
Status: DISCONTINUED | OUTPATIENT
Start: 2019-02-20 | End: 2019-02-21 | Stop reason: HOSPADM

## 2019-02-20 RX ORDER — DEXTROSE AND SODIUM CHLORIDE 5; .33 G/100ML; G/100ML
INJECTION, SOLUTION INTRAVENOUS CONTINUOUS
Status: DISCONTINUED | OUTPATIENT
Start: 2019-02-20 | End: 2019-02-21 | Stop reason: HOSPADM

## 2019-02-20 RX ORDER — DIPHENHYDRAMINE HCL 25 MG
25 TABLET ORAL ONCE
Status: CANCELLED | OUTPATIENT
Start: 2019-02-20 | End: 2019-02-20

## 2019-02-20 RX ORDER — DIPHENHYDRAMINE HCL 25 MG
25 TABLET ORAL EVERY 4 HOURS PRN
Status: CANCELLED
Start: 2019-02-20

## 2019-02-20 RX ORDER — DIPHENHYDRAMINE HCL 25 MG
25 TABLET ORAL ONCE
Status: COMPLETED | OUTPATIENT
Start: 2019-02-20 | End: 2019-02-20

## 2019-02-20 RX ORDER — HEPARIN SODIUM (PORCINE) LOCK FLUSH IV SOLN 100 UNIT/ML 100 UNIT/ML
500 SOLUTION INTRAVENOUS PRN
Status: DISCONTINUED | OUTPATIENT
Start: 2019-02-20 | End: 2019-02-21 | Stop reason: HOSPADM

## 2019-02-20 RX ORDER — 0.9 % SODIUM CHLORIDE 0.9 %
10 VIAL (ML) INJECTION PRN
Status: CANCELLED | OUTPATIENT
Start: 2019-02-20

## 2019-02-20 RX ORDER — DEXTROSE AND SODIUM CHLORIDE 5; .33 G/100ML; G/100ML
INJECTION, SOLUTION INTRAVENOUS CONTINUOUS
Status: CANCELLED
Start: 2019-02-20

## 2019-02-20 RX ORDER — DEXTROSE MONOHYDRATE 50 MG/ML
INJECTION, SOLUTION INTRAVENOUS CONTINUOUS
Status: DISCONTINUED | OUTPATIENT
Start: 2019-02-20 | End: 2019-02-21 | Stop reason: HOSPADM

## 2019-02-20 RX ADMIN — DEXTROSE MONOHYDRATE: 50 INJECTION, SOLUTION INTRAVENOUS at 09:36

## 2019-02-20 RX ADMIN — DIPHENHYDRAMINE HCL 25 MG: 25 TABLET ORAL at 13:11

## 2019-02-20 RX ADMIN — DIPHENHYDRAMINE HCL 25 MG: 25 TABLET ORAL at 09:29

## 2019-02-20 RX ADMIN — SODIUM CHLORIDE, PRESERVATIVE FREE 500 UNITS: 5 INJECTION INTRAVENOUS at 16:51

## 2019-02-20 RX ADMIN — Medication 20 ML: at 09:25

## 2019-02-20 RX ADMIN — IMMUNE GLOBULIN INTRAVENOUS (HUMAN): 5 INJECTION, SOLUTION INTRAVENOUS at 10:07

## 2019-02-20 RX ADMIN — Medication 20 ML: at 16:51

## 2019-02-20 RX ADMIN — DEXTROSE AND SODIUM CHLORIDE: 5; 330 INJECTION, SOLUTION INTRAVENOUS at 09:36

## 2019-02-20 NOTE — FLOWSHEET NOTE
Situation:  encountered Mr. Verena Dockery while rounding the unit. Assessment:  Mr. Verena Dockery is a 65 y/o male whom  has met on a few occasions when Pt has come for treatment. Today, Mr. Verena Dockery was in treatment cubicle and was watching TV while receiving treatment. Mr. Verena Dockery smiled and greeted . Mr. Verena Dockery shared that he was \"doing better,\" and that he was in the hospital with Influenza A for 6 days. Mr. Verean Dockery spoke of how he contracted the flu and the effects on his body. Mr. Verena Dockery tapped into his sense of humor when sharing about his experience, describing [facility], where he has spent much time as [place] resort. \" Pt expressed gratitude for the care of the staff who know him well. Pt shared that it has been a \"tough year\" regarding his health. Pt noted that he has been hospitalized several times and that he had a hip replacement. Pt spoke of the impact of the cold weather, noting that he stays inside when it is really cold. Mr. Verena Dockery talked about his plans to drive to [place] with his wife this May to see and stay with friends. Mr. Verena Dockery shared that he sold his truck and that they will drive in the [facility], which gets better mileage. Mr. Verena Dockery initiated the conversation, talking about his parents, his sister who beat cancer, his children, whom Pt described as \"doing well\" and future vacation plans. Pt also talked about selling some of his items and mentioned the medical expenses he continues to face. Pt laughed when talking about what little he may have to give his children. Pt noted, \"I have to laugh,\" and talked about his parents who taught him to face challenges with humor and perseverance. Pt shared that he and his wife have been  44 years. When RN entered to provide care, Pt joked with RN. Pt expressed hopes of not returning to the hospital (\"the resort\") and reported that he will be back in 3 weeks.     Intervention:   provided supportive presence, active listening, exploration of coping, needs, and resources, affirmation of Pt's strengths and words of encouragement. Outcome:  Patient received 's visit. Pt accessed his sense of humor and story telling when communicating with . Pt shared his hopes and plans for the future. Pt thanked  for the visit. Recommendations:  Chaplains will remain available to provide emotional and spiritual support as needed. 02/20/19 4048   Encounter Summary   Services provided to: Patient   Referral/Consult From: [facility]   Support System Spouse; Children;Family members;Friends/neighbors   Continue Visiting (2/20/19)   Complexity of Encounter Low   Length of Encounter 30 minutes   Spiritual Assessment Completed Yes   Spiritual/Congregation   Type Spiritual support   Assessment Approachable;Coping; Hopeful   Intervention Active listening;Explored feelings, thoughts, concerns;Explored coping resources;Sustaining presence/ Ministry of presence   Outcome Expressed gratitude;Engaged in conversation;Coping; Hopeful;Encouraged;Receptive     Electronically signed by Nathalie Parish, Oncology Outpatient [facility], [id number] [facility]  2/20/2019  4:33 PM

## 2019-02-20 NOTE — PROGRESS NOTES
Pt arrives per ambulatory per self with home o2 and o2 connected at [facility] with 3L/NC. Orders reviewed and labs sent and pt given copy of labs. Pt had been in hospital about 3 weeks ago and OK per Dr Virgilio Gill for today's infusion, pt states. Premed tolerated well and pre-hydration with D5 1/3 NS infusing 250 ml in 30 min and then IVIG infusing per pump at 20 ml/hr for 20 min, 40 ml/hr for 20 min, 80 ml/hr for 20 min, 160 ml/hr for 20 min, 250 ml/hr for remainder. See vital sign section for all vital signs Q 30 min. No reactions or complaints, about 1/2 way through infusion, given another 25 mg benadryl po. Blood return present throughout infusion. Pt given calendar with next appt. 250 ml D 5 1/3 NS infusing over 30 min. Pt discharged per ambulatory per self without further questions or concerns voiced.

## 2019-03-07 RX ORDER — MONTELUKAST SODIUM 10 MG/1
TABLET ORAL
Qty: 90 TABLET | Refills: 0 | Status: SHIPPED | OUTPATIENT
Start: 2019-03-07 | End: 2019-06-21 | Stop reason: SDUPTHER

## 2019-03-13 ENCOUNTER — HOSPITAL ENCOUNTER (OUTPATIENT)
Dept: INFUSION THERAPY | Facility: MEDICAL CENTER | Age: 64
Discharge: HOME | End: 2019-03-13
Payer: MEDICARE

## 2019-03-13 VITALS
HEART RATE: 94 BPM | RESPIRATION RATE: 20 BRPM | TEMPERATURE: 97.7 F | SYSTOLIC BLOOD PRESSURE: 136 MMHG | DIASTOLIC BLOOD PRESSURE: 76 MMHG

## 2019-03-13 DIAGNOSIS — D83.9 COMMON VARIABLE IMMUNODEFICIENCY (HCC): ICD-10-CM

## 2019-03-13 DIAGNOSIS — D80.1 COMMON VARIABLE HYPOGAMMAGLOBULINEMIA (HCC): Primary | ICD-10-CM

## 2019-03-13 PROCEDURE — 96366 THER/PROPH/DIAG IV INF ADDON: CPT

## 2019-03-13 PROCEDURE — 6370000000 HC RX 637 (ALT 250 FOR IP): Performed by: ALLERGY & IMMUNOLOGY

## 2019-03-13 PROCEDURE — 96375 TX/PRO/DX INJ NEW DRUG ADDON: CPT

## 2019-03-13 PROCEDURE — 2580000003 HC RX 258: Performed by: ALLERGY & IMMUNOLOGY

## 2019-03-13 PROCEDURE — 6360000002 HC RX W HCPCS: Performed by: ALLERGY & IMMUNOLOGY

## 2019-03-13 PROCEDURE — 96365 THER/PROPH/DIAG IV INF INIT: CPT

## 2019-03-13 PROCEDURE — 96374 THER/PROPH/DIAG INJ IV PUSH: CPT

## 2019-03-13 RX ORDER — DIPHENHYDRAMINE HCL 25 MG
25 TABLET ORAL EVERY 4 HOURS PRN
Status: DISCONTINUED | OUTPATIENT
Start: 2019-03-13 | End: 2019-03-14 | Stop reason: HOSPADM

## 2019-03-13 RX ORDER — HEPARIN SODIUM (PORCINE) LOCK FLUSH IV SOLN 100 UNIT/ML 100 UNIT/ML
500 SOLUTION INTRAVENOUS PRN
Status: CANCELLED
Start: 2019-03-13

## 2019-03-13 RX ORDER — HEPARIN SODIUM (PORCINE) LOCK FLUSH IV SOLN 100 UNIT/ML 100 UNIT/ML
500 SOLUTION INTRAVENOUS PRN
Status: DISCONTINUED | OUTPATIENT
Start: 2019-03-13 | End: 2019-03-14 | Stop reason: HOSPADM

## 2019-03-13 RX ORDER — DEXTROSE MONOHYDRATE 50 MG/ML
INJECTION, SOLUTION INTRAVENOUS CONTINUOUS
Status: CANCELLED
Start: 2019-03-13

## 2019-03-13 RX ORDER — 0.9 % SODIUM CHLORIDE 0.9 %
10 VIAL (ML) INJECTION PRN
Status: DISCONTINUED | OUTPATIENT
Start: 2019-03-13 | End: 2019-03-14 | Stop reason: HOSPADM

## 2019-03-13 RX ORDER — 0.9 % SODIUM CHLORIDE 0.9 %
10 VIAL (ML) INJECTION PRN
Status: CANCELLED | OUTPATIENT
Start: 2019-03-13

## 2019-03-13 RX ORDER — DIPHENHYDRAMINE HCL 25 MG
25 TABLET ORAL ONCE
Status: COMPLETED | OUTPATIENT
Start: 2019-03-13 | End: 2019-03-13

## 2019-03-13 RX ORDER — DIPHENHYDRAMINE HCL 25 MG
25 TABLET ORAL ONCE
Status: CANCELLED | OUTPATIENT
Start: 2019-03-13 | End: 2019-03-13

## 2019-03-13 RX ORDER — DIPHENHYDRAMINE HCL 25 MG
25 TABLET ORAL EVERY 4 HOURS PRN
Status: CANCELLED
Start: 2019-03-13

## 2019-03-13 RX ORDER — DEXTROSE AND SODIUM CHLORIDE 5; .33 G/100ML; G/100ML
INJECTION, SOLUTION INTRAVENOUS CONTINUOUS
Status: DISCONTINUED | OUTPATIENT
Start: 2019-03-13 | End: 2019-03-14 | Stop reason: HOSPADM

## 2019-03-13 RX ORDER — DEXTROSE AND SODIUM CHLORIDE 5; .33 G/100ML; G/100ML
INJECTION, SOLUTION INTRAVENOUS CONTINUOUS
Status: CANCELLED
Start: 2019-03-13

## 2019-03-13 RX ADMIN — DIPHENHYDRAMINE HCL 25 MG: 25 TABLET ORAL at 14:00

## 2019-03-13 RX ADMIN — DEXTROSE AND SODIUM CHLORIDE: 5; 330 INJECTION, SOLUTION INTRAVENOUS at 08:46

## 2019-03-13 RX ADMIN — Medication 500 UNITS: at 16:04

## 2019-03-13 RX ADMIN — IMMUNE GLOBULIN INTRAVENOUS (HUMAN): 5 INJECTION, SOLUTION INTRAVENOUS at 09:18

## 2019-03-13 RX ADMIN — DIPHENHYDRAMINE HCL 25 MG: 25 TABLET ORAL at 09:15

## 2019-03-13 NOTE — PROGRESS NOTES
Patent here for IVIG infusion. Feels well today. Oxygen on at 3 L nasal cannula. Vitals obtained. Port accessed per policy with brisk blood return obtained. Premeds given per [facility]. Pre hydration fluids given. IVIG hung per [facility] and titrated per MD order. Vitals obtained per policy and placed on flow sheets. Tolerated well. Slept on and off. Benadryl repeated x 1. Infusion completed. Port flushed per policy and gripper removed intact, band aid to site. Has a return visit scheduled. Discharged ambulatory per self with personal oxygen tank.

## 2019-03-20 ENCOUNTER — OFFICE VISIT (OUTPATIENT)
Dept: PULMONOLOGY | Age: 64
End: 2019-03-20
Payer: MEDICARE

## 2019-03-20 VITALS — OXYGEN SATURATION: 97 % | BODY MASS INDEX: 40.77 KG/M2 | WEIGHT: 269 LBS | HEIGHT: 68 IN | HEART RATE: 93 BPM

## 2019-03-20 VITALS
SYSTOLIC BLOOD PRESSURE: 124 MMHG | DIASTOLIC BLOOD PRESSURE: 74 MMHG | HEART RATE: 97 BPM | BODY MASS INDEX: 40.89 KG/M2 | RESPIRATION RATE: 18 BRPM | WEIGHT: 269.8 LBS | OXYGEN SATURATION: 97 % | HEIGHT: 68 IN

## 2019-03-20 DIAGNOSIS — D83.9 CVID (COMMON VARIABLE IMMUNODEFICIENCY) (HCC): ICD-10-CM

## 2019-03-20 DIAGNOSIS — J96.11 CHRONIC RESPIRATORY FAILURE WITH HYPOXIA (HCC): ICD-10-CM

## 2019-03-20 DIAGNOSIS — J45.50 SEVERE PERSISTENT ASTHMA WITHOUT COMPLICATION: ICD-10-CM

## 2019-03-20 DIAGNOSIS — E66.01 MORBID OBESITY WITH BMI OF 40.0-44.9, ADULT (HCC): ICD-10-CM

## 2019-03-20 DIAGNOSIS — J30.9 ALLERGIC RHINITIS, UNSPECIFIED SEASONALITY, UNSPECIFIED TRIGGER: ICD-10-CM

## 2019-03-20 DIAGNOSIS — Z99.89 OSA ON CPAP: ICD-10-CM

## 2019-03-20 DIAGNOSIS — J44.9 CHRONIC OBSTRUCTIVE PULMONARY DISEASE, UNSPECIFIED COPD TYPE (HCC): Primary | ICD-10-CM

## 2019-03-20 DIAGNOSIS — G47.33 OSA ON CPAP: ICD-10-CM

## 2019-03-20 PROCEDURE — G8482 FLU IMMUNIZE ORDER/ADMIN: HCPCS | Performed by: INTERNAL MEDICINE

## 2019-03-20 PROCEDURE — G8427 DOCREV CUR MEDS BY ELIG CLIN: HCPCS | Performed by: INTERNAL MEDICINE

## 2019-03-20 PROCEDURE — G8417 CALC BMI ABV UP PARAM F/U: HCPCS | Performed by: INTERNAL MEDICINE

## 2019-03-20 PROCEDURE — 94618 PULMONARY STRESS TESTING: CPT | Performed by: INTERNAL MEDICINE

## 2019-03-20 PROCEDURE — 99214 OFFICE O/P EST MOD 30 MIN: CPT | Performed by: INTERNAL MEDICINE

## 2019-03-20 PROCEDURE — 3023F SPIROM DOC REV: CPT | Performed by: INTERNAL MEDICINE

## 2019-03-20 PROCEDURE — G8926 SPIRO NO PERF OR DOC: HCPCS | Performed by: INTERNAL MEDICINE

## 2019-03-20 PROCEDURE — 3017F COLORECTAL CA SCREEN DOC REV: CPT | Performed by: INTERNAL MEDICINE

## 2019-03-20 PROCEDURE — 1036F TOBACCO NON-USER: CPT | Performed by: INTERNAL MEDICINE

## 2019-03-20 RX ORDER — METRONIDAZOLE 10 MG/G
GEL TOPICAL
Refills: 0 | COMMUNITY
Start: 2019-03-09 | End: 2019-07-31

## 2019-03-20 RX ORDER — PREDNISONE 10 MG/1
TABLET ORAL
Qty: 45 TABLET | Refills: 0 | Status: SHIPPED | OUTPATIENT
Start: 2019-03-20

## 2019-03-20 RX ORDER — LEVOFLOXACIN 500 MG/1
500 TABLET, FILM COATED ORAL DAILY
Qty: 7 TABLET | Refills: 0 | Status: SHIPPED | OUTPATIENT
Start: 2019-03-20 | End: 2019-03-27

## 2019-03-20 ASSESSMENT — SLEEP AND FATIGUE QUESTIONNAIRES
HOW LIKELY ARE YOU TO NOD OFF OR FALL ASLEEP WHILE SITTING AND READING: 3
HOW LIKELY ARE YOU TO NOD OFF OR FALL ASLEEP WHEN YOU ARE A PASSENGER IN A CAR FOR AN HOUR WITHOUT A BREAK: 0
HOW LIKELY ARE YOU TO NOD OFF OR FALL ASLEEP WHILE SITTING INACTIVE IN A PUBLIC PLACE: 1
HOW LIKELY ARE YOU TO NOD OFF OR FALL ASLEEP WHILE SITTING AND TALKING TO SOMEONE: 0
HOW LIKELY ARE YOU TO NOD OFF OR FALL ASLEEP WHILE SITTING QUIETLY AFTER LUNCH WITHOUT ALCOHOL: 3
ESS TOTAL SCORE: 11
HOW LIKELY ARE YOU TO NOD OFF OR FALL ASLEEP IN A CAR, WHILE STOPPED FOR A FEW MINUTES IN TRAFFIC: 0
HOW LIKELY ARE YOU TO NOD OFF OR FALL ASLEEP WHILE LYING DOWN TO REST IN THE AFTERNOON WHEN CIRCUMSTANCES PERMIT: 3
HOW LIKELY ARE YOU TO NOD OFF OR FALL ASLEEP WHILE WATCHING TV: 1

## 2019-03-29 ENCOUNTER — TELEPHONE (OUTPATIENT)
Dept: PULMONOLOGY | Age: 64
End: 2019-03-29

## 2019-03-29 DIAGNOSIS — J44.9 CHRONIC OBSTRUCTIVE PULMONARY DISEASE, UNSPECIFIED COPD TYPE (HCC): Primary | ICD-10-CM

## 2019-03-29 DIAGNOSIS — J45.50 SEVERE PERSISTENT ASTHMA WITHOUT COMPLICATION: ICD-10-CM

## 2019-04-02 ENCOUNTER — HOSPITAL ENCOUNTER (OUTPATIENT)
Dept: INFUSION THERAPY | Facility: MEDICAL CENTER | Age: 64
Discharge: HOME | End: 2019-04-02
Payer: MEDICARE

## 2019-04-02 VITALS
OXYGEN SATURATION: 98 % | HEART RATE: 81 BPM | SYSTOLIC BLOOD PRESSURE: 112 MMHG | RESPIRATION RATE: 18 BRPM | DIASTOLIC BLOOD PRESSURE: 69 MMHG | TEMPERATURE: 98.2 F

## 2019-04-02 DIAGNOSIS — D80.1 COMMON VARIABLE HYPOGAMMAGLOBULINEMIA (HCC): Primary | ICD-10-CM

## 2019-04-02 DIAGNOSIS — D83.9 COMMON VARIABLE IMMUNODEFICIENCY (HCC): ICD-10-CM

## 2019-04-02 PROCEDURE — 96365 THER/PROPH/DIAG IV INF INIT: CPT

## 2019-04-02 PROCEDURE — 2580000003 HC RX 258: Performed by: ALLERGY & IMMUNOLOGY

## 2019-04-02 PROCEDURE — 6360000002 HC RX W HCPCS: Performed by: ALLERGY & IMMUNOLOGY

## 2019-04-02 PROCEDURE — 6370000000 HC RX 637 (ALT 250 FOR IP): Performed by: ALLERGY & IMMUNOLOGY

## 2019-04-02 PROCEDURE — 96366 THER/PROPH/DIAG IV INF ADDON: CPT

## 2019-04-02 RX ORDER — DEXTROSE AND SODIUM CHLORIDE 5; .33 G/100ML; G/100ML
INJECTION, SOLUTION INTRAVENOUS CONTINUOUS
Status: DISCONTINUED | OUTPATIENT
Start: 2019-04-02 | End: 2019-04-03 | Stop reason: HOSPADM

## 2019-04-02 RX ORDER — DEXTROSE MONOHYDRATE 50 MG/ML
INJECTION, SOLUTION INTRAVENOUS CONTINUOUS
Status: CANCELLED
Start: 2019-04-23

## 2019-04-02 RX ORDER — DEXTROSE AND SODIUM CHLORIDE 5; .33 G/100ML; G/100ML
INJECTION, SOLUTION INTRAVENOUS CONTINUOUS
Status: CANCELLED
Start: 2019-04-23

## 2019-04-02 RX ORDER — 0.9 % SODIUM CHLORIDE 0.9 %
10 VIAL (ML) INJECTION PRN
Status: CANCELLED | OUTPATIENT
Start: 2019-04-23

## 2019-04-02 RX ORDER — DIPHENHYDRAMINE HCL 25 MG
25 TABLET ORAL EVERY 4 HOURS PRN
Status: DISCONTINUED | OUTPATIENT
Start: 2019-04-02 | End: 2019-04-03 | Stop reason: HOSPADM

## 2019-04-02 RX ORDER — HEPARIN SODIUM (PORCINE) LOCK FLUSH IV SOLN 100 UNIT/ML 100 UNIT/ML
500 SOLUTION INTRAVENOUS PRN
Status: CANCELLED
Start: 2019-04-23

## 2019-04-02 RX ORDER — DIPHENHYDRAMINE HCL 25 MG
25 TABLET ORAL ONCE
Status: CANCELLED | OUTPATIENT
Start: 2019-04-23

## 2019-04-02 RX ORDER — HEPARIN SODIUM (PORCINE) LOCK FLUSH IV SOLN 100 UNIT/ML 100 UNIT/ML
500 SOLUTION INTRAVENOUS PRN
Status: DISCONTINUED | OUTPATIENT
Start: 2019-04-02 | End: 2019-04-03 | Stop reason: HOSPADM

## 2019-04-02 RX ORDER — DIPHENHYDRAMINE HCL 25 MG
25 TABLET ORAL ONCE
Status: COMPLETED | OUTPATIENT
Start: 2019-04-02 | End: 2019-04-02

## 2019-04-02 RX ORDER — 0.9 % SODIUM CHLORIDE 0.9 %
10 VIAL (ML) INJECTION PRN
Status: DISCONTINUED | OUTPATIENT
Start: 2019-04-02 | End: 2019-04-03 | Stop reason: HOSPADM

## 2019-04-02 RX ORDER — DIPHENHYDRAMINE HCL 25 MG
25 TABLET ORAL EVERY 4 HOURS PRN
Status: CANCELLED
Start: 2019-04-23

## 2019-04-02 RX ADMIN — IMMUNE GLOBULIN INTRAVENOUS (HUMAN): 5 INJECTION, SOLUTION INTRAVENOUS at 09:02

## 2019-04-02 RX ADMIN — DIPHENHYDRAMINE HCL 25 MG: 25 TABLET ORAL at 08:30

## 2019-04-02 RX ADMIN — Medication 10 ML: at 15:40

## 2019-04-02 RX ADMIN — DIPHENHYDRAMINE HCL 25 MG: 25 TABLET ORAL at 13:35

## 2019-04-02 RX ADMIN — DEXTROSE AND SODIUM CHLORIDE: 5; 330 INJECTION, SOLUTION INTRAVENOUS at 08:30

## 2019-04-02 RX ADMIN — Medication 500 UNITS: at 15:40

## 2019-04-02 ASSESSMENT — PAIN SCALES - GENERAL: PAINLEVEL_OUTOF10: 0

## 2019-04-02 NOTE — PROGRESS NOTES
0820:  Pt arrives ambulatory on own portable [email or web address] via nasal cannula for his IVIG infusion. Dr. Thomas Rising orders from 5/14/19 reviewed. VS obtained. Labs not due until 5/15/19. Pt feels well today. Denies complaints. 0830:  Pt's port accessed as charted in LDA's. Pre-hydration IVF's hung as per MAR. Pt pre-medicated as per [facility].  0902:  IVIG hung and titrated as per MAR. Rate begun at 20 ml/hr for 20 minutes. Rate increased to 40 ml/hr for 20 minutes. Rate increased to 80 ml/hr for 20 minutes. Rate increased to 160 ml/hr for 30 minutes. Rate increased to and maintained at 250 ml/hr until IVIG completion time of 1501. VS taken before IVIG start, with each IVIG rate increase and then every 30 minutes during IVIG infusion and then 30 minutes post IVIG completion as charted in VS section. Pt tolerated IVIG well without complaints. Pt observed for 30 minutes post IVIG while 250 ml of D5W0.33NaCl given as per [facility] as ordered for post IV hydration. No adverse reactions noted. Pt denied headaches throughout. Pt's port flushed and heparinized as per MAR. Pt's port de-accessed as charted in LDA's. Appointment calendar given to the patient. RTC on 4/24/19. Discharged per ambulation.

## 2019-04-03 ENCOUNTER — HOSPITAL ENCOUNTER (OUTPATIENT)
Dept: INFUSION THERAPY | Facility: MEDICAL CENTER | Age: 64
End: 2019-04-03

## 2019-04-10 ENCOUNTER — TELEPHONE (OUTPATIENT)
Dept: PULMONOLOGY | Age: 64
End: 2019-04-10

## 2019-04-10 DIAGNOSIS — G47.33 OSA ON CPAP: ICD-10-CM

## 2019-04-10 DIAGNOSIS — J44.9 CHRONIC OBSTRUCTIVE PULMONARY DISEASE, UNSPECIFIED COPD TYPE (HCC): Primary | ICD-10-CM

## 2019-04-10 DIAGNOSIS — J45.909 ASTHMA IN ADULT, UNSPECIFIED ASTHMA SEVERITY, UNCOMPLICATED: ICD-10-CM

## 2019-04-10 DIAGNOSIS — Z99.89 OSA ON CPAP: ICD-10-CM

## 2019-04-24 ENCOUNTER — HOSPITAL ENCOUNTER (OUTPATIENT)
Dept: INFUSION THERAPY | Facility: MEDICAL CENTER | Age: 64
Discharge: HOME | End: 2019-04-24
Payer: MEDICARE

## 2019-04-24 VITALS
RESPIRATION RATE: 18 BRPM | HEART RATE: 90 BPM | SYSTOLIC BLOOD PRESSURE: 112 MMHG | TEMPERATURE: 97.8 F | DIASTOLIC BLOOD PRESSURE: 64 MMHG

## 2019-04-24 DIAGNOSIS — D83.9 COMMON VARIABLE IMMUNODEFICIENCY (HCC): ICD-10-CM

## 2019-04-24 DIAGNOSIS — D80.1 COMMON VARIABLE HYPOGAMMAGLOBULINEMIA (HCC): Primary | ICD-10-CM

## 2019-04-24 PROCEDURE — 96366 THER/PROPH/DIAG IV INF ADDON: CPT

## 2019-04-24 PROCEDURE — 6360000002 HC RX W HCPCS: Performed by: ALLERGY & IMMUNOLOGY

## 2019-04-24 PROCEDURE — 96361 HYDRATE IV INFUSION ADD-ON: CPT

## 2019-04-24 PROCEDURE — 2580000003 HC RX 258: Performed by: ALLERGY & IMMUNOLOGY

## 2019-04-24 PROCEDURE — 6370000000 HC RX 637 (ALT 250 FOR IP): Performed by: ALLERGY & IMMUNOLOGY

## 2019-04-24 PROCEDURE — 96365 THER/PROPH/DIAG IV INF INIT: CPT

## 2019-04-24 RX ORDER — DIPHENHYDRAMINE HCL 25 MG
25 TABLET ORAL ONCE
Status: CANCELLED | OUTPATIENT
Start: 2019-05-14

## 2019-04-24 RX ORDER — DIPHENHYDRAMINE HCL 25 MG
25 TABLET ORAL ONCE
Status: COMPLETED | OUTPATIENT
Start: 2019-04-24 | End: 2019-04-24

## 2019-04-24 RX ORDER — HEPARIN SODIUM (PORCINE) LOCK FLUSH IV SOLN 100 UNIT/ML 100 UNIT/ML
500 SOLUTION INTRAVENOUS PRN
Status: DISCONTINUED | OUTPATIENT
Start: 2019-04-24 | End: 2019-04-25 | Stop reason: HOSPADM

## 2019-04-24 RX ORDER — HEPARIN SODIUM (PORCINE) LOCK FLUSH IV SOLN 100 UNIT/ML 100 UNIT/ML
500 SOLUTION INTRAVENOUS PRN
Status: CANCELLED
Start: 2019-05-14

## 2019-04-24 RX ORDER — DEXTROSE AND SODIUM CHLORIDE 5; .33 G/100ML; G/100ML
INJECTION, SOLUTION INTRAVENOUS CONTINUOUS
Status: DISCONTINUED | OUTPATIENT
Start: 2019-04-24 | End: 2019-04-25 | Stop reason: HOSPADM

## 2019-04-24 RX ORDER — DIPHENHYDRAMINE HCL 25 MG
25 TABLET ORAL EVERY 4 HOURS PRN
Status: DISCONTINUED | OUTPATIENT
Start: 2019-04-24 | End: 2019-04-25 | Stop reason: HOSPADM

## 2019-04-24 RX ORDER — 0.9 % SODIUM CHLORIDE 0.9 %
10 VIAL (ML) INJECTION PRN
Status: CANCELLED | OUTPATIENT
Start: 2019-05-14

## 2019-04-24 RX ORDER — DEXTROSE MONOHYDRATE 50 MG/ML
INJECTION, SOLUTION INTRAVENOUS CONTINUOUS
Status: CANCELLED
Start: 2019-05-14

## 2019-04-24 RX ORDER — DIPHENHYDRAMINE HCL 25 MG
25 TABLET ORAL EVERY 4 HOURS PRN
Status: CANCELLED
Start: 2019-05-14

## 2019-04-24 RX ORDER — 0.9 % SODIUM CHLORIDE 0.9 %
10 VIAL (ML) INJECTION PRN
Status: DISCONTINUED | OUTPATIENT
Start: 2019-04-24 | End: 2019-04-25 | Stop reason: HOSPADM

## 2019-04-24 RX ORDER — DEXTROSE AND SODIUM CHLORIDE 5; .33 G/100ML; G/100ML
INJECTION, SOLUTION INTRAVENOUS CONTINUOUS
Status: CANCELLED
Start: 2019-05-14

## 2019-04-24 RX ADMIN — DIPHENHYDRAMINE HCL 25 MG: 25 TABLET ORAL at 13:16

## 2019-04-24 RX ADMIN — Medication 10 ML: at 16:42

## 2019-04-24 RX ADMIN — DIPHENHYDRAMINE HCL 25 MG: 25 TABLET ORAL at 09:17

## 2019-04-24 RX ADMIN — DEXTROSE AND SODIUM CHLORIDE: 5; 330 INJECTION, SOLUTION INTRAVENOUS at 09:16

## 2019-04-24 RX ADMIN — Medication 500 UNITS: at 16:43

## 2019-04-24 RX ADMIN — IMMUNE GLOBULIN INTRAVENOUS (HUMAN): 5 INJECTION, SOLUTION INTRAVENOUS at 09:57

## 2019-04-24 NOTE — PROGRESS NOTES
Pt arrives per ambulatory per self with 3 L/NC of O2 from home and then to wall outlet while at [facility]. D5 1/3 NS infusing 250 ml before and after IVIG. IVIG infusing at 20 ml/hr for 20 min, 40 ml/hr for 20 min, 80 ml/hr for 20 min, 160 ml/hr for 30 min, and then 250 ml/hr for remainder. See flowheets for vital signs. BP checked Q 30 min. No reactions or complaints and blood return present throughout infusion. Pt has next appt. Pt discharged per ambulatory per self with portable O2.

## 2019-05-10 ENCOUNTER — TELEPHONE (OUTPATIENT)
Dept: CASE MANAGEMENT | Age: 64
End: 2019-05-10

## 2019-05-17 ENCOUNTER — HOSPITAL ENCOUNTER (OUTPATIENT)
Dept: INFUSION THERAPY | Age: 64
Discharge: HOME | End: 2019-05-17
Payer: MEDICARE

## 2019-05-17 VITALS
SYSTOLIC BLOOD PRESSURE: 136 MMHG | TEMPERATURE: 97.6 F | DIASTOLIC BLOOD PRESSURE: 77 MMHG | RESPIRATION RATE: 18 BRPM | HEART RATE: 85 BPM

## 2019-05-17 DIAGNOSIS — D80.1 COMMON VARIABLE HYPOGAMMAGLOBULINEMIA (HCC): Primary | ICD-10-CM

## 2019-05-17 DIAGNOSIS — D83.9 COMMON VARIABLE IMMUNODEFICIENCY (HCC): ICD-10-CM

## 2019-05-17 LAB
ABSOLUTE EOS #: 0 K/UL (ref 0–0.4)
ABSOLUTE IMMATURE GRANULOCYTE: ABNORMAL K/UL (ref 0–0.3)
ABSOLUTE LYMPH #: 1.3 K/UL (ref 1–4.8)
ABSOLUTE MONO #: 0.3 K/UL (ref 0.1–1.2)
ALT SERPL-CCNC: 26 U/L (ref 5–41)
BASOPHILS # BLD: 1 % (ref 0–2)
BASOPHILS ABSOLUTE: 0.1 K/UL (ref 0–0.2)
CREAT SERPL-MCNC: 0.86 MG/DL (ref 0.7–1.2)
DIFFERENTIAL TYPE: ABNORMAL
EOSINOPHILS RELATIVE PERCENT: 1 % (ref 1–4)
GFR AFRICAN AMERICAN: >60 ML/MIN
GFR NON-AFRICAN AMERICAN: >60 ML/MIN
GFR SERPL CREATININE-BSD FRML MDRD: NORMAL ML/MIN/{1.73_M2}
GFR SERPL CREATININE-BSD FRML MDRD: NORMAL ML/MIN/{1.73_M2}
HCT VFR BLD CALC: 43.1 % (ref 41–53)
HEMOGLOBIN: 14.9 G/DL (ref 13.5–17.5)
IGA: 70 MG/DL (ref 70–400)
IGG: 846 MG/DL (ref 700–1600)
IGM: 92 MG/DL (ref 40–230)
IMMATURE GRANULOCYTES: ABNORMAL %
LYMPHOCYTES # BLD: 19 % (ref 24–44)
MCH RBC QN AUTO: 29.9 PG (ref 26–34)
MCHC RBC AUTO-ENTMCNC: 34.6 G/DL (ref 31–37)
MCV RBC AUTO: 86.4 FL (ref 80–100)
MONOCYTES # BLD: 5 % (ref 2–11)
NRBC AUTOMATED: ABNORMAL PER 100 WBC
PDW BLD-RTO: 13.3 % (ref 12.5–15.4)
PLATELET # BLD: 247 K/UL (ref 140–450)
PLATELET ESTIMATE: ABNORMAL
PMV BLD AUTO: 8.8 FL (ref 6–12)
RBC # BLD: 4.99 M/UL (ref 4.5–5.9)
RBC # BLD: ABNORMAL 10*6/UL
SEG NEUTROPHILS: 74 % (ref 36–66)
SEGMENTED NEUTROPHILS ABSOLUTE COUNT: 5 K/UL (ref 1.8–7.7)
WBC # BLD: 6.7 K/UL (ref 3.5–11)
WBC # BLD: ABNORMAL 10*3/UL

## 2019-05-17 PROCEDURE — 84460 ALANINE AMINO (ALT) (SGPT): CPT

## 2019-05-17 PROCEDURE — 6370000000 HC RX 637 (ALT 250 FOR IP): Performed by: ALLERGY & IMMUNOLOGY

## 2019-05-17 PROCEDURE — 82784 ASSAY IGA/IGD/IGG/IGM EACH: CPT

## 2019-05-17 PROCEDURE — 96365 THER/PROPH/DIAG IV INF INIT: CPT

## 2019-05-17 PROCEDURE — 85025 COMPLETE CBC W/AUTO DIFF WBC: CPT

## 2019-05-17 PROCEDURE — 96361 HYDRATE IV INFUSION ADD-ON: CPT

## 2019-05-17 PROCEDURE — 96366 THER/PROPH/DIAG IV INF ADDON: CPT

## 2019-05-17 PROCEDURE — 2580000003 HC RX 258: Performed by: ALLERGY & IMMUNOLOGY

## 2019-05-17 PROCEDURE — 82565 ASSAY OF CREATININE: CPT

## 2019-05-17 PROCEDURE — 6360000002 HC RX W HCPCS: Performed by: ALLERGY & IMMUNOLOGY

## 2019-05-17 PROCEDURE — 36591 DRAW BLOOD OFF VENOUS DEVICE: CPT

## 2019-05-17 RX ORDER — DEXTROSE MONOHYDRATE 50 MG/ML
INJECTION, SOLUTION INTRAVENOUS CONTINUOUS
Status: CANCELLED
Start: 2019-06-05

## 2019-05-17 RX ORDER — DIPHENHYDRAMINE HCL 25 MG
25 TABLET ORAL EVERY 4 HOURS PRN
Status: CANCELLED
Start: 2019-06-05

## 2019-05-17 RX ORDER — 0.9 % SODIUM CHLORIDE 0.9 %
10 VIAL (ML) INJECTION PRN
Status: CANCELLED | OUTPATIENT
Start: 2019-06-05

## 2019-05-17 RX ORDER — DIPHENHYDRAMINE HCL 25 MG
25 TABLET ORAL EVERY 4 HOURS PRN
Status: DISCONTINUED | OUTPATIENT
Start: 2019-05-17 | End: 2019-05-18 | Stop reason: HOSPADM

## 2019-05-17 RX ORDER — HEPARIN SODIUM (PORCINE) LOCK FLUSH IV SOLN 100 UNIT/ML 100 UNIT/ML
500 SOLUTION INTRAVENOUS PRN
Status: DISCONTINUED | OUTPATIENT
Start: 2019-05-17 | End: 2019-05-18 | Stop reason: HOSPADM

## 2019-05-17 RX ORDER — DEXTROSE MONOHYDRATE 50 MG/ML
INJECTION, SOLUTION INTRAVENOUS CONTINUOUS
Status: DISCONTINUED | OUTPATIENT
Start: 2019-05-17 | End: 2019-05-18 | Stop reason: HOSPADM

## 2019-05-17 RX ORDER — DEXTROSE AND SODIUM CHLORIDE 5; .33 G/100ML; G/100ML
INJECTION, SOLUTION INTRAVENOUS CONTINUOUS
Status: DISCONTINUED | OUTPATIENT
Start: 2019-05-17 | End: 2019-05-18 | Stop reason: HOSPADM

## 2019-05-17 RX ORDER — DEXTROSE AND SODIUM CHLORIDE 5; .33 G/100ML; G/100ML
INJECTION, SOLUTION INTRAVENOUS CONTINUOUS
Status: CANCELLED
Start: 2019-06-05

## 2019-05-17 RX ORDER — DIPHENHYDRAMINE HCL 25 MG
25 TABLET ORAL ONCE
Status: CANCELLED | OUTPATIENT
Start: 2019-06-05

## 2019-05-17 RX ORDER — HEPARIN SODIUM (PORCINE) LOCK FLUSH IV SOLN 100 UNIT/ML 100 UNIT/ML
500 SOLUTION INTRAVENOUS PRN
Status: CANCELLED
Start: 2019-06-05

## 2019-05-17 RX ORDER — DIPHENHYDRAMINE HCL 25 MG
25 TABLET ORAL ONCE
Status: COMPLETED | OUTPATIENT
Start: 2019-05-17 | End: 2019-05-17

## 2019-05-17 RX ORDER — 0.9 % SODIUM CHLORIDE 0.9 %
10 VIAL (ML) INJECTION PRN
Status: DISCONTINUED | OUTPATIENT
Start: 2019-05-17 | End: 2019-05-18 | Stop reason: HOSPADM

## 2019-05-17 RX ADMIN — Medication 10 ML: at 08:05

## 2019-05-17 RX ADMIN — DEXTROSE MONOHYDRATE: 50 INJECTION, SOLUTION INTRAVENOUS at 08:55

## 2019-05-17 RX ADMIN — Medication 10 ML: at 15:35

## 2019-05-17 RX ADMIN — IMMUNE GLOBULIN INTRAVENOUS (HUMAN): 5 INJECTION, SOLUTION INTRAVENOUS at 08:58

## 2019-05-17 RX ADMIN — DIPHENHYDRAMINE HCL 25 MG: 25 TABLET ORAL at 14:40

## 2019-05-17 RX ADMIN — DEXTROSE AND SODIUM CHLORIDE: 5; 330 INJECTION, SOLUTION INTRAVENOUS at 08:11

## 2019-05-17 RX ADMIN — Medication 500 UNITS: at 15:35

## 2019-05-17 RX ADMIN — Medication 10 ML: at 08:06

## 2019-05-17 RX ADMIN — DIPHENHYDRAMINE HCL 25 MG: 25 TABLET ORAL at 08:45

## 2019-05-17 NOTE — FLOWSHEET NOTE
Situation:   encountered Mr. Yeny Dillon when rounding the infusion clinic. Assessment:  Mr. Yeny Dillon is a 67 y/o male who comes for infusion treatment regularly. Today, Mr. Yeny Dillon was in the treatment cubicle and greeted . He informed  that his brother-in-law, Mikaela, who was also receiving treatment at the infusion clinic, passed away last Friday. He shared about the circumstances of his brother-in-law's death, including that he was at home with hospice care and passed in the presence of his whole family. Mr. Yeny Dillon shared stories about his brother-in-law and wife's family. He shared how he and his wife of 45 years met and the commonalities of their families. He expressed gratitude for his children and grandchildren and for how they have \"turned out. \" He described them as \"good people. \"    Mr. Yeny Dillon talked about his father, how he  and how he prepared for his death. He shared that he and his wife want to have their end of life planning in order for his children. Mr. Penny Villanueva spoke of his and his wife's plans to visit friends in [place] next week. He expressed hopes that this will be helpful to his wife, who is his primary caregiver and who is grieving his brother-in-law. Mr. Penny Villanueva accessed his sense of humor and laughed throughout the conversation. Intervention:   provided supportive presence and explored how Pt has been coping.  offered empathy as Pt shared about the loss of his brother-in-law.  listened as Pt shared stories and memories of his family.  affirmed Pt and his wife and wished Pt safe travels for his vacation. Outcome;  Mr. Yeny Dillon processed his feelings and thoughts about the loss of his brother-in-law. He voiced his priorities and plans. He acknowledged 's words of support and expressed thanks.         19 1126   Encounter Summary   Services provided to: Patient   Referral/Consult From: Rounding   Support System Spouse; Children;Family members   Continue Visiting   (5/17/19)   Complexity of Encounter Low   Length of Encounter 30 minutes   Spiritual Assessment Completed Yes   Spiritual/Gnosticism   Type Spiritual support   Assessment Approachable;Calm; Hopeful;Coping;Grieving   Intervention Active listening;Explored feelings, thoughts, concerns;Explored coping resources;Sustaining presence/ Ministry of presence;Grief care; Discussed illness/injury and it's impact   Outcome Expressed gratitude;Expressed feelings/needs/concerns;Coping;Encouraged; Hopeful;Receptive;Grieving     Electronically signed by Bozena Pinto, Oncology Outpatient [facility], [id number] [facility] Radiation Oncology  5/17/2019  11:28 AM

## 2019-05-17 NOTE — PLAN OF CARE
Problem: Safety:  Goal: Free from accidental physical injury  Description  Free from accidental physical injury  Outcome: Met This Shift  Goal: Free from intentional harm  Description  Free from intentional harm  Outcome: Met This Shift

## 2019-06-06 ENCOUNTER — HOSPITAL ENCOUNTER (OUTPATIENT)
Facility: MEDICAL CENTER | Age: 64
End: 2019-06-06
Payer: MEDICARE

## 2019-06-07 ENCOUNTER — HOSPITAL ENCOUNTER (OUTPATIENT)
Dept: INFUSION THERAPY | Facility: MEDICAL CENTER | Age: 64
Discharge: HOME | End: 2019-06-07
Payer: MEDICARE

## 2019-06-07 VITALS
HEART RATE: 85 BPM | RESPIRATION RATE: 18 BRPM | SYSTOLIC BLOOD PRESSURE: 119 MMHG | DIASTOLIC BLOOD PRESSURE: 67 MMHG | TEMPERATURE: 97.7 F

## 2019-06-07 DIAGNOSIS — D83.9 COMMON VARIABLE IMMUNODEFICIENCY (HCC): ICD-10-CM

## 2019-06-07 DIAGNOSIS — D80.1 COMMON VARIABLE HYPOGAMMAGLOBULINEMIA (HCC): Primary | ICD-10-CM

## 2019-06-07 PROCEDURE — 2580000003 HC RX 258: Performed by: ALLERGY & IMMUNOLOGY

## 2019-06-07 PROCEDURE — 96365 THER/PROPH/DIAG IV INF INIT: CPT

## 2019-06-07 PROCEDURE — 6360000002 HC RX W HCPCS: Performed by: ALLERGY & IMMUNOLOGY

## 2019-06-07 PROCEDURE — 96366 THER/PROPH/DIAG IV INF ADDON: CPT

## 2019-06-07 PROCEDURE — 6370000000 HC RX 637 (ALT 250 FOR IP): Performed by: ALLERGY & IMMUNOLOGY

## 2019-06-07 PROCEDURE — 96361 HYDRATE IV INFUSION ADD-ON: CPT

## 2019-06-07 RX ORDER — DIPHENHYDRAMINE HCL 25 MG
25 TABLET ORAL EVERY 4 HOURS PRN
Status: DISCONTINUED | OUTPATIENT
Start: 2019-06-07 | End: 2019-06-08 | Stop reason: HOSPADM

## 2019-06-07 RX ORDER — DEXTROSE AND SODIUM CHLORIDE 5; .33 G/100ML; G/100ML
INJECTION, SOLUTION INTRAVENOUS CONTINUOUS
Status: DISCONTINUED | OUTPATIENT
Start: 2019-06-07 | End: 2019-06-08 | Stop reason: HOSPADM

## 2019-06-07 RX ORDER — HEPARIN SODIUM (PORCINE) LOCK FLUSH IV SOLN 100 UNIT/ML 100 UNIT/ML
500 SOLUTION INTRAVENOUS PRN
Status: DISCONTINUED | OUTPATIENT
Start: 2019-06-07 | End: 2019-06-08 | Stop reason: HOSPADM

## 2019-06-07 RX ORDER — 0.9 % SODIUM CHLORIDE 0.9 %
10 VIAL (ML) INJECTION PRN
Status: CANCELLED | OUTPATIENT
Start: 2019-06-28

## 2019-06-07 RX ORDER — DEXTROSE AND SODIUM CHLORIDE 5; .33 G/100ML; G/100ML
INJECTION, SOLUTION INTRAVENOUS CONTINUOUS
Status: CANCELLED
Start: 2019-06-28

## 2019-06-07 RX ORDER — DIPHENHYDRAMINE HCL 25 MG
25 TABLET ORAL ONCE
Status: COMPLETED | OUTPATIENT
Start: 2019-06-07 | End: 2019-06-07

## 2019-06-07 RX ORDER — HEPARIN SODIUM (PORCINE) LOCK FLUSH IV SOLN 100 UNIT/ML 100 UNIT/ML
500 SOLUTION INTRAVENOUS PRN
Status: CANCELLED
Start: 2019-06-28

## 2019-06-07 RX ORDER — DIPHENHYDRAMINE HCL 25 MG
25 TABLET ORAL EVERY 4 HOURS PRN
Status: CANCELLED
Start: 2019-06-28

## 2019-06-07 RX ORDER — DIPHENHYDRAMINE HCL 25 MG
25 TABLET ORAL ONCE
Status: CANCELLED | OUTPATIENT
Start: 2019-06-28

## 2019-06-07 RX ORDER — 0.9 % SODIUM CHLORIDE 0.9 %
10 VIAL (ML) INJECTION PRN
Status: DISCONTINUED | OUTPATIENT
Start: 2019-06-07 | End: 2019-06-08 | Stop reason: HOSPADM

## 2019-06-07 RX ORDER — DEXTROSE MONOHYDRATE 50 MG/ML
INJECTION, SOLUTION INTRAVENOUS CONTINUOUS
Status: CANCELLED
Start: 2019-06-28

## 2019-06-07 RX ADMIN — Medication 10 ML: at 16:19

## 2019-06-07 RX ADMIN — Medication 10 ML: at 08:41

## 2019-06-07 RX ADMIN — DEXTROSE AND SODIUM CHLORIDE: 5; 330 INJECTION, SOLUTION INTRAVENOUS at 08:50

## 2019-06-07 RX ADMIN — Medication 10 ML: at 08:40

## 2019-06-07 RX ADMIN — DIPHENHYDRAMINE HCL 25 MG: 25 TABLET ORAL at 08:53

## 2019-06-07 RX ADMIN — SODIUM CHLORIDE, PRESERVATIVE FREE 500 UNITS: 5 INJECTION INTRAVENOUS at 16:19

## 2019-06-07 RX ADMIN — IMMUNE GLOBULIN INTRAVENOUS (HUMAN): 5 INJECTION, SOLUTION INTRAVENOUS at 09:21

## 2019-06-07 RX ADMIN — DIPHENHYDRAMINE HCL 25 MG: 25 TABLET ORAL at 12:59

## 2019-06-07 NOTE — PROGRESS NOTES
Ashish Kumar here for IVIG infusion   No complaints voiced  Port accessed per policy, good blood return    Pre meds as ordered see MAR  IVIG infusion as ordered see MAR  Pre and post hydration as ordered, pt did receive a prn benadryl orally 4hrs post initiation of IVIG  Pt tolerated infusion well and discharged to home

## 2019-06-10 DIAGNOSIS — M87.051 AVASCULAR NECROSIS OF BONE OF HIP, RIGHT (HCC): ICD-10-CM

## 2019-06-10 DIAGNOSIS — Z96.642 STATUS POST TOTAL HIP REPLACEMENT, LEFT: Primary | ICD-10-CM

## 2019-06-10 DIAGNOSIS — M87.052 AVASCULAR NECROSIS OF BONE OF LEFT HIP (HCC): ICD-10-CM

## 2019-06-13 ENCOUNTER — OFFICE VISIT (OUTPATIENT)
Dept: ORTHOPEDIC SURGERY | Age: 64
End: 2019-06-13
Payer: MEDICARE

## 2019-06-13 VITALS — WEIGHT: 270 LBS | BODY MASS INDEX: 40.92 KG/M2 | HEIGHT: 68 IN

## 2019-06-13 DIAGNOSIS — M87.052 AVASCULAR NECROSIS OF BONE OF LEFT HIP (HCC): Primary | ICD-10-CM

## 2019-06-13 DIAGNOSIS — M87.051 AVASCULAR NECROSIS OF BONE OF HIP, RIGHT (HCC): ICD-10-CM

## 2019-06-13 DIAGNOSIS — Z96.642 STATUS POST TOTAL HIP REPLACEMENT, LEFT: ICD-10-CM

## 2019-06-13 PROCEDURE — 3017F COLORECTAL CA SCREEN DOC REV: CPT | Performed by: ORTHOPAEDIC SURGERY

## 2019-06-13 PROCEDURE — G8427 DOCREV CUR MEDS BY ELIG CLIN: HCPCS | Performed by: ORTHOPAEDIC SURGERY

## 2019-06-13 PROCEDURE — 1036F TOBACCO NON-USER: CPT | Performed by: ORTHOPAEDIC SURGERY

## 2019-06-13 PROCEDURE — G8417 CALC BMI ABV UP PARAM F/U: HCPCS | Performed by: ORTHOPAEDIC SURGERY

## 2019-06-13 PROCEDURE — 99213 OFFICE O/P EST LOW 20 MIN: CPT | Performed by: ORTHOPAEDIC SURGERY

## 2019-06-13 RX ORDER — ACETAMINOPHEN 500 MG
500 TABLET ORAL EVERY 6 HOURS PRN
Status: ON HOLD | COMMUNITY
End: 2021-04-27 | Stop reason: HOSPADM

## 2019-06-13 RX ORDER — TRAMADOL HYDROCHLORIDE 50 MG/1
50 TABLET ORAL PRN
COMMUNITY

## 2019-06-13 ASSESSMENT — ENCOUNTER SYMPTOMS
NAUSEA: 0
DIARRHEA: 0
COUGH: 0
CONSTIPATION: 0

## 2019-06-13 NOTE — LETTER
[address] [place] [id number]  Phone: [phone]  Fax: Judah Duke DO        06/13/2019    Patient: Clive Peng   YOB: 1955   Date of Visit: 6/13/2019       To Whom it May Concern:    Chiqui Faulkner is okay to perform squats. Patient is okay to perform leg presses. Patient would benefit more starting at a lighter weight on the leg press with more repetitions than performing high weight low rep exercises. Okay for generalized quadriceps and hamstring strengthening. Okay for ankle weights to be used. If any activity causes pain patient should not complete. No running, jumping or any high impact activity. If you have any questions or concerns, please don't hesitate to call.     Sincerely,           Rajendra Juárez, DO

## 2019-06-13 NOTE — PROGRESS NOTES
[address] [place] [address]  Dept: [phone]  Dept Fax: [phone]        Ambulatory Follow Up      Subjective:   Kristie Wilson is a 66y.o. year old male who presents to our office today for routine followup regarding his   1. Avascular necrosis of bone of left hip (HCC)    2. Status post total hip replacement, left    3. Avascular necrosis of bone of hip, right ([address]    . Chief Complaint   Patient presents with    Hip Pain     right, left LANRE:5/22/2018       HPI-Patient presents today to our office for his one years total hip follow up and right hip AVN. Patient had his left total hip arthroplasty done on 05/22/2018. Patient says his left hip is doing great. Patient has no pain. Patient is not walking with any assistive device. Patient says his right hip is a 1/10. Patient will have good and bad days. Sometimes his right leg will buckle. He more wanted to his right hip imaged today to make sure the AVN is not getting to a point where he will need another infection. Patient was hospitalized much of the winter with the flu and was on high doses of steroids. Patient says he does have right groin pain. Review of Systems   Constitutional: Negative for chills and fever. Respiratory: Negative for cough. Gastrointestinal: Negative for constipation, diarrhea and nausea. Musculoskeletal: Positive for arthralgias (right hip). Negative for gait problem, joint swelling and myalgias. Neurological: Negative for dizziness, weakness and numbness. I have reviewed the CC, HPI, ROS, PMH, FHX, Social History, and if not present in this note, I have reviewed in the patient's chart. I agree with the documentation provided by other staff and have reviewed their documentation prior to providing my signature indicating agreement.     Objective :   Ht 5' 8\" (1.727 m)   Wt 270 lb (122.5 kg)   BMI 41.05 kg/m²  Body mass index is 41.05 kg/m². General: Karthik Dang is a 66 y.o. male who is alert and oriented and sitting comfortably in our office. Ortho Exam  MS:  Negative hip log roll on the right. Patient with no antalgia external weightbearing views of the bilateral lower extremities. Motor, sensory, vascular examination of bilateral lower extremities is grossly intact without focal deficits. Neuro: alert and oriented to person and place. Eyes: Extra-ocular muscles intact  Mouth: Oral mucosa moist. No perioral lesions  Pulm: Respirations unlabored and regular. Symmetric chest excursion without outward deformity is noted. Skin: warm, well perfused  Psych:   Patient has good fund of knowledge and displays understanging of exam, diagnosis, and plan. Radiology:     Xr Hip Left (2-3 Views)    Result Date: 6/15/2019  History: Status post left total hip arthroplasty Findings: Low AP pelvis and crosstable lateral x-ray of the left hip done in the office today shows total hip arthroplasty good position without complications. Moderate degenerative narrowing of the right hip joint with patchy sclerosis of the femoral head consistent with avascular necrosis without collapse is appreciated. No further evidence of fracture, subluxation, dislocation, radiopaque foreign body, radiopaque tumors noted. Impression: Status post left total hip arthroplasty in good position with avascular necrosis and degenerative changes right hip as described above. Xr Hip Right (2-3 Views)    Result Date: 6/15/2019  To: Right hip pain Findings: Low AP pelvis and frog-leg lateral x-ray of the right hip done in the office today shows mild to moderate degenerative narrowing of the hip joint with para-articular osteophytosis has sclerotic changes in the femoral head consistent with stage II avascular necrosis without femoral head collapse. Total hip arthroplasty left hip is appreciated in good position without complications.   No evidence of fracture, subluxation, dislocation, radiopaque foreign body, red opaque tumors appreciated. Impression: Moderate degenerative changes right hip with left total hip arthroplasty in good position as described above and avascular necrosis of the right hip as described above. Assessment:      1. Avascular necrosis of bone of left hip (HCC)    2. Status post total hip replacement, left    3. Avascular necrosis of bone of hip, right ([address]       Plan: For patients left hip he can continue doing activity as tolerated. He can follow up in 1 year for surveillance x-rays. For patient right hip AVN will have him follow up as needed. Did discuss with him that right hip AVN progresses or his pain becomes unbearable then surgical fixation may be warranted. Follow up as needed. Follow up:Return in about 1 month (around 7/11/2019) for re- evaluation. No orders of the defined types were placed in this encounter. No orders of the defined types were placed in this encounter. Rigo Garcia RN am scribing for and in the presence of Dr. Dacia Casey  6/16/2019 2:54 PM      I have reviewed and made changes accordingly to the work scribed by Araceli Yip RN. The documentation accurately reflects work and decisions made by me. I have also reviewed documentation completed by clinical staff.     Dacia Casey DO, [facility]  6/16/2019 2:54 PM    This note is created with the assistance of a speech recognition program.  While intending to generate a document that actually reflects the content of the visit, the document can still have some errors including those of syntax and sound a like substitutions which may escape proof reading.  In such instances, actual meaning can be extrapolated by contextual diversion      Electronically signed by Angela Harley on 6/16/2019 at 2:54 PM

## 2019-06-21 RX ORDER — ALBUTEROL SULFATE 2.5 MG/3ML
SOLUTION RESPIRATORY (INHALATION)
Qty: 375 ML | Refills: 5 | Status: SHIPPED | OUTPATIENT
Start: 2019-06-21 | End: 2020-11-13 | Stop reason: SDUPTHER

## 2019-06-21 RX ORDER — BUDESONIDE AND FORMOTEROL FUMARATE DIHYDRATE 160; 4.5 UG/1; UG/1
AEROSOL RESPIRATORY (INHALATION)
Qty: 1 INHALER | Refills: 5 | Status: SHIPPED | OUTPATIENT
Start: 2019-06-21 | End: 2019-12-06 | Stop reason: SDUPTHER

## 2019-06-21 RX ORDER — MONTELUKAST SODIUM 10 MG/1
TABLET ORAL
Qty: 90 TABLET | Refills: 3 | Status: SHIPPED | OUTPATIENT
Start: 2019-06-21 | End: 2020-03-06

## 2019-06-21 NOTE — TELEPHONE ENCOUNTER
Request came over vis pharmacy for singulair, symbicort 160 and albuterol solution. Patient is current please sign the attached scripts.  Thank you

## 2019-06-28 ENCOUNTER — HOSPITAL ENCOUNTER (OUTPATIENT)
Dept: INFUSION THERAPY | Facility: MEDICAL CENTER | Age: 64
Discharge: HOME | End: 2019-06-28
Payer: MEDICARE

## 2019-06-28 VITALS
HEART RATE: 81 BPM | TEMPERATURE: 98.2 F | SYSTOLIC BLOOD PRESSURE: 120 MMHG | DIASTOLIC BLOOD PRESSURE: 66 MMHG | RESPIRATION RATE: 24 BRPM

## 2019-06-28 DIAGNOSIS — D83.9 COMMON VARIABLE IMMUNODEFICIENCY (HCC): ICD-10-CM

## 2019-06-28 DIAGNOSIS — D80.1 COMMON VARIABLE HYPOGAMMAGLOBULINEMIA (HCC): Primary | ICD-10-CM

## 2019-06-28 PROCEDURE — 96365 THER/PROPH/DIAG IV INF INIT: CPT

## 2019-06-28 PROCEDURE — 2580000003 HC RX 258: Performed by: ALLERGY & IMMUNOLOGY

## 2019-06-28 PROCEDURE — 96366 THER/PROPH/DIAG IV INF ADDON: CPT

## 2019-06-28 PROCEDURE — 6360000002 HC RX W HCPCS: Performed by: ALLERGY & IMMUNOLOGY

## 2019-06-28 PROCEDURE — 6370000000 HC RX 637 (ALT 250 FOR IP): Performed by: ALLERGY & IMMUNOLOGY

## 2019-06-28 RX ORDER — HEPARIN SODIUM (PORCINE) LOCK FLUSH IV SOLN 100 UNIT/ML 100 UNIT/ML
500 SOLUTION INTRAVENOUS PRN
Status: CANCELLED
Start: 2019-07-19

## 2019-06-28 RX ORDER — HEPARIN SODIUM (PORCINE) LOCK FLUSH IV SOLN 100 UNIT/ML 100 UNIT/ML
500 SOLUTION INTRAVENOUS PRN
Status: DISCONTINUED | OUTPATIENT
Start: 2019-06-28 | End: 2019-06-29 | Stop reason: HOSPADM

## 2019-06-28 RX ORDER — DIPHENHYDRAMINE HCL 25 MG
25 TABLET ORAL EVERY 4 HOURS PRN
Status: DISCONTINUED | OUTPATIENT
Start: 2019-06-28 | End: 2019-06-29 | Stop reason: HOSPADM

## 2019-06-28 RX ORDER — DEXTROSE AND SODIUM CHLORIDE 5; .33 G/100ML; G/100ML
INJECTION, SOLUTION INTRAVENOUS CONTINUOUS
Status: CANCELLED
Start: 2019-07-19

## 2019-06-28 RX ORDER — DEXTROSE AND SODIUM CHLORIDE 5; .33 G/100ML; G/100ML
INJECTION, SOLUTION INTRAVENOUS CONTINUOUS
Status: DISCONTINUED | OUTPATIENT
Start: 2019-06-28 | End: 2019-06-29 | Stop reason: HOSPADM

## 2019-06-28 RX ORDER — 0.9 % SODIUM CHLORIDE 0.9 %
10 VIAL (ML) INJECTION PRN
Status: DISCONTINUED | OUTPATIENT
Start: 2019-06-28 | End: 2019-06-29 | Stop reason: HOSPADM

## 2019-06-28 RX ORDER — DIPHENHYDRAMINE HCL 25 MG
25 TABLET ORAL EVERY 4 HOURS PRN
Status: CANCELLED
Start: 2019-07-19

## 2019-06-28 RX ORDER — DIPHENHYDRAMINE HCL 25 MG
25 TABLET ORAL ONCE
Status: COMPLETED | OUTPATIENT
Start: 2019-06-28 | End: 2019-06-28

## 2019-06-28 RX ORDER — DEXTROSE MONOHYDRATE 50 MG/ML
INJECTION, SOLUTION INTRAVENOUS CONTINUOUS
Status: CANCELLED
Start: 2019-07-19

## 2019-06-28 RX ORDER — DIPHENHYDRAMINE HCL 25 MG
25 TABLET ORAL ONCE
Status: CANCELLED | OUTPATIENT
Start: 2019-07-19

## 2019-06-28 RX ORDER — 0.9 % SODIUM CHLORIDE 0.9 %
10 VIAL (ML) INJECTION PRN
Status: CANCELLED | OUTPATIENT
Start: 2019-07-19

## 2019-06-28 RX ADMIN — IMMUNE GLOBULIN INTRAVENOUS (HUMAN): 5 INJECTION, SOLUTION INTRAVENOUS at 10:47

## 2019-06-28 RX ADMIN — DEXTROSE AND SODIUM CHLORIDE: 5; 330 INJECTION, SOLUTION INTRAVENOUS at 10:16

## 2019-06-28 RX ADMIN — DIPHENHYDRAMINE HCL 25 MG: 25 TABLET ORAL at 10:17

## 2019-06-28 RX ADMIN — Medication 500 UNITS: at 16:42

## 2019-06-28 RX ADMIN — DIPHENHYDRAMINE HCL 25 MG: 25 TABLET ORAL at 14:08
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
## 2019-07-18 ENCOUNTER — HOSPITAL ENCOUNTER (OUTPATIENT)
Dept: INFUSION THERAPY | Facility: MEDICAL CENTER | Age: 64
Discharge: HOME | End: 2019-07-18
Payer: MEDICARE

## 2019-07-18 VITALS
SYSTOLIC BLOOD PRESSURE: 137 MMHG | DIASTOLIC BLOOD PRESSURE: 69 MMHG | HEART RATE: 81 BPM | TEMPERATURE: 97.7 F | RESPIRATION RATE: 24 BRPM

## 2019-07-18 DIAGNOSIS — D80.1 COMMON VARIABLE HYPOGAMMAGLOBULINEMIA (HCC): Primary | ICD-10-CM

## 2019-07-18 DIAGNOSIS — D83.9 COMMON VARIABLE IMMUNODEFICIENCY (HCC): ICD-10-CM

## 2019-07-18 PROCEDURE — 96365 THER/PROPH/DIAG IV INF INIT: CPT

## 2019-07-18 PROCEDURE — 96361 HYDRATE IV INFUSION ADD-ON: CPT

## 2019-07-18 PROCEDURE — 2580000003 HC RX 258: Performed by: ALLERGY & IMMUNOLOGY

## 2019-07-18 PROCEDURE — 6370000000 HC RX 637 (ALT 250 FOR IP): Performed by: ALLERGY & IMMUNOLOGY

## 2019-07-18 PROCEDURE — 6360000002 HC RX W HCPCS: Performed by: ALLERGY & IMMUNOLOGY

## 2019-07-18 PROCEDURE — 96360 HYDRATION IV INFUSION INIT: CPT

## 2019-07-18 PROCEDURE — 96366 THER/PROPH/DIAG IV INF ADDON: CPT

## 2019-07-18 RX ORDER — HEPARIN SODIUM (PORCINE) LOCK FLUSH IV SOLN 100 UNIT/ML 100 UNIT/ML
500 SOLUTION INTRAVENOUS PRN
Status: CANCELLED
Start: 2019-08-08

## 2019-07-18 RX ORDER — 0.9 % SODIUM CHLORIDE 0.9 %
10 VIAL (ML) INJECTION PRN
Status: DISCONTINUED | OUTPATIENT
Start: 2019-07-18 | End: 2019-07-19 | Stop reason: HOSPADM

## 2019-07-18 RX ORDER — DEXTROSE AND SODIUM CHLORIDE 5; .33 G/100ML; G/100ML
INJECTION, SOLUTION INTRAVENOUS CONTINUOUS
Status: CANCELLED
Start: 2019-08-08

## 2019-07-18 RX ORDER — HEPARIN SODIUM (PORCINE) LOCK FLUSH IV SOLN 100 UNIT/ML 100 UNIT/ML
500 SOLUTION INTRAVENOUS PRN
Status: DISCONTINUED | OUTPATIENT
Start: 2019-07-18 | End: 2019-07-19 | Stop reason: HOSPADM

## 2019-07-18 RX ORDER — 0.9 % SODIUM CHLORIDE 0.9 %
10 VIAL (ML) INJECTION PRN
Status: CANCELLED | OUTPATIENT
Start: 2019-08-08

## 2019-07-18 RX ORDER — DIPHENHYDRAMINE HCL 25 MG
25 TABLET ORAL EVERY 4 HOURS PRN
Status: DISCONTINUED | OUTPATIENT
Start: 2019-07-18 | End: 2019-07-19 | Stop reason: HOSPADM

## 2019-07-18 RX ORDER — DEXTROSE AND SODIUM CHLORIDE 5; .33 G/100ML; G/100ML
INJECTION, SOLUTION INTRAVENOUS CONTINUOUS
Status: DISCONTINUED | OUTPATIENT
Start: 2019-07-18 | End: 2019-07-19 | Stop reason: HOSPADM

## 2019-07-18 RX ORDER — DIPHENHYDRAMINE HCL 25 MG
25 TABLET ORAL ONCE
Status: COMPLETED | OUTPATIENT
Start: 2019-07-18 | End: 2019-07-18

## 2019-07-18 RX ORDER — DEXTROSE MONOHYDRATE 50 MG/ML
INJECTION, SOLUTION INTRAVENOUS CONTINUOUS
Status: CANCELLED
Start: 2019-08-08

## 2019-07-18 RX ORDER — DIPHENHYDRAMINE HCL 25 MG
25 TABLET ORAL ONCE
Status: CANCELLED | OUTPATIENT
Start: 2019-08-08

## 2019-07-18 RX ORDER — DIPHENHYDRAMINE HCL 25 MG
25 TABLET ORAL EVERY 4 HOURS PRN
Status: CANCELLED
Start: 2019-08-08

## 2019-07-18 RX ADMIN — DIPHENHYDRAMINE HCL 25 MG: 25 TABLET ORAL at 09:35

## 2019-07-18 RX ADMIN — IMMUNE GLOBULIN INTRAVENOUS (HUMAN): 5 INJECTION, SOLUTION INTRAVENOUS at 10:05

## 2019-07-18 RX ADMIN — Medication 10 ML: at 09:29

## 2019-07-18 RX ADMIN — DEXTROSE AND SODIUM CHLORIDE: 5; 330 INJECTION, SOLUTION INTRAVENOUS at 09:22

## 2019-07-18 RX ADMIN — DIPHENHYDRAMINE HCL 25 MG: 25 TABLET ORAL at 13:24

## 2019-07-18 RX ADMIN — Medication 10 ML: at 16:53

## 2019-07-18 RX ADMIN — Medication 500 UNITS: at 16:54

## 2019-07-18 NOTE — FLOWSHEET NOTE
Situation:   encountered Mr. Velvet Torres when rounding the infusion clinic. Assessment:  Mr. Velvet oTrres greeted . He shared that he just returned from a 14 day vacation with his wife. They visited friends and family in [place] and [place]. He expressed gratitude for being able to go, noting that it was \"good\" for his wife, whose brother  in the spring. Wife is also Pt's caregiver. Mr. Velvet Torres reminisced and shared stories. 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 He shared that his friends raised funds for him when he got sick. He expressed gratitude for being able to [facility] to give. \" He agreed that he and his wife have been close to family and friends and continue to make friends and family a priority. Mr. Velvet Torres reflected on the impact of his illness, including needing to leave work at age 49 and downsize from his big house and other possessions. He admitted that it has been challenging at times. He agreed that he and his wife have been \"creative\" in making their income work for them. Mr. Velvet Torres shared stories and memories of his work in [employer]. He expressed gratitude for his coworkers, who were like \"family. 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 Intervention:   provided supportive presence and active listening.  explored Pt's coping and needs.  inquired about Pt's sources of support and strength.  facilitated story telling and listened as Pt reflected on his life.  offered words of encouragement and support to Pt. Outcome:  Mr. Velvet Torres thanked  for the visit.        19 1651   Encounter Summary   Services provided to: Patient   Referral/Consult From: Rounding   Support System Spouse; Children;Family members   Continue Visiting   (19)   Complexity of Encounter Low   Length of Encounter 30 minutes   Spiritual/Congregational   Type Spiritual support Assessment Approachable; Hopeful;Coping   Intervention Active listening;Explored feelings, thoughts, concerns;Explored coping resources;Sustaining presence/ Ministry of presence   Outcome Connection/belonging;Expressed gratitude;Engaged in conversation;Coping;Encouraged; Hopeful;Receptive; Shared reminiscences     Electronically signed by Eunice Vogt, Oncology Outpatient [facility], [id number] [facility] Radiation Oncology  7/18/2019  4:52 PM

## 2019-07-25 ENCOUNTER — TELEPHONE (OUTPATIENT)
Dept: FAMILY MEDICINE CLINIC | Age: 64
End: 2019-07-25

## 2019-07-31 ENCOUNTER — OFFICE VISIT (OUTPATIENT)
Dept: PULMONOLOGY | Age: 64
End: 2019-07-31
Payer: MEDICARE

## 2019-07-31 VITALS
HEART RATE: 85 BPM | BODY MASS INDEX: 40.47 KG/M2 | OXYGEN SATURATION: 98 % | DIASTOLIC BLOOD PRESSURE: 80 MMHG | WEIGHT: 267 LBS | HEIGHT: 68 IN | SYSTOLIC BLOOD PRESSURE: 110 MMHG | RESPIRATION RATE: 12 BRPM

## 2019-07-31 VITALS — WEIGHT: 267 LBS | OXYGEN SATURATION: 98 % | BODY MASS INDEX: 40.47 KG/M2 | HEIGHT: 68 IN | HEART RATE: 86 BPM

## 2019-07-31 DIAGNOSIS — G47.33 OBSTRUCTIVE SLEEP APNEA: ICD-10-CM

## 2019-07-31 DIAGNOSIS — J44.9 CHRONIC OBSTRUCTIVE PULMONARY DISEASE, UNSPECIFIED COPD TYPE (HCC): Primary | ICD-10-CM

## 2019-07-31 DIAGNOSIS — J96.11 CHRONIC RESPIRATORY FAILURE WITH HYPOXIA (HCC): ICD-10-CM

## 2019-07-31 DIAGNOSIS — J45.50 SEVERE PERSISTENT ASTHMA WITHOUT COMPLICATION: ICD-10-CM

## 2019-07-31 PROCEDURE — 94010 BREATHING CAPACITY TEST: CPT | Performed by: INTERNAL MEDICINE

## 2019-07-31 PROCEDURE — G8427 DOCREV CUR MEDS BY ELIG CLIN: HCPCS | Performed by: INTERNAL MEDICINE

## 2019-07-31 PROCEDURE — G8417 CALC BMI ABV UP PARAM F/U: HCPCS | Performed by: INTERNAL MEDICINE

## 2019-07-31 PROCEDURE — 99214 OFFICE O/P EST MOD 30 MIN: CPT | Performed by: INTERNAL MEDICINE

## 2019-07-31 PROCEDURE — 94729 DIFFUSING CAPACITY: CPT | Performed by: INTERNAL MEDICINE

## 2019-07-31 PROCEDURE — G8926 SPIRO NO PERF OR DOC: HCPCS | Performed by: INTERNAL MEDICINE

## 2019-07-31 PROCEDURE — 1036F TOBACCO NON-USER: CPT | Performed by: INTERNAL MEDICINE

## 2019-07-31 PROCEDURE — 3017F COLORECTAL CA SCREEN DOC REV: CPT | Performed by: INTERNAL MEDICINE

## 2019-07-31 PROCEDURE — 3023F SPIROM DOC REV: CPT | Performed by: INTERNAL MEDICINE

## 2019-07-31 PROCEDURE — 94726 PLETHYSMOGRAPHY LUNG VOLUMES: CPT | Performed by: INTERNAL MEDICINE

## 2019-07-31 NOTE — PROGRESS NOTES
Dispense Refill    montelukast (SINGULAIR) 10 MG tablet take 1 tablet by mouth every evening 90 tablet 3    SYMBICORT 160-4.5 MCG/ACT AERO inhale 2 puffs by mouth twice a day 1 Inhaler 5    albuterol (PROVENTIL) (2.5 MG/3ML) 0.083% nebulizer solution inhale contents of 1 vial in nebulizer every 6 hours if needed for wheezing and shortness of breath 375 mL 5    acetaminophen (TYLENOL) 500 MG tablet Take 500 mg by mouth every 6 hours as needed for Pain      traMADol (ULTRAM) 50 MG tablet Take 50 mg by mouth as needed for Pain.  predniSONE (DELTASONE) 10 MG tablet Start at 50 mg and taper 10 mg every 3 days 45 tablet 0    ALPRAZolam (XANAX) 0.5 MG tablet Take 0.5 mg by mouth 3 times daily as needed for Sleep. Germán Terry SPIRIVA HANDIHALER 18 MCG inhalation capsule inhale the contents of one capsule in the handihaler once daily 90 capsule 3    benazepril (LOTENSIN) 10 MG tablet take 1 tablet by mouth once daily (Patient taking differently: Take 10 mg BID) 30 tablet 5    amLODIPine (NORVASC) 10 MG tablet take 1 tablet by mouth once daily 60 tablet 3    omeprazole (PRILOSEC) 20 MG delayed release capsule take 1 capsule by mouth once daily 60 capsule 3    albuterol sulfate  (90 Base) MCG/ACT inhaler Inhale 2 puffs into the lungs every 6 hours as needed for Wheezing      fluticasone (FLONASE) 50 MCG/ACT nasal spray 2 sprays by Nasal route daily 1 Bottle 11    Handicap Placard MISC by Does not apply route 1 each 0    alendronate (FOSAMAX) 70 MG tablet Take 1 tablet by mouth every 7 days (Patient taking differently: Take 70 mg by mouth every 7 days ) 4 tablet 3    SPIRIVA HANDIHALER 18 MCG inhalation capsule inhale contents of 1 capsule by mouth once daily 30 capsule 11    Immune Globulin, Human, 10 GM/100ML SOLN IV solution Infuse 60 g intravenously every 21 days       [DISCONTINUED] metroNIDAZOLE (METROGEL) 1 % gel apply to affected area once daily  0    [DISCONTINUED] montelukast (SINGULAIR) 10 MG

## 2019-08-07 ENCOUNTER — HOSPITAL ENCOUNTER (OUTPATIENT)
Dept: INFUSION THERAPY | Facility: MEDICAL CENTER | Age: 64
End: 2019-08-07

## 2019-08-08 ENCOUNTER — HOSPITAL ENCOUNTER (OUTPATIENT)
Dept: INFUSION THERAPY | Facility: MEDICAL CENTER | Age: 64
Discharge: HOME | End: 2019-08-08
Payer: MEDICARE

## 2019-08-08 VITALS
TEMPERATURE: 98 F | RESPIRATION RATE: 18 BRPM | DIASTOLIC BLOOD PRESSURE: 55 MMHG | HEART RATE: 73 BPM | SYSTOLIC BLOOD PRESSURE: 119 MMHG

## 2019-08-08 DIAGNOSIS — D80.1 COMMON VARIABLE HYPOGAMMAGLOBULINEMIA (HCC): Primary | ICD-10-CM

## 2019-08-08 DIAGNOSIS — D83.9 COMMON VARIABLE IMMUNODEFICIENCY (HCC): ICD-10-CM

## 2019-08-08 DIAGNOSIS — D83.9 COMMON VARIABLE IMMUNODEFICIENCY (HCC): Primary | ICD-10-CM

## 2019-08-08 LAB
ABSOLUTE EOS #: <0.03 K/UL (ref 0–0.44)
ABSOLUTE IMMATURE GRANULOCYTE: 0.03 K/UL (ref 0–0.3)
ABSOLUTE LYMPH #: 0.95 K/UL (ref 1.1–3.7)
ABSOLUTE MONO #: 0.24 K/UL (ref 0.1–1.2)
ALT SERPL-CCNC: 28 U/L (ref 5–41)
BASOPHILS # BLD: 0 % (ref 0–2)
BASOPHILS ABSOLUTE: <0.03 K/UL (ref 0–0.2)
CREAT SERPL-MCNC: 0.86 MG/DL (ref 0.7–1.2)
DIFFERENTIAL TYPE: ABNORMAL
EOSINOPHILS RELATIVE PERCENT: 0 % (ref 1–4)
GFR AFRICAN AMERICAN: >60 ML/MIN
GFR NON-AFRICAN AMERICAN: >60 ML/MIN
GFR SERPL CREATININE-BSD FRML MDRD: NORMAL ML/MIN/{1.73_M2}
GFR SERPL CREATININE-BSD FRML MDRD: NORMAL ML/MIN/{1.73_M2}
HCT VFR BLD CALC: 41.9 % (ref 40.7–50.3)
HEMOGLOBIN: 13.7 G/DL (ref 13–17)
IGG: 901 MG/DL (ref 700–1600)
IMMATURE GRANULOCYTES: 1 %
LYMPHOCYTES # BLD: 15 % (ref 24–43)
MCH RBC QN AUTO: 28.5 PG (ref 25.2–33.5)
MCHC RBC AUTO-ENTMCNC: 32.7 G/DL (ref 28.4–34.8)
MCV RBC AUTO: 87.1 FL (ref 82.6–102.9)
MONOCYTES # BLD: 4 % (ref 3–12)
NRBC AUTOMATED: 0 PER 100 WBC
PDW BLD-RTO: 13.9 % (ref 11.8–14.4)
PLATELET # BLD: 250 K/UL (ref 138–453)
PLATELET ESTIMATE: ABNORMAL
PMV BLD AUTO: 10.4 FL (ref 8.1–13.5)
RBC # BLD: 4.81 M/UL (ref 4.21–5.77)
RBC # BLD: ABNORMAL 10*6/UL
SEG NEUTROPHILS: 80 % (ref 36–65)
SEGMENTED NEUTROPHILS ABSOLUTE COUNT: 4.97 K/UL (ref 1.5–8.1)
WBC # BLD: 6.2 K/UL (ref 3.5–11.3)
WBC # BLD: ABNORMAL 10*3/UL

## 2019-08-08 PROCEDURE — 82784 ASSAY IGA/IGD/IGG/IGM EACH: CPT

## 2019-08-08 PROCEDURE — 96366 THER/PROPH/DIAG IV INF ADDON: CPT

## 2019-08-08 PROCEDURE — 84460 ALANINE AMINO (ALT) (SGPT): CPT

## 2019-08-08 PROCEDURE — 6360000002 HC RX W HCPCS: Performed by: ALLERGY & IMMUNOLOGY

## 2019-08-08 PROCEDURE — 96361 HYDRATE IV INFUSION ADD-ON: CPT

## 2019-08-08 PROCEDURE — 96365 THER/PROPH/DIAG IV INF INIT: CPT

## 2019-08-08 PROCEDURE — 36591 DRAW BLOOD OFF VENOUS DEVICE: CPT

## 2019-08-08 PROCEDURE — 2580000003 HC RX 258: Performed by: ALLERGY & IMMUNOLOGY

## 2019-08-08 PROCEDURE — 85025 COMPLETE CBC W/AUTO DIFF WBC: CPT

## 2019-08-08 PROCEDURE — 6370000000 HC RX 637 (ALT 250 FOR IP): Performed by: ALLERGY & IMMUNOLOGY

## 2019-08-08 PROCEDURE — 82565 ASSAY OF CREATININE: CPT

## 2019-08-08 RX ORDER — DIPHENHYDRAMINE HCL 25 MG
25 TABLET ORAL EVERY 4 HOURS PRN
Status: CANCELLED
Start: 2019-08-29

## 2019-08-08 RX ORDER — HEPARIN SODIUM (PORCINE) LOCK FLUSH IV SOLN 100 UNIT/ML 100 UNIT/ML
500 SOLUTION INTRAVENOUS PRN
Status: CANCELLED
Start: 2019-08-29

## 2019-08-08 RX ORDER — SODIUM CHLORIDE 0.9 % (FLUSH) 0.9 %
10 SYRINGE (ML) INJECTION PRN
Status: CANCELLED | OUTPATIENT
Start: 2019-08-08

## 2019-08-08 RX ORDER — DIPHENHYDRAMINE HCL 25 MG
25 TABLET ORAL ONCE
Status: COMPLETED | OUTPATIENT
Start: 2019-08-08 | End: 2019-08-08

## 2019-08-08 RX ORDER — DEXTROSE AND SODIUM CHLORIDE 5; .33 G/100ML; G/100ML
INJECTION, SOLUTION INTRAVENOUS CONTINUOUS
Status: CANCELLED
Start: 2019-08-29

## 2019-08-08 RX ORDER — HEPARIN SODIUM (PORCINE) LOCK FLUSH IV SOLN 100 UNIT/ML 100 UNIT/ML
500 SOLUTION INTRAVENOUS PRN
Status: CANCELLED | OUTPATIENT
Start: 2019-08-08

## 2019-08-08 RX ORDER — DIPHENHYDRAMINE HCL 25 MG
25 TABLET ORAL EVERY 4 HOURS PRN
Status: DISCONTINUED | OUTPATIENT
Start: 2019-08-08 | End: 2019-08-09 | Stop reason: HOSPADM

## 2019-08-08 RX ORDER — HEPARIN SODIUM (PORCINE) LOCK FLUSH IV SOLN 100 UNIT/ML 100 UNIT/ML
500 SOLUTION INTRAVENOUS PRN
Status: DISCONTINUED | OUTPATIENT
Start: 2019-08-08 | End: 2019-08-09 | Stop reason: HOSPADM

## 2019-08-08 RX ORDER — 0.9 % SODIUM CHLORIDE 0.9 %
10 VIAL (ML) INJECTION PRN
Status: CANCELLED | OUTPATIENT
Start: 2019-08-29

## 2019-08-08 RX ORDER — DEXTROSE AND SODIUM CHLORIDE 5; .33 G/100ML; G/100ML
INJECTION, SOLUTION INTRAVENOUS CONTINUOUS
Status: DISCONTINUED | OUTPATIENT
Start: 2019-08-08 | End: 2019-08-09 | Stop reason: HOSPADM

## 2019-08-08 RX ORDER — DIPHENHYDRAMINE HCL 25 MG
25 TABLET ORAL ONCE
Status: CANCELLED | OUTPATIENT
Start: 2019-08-29

## 2019-08-08 RX ORDER — DEXTROSE MONOHYDRATE 50 MG/ML
INJECTION, SOLUTION INTRAVENOUS CONTINUOUS
Status: CANCELLED
Start: 2019-08-29

## 2019-08-08 RX ORDER — SODIUM CHLORIDE 0.9 % (FLUSH) 0.9 %
20 SYRINGE (ML) INJECTION PRN
Status: CANCELLED | OUTPATIENT
Start: 2019-08-08

## 2019-08-08 RX ORDER — 0.9 % SODIUM CHLORIDE 0.9 %
10 VIAL (ML) INJECTION PRN
Status: DISCONTINUED | OUTPATIENT
Start: 2019-08-08 | End: 2019-08-09 | Stop reason: HOSPADM

## 2019-08-08 RX ORDER — DEXTROSE MONOHYDRATE 50 MG/ML
INJECTION, SOLUTION INTRAVENOUS CONTINUOUS
Status: DISCONTINUED | OUTPATIENT
Start: 2019-08-08 | End: 2019-08-09 | Stop reason: HOSPADM

## 2019-08-08 RX ADMIN — DIPHENHYDRAMINE HCL 25 MG: 25 TABLET ORAL at 14:12

## 2019-08-08 RX ADMIN — DEXTROSE MONOHYDRATE: 50 INJECTION, SOLUTION INTRAVENOUS at 09:29

## 2019-08-08 RX ADMIN — DIPHENHYDRAMINE HCL 25 MG: 25 TABLET ORAL at 09:38

## 2019-08-08 RX ADMIN — Medication 10 ML: at 16:44

## 2019-08-08 RX ADMIN — Medication 10 ML: at 09:28

## 2019-08-08 RX ADMIN — DEXTROSE AND SODIUM CHLORIDE: 5; 330 INJECTION, SOLUTION INTRAVENOUS at 09:28

## 2019-08-08 RX ADMIN — Medication 500 UNITS: at 16:44

## 2019-08-08 RX ADMIN — IMMUNE GLOBULIN INTRAVENOUS (HUMAN): 5 INJECTION, SOLUTION INTRAVENOUS at 10:00

## 2019-08-08 NOTE — PROGRESS NOTES
Pt arrives per ambulatory and orders reviewed. O 2 to 3 L/NC. Difficulty with blood return flushed many times and repositioned. Writer called office for order to activase and waited about 30 min and then tried to get blood return again and very good blood return now and labs sent. D5 1/3 NS infusing for 250 ml and po benadryl 25 mg given and then after 30 min. IVIG 60 GM infusing at 20 ml/hr for 20 min, 40 ml/hr for 20 min, 40 ml/hr for 20 min, 80 ml/hr for 20 min, 160 ml/hr for 30 min, 250 ml/hr for remainder. Pt tolerated IVIG well and no reactions or complaints and blood return present throughout infusion. Then D5 1/3 NS for 250 ml infusing without problem. Pt given calendar with next appt and copy of lab results. Pt discharged per ambulatory per self and see flowsheet for vital signs during IVIG infusion.

## 2019-08-19 RX ORDER — AMLODIPINE BESYLATE 10 MG/1
TABLET ORAL
Qty: 60 TABLET | Refills: 3 | Status: SHIPPED | OUTPATIENT
Start: 2019-08-19

## 2019-08-19 RX ORDER — OMEPRAZOLE 20 MG/1
CAPSULE, DELAYED RELEASE ORAL
Qty: 60 CAPSULE | Refills: 3 | Status: SHIPPED | OUTPATIENT
Start: 2019-08-19

## 2019-08-28 ENCOUNTER — HOSPITAL ENCOUNTER (OUTPATIENT)
Dept: INFUSION THERAPY | Facility: MEDICAL CENTER | Age: 64
Discharge: HOME | End: 2019-08-28
Payer: MEDICARE

## 2019-08-28 VITALS — HEART RATE: 87 BPM | SYSTOLIC BLOOD PRESSURE: 126 MMHG | DIASTOLIC BLOOD PRESSURE: 76 MMHG

## 2019-08-28 DIAGNOSIS — D80.1 COMMON VARIABLE HYPOGAMMAGLOBULINEMIA (HCC): Primary | ICD-10-CM

## 2019-08-28 DIAGNOSIS — D83.9 COMMON VARIABLE IMMUNODEFICIENCY (HCC): ICD-10-CM

## 2019-08-28 PROCEDURE — 2580000003 HC RX 258: Performed by: ALLERGY & IMMUNOLOGY

## 2019-08-28 PROCEDURE — 96366 THER/PROPH/DIAG IV INF ADDON: CPT

## 2019-08-28 PROCEDURE — 6360000002 HC RX W HCPCS: Performed by: ALLERGY & IMMUNOLOGY

## 2019-08-28 PROCEDURE — 6370000000 HC RX 637 (ALT 250 FOR IP): Performed by: ALLERGY & IMMUNOLOGY

## 2019-08-28 PROCEDURE — 96365 THER/PROPH/DIAG IV INF INIT: CPT

## 2019-08-28 PROCEDURE — 96361 HYDRATE IV INFUSION ADD-ON: CPT

## 2019-08-28 PROCEDURE — 96360 HYDRATION IV INFUSION INIT: CPT

## 2019-08-28 RX ORDER — HEPARIN SODIUM (PORCINE) LOCK FLUSH IV SOLN 100 UNIT/ML 100 UNIT/ML
500 SOLUTION INTRAVENOUS PRN
Status: DISCONTINUED | OUTPATIENT
Start: 2019-08-28 | End: 2019-08-29 | Stop reason: HOSPADM

## 2019-08-28 RX ORDER — HEPARIN SODIUM (PORCINE) LOCK FLUSH IV SOLN 100 UNIT/ML 100 UNIT/ML
500 SOLUTION INTRAVENOUS PRN
Status: CANCELLED
Start: 2019-09-18

## 2019-08-28 RX ORDER — DEXTROSE AND SODIUM CHLORIDE 5; .33 G/100ML; G/100ML
INJECTION, SOLUTION INTRAVENOUS CONTINUOUS
Status: CANCELLED
Start: 2019-09-18

## 2019-08-28 RX ORDER — DEXTROSE MONOHYDRATE 50 MG/ML
INJECTION, SOLUTION INTRAVENOUS CONTINUOUS
Status: CANCELLED
Start: 2019-09-18

## 2019-08-28 RX ORDER — DIPHENHYDRAMINE HCL 25 MG
25 TABLET ORAL ONCE
Status: CANCELLED | OUTPATIENT
Start: 2019-09-18

## 2019-08-28 RX ORDER — 0.9 % SODIUM CHLORIDE 0.9 %
10 VIAL (ML) INJECTION PRN
Status: DISCONTINUED | OUTPATIENT
Start: 2019-08-28 | End: 2019-08-29 | Stop reason: HOSPADM

## 2019-08-28 RX ORDER — DIPHENHYDRAMINE HCL 25 MG
25 TABLET ORAL EVERY 4 HOURS PRN
Status: DISCONTINUED | OUTPATIENT
Start: 2019-08-28 | End: 2019-08-29 | Stop reason: HOSPADM

## 2019-08-28 RX ORDER — DIPHENHYDRAMINE HCL 25 MG
25 TABLET ORAL ONCE
Status: COMPLETED | OUTPATIENT
Start: 2019-08-28 | End: 2019-08-28

## 2019-08-28 RX ORDER — DEXTROSE AND SODIUM CHLORIDE 5; .33 G/100ML; G/100ML
INJECTION, SOLUTION INTRAVENOUS CONTINUOUS
Status: DISCONTINUED | OUTPATIENT
Start: 2019-08-28 | End: 2019-08-29 | Stop reason: HOSPADM

## 2019-08-28 RX ORDER — DIPHENHYDRAMINE HCL 25 MG
25 TABLET ORAL EVERY 4 HOURS PRN
Status: CANCELLED
Start: 2019-09-18

## 2019-08-28 RX ORDER — 0.9 % SODIUM CHLORIDE 0.9 %
10 VIAL (ML) INJECTION PRN
Status: CANCELLED | OUTPATIENT
Start: 2019-09-18

## 2019-08-28 RX ADMIN — Medication 10 ML: at 15:39

## 2019-08-28 RX ADMIN — DEXTROSE AND SODIUM CHLORIDE: 5; 330 INJECTION, SOLUTION INTRAVENOUS at 08:45

## 2019-08-28 RX ADMIN — DIPHENHYDRAMINE HCL 25 MG: 25 TABLET ORAL at 08:49

## 2019-08-28 RX ADMIN — IMMUNE GLOBULIN INTRAVENOUS (HUMAN): 5 INJECTION, SOLUTION INTRAVENOUS at 09:11

## 2019-08-28 RX ADMIN — Medication 500 UNITS: at 15:39

## 2019-08-28 RX ADMIN — DIPHENHYDRAMINE HCL 25 MG: 25 TABLET ORAL at 13:22

## 2019-08-28 NOTE — PROGRESS NOTES
Frances Hem arrives ambulatory alone using oxygen at 2liters per nasal cannula  Denies changes to health history  Rt chest port accessed per protocol with #20 G 3/4 L Carlos needle  Good blood return noted  845 pre hydration initiated and completed  See MAR for pre medications  911 IVIG 60 g initiated at 20ml/hr x 20 minutes  Vital signs taken every 20 minutes and charted on flowsheet   935 iv rate increased to 40ml/hr x 20 minutes   No reaction noted  1001 iv rate increased to 80 ml/hr x 20 minutes  1016 iv rate increased to 160 ml/hr x 20 minutes   1032 iv rate increased to 250ml/hr until completion of infusion  Post hydration initiated and completed  Iv line flushed and carlos needle flushed   Carlos needle removed with needle intact  Band aid applied  Discharged per ambulatory

## 2019-08-28 NOTE — FLOWSHEET NOTE
Situation:   encountered Mr. Mabel Singer when rounding the infusion clinic. Assessment:  Mr. Mabel Singer smiled and greeted . He shared that he and his family will be traveling to [place] for the holiday weekend. He smiled as he talked about the place where they will be staying. Mr. Mabel Singer agreed that he and his wife \"do not let grass grow under their feet. \" He joked about the \"grass growing over our feet some day,\" and stated, \"life is too short. \"    Mr. Mabel Singer voiced that his philosophy has been to [employer] and not sit around and feel sorry for himself. He credited his parents for modeling this. He voiced his belief that God does not punish and that \"I have done nothing wrong. \" He reflected that he has chosen to accept what is and keep living. Mr. Mabel Singer accessed his sense of humor during the conversation and expressed gratitude for having \"a better year. \" He joked with the nurse. Intervention:   provided supportive presence and explored Pt's coping and needs.  inquired about Pt's sources of support and strength.  offered words of encouragement and support.  affirmed Pt's strengths. Outcome:  Mr. Mabel Singer acknowledged 's words of encouragement and support. He expressed thanks. 08/28/19 1139   Encounter Summary   Services provided to: Patient   Referral/Consult From: Rounding   Support System Spouse; Children;Family members   Continue Visiting   (8/28/19)   Complexity of Encounter Low   Length of Encounter 15 minutes   Spiritual Assessment Completed Yes   Routine   Type Follow up   Spiritual/Mandaeism   Type Spiritual support   Assessment Approachable; Hopeful;Coping   Intervention Active listening;Explored feelings, thoughts, concerns;Explored coping resources;Sustaining presence/ Ministry of presence; Discussed relationship with God;Discussed belief system/Yazidi practices/lalit   Outcome Engaged in conversation;Expressed gratitude;Coping;Encouraged; Hopeful;Receptive     Electronically signed by Sonia Leblanc, Oncology Outpatient [facility] 07, [address] [facility] Radiation Oncology  8/28/2019  11:41 AM

## 2019-09-18 ENCOUNTER — HOSPITAL ENCOUNTER (OUTPATIENT)
Dept: INFUSION THERAPY | Facility: MEDICAL CENTER | Age: 64
Discharge: HOME | End: 2019-09-18
Payer: MEDICARE

## 2019-09-18 VITALS
SYSTOLIC BLOOD PRESSURE: 105 MMHG | RESPIRATION RATE: 16 BRPM | HEART RATE: 65 BPM | TEMPERATURE: 97.9 F | DIASTOLIC BLOOD PRESSURE: 56 MMHG

## 2019-09-18 DIAGNOSIS — D80.1 COMMON VARIABLE HYPOGAMMAGLOBULINEMIA (HCC): Primary | ICD-10-CM

## 2019-09-18 DIAGNOSIS — D83.9 COMMON VARIABLE IMMUNODEFICIENCY (HCC): ICD-10-CM

## 2019-09-18 PROCEDURE — 6360000002 HC RX W HCPCS: Performed by: ALLERGY & IMMUNOLOGY

## 2019-09-18 PROCEDURE — 2580000003 HC RX 258: Performed by: ALLERGY & IMMUNOLOGY

## 2019-09-18 PROCEDURE — 96360 HYDRATION IV INFUSION INIT: CPT

## 2019-09-18 PROCEDURE — 96366 THER/PROPH/DIAG IV INF ADDON: CPT

## 2019-09-18 PROCEDURE — 96365 THER/PROPH/DIAG IV INF INIT: CPT

## 2019-09-18 PROCEDURE — 96361 HYDRATE IV INFUSION ADD-ON: CPT

## 2019-09-18 PROCEDURE — 6370000000 HC RX 637 (ALT 250 FOR IP): Performed by: ALLERGY & IMMUNOLOGY

## 2019-09-18 RX ORDER — DIPHENHYDRAMINE HCL 25 MG
25 TABLET ORAL EVERY 4 HOURS PRN
Status: CANCELLED
Start: 2019-10-09

## 2019-09-18 RX ORDER — DIPHENHYDRAMINE HCL 25 MG
25 TABLET ORAL EVERY 4 HOURS PRN
Status: DISCONTINUED | OUTPATIENT
Start: 2019-09-18 | End: 2019-09-19 | Stop reason: HOSPADM

## 2019-09-18 RX ORDER — HEPARIN SODIUM (PORCINE) LOCK FLUSH IV SOLN 100 UNIT/ML 100 UNIT/ML
500 SOLUTION INTRAVENOUS PRN
Status: CANCELLED
Start: 2019-10-09

## 2019-09-18 RX ORDER — DIPHENHYDRAMINE HCL 25 MG
25 TABLET ORAL ONCE
Status: COMPLETED | OUTPATIENT
Start: 2019-09-18 | End: 2019-09-18

## 2019-09-18 RX ORDER — 0.9 % SODIUM CHLORIDE 0.9 %
10 VIAL (ML) INJECTION PRN
Status: CANCELLED | OUTPATIENT
Start: 2019-10-09

## 2019-09-18 RX ORDER — DEXTROSE MONOHYDRATE 50 MG/ML
INJECTION, SOLUTION INTRAVENOUS CONTINUOUS
Status: CANCELLED
Start: 2019-10-09

## 2019-09-18 RX ORDER — DEXTROSE AND SODIUM CHLORIDE 5; .33 G/100ML; G/100ML
INJECTION, SOLUTION INTRAVENOUS CONTINUOUS
Status: DISCONTINUED | OUTPATIENT
Start: 2019-09-18 | End: 2019-09-19 | Stop reason: HOSPADM

## 2019-09-18 RX ORDER — DIPHENHYDRAMINE HCL 25 MG
25 TABLET ORAL ONCE
Status: CANCELLED | OUTPATIENT
Start: 2019-10-09

## 2019-09-18 RX ORDER — HEPARIN SODIUM (PORCINE) LOCK FLUSH IV SOLN 100 UNIT/ML 100 UNIT/ML
500 SOLUTION INTRAVENOUS PRN
Status: DISPENSED | OUTPATIENT
Start: 2019-09-18 | End: 2019-09-18

## 2019-09-18 RX ORDER — DEXTROSE AND SODIUM CHLORIDE 5; .33 G/100ML; G/100ML
INJECTION, SOLUTION INTRAVENOUS CONTINUOUS
Status: CANCELLED
Start: 2019-10-09

## 2019-09-18 RX ORDER — SODIUM CHLORIDE 0.9 % (FLUSH) 0.9 %
10 SYRINGE (ML) INJECTION PRN
Status: ACTIVE | OUTPATIENT
Start: 2019-09-18 | End: 2019-09-18

## 2019-09-18 RX ADMIN — Medication 10 ML: at 15:45

## 2019-09-18 RX ADMIN — DIPHENHYDRAMINE HCL 25 MG: 25 TABLET ORAL at 13:36

## 2019-09-18 RX ADMIN — DIPHENHYDRAMINE HCL 25 MG: 25 TABLET ORAL at 08:49

## 2019-09-18 RX ADMIN — DEXTROSE AND SODIUM CHLORIDE: 5; 330 INJECTION, SOLUTION INTRAVENOUS at 08:44

## 2019-09-18 RX ADMIN — Medication 500 UNITS: at 15:45

## 2019-09-18 RX ADMIN — IMMUNE GLOBULIN INTRAVENOUS (HUMAN): 5 INJECTION, SOLUTION INTRAVENOUS at 09:22

## 2019-09-18 ASSESSMENT — PAIN DESCRIPTION - PROGRESSION: CLINICAL_PROGRESSION: NOT CHANGED

## 2019-09-18 ASSESSMENT — PAIN DESCRIPTION - FREQUENCY: FREQUENCY: CONTINUOUS

## 2019-09-18 ASSESSMENT — PAIN DESCRIPTION - ORIENTATION: ORIENTATION: MID

## 2019-09-18 ASSESSMENT — PAIN DESCRIPTION - PAIN TYPE: TYPE: CHRONIC PAIN

## 2019-09-18 ASSESSMENT — PAIN DESCRIPTION - LOCATION: LOCATION: BACK

## 2019-09-18 NOTE — FLOWSHEET NOTE
Situation:   encountered Mr. Tor Nicolas when rounding the infusion clinic. Assessment:  Mr. Tor Nicolas welcomed 's visit. He shared that he enjoyed his vacation with his wife and her siblings in [place]. He talked about their plans for future trips, including to see his older sister in [place]. Mr. Tor Nicolas gave an update about his children, talking about their jobs and their children. He agreed that he is proud of his children and enjoys his grandchildren. He joked and laughed about the expense of having eight grandchildren and being  so long. Mr. Tor Nicolas accessed his sense of humor and positive outlook. He agreed that he is making the most of his penitentiary and trying to do what he can. He talked about the ongoing grief in his family after the loss of his brother-in-law from cancer, stating, \"everyone grieves differently. \" He voiced hopes of remaining healthy. Intervention:   provided supportive presence and explored Pt's coping and needs.  listened as Pt shared stories about his travels and family.  affirmed Pt's strengths and offered words of encouragement. Outcome:  Mr. Tor Nicolas acknowledged 's words of encouragement and support and expressed thanks. He told her he would see her next month.        09/18/19 1333   Encounter Summary   Services provided to: Patient   Referral/Consult From: [facility]   Support System Spouse; Children;Family members   Continue Visiting   (9/18/19)   Complexity of Encounter Low   Length of Encounter 15 minutes   Spiritual Assessment Completed Yes   Routine   Type Follow up   Spiritual/Denominational   Type Spiritual support   Assessment Approachable; Hopeful;Coping   Intervention Active listening;Explored feelings, thoughts, concerns;Explored coping resources;Sustaining presence/ Ministry of presence   Outcome Hopeful;Encouraged;Coping;Engaged in conversation;Expressed gratitude     Electronically signed by Harley Sullivan Oncology Outpatient [facility], [id number] [facility] Radiation Oncology  9/18/2019  1:34 PM

## 2019-09-27 ENCOUNTER — TELEPHONE (OUTPATIENT)
Dept: PULMONOLOGY | Age: 64
End: 2019-09-27

## 2019-09-27 RX ORDER — PREDNISONE 10 MG/1
TABLET ORAL
Qty: 30 TABLET | Refills: 0 | Status: SHIPPED | OUTPATIENT
Start: 2019-09-27 | End: 2021-03-16

## 2019-09-27 NOTE — TELEPHONE ENCOUNTER
The patient wife called the office stating that her  is coughing up green mucus. She stated that he have started Levaqin and his nebulizer this morning. She stated that he needs more prednisone. If you agree I have pend a order.

## 2019-10-08 RX ORDER — FLUTICASONE PROPIONATE 50 MCG
SPRAY, SUSPENSION (ML) NASAL
Qty: 1 BOTTLE | Refills: 1 | Status: SHIPPED | OUTPATIENT
Start: 2019-10-08 | End: 2019-12-06 | Stop reason: SDUPTHER

## 2019-10-09 ENCOUNTER — HOSPITAL ENCOUNTER (OUTPATIENT)
Dept: INFUSION THERAPY | Facility: MEDICAL CENTER | Age: 64
Discharge: HOME | End: 2019-10-09
Payer: MEDICARE

## 2019-10-09 VITALS
SYSTOLIC BLOOD PRESSURE: 108 MMHG | HEART RATE: 70 BPM | TEMPERATURE: 97.9 F | DIASTOLIC BLOOD PRESSURE: 62 MMHG | RESPIRATION RATE: 18 BRPM

## 2019-10-09 DIAGNOSIS — D83.9 COMMON VARIABLE IMMUNODEFICIENCY (HCC): ICD-10-CM

## 2019-10-09 DIAGNOSIS — D80.1 COMMON VARIABLE HYPOGAMMAGLOBULINEMIA (HCC): Primary | ICD-10-CM

## 2019-10-09 PROCEDURE — 6360000002 HC RX W HCPCS: Performed by: ALLERGY & IMMUNOLOGY

## 2019-10-09 PROCEDURE — 96413 CHEMO IV INFUSION 1 HR: CPT

## 2019-10-09 PROCEDURE — 2580000003 HC RX 258: Performed by: ALLERGY & IMMUNOLOGY

## 2019-10-09 PROCEDURE — 96361 HYDRATE IV INFUSION ADD-ON: CPT

## 2019-10-09 PROCEDURE — 6370000000 HC RX 637 (ALT 250 FOR IP): Performed by: ALLERGY & IMMUNOLOGY

## 2019-10-09 PROCEDURE — 96415 CHEMO IV INFUSION ADDL HR: CPT

## 2019-10-09 RX ORDER — HEPARIN SODIUM (PORCINE) LOCK FLUSH IV SOLN 100 UNIT/ML 100 UNIT/ML
500 SOLUTION INTRAVENOUS PRN
Status: DISCONTINUED | OUTPATIENT
Start: 2019-10-09 | End: 2019-10-10 | Stop reason: HOSPADM

## 2019-10-09 RX ORDER — 0.9 % SODIUM CHLORIDE 0.9 %
10 VIAL (ML) INJECTION PRN
Status: CANCELLED | OUTPATIENT
Start: 2019-10-30

## 2019-10-09 RX ORDER — DIPHENHYDRAMINE HCL 25 MG
25 TABLET ORAL EVERY 4 HOURS PRN
Status: CANCELLED
Start: 2019-10-30

## 2019-10-09 RX ORDER — DIPHENHYDRAMINE HCL 25 MG
25 TABLET ORAL EVERY 4 HOURS PRN
Status: DISCONTINUED | OUTPATIENT
Start: 2019-10-09 | End: 2019-10-10 | Stop reason: HOSPADM

## 2019-10-09 RX ORDER — DEXTROSE AND SODIUM CHLORIDE 5; .33 G/100ML; G/100ML
INJECTION, SOLUTION INTRAVENOUS CONTINUOUS
Status: CANCELLED
Start: 2019-10-30

## 2019-10-09 RX ORDER — DEXTROSE AND SODIUM CHLORIDE 5; .33 G/100ML; G/100ML
INJECTION, SOLUTION INTRAVENOUS CONTINUOUS
Status: DISCONTINUED | OUTPATIENT
Start: 2019-10-09 | End: 2019-10-10 | Stop reason: HOSPADM

## 2019-10-09 RX ORDER — HEPARIN SODIUM (PORCINE) LOCK FLUSH IV SOLN 100 UNIT/ML 100 UNIT/ML
500 SOLUTION INTRAVENOUS PRN
Status: CANCELLED
Start: 2019-10-30

## 2019-10-09 RX ORDER — 0.9 % SODIUM CHLORIDE 0.9 %
10 VIAL (ML) INJECTION PRN
Status: DISCONTINUED | OUTPATIENT
Start: 2019-10-09 | End: 2019-10-10 | Stop reason: HOSPADM

## 2019-10-09 RX ORDER — DIPHENHYDRAMINE HCL 25 MG
25 TABLET ORAL ONCE
Status: COMPLETED | OUTPATIENT
Start: 2019-10-09 | End: 2019-10-09

## 2019-10-09 RX ORDER — DIPHENHYDRAMINE HCL 25 MG
25 TABLET ORAL ONCE
Status: CANCELLED | OUTPATIENT
Start: 2019-10-30

## 2019-10-09 RX ORDER — DEXTROSE MONOHYDRATE 50 MG/ML
INJECTION, SOLUTION INTRAVENOUS CONTINUOUS
Status: CANCELLED
Start: 2019-10-30

## 2019-10-09 RX ADMIN — IMMUNE GLOBULIN INTRAVENOUS (HUMAN): 5 INJECTION, SOLUTION INTRAVENOUS at 09:13

## 2019-10-09 RX ADMIN — DEXTROSE AND SODIUM CHLORIDE: 5; 330 INJECTION, SOLUTION INTRAVENOUS at 08:46

## 2019-10-09 RX ADMIN — Medication 10 ML: at 15:46

## 2019-10-09 RX ADMIN — DIPHENHYDRAMINE HCL 25 MG: 25 TABLET ORAL at 08:47

## 2019-10-09 RX ADMIN — Medication 10 ML: at 08:30

## 2019-10-09 RX ADMIN — Medication 500 UNITS: at 15:46

## 2019-10-09 RX ADMIN — DIPHENHYDRAMINE HCL 25 MG: 25 TABLET ORAL at 13:17

## 2019-10-09 NOTE — FLOWSHEET NOTE
greeted Patient while rounding the unit. He reported that he was doing well. He described the upcoming season as his time to \"hibernate\" and avoid getting sick.  offered words of encouragement and support. Pt expressed gratitude. 10/09/19 1350   Encounter Summary   Services provided to: Patient   Referral/Consult From: Rounding   Support System Spouse; Children   Continue Visiting   (10/9/19)   Complexity of Encounter Low   Length of Encounter 15 minutes   Routine   Type Follow up   Assessment Approachable;Calm;Coping; Hopeful   Intervention Explored coping resources;Sustaining presence/ Ministry of presence   Outcome Expressed gratitude     Electronically signed by Beatriz Fontaine, Oncology Outpatient [facility] 11, [id number] [facility] Radiation Oncology  10/9/2019  1:52 PM

## 2019-10-09 NOTE — PROGRESS NOTES
Pt arrives per ambulatory per self and with home O2 and connected to [facility] wall O2 at 3 L/NC. Orders reviewed. No labs today , to be done with next infusion. Pt tolerated premed and pre-hydration well. Then IVIG started at 40 ml/hr for 20 min, (tolerated well per pt, pt states to not start at 20 ml/hr). Then 80 ml/hr for 20 min, 160 ml/hr for 20 min, and 250 ml/hr for remainder. Pt tolerated IVIG well and no reactions or complaints and blood return present throughout infusion. Pt has next appt. Pt discharged per ambulatory per self and see flow sheet for all vital signs.

## 2019-10-25 ENCOUNTER — HOSPITAL ENCOUNTER (OUTPATIENT)
Facility: MEDICAL CENTER | Age: 64
End: 2019-10-25
Payer: MEDICARE

## 2019-10-30 ENCOUNTER — HOSPITAL ENCOUNTER (OUTPATIENT)
Dept: INFUSION THERAPY | Facility: MEDICAL CENTER | Age: 64
Discharge: HOME | End: 2019-10-30
Payer: MEDICARE

## 2019-10-30 VITALS
DIASTOLIC BLOOD PRESSURE: 78 MMHG | TEMPERATURE: 97.6 F | HEART RATE: 69 BPM | SYSTOLIC BLOOD PRESSURE: 128 MMHG | RESPIRATION RATE: 18 BRPM

## 2019-10-30 DIAGNOSIS — D80.1 COMMON VARIABLE HYPOGAMMAGLOBULINEMIA (HCC): Primary | ICD-10-CM

## 2019-10-30 DIAGNOSIS — D83.9 COMMON VARIABLE IMMUNODEFICIENCY (HCC): ICD-10-CM

## 2019-10-30 DIAGNOSIS — D83.9 COMMON VARIABLE IMMUNODEFICIENCY (HCC): Primary | ICD-10-CM

## 2019-10-30 LAB
ABSOLUTE EOS #: 0.07 K/UL (ref 0–0.44)
ABSOLUTE IMMATURE GRANULOCYTE: 0.07 K/UL (ref 0–0.3)
ABSOLUTE LYMPH #: 1.5 K/UL (ref 1.1–3.7)
ABSOLUTE MONO #: 0.4 K/UL (ref 0.1–1.2)
ALT SERPL-CCNC: 24 U/L (ref 5–41)
BASOPHILS # BLD: 1 % (ref 0–2)
BASOPHILS ABSOLUTE: 0.04 K/UL (ref 0–0.2)
CREAT SERPL-MCNC: 0.9 MG/DL (ref 0.7–1.2)
DIFFERENTIAL TYPE: ABNORMAL
EOSINOPHILS RELATIVE PERCENT: 1 % (ref 1–4)
GFR AFRICAN AMERICAN: >60 ML/MIN
GFR NON-AFRICAN AMERICAN: >60 ML/MIN
GFR SERPL CREATININE-BSD FRML MDRD: NORMAL ML/MIN/{1.73_M2}
GFR SERPL CREATININE-BSD FRML MDRD: NORMAL ML/MIN/{1.73_M2}
HCT VFR BLD CALC: 42.7 % (ref 40.7–50.3)
HEMOGLOBIN: 14 G/DL (ref 13–17)
IGG: 885 MG/DL (ref 700–1600)
IMMATURE GRANULOCYTES: 1 %
LYMPHOCYTES # BLD: 20 % (ref 24–43)
MCH RBC QN AUTO: 28.9 PG (ref 25.2–33.5)
MCHC RBC AUTO-ENTMCNC: 32.8 G/DL (ref 28.4–34.8)
MCV RBC AUTO: 88.2 FL (ref 82.6–102.9)
MONOCYTES # BLD: 5 % (ref 3–12)
NRBC AUTOMATED: 0 PER 100 WBC
PDW BLD-RTO: 12.9 % (ref 11.8–14.4)
PLATELET # BLD: 256 K/UL (ref 138–453)
PLATELET ESTIMATE: ABNORMAL
PMV BLD AUTO: 10.2 FL (ref 8.1–13.5)
RBC # BLD: 4.84 M/UL (ref 4.21–5.77)
RBC # BLD: ABNORMAL 10*6/UL
SEG NEUTROPHILS: 72 % (ref 36–65)
SEGMENTED NEUTROPHILS ABSOLUTE COUNT: 5.38 K/UL (ref 1.5–8.1)
WBC # BLD: 7.5 K/UL (ref 3.5–11.3)
WBC # BLD: ABNORMAL 10*3/UL

## 2019-10-30 PROCEDURE — 84460 ALANINE AMINO (ALT) (SGPT): CPT

## 2019-10-30 PROCEDURE — 96365 THER/PROPH/DIAG IV INF INIT: CPT

## 2019-10-30 PROCEDURE — 6360000002 HC RX W HCPCS: Performed by: ALLERGY & IMMUNOLOGY

## 2019-10-30 PROCEDURE — 96366 THER/PROPH/DIAG IV INF ADDON: CPT

## 2019-10-30 PROCEDURE — 96361 HYDRATE IV INFUSION ADD-ON: CPT

## 2019-10-30 PROCEDURE — 36591 DRAW BLOOD OFF VENOUS DEVICE: CPT

## 2019-10-30 PROCEDURE — 2580000003 HC RX 258: Performed by: ALLERGY & IMMUNOLOGY

## 2019-10-30 PROCEDURE — 82784 ASSAY IGA/IGD/IGG/IGM EACH: CPT

## 2019-10-30 PROCEDURE — 82565 ASSAY OF CREATININE: CPT

## 2019-10-30 PROCEDURE — 85025 COMPLETE CBC W/AUTO DIFF WBC: CPT

## 2019-10-30 PROCEDURE — 6370000000 HC RX 637 (ALT 250 FOR IP): Performed by: ALLERGY & IMMUNOLOGY

## 2019-10-30 RX ORDER — DIPHENHYDRAMINE HCL 25 MG
25 TABLET ORAL ONCE
Status: CANCELLED | OUTPATIENT
Start: 2019-11-20

## 2019-10-30 RX ORDER — DIPHENHYDRAMINE HCL 25 MG
25 TABLET ORAL ONCE
Status: COMPLETED | OUTPATIENT
Start: 2019-10-30 | End: 2019-10-30

## 2019-10-30 RX ORDER — DIPHENHYDRAMINE HCL 25 MG
25 TABLET ORAL EVERY 4 HOURS PRN
Status: CANCELLED
Start: 2019-11-20

## 2019-10-30 RX ORDER — HEPARIN SODIUM (PORCINE) LOCK FLUSH IV SOLN 100 UNIT/ML 100 UNIT/ML
500 SOLUTION INTRAVENOUS PRN
Status: CANCELLED
Start: 2019-11-20

## 2019-10-30 RX ORDER — DEXTROSE MONOHYDRATE 50 MG/ML
INJECTION, SOLUTION INTRAVENOUS CONTINUOUS
Status: CANCELLED
Start: 2019-11-20

## 2019-10-30 RX ORDER — DEXTROSE AND SODIUM CHLORIDE 5; .33 G/100ML; G/100ML
INJECTION, SOLUTION INTRAVENOUS CONTINUOUS
Status: DISCONTINUED | OUTPATIENT
Start: 2019-10-30 | End: 2019-10-31 | Stop reason: HOSPADM

## 2019-10-30 RX ORDER — 0.9 % SODIUM CHLORIDE 0.9 %
10 VIAL (ML) INJECTION PRN
Status: CANCELLED | OUTPATIENT
Start: 2019-11-20

## 2019-10-30 RX ORDER — HEPARIN SODIUM (PORCINE) LOCK FLUSH IV SOLN 100 UNIT/ML 100 UNIT/ML
500 SOLUTION INTRAVENOUS PRN
Status: DISCONTINUED | OUTPATIENT
Start: 2019-10-30 | End: 2019-10-31 | Stop reason: HOSPADM

## 2019-10-30 RX ORDER — DEXTROSE AND SODIUM CHLORIDE 5; .33 G/100ML; G/100ML
INJECTION, SOLUTION INTRAVENOUS CONTINUOUS
Status: CANCELLED
Start: 2019-11-20

## 2019-10-30 RX ORDER — DIPHENHYDRAMINE HCL 25 MG
25 TABLET ORAL EVERY 4 HOURS PRN
Status: DISCONTINUED | OUTPATIENT
Start: 2019-10-30 | End: 2019-10-31 | Stop reason: HOSPADM

## 2019-10-30 RX ORDER — SODIUM CHLORIDE 0.9 % (FLUSH) 0.9 %
10 SYRINGE (ML) INJECTION PRN
Status: DISCONTINUED | OUTPATIENT
Start: 2019-10-30 | End: 2019-10-31 | Stop reason: HOSPADM

## 2019-10-30 RX ADMIN — DEXTROSE AND SODIUM CHLORIDE: 5; 330 INJECTION, SOLUTION INTRAVENOUS at 08:40

## 2019-10-30 RX ADMIN — DIPHENHYDRAMINE HYDROCHLORIDE 25 MG: 25 TABLET ORAL at 08:40

## 2019-10-30 RX ADMIN — Medication 10 ML: at 15:38

## 2019-10-30 RX ADMIN — HEPARIN 500 UNITS: 100 SYRINGE at 15:38

## 2019-10-30 RX ADMIN — DIPHENHYDRAMINE HYDROCHLORIDE 25 MG: 25 TABLET ORAL at 13:01

## 2019-10-30 RX ADMIN — Medication 10 ML: at 08:26

## 2019-10-30 RX ADMIN — IMMUNE GLOBULIN INTRAVENOUS (HUMAN): 5 INJECTION, SOLUTION INTRAVENOUS at 09:13

## 2019-10-30 NOTE — PROGRESS NOTES
Pt arrives per ambulatory per self and home o 2 in use at 3 L/NC and also from wall oxygen while at [facility]. Orders reviewed and labs sent and copy of results given to pt. Pt states has next appt, no calendar needed. D5 1/3 NS infusing 250 ml prior to IVIG and 250 ml post IVIG. PT also given 25 mg benadyl 30 min prior to IVIG and then again at 1300. Pt tolerated IVIG well and no reactions or complaints and blood return present throughout infusion. IVIG rate at 40 ml/hr for 20 min, 80 ml/hr for 20 min, 160 ml/hr for 20 min, 250 ml/hr for remainder. Also D5 W flushing line post IVIG. Pt discharged per ambulatory per self and see flow sheet for all vital signs.

## 2019-10-30 NOTE — FLOWSHEET NOTE
greeted and visited briefly with Patient while rounding the infusion clinic. Pt smiled and reported that he was doing well. He talked about his upcoming plans for the holiday, which include cooking for and hosting his family. He accessed his humor when communicating.  offered active listening and words of encouragement. Pt expressed gratitude. 10/30/19 1223   Encounter Summary   Services provided to: Patient   Referral/Consult From: Rounding   Support System Spouse; Children   Continue Visiting   (10/30/19)   Complexity of Encounter Low   Length of Encounter 15 minutes   Routine   Type Follow up   Assessment Approachable;Calm; Hopeful;Coping   Intervention Active listening;Explored feelings, thoughts, concerns;Explored coping resources;Sustaining presence/ Ministry of presence   Outcome Expressed gratitude;Encouraged; Hopeful;Receptive;Coping     Electronically signed by Ebenezer Link, Oncology Outpatient [facility] 74, [address] [facility] Radiation Oncology  10/30/2019  12:25 PM

## 2019-11-05 ENCOUNTER — OFFICE VISIT (OUTPATIENT)
Dept: PULMONOLOGY | Age: 64
End: 2019-11-05
Payer: MEDICARE

## 2019-11-05 VITALS
TEMPERATURE: 97.4 F | SYSTOLIC BLOOD PRESSURE: 119 MMHG | WEIGHT: 269 LBS | OXYGEN SATURATION: 98 % | DIASTOLIC BLOOD PRESSURE: 69 MMHG | HEIGHT: 70 IN | HEART RATE: 83 BPM | BODY MASS INDEX: 38.51 KG/M2

## 2019-11-05 DIAGNOSIS — J45.50 SEVERE PERSISTENT ASTHMA WITHOUT COMPLICATION: ICD-10-CM

## 2019-11-05 DIAGNOSIS — Z99.89 OSA ON CPAP: ICD-10-CM

## 2019-11-05 DIAGNOSIS — G47.33 OSA ON CPAP: ICD-10-CM

## 2019-11-05 DIAGNOSIS — J96.11 CHRONIC RESPIRATORY FAILURE WITH HYPOXIA (HCC): ICD-10-CM

## 2019-11-05 DIAGNOSIS — D83.9 CVID (COMMON VARIABLE IMMUNODEFICIENCY) (HCC): ICD-10-CM

## 2019-11-05 DIAGNOSIS — J44.9 CHRONIC OBSTRUCTIVE PULMONARY DISEASE, UNSPECIFIED COPD TYPE (HCC): Primary | ICD-10-CM

## 2019-11-05 PROCEDURE — 1036F TOBACCO NON-USER: CPT | Performed by: INTERNAL MEDICINE

## 2019-11-05 PROCEDURE — 99214 OFFICE O/P EST MOD 30 MIN: CPT | Performed by: INTERNAL MEDICINE

## 2019-11-05 PROCEDURE — 3023F SPIROM DOC REV: CPT | Performed by: INTERNAL MEDICINE

## 2019-11-05 PROCEDURE — G8417 CALC BMI ABV UP PARAM F/U: HCPCS | Performed by: INTERNAL MEDICINE

## 2019-11-05 PROCEDURE — G8484 FLU IMMUNIZE NO ADMIN: HCPCS | Performed by: INTERNAL MEDICINE

## 2019-11-05 PROCEDURE — 3017F COLORECTAL CA SCREEN DOC REV: CPT | Performed by: INTERNAL MEDICINE

## 2019-11-05 PROCEDURE — G8926 SPIRO NO PERF OR DOC: HCPCS | Performed by: INTERNAL MEDICINE

## 2019-11-05 PROCEDURE — G8427 DOCREV CUR MEDS BY ELIG CLIN: HCPCS | Performed by: INTERNAL MEDICINE

## 2019-11-05 RX ORDER — PREDNISONE 10 MG/1
TABLET ORAL
Qty: 30 TABLET | Refills: 0 | Status: SHIPPED | OUTPATIENT
Start: 2019-11-05 | End: 2020-03-11 | Stop reason: SDUPTHER

## 2019-11-05 RX ORDER — LEVOFLOXACIN 500 MG/1
500 TABLET, FILM COATED ORAL DAILY
Qty: 7 TABLET | Refills: 0 | Status: SHIPPED | OUTPATIENT
Start: 2019-11-05 | End: 2020-03-11 | Stop reason: SDUPTHER

## 2019-11-20 ENCOUNTER — HOSPITAL ENCOUNTER (OUTPATIENT)
Dept: INFUSION THERAPY | Facility: MEDICAL CENTER | Age: 64
Discharge: HOME | End: 2019-11-20
Payer: MEDICARE

## 2019-11-20 VITALS
RESPIRATION RATE: 18 BRPM | TEMPERATURE: 98.4 F | DIASTOLIC BLOOD PRESSURE: 62 MMHG | HEART RATE: 68 BPM | SYSTOLIC BLOOD PRESSURE: 115 MMHG

## 2019-11-20 DIAGNOSIS — D80.1 COMMON VARIABLE HYPOGAMMAGLOBULINEMIA (HCC): Primary | ICD-10-CM

## 2019-11-20 DIAGNOSIS — D83.9 COMMON VARIABLE IMMUNODEFICIENCY (HCC): ICD-10-CM

## 2019-11-20 PROCEDURE — 6370000000 HC RX 637 (ALT 250 FOR IP): Performed by: ALLERGY & IMMUNOLOGY

## 2019-11-20 PROCEDURE — 6360000002 HC RX W HCPCS: Performed by: ALLERGY & IMMUNOLOGY

## 2019-11-20 PROCEDURE — 2580000003 HC RX 258: Performed by: ALLERGY & IMMUNOLOGY

## 2019-11-20 PROCEDURE — 96360 HYDRATION IV INFUSION INIT: CPT

## 2019-11-20 PROCEDURE — 96361 HYDRATE IV INFUSION ADD-ON: CPT

## 2019-11-20 PROCEDURE — 96366 THER/PROPH/DIAG IV INF ADDON: CPT

## 2019-11-20 PROCEDURE — 96365 THER/PROPH/DIAG IV INF INIT: CPT

## 2019-11-20 RX ORDER — 0.9 % SODIUM CHLORIDE 0.9 %
10 VIAL (ML) INJECTION PRN
Status: DISCONTINUED | OUTPATIENT
Start: 2019-11-20 | End: 2019-11-21 | Stop reason: HOSPADM

## 2019-11-20 RX ORDER — DEXTROSE AND SODIUM CHLORIDE 5; .33 G/100ML; G/100ML
INJECTION, SOLUTION INTRAVENOUS CONTINUOUS
Status: CANCELLED
Start: 2019-12-11

## 2019-11-20 RX ORDER — HEPARIN SODIUM (PORCINE) LOCK FLUSH IV SOLN 100 UNIT/ML 100 UNIT/ML
500 SOLUTION INTRAVENOUS PRN
Status: CANCELLED
Start: 2019-12-11

## 2019-11-20 RX ORDER — DIPHENHYDRAMINE HCL 25 MG
25 TABLET ORAL EVERY 4 HOURS PRN
Status: CANCELLED
Start: 2019-12-11

## 2019-11-20 RX ORDER — DEXTROSE MONOHYDRATE 50 MG/ML
INJECTION, SOLUTION INTRAVENOUS CONTINUOUS
Status: DISCONTINUED | OUTPATIENT
Start: 2019-11-20 | End: 2019-11-21 | Stop reason: HOSPADM

## 2019-11-20 RX ORDER — DIPHENHYDRAMINE HCL 25 MG
25 TABLET ORAL ONCE
Status: COMPLETED | OUTPATIENT
Start: 2019-11-20 | End: 2019-11-20

## 2019-11-20 RX ORDER — DEXTROSE MONOHYDRATE 50 MG/ML
INJECTION, SOLUTION INTRAVENOUS CONTINUOUS
Status: CANCELLED
Start: 2019-12-11

## 2019-11-20 RX ORDER — HEPARIN SODIUM (PORCINE) LOCK FLUSH IV SOLN 100 UNIT/ML 100 UNIT/ML
500 SOLUTION INTRAVENOUS PRN
Status: DISCONTINUED | OUTPATIENT
Start: 2019-11-20 | End: 2019-11-21 | Stop reason: HOSPADM

## 2019-11-20 RX ORDER — DIPHENHYDRAMINE HCL 25 MG
25 TABLET ORAL ONCE
Status: CANCELLED | OUTPATIENT
Start: 2019-12-11

## 2019-11-20 RX ORDER — 0.9 % SODIUM CHLORIDE 0.9 %
10 VIAL (ML) INJECTION PRN
Status: CANCELLED | OUTPATIENT
Start: 2019-12-11

## 2019-11-20 RX ORDER — DIPHENHYDRAMINE HCL 25 MG
25 TABLET ORAL EVERY 4 HOURS PRN
Status: DISCONTINUED | OUTPATIENT
Start: 2019-11-20 | End: 2019-11-21 | Stop reason: HOSPADM

## 2019-11-20 RX ORDER — DEXTROSE AND SODIUM CHLORIDE 5; .33 G/100ML; G/100ML
INJECTION, SOLUTION INTRAVENOUS CONTINUOUS
Status: DISCONTINUED | OUTPATIENT
Start: 2019-11-20 | End: 2019-11-21 | Stop reason: HOSPADM

## 2019-11-20 RX ADMIN — Medication 10 ML: at 15:37

## 2019-11-20 RX ADMIN — IMMUNE GLOBULIN INTRAVENOUS (HUMAN): 5 INJECTION, SOLUTION INTRAVENOUS at 09:23

## 2019-11-20 RX ADMIN — DEXTROSE AND SODIUM CHLORIDE: 5; 330 INJECTION, SOLUTION INTRAVENOUS at 09:06

## 2019-11-20 RX ADMIN — DIPHENHYDRAMINE HYDROCHLORIDE 25 MG: 25 TABLET ORAL at 09:16

## 2019-11-20 RX ADMIN — DIPHENHYDRAMINE HYDROCHLORIDE 25 MG: 25 TABLET ORAL at 13:33

## 2019-11-20 RX ADMIN — DEXTROSE MONOHYDRATE: 50 INJECTION, SOLUTION INTRAVENOUS at 09:27

## 2019-11-20 RX ADMIN — HEPARIN 500 UNITS: 100 SYRINGE at 15:37

## 2019-11-20 NOTE — FLOWSHEET NOTE
greeted and visited with Patient while rounding the infusion clinic. Mr. Delmar Parra talked about his future holiday and vacation plans. He smiled as he shared that he and his wife will join his son and family to a vacation in [place] next year. He acknowledged and expressed gratitude that this year has been \"good\" for him and his family.  offered words of encouragement, active listening, and supportive presence. Mr. Delmar Parra expressed thanks and wished  a \"Happy Thanksgiving. \"        11/20/19 1151   Encounter Summary   Services provided to: Patient   Referral/Consult From: [address] [facility] Children;Spouse; Family members   Continue Visiting   (11/20/19)   Complexity of Encounter Low   Length of Encounter 15 minutes   Routine   Type Follow up   Assessment Calm; Approachable; Hopeful;Coping   Intervention Active listening;Explored feelings, thoughts, concerns;Explored coping resources;Sustaining presence/ Ministry of presence   Outcome Coping;Encouraged; Hopeful;Receptive; Expressed gratitude;Engaged in conversation     Electronically signed by Debbie Kelley, Oncology Outpatient [facility] 27, [facility] [facility] Radiation Oncology  11/20/2019  11:52 AM

## 2019-11-20 NOTE — PROGRESS NOTES
Georgina Reynolds arrives ambulatory for IVIG infusion. Orders and labs reviewed for IVIG. Right port accessed per policy with 86N 5/2E Pop needle. Brisk blood return noted. IV initiated with D5 !/3 at 500 ml/hr for 30 mins of prehydration. O2 at 3L per nasal cannula initiated. See MAR for premedication. 0927 IVIG initiated at 40 ml/hr for 15 mins. Blood pressure monitored every 15 mins. No reaction noted. 0942 IVIG rate increased to 80 ml/hr for 15 mins. No reaction noted. 0958 rate increased to 160 ml/hr for 15 mins No reaction noted. 1014 rate increased to 250 ml/hr for remainder of infusion. 1333 Patient feeling itchy. See MAR for Benadryl given. 1400 Patient states itching relieved. IVIG completed. D5 1/3 hydration at 500 ml/hr for 30 mins initiated and completed. Port flushed, heparinized and de-accessed per policy, with Ba Oil needle intact. Band aid applied.

## 2019-12-06 RX ORDER — BUDESONIDE AND FORMOTEROL FUMARATE DIHYDRATE 160; 4.5 UG/1; UG/1
AEROSOL RESPIRATORY (INHALATION)
Qty: 1 INHALER | Refills: 2 | Status: SHIPPED | OUTPATIENT
Start: 2019-12-06 | End: 2020-04-01

## 2019-12-06 RX ORDER — FLUTICASONE PROPIONATE 50 MCG
SPRAY, SUSPENSION (ML) NASAL
Qty: 1 BOTTLE | Refills: 2 | Status: SHIPPED | OUTPATIENT
Start: 2019-12-06 | End: 2020-03-06

## 2019-12-11 ENCOUNTER — HOSPITAL ENCOUNTER (OUTPATIENT)
Dept: INFUSION THERAPY | Facility: MEDICAL CENTER | Age: 64
Discharge: HOME | End: 2019-12-11
Payer: MEDICARE

## 2019-12-11 VITALS
SYSTOLIC BLOOD PRESSURE: 132 MMHG | HEART RATE: 67 BPM | DIASTOLIC BLOOD PRESSURE: 69 MMHG | TEMPERATURE: 97.9 F | RESPIRATION RATE: 18 BRPM

## 2019-12-11 DIAGNOSIS — D83.9 COMMON VARIABLE IMMUNODEFICIENCY (HCC): ICD-10-CM

## 2019-12-11 DIAGNOSIS — D80.1 COMMON VARIABLE HYPOGAMMAGLOBULINEMIA (HCC): Primary | ICD-10-CM

## 2019-12-11 PROCEDURE — 96361 HYDRATE IV INFUSION ADD-ON: CPT

## 2019-12-11 PROCEDURE — 2580000003 HC RX 258: Performed by: ALLERGY & IMMUNOLOGY

## 2019-12-11 PROCEDURE — 6360000002 HC RX W HCPCS: Performed by: ALLERGY & IMMUNOLOGY

## 2019-12-11 PROCEDURE — 96360 HYDRATION IV INFUSION INIT: CPT

## 2019-12-11 PROCEDURE — 96365 THER/PROPH/DIAG IV INF INIT: CPT

## 2019-12-11 PROCEDURE — 6370000000 HC RX 637 (ALT 250 FOR IP): Performed by: ALLERGY & IMMUNOLOGY

## 2019-12-11 PROCEDURE — 96366 THER/PROPH/DIAG IV INF ADDON: CPT

## 2019-12-11 RX ORDER — DIPHENHYDRAMINE HCL 25 MG
25 TABLET ORAL EVERY 4 HOURS PRN
Status: CANCELLED
Start: 2020-01-01

## 2019-12-11 RX ORDER — DEXTROSE MONOHYDRATE 50 MG/ML
INJECTION, SOLUTION INTRAVENOUS CONTINUOUS
Status: CANCELLED
Start: 2020-01-01

## 2019-12-11 RX ORDER — 0.9 % SODIUM CHLORIDE 0.9 %
10 VIAL (ML) INJECTION PRN
Status: CANCELLED | OUTPATIENT
Start: 2020-01-01

## 2019-12-11 RX ORDER — 0.9 % SODIUM CHLORIDE 0.9 %
10 VIAL (ML) INJECTION PRN
Status: DISCONTINUED | OUTPATIENT
Start: 2019-12-11 | End: 2019-12-12 | Stop reason: HOSPADM

## 2019-12-11 RX ORDER — HEPARIN SODIUM (PORCINE) LOCK FLUSH IV SOLN 100 UNIT/ML 100 UNIT/ML
500 SOLUTION INTRAVENOUS PRN
Status: CANCELLED
Start: 2020-01-01

## 2019-12-11 RX ORDER — DIPHENHYDRAMINE HCL 25 MG
25 TABLET ORAL EVERY 4 HOURS PRN
Status: DISCONTINUED | OUTPATIENT
Start: 2019-12-11 | End: 2019-12-12 | Stop reason: HOSPADM

## 2019-12-11 RX ORDER — DIPHENHYDRAMINE HCL 25 MG
25 TABLET ORAL ONCE
Status: DISCONTINUED | OUTPATIENT
Start: 2019-12-11 | End: 2019-12-12 | Stop reason: HOSPADM

## 2019-12-11 RX ORDER — DEXTROSE AND SODIUM CHLORIDE 5; .33 G/100ML; G/100ML
INJECTION, SOLUTION INTRAVENOUS CONTINUOUS
Status: DISCONTINUED | OUTPATIENT
Start: 2019-12-11 | End: 2019-12-12 | Stop reason: HOSPADM

## 2019-12-11 RX ORDER — HEPARIN SODIUM (PORCINE) LOCK FLUSH IV SOLN 100 UNIT/ML 100 UNIT/ML
500 SOLUTION INTRAVENOUS PRN
Status: DISCONTINUED | OUTPATIENT
Start: 2019-12-11 | End: 2019-12-12 | Stop reason: HOSPADM

## 2019-12-11 RX ORDER — DEXTROSE AND SODIUM CHLORIDE 5; .33 G/100ML; G/100ML
INJECTION, SOLUTION INTRAVENOUS CONTINUOUS
Status: CANCELLED
Start: 2020-01-01

## 2019-12-11 RX ORDER — DIPHENHYDRAMINE HCL 25 MG
25 TABLET ORAL ONCE
Status: CANCELLED | OUTPATIENT
Start: 2020-01-01

## 2019-12-11 RX ADMIN — HEPARIN 500 UNITS: 100 SYRINGE at 15:14

## 2019-12-11 RX ADMIN — DIPHENHYDRAMINE HYDROCHLORIDE 25 MG: 25 TABLET ORAL at 08:33

## 2019-12-11 RX ADMIN — Medication 10 ML: at 15:14

## 2019-12-11 RX ADMIN — DEXTROSE AND SODIUM CHLORIDE: 5; 330 INJECTION, SOLUTION INTRAVENOUS at 08:33

## 2019-12-11 RX ADMIN — IMMUNE GLOBULIN INTRAVENOUS (HUMAN): 5 INJECTION, SOLUTION INTRAVENOUS at 09:04

## 2019-12-11 RX ADMIN — DIPHENHYDRAMINE HYDROCHLORIDE 25 MG: 25 TABLET ORAL at 13:19

## 2019-12-11 NOTE — PROGRESS NOTES
Patient arrive ambulatory for IVIG infusion. Denies complaint or concern with exception of mild chronic pain/aching post joint replacement. Port accessed without difficulty. Patient premedicated. Pre-hydration completed. IVIG infusion begin at 40 ml/hr x 20 minutes; no adverse reaction. IVIG increase to 80 ml/hr x 20 minutes; no adverse reaction. IVIG increase to 160 ml/hr x 20 minutes; no adverse reaction. IVIG increase to 250 ml/hr for remainder of infusion; no adverse reaction. Post-hydration completed. See Epic flowsheets for stable vitals throughout and post IVIG infusion. Port flushed and heparinized with intact carlos needle removed per protocol. Patient ambulate off unit per self at discharge.

## 2019-12-27 ENCOUNTER — OFFICE VISIT (OUTPATIENT)
Dept: ORTHOPEDIC SURGERY | Age: 64
End: 2019-12-27
Payer: MEDICARE

## 2019-12-27 VITALS — HEIGHT: 69 IN | BODY MASS INDEX: 39.84 KG/M2 | WEIGHT: 269 LBS

## 2019-12-27 DIAGNOSIS — M87.051 AVASCULAR NECROSIS OF BONE OF HIP, RIGHT (HCC): ICD-10-CM

## 2019-12-27 DIAGNOSIS — M87.052 AVASCULAR NECROSIS OF BONE OF LEFT HIP (HCC): Primary | ICD-10-CM

## 2019-12-27 DIAGNOSIS — M48.061 SPINAL STENOSIS OF LUMBAR REGION, UNSPECIFIED WHETHER NEUROGENIC CLAUDICATION PRESENT: ICD-10-CM

## 2019-12-27 PROCEDURE — 99214 OFFICE O/P EST MOD 30 MIN: CPT | Performed by: ORTHOPAEDIC SURGERY

## 2019-12-27 PROCEDURE — G8427 DOCREV CUR MEDS BY ELIG CLIN: HCPCS | Performed by: ORTHOPAEDIC SURGERY

## 2019-12-27 PROCEDURE — 1036F TOBACCO NON-USER: CPT | Performed by: ORTHOPAEDIC SURGERY

## 2019-12-27 PROCEDURE — G8484 FLU IMMUNIZE NO ADMIN: HCPCS | Performed by: ORTHOPAEDIC SURGERY

## 2019-12-27 PROCEDURE — G8417 CALC BMI ABV UP PARAM F/U: HCPCS | Performed by: ORTHOPAEDIC SURGERY

## 2019-12-27 PROCEDURE — 3017F COLORECTAL CA SCREEN DOC REV: CPT | Performed by: ORTHOPAEDIC SURGERY

## 2019-12-27 ASSESSMENT — ENCOUNTER SYMPTOMS
WHEEZING: 0
BACK PAIN: 0
SHORTNESS OF BREATH: 0

## 2019-12-31 NOTE — TELEPHONE ENCOUNTER
variable hypogammaglobulinemia (HCC)     Acute on chronic respiratory failure with hypoxia and hypercapnia (HCC)     Chronic obstructive pulmonary disease (HCC)     Abnormality of immune system     Lung disease-COPD     Vitamin D deficiency     Avascular necrosis of bone of hip, right (HCC)     Avascular necrosis of bone of left hip (HCC)     Status post total replacement of left hip     SOB (shortness of breath)     Asthma exacerbation with COPD (chronic obstructive pulmonary disease) (HCC)     COPD with exacerbation (HCC)     CVID (common variable immunodeficiency) ([facility].)

## 2020-01-02 ENCOUNTER — HOSPITAL ENCOUNTER (OUTPATIENT)
Dept: INFUSION THERAPY | Facility: MEDICAL CENTER | Age: 65
Discharge: HOME | End: 2020-01-02
Payer: MEDICARE

## 2020-01-02 VITALS
TEMPERATURE: 97.6 F | SYSTOLIC BLOOD PRESSURE: 115 MMHG | HEART RATE: 76 BPM | DIASTOLIC BLOOD PRESSURE: 69 MMHG | RESPIRATION RATE: 16 BRPM

## 2020-01-02 DIAGNOSIS — D80.1 COMMON VARIABLE HYPOGAMMAGLOBULINEMIA (HCC): Primary | ICD-10-CM

## 2020-01-02 DIAGNOSIS — D83.9 COMMON VARIABLE IMMUNODEFICIENCY (HCC): ICD-10-CM

## 2020-01-02 PROCEDURE — 6370000000 HC RX 637 (ALT 250 FOR IP): Performed by: ALLERGY & IMMUNOLOGY

## 2020-01-02 PROCEDURE — 96361 HYDRATE IV INFUSION ADD-ON: CPT

## 2020-01-02 PROCEDURE — 96365 THER/PROPH/DIAG IV INF INIT: CPT

## 2020-01-02 PROCEDURE — 6360000002 HC RX W HCPCS: Performed by: ALLERGY & IMMUNOLOGY

## 2020-01-02 PROCEDURE — 96366 THER/PROPH/DIAG IV INF ADDON: CPT

## 2020-01-02 PROCEDURE — 2580000003 HC RX 258: Performed by: ALLERGY & IMMUNOLOGY

## 2020-01-02 RX ORDER — DIPHENHYDRAMINE HCL 25 MG
25 TABLET ORAL EVERY 4 HOURS PRN
Status: CANCELLED
Start: 2020-01-22

## 2020-01-02 RX ORDER — DIPHENHYDRAMINE HCL 25 MG
25 TABLET ORAL ONCE
Status: COMPLETED | OUTPATIENT
Start: 2020-01-02 | End: 2020-01-02

## 2020-01-02 RX ORDER — DEXTROSE AND SODIUM CHLORIDE 5; .33 G/100ML; G/100ML
INJECTION, SOLUTION INTRAVENOUS CONTINUOUS
Status: DISCONTINUED | OUTPATIENT
Start: 2020-01-02 | End: 2020-01-03 | Stop reason: HOSPADM

## 2020-01-02 RX ORDER — DEXTROSE MONOHYDRATE 50 MG/ML
250 INJECTION, SOLUTION INTRAVENOUS CONTINUOUS
Status: ACTIVE | OUTPATIENT
Start: 2020-01-02 | End: 2020-01-02

## 2020-01-02 RX ORDER — TIOTROPIUM BROMIDE 18 UG/1
CAPSULE ORAL; RESPIRATORY (INHALATION)
Qty: 90 CAPSULE | Refills: 3 | Status: SHIPPED | OUTPATIENT
Start: 2020-01-02 | End: 2020-12-14 | Stop reason: SDUPTHER

## 2020-01-02 RX ORDER — HEPARIN SODIUM (PORCINE) LOCK FLUSH IV SOLN 100 UNIT/ML 100 UNIT/ML
500 SOLUTION INTRAVENOUS PRN
Status: CANCELLED
Start: 2020-01-22

## 2020-01-02 RX ORDER — DIPHENHYDRAMINE HCL 25 MG
25 TABLET ORAL EVERY 4 HOURS PRN
Status: DISCONTINUED | OUTPATIENT
Start: 2020-01-02 | End: 2020-01-03 | Stop reason: HOSPADM

## 2020-01-02 RX ORDER — 0.9 % SODIUM CHLORIDE 0.9 %
10 VIAL (ML) INJECTION PRN
Status: CANCELLED | OUTPATIENT
Start: 2020-01-22

## 2020-01-02 RX ORDER — HEPARIN SODIUM (PORCINE) LOCK FLUSH IV SOLN 100 UNIT/ML 100 UNIT/ML
500 SOLUTION INTRAVENOUS PRN
Status: DISPENSED | OUTPATIENT
Start: 2020-01-02 | End: 2020-01-02

## 2020-01-02 RX ORDER — DEXTROSE MONOHYDRATE 50 MG/ML
INJECTION, SOLUTION INTRAVENOUS CONTINUOUS
Status: CANCELLED
Start: 2020-01-22

## 2020-01-02 RX ORDER — SODIUM CHLORIDE 0.9 % (FLUSH) 0.9 %
10 SYRINGE (ML) INJECTION PRN
Status: ACTIVE | OUTPATIENT
Start: 2020-01-02 | End: 2020-01-02

## 2020-01-02 RX ORDER — DEXTROSE AND SODIUM CHLORIDE 5; .33 G/100ML; G/100ML
INJECTION, SOLUTION INTRAVENOUS CONTINUOUS
Status: CANCELLED
Start: 2020-01-22

## 2020-01-02 RX ORDER — DIPHENHYDRAMINE HCL 25 MG
25 TABLET ORAL ONCE
Status: CANCELLED | OUTPATIENT
Start: 2020-01-22

## 2020-01-02 RX ADMIN — IMMUNE GLOBULIN INTRAVENOUS (HUMAN): 5 INJECTION, SOLUTION INTRAVENOUS at 09:08

## 2020-01-02 RX ADMIN — Medication 10 ML: at 15:22

## 2020-01-02 RX ADMIN — DEXTROSE AND SODIUM CHLORIDE: 5; 330 INJECTION, SOLUTION INTRAVENOUS at 08:36

## 2020-01-02 RX ADMIN — DIPHENHYDRAMINE HYDROCHLORIDE 25 MG: 25 TABLET ORAL at 08:47

## 2020-01-02 RX ADMIN — HEPARIN 500 UNITS: 100 SYRINGE at 15:22

## 2020-01-02 RX ADMIN — DEXTROSE MONOHYDRATE 500 ML: 50 INJECTION, SOLUTION INTRAVENOUS at 08:38

## 2020-01-02 RX ADMIN — DIPHENHYDRAMINE HYDROCHLORIDE 25 MG: 25 TABLET ORAL at 13:42

## 2020-01-02 NOTE — PROGRESS NOTES
Diane Chua arrives ambulatory alone  Order for ivig infusion  Rt chest port accessed per protocol with #20 G 3/4 L Carlos needle  Good blood return noted  O2 continued at 2L per nasal cannula  prehydration initiated and completed  See MAR for pre medications  908 IVIG initiated at 40ml/hr x 15 minutes   No reaction noted  923 iv rate increased to 80ml/hr x 15 minutes  No reaction noted  941 iv rate increased to 160ml/hr x 15 minutes   No reaction noted  957 iv rate increased to 250ml/hr until completion of infusion   1342 requesting benadryl for itching                                                      Tolerated well  Iv line flushed for thirty minutes  Post hydration initiated and completed  See flowsheet for vital signs  Carlos needle flushed and carlos needle removed with needle intact  Band aid applied  Discharged per ambulatory

## 2020-01-02 NOTE — FLOWSHEET NOTE
Situation:  Writer encountered Mr. Verena Dockery in the treatment cubicle of the [facility] clinic. Assessment:  Mr. Verena Dockery smiled and greeted writer. He talked about his holiday with family and his upcoming vacation plans with family this year. He smiled as he shared about the vacation plans. He expressed hopes for a healthy year. Intervention:  Writer provided supportive presence and explored Pt's coping and needs; inquired about Pt's travel plans; offered words of support and encouragement. Outcome:  Mr. Verena Dockery thanked writer. 01/02/20 1152   Encounter Summary   Services provided to: Patient   Referral/Consult From: [address] [facility] Children;Family members   Continue Visiting   (1/2/20)   Complexity of Encounter Low   Length of Encounter 15 minutes   Routine   Type Follow up   Assessment Calm; Approachable;Coping; Hopeful   Intervention Active listening;Explored feelings, thoughts, concerns;Explored coping resources;Sustaining presence/ Ministry of presence   Outcome Expressed gratitude;Coping; Hopeful;Encouraged;Receptive     Electronically signed by Pablo Shook, Oncology Outpatient [place] [facility], [address] [facility] Radiation Oncology  1/2/2020  11:55 AM

## 2020-01-03 ENCOUNTER — HOSPITAL ENCOUNTER (OUTPATIENT)
Dept: PHYSICAL THERAPY | Facility: CLINIC | Age: 65
Setting detail: THERAPIES SERIES
Discharge: HOME | End: 2020-01-03
Payer: MEDICARE

## 2020-01-03 PROCEDURE — 97110 THERAPEUTIC EXERCISES: CPT

## 2020-01-03 PROCEDURE — 97161 PT EVAL LOW COMPLEX 20 MIN: CPT

## 2020-01-03 NOTE — FLOWSHEET NOTE
[facility] Fall Risk Assessment    Patient Name:  Pepe Palmer  : 1955        Risk Factor Scale  Score   History of Falls [] Yes  [x] No 25  0 0   Secondary Diagnosis [] Yes  [x] No 15  0 0   Ambulatory Aid [] Furniture  [] Crutches/cane/walker  [x] None/bedrest/wheelchair/nurse 30  15  0 0   IV/Heparin Lock [] Yes  [x] No 20  0 0   Gait/Transferring [] Impaired  [x] Weak  [] Normal/bedrest/immobile 20  10  0 10   Mental Status [] Forgets limitations  [x] Oriented to own ability 15  0 0      Total: 10     Based on the Assessment score: check the appropriate box.     [x]  No intervention needed   Low =   Score of 0-24    []  Use standard prevention interventions Moderate =  Score of 24-44   [] Give patient handout and discuss fall prevention strategies   [] Establish goal of education for patient/family RE: fall prevention strategies    []  Use high risk prevention interventions High = Score of 45 and higher   [] Give patient handout and discuss fall prevention strategies   [] Establish goal of education for patient/family Re: fall prevention strategies   [] Discuss lifeline / other resources    Electronically signed by:   Fadia Mendoza PT  Date: 1/3/2020

## 2020-01-03 NOTE — PLAN OF CARE
[] [facility]        Outpatient Physical                Therapy       [address]       Phone: [phone]       Fax: [phone] [x] [facility]       Promotion at [address] [facility]       Phone: [phone]       Fax: [phone] [] [facility]      for [facility] Promotion    [address]     Phone: [phone]     Fax:  [phone]     Physical Therapy Plan of Care    Date:  1/3/2020  Patient: Warren Colunga  : 1955  MRN: [id number]  Physician: Tima Smith DO                       Insurance: Medicare/Medicaid  Medical Diagnosis: B hip AVN, lumbar stenosis      Rehab Codes: M25.551, M25.552, M25.651, M25.652, M54.5, M62.81  Onset Date: 12-3-19                                                  Next 's appt. : 20    Subjective:   CC/HPI: Pt reports having a lot of pain in his L thigh and in his RLE that has worsened over the past month. Pt reports the pain in the LLE is worse than the R. Pt reports having a history of AVN in the R hip that has not changed. Pt has had L hip replaced in the past. Pt reports having groin pain in B hips too. Pt states that the pain worsens with sit to stand transfers as well as amb for longer periods of time. Pt also reports \"popping/cracking\" in B hips with certain movements. Pt states that the pain is sharp in the groin area but achy everywhere else. Pt states the pain can go into the R calf but no further. Pt states he occassionally has N/T into BLE. Pt states that \"not moving\" makes the pain better. Pt states tramadol and Tylenol for pain.     Pt also states he stumbles a lot and is off balance. Pt reports no falls in the past year. Assessment:  Pt is a 60year old male who presents with B hip and low back pain.  Pt with B hip pain posteriorly which is likely radiculopathy from the low back, with pos result of SLR as well as pain with varying position with N/T

## 2020-01-03 NOTE — CONSULTS
[] [facility]) [facility] &  Therapy  [address]  [phone]  F: [phone] [x] [address]  [facility]   [address]  P: [phone]  F: [phone] [] [place]  [address]  P: [phone]  F: [phone] [] [address]  [facility]   [address]   [place]: [phone]  F: [phone]      Physical Therapy Spine Evaluation    Date:  1/3/2020  Patient: Josias Messer  : 1955  MRN: [id number]  Physician: Jerel Tapia DO  Insurance: Medicare/Medicaid  Medical Diagnosis: B hip AVN, lumbar stenosis Rehab Codes: M25.551, M25.552, M25.651, M25.652, M54.5, M62.81  Onset Date: 12-3-19     Next 's appt. : 20    Subjective:   CC/HPI: Pt reports having a lot of pain in his L thigh and in his RLE that has worsened over the past month. Pt reports the pain in the LLE is worse than the R. Pt reports having a history of AVN in the R hip that has not changed. Pt has had L hip replaced in the past. Pt reports having groin pain in B hips too. Pt states that the pain worsens with sit to stand transfers as well as amb for longer periods of time. Pt also reports \"popping/cracking\" in B hips with certain movements. Pt states that the pain is sharp in the groin area but achy everywhere else. Pt states the pain can go into the R calf but no further. Pt states he occassionally has N/T into BLE. Pt states that \"not moving\" makes the pain better. Pt states tramadol and Tylenol for pain. Pt also states he stumbles a lot and is off balance. Pt reports no falls in the past year.     PMHx: [] Unremarkable [] Diabetes [] HTN  [] Pacemaker   [] MI/Heart Problems [] Cancer [] Arthritis [] Other:              [x] Refer to full medical chart  In EPIC   Tests: [x] X-Ray: [] MRI:  [] Other:    Impression: Mild degenerative changes right hip with avascular necrosis    unchanged from previous x-ray evaluation and left total hip arthroplasty    in good position without complications. Medications: [x] Refer to full medical record [] None [] Other:  Allergies:      [x] Refer to full medical record [] None [] Other:    Function:  Hand Dominance  [] Right  [] Left  Working:  [] Normal Duty  [] Light Duty  [] Off D/T Condition  [x] Retired  [] Not Employed                  [x]  Disability  [] Other:           Return to work:    Job/ADL Description:  Pain:  [x] Yes  [] No Location: B hips Pain Rating: (0-10 scale) 3-4/10  Pain altered Tx:  [] Yes  [] No  Action:  Symptoms:  [] Improving [x] Worsening [] Same  Better:  [] AM    [] PM    [x] Sit    [] Rise/Sit    []Stand    [] Walk    [] Lying    [] Other:  Worse: [] AM    [] PM    [] Sit    [x] Rise/Sit    [x]Stand    [x] Walk    [] Lying    [] Bend                             [] Valsalva    [] Other:  Sleep: [x] OK    [] Disturbed    Objective:      STRENGTH  STRENGTH  ROM    Left Right  Left Right Cervical    C5 Shld Abd   L1-2 Hip Flex 2+ 3+ Flexion    Shld Flexion   Hip Abd 2+ 4- Extension    Shld IR   L3-4 Knee Ext 4- 4 Rotation L R   Shld ER   L4 Ankle DF 5 5 Sidebend L R   C6 Elb Flex   L5 EHL   Retraction    C7 Elb Ext   S1 Plant. Flex 5 5 Lumbar    C8 EPL   Abdominals   Flexion 100% with pain   T1 Fing Abd   Erector Spinae   Extension 75% with pain         Rotation L  R         Sidebend L 50% with ips pain R 50% with ips pain         UE/LE                                                              Pt with severe pain on the L groin/hip flexor area with MMT for hip flexion, abd, and quad  L hip:  ER = 45  IR = 28  R hip:  ER = 28  IR = 23    Any eccentric/concentric contraction of the hip flexors during evaluation caused severe, sharp pain in groin/hip flexor areas.     TESTS (+/-) LEFT RIGHT Not Tested   SLR [] sit [x] supine - + [] [facility] pain in groin Inc pain in groin []   Slump/Dural - - []       OBSERVATION No Deficit Deficit Not Tested Comments   Posture       Forward Head [] [x] []    Rounded Shoulders [] [x] []    Iliac Crest [x] [] []    PSIS [x] [] []    ASIS [x] [x] []    Palpation [] [x] [] Tenderness to B hip flexors and B lumbar paraspinals    No tenderness to B greater trochanter or B piriformis   Sensation [x] [] []    Edema [x] [] []    Neurological [x] [] []          FUNCTION Normal Difficult Unable   Sitting [x] [] []   Standing [] [x] []   Ambulation [] [x] []   Groom/Dress [x] [] []   Lift/Carry [] [x] []   Stairs [] [x] []   Bending [] [x] []   OH reach [x] [] []   Sit to Stand [] [x] []     Comments:    Assessment:    Pt is a 60year old male who presents with B hip and low back pain. Pt with B hip pain posteriorly which is likely radiculopathy from the low back, with pos result of SLR as well as pain with varying position with N/T and pain into the calf. Pt also has pain in B groin/hip flexor areas in which the L hip is worse than the R. Pt with tenderness to B hip flexors and pain with concentric/eccentric contractions and MMT of this musculature in which the pain is related to specific activity/contraction of that musculature. Pt has been having difficulty with standing, completing bed mobility and transfers due to pain. Pt would continue to benefit from skilled PT interventions to decrease pain, increase strength, ROM, and overall functional mobility to improve quality of life. Problems:    [x] ? Back Pain:    [] ? Cervical Pain:    [x] ? ROM:     [x] ? Strength:   [x] ? Function: Oswestry = 14/45 (28% impairment), LEFS = 38/64 (41% impairment)  [x] Postural Deviations  [] Gait Deviations  [] Other:    STG: (to be met in 10 treatments)  1. ? Pain: Pt to decrease pain in B hips to no greater than 3/10 at worst to improve quality of life.   2. ? ROM: Pt to improve L hip ER to 45 degrees to equal the opposite side Massage   65558      [] Dry Needling, 1 or 2 muscles  92490   [] Biofeedback, first 15 minutes   95079  [] Biofeedback, additional 15 minutes   52994 [] Dry Needling, 3 or more muscles  96573     []  Medication allergies reviewed for use of    Dexamethasone Sodium Phosphate 4mg/ml     with iontophoresis treatments. Pt is not allergic. Frequency:  2 x/week for 16 visits    Todays Treatment:  Modalities:   Precautions:  Exercises:  Exercise Reps/ Time Weight/ Level Comments   Supine piriformis stretch 30\"x2  Figure 4, no overpressure   Seated ab brace 5\"x10     Ab brace with hip add 5\"x10 ball    Ab brace with hip abd 5\"x10 lime          Other: hip/BLE fatigue with these few exercises    Specific Instructions for next treatment: Add Nustep/Scifit for warm up, consider stretches for the hip, including a hip flexor stretch. Continue to progress strengthening of core/hip musculature as tolerated.  May also consider manual therapy such as TPR for the hip flexors and \"the stick\" for the quad/hip adductors      Evaluation Complexity:  History (Personal factors, comorbidities) [] 0 [x] 1-2 [] 3+   Exam (limitations, restrictions) [] 1-2 [x] 3 [] 4+   Clinical presentation (progression) [x] Stable [] Evolving  [] Unstable   Decision Making [] Low [x] Moderate [] High    [] Low Complexity [x] Moderate Complexity [] High Complexity       Treatment Charges: Mins Units   [x] Evaluation       []  Low       [x]  Moderate       []  High 42 1   []  Modalities     [x]  Ther Exercise 15 1   []  Manual Therapy     []  Ther Activities     []  Aquatics     []  Vasocompression     []  Other       TOTAL TREATMENT TIME: 15    Time in: 10:18am      Time out: 11:15am    Electronically signed by: Joy Champagne PT

## 2020-01-07 ENCOUNTER — HOSPITAL ENCOUNTER (OUTPATIENT)
Dept: PHYSICAL THERAPY | Facility: CLINIC | Age: 65
Setting detail: THERAPIES SERIES
Discharge: HOME | End: 2020-01-07
Payer: MEDICARE

## 2020-01-07 PROCEDURE — 97110 THERAPEUTIC EXERCISES: CPT

## 2020-01-09 ENCOUNTER — HOSPITAL ENCOUNTER (OUTPATIENT)
Dept: PHYSICAL THERAPY | Facility: CLINIC | Age: 65
Setting detail: THERAPIES SERIES
Discharge: HOME | End: 2020-01-09
Payer: MEDICARE

## 2020-01-09 PROCEDURE — 97110 THERAPEUTIC EXERCISES: CPT

## 2020-01-09 NOTE — FLOWSHEET NOTE
Hip abd            Prone      Hip extension  10x  With pillows under pelvis--To neutral if anterior approach  Added 1/9   Hip flexor stretch 20\"x3 Manually  Added 1/9         STANDING      Heel raises 20x     Mini squats 10x     3 way hip 15x A To neutral if anterior approach   TKE      Marches      Step up      Step down      Step lateral      Lateral side steps      Calf stretch 20\"x3  Added 1/9                     Other:    Specific Instructions for next treatment: trial traction. Continue to progress strengthening of core/hip musculature as tolerated. May also consider manual therapy such as TPR for the hip flexors and \"the stick\" for the quad/hip adductors     Treatment Charges: Mins Units   []  Modalities     [x]  Ther Exercise 47 3   []  Manual Therapy     []  Ther Activities     []  Aquatics     []  Vasocompression     []  Other     Total Treatment time 47 3       Assessment: [] Progressing toward goals. [x] No change. Any motion actively with LLE today causes pain and assistance needed for most exercises. Patient complains of pain in groin area. Questionable completion of exercises at home.      [] Other:    Problems at evaluation on 1/03:    [x]? ? Back Pain:                         []? ? Cervical Pain:        [x]? ? ROM:                     [x]? ? Strength:    [x]? ? Function: Oswestry = 14/45 (28% impairment), LEFS = 38/64 (41% impairment)  [x]? Postural Deviations  []? Gait Deviations  []? Other:                       STG: (to be met in 10 treatments)  1. ? Pain: Pt to decrease pain in B hips to no greater than 3/10 at worst to improve quality of life. 2. ? ROM: Pt to improve L hip ER to 45 degrees to equal the opposite side and decrease hip pain  3. ? Strength: Pt to increase R hip strength to 4/5 and L hip strength to 3+/5 to increase hip strength for gait and amb.  4. ? Function: Pt to report 50% improvement with ease of transfers/amb to return to PLOF.   5. Independent with Home Exercise

## 2020-01-14 ENCOUNTER — HOSPITAL ENCOUNTER (OUTPATIENT)
Dept: PHYSICAL THERAPY | Facility: CLINIC | Age: 65
Setting detail: THERAPIES SERIES
Discharge: HOME | End: 2020-01-14
Payer: MEDICARE

## 2020-01-14 PROCEDURE — 97110 THERAPEUTIC EXERCISES: CPT

## 2020-01-14 NOTE — FLOWSHEET NOTE
neutral if anterior approach  Added 1/9   Hip flexor stretch 20\"x3 Manually  Added 1/9         STANDING      Heel raises 20x     Mini squats 10x     3 way hip 15x A    TKE      Marches      Step up      Step down      Step lateral      Lateral side steps      Calf stretch 20\"x3  Added 1/9                     Other:    Specific Instructions for next treatment: trial traction. Continue to progress strengthening of core/hip musculature as tolerated. May also consider manual therapy such as TPR for the hip flexors and \"the stick\" for the quad/hip adductors     Treatment Charges: Mins Units   []  Modalities     [x]  Ther Exercise 50 3   []  Manual Therapy     []  Ther Activities     []  Aquatics     []  Vasocompression     []  Other     Total Treatment time 50 3       Assessment: [x] Progressing toward goals. Pt did well with charted exercises. Less pain in the L hip with motion, able to complete without complaints. [] No change. [] Other:    Problems at evaluation on 1/03:    [x]? ? Back Pain:                         []? ? Cervical Pain:        [x]? ? ROM:                     [x]? ? Strength:    [x]? ? Function: Oswestry = 14/45 (28% impairment), LEFS = 38/64 (41% impairment)  [x]? Postural Deviations  []? Gait Deviations  []? Other:                       STG: (to be met in 10 treatments)  1. ? Pain: Pt to decrease pain in B hips to no greater than 3/10 at worst to improve quality of life. 2. ? ROM: Pt to improve L hip ER to 45 degrees to equal the opposite side and decrease hip pain  3. ? Strength: Pt to increase R hip strength to 4/5 and L hip strength to 3+/5 to increase hip strength for gait and amb.  4. ? Function: Pt to report 50% improvement with ease of transfers/amb to return to PLOF. 5. Independent with Home Exercise Programs  6.  Demonstrate Knowledge of fall prevention  LTG: (to be met in 16 treatments)  7. ? Pain: Pt to decrease pain in B hips to no greater than 2/10 at worst to improve quality of life. 8. ? Strength: Pt to increase R hip strength to 4+/5 and L hip strength to 4/5 to increase hip strength for gait and amb.  9. ? Function: Pt to have no tenderness to B hip flexors with palpation to demonstrate resolution of symptoms  10. Pt to improve LEFS score to no greater than 20% impairment to decrease pts perception of disability     Patient goals: \"to help the pain in my hips\"    Pt. Education:  [x] Yes  [] No  [] Reviewed Prior HEP/Ed  Method of Education: [x] Verbal  [x] Demo for charted exercises   [] Written  Comprehension of Education:  [x] Verbalizes understanding. [x] Demonstrates understanding. [x] Needs review. [x] Demonstrates/verbalizes HEP/Ed previously given. Plan: [x] Continue per plan of care.    [] Other:      Time In: 8:00            Time Out: 8: 55 am    Electronically signed by:  Eugenia Tripp PTA

## 2020-01-16 ENCOUNTER — HOSPITAL ENCOUNTER (OUTPATIENT)
Dept: PHYSICAL THERAPY | Facility: CLINIC | Age: 65
Setting detail: THERAPIES SERIES
Discharge: HOME | End: 2020-01-16
Payer: MEDICARE

## 2020-01-16 PROCEDURE — 97110 THERAPEUTIC EXERCISES: CPT

## 2020-01-21 ENCOUNTER — HOSPITAL ENCOUNTER (OUTPATIENT)
Dept: PHYSICAL THERAPY | Facility: CLINIC | Age: 65
Setting detail: THERAPIES SERIES
Discharge: HOME | End: 2020-01-21
Payer: MEDICARE

## 2020-01-21 ENCOUNTER — HOSPITAL ENCOUNTER (OUTPATIENT)
Facility: MEDICAL CENTER | Age: 65
End: 2020-01-21
Payer: MEDICARE

## 2020-01-22 ENCOUNTER — HOSPITAL ENCOUNTER (OUTPATIENT)
Dept: INFUSION THERAPY | Facility: MEDICAL CENTER | Age: 65
Discharge: HOME | End: 2020-01-22
Payer: MEDICARE

## 2020-01-22 VITALS
RESPIRATION RATE: 18 BRPM | TEMPERATURE: 97.9 F | HEART RATE: 79 BPM | SYSTOLIC BLOOD PRESSURE: 131 MMHG | DIASTOLIC BLOOD PRESSURE: 74 MMHG

## 2020-01-22 DIAGNOSIS — D80.1 COMMON VARIABLE HYPOGAMMAGLOBULINEMIA (HCC): ICD-10-CM

## 2020-01-22 DIAGNOSIS — D83.9 COMMON VARIABLE IMMUNODEFICIENCY (HCC): Primary | Chronic | ICD-10-CM

## 2020-01-22 LAB
ABSOLUTE EOS #: 0.03 K/UL (ref 0–0.44)
ABSOLUTE IMMATURE GRANULOCYTE: 0.03 K/UL (ref 0–0.3)
ABSOLUTE LYMPH #: 1.39 K/UL (ref 1.1–3.7)
ABSOLUTE MONO #: 0.33 K/UL (ref 0.1–1.2)
ALT SERPL-CCNC: 27 U/L (ref 5–41)
BASOPHILS # BLD: 1 % (ref 0–2)
BASOPHILS ABSOLUTE: 0.05 K/UL (ref 0–0.2)
CREAT SERPL-MCNC: 0.86 MG/DL (ref 0.7–1.2)
DIFFERENTIAL TYPE: ABNORMAL
EOSINOPHILS RELATIVE PERCENT: 0 % (ref 1–4)
GFR AFRICAN AMERICAN: >60 ML/MIN
GFR NON-AFRICAN AMERICAN: >60 ML/MIN
GFR SERPL CREATININE-BSD FRML MDRD: NORMAL ML/MIN/{1.73_M2}
GFR SERPL CREATININE-BSD FRML MDRD: NORMAL ML/MIN/{1.73_M2}
HCT VFR BLD CALC: 44.8 % (ref 40.7–50.3)
HEMOGLOBIN: 14.7 G/DL (ref 13–17)
IGG: 989 MG/DL (ref 700–1600)
IMMATURE GRANULOCYTES: 0 %
LYMPHOCYTES # BLD: 17 % (ref 24–43)
MCH RBC QN AUTO: 28.8 PG (ref 25.2–33.5)
MCHC RBC AUTO-ENTMCNC: 32.8 G/DL (ref 28–38)
MCV RBC AUTO: 87.7 FL (ref 82.6–102.9)
MONOCYTES # BLD: 4 % (ref 3–12)
NRBC AUTOMATED: ABNORMAL PER 100 WBC
PDW BLD-RTO: 12.8 % (ref 11.8–14.4)
PLATELET # BLD: 255 K/UL (ref 138–453)
PLATELET ESTIMATE: ABNORMAL
PMV BLD AUTO: 10 FL (ref 8.1–13.5)
RBC # BLD: 5.11 M/UL (ref 4.21–5.77)
RBC # BLD: ABNORMAL 10*6/UL
SEG NEUTROPHILS: 78 % (ref 36–65)
SEGMENTED NEUTROPHILS ABSOLUTE COUNT: 6.5 K/UL (ref 1.5–8.1)
WBC # BLD: 8.3 K/UL (ref 3.5–11.3)
WBC # BLD: ABNORMAL 10*3/UL

## 2020-01-22 PROCEDURE — 96361 HYDRATE IV INFUSION ADD-ON: CPT

## 2020-01-22 PROCEDURE — 6370000000 HC RX 637 (ALT 250 FOR IP): Performed by: ALLERGY & IMMUNOLOGY

## 2020-01-22 PROCEDURE — 2580000003 HC RX 258: Performed by: ALLERGY & IMMUNOLOGY

## 2020-01-22 PROCEDURE — 36415 COLL VENOUS BLD VENIPUNCTURE: CPT

## 2020-01-22 PROCEDURE — 96365 THER/PROPH/DIAG IV INF INIT: CPT

## 2020-01-22 PROCEDURE — 96366 THER/PROPH/DIAG IV INF ADDON: CPT

## 2020-01-22 PROCEDURE — 6360000002 HC RX W HCPCS: Performed by: ALLERGY & IMMUNOLOGY

## 2020-01-22 PROCEDURE — 82565 ASSAY OF CREATININE: CPT

## 2020-01-22 PROCEDURE — 36591 DRAW BLOOD OFF VENOUS DEVICE: CPT

## 2020-01-22 PROCEDURE — 84460 ALANINE AMINO (ALT) (SGPT): CPT

## 2020-01-22 PROCEDURE — 82784 ASSAY IGA/IGD/IGG/IGM EACH: CPT

## 2020-01-22 PROCEDURE — 85025 COMPLETE CBC W/AUTO DIFF WBC: CPT

## 2020-01-22 RX ORDER — DIPHENHYDRAMINE HCL 25 MG
25 TABLET ORAL EVERY 4 HOURS PRN
Status: DISCONTINUED | OUTPATIENT
Start: 2020-01-22 | End: 2020-01-23 | Stop reason: HOSPADM

## 2020-01-22 RX ORDER — 0.9 % SODIUM CHLORIDE 0.9 %
10 VIAL (ML) INJECTION PRN
Status: CANCELLED | OUTPATIENT
Start: 2020-02-12

## 2020-01-22 RX ORDER — DEXTROSE AND SODIUM CHLORIDE 5; .33 G/100ML; G/100ML
INJECTION, SOLUTION INTRAVENOUS CONTINUOUS
Status: CANCELLED
Start: 2020-02-12

## 2020-01-22 RX ORDER — DIPHENHYDRAMINE HCL 25 MG
25 TABLET ORAL ONCE
Status: COMPLETED | OUTPATIENT
Start: 2020-01-22 | End: 2020-01-22

## 2020-01-22 RX ORDER — DEXTROSE MONOHYDRATE 50 MG/ML
INJECTION, SOLUTION INTRAVENOUS CONTINUOUS
Status: CANCELLED
Start: 2020-02-12

## 2020-01-22 RX ORDER — HEPARIN SODIUM (PORCINE) LOCK FLUSH IV SOLN 100 UNIT/ML 100 UNIT/ML
500 SOLUTION INTRAVENOUS PRN
Status: CANCELLED
Start: 2020-02-12

## 2020-01-22 RX ORDER — DEXTROSE AND SODIUM CHLORIDE 5; .33 G/100ML; G/100ML
INJECTION, SOLUTION INTRAVENOUS CONTINUOUS
Status: DISCONTINUED | OUTPATIENT
Start: 2020-01-22 | End: 2020-01-23 | Stop reason: HOSPADM

## 2020-01-22 RX ORDER — HEPARIN SODIUM (PORCINE) LOCK FLUSH IV SOLN 100 UNIT/ML 100 UNIT/ML
500 SOLUTION INTRAVENOUS PRN
Status: DISCONTINUED | OUTPATIENT
Start: 2020-01-22 | End: 2020-01-23 | Stop reason: HOSPADM

## 2020-01-22 RX ORDER — DIPHENHYDRAMINE HCL 25 MG
25 TABLET ORAL ONCE
Status: CANCELLED | OUTPATIENT
Start: 2020-02-12

## 2020-01-22 RX ORDER — DIPHENHYDRAMINE HCL 25 MG
25 TABLET ORAL EVERY 4 HOURS PRN
Status: CANCELLED
Start: 2020-02-12

## 2020-01-22 RX ORDER — 0.9 % SODIUM CHLORIDE 0.9 %
10 VIAL (ML) INJECTION PRN
Status: DISCONTINUED | OUTPATIENT
Start: 2020-01-22 | End: 2020-01-23 | Stop reason: HOSPADM

## 2020-01-22 RX ADMIN — DIPHENHYDRAMINE HYDROCHLORIDE 25 MG: 25 TABLET ORAL at 08:59

## 2020-01-22 RX ADMIN — Medication 20 ML: at 15:41

## 2020-01-22 RX ADMIN — IMMUNE GLOBULIN INTRAVENOUS (HUMAN): 5 INJECTION, SOLUTION INTRAVENOUS at 09:30

## 2020-01-22 RX ADMIN — DEXTROSE AND SODIUM CHLORIDE: 5; 330 INJECTION, SOLUTION INTRAVENOUS at 08:59

## 2020-01-22 RX ADMIN — Medication 10 ML: at 08:50

## 2020-01-22 RX ADMIN — HEPARIN 500 UNITS: 100 SYRINGE at 15:41

## 2020-01-22 RX ADMIN — DIPHENHYDRAMINE HYDROCHLORIDE 25 MG: 25 TABLET ORAL at 12:48

## 2020-01-22 ASSESSMENT — PAIN DESCRIPTION - PAIN TYPE: TYPE: CHRONIC PAIN

## 2020-01-22 ASSESSMENT — PAIN DESCRIPTION - ORIENTATION: ORIENTATION: RIGHT;LEFT

## 2020-01-22 ASSESSMENT — PAIN SCALES - GENERAL: PAINLEVEL_OUTOF10: 1

## 2020-01-22 ASSESSMENT — PAIN DESCRIPTION - LOCATION: LOCATION: HIP

## 2020-01-22 NOTE — PROGRESS NOTES
Pt arrives per ambulatory per self and labs sent today and orders reviewed. Pt given copy of labs. Pt requests Tuesday 2/11/2020 for next appt, due to pt will be out of town on his Wednesday appt date of 2/12/2020, and appt changed with Kindra , [facility]. D5 1/3  ml prior to IVIG and 250 ml infused post IVIG. IVIG infusing after benadryl given and pre hydration infused. IVIG infusing at 40 ml/hr for 20 min, 80 ml/hr for 20 min, 160 ml/hr for 30 min, and 250 ml/hr for remainder. Pt requests 2nd dose of benadryl around 1300 at each treatment. Pt given calendar with next appt and no reactions or complaints and blood return present throughout infusions. Pt using 2L/NC of O2 while here and has O2 tank with him , due to uses home O2. Pt discharged per ambulatory per self.

## 2020-01-23 ENCOUNTER — HOSPITAL ENCOUNTER (OUTPATIENT)
Dept: PHYSICAL THERAPY | Facility: CLINIC | Age: 65
Setting detail: THERAPIES SERIES
Discharge: HOME | End: 2020-01-23
Payer: MEDICARE

## 2020-01-23 LAB
IGA: 81 MG/DL (ref 70–400)
IGM: 116 MG/DL (ref 40–230)

## 2020-01-23 PROCEDURE — 97110 THERAPEUTIC EXERCISES: CPT

## 2020-01-23 PROCEDURE — 97012 MECHANICAL TRACTION THERAPY: CPT

## 2020-01-28 ENCOUNTER — HOSPITAL ENCOUNTER (OUTPATIENT)
Dept: PHYSICAL THERAPY | Facility: CLINIC | Age: 65
Setting detail: THERAPIES SERIES
Discharge: HOME | End: 2020-01-28
Payer: MEDICARE

## 2020-01-28 PROCEDURE — 97110 THERAPEUTIC EXERCISES: CPT

## 2020-01-28 NOTE — FLOWSHEET NOTE
improvement with ease of transfers/amb to return to PLOF. 5. Independent with Home Exercise Programs  6. Demonstrate Knowledge of fall prevention  LTG: (to be met in 16 treatments)  7. ? Pain: Pt to decrease pain in B hips to no greater than 2/10 at worst to improve quality of life. 8. ? Strength: Pt to increase R hip strength to 4+/5 and L hip strength to 4/5 to increase hip strength for gait and amb.  9. ? Function: Pt to have no tenderness to B hip flexors with palpation to demonstrate resolution of symptoms  10. Pt to improve LEFS score to no greater than 20% impairment to decrease pts perception of disability     Patient goals: \"to help the pain in my hips\"    Pt. Education:  [x] Yes  [] No  [] Reviewed Prior HEP/Ed  Method of Education: [x] Verbal  [x] Demo for charted exercises   [] Written  Comprehension of Education:  [x] Verbalizes understanding. [x] Demonstrates understanding. [] Needs review. [x] Demonstrates/verbalizes HEP/Ed previously given. Plan: [x] Continue per plan of care.    [] Other:      Time In: 8:00            Time Out: 8:55am    Electronically signed by:  Nigel Cunha PTA

## 2020-01-30 ENCOUNTER — HOSPITAL ENCOUNTER (OUTPATIENT)
Dept: PHYSICAL THERAPY | Facility: CLINIC | Age: 65
Setting detail: THERAPIES SERIES
Discharge: HOME | End: 2020-01-30
Payer: MEDICARE

## 2020-01-30 PROCEDURE — 97110 THERAPEUTIC EXERCISES: CPT

## 2020-02-04 ENCOUNTER — HOSPITAL ENCOUNTER (OUTPATIENT)
Dept: PHYSICAL THERAPY | Facility: CLINIC | Age: 65
Setting detail: THERAPIES SERIES
Discharge: HOME | End: 2020-02-04
Payer: MEDICARE

## 2020-02-04 PROCEDURE — 97110 THERAPEUTIC EXERCISES: CPT

## 2020-02-04 NOTE — FLOWSHEET NOTE
[] [facility]) [facility] CENTER &  Therapy  [address]  [phone]  F: [phone] [x] [address]  [facility]   [address]  P: [phone]  F: [phone] [] [place]  [address]  P: [phone]  F: [phone] [] [address]  [facility]   [address]   [place]: [phone]  F: [phone]      Physical Therapy Daily Treatment Note    Date:  2020  Patient Name:  Ro Antonio    :  1955  MRN: [id number]  Physician: Meryle Ester, DO                       Insurance: Medicare/Medicaid  Medical Diagnosis: B hip AVN, lumbar stenosis      Rehab Codes: M25.551, M25.552, M25.651, M25.652, M54.5, M62.81  Onset Date: 12-3-19                                                  Next 's appt. : 20  Visit# / total visits:   Cancels/No Shows: 0/0    Subjective:    Pain:  [x] Yes  [] No Location: Hips, LB Pain Rating: (0-10 scale) 2-3/10  Pain altered Tx:  [x] No  [] Yes  Action:     Comments: Pt states his hips are slightly sore from kneeling on his garage floor fixing his riding mower, otherwise doing well. Feels like he isn't \"stubbling\" when walking as much as better. Objective:  Modalities: .   Precautions:  Exercises: Requires assist with LLE for some exercises and stretches  Bold completed  Exercise Reps/Time Weight/level Comments   nustep  6min L3          SUPINE      Glute set 10x5\"     gastroc stretch/HS stretch  3x15\"ea Belt and assist     SKTC 5x5\"     Piriformis stretch 3x15\"ea     Quad/hip flexor stretch off mat 3x20\" L     Heel slides      Butterfly stretch  3x15\"     SAQ      Pelvic tilt  10x5\"     PPT with march 20xea  Pain with L   PPT with SLR 10x  Added 1/30   PPT Hip add - hooklying 20x5\" hold ball Increased reps 1/30   PPT Hip abd - hooklying 20x [x]? ? Strength:    [x]? ? Function: Oswestry = 14/45 (28% impairment), LEFS = 38/64 (41% impairment)  [x]? Postural Deviations  []? Gait Deviations  []? Other:                       STG: (to be met in 10 treatments)  1. ? Pain: Pt to decrease pain in B hips to no greater than 3/10 at worst to improve quality of life-MET, 1-2/10  2. ? ROM: Pt to improve L hip ER to 45 degrees to equal the opposite side and decrease hip pain- Progress made, 34 °   3. ? Strength: Pt to increase R hip strength to 4/5 and L hip strength to 3+/5 to increase hip strength for gait and amb-MET  4. ? Function: Pt to report 50% improvement with ease of transfers/amb to return to PLOF-MET, reports 50% improvement  5. Independent with Home Exercise Programs-MET  6. Demonstrate Knowledge of fall prevention-MET  LTG: (to be met in 16 treatments)  7. ? Pain: Pt to decrease pain in B hips to no greater than 2/10 at worst to improve quality of life. 8. ? Strength: Pt to increase R hip strength to 4+/5 and L hip strength to 4/5 to increase hip strength for gait and amb.  9. ? Function: Pt to have no tenderness to B hip flexors with palpation to demonstrate resolution of symptoms  10. Pt to improve LEFS score to no greater than 20% impairment to decrease pts perception of disability     Patient goals: \"to help the pain in my hips\"    Pt. Education:  [x] Yes  [] No  [] Reviewed Prior HEP/Ed  Method of Education: [x] Verbal  [x] Demo for charted exercises   [] Written  Comprehension of Education:  [x] Verbalizes understanding. [x] Demonstrates understanding. [] Needs review. [x] Demonstrates/verbalizes HEP/Ed previously given. Plan: [x] Continue per plan of care.    [] Other:      Time In: 8:00 am           Time Out: 8:57 am    Electronically signed by:  Surjit Rivera, PT

## 2020-02-06 ENCOUNTER — HOSPITAL ENCOUNTER (OUTPATIENT)
Dept: PHYSICAL THERAPY | Facility: CLINIC | Age: 65
Setting detail: THERAPIES SERIES
Discharge: HOME | End: 2020-02-06
Payer: MEDICARE

## 2020-02-06 PROCEDURE — 97110 THERAPEUTIC EXERCISES: CPT

## 2020-02-06 NOTE — FLOWSHEET NOTE
[] [facility]) [facility] CENTER &  Therapy  [address]  [phone]  F: [phone] [x] [address]  [facility] [address]   [address]  P: [phone]  F: [phone] [] [place]  [address] [facility]  P: [phone]  F: [phone] [] [address]  [facility]   [address]   [place]: [phone]  F: [phone]      Physical Therapy Daily Treatment Note    Date:  2020  Patient Name:  Natalio Hernandez    :  1955  MRN: [id number]  Physician: Imer Atkinson DO                       Insurance: Medicare/Medicaid  Medical Diagnosis: B hip AVN, lumbar stenosis      Rehab Codes: M25.551, M25.552, M25.651, M25.652, M54.5, M62.81  Onset Date: 12-3-19                                                  Next 's appt. : 20  Visit# / total visits: 10/16  Cancels/No Shows: 0/0    Subjective:    Pain:  [x] Yes  [] No Location: Hips, LB Pain Rating: (0-10 scale) 1-2/10  Pain altered Tx:  [x] No  [] Yes  Action:     Comments: Pt reported that he is a little sore this date due to completing activities in the garage. Objective:  Modalities: .   Precautions:  Exercises: Requires assist with LLE for some exercises and stretches  Bold completed  Exercise Reps/Time Weight/level Comments   nustep  6min L3          SUPINE      Glute set 10x5\"     gastroc stretch/HS stretch  3x15\"ea Belt and assist     SKTC 5x5\"     Piriformis stretch 3x15\"ea     Quad/hip flexor stretch off mat 3x20\" L     Heel slides      Butterfly stretch  3x15\"     SAQ      Pelvic tilt  10x5\"     PPT with march 20xea  Pain with L   PPT with SLR 10x  Added 1/30   PPT Hip add - hooklying 20x5\" hold ball Increased reps 1/30   PPT Hip abd - hooklying 20x Blue Added band  Progressed 1/16         SIDELYING      Hip abd 15x A Added 2/4 strength to 3+/5 to increase hip strength for gait and amb-MET  4. ? Function: Pt to report 50% improvement with ease of transfers/amb to return to PLOF-MET, reports 50% improvement  5. Independent with Home Exercise Programs-MET  6. Demonstrate Knowledge of fall prevention-MET  LTG: (to be met in 16 treatments)  7. ? Pain: Pt to decrease pain in B hips to no greater than 2/10 at worst to improve quality of life. 8. ? Strength: Pt to increase R hip strength to 4+/5 and L hip strength to 4/5 to increase hip strength for gait and amb.  9. ? Function: Pt to have no tenderness to B hip flexors with palpation to demonstrate resolution of symptoms  10. Pt to improve LEFS score to no greater than 20% impairment to decrease pts perception of disability     Patient goals: \"to help the pain in my hips\"    Pt. Education:  [x] Yes  [] No  [] Reviewed Prior HEP/Ed  Method of Education: [x] Verbal  [x] Demo for charted exercises   [] Written  Comprehension of Education:  [x] Verbalizes understanding. [x] Demonstrates understanding. [] Needs review. [x] Demonstrates/verbalizes HEP/Ed previously given. Plan: [x] Continue per plan of care.    [] Other:      Time In: 0806           Time Out: 4052    Electronically signed by:  Lathan Leyden, PTA

## 2020-02-07 ENCOUNTER — TELEPHONE (OUTPATIENT)
Dept: ONCOLOGY | Age: 65
End: 2020-02-07

## 2020-02-07 ENCOUNTER — HOSPITAL ENCOUNTER (OUTPATIENT)
Facility: MEDICAL CENTER | Age: 65
End: 2020-02-07
Payer: MEDICARE

## 2020-02-07 NOTE — TELEPHONE ENCOUNTER
Our records indicate that your patient is due for their annual lung cancer screening follow up testing. For your convenience, we have pended the order for the scan for you. If you do not agree with the need for the test, please cancel the order and let us know.      Sincerely,    [facility]

## 2020-02-10 ENCOUNTER — APPOINTMENT (OUTPATIENT)
Dept: PHYSICAL THERAPY | Facility: CLINIC | Age: 65
End: 2020-02-10
Payer: MEDICARE

## 2020-02-11 ENCOUNTER — HOSPITAL ENCOUNTER (OUTPATIENT)
Dept: INFUSION THERAPY | Facility: MEDICAL CENTER | Age: 65
Discharge: HOME | End: 2020-02-11
Payer: MEDICARE

## 2020-02-11 VITALS
RESPIRATION RATE: 18 BRPM | TEMPERATURE: 98.1 F | SYSTOLIC BLOOD PRESSURE: 124 MMHG | HEART RATE: 72 BPM | DIASTOLIC BLOOD PRESSURE: 76 MMHG

## 2020-02-11 DIAGNOSIS — D83.9 COMMON VARIABLE IMMUNODEFICIENCY (HCC): Primary | Chronic | ICD-10-CM

## 2020-02-11 DIAGNOSIS — D80.1 COMMON VARIABLE HYPOGAMMAGLOBULINEMIA (HCC): ICD-10-CM

## 2020-02-11 LAB
IGA: 77 MG/DL (ref 70–400)
IGG: 930 MG/DL (ref 700–1600)
IGM: 101 MG/DL (ref 40–230)

## 2020-02-11 PROCEDURE — 96366 THER/PROPH/DIAG IV INF ADDON: CPT

## 2020-02-11 PROCEDURE — 6370000000 HC RX 637 (ALT 250 FOR IP): Performed by: ALLERGY & IMMUNOLOGY

## 2020-02-11 PROCEDURE — 96365 THER/PROPH/DIAG IV INF INIT: CPT

## 2020-02-11 PROCEDURE — 82784 ASSAY IGA/IGD/IGG/IGM EACH: CPT

## 2020-02-11 PROCEDURE — 2580000003 HC RX 258: Performed by: ALLERGY & IMMUNOLOGY

## 2020-02-11 PROCEDURE — 6360000002 HC RX W HCPCS: Performed by: ALLERGY & IMMUNOLOGY

## 2020-02-11 RX ORDER — DIPHENHYDRAMINE HCL 25 MG
25 TABLET ORAL ONCE
Status: COMPLETED | OUTPATIENT
Start: 2020-02-11 | End: 2020-02-11

## 2020-02-11 RX ORDER — DEXTROSE MONOHYDRATE 50 MG/ML
INJECTION, SOLUTION INTRAVENOUS CONTINUOUS
Status: CANCELLED
Start: 2020-03-03

## 2020-02-11 RX ORDER — HEPARIN SODIUM (PORCINE) LOCK FLUSH IV SOLN 100 UNIT/ML 100 UNIT/ML
500 SOLUTION INTRAVENOUS PRN
Status: DISCONTINUED | OUTPATIENT
Start: 2020-02-11 | End: 2020-02-12 | Stop reason: HOSPADM

## 2020-02-11 RX ORDER — 0.9 % SODIUM CHLORIDE 0.9 %
10 VIAL (ML) INJECTION PRN
Status: DISCONTINUED | OUTPATIENT
Start: 2020-02-11 | End: 2020-02-12 | Stop reason: HOSPADM

## 2020-02-11 RX ORDER — DIPHENHYDRAMINE HCL 25 MG
25 TABLET ORAL ONCE
Status: CANCELLED | OUTPATIENT
Start: 2020-03-03

## 2020-02-11 RX ORDER — DIPHENHYDRAMINE HCL 25 MG
25 TABLET ORAL EVERY 4 HOURS PRN
Status: CANCELLED
Start: 2020-03-03

## 2020-02-11 RX ORDER — DEXTROSE AND SODIUM CHLORIDE 5; .33 G/100ML; G/100ML
INJECTION, SOLUTION INTRAVENOUS CONTINUOUS
Status: CANCELLED
Start: 2020-03-03

## 2020-02-11 RX ORDER — DEXTROSE AND SODIUM CHLORIDE 5; .33 G/100ML; G/100ML
INJECTION, SOLUTION INTRAVENOUS CONTINUOUS
Status: DISCONTINUED | OUTPATIENT
Start: 2020-02-11 | End: 2020-02-12 | Stop reason: HOSPADM

## 2020-02-11 RX ORDER — 0.9 % SODIUM CHLORIDE 0.9 %
10 VIAL (ML) INJECTION PRN
Status: CANCELLED | OUTPATIENT
Start: 2020-03-03

## 2020-02-11 RX ORDER — DIPHENHYDRAMINE HCL 25 MG
25 TABLET ORAL EVERY 4 HOURS PRN
Status: DISCONTINUED | OUTPATIENT
Start: 2020-02-11 | End: 2020-02-12 | Stop reason: HOSPADM

## 2020-02-11 RX ORDER — HEPARIN SODIUM (PORCINE) LOCK FLUSH IV SOLN 100 UNIT/ML 100 UNIT/ML
500 SOLUTION INTRAVENOUS PRN
Status: CANCELLED
Start: 2020-03-03

## 2020-02-11 RX ADMIN — DEXTROSE AND SODIUM CHLORIDE: 5; 330 INJECTION, SOLUTION INTRAVENOUS at 09:59

## 2020-02-11 RX ADMIN — HEPARIN 500 UNITS: 100 SYRINGE at 16:36

## 2020-02-11 RX ADMIN — DIPHENHYDRAMINE HYDROCHLORIDE 25 MG: 25 TABLET ORAL at 10:01

## 2020-02-11 RX ADMIN — DIPHENHYDRAMINE HYDROCHLORIDE 25 MG: 25 TABLET ORAL at 13:45

## 2020-02-11 RX ADMIN — DIPHENHYDRAMINE HYDROCHLORIDE 25 MG: 25 TABLET ORAL at 10:00

## 2020-02-11 RX ADMIN — SODIUM CHLORIDE, PRESERVATIVE FREE 20 ML: 5 INJECTION INTRAVENOUS at 16:36

## 2020-02-11 RX ADMIN — IMMUNE GLOBULIN INTRAVENOUS (HUMAN): 5 INJECTION, SOLUTION INTRAVENOUS at 10:28

## 2020-02-12 ENCOUNTER — TELEPHONE (OUTPATIENT)
Dept: ONCOLOGY | Age: 65
End: 2020-02-12

## 2020-02-18 ENCOUNTER — HOSPITAL ENCOUNTER (OUTPATIENT)
Dept: PHYSICAL THERAPY | Facility: CLINIC | Age: 65
Setting detail: THERAPIES SERIES
Discharge: HOME | End: 2020-02-18
Payer: MEDICARE

## 2020-02-18 PROCEDURE — 97110 THERAPEUTIC EXERCISES: CPT

## 2020-02-18 NOTE — FLOWSHEET NOTE
[] [facility]) [facility] &  Therapy  [address]  [phone]  F: [phone] [x] [address]  [facility]   [address]  P: [phone]  F: [phone] [] [address]  [address] [facility]  P: [phone]  F: [phone] [] [address]  Psychiatric   [address]   [place]: [phone]  F: [phone]      Physical Therapy Daily Treatment Note    Date:  2020  Patient Name:  Warren Colunga    :  1955  MRN: [id number]  Physician: Tima Smith DO                       Insurance: Medicare/Medicaid  Medical Diagnosis: B hip AVN, lumbar stenosis      Rehab Codes: M25.551, M25.552, M25.651, M25.652, M54.5, M62.81  Onset Date: 12-3-19                                                  Next 's appt. : 20  Visit# / total visits:   Cancels/No Shows: 0/0    Subjective:    Pain:  [x] Yes  [] No Location: Hips, LB Pain Rating: (0-10 scale) 1-2/10  Pain altered Tx:  [x] No  [] Yes  Action:     Comments: Pt states same reports today with some soreness and he has been doing things in his garage. Objective:  Modalities: .   Precautions:  Exercises: Requires assist with LLE for some exercises and stretches  Bold completed  Exercise Reps/Time Weight/level Comments   nustep  6min L3          SUPINE      Glute set 10x5\"     gastroc stretch/HS stretch  3x15\"ea Belt and assist     SKTC 5x5\"     Piriformis stretch 3x15\"ea     Quad/hip flexor stretch off mat 3x20\" L     Heel slides      Butterfly stretch  3x15\"     SAQ      Pelvic tilt  10x5\"     PPT with march 20xea  Pain with L   PPT with SLR 10x  Added 1/30   PPT Hip add - hooklying 20x5\" hold ball Increased reps 1/30   PPT Hip abd - hooklying 20x Blue Added band  Progressed 1/16         SIDELYING      Hip abd 15x A Added

## 2020-02-19 ENCOUNTER — HOSPITAL ENCOUNTER (OUTPATIENT)
Dept: CT IMAGING | Age: 65
Discharge: HOME | End: 2020-02-21
Payer: MEDICARE

## 2020-02-19 PROCEDURE — G0297 LDCT FOR LUNG CA SCREEN: HCPCS

## 2020-02-20 ENCOUNTER — HOSPITAL ENCOUNTER (OUTPATIENT)
Dept: PHYSICAL THERAPY | Facility: CLINIC | Age: 65
Setting detail: THERAPIES SERIES
Discharge: HOME | End: 2020-02-20
Payer: MEDICARE

## 2020-02-20 PROCEDURE — 97110 THERAPEUTIC EXERCISES: CPT

## 2020-02-20 NOTE — FLOWSHEET NOTE
[] [facility]) [facility] CENTER &  Therapy  [address]  [phone]  F: [phone] [x] [address]  [facility]   [address]  P: [phone]  F: [phone] [] [place]  [address] [facility]  P: [phone]  F: [phone] [] [address]  [facility]   [address]   [place]: [phone]  F: [phone]      Physical Therapy Daily Treatment Note    Date:  2020  Patient Name:  Kay Ruano    :  1955  MRN: [id number]  Physician: Hodan Miner DO                       Insurance: Medicare/Medicaid  Medical Diagnosis: B hip AVN, lumbar stenosis      Rehab Codes: M25.551, M25.552, M25.651, M25.652, M54.5, M62.81  Onset Date: 12-3-19                                                  Next 's appt. : 20  Visit# / total visits:   Cancels/No Shows: 0/0    Subjective:    Pain:  [x] Yes  [] No Location: Hips, LB Pain Rating: (0-10 scale) 0/10  Pain altered Tx:  [x] No  [] Yes  Action:     Comments: Pt reports he has no pain in his hips or low back. Pt reports he has been \"feeling good lately\". Objective:  Modalities: .   Precautions:  Exercises: Requires assist with LLE for some exercises and stretches  Bold completed  Exercise Reps/Time Weight/level Comments   nustep  6min L3          SUPINE      Glute set 10x5\"     gastroc stretch/HS stretch  3x15\"ea Belt and assist     SKTC 5x5\" ea     Piriformis stretch 3x15\"ea     Quad/hip flexor stretch off mat 3x20\" L     Heel slides      Butterfly stretch  3x15\"     SAQ      Pelvic tilt  10x5\"     PPT with march 20xea     PPT with SLR 10x ea  Added 1/30   PPT Hip add - hooklying 20x5\" hold ball Increased reps 1/30   PPT Hip abd - hooklying 20x Blue Added band 1/9 Progressed          SIDELYING      Hip abd 15x A Added  Prone      Hip extension  10x  With pillows under pelvis--To neutral if anterior approach  Added 1/9   Hip flexor stretch 20\"x3 Manually  Added 1/9         STANDING      Heel raises 20x     Mini squats 20x  Progressed 1/23   3 way hip 20x ea lime Added band 1/23 increased reps 1/30   TKE      Marches 20x ea A Added 1/16  Progressed 1/23   Step up 15x ea 6\" Added 1/16   Step down      Step lateral      Lateral step ups  15x 6\" Added 1/23  Increased reps 1/30   Calf stretch 20\"x3  Added 1/9                     Other:    Specific Instructions for next treatment:  lateral band walk as tolerated        Treatment Charges: Mins Units   []  Modalities:      [x]  Ther Exercise 44 3   []  Manual Therapy     []  Ther Activities     []  Aquatics     []  Vasocompression     []  Other     Total Treatment time 44 3   Estimated cost of care to date (02/20/20): $850.54    MMT 2/4/2020:  Hip abd: L 4/5, R 4/5  Hip flex: L 4/5 (slight pain), R 4/5   Knee ext:  L 4/5, R 4+/5    Hip ER ROM 2/4/2020: 34 °       Assessment: [x] Progressing toward goals. Pt tolerated all charted exercises with no pain. Verbal cuing needed to maintain pelvic tilt during supine exercises. Tactile cuing needed to maintain correct positioning during side lying hip abduction. [] No change. [x] Other:      Problems at evaluation on 1/03:    [x]? ? Back Pain:                         []? ? Cervical Pain:        [x]? ? ROM:                     [x]? ? Strength:    [x]? ? Function: Oswestry = 14/45 (28% impairment), LEFS = 38/64 (41% impairment)  [x]? Postural Deviations  []? Gait Deviations  []?  Other:                       STG: (to be met in 10 treatments)  1. ? Pain: Pt to decrease pain in B hips to no greater than 3/10 at worst to improve quality of life-MET, 1-2/10  2. ? ROM: Pt to improve L hip ER to 45 degrees to equal the opposite side and decrease hip pain- Progress made, 34 °   3. ? Strength: Pt to increase R hip strength to 4/5 and L hip strength to 3+/5 to increase hip strength for gait and amb-MET  4. ? Function: Pt to report 50% improvement with ease of transfers/amb to return to PLOF-MET, reports 50% improvement  5. Independent with Home Exercise Programs-MET  6. Demonstrate Knowledge of fall prevention-MET  LTG: (to be met in 16 treatments)  7. ? Pain: Pt to decrease pain in B hips to no greater than 2/10 at worst to improve quality of life. 8. ? Strength: Pt to increase R hip strength to 4+/5 and L hip strength to 4/5 to increase hip strength for gait and amb.  9. ? Function: Pt to have no tenderness to B hip flexors with palpation to demonstrate resolution of symptoms  10. Pt to improve LEFS score to no greater than 20% impairment to decrease pts perception of disability     Patient goals: \"to help the pain in my hips\"    Pt. Education:  [x] Yes  [] No  [] Reviewed Prior HEP/Ed  Method of Education: [x] Verbal  [x] Demo for charted exercises   [] Written  Comprehension of Education:  [x] Verbalizes understanding. [x] Demonstrates understanding. [] Needs review. [] Demonstrates/verbalizes HEP/Ed previously given. Plan: [x] Continue per plan of care.    [] Other:      Time In: 8:00         Time Out: 8:50    Electronically signed by:  Fanny Guerra PTA

## 2020-02-21 ENCOUNTER — OFFICE VISIT (OUTPATIENT)
Dept: ORTHOPEDIC SURGERY | Age: 65
End: 2020-02-21

## 2020-02-21 VITALS — HEIGHT: 69 IN | BODY MASS INDEX: 39.4 KG/M2 | WEIGHT: 266 LBS

## 2020-02-21 PROCEDURE — 99024 POSTOP FOLLOW-UP VISIT: CPT | Performed by: ORTHOPAEDIC SURGERY

## 2020-02-21 ASSESSMENT — ENCOUNTER SYMPTOMS
SHORTNESS OF BREATH: 0
WHEEZING: 0
BACK PAIN: 0

## 2020-02-21 NOTE — PROGRESS NOTES
[facility]  [facility]  [address] [facility] [address]  Dept: [phone]  Dept Fax: [phone]        Ambulatory Follow Up      Subjective:   Ro Antonio is a 60y.o. year old male who presents to our office today for routine followup regarding his   1. Status post total hip replacement, left    2. Avascular necrosis of bone of hip, right ([address]    . Chief Complaint   Patient presents with    Hip Pain     bilateral       HPI Ro Antonio  is a 60 y.o. male who presents today in follow for bilateral hip pain. The patient was last seen on 12/27/2019 and underwent treatment in the form of physical therapy. The patient notes 95% improvement with the previous treatment. He states that there is no longer tingling or numbness in the left leg, no longer stumbling. Review of Systems   Constitutional: Negative for chills, diaphoresis and fever. Respiratory: Negative for shortness of breath and wheezing. Cardiovascular: Negative for chest pain, palpitations and leg swelling. Musculoskeletal: Negative for arthralgias, back pain, gait problem, joint swelling, myalgias, neck pain and neck stiffness. Neurological: Negative for weakness and numbness. I have reviewed the CC, HPI, ROS, PMH, FHX, Social History, and if not present in this note, I have reviewed in the patient's chart. I agree with the documentation provided by other staff and have reviewed their documentation prior to providing my signature indicating agreement. Objective :   Ht 5' 9\" (1.753 m)   Wt 266 lb (120.7 kg)   BMI 39.28 kg/m²  Body mass index is 39.28 kg/m². General: Ro Antonio is a 60 y.o. male who is alert and oriented and sitting comfortably in our office. Ortho Exam  MS:  Full ROM bilateral hips. Patient ambulates with antalgia or short weightbearing phase of the bilateral lower extremities.   Motor, sensory, vascular examination of bilateral lower extremities is grossly intact without focal deficits. Neuro: alert and oriented to person and place. Eyes: Extra-ocular muscles intact  Mouth: Oral mucosa moist. No perioral lesions  Pulm: Respirations unlabored and regular. Symmetric chest excursion without outward deformity is noted. Skin: warm, well perfused  Psych:   Patient has good fund of knowledge and displays understanging of exam, diagnosis, and plan. Radiology:     No current x-rays bilateral hips is appreciated. Assessment:      1. Status post total hip replacement, left    2. Avascular necrosis of bone of hip, right (HCC)       Plan:      Discussed etiology and natural history of bilateral hip pain. The treatment options may include oral anti-inflammatories, bracing, injections, advanced imaging, activity modification, physical therapy and/or surgical intervention. The patient is doing very good and the patient would like to proceed with HEP, he may need to occasionally go to formal therapy which I strongly recommend with any set backs. The patient will follow up as needed. We discussed that the patient should call us with any concerns or questions. Follow up:Return if symptoms worsen or fail to improve. No orders of the defined types were placed in this encounter. No orders of the defined types were placed in this encounter. NUNU Bryan am scribing for and in the presence of Dr. Alondra Morrison. 2/23/2020 8:15 PM    I have reviewed and made changes accordingly to the work scribed by NUNU Carlos. The documentation accurately reflects work and decisions made by me. I have also reviewed documentation completed by clinical staff.     Alondra Morrison DO, [facility]  2/23/2020 8:16 PM    This note is created with the assistance of a speech recognition program.  While intending to generate a document that actually reflects the content of the visit, the document can still have some errors including those of syntax and sound a like substitutions which may escape proof reading.  In such instances, actual meaning can be extrapolated by contextual diversion      Electronically signed by Sukumar Strong DO, FAOAO on 2/23/2020 at 8:15 PM

## 2020-02-24 ENCOUNTER — HOSPITAL ENCOUNTER (OUTPATIENT)
Dept: PHYSICAL THERAPY | Facility: CLINIC | Age: 65
Setting detail: THERAPIES SERIES
Discharge: HOME | End: 2020-02-24
Payer: MEDICARE

## 2020-02-25 ENCOUNTER — OFFICE VISIT (OUTPATIENT)
Dept: PULMONOLOGY | Age: 65
End: 2020-02-25
Payer: MEDICARE

## 2020-02-25 VITALS
DIASTOLIC BLOOD PRESSURE: 78 MMHG | HEIGHT: 68 IN | WEIGHT: 266.8 LBS | RESPIRATION RATE: 16 BRPM | SYSTOLIC BLOOD PRESSURE: 119 MMHG | HEART RATE: 86 BPM | OXYGEN SATURATION: 96 % | BODY MASS INDEX: 40.44 KG/M2

## 2020-02-25 PROCEDURE — G8484 FLU IMMUNIZE NO ADMIN: HCPCS | Performed by: INTERNAL MEDICINE

## 2020-02-25 PROCEDURE — 3023F SPIROM DOC REV: CPT | Performed by: INTERNAL MEDICINE

## 2020-02-25 PROCEDURE — 3017F COLORECTAL CA SCREEN DOC REV: CPT | Performed by: INTERNAL MEDICINE

## 2020-02-25 PROCEDURE — 1036F TOBACCO NON-USER: CPT | Performed by: INTERNAL MEDICINE

## 2020-02-25 PROCEDURE — 99214 OFFICE O/P EST MOD 30 MIN: CPT | Performed by: INTERNAL MEDICINE

## 2020-02-25 PROCEDURE — G8926 SPIRO NO PERF OR DOC: HCPCS | Performed by: INTERNAL MEDICINE

## 2020-02-25 PROCEDURE — G8427 DOCREV CUR MEDS BY ELIG CLIN: HCPCS | Performed by: INTERNAL MEDICINE

## 2020-02-25 PROCEDURE — G8417 CALC BMI ABV UP PARAM F/U: HCPCS | Performed by: INTERNAL MEDICINE

## 2020-02-25 ASSESSMENT — SLEEP AND FATIGUE QUESTIONNAIRES
HOW LIKELY ARE YOU TO NOD OFF OR FALL ASLEEP WHILE LYING DOWN TO REST IN THE AFTERNOON WHEN CIRCUMSTANCES PERMIT: 3
HOW LIKELY ARE YOU TO NOD OFF OR FALL ASLEEP WHILE SITTING QUIETLY AFTER LUNCH WITHOUT ALCOHOL: 3
HOW LIKELY ARE YOU TO NOD OFF OR FALL ASLEEP WHILE SITTING AND TALKING TO SOMEONE: 0
ESS TOTAL SCORE: 7
HOW LIKELY ARE YOU TO NOD OFF OR FALL ASLEEP WHILE SITTING INACTIVE IN A PUBLIC PLACE: 0
HOW LIKELY ARE YOU TO NOD OFF OR FALL ASLEEP WHILE SITTING AND READING: 1
HOW LIKELY ARE YOU TO NOD OFF OR FALL ASLEEP WHEN YOU ARE A PASSENGER IN A CAR FOR AN HOUR WITHOUT A BREAK: 0
HOW LIKELY ARE YOU TO NOD OFF OR FALL ASLEEP WHILE WATCHING TV: 0
HOW LIKELY ARE YOU TO NOD OFF OR FALL ASLEEP IN A CAR, WHILE STOPPED FOR A FEW MINUTES IN TRAFFIC: 0

## 2020-02-25 NOTE — PROGRESS NOTES
Allergies:  No Known Allergies    Medications:  Outpatient Encounter Medications as of 2/25/2020   Medication Sig Dispense Refill    SPIRIVA HANDIHALER 18 MCG inhalation capsule inhale the contents of one capsule in the handihaler once daily 90 capsule 3    fluticasone (FLONASE) 50 MCG/ACT nasal spray instill 2 sprays into each nostril once daily 1 Bottle 2    SYMBICORT 160-4.5 MCG/ACT AERO inhale 2 puffs by mouth twice a day 1 Inhaler 2    omeprazole (PRILOSEC) 20 MG delayed release capsule take 1 capsule by mouth once daily 60 capsule 3    amLODIPine (NORVASC) 10 MG tablet take 1 tablet by mouth once daily 60 tablet 3    Handicap Placard MISC by Does not apply route For 5 years 1 each 0    montelukast (SINGULAIR) 10 MG tablet take 1 tablet by mouth every evening 90 tablet 3    albuterol (PROVENTIL) (2.5 MG/3ML) 0.083% nebulizer solution inhale contents of 1 vial in nebulizer every 6 hours if needed for wheezing and shortness of breath 375 mL 5    acetaminophen (TYLENOL) 500 MG tablet Take 500 mg by mouth every 6 hours as needed for Pain      traMADol (ULTRAM) 50 MG tablet Take 50 mg by mouth as needed for Pain.  predniSONE (DELTASONE) 10 MG tablet Start at 50 mg and taper 10 mg every 3 days 45 tablet 0    ALPRAZolam (XANAX) 0.5 MG tablet Take 0.5 mg by mouth 3 times daily as needed for Sleep. .      benazepril (LOTENSIN) 10 MG tablet take 1 tablet by mouth once daily (Patient taking differently: Take 10 mg BID) 30 tablet 5    albuterol sulfate  (90 Base) MCG/ACT inhaler Inhale 2 puffs into the lungs every 6 hours as needed for Wheezing      Handicap Placard MISC by Does not apply route 1 each 0    Immune Globulin, Human, 10 GM/100ML SOLN IV solution Infuse 60 g intravenously every 21 days       predniSONE (DELTASONE) 10 MG tablet 4 tab daily for 3 days, 3 tab daily for 3 days, 2 tab daily for 3 days, then 1 tab daily for 3 days (Patient not taking: Reported on 2/25/2020) 30 tablet

## 2020-02-27 ENCOUNTER — APPOINTMENT (OUTPATIENT)
Dept: PHYSICAL THERAPY | Facility: CLINIC | Age: 65
End: 2020-02-27
Payer: MEDICARE

## 2020-03-02 ENCOUNTER — HOSPITAL ENCOUNTER (OUTPATIENT)
Facility: MEDICAL CENTER | Age: 65
End: 2020-03-02
Payer: MEDICARE

## 2020-03-04 ENCOUNTER — HOSPITAL ENCOUNTER (OUTPATIENT)
Dept: INFUSION THERAPY | Facility: MEDICAL CENTER | Age: 65
Discharge: HOME | End: 2020-03-04
Payer: MEDICARE

## 2020-03-04 VITALS
HEART RATE: 70 BPM | RESPIRATION RATE: 20 BRPM | DIASTOLIC BLOOD PRESSURE: 67 MMHG | SYSTOLIC BLOOD PRESSURE: 133 MMHG | TEMPERATURE: 97.9 F

## 2020-03-04 DIAGNOSIS — D80.1 COMMON VARIABLE HYPOGAMMAGLOBULINEMIA (HCC): Primary | ICD-10-CM

## 2020-03-04 DIAGNOSIS — D83.9 CVID (COMMON VARIABLE IMMUNODEFICIENCY) (HCC): ICD-10-CM

## 2020-03-04 DIAGNOSIS — D83.9 COMMON VARIABLE IMMUNODEFICIENCY (HCC): ICD-10-CM

## 2020-03-04 PROCEDURE — 6370000000 HC RX 637 (ALT 250 FOR IP): Performed by: ALLERGY & IMMUNOLOGY

## 2020-03-04 PROCEDURE — 96365 THER/PROPH/DIAG IV INF INIT: CPT

## 2020-03-04 PROCEDURE — 2580000003 HC RX 258: Performed by: ALLERGY & IMMUNOLOGY

## 2020-03-04 PROCEDURE — 96361 HYDRATE IV INFUSION ADD-ON: CPT

## 2020-03-04 PROCEDURE — 36593 DECLOT VASCULAR DEVICE: CPT

## 2020-03-04 PROCEDURE — 6360000002 HC RX W HCPCS: Performed by: ALLERGY & IMMUNOLOGY

## 2020-03-04 PROCEDURE — 96366 THER/PROPH/DIAG IV INF ADDON: CPT

## 2020-03-04 RX ORDER — DEXTROSE AND SODIUM CHLORIDE 5; .33 G/100ML; G/100ML
INJECTION, SOLUTION INTRAVENOUS CONTINUOUS
Status: DISCONTINUED | OUTPATIENT
Start: 2020-03-04 | End: 2020-03-05 | Stop reason: HOSPADM

## 2020-03-04 RX ORDER — HEPARIN SODIUM (PORCINE) LOCK FLUSH IV SOLN 100 UNIT/ML 100 UNIT/ML
500 SOLUTION INTRAVENOUS PRN
Status: CANCELLED | OUTPATIENT
Start: 2020-03-04

## 2020-03-04 RX ORDER — SODIUM CHLORIDE 0.9 % (FLUSH) 0.9 %
10 SYRINGE (ML) INJECTION PRN
Status: CANCELLED | OUTPATIENT
Start: 2020-03-04

## 2020-03-04 RX ORDER — DIPHENHYDRAMINE HCL 25 MG
25 TABLET ORAL EVERY 4 HOURS PRN
Status: DISCONTINUED | OUTPATIENT
Start: 2020-03-04 | End: 2020-03-05 | Stop reason: HOSPADM

## 2020-03-04 RX ORDER — DEXTROSE AND SODIUM CHLORIDE 5; .33 G/100ML; G/100ML
INJECTION, SOLUTION INTRAVENOUS CONTINUOUS
Status: CANCELLED
Start: 2020-03-24

## 2020-03-04 RX ORDER — 0.9 % SODIUM CHLORIDE 0.9 %
10 VIAL (ML) INJECTION PRN
Status: DISCONTINUED | OUTPATIENT
Start: 2020-03-04 | End: 2020-03-05 | Stop reason: HOSPADM

## 2020-03-04 RX ORDER — DIPHENHYDRAMINE HCL 25 MG
25 TABLET ORAL ONCE
Status: CANCELLED | OUTPATIENT
Start: 2020-03-24

## 2020-03-04 RX ORDER — SODIUM CHLORIDE 0.9 % (FLUSH) 0.9 %
20 SYRINGE (ML) INJECTION PRN
Status: CANCELLED | OUTPATIENT
Start: 2020-03-04

## 2020-03-04 RX ORDER — 0.9 % SODIUM CHLORIDE 0.9 %
10 VIAL (ML) INJECTION PRN
Status: CANCELLED | OUTPATIENT
Start: 2020-03-24

## 2020-03-04 RX ORDER — DIPHENHYDRAMINE HCL 25 MG
25 TABLET ORAL ONCE
Status: COMPLETED | OUTPATIENT
Start: 2020-03-04 | End: 2020-03-04

## 2020-03-04 RX ORDER — DIPHENHYDRAMINE HCL 25 MG
25 TABLET ORAL EVERY 4 HOURS PRN
Status: CANCELLED
Start: 2020-03-24

## 2020-03-04 RX ORDER — HEPARIN SODIUM (PORCINE) LOCK FLUSH IV SOLN 100 UNIT/ML 100 UNIT/ML
500 SOLUTION INTRAVENOUS PRN
Status: DISCONTINUED | OUTPATIENT
Start: 2020-03-04 | End: 2020-03-05 | Stop reason: HOSPADM

## 2020-03-04 RX ORDER — HEPARIN SODIUM (PORCINE) LOCK FLUSH IV SOLN 100 UNIT/ML 100 UNIT/ML
500 SOLUTION INTRAVENOUS PRN
Status: CANCELLED
Start: 2020-03-24

## 2020-03-04 RX ORDER — DEXTROSE MONOHYDRATE 50 MG/ML
INJECTION, SOLUTION INTRAVENOUS CONTINUOUS
Status: CANCELLED
Start: 2020-03-24

## 2020-03-04 RX ADMIN — ALTEPLASE 2 MG: 2.2 INJECTION, POWDER, LYOPHILIZED, FOR SOLUTION INTRAVENOUS at 09:05

## 2020-03-04 RX ADMIN — SODIUM CHLORIDE, PRESERVATIVE FREE 10 ML: 5 INJECTION INTRAVENOUS at 15:54

## 2020-03-04 RX ADMIN — DEXTROSE AND SODIUM CHLORIDE: 5; 330 INJECTION, SOLUTION INTRAVENOUS at 08:45

## 2020-03-04 RX ADMIN — DIPHENHYDRAMINE HYDROCHLORIDE 25 MG: 25 TABLET ORAL at 08:45

## 2020-03-04 RX ADMIN — HEPARIN 500 UNITS: 100 SYRINGE at 15:54

## 2020-03-04 RX ADMIN — IMMUNE GLOBULIN INTRAVENOUS (HUMAN): 5 INJECTION, SOLUTION INTRAVENOUS at 09:32

## 2020-03-04 RX ADMIN — SODIUM CHLORIDE, PRESERVATIVE FREE 10 ML: 5 INJECTION INTRAVENOUS at 08:45

## 2020-03-04 RX ADMIN — DIPHENHYDRAMINE HYDROCHLORIDE 25 MG: 25 TABLET ORAL at 12:37

## 2020-03-04 NOTE — PROGRESS NOTES
Pt arrives per ambulatory with O2 from home at 3L/NC and attached to O2 at 3L/NC here at [facility]. No blood return to port, after many NS 10 ml flushes and repositioned and also coughed and deep breath. Also re-accessed and still no blood return and activase instilled and after about 2 hours, writer pulled back and very good blood return obtained. Pt had peripheral IV placed at time of activase to port, due to treatment is very long. Pt tolerated hydration with D5 1/3 NS at 500 ml/hr for 30 min, for 250 ml pre and post hydration with IVIG. Orders reviewed and labs due in April as written on snapshot in Epic and pt aware. Pt tolerated oral premed also. Then IVIG infusing at 40 ml/hr for 20 min (pt does not do 20 ml/hr as is in order). Then 80 ml/hr for 20 min, then 160 ml/hr for 30 min, and then 250 ml/hr for remainder. Pt tolerates IVIG well and hydration well. Then another 25 mg po benadryl given about 1245. Pt resting well and see flow sheets for vital signs Q 30 min. No reactions or complaints and blood return present throughout infusion. Pt discharged per ambulatory per self with home O2 with calendar with next appts.

## 2020-03-06 RX ORDER — BUDESONIDE AND FORMOTEROL FUMARATE DIHYDRATE 160; 4.5 UG/1; UG/1
AEROSOL RESPIRATORY (INHALATION)
Qty: 10.2 G | OUTPATIENT
Start: 2020-03-06

## 2020-03-09 RX ORDER — FLUTICASONE PROPIONATE 50 MCG
SPRAY, SUSPENSION (ML) NASAL
Qty: 1 BOTTLE | Refills: 3 | Status: SHIPPED | OUTPATIENT
Start: 2020-03-09 | End: 2020-04-01

## 2020-03-09 RX ORDER — MONTELUKAST SODIUM 10 MG/1
10 TABLET ORAL NIGHTLY
Qty: 90 TABLET | Refills: 0 | Status: SHIPPED | OUTPATIENT
Start: 2020-03-09 | End: 2020-06-01

## 2020-03-11 ENCOUNTER — TELEPHONE (OUTPATIENT)
Dept: PULMONOLOGY | Age: 65
End: 2020-03-11

## 2020-03-11 RX ORDER — LEVOFLOXACIN 500 MG/1
500 TABLET, FILM COATED ORAL DAILY
Qty: 7 TABLET | Refills: 1 | Status: SHIPPED | OUTPATIENT
Start: 2020-03-11 | End: 2020-03-18

## 2020-03-11 RX ORDER — PREDNISONE 10 MG/1
TABLET ORAL
Qty: 30 TABLET | Refills: 1 | Status: SHIPPED | OUTPATIENT
Start: 2020-03-11 | End: 2021-03-16

## 2020-03-17 ENCOUNTER — TELEPHONE (OUTPATIENT)
Dept: PULMONOLOGY | Age: 65
End: 2020-03-17

## 2020-04-01 RX ORDER — FLUTICASONE PROPIONATE 50 MCG
2 SPRAY, SUSPENSION (ML) NASAL DAILY
Qty: 16 G | Refills: 5 | Status: SHIPPED | OUTPATIENT
Start: 2020-04-01 | End: 2020-12-14 | Stop reason: SDUPTHER

## 2020-04-01 RX ORDER — BUDESONIDE AND FORMOTEROL FUMARATE DIHYDRATE 160; 4.5 UG/1; UG/1
AEROSOL RESPIRATORY (INHALATION)
Qty: 10.2 G | Refills: 5 | Status: SHIPPED | OUTPATIENT
Start: 2020-04-01 | End: 2020-12-14 | Stop reason: SDUPTHER

## 2020-04-01 NOTE — TELEPHONE ENCOUNTER
Last OV- 2/25/20  Next OV- 5/26/20    Health Maintenance   Topic Date Due    DTaP/Tdap/Td vaccine (1 - Tdap) 08/29/1976    A1C test (Diabetic or Prediabetic)  03/01/2017    Colon cancer screen colonoscopy  01/01/2019    Annual Wellness Visit (AWV)  06/19/2019    Potassium monitoring  02/04/2020    Lipid screen  03/04/2020    Flu vaccine (Season Ended) 09/01/2020    Creatinine monitoring  01/22/2021    Pneumococcal 0-64 years Vaccine (3 of 3 - PPSV23) 12/02/2021    Hepatitis C screen  Completed    HIV screen  Completed    Hepatitis A vaccine  Aged Out    Hepatitis B vaccine  Aged Out    Hib vaccine  Aged Out    Meningococcal (ACWY) vaccine  Aged Out             (applicable per patient's age: Cancer Screenings, Depression Screening, Fall Risk Screening, Immunizations)    Hemoglobin A1C (%)   Date Value   03/01/2016 5.7   12/29/2014 5.6   06/08/2014 5.5     LDL Cholesterol (mg/dL)   Date Value   03/04/2015 88     AST (U/L)   Date Value   02/04/2019 23     ALT (U/L)   Date Value   01/22/2020 27     BUN (mg/dL)   Date Value   02/04/2019 13      (goal A1C is < 7)   (goal LDL is <100) need 30-50% reduction from baseline     BP Readings from Last 3 Encounters:   03/04/20 133/67   02/25/20 119/78   02/11/20 124/76    (goal /80)      All Future Testing planned in [facility]:  Lab Frequency Next Occurrence   Sleep Study with PAP Titration Once 04/17/2020       Next Visit Date:  Future Appointments   Date Time Provider Abdirashid Vallecillo   5/26/2020 10:30 AM Shameka Perdomo MD Resp Spec [facility]            Patient Active Problem List:     COPD exacerbation ([address]     Sleep apnea     Common variable immunodeficiency ([address]     Reflux     HTN (hypertension)     Anxiety     Obesity (BMI 30-39. 9)     Hyperglycemia     Hypoxia     Low back pain     Radiculopathy with lower extremity symptoms     Essential hypertension     Toxic encephalopathy     Tachycardia     Common variable hypogammaglobulinemia (HCC)     Acute on

## 2020-05-20 ENCOUNTER — HOSPITAL ENCOUNTER (OUTPATIENT)
Age: 65
Setting detail: SPECIMEN
Discharge: HOME | End: 2020-05-20
Payer: MEDICARE

## 2020-05-20 LAB
ALBUMIN SERPL-MCNC: 3.9 G/DL (ref 3.5–5.2)
ALBUMIN/GLOBULIN RATIO: 1.2 (ref 1–2.5)
ALP BLD-CCNC: 96 U/L (ref 40–129)
ALT SERPL-CCNC: 32 U/L (ref 5–41)
ANION GAP SERPL CALCULATED.3IONS-SCNC: 15 MMOL/L (ref 9–17)
AST SERPL-CCNC: 29 U/L
BILIRUB SERPL-MCNC: 0.23 MG/DL (ref 0.3–1.2)
BILIRUBIN DIRECT: <0.08 MG/DL
BILIRUBIN, INDIRECT: ABNORMAL MG/DL (ref 0–1)
BUN BLDV-MCNC: 12 MG/DL (ref 8–23)
BUN/CREAT BLD: ABNORMAL (ref 9–20)
CALCIUM SERPL-MCNC: 9.3 MG/DL (ref 8.6–10.4)
CHLORIDE BLD-SCNC: 98 MMOL/L (ref 98–107)
CO2: 22 MMOL/L (ref 20–31)
CREAT SERPL-MCNC: 0.87 MG/DL (ref 0.7–1.2)
GFR AFRICAN AMERICAN: >60 ML/MIN
GFR NON-AFRICAN AMERICAN: >60 ML/MIN
GFR SERPL CREATININE-BSD FRML MDRD: ABNORMAL ML/MIN/{1.73_M2}
GFR SERPL CREATININE-BSD FRML MDRD: ABNORMAL ML/MIN/{1.73_M2}
GLOBULIN: ABNORMAL G/DL (ref 1.5–3.8)
GLUCOSE BLD-MCNC: 140 MG/DL (ref 70–99)
HCT VFR BLD CALC: 44.5 % (ref 40.7–50.3)
HEMOGLOBIN: 14.7 G/DL (ref 13–17)
MCH RBC QN AUTO: 28.8 PG (ref 25.2–33.5)
MCHC RBC AUTO-ENTMCNC: 33 G/DL (ref 28.4–34.8)
MCV RBC AUTO: 87.1 FL (ref 82.6–102.9)
NRBC AUTOMATED: 0 PER 100 WBC
PDW BLD-RTO: 12.7 % (ref 11.8–14.4)
PLATELET # BLD: 237 K/UL (ref 138–453)
PMV BLD AUTO: 10.8 FL (ref 8.1–13.5)
POTASSIUM SERPL-SCNC: 4.1 MMOL/L (ref 3.7–5.3)
RBC # BLD: 5.11 M/UL (ref 4.21–5.77)
SODIUM BLD-SCNC: 135 MMOL/L (ref 135–144)
TOTAL PROTEIN: 7.1 G/DL (ref 6.4–8.3)
WBC # BLD: 6.8 K/UL (ref 3.5–11.3)

## 2020-06-01 RX ORDER — MONTELUKAST SODIUM 10 MG/1
TABLET ORAL
Qty: 90 TABLET | Refills: 0 | Status: SHIPPED | OUTPATIENT
Start: 2020-06-01 | End: 2020-12-14 | Stop reason: SDUPTHER

## 2020-06-01 NOTE — TELEPHONE ENCOUNTER
Dr Bess Ovalles, patient is current and due in for an appointment on 7/29/20. Per last dictation patient to continue this medication. Please sign for refill if ok. Thank you.

## 2020-07-29 ENCOUNTER — OFFICE VISIT (OUTPATIENT)
Dept: PULMONOLOGY | Age: 65
End: 2020-07-29
Payer: MEDICARE

## 2020-07-29 ENCOUNTER — TELEPHONE (OUTPATIENT)
Dept: PULMONOLOGY | Age: 65
End: 2020-07-29

## 2020-07-29 VITALS
TEMPERATURE: 98.3 F | HEART RATE: 80 BPM | OXYGEN SATURATION: 99 % | DIASTOLIC BLOOD PRESSURE: 73 MMHG | SYSTOLIC BLOOD PRESSURE: 121 MMHG | BODY MASS INDEX: 39.87 KG/M2 | WEIGHT: 262.2 LBS

## 2020-07-29 PROCEDURE — G8427 DOCREV CUR MEDS BY ELIG CLIN: HCPCS | Performed by: INTERNAL MEDICINE

## 2020-07-29 PROCEDURE — 3017F COLORECTAL CA SCREEN DOC REV: CPT | Performed by: INTERNAL MEDICINE

## 2020-07-29 PROCEDURE — 1036F TOBACCO NON-USER: CPT | Performed by: INTERNAL MEDICINE

## 2020-07-29 PROCEDURE — G8926 SPIRO NO PERF OR DOC: HCPCS | Performed by: INTERNAL MEDICINE

## 2020-07-29 PROCEDURE — 99214 OFFICE O/P EST MOD 30 MIN: CPT | Performed by: INTERNAL MEDICINE

## 2020-07-29 PROCEDURE — G8417 CALC BMI ABV UP PARAM F/U: HCPCS | Performed by: INTERNAL MEDICINE

## 2020-07-29 PROCEDURE — 3023F SPIROM DOC REV: CPT | Performed by: INTERNAL MEDICINE

## 2020-07-29 RX ORDER — BUDESONIDE AND FORMOTEROL FUMARATE DIHYDRATE 160; 4.5 UG/1; UG/1
2 AEROSOL RESPIRATORY (INHALATION) 2 TIMES DAILY
Qty: 1 INHALER | Refills: 11 | Status: SHIPPED | OUTPATIENT
Start: 2020-07-29 | End: 2021-04-15

## 2020-07-29 ASSESSMENT — SLEEP AND FATIGUE QUESTIONNAIRES
HOW LIKELY ARE YOU TO NOD OFF OR FALL ASLEEP WHILE SITTING QUIETLY AFTER LUNCH WITHOUT ALCOHOL: 3
HOW LIKELY ARE YOU TO NOD OFF OR FALL ASLEEP WHILE LYING DOWN TO REST IN THE AFTERNOON WHEN CIRCUMSTANCES PERMIT: 3
HOW LIKELY ARE YOU TO NOD OFF OR FALL ASLEEP WHILE SITTING AND TALKING TO SOMEONE: 0
ESS TOTAL SCORE: 6
HOW LIKELY ARE YOU TO NOD OFF OR FALL ASLEEP WHEN YOU ARE A PASSENGER IN A CAR FOR AN HOUR WITHOUT A BREAK: 0
HOW LIKELY ARE YOU TO NOD OFF OR FALL ASLEEP WHILE WATCHING TV: 0
HOW LIKELY ARE YOU TO NOD OFF OR FALL ASLEEP IN A CAR, WHILE STOPPED FOR A FEW MINUTES IN TRAFFIC: 0
HOW LIKELY ARE YOU TO NOD OFF OR FALL ASLEEP WHILE SITTING AND READING: 0
HOW LIKELY ARE YOU TO NOD OFF OR FALL ASLEEP WHILE SITTING INACTIVE IN A PUBLIC PLACE: 0

## 2020-07-29 NOTE — TELEPHONE ENCOUNTER
Rowan Fields Mckeon: [id number] - PA Case ID: [id number] Need help? Call us at [phone]   Outcome   Approvedtoday   PA Case: [id number], Status: Approved, Coverage Starts on: 7/29/2020 12:00:00 AM, Coverage Ends on: 12/31/2020 12:00:00 AM.      CALLED [facility] SPOKE WITH ATA INFORMED HER OF THE APPROVAL.

## 2020-07-29 NOTE — PROGRESS NOTES
exertional activities  Denies nocturnal symptoms of cough wheezing chest tightness or shortness of breath. Denies postnasal dripping nasal obstruction and nasal congestion and epistaxis except sometimes. Denies chest pain hemoptysis no night sweats. Since he was seen last time he had finished pulmonary rehabilitation currently not in pulmonary rehabilitation. Using all his medicines as prescribed including Advair, Singulair, Spiriva, Flonase, albuterol  He used albuterol aerosol or inhaler off and on as needed more often since on Advair   He is followed by allergist/immunologist and getting IvIgg every 3 weeks. Since pandemic started he is getting IV IgG at home by visiting nurse    He is using his CPAP and denies any problem using CPAP. He is very compliant with CPAP, he is getting benefit with use of CPAP, his sleep is refreshing sleep quality is better, no sleep fragmentation and denies daytime fatigue, and does not doze off during the daytime and does not usually take naps while he is on CPAP. Compliance data seen from 11/27/1990  To 02/24/2020 CPAP he is on CPAP of 12 cm, compliance is 100% for 90 days with average usage of 8 hours 16 minutes and average AHI of 0.3    Last hospitalization in January and February 2019  He was admitted to hospital twice for COPD/asthma exacerbation initially in second or third week of January 2019 when he was treated with COPD exhibition with bronchodilators and steroids and Levaquin, his chest x-ray that time did not show any infiltrate he was discharged on tapered dose of prednisone and Levaquin.   He was admitted again in early February 2019 for almost 2 weeks later and at that time he was finished with steroids and Levaquin, his flu a was positive this time he was treated with Tamiflu, he had a chest x-ray done which did not show infiltrates or evidence of pneumonia, he had a CT scan of the chest done which shows minimal residual or dependent atelectasis and/or pneumonia or infiltrate was seen this time he was again treated with Levaquin and prednisone and was discharged home. 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, he c/o pains in legs and joints a week prior to infusion and resolve usually the next day after infusion, he is tolerating it well and followed with allergist/ immunologist and recent Igg was > 800 and getting infusion every 3 weeks   H/O Severe persistent Asthma and COPD with h/o smoking for about > 30 years with h/o frequent exacerbations of Asthmatic Bronchitis  He is on home O2 and use O2 at night with cpap and also during daytime most of the time, He has PRECIOUS and very complaint with cpap at 12 cm and denies any problems with cpap since seen last time  With h/o all rhinitis and gerd and h/o cough denies ch cough, allergy symptoms are better since he stop mowing his lawn       Subjective:   Review of Systems -   General ROS: Negative for fatigue, negative for  - , chills, fever, night sweats or weight loss  ENT ROS: Negative for - nasal congestion, postnasal dripping, sinus pain, nasal discharge and no sneezing or epistaxis  Allergy and Immunology ROS: Negative for - nasal congestion and seasonal allergies  Respiratory ROS:  Positive-intermittent  cough, positive shortness of breath, positive clear sputum production, occasional wheezing, no chest pain or hemoptysis  Cardiovascular ROS: positive for - dyspnea on exertion,  negative for chest pain, orthopnea, PND, pedal edema. Gastrointestinal ROS: no abdominal pain,  nausea, vomiting, diarrhea, change in bowel habits, hematochezia, melena. Musculoskeletal ROS: Positive for left hip pain and difficulty walking, negative  for - joint stiffness and swelling and muscle pain  CNS: negative for weakness, dizziness, diplopia, syncope, seizure, headache, tingling, dysphagia. Hem/Onc: negative for bleeding and bruisingand petechia  Skin: negative for rash and skin lesions  : negative for hematuria, dysuria nocturia and frequency. Sleep Medicine 7/29/2020 2/25/2020 3/20/2019 3/28/2018 12/6/2016   Sitting and reading 0 1 3 0 3   Watching TV 0 0 1 0 0   Sitting, inactive in a public place (e.g. a theatre or a meeting) 0 0 1 0 0   As a passenger in a car for an hour without a break 0 0 0 0 2   Lying down to rest in the afternoon when circumstances permit 3 3 3 2 3   Sitting and talking to someone 0 0 0 0 0   Sitting quietly after a lunch without alcohol 3 3 3 0 3   In a car, while stopped for a few minutes in traffic 0 0 0 0 0   Total score 6 7 11 2 11       Allergies:  No Known Allergies    Medications:  Outpatient Encounter Medications as of 7/29/2020   Medication Sig Dispense Refill    budesonide-formoterol (SYMBICORT) 160-4.5 MCG/ACT AERO Inhale 2 puffs into the lungs 2 times daily 1 Inhaler 11    montelukast (SINGULAIR) 10 MG tablet take 1 tablet by mouth every evening 90 tablet 0    PROAIR  (90 Base) MCG/ACT inhaler inhale 2 puffs by mouth every 6 hours if needed for wheezing 1 Inhaler 2    fluticasone (FLONASE) 50 MCG/ACT nasal spray 2 sprays by Nasal route daily 16 g 5    predniSONE (DELTASONE) 10 MG tablet 4 tab daily for 3 days, 3 tab daily for 3 days, 2 tab daily for 3 days, then 1 tab daily for 3 days 30 tablet 1    fluticasone-salmeterol (ADVAIR DISKUS) 500-50 MCG/DOSE diskus inhaler Inhale 1 puff into the lungs 2 times daily 60 each 11    SPIRIVA HANDIHALER 18 MCG inhalation capsule inhale the contents of one capsule in the handihaler once daily 90 capsule 3    predniSONE (DELTASONE) 10 MG tablet Take 4 tabs for 3 days, then 3 tabs for 3 days, then 2 tabs or 3 days, then 1 tab for 3 days.  30 tablet 0    omeprazole (PRILOSEC) 20 MG delayed release capsule take 1 capsule by mouth once daily 60 capsule 3    amLODIPine (NORVASC) 10 MG tablet take 1 tablet by mouth once daily 60 tablet 3    Handicap Placard MISC by Does not apply route For 5 years 1 each 0    albuterol (PROVENTIL) (2.5 MG/3ML) 0.083% nebulizer solution inhale contents of 1 vial in nebulizer every 6 hours if needed for wheezing and shortness of breath 375 mL 5    acetaminophen (TYLENOL) 500 MG tablet Take 500 mg by mouth every 6 hours as needed for Pain      traMADol (ULTRAM) 50 MG tablet Take 50 mg by mouth as needed for Pain.  predniSONE (DELTASONE) 10 MG tablet Start at 50 mg and taper 10 mg every 3 days 45 tablet 0    ALPRAZolam (XANAX) 0.5 MG tablet Take 0.5 mg by mouth 3 times daily as needed for Sleep. .      benazepril (LOTENSIN) 10 MG tablet take 1 tablet by mouth once daily (Patient taking differently: Take 10 mg BID) 30 tablet 5    Handicap Placard MISC by Does not apply route 1 each 0    Immune Globulin, Human, 10 GM/100ML SOLN IV solution Infuse 60 g intravenously every 21 days       SYMBICORT 160-4.5 MCG/ACT AERO inhale 2 puffs by mouth twice a day (Patient not taking: Reported on 7/29/2020) 10.2 g 5     No facility-administered encounter medications on file as of 7/29/2020. Objective:    Physical Exam:  Vitals:   /73   Pulse 80   Temp 98.3 °F (36.8 °C)   Wt 262 lb 3.2 oz (118.9 kg)   SpO2 99% Comment: uses 2-3 liters of o2 continuously  BMI 39.87 kg/m²   Last 3 weights: Wt Readings from Last 3 Encounters:   07/29/20 262 lb 3.2 oz (118.9 kg)   02/25/20 266 lb 12.8 oz (121 kg)   02/21/20 266 lb (120.7 kg)     Body mass index is 39.87 kg/m². Physical Examination:   General appearance - alert, over weight not tachypnic and in no distress  Mental status - alert, oriented to person, place, and time  Eyes - pupils equal and reactive, extraocular eye movements intact  Nose - normal and patent, no erythema, , positive pale and edematous nasal mucosa, no purulent secretion.   Mouth - mucous membranes moist, pharynx normal without lesions, large tongue, thick 9.3 8.6 - 10.4 mg/dL Final     S. Ionized Calcium: No results found for: IONCA  S. Magnesium:   Magnesium   Date Value Ref Range Status   06/12/2014 2.7 (H) 1.6 - 2.6 mg/dL Final     Comment:     [facility] [place] [address] [phone]     S. Phosphorus:   Phosphorus   Date Value Ref Range Status   01/08/2013 3.4 2.5 - 4.5 mg/dL Final     Comment:     [facility] [place] 3 [phone]     S. Glucose:   POC Glucose   Date Value Ref Range Status   01/16/2019 168 (H) 75 - 110 mg/dL Final   01/15/2019 179 (H) 75 - 110 mg/dL Final   01/15/2019 160 (H) 75 - 110 mg/dL Final     Glycosylated hemoglobin A1C:   Hemoglobin A1C   Date Value Ref Range Status   03/01/2016 5.7 4.0 - 6.0 % Final       Pulmonary Functions Testing Results:  07/31/2019  Spirometry shows FVC is 3.14 79% predicted. FEV1 is 2.60 82% predicted. FEV1 FVC ratio is normal.  Lung volume shows residual volume is 2.93 130% predicted consistent with hyperinflation and airway trapping. Diffusion capacity is 21.24 81% predicted which was within normal limit. 9/6/2016:  FEV1 2.78 86%, FVC 3.64 90%, [id number] 95% , TLC 7.02  114%, DLCO 20.32  77%  8/26/2015: FEV1 2.30 69%, FVC 3.00 64%, [id number], TLC 6.64  95%, DLCO 21.54  79%    Blood Gases: No results found for: PH, PCO2, PO2, HCO3, O2SAT    Radiological reports:    CXR    CT Scans     CT lung screening.  02/19/2020  Mediastinum:  Suboptimal evaluation due to low dose technique.  Right-sided   port is in place.  Thoracic aorta demonstrates mild calcification without   aneurysm.  Pulmonary trunk appears nondilated.  Heart appears normal in size   without pericardial effusion.  No lymphadenopathy.  The esophagus is grossly   unremarkable.       Lungs/Pleura:  Emphysematous changes.  No focal consolidation, pneumothorax   or pleural effusion.  Trachea and distal airways appear patent.  No   suspicious noncalcified lung nodule or mass.      02/05/19  Basilar subsegmental atelectasis without evidence for consolidation.       Emphysema and sequela of old granulomatous disease.  Return to low-dose chest   CT screening schedule is recommended, if clinically appropriate. CT Scan Low dose     9/19/17  *Stable 5 mm nodular thickening along the minor fissure.  No new or enlarging   nodule. *Paraseptal emphysema. 9/27/16  Mild apical paraseptal emphysematous changes, more on the right. Dependent/atelectatic findings posteriorly toward the bases.       Unchanged 5 mm nodule contiguous with the medial minor fissure. EKG:     ECHO:     7/24/17  Left ventricle is normal in size  Global left ventricular systolic function is normal Estimated ejection  fraction is 65 % . No significant valvular regurgitation or stenosis seen. 11/8/13  Technically difficult study. No gross segmental wall motion abnormality. Estimated ejection fraction is 55 %. Mild left ventricular hypertrophy. Evidence of diastolic dysfunction. Left atrium is mildly dilated. No significant valvular regurgitation or stenosis seen. Trivial anterior pericardial effusion  Right Atrium  Right atrium is normal in size. Right Ventricle  Normal right ventricular size and function. 6 minutes walk test 03/20/19  Total distance walk 750 feet 48% predicted. Room air oxygen was 88% on walking slow to 250 feet started on 2 L nasal cannula and O2 saturation 88% after walking 250 feet and increased to 3 L and his saturation went to 95% with walking to 250 feet. Compliance data from CPAP  11/27/2019 to 02/24/2020  Compliance 100%  Average usage of 8 hours 16 minutes  Average AHI 0.3    Assessment:      1. Chronic obstructive pulmonary disease, unspecified COPD type ([facility]   2. Severe persistent asthma without complication   3. CVID (common variable immunodeficiency) ([facility] [address]   4. Allergic rhinitis, unspecified seasonality, unspecified trigger   5. PRECIOUS on CPAP   6.  Chronic respiratory failure with hypoxia ([address]         Plan: Will change back to Symbicort 160/4.5 2 puffs BID as he is having problems with Advair as mentioned above, he get more short of breath since start using Advair does not last as long he is having problem with powder and have to use more frequent albuterol. And will get preauthorization for Symbicort as he was getting benefit from Symbicort as compared to Advair  Continue  spiriva once daily. Continue Flonase. Continue Singulair. Reflux precautions and continue PPI  Continue albuterol aerosol as needed  Follow up Allergist and immunologist for CVID/ IVIG. Had Flu vaccine last year  Annual flu vaccine recommended in fall  Up to date with vaccinations from pulm perspective and recommendations per allergist for vaccination  Maintain an active lifestyle    Supplemental O2 to continue, he is benefiting from oxygen therapy and advised to continue with oxygen     CPAP at 12 cm to continue  Wt loss is recommended and discussed  Follow good sleep hygeine instructions  Use humidifier  Questions answered pertaining to diagnosis and management explained importance of compliance with therapy   Compliance data reviewed and pt is compliant per insurance requirements. RTC in 4 months. Please note that this chart was generated using voice recognition Dragon dictation software. Although every effort was made to ensure the accuracy of this automated transcription, some errors in transcription may have occurred.     Sergio Caban MD             7/29/2020, 1:55 PM

## 2020-08-02 ENCOUNTER — HOSPITAL ENCOUNTER (OUTPATIENT)
Age: 65
Setting detail: SPECIMEN
Discharge: HOME | End: 2020-08-02
Payer: MEDICARE

## 2020-08-02 LAB
ABSOLUTE EOS #: 0.12 K/UL (ref 0–0.44)
ABSOLUTE IMMATURE GRANULOCYTE: <0.03 K/UL (ref 0–0.3)
ABSOLUTE LYMPH #: 1.7 K/UL (ref 1.1–3.7)
ABSOLUTE MONO #: 0.4 K/UL (ref 0.1–1.2)
ALT SERPL-CCNC: 22 U/L (ref 5–41)
BASOPHILS # BLD: 1 % (ref 0–2)
BASOPHILS ABSOLUTE: 0.05 K/UL (ref 0–0.2)
CREAT SERPL-MCNC: 0.86 MG/DL (ref 0.7–1.2)
DIFFERENTIAL TYPE: ABNORMAL
EOSINOPHILS RELATIVE PERCENT: 2 % (ref 1–4)
GFR AFRICAN AMERICAN: >60 ML/MIN
GFR NON-AFRICAN AMERICAN: >60 ML/MIN
GFR SERPL CREATININE-BSD FRML MDRD: NORMAL ML/MIN/{1.73_M2}
GFR SERPL CREATININE-BSD FRML MDRD: NORMAL ML/MIN/{1.73_M2}
HCT VFR BLD CALC: 41.1 % (ref 40.7–50.3)
HEMOGLOBIN: 13.4 G/DL (ref 13–17)
IGG: 787 MG/DL (ref 700–1600)
IMMATURE GRANULOCYTES: 0 %
LYMPHOCYTES # BLD: 24 % (ref 24–43)
MCH RBC QN AUTO: 29.2 PG (ref 25.2–33.5)
MCHC RBC AUTO-ENTMCNC: 32.6 G/DL (ref 28.4–34.8)
MCV RBC AUTO: 89.5 FL (ref 82.6–102.9)
MONOCYTES # BLD: 6 % (ref 3–12)
NRBC AUTOMATED: 0 PER 100 WBC
PDW BLD-RTO: 13.1 % (ref 11.8–14.4)
PLATELET # BLD: 219 K/UL (ref 138–453)
PLATELET ESTIMATE: ABNORMAL
PMV BLD AUTO: 11.3 FL (ref 8.1–13.5)
RBC # BLD: 4.59 M/UL (ref 4.21–5.77)
RBC # BLD: ABNORMAL 10*6/UL
SEG NEUTROPHILS: 67 % (ref 36–65)
SEGMENTED NEUTROPHILS ABSOLUTE COUNT: 4.79 K/UL (ref 1.5–8.1)
WBC # BLD: 7.1 K/UL (ref 3.5–11.3)
WBC # BLD: ABNORMAL 10*3/UL

## 2020-11-13 ENCOUNTER — VIRTUAL VISIT (OUTPATIENT)
Dept: PULMONOLOGY | Age: 65
End: 2020-11-13
Payer: MEDICARE

## 2020-11-13 PROCEDURE — G8427 DOCREV CUR MEDS BY ELIG CLIN: HCPCS | Performed by: INTERNAL MEDICINE

## 2020-11-13 PROCEDURE — G8417 CALC BMI ABV UP PARAM F/U: HCPCS | Performed by: INTERNAL MEDICINE

## 2020-11-13 PROCEDURE — 1036F TOBACCO NON-USER: CPT | Performed by: INTERNAL MEDICINE

## 2020-11-13 PROCEDURE — 99214 OFFICE O/P EST MOD 30 MIN: CPT | Performed by: INTERNAL MEDICINE

## 2020-11-13 PROCEDURE — 3023F SPIROM DOC REV: CPT | Performed by: INTERNAL MEDICINE

## 2020-11-13 PROCEDURE — 3017F COLORECTAL CA SCREEN DOC REV: CPT | Performed by: INTERNAL MEDICINE

## 2020-11-13 PROCEDURE — G8926 SPIRO NO PERF OR DOC: HCPCS | Performed by: INTERNAL MEDICINE

## 2020-11-13 PROCEDURE — G8482 FLU IMMUNIZE ORDER/ADMIN: HCPCS | Performed by: INTERNAL MEDICINE

## 2020-11-13 RX ORDER — ALBUTEROL SULFATE 2.5 MG/3ML
2.5 SOLUTION RESPIRATORY (INHALATION) EVERY 4 HOURS PRN
Qty: 375 ML | Refills: 5 | Status: SHIPPED | OUTPATIENT
Start: 2020-11-13 | End: 2022-04-15 | Stop reason: SDUPTHER

## 2020-11-13 RX ORDER — LEVOFLOXACIN 500 MG/1
500 TABLET, FILM COATED ORAL DAILY
Qty: 7 TABLET | Refills: 0 | Status: SHIPPED | OUTPATIENT
Start: 2020-11-13 | End: 2020-11-20

## 2020-11-13 RX ORDER — PREDNISONE 10 MG/1
TABLET ORAL
Qty: 30 TABLET | Refills: 0 | Status: SHIPPED | OUTPATIENT
Start: 2020-11-13 | End: 2021-03-16

## 2020-11-13 NOTE — PROGRESS NOTES
PULMONARY OUTPATIENT PROGRESS NOTE    Telehealth visit  Telephone visit. Patient:  Jim Rivero  YOB: 1955    MRN: [id number]     Acct:        Pt seen and Chart reviewed. Mr/Ms Jim Rivero is here in followup for    Diagnosis Orders   1. Chronic obstructive pulmonary disease, unspecified COPD type ([facility]     2. Moderate persistent asthma without complication     3. PRECIOUS on CPAP     4. CVID (common variable immunodeficiency) ([facility].)     5. Allergic rhinitis, unspecified seasonality, unspecified trigger       He is here for follow up of COPD, PRECIOUS, CVID, severe persistent asthma, chronic respiratory failure on home O2   Since he was seen last time he has been stable, according to patient and his wife he has been doing okay, because of pandemic he is staying at home most of the time he does go outside in his backyard and he use oxygen when he goes outside. Most of the time he is using oxygen except when he is sitting and not mobilizing. He does use oxygen with CPAP at night also. He does have intermittent cough which is chronic without much change denies increased cough. He does have whitish sputum especially because of the fall and in his neighborhood because of the leaf burning he does feel slightly more sputum production denies any change in the color of the sputum or increase in sputum. He denies waking up at night with cough wheezing chest tightness or shortness of breath sleep okay at night. He does have intermittent wheezing off and on but denies daily or persistent wheezing. He denies any chest pain fever hemoptysis or night sweats. He denies any increase in postnasal drip in his allergy rhinitis symptoms are controlled with Flonase. He use Symbicort, Symbicort is much better than Advair, he is using Spiriva, he use albuterol as needed sometimes use it to 3 times a week and some days he does not require albuterol.   Last time he had to take his steroids and Levaquin was few months ago as he was given a prescription to keep. He is using CPAP every night he denies any problem with CPAP he is very compliant he use CPAP with mask. He does not usually take naps during the daytime and denies dozing off, his sleep quality is good denies sleep fragmentation and sleep is refreshing and is getting benefit from CPAP. Last compliance data seen from 11/27/1990 to 02/24/2020 CPAP he is on CPAP of 12 cm, compliance is 100% for 90 days with average usage of 8 hours 16 minutes and average AHI of 0.3    He is followed by allergist/immunologist and getting IvIgg every 3 weeks. Since pandemic started he is getting IV IgG at home by visiting nurse and he got a dose today        Last hospitalization in January and February 2019  He was admitted to hospital twice for COPD/asthma exacerbation initially in second or third week of January 2019 when he was treated with COPD exhibition with bronchodilators and steroids and Levaquin, his chest x-ray that time did not show any infiltrate he was discharged on tapered dose of prednisone and Levaquin. He was admitted again in early February 2019 for almost 2 weeks later and at that time he was finished with steroids and Levaquin, his flu a was positive this time he was treated with Tamiflu, he had a chest x-ray done which did not show infiltrates or evidence of pneumonia, he had a CT scan of the chest done which shows minimal residual or dependent atelectasis and/or pneumonia or infiltrate was seen this time he was again treated with Levaquin and prednisone and was discharged home.       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, he c/o pains in legs and joints a week prior to infusion and resolve usually the next day after infusion, he is tolerating it well and followed with allergist/ immunologist and recent Igg was > 800 and getting infusion every 3 weeks   H/O Severe persistent Asthma and COPD with h/o smoking for about > 30 years with h/o frequent exacerbations of Asthmatic Bronchitis  He is on home O2 and use O2 at night with cpap and also during daytime most of the time, He has PRECIOUS and very complaint with cpap at 12 cm and denies any problems with cpap since seen last time  With h/o all rhinitis and gerd and h/o cough denies ch cough, allergy symptoms are better since he stop mowing his lawn       Subjective:   Review of Systems -   General ROS: Negative for fatigue, negative for  - , chills, fever, night sweats or weight loss  ENT ROS: Negative for - nasal congestion, postnasal dripping, sinus pain, nasal discharge and no sneezing or epistaxis  Allergy and Immunology ROS: Negative for - nasal congestion and seasonal allergies  Respiratory ROS:  Positive-intermittent  cough, positive shortness of breath on exertion, positive clear sputum production, occasional wheezing, no chest pain or hemoptysis  Cardiovascular ROS: positive for - dyspnea on exertion,  negative for chest pain, orthopnea, PND, pedal edema. Gastrointestinal ROS: no abdominal pain,  nausea, vomiting, diarrhea, change in bowel habits, hematochezia, melena. Musculoskeletal ROS: Negative for joints pain, negative  for - joint stiffness and swelling and muscle pain  CNS: negative for weakness, dizziness, diplopia, syncope, seizure, headache, tingling, dysphagia. Hem/Onc: negative for bleeding and bruisingand petechia  Skin: negative for rash and skin lesions  : negative for hematuria, dysuria nocturia and frequency.        Sleep Medicine 7/29/2020 2/25/2020 3/20/2019 3/28/2018 12/6/2016   Sitting and reading 0 1 3 0 3   Watching TV 0 0 1 0 0   Sitting, inactive in a public place (e.g. a theatre or a meeting) 0 0 1 0 0   As a passenger in a car for an hour without a break 0 0 0 0 2   Lying down to rest in the afternoon when circumstances permit 3 3 3 2 3   Sitting and talking to someone 0 0 0 0 0   Sitting quietly after a lunch without alcohol 3 3 3 0 3   In a car, while stopped for a few minutes in traffic 0 0 0 0 0   Total score 6 7 11 2 11       Allergies:  No Known Allergies    Medications:  Outpatient Encounter Medications as of 11/13/2020   Medication Sig Dispense Refill    budesonide-formoterol (SYMBICORT) 160-4.5 MCG/ACT AERO Inhale 2 puffs into the lungs 2 times daily 1 Inhaler 11    montelukast (SINGULAIR) 10 MG tablet take 1 tablet by mouth every evening 90 tablet 0    PROAIR  (90 Base) MCG/ACT inhaler inhale 2 puffs by mouth every 6 hours if needed for wheezing 1 Inhaler 2    SYMBICORT 160-4.5 MCG/ACT AERO inhale 2 puffs by mouth twice a day (Patient not taking: Reported on 7/29/2020) 10.2 g 5    fluticasone (FLONASE) 50 MCG/ACT nasal spray 2 sprays by Nasal route daily 16 g 5    predniSONE (DELTASONE) 10 MG tablet 4 tab daily for 3 days, 3 tab daily for 3 days, 2 tab daily for 3 days, then 1 tab daily for 3 days 30 tablet 1    fluticasone-salmeterol (ADVAIR DISKUS) 500-50 MCG/DOSE diskus inhaler Inhale 1 puff into the lungs 2 times daily 60 each 11    SPIRIVA HANDIHALER 18 MCG inhalation capsule inhale the contents of one capsule in the handihaler once daily 90 capsule 3    predniSONE (DELTASONE) 10 MG tablet Take 4 tabs for 3 days, then 3 tabs for 3 days, then 2 tabs or 3 days, then 1 tab for 3 days.  30 tablet 0    omeprazole (PRILOSEC) 20 MG delayed release capsule take 1 capsule by mouth once daily 60 capsule 3    amLODIPine (NORVASC) 10 MG tablet take 1 tablet by mouth once daily 60 tablet 3    Handicap Placard MISC by Does not apply route For 5 years 1 each 0    albuterol (PROVENTIL) (2.5 MG/3ML) 0.083% nebulizer solution inhale contents of 1 vial in nebulizer every 6 hours if needed for wheezing and shortness of breath 375 mL 5    acetaminophen (TYLENOL) 500 MG tablet Take 500 mg by mouth every 6 hours as needed for Pain      traMADol (ULTRAM) 50 MG tablet Take 50 mg by mouth as needed for Pain.  predniSONE (DELTASONE) 10 MG tablet Start at 50 mg and taper 10 mg every 3 days 45 tablet 0    ALPRAZolam (XANAX) 0.5 MG tablet Take 0.5 mg by mouth 3 times daily as needed for Sleep. .      benazepril (LOTENSIN) 10 MG tablet take 1 tablet by mouth once daily (Patient taking differently: Take 10 mg BID) 30 tablet 5    Handicap Placard MISC by Does not apply route 1 each 0    Immune Globulin, Human, 10 GM/100ML SOLN IV solution Infuse 60 g intravenously every 21 days        No facility-administered encounter medications on file as of 11/13/2020. Objective:    Physical Exam:  Vitals: There were no vitals taken for this visit. Last 3 weights: Wt Readings from Last 3 Encounters:   07/29/20 262 lb 3.2 oz (118.9 kg)   02/25/20 266 lb 12.8 oz (121 kg)   02/21/20 266 lb (120.7 kg)     There is no height or weight on file to calculate BMI. Physical Examination:   Physical examination not done as this is a telephone visit. No conversational dyspnea noted on the phone.       Labs:  None    CBC:   WBC   Date Value Ref Range Status   08/02/2020 7.1 3.5 - 11.3 k/uL Final     Hemoglobin   Date Value Ref Range Status   08/02/2020 13.4 13.0 - 17.0 g/dL Final     Platelet Count   Date Value Ref Range Status   03/01/2012 202 140 - 450 k/uL Final     Platelets   Date Value Ref Range Status   08/02/2020 219 138 - 453 k/uL Final     BMP:   Sodium   Date Value Ref Range Status   05/20/2020 135 135 - 144 mmol/L Final     Potassium   Date Value Ref Range Status   05/20/2020 4.1 3.7 - 5.3 mmol/L Final     Chloride   Date Value Ref Range Status   05/20/2020 98 98 - 107 mmol/L Final     CO2   Date Value Ref Range Status   05/20/2020 22 20 - 31 mmol/L Final     BUN   Date Value Ref Range Status   05/20/2020 12 8 - 23 mg/dL Final     CREATININE   Date Value Ref Range Status   08/02/2020 0.86 0.70 - 1.20 mg/dL Final 05/20/2020 0.87 0.70 - 1.20 mg/dL Final   01/22/2020 0.86 0.70 - 1.20 mg/dL Final     Glucose   Date Value Ref Range Status   05/20/2020 140 (H) 70 - 99 mg/dL Final   06/04/2012 86 74 - 106 mg/dL Final     Comment:     [facility] [address] [facility] 3 [phone]     S. Calcium:   Calcium   Date Value Ref Range Status   05/20/2020 9.3 8.6 - 10.4 mg/dL Final     S. Ionized Calcium: No results found for: IONCA  S. Magnesium:   Magnesium   Date Value Ref Range Status   06/12/2014 2.7 (H) 1.6 - 2.6 mg/dL Final     Comment:     [address] 3 [phone]     S. Phosphorus:   Phosphorus   Date Value Ref Range Status   01/08/2013 3.4 2.5 - 4.5 mg/dL Final     Comment:     [address] 3 [phone]     S. Glucose:   POC Glucose   Date Value Ref Range Status   01/16/2019 168 (H) 75 - 110 mg/dL Final   01/15/2019 179 (H) 75 - 110 mg/dL Final   01/15/2019 160 (H) 75 - 110 mg/dL Final     Glycosylated hemoglobin A1C:   Hemoglobin A1C   Date Value Ref Range Status   03/01/2016 5.7 4.0 - 6.0 % Final       Pulmonary Functions Testing Results:  07/31/2019  Spirometry shows FVC is 3.14 79% predicted. FEV1 is 2.60 82% predicted. FEV1 FVC ratio is normal.  Lung volume shows residual volume is 2.93 130% predicted consistent with hyperinflation and airway trapping. Diffusion capacity is 21.24 81% predicted which was within normal limit.     9/6/2016:  FEV1 2.78 86%, FVC 3.64 90%, [id number] 95% , TLC 7.02  114%, DLCO 20.32  77%  8/26/2015: FEV1 2.30 69%, FVC 3.00 64%, [id number], TLC 6.64  95%, DLCO 21.54  79%    Blood Gases: No results found for: PH, PCO2, PO2, HCO3, O2SAT    Radiological reports:    CXR    CT Scans     CT lung screening.  02/19/2020  Mediastinum:  Suboptimal evaluation due to low dose technique.  Right-sided   port is in place.  Thoracic aorta demonstrates mild calcification without   aneurysm.  Pulmonary trunk appears nondilated.  Heart appears normal in size   without pericardial effusion.  No lymphadenopathy.  The esophagus is grossly   unremarkable.       Lungs/Pleura:  Emphysematous changes.  No focal consolidation, pneumothorax   or pleural effusion.  Trachea and distal airways appear patent.  No   suspicious noncalcified lung nodule or mass. 02/05/19  Basilar subsegmental atelectasis without evidence for consolidation.       Emphysema and sequela of old granulomatous disease.  Return to low-dose chest   CT screening schedule is recommended, if clinically appropriate. CT Scan Low dose     9/19/17  *Stable 5 mm nodular thickening along the minor fissure.  No new or enlarging   nodule. *Paraseptal emphysema. 9/27/16  Mild apical paraseptal emphysematous changes, more on the right. Dependent/atelectatic findings posteriorly toward the bases.       Unchanged 5 mm nodule contiguous with the medial minor fissure. EKG:     ECHO:     7/24/17  Left ventricle is normal in size  Global left ventricular systolic function is normal Estimated ejection  fraction is 65 % . No significant valvular regurgitation or stenosis seen. 11/8/13  Technically difficult study. No gross segmental wall motion abnormality. Estimated ejection fraction is 55 %. Mild left ventricular hypertrophy. Evidence of diastolic dysfunction. Left atrium is mildly dilated. No significant valvular regurgitation or stenosis seen. Trivial anterior pericardial effusion  Right Atrium  Right atrium is normal in size. Right Ventricle  Normal right ventricular size and function. 6 minutes walk test 03/20/19  Total distance walk 750 feet 48% predicted. Room air oxygen was 88% on walking slow to 250 feet started on 2 L nasal cannula and O2 saturation 88% after walking 250 feet and increased to 3 L and his saturation went to 95% with walking to 250 feet.     Compliance data from CPAP  11/27/2019 to 02/24/2020  Compliance 100%  Average usage emergency), evaluation of the following organ systems was limited: Vitals/Constitutional/EENT/Resp/CV/GI//MS/Neuro/Skin/Heme-Lymph-Imm. Pursuant to the emergency declaration under the [facility], [address] [place] [address] authority and the [employer] Act, this Virtual Visit was conducted with patient's (and/or legal guardian's) consent, to reduce the patient's risk of exposure to COVID-19 and provide necessary medical care. The patient (and/or legal guardian) has also been advised to contact this office for worsening conditions or problems, and seek emergency medical treatment and/or call 911 if deemed necessary. Patient identification was verified at the start of the visit: Yes  Total time spent for this encounter: 22 minutes  Services were provided through a video/telephone synchronous discussion virtually to substitute for in-person clinic visit. Patient and provider were located at their individual locations at home and office respectively. --Jenny Manuel MD on 11/13/2020 at 5:45 PM    An electronic signature was used to authenticate this note.

## 2020-11-16 ENCOUNTER — TELEPHONE (OUTPATIENT)
Dept: PULMONOLOGY | Age: 65
End: 2020-11-16

## 2020-11-30 ENCOUNTER — TELEPHONE (OUTPATIENT)
Dept: PULMONOLOGY | Age: 65
End: 2020-11-30

## 2020-11-30 NOTE — TELEPHONE ENCOUNTER
[place] called office, she said Kristi Gómez still not able to get Rx filled - she informed the pharmacy about billing under correct plan. [facility] pharmacy told her office needs to call regarding PA.

## 2020-11-30 NOTE — TELEPHONE ENCOUNTER
Dr Ralph Mcgarry, patient is current and due in for an appointment on 3/16/21. Per last dictation patient is on albuterol. Please sign for refill if ok. Thank you.

## 2020-11-30 NOTE — TELEPHONE ENCOUNTER
Received a PA notice for Albuterol Solution from [employer]. I phoned [facility] and spoke with Yocasta  informed her to send us the [facility] form so that we can fill it out so that the patient can receive his medication. She is faxing it over.

## 2020-12-03 NOTE — TELEPHONE ENCOUNTER
Received the DWO from back from Dr. Suman Holland and faxed it to [address] [place] [address] at [phone].

## 2020-12-07 NOTE — TELEPHONE ENCOUNTER
Lucille received a call from pt's wife about this script. I phoned [facility] spoke with Dayton Falk informed her we filled out the [facility] form faxed it back on the 3rd of December she did confirm she received it and she will rebill under part \"B\". I also phoned Noelle Gupta pt's wife informed her what needs to be done. She acknowledged understanding.

## 2020-12-14 ENCOUNTER — TELEPHONE (OUTPATIENT)
Dept: PULMONOLOGY | Age: 65
End: 2020-12-14

## 2020-12-14 RX ORDER — TIOTROPIUM BROMIDE 18 UG/1
CAPSULE ORAL; RESPIRATORY (INHALATION)
Qty: 90 CAPSULE | Refills: 0 | Status: SHIPPED | OUTPATIENT
Start: 2020-12-14 | End: 2021-03-25

## 2020-12-14 RX ORDER — ALBUTEROL SULFATE 90 UG/1
AEROSOL, METERED RESPIRATORY (INHALATION)
Qty: 3 INHALER | Refills: 0 | Status: SHIPPED | OUTPATIENT
Start: 2020-12-14 | End: 2021-03-25

## 2020-12-14 RX ORDER — FLUTICASONE PROPIONATE 50 MCG
2 SPRAY, SUSPENSION (ML) NASAL DAILY
Qty: 3 BOTTLE | Refills: 0 | Status: SHIPPED | OUTPATIENT
Start: 2020-12-14 | End: 2021-03-25

## 2020-12-14 RX ORDER — MONTELUKAST SODIUM 10 MG/1
TABLET ORAL
Qty: 90 TABLET | Refills: 0 | Status: SHIPPED | OUTPATIENT
Start: 2020-12-14 | End: 2021-03-25

## 2020-12-14 RX ORDER — BUDESONIDE AND FORMOTEROL FUMARATE DIHYDRATE 160; 4.5 UG/1; UG/1
AEROSOL RESPIRATORY (INHALATION)
Qty: 3 INHALER | Refills: 0 | Status: SHIPPED | OUTPATIENT
Start: 2020-12-14 | End: 2021-03-25

## 2020-12-14 NOTE — TELEPHONE ENCOUNTER
Tianna looked into this for the pt and [facility] is asking pt to give up their Stationary tank or Inogen system because more pt's that are in the hospital are going home on oxygen and they need the tanks. If the pt gave up his inogen they would replace it with little tanks. Pt's does not have to do this. This information was relayed to the patient and his wife. Acknowledged understanding.

## 2020-12-14 NOTE — TELEPHONE ENCOUNTER
Received a call from patient's wife Burgess Jones stating she received a call from [facility] that they want Jaja Brown to turn his inogen oxygen because of a shortage in oxygen. The person that called them from [facility] was a Hildred Grandchild and at the end of the conversation Susan Collado are coming to get it\"  Pt's wife states she called Medicare and they know nothing about this and if they come to the door do not open it for them. I explained to Burgess Jones that first they have to have a discontinue letter from us and Burgess Jones stated Medicare told her the same thing. I spoke with Nitish Braun our RT and she is going to call Janette with [facility] and get back with me.

## 2020-12-14 NOTE — TELEPHONE ENCOUNTER
Wife is requesting a 90 day script on the following medications: Singulair, Flonase, Spiriva, Symbicort, ProAir. I put the scripts in, please sign off on them. Thank you.

## 2020-12-15 ENCOUNTER — TELEPHONE (OUTPATIENT)
Dept: PULMONOLOGY | Age: 65
End: 2020-12-15

## 2020-12-15 NOTE — TELEPHONE ENCOUNTER
RECEIVED a notice from [facility] that a PA was needed for Albuterol HFA inhaler. I phoned the pharmacy spoke with Mar and no PA is needed this a a fill too soon and pt has to wait to get this inhaler filled.

## 2021-01-12 ENCOUNTER — TELEPHONE (OUTPATIENT)
Dept: PULMONOLOGY | Age: 66
End: 2021-01-12

## 2021-01-12 NOTE — TELEPHONE ENCOUNTER
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   Covid vaccine is at this time not ordered by us

## 2021-01-12 NOTE — TELEPHONE ENCOUNTER
Patient's wife Nguyen Rodriguez is calling in wanting to know if Lamar Cho and her should received the COVID vaccine. Please advise?

## 2021-03-16 ENCOUNTER — VIRTUAL VISIT (OUTPATIENT)
Dept: PULMONOLOGY | Age: 66
End: 2021-03-16
Payer: MEDICARE

## 2021-03-16 DIAGNOSIS — J45.50 SEVERE PERSISTENT ASTHMA WITHOUT COMPLICATION: ICD-10-CM

## 2021-03-16 DIAGNOSIS — J96.11 CHRONIC RESPIRATORY FAILURE WITH HYPOXIA (HCC): ICD-10-CM

## 2021-03-16 DIAGNOSIS — G47.33 OSA ON CPAP: ICD-10-CM

## 2021-03-16 DIAGNOSIS — Z99.89 OSA ON CPAP: ICD-10-CM

## 2021-03-16 DIAGNOSIS — J44.9 CHRONIC OBSTRUCTIVE PULMONARY DISEASE, UNSPECIFIED COPD TYPE (HCC): Primary | ICD-10-CM

## 2021-03-16 DIAGNOSIS — D83.9 CVID (COMMON VARIABLE IMMUNODEFICIENCY) (HCC): ICD-10-CM

## 2021-03-16 PROCEDURE — 3023F SPIROM DOC REV: CPT | Performed by: INTERNAL MEDICINE

## 2021-03-16 PROCEDURE — 3017F COLORECTAL CA SCREEN DOC REV: CPT | Performed by: INTERNAL MEDICINE

## 2021-03-16 PROCEDURE — 1036F TOBACCO NON-USER: CPT | Performed by: INTERNAL MEDICINE

## 2021-03-16 PROCEDURE — G8417 CALC BMI ABV UP PARAM F/U: HCPCS | Performed by: INTERNAL MEDICINE

## 2021-03-16 PROCEDURE — G8427 DOCREV CUR MEDS BY ELIG CLIN: HCPCS | Performed by: INTERNAL MEDICINE

## 2021-03-16 PROCEDURE — G8926 SPIRO NO PERF OR DOC: HCPCS | Performed by: INTERNAL MEDICINE

## 2021-03-16 PROCEDURE — G8482 FLU IMMUNIZE ORDER/ADMIN: HCPCS | Performed by: INTERNAL MEDICINE

## 2021-03-16 PROCEDURE — 99213 OFFICE O/P EST LOW 20 MIN: CPT | Performed by: INTERNAL MEDICINE

## 2021-03-16 RX ORDER — PREDNISONE 10 MG/1
TABLET ORAL
Qty: 30 TABLET | Refills: 0 | Status: SHIPPED | OUTPATIENT
Start: 2021-03-16 | End: 2022-04-15 | Stop reason: SDUPTHER

## 2021-03-16 ASSESSMENT — SLEEP AND FATIGUE QUESTIONNAIRES
ESS TOTAL SCORE: 9
HOW LIKELY ARE YOU TO NOD OFF OR FALL ASLEEP WHILE LYING DOWN TO REST IN THE AFTERNOON WHEN CIRCUMSTANCES PERMIT: 3
HOW LIKELY ARE YOU TO NOD OFF OR FALL ASLEEP WHILE SITTING INACTIVE IN A PUBLIC PLACE: 0
HOW LIKELY ARE YOU TO NOD OFF OR FALL ASLEEP WHEN YOU ARE A PASSENGER IN A CAR FOR AN HOUR WITHOUT A BREAK: 1
HOW LIKELY ARE YOU TO NOD OFF OR FALL ASLEEP WHILE WATCHING TV: 0

## 2021-03-16 NOTE — PROGRESS NOTES
PULMONARY OUTPATIENT PROGRESS NOTE    Telehealth visit  Telephone visit. Patient:  Chana Carty  YOB: 1955    MRN: [id number]     Acct:        Pt seen and Chart reviewed. Mr/Ms Chana Carty is here in followup for    Diagnosis Orders   1. Chronic obstructive pulmonary disease, unspecified COPD type ([facility] [address]     2. Severe persistent asthma without complication     3. CVID (common variable immunodeficiency) ([facility]     4. PRECIOUS on CPAP     5. Chronic respiratory failure with hypoxia (HCC)       He is for follow up of COPD, PRECIOUS, CVID, severe persistent asthma, chronic respiratory failure on home O2/obstructive sleep apnea. Since he was seen last time his symptoms have been stable and he has been doing fairly well at home. According to patient he get flares up for few days in between and had been taking aerosol during that time otherwise he does not usually require albuterol aerosol on a regular basis. Since he was seen last time he had not had exacerbation or required steroids or antibiotic although sometimes he take few doses of prednisone. He does have dyspnea on exertional activity usually able to do his regular activities at home walking from one room to another room bathroom and taking shower he does get shortness of breath when he has to go stairs or a pill task or walk. He does have cough intermittently and occasionally denies daily or persistent cough. Cough is same without much change  He has a sputum production off and on sometimes it is white and sometimes it is yellow denies any increase in sputum production or change in color of the sputum. He denies nocturnal awakening with cough wheezing chest tightness or shortness of breath. He does not complain of orthopnea PND or pedal edema. He is very compliant with CPAP use CPAP at same setting denies much change denies much problem with CPAP.   His sleep is usually refreshing when he wakes up in the morning he usually feels good. He does take nap every day for an hour he usually does not doze off except occasionally during the daytime although when he is not active according to wife he may doze off. Last compliance data seen from 11/27/2019 to 02/24/2020 CPAP he is on CPAP of 12 cm, compliance is 100% for 90 days with average usage of 8 hours 16 minutes and average AHI of 0.3. No compliance data is available since then  Low-dose CT scan of the chest was ordered supposed to be in February but not done. He is followed by allergist/immunologist and getting IvIgg every 3 weeks. Since pandemic started he is getting IV IgG at home by visiting nurse and he got a dose today        Last hospitalization in January and February 2019  He was admitted to hospital twice for COPD/asthma exacerbation initially in second or third week of January 2019 when he was treated with COPD exhibition with bronchodilators and steroids and Levaquin, his chest x-ray that time did not show any infiltrate he was discharged on tapered dose of prednisone and Levaquin. He was admitted again in early February 2019 for almost 2 weeks later and at that time he was finished with steroids and Levaquin, his flu a was positive this time he was treated with Tamiflu, he had a chest x-ray done which did not show infiltrates or evidence of pneumonia, he had a CT scan of the chest done which shows minimal residual or dependent atelectasis and/or pneumonia or infiltrate was seen this time he was again treated with Levaquin and prednisone and was discharged home.       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, he c/o pains in legs and joints a week prior to infusion and resolve usually the next day after infusion, he is tolerating it well and followed with allergist/ immunologist and recent Igg was > 800 and getting infusion every 3 weeks   H/O Severe persistent Asthma and COPD with h/o smoking for about > 30 years with h/o frequent exacerbations of Asthmatic Bronchitis  He is on home O2 and use O2 at night with cpap and also during daytime most of the time, He has PRECIOUS and very complaint with cpap at 12 cm and denies any problems with cpap since seen last time  With h/o all rhinitis and gerd and h/o cough denies ch cough, allergy symptoms are better since he stop mowing his lawn       Subjective:   Review of Systems -   General ROS: Negative for fatigue, negative for  - , chills, fever, night sweats or weight loss  ENT ROS: Negative for - nasal congestion, postnasal dripping, sinus pain, nasal discharge and no sneezing or epistaxis  Allergy and Immunology ROS: Negative for - nasal congestion and seasonal allergies  Respiratory ROS:  Positive-intermittent  cough, positive shortness of breath on exertion, positive clear sputum production, occasional wheezing, no chest pain or hemoptysis  Cardiovascular ROS: positive for - dyspnea on exertion,  negative for chest pain, orthopnea, PND, pedal edema. Gastrointestinal ROS: no abdominal pain,  nausea, vomiting, diarrhea, change in bowel habits, hematochezia, melena. Musculoskeletal ROS: Negative for joints pain, negative  for - joint stiffness and swelling and muscle pain  CNS: negative for weakness, dizziness, diplopia, syncope, seizure, headache, tingling, dysphagia. Hem/Onc: negative for bleeding and bruisingand petechia  Skin: negative for rash and skin lesions  : negative for hematuria, dysuria nocturia and frequency.        Sleep Medicine 3/16/2021 7/29/2020 2/25/2020 3/20/2019 3/28/2018 12/6/2016   Sitting and reading 2 0 1 3 0 3   Watching TV 0 0 0 1 0 0   Sitting, inactive in a public place (e.g. a theatre or a meeting) 0 0 0 1 0 0   As a passenger in a car for an hour without a break 1 0 0 0 0 2   Lying down to rest in the afternoon when circumstances permit 3 3 3 3 2 3   Sitting and talking to someone 0 0 0 0 0 0   Sitting quietly after a lunch without alcohol 3 3 3 3 0 3   In a car, while stopped for a few minutes in traffic 0 0 0 0 0 0   Total score 9 6 7 11 2 11       Allergies:  No Known Allergies    Medications:  Outpatient Encounter Medications as of 3/16/2021   Medication Sig Dispense Refill    montelukast (SINGULAIR) 10 MG tablet take 1 tablet by mouth every evening 90 tablet 0    fluticasone (FLONASE) 50 MCG/ACT nasal spray 2 sprays by Nasal route daily 3 Bottle 0    tiotropium (SPIRIVA HANDIHALER) 18 MCG inhalation capsule inhale the contents of one capsule in the handihaler once daily 90 capsule 0    budesonide-formoterol (SYMBICORT) 160-4.5 MCG/ACT AERO inhale 2 puffs by mouth twice a day 3 Inhaler 0    albuterol sulfate HFA (VENTOLIN HFA) 108 (90 Base) MCG/ACT inhaler inhale 2 puffs by mouth every 6 hours if needed for wheezing 3 Inhaler 0    albuterol (PROVENTIL) (2.5 MG/3ML) 0.083% nebulizer solution Take 3 mLs by nebulization every 4 hours as needed for Wheezing or Shortness of Breath 375 mL 5    budesonide-formoterol (SYMBICORT) 160-4.5 MCG/ACT AERO Inhale 2 puffs into the lungs 2 times daily 1 Inhaler 11    omeprazole (PRILOSEC) 20 MG delayed release capsule take 1 capsule by mouth once daily 60 capsule 3    amLODIPine (NORVASC) 10 MG tablet take 1 tablet by mouth once daily 60 tablet 3    Handicap Placard MISC by Does not apply route For 5 years 1 each 0    acetaminophen (TYLENOL) 500 MG tablet Take 500 mg by mouth every 6 hours as needed for Pain      traMADol (ULTRAM) 50 MG tablet Take 50 mg by mouth as needed for Pain.  predniSONE (DELTASONE) 10 MG tablet Start at 50 mg and taper 10 mg every 3 days 45 tablet 0    ALPRAZolam (XANAX) 0.5 MG tablet Take 0.5 mg by mouth 3 times daily as needed for Sleep. .      benazepril (LOTENSIN) 10 MG tablet take 1 tablet by mouth once daily (Patient taking differently: Take 10 mg BID) 30 tablet 5 Status   05/20/2020 98 98 - 107 mmol/L Final     CO2   Date Value Ref Range Status   05/20/2020 22 20 - 31 mmol/L Final     BUN   Date Value Ref Range Status   05/20/2020 12 8 - 23 mg/dL Final     CREATININE   Date Value Ref Range Status   08/02/2020 0.86 0.70 - 1.20 mg/dL Final   05/20/2020 0.87 0.70 - 1.20 mg/dL Final   01/22/2020 0.86 0.70 - 1.20 mg/dL Final     Glucose   Date Value Ref Range Status   05/20/2020 140 (H) 70 - 99 mg/dL Final   06/04/2012 86 74 - 106 mg/dL Final     Comment:     [facility] [address] [facility] 3 [phone]     S. Calcium:   Calcium   Date Value Ref Range Status   05/20/2020 9.3 8.6 - 10.4 mg/dL Final     S. Ionized Calcium: No results found for: IONCA  S. Magnesium:   Magnesium   Date Value Ref Range Status   06/12/2014 2.7 (H) 1.6 - 2.6 mg/dL Final     Comment:     [address] 3 [phone]     S. Phosphorus:   Phosphorus   Date Value Ref Range Status   01/08/2013 3.4 2.5 - 4.5 mg/dL Final     Comment:     [address] 3 [phone]     S. Glucose:   POC Glucose   Date Value Ref Range Status   01/16/2019 168 (H) 75 - 110 mg/dL Final   01/15/2019 179 (H) 75 - 110 mg/dL Final   01/15/2019 160 (H) 75 - 110 mg/dL Final     Glycosylated hemoglobin A1C:   Hemoglobin A1C   Date Value Ref Range Status   03/01/2016 5.7 4.0 - 6.0 % Final       Pulmonary Functions Testing Results:  07/31/2019  Spirometry shows FVC is 3.14 79% predicted. FEV1 is 2.60 82% predicted. FEV1 FVC ratio is normal.  Lung volume shows residual volume is 2.93 130% predicted consistent with hyperinflation and airway trapping. Diffusion capacity is 21.24 81% predicted which was within normal limit.     9/6/2016:  FEV1 2.78 86%, FVC 3.64 90%, KLD1NDY 95% , TLC 7.02  114%, DLCO 20.32  77%  8/26/2015: FEV1 2.30 69%, FVC 3.00 64%, MAZ1LEB, TLC 6.64  95%, DLCO 21.54  79%    Blood Gases: No results found for: PH, PCO2, PO2, HCO3, O2SAT    Radiological reports:    CXR    CT Scans     CT lung screening.  02/19/2020  Mediastinum:  Suboptimal evaluation due to low dose technique.  Right-sided   port is in place.  Thoracic aorta demonstrates mild calcification without   aneurysm.  Pulmonary trunk appears nondilated.  Heart appears normal in size   without pericardial effusion.  No lymphadenopathy.  The esophagus is grossly   unremarkable.       Lungs/Pleura:  Emphysematous changes.  No focal consolidation, pneumothorax   or pleural effusion.  Trachea and distal airways appear patent.  No   suspicious noncalcified lung nodule or mass. 02/05/19  Basilar subsegmental atelectasis without evidence for consolidation.       Emphysema and sequela of old granulomatous disease.  Return to low-dose chest   CT screening schedule is recommended, if clinically appropriate. CT Scan Low dose     9/19/17  *Stable 5 mm nodular thickening along the minor fissure.  No new or enlarging   nodule. *Paraseptal emphysema. 9/27/16  Mild apical paraseptal emphysematous changes, more on the right. Dependent/atelectatic findings posteriorly toward the bases.       Unchanged 5 mm nodule contiguous with the medial minor fissure. EKG:     ECHO:     7/24/17  Left ventricle is normal in size  Global left ventricular systolic function is normal Estimated ejection  fraction is 65 % . No significant valvular regurgitation or stenosis seen. 11/8/13  Technically difficult study. No gross segmental wall motion abnormality. Estimated ejection fraction is 55 %. Mild left ventricular hypertrophy. Evidence of diastolic dysfunction. Left atrium is mildly dilated. No significant valvular regurgitation or stenosis seen. Trivial anterior pericardial effusion  Right Atrium  Right atrium is normal in size. Right Ventricle  Normal right ventricular size and function. 6 minutes walk test 03/20/19  Total distance walk 750 feet 48% predicted.   Room air oxygen was 88% on walking slow to 250 feet started on 2 L nasal cannula and O2 saturation 88% after walking 250 feet and increased to 3 L and his saturation went to 95% with walking to 250 feet. Compliance data from CPAP  11/27/2019 to 02/24/2020  Compliance 100%  Average usage of 8 hours 16 minutes  Average AHI 0.3    Assessment:      1. Chronic obstructive pulmonary disease, unspecified COPD type ([facility]   2. Severe persistent asthma without complication   3. CVID (common variable immunodeficiency) ([facility]   4. Allergic rhinitis, unspecified seasonality, unspecified trigger   5. PRECIOUS on CPAP   6. Chronic respiratory failure with hypoxia (HCC)         Plan:    Is given a prescription for prednisone to keep in case of exacerbation  Continue Symbicort 160/4.5 space 2 puff twice daily  Continue spiriva once daily. Continue Flonase. Continue Singulair. Reflux precautions and continue PPI  Continue albuterol aerosol as needed  Follow up Allergist and immunologist for CVID/ IVIG. Had Flu vaccine last year  Annual flu vaccine recommended in fall  Had Covid vaccine yesterday. Up to date with vaccinations from pulm perspective and recommendations per allergist for vaccination  Maintain an active lifestyle    Supplemental O2 to continue, he is benefiting from oxygen therapy and advised to continue with oxygen     CPAP at 12 cm to continue. We will get compliance data and will evaluate AHI and compliance. If he complains of increased fatigue tiredness or sleepiness during the daytime then may consider repeat sleep study  Wt loss is recommended and discussed  Follow good sleep hygeine instructions  Use humidifier  Questions answered pertaining to diagnosis and management explained importance of compliance with therapy. Last compliance data reviewed and pt is compliant per insurance requirements. RTC in 4 months. Please note that this chart was generated using voice recognition Dragon dictation software.  Although every effort was made to ensure the accuracy of this automated transcription, some errors in transcription may have occurred. Ghazal Carrero MD             3/16/2021, 11:35 AM    Brandi Castañeda is a 63 y.o. male being evaluated by a Virtual Visit (video/telephone visit) encounter to address concerns as mentioned above. A caregiver was present when appropriate. Due to this being a TeleHealth encounter (During XRATG-40 public [facility] emergency), evaluation of the following organ systems was limited: Vitals/Constitutional/EENT/Resp/CV/GI//MS/Neuro/Skin/Heme-Lymph-Imm. Pursuant to the emergency declaration under the [address] [place] [address], [address] [place] [address] authority and the [employer] Act, this Virtual Visit was conducted with patient's (and/or legal guardian's) consent, to reduce the patient's risk of exposure to COVID-19 and provide necessary medical care. The patient (and/or legal guardian) has also been advised to contact this office for worsening conditions or problems, and seek emergency medical treatment and/or call 911 if deemed necessary. Patient identification was verified at the start of the visit: Yes  Total time spent for this encounter: 24 minutes  Services were provided through a video/telephone synchronous discussion virtually to substitute for in-person clinic visit. Patient and provider were located at their individual locations at home and office respectively. --Ghazal Carrero MD on 3/16/2021 at 11:35 AM    An electronic signature was used to authenticate this note.

## 2021-03-22 ENCOUNTER — TELEPHONE (OUTPATIENT)
Dept: ONCOLOGY | Age: 66
End: 2021-03-22

## 2021-03-22 NOTE — TELEPHONE ENCOUNTER
Patient does not meet criteria for lung screening. Patient quit smoking 16.8 years ago. If this is an error please update EPIC  And place a new order. Patient is currently scheduled for 03/26/21 but will be cancelled by 3/24/21 if not updated. Thank you! CT LUNG SCREENING CRITERIA   Patient is between 50-69 years [Medicare] or 46-80 [private insurance]   Is currently smoking or is a former smoker who has quit smoking within the last 15 years   Has at least a 30 pack-year smoking history (regardless of patient being a current or former smoker). Pack history is packs per day times years smoked equals pack history. IE:  1 pack a day X 30 years = 30 pack year history.  Has not had a CT lung screening or any CT chest exam within the last year   Patient is asymptomatic (no signs/symptoms of lung cancer such as shortness of breath, cough, coughing up blood or unexplained significant weight loss).  This patient does not have a condition that limits life expectancy to <5 years   This patient has participated in a shared decision-making session during which potential risks and benefits of CT Lung Screening were discussed.  This patient was informed of the importance of adherence to annual screening, impact of comorbidities, and ability/willingness to undergo diagnosis and treatment.  This patient was informed of the importance of smoking cessation and/or maintaining smoking abstinence. If applicable, this patient was offered information on smoking cessation counseling services. ORDER MUST INCLUDE: THIS INFORMATION IS AUTOMATICALLY ON ORDER IF ENTERED IN EPIC   PROVIDER'S NPI - AUTOMATICALLY APPEARS ON ORDER WHEN SIGNED BY THE PROVIDER.  PACK HISTORY    QUIT DATE IF THEY HAVE QUIT   DIAGNOSIS: Z87.891 PERSONAL HISTORY OF TABACCO USE OR PERSONAL HISTORY OF NICOTINE DEPENDENCE. (BOTH HAS THE SAME Z CODE) THIS IS REQUIRED FOR MEDICARE/MEDICAID.        FOR PRIVATE INSURANCES DIAGNOSIS CODE CAN BE: Z12.2 ENCOUNTER FOR SCREENING FOR MALIGNANT NEOPLASM OF RESPIRATORY ORGANS OR Z87.891 PERSONAL HISTORY OF TABACCO USE OR PERSONAL HISTORY OF NICOTINE DEPENDENCE.

## 2021-03-25 RX ORDER — ALBUTEROL SULFATE 90 UG/1
AEROSOL, METERED RESPIRATORY (INHALATION)
Qty: 3 INHALER | Refills: 3 | Status: SHIPPED | OUTPATIENT
Start: 2021-03-25 | End: 2022-10-12 | Stop reason: SDUPTHER

## 2021-03-25 RX ORDER — FLUTICASONE PROPIONATE 50 MCG
SPRAY, SUSPENSION (ML) NASAL
Qty: 3 BOTTLE | Refills: 3 | Status: SHIPPED | OUTPATIENT
Start: 2021-03-25 | End: 2022-04-05

## 2021-03-25 RX ORDER — BUDESONIDE AND FORMOTEROL FUMARATE DIHYDRATE 160; 4.5 UG/1; UG/1
AEROSOL RESPIRATORY (INHALATION)
Qty: 3 INHALER | Refills: 3 | Status: SHIPPED | OUTPATIENT
Start: 2021-03-25 | End: 2022-04-05

## 2021-03-25 RX ORDER — MONTELUKAST SODIUM 10 MG/1
TABLET ORAL
Qty: 90 TABLET | Refills: 3 | Status: SHIPPED | OUTPATIENT
Start: 2021-03-25 | End: 2022-05-09

## 2021-03-25 RX ORDER — TIOTROPIUM BROMIDE 18 UG/1
CAPSULE ORAL; RESPIRATORY (INHALATION)
Qty: 90 CAPSULE | Refills: 3 | Status: SHIPPED | OUTPATIENT
Start: 2021-03-25 | End: 2022-04-05

## 2021-03-25 NOTE — TELEPHONE ENCOUNTER
Dr Magdalene Gardner, patient is current and is scheduled for follow up on 7/20/21. Per last dictation patient to continue these medications. Please sign for refills if ok. Thank you.

## 2021-03-26 DIAGNOSIS — M87.051 AVASCULAR NECROSIS OF BONE OF HIP, RIGHT (HCC): Primary | ICD-10-CM

## 2021-03-26 DIAGNOSIS — Z96.642 STATUS POST TOTAL HIP REPLACEMENT, LEFT: ICD-10-CM

## 2021-03-29 ENCOUNTER — OFFICE VISIT (OUTPATIENT)
Dept: ORTHOPEDIC SURGERY | Age: 66
End: 2021-03-29
Payer: MEDICARE

## 2021-03-29 ENCOUNTER — TELEPHONE (OUTPATIENT)
Dept: PULMONOLOGY | Age: 66
End: 2021-03-29

## 2021-03-29 VITALS — BODY MASS INDEX: 39.71 KG/M2 | WEIGHT: 262 LBS | HEIGHT: 68 IN

## 2021-03-29 DIAGNOSIS — Z01.818 PRE-OP TESTING: ICD-10-CM

## 2021-03-29 DIAGNOSIS — M16.12 ARTHRITIS OF LEFT HIP: ICD-10-CM

## 2021-03-29 DIAGNOSIS — M87.051 AVASCULAR NECROSIS OF BONE OF HIP, RIGHT (HCC): Primary | ICD-10-CM

## 2021-03-29 DIAGNOSIS — Z96.642 STATUS POST TOTAL HIP REPLACEMENT, LEFT: ICD-10-CM

## 2021-03-29 PROCEDURE — G8427 DOCREV CUR MEDS BY ELIG CLIN: HCPCS | Performed by: ORTHOPAEDIC SURGERY

## 2021-03-29 PROCEDURE — 3017F COLORECTAL CA SCREEN DOC REV: CPT | Performed by: ORTHOPAEDIC SURGERY

## 2021-03-29 PROCEDURE — G8417 CALC BMI ABV UP PARAM F/U: HCPCS | Performed by: ORTHOPAEDIC SURGERY

## 2021-03-29 PROCEDURE — 99214 OFFICE O/P EST MOD 30 MIN: CPT | Performed by: ORTHOPAEDIC SURGERY

## 2021-03-29 PROCEDURE — 1036F TOBACCO NON-USER: CPT | Performed by: ORTHOPAEDIC SURGERY

## 2021-03-29 PROCEDURE — G8482 FLU IMMUNIZE ORDER/ADMIN: HCPCS | Performed by: ORTHOPAEDIC SURGERY

## 2021-03-29 ASSESSMENT — ENCOUNTER SYMPTOMS
ABDOMINAL PAIN: 0
CHEST TIGHTNESS: 0
COUGH: 0
APNEA: 0
VOMITING: 0

## 2021-03-29 NOTE — TELEPHONE ENCOUNTER
Patient is seeing Dr. Ann Miller for a surgical clearance for hip surgery. Sees him Thursday.  They will follow same procedures they did 3 years ago do you need to see Meghna Gregorio before his surgery 4-27-21

## 2021-03-29 NOTE — PROGRESS NOTES
[facility]  [facility]  [address] [facility] [address]  Dept: [phone]  Dept Fax: [phone]        Ambulatory Follow Up      Subjective:   Amara Contreras is a 63y.o. year old male who presents to our office today for routine followup regarding his   1. Avascular necrosis of bone of hip, right ([address]    2. Arthritis of left hip    3. Status post total hip replacement, left    4. Pre-op testing    . Chief Complaint   Patient presents with    Hip Pain     extends throught buttocks and groin. Causing instability. HPI- patient is in the office today for right hip pain. The patient has been having pain for 6 months. The patient states that catching in the right hip causing the patient to fall. The patient has fallen a hand full of time since his last visit. The patient has a hard time weightbearing to the right hip. The patient feels that the hip pain is a safety and pain issue. The patient would like to discuss total hip arthroplasty. Left hip doing well. The patient is able to complete his normal activities to the left hip. Left total hip arthroplasty completed on 5/22/2018. Review of Systems   Constitutional: Negative for chills and fever. Respiratory: Negative for apnea, cough and chest tightness. Cardiovascular: Negative for chest pain and palpitations. Gastrointestinal: Negative for abdominal pain and vomiting. Genitourinary: Negative for difficulty urinating. Musculoskeletal: Positive for arthralgias (right hip). Negative for gait problem, joint swelling and myalgias. Neurological: Negative for dizziness, weakness and numbness. I have reviewed the CC, HPI, ROS, PMH, FHX, Social History, and if not present in this note, I have reviewed in the patient's chart. I agree with the documentation provided by other staff and have reviewed their documentation prior to providing my signature indicating agreement.     Objective :   Ht 5' 8\" (1.727 m)   Wt 262 lb (118.8 kg)   BMI 39.84 kg/m²  Body mass index is 39.84 kg/m². General: Kimberlee Dempsey is a 63 y.o. male who is alert and oriented and sitting comfortably in our office. Ortho Exam  MS:  Patient ambulates with mild shortening weightbearing phase and antalgic gait to the Right lower extremity. There is no erythema, warmth, skin lesions, signs of infection. Positive hip logroll and Stinchfield test is noted. Patient has full range of motion of the Right knee and ankle. Motor, sensory, and vascular examination to the Right lower extremity is intact without focal deficits. Patient has full range of motion of the lumbosacral spine. Neuro: alert and oriented to person and place. Eyes: Extra-ocular muscles intact  Mouth: Oral mucosa moist. No perioral lesions  Pulm: Respirations unlabored and regular. Symmetric chest excursion without outward deformity is noted. Skin: warm, well perfused  Psych:   Patient has good fund of knowledge and displays understanging of exam, diagnosis, and plan. Radiology:     Xr Hip Left (2-3 Views)    Result Date: 3/30/2021  History:   Status post Left  total hip arthroplasty Findings:    Low AP pelvis, AP Left  hip, cross table lateral xrays Left hip done in the office today shows total hip arthroplasty in good position without signs complication. Leg lengths are approximate. Components are in good position without signs of loosening. No evidence of fracture, subluxation, dislocation, radioopaque foreign body/tumor is noted. Impression:  Left  total hip arthroplasty in good position without complication noted. Xr Hip Right (2-3 Views)    Result Date: 3/30/2021  History:   Right hip pain Findings:   Low AP pelvis, AP Right hip, frog leg lateral xrays Right hip xrays done in the office today shows moderate joint space narrowing, osteophytosis, joint line sclerosis to the Right hip.     No evidence of fracture, subluxation, dislocation, radioopaque

## 2021-03-31 ENCOUNTER — TELEPHONE (OUTPATIENT)
Dept: PULMONOLOGY | Age: 66
End: 2021-03-31

## 2021-03-31 NOTE — TELEPHONE ENCOUNTER
No this is a screening CT scan if he does not qualify for screening CT scan anymore then we cannot order it

## 2021-03-31 NOTE — TELEPHONE ENCOUNTER
Patient had an order for CT Lung screening. But [facility] called wife and she stated he does not qualify for it since it has been so long since he smokes, Do you want to order a new test? Please advise.

## 2021-04-01 ENCOUNTER — OFFICE VISIT (OUTPATIENT)
Dept: INFECTIOUS DISEASES | Age: 66
End: 2021-04-01
Payer: MEDICARE

## 2021-04-01 VITALS
SYSTOLIC BLOOD PRESSURE: 109 MMHG | WEIGHT: 259.4 LBS | TEMPERATURE: 97.9 F | OXYGEN SATURATION: 97 % | BODY MASS INDEX: 39.31 KG/M2 | HEIGHT: 68 IN | DIASTOLIC BLOOD PRESSURE: 68 MMHG | HEART RATE: 89 BPM | RESPIRATION RATE: 18 BRPM

## 2021-04-01 DIAGNOSIS — M87.051 AVASCULAR NECROSIS OF BONE OF RIGHT HIP (HCC): Primary | ICD-10-CM

## 2021-04-01 DIAGNOSIS — M54.10 RADICULOPATHY WITH LOWER EXTREMITY SYMPTOMS: Chronic | ICD-10-CM

## 2021-04-01 DIAGNOSIS — D83.9 COMMON VARIABLE IMMUNODEFICIENCY (HCC): Chronic | ICD-10-CM

## 2021-04-01 DIAGNOSIS — I10 ESSENTIAL HYPERTENSION: ICD-10-CM

## 2021-04-01 DIAGNOSIS — J98.4 LUNG DISEASE: Chronic | ICD-10-CM

## 2021-04-01 DIAGNOSIS — D80.1 COMMON VARIABLE HYPOGAMMAGLOBULINEMIA (HCC): ICD-10-CM

## 2021-04-01 DIAGNOSIS — M87.051 AVASCULAR NECROSIS OF BONE OF HIP, RIGHT (HCC): ICD-10-CM

## 2021-04-01 DIAGNOSIS — G92.9 TOXIC ENCEPHALOPATHY: ICD-10-CM

## 2021-04-01 DIAGNOSIS — J44.1 COPD EXACERBATION (HCC): ICD-10-CM

## 2021-04-01 DIAGNOSIS — J44.9 COPD WITHOUT EXACERBATION (HCC): ICD-10-CM

## 2021-04-01 PROCEDURE — 99214 OFFICE O/P EST MOD 30 MIN: CPT | Performed by: INTERNAL MEDICINE

## 2021-04-01 NOTE — PROGRESS NOTES
[facility] - Initial Office Consult Note  Today's Date and Time: 4/1/2021, 2:58 PM    Diagnostic Impression :     1. Avascular necrosis of bone of right hip ([address]    2. COPD without exacerbation ([address]    3. Common variable immunodeficiency (HCC)    4. Radiculopathy with lower extremity symptoms    5. Lung disease-COPD    6. Avascular necrosis of bone of hip, right ([address]    7. COPD exacerbation ([address]    8. Essential hypertension    9. Toxic encephalopathy    10. Common variable hypogammaglobulinemia ([address]        Recommendations   · Patient suffers from hypogammaglobulinemia. He receives IVIG 3 3 weeks. · He will need to move his current schedule of IVIG infusion from April 12 to April 23, 2021 in order to be best protected for the right hip replacement surgery on 4/27/2021. · Patient will require 1750 mg of vancomycin and 2 g of cefepime every 12 hours starting preoperatively on the day of surgery. · Patient has been given prescriptions with the above requests to present to the hospital personnel on the day of his surgery. Chief complaint/reason for consultation:     Chief Complaint   Patient presents with    Procedure     hip surgical clearance       History of Present Illness:   Gerhardt Slain is a 63y.o.-year-old  male who was initially evaluated on 4/1/2021. Patient seen at the request of Dr. Mane Bush is a 63y.o.-year-old  male who was initially evaluated on 5/8/2018. Patient seen at the request of Dr. Jetty Councilman.     The patient has a history of combined variable immunodeficiency for which he sees Dr. Holly Banuelos for. He receives IVIG and a 3 day steroid course every three weeks. He follows with Dr. Laura Spangler for recurrent pulmonary infections. He states that he has not had any in the past year though.     The patient began to have hip pain about a year ago. He recently had some falls and imaging showed AVN of the left hip.  He has developed AVN of the let hip from the chronic steroid use.     He was seen by Dr Murtaza Willis, orthopedic surgery, and the patient has decided to proceed with a left hip arthroplasty. The patient was sent to us for recommendations for preoperative antibiotics.     He denies fevers, chills, sputum production, diarrhea, and urinary symptoms. His only complaint is the pain to his L hip.     DISCUSSION from 5/8/18:     It was discussed with the patient that surgery needs to be done as close to his IVIG treatment as possible because that is when his immunoglobulin levels are the highest.      We also discussed the antibiotics that he will need to take preoperatively. Since he has an immune deficiency we will have to cover for encapsulated bacteria with Cefepime. We will also cover the normal hip rell with Vancomycin.     PLAN:  · Schedule surgery for 5/22/18 and move up IVIG infusion to 5/21/18 so that patient has highest level of immunoglobulins at time of hip surgery. · With patient's immune status, will order Vancomycin and Cefepime to be started the day of surgery to cover encapsulated organisms and potential pathogens associated with prosthetic joints. · Prescriptions for vancomycin (1750 mg IV) and cefepime (2 gm IV) were given to patient to bring to the hospital the day of surgery      CURRENT EVALUATION 4/1/2021     The patient was alert and oriented on home O2. He states that he has been feeling well and denied any symptoms of infection including fever, chills, or N/V/D. This patient was referred to us by Dr. Murtaza Willis, with orthopedic surgery, in order to give recommendations for preoperative antibiotics for right hip replacement arthroplasty  which is a scheduled for 4/27/2021. He had great success with the antibiotic regiment that was prescribed to him in 2018 for his left hip arthroplasty and was hoping to have a similar plan in place for this surgery.     Arrangements were made for him to move his next to schedule IVIG infusion from April 12 to April 23, 2021. I also gave him a prescription for vancomycin 1750 mg IV every 12 hours and for cefepime 2 g IV every 12 hours to be started preoperatively on the day of surgery      PLAN:   · Patient suffers from hypogammaglobulinemia. He receives IVIG 3 3 weeks. · He will need to move his current schedule of IVIG infusion from April 12 to April 23, 2021 in order to be best protected for the right hip replacement surgery on 4/27/2021. · Patient will require 1750 mg of vancomycin and 2 g of cefepime every 12 hours starting preoperatively on the day of surgery. · Patient has been given prescriptions with the above requests to present to the hospital personnel on the day of his surgery. ·       I have personally reviewed the past medical history, past surgical history, medications, social history, and family history, and I have updated the database accordingly.   Past Medical History:     Past Medical History:   Diagnosis Date    Allergic rhinitis     Anxiety     Aseptic meningitis     Asthma     Avascular necrosis of bone of hip, left ([address] 04/2018    CAD (coronary artery disease)     mild on cath, no stents    Common variable immunodeficiency ([address] 12/4/2013    COPD (chronic obstructive pulmonary disease) (HCC)     Deviated nasal septum     Dyspnea     with exertion    HTN (hypertension)     Hyperglycemia     Hypoxia     Immune deficiency disorder ([address] 2012    IGIV every 3 weeks with prednisone    Obesity     On supplemental oxygen therapy     PRECIOUS (obstructive sleep apnea) 2012    CPAP nightly    Osteoporosis     Reflux     Respiratory failure ([address] 2014    Oxygen on    Severe persistent asthma     Spondylosis of cervical spine     TIA (transient ischemic attack) 2005    Tobacco use     Vitamin D deficiency 3/6/2018    Wears glasses        Past Surgical  History:     Past Surgical History:   Procedure Laterality Date    CARDIAC CATHETERIZATION  2005    no stents    COLONOSCOPY  2003    FINGER SURGERY Left 1997    reattachment, index finger    HIP ARTHROPLASTY Left 05/22/2018    total    NASAL ENDOSCOPY      [place] REVISE TOTAL HIP REPLACEMENT Left 5/22/2018    HIP TOTAL ARTHROPLASTY ANTERIOR APPROACH  ([facility]) performed by Alexus Kern DO at [facility]  2014    lesion excision    TONSILLECTOMY      TUNNELED VENOUS PORT PLACEMENT Right 11/25/2015    Rt.  chest       Medications:     Current Outpatient Medications:     montelukast (SINGULAIR) 10 MG tablet, take 1 tablet by mouth every evening, Disp: 90 tablet, Rfl: 3    albuterol sulfate HFA (VENTOLIN HFA) 108 (90 Base) MCG/ACT inhaler, inhale 2 puffs by mouth and INTO THE LUNGS every 6 hours if needed for wheezing, Disp: 3 Inhaler, Rfl: 3    budesonide-formoterol (SYMBICORT) 160-4.5 MCG/ACT AERO, inhale 2 puffs by mouth twice a day, Disp: 3 Inhaler, Rfl: 3    fluticasone (FLONASE) 50 MCG/ACT nasal spray, instill 2 sprays into each nostril once daily, Disp: 3 Bottle, Rfl: 3    SPIRIVA HANDIHALER 18 MCG inhalation capsule, inhale the contents of one capsule in the handihaler once daily, Disp: 90 capsule, Rfl: 3    predniSONE (DELTASONE) 10 MG tablet, 4 tablet once daily for 3 days then 3 tablet once daily for 3 days then 2 tablet once daily for 3 days then 1 tablet once daily for 3 days then discontinue, Disp: 30 tablet, Rfl: 0    albuterol (PROVENTIL) (2.5 MG/3ML) 0.083% nebulizer solution, Take 3 mLs by nebulization every 4 hours as needed for Wheezing or Shortness of Breath, Disp: 375 mL, Rfl: 5    budesonide-formoterol (SYMBICORT) 160-4.5 MCG/ACT AERO, Inhale 2 puffs into the lungs 2 times daily, Disp: 1 Inhaler, Rfl: 11    fluticasone-salmeterol (ADVAIR DISKUS) 500-50 MCG/DOSE diskus inhaler, Inhale 1 puff into the lungs 2 times daily, Disp: 60 each, Rfl: 11    omeprazole (PRILOSEC) 20 MG delayed release capsule, take 1 capsule by mouth once daily, Disp: 60 capsule, Rfl: 3    amLODIPine (NORVASC) 10 MG tablet, take 1 tablet by mouth once daily, Disp: 60 tablet, Rfl: 3    Handicap Placard MISC, by Does not apply route For 5 years, Disp: 1 each, Rfl: 0    acetaminophen (TYLENOL) 500 MG tablet, Take 500 mg by mouth every 6 hours as needed for Pain, Disp: , Rfl:     traMADol (ULTRAM) 50 MG tablet, Take 50 mg by mouth as needed for Pain., Disp: , Rfl:     predniSONE (DELTASONE) 10 MG tablet, Start at 50 mg and taper 10 mg every 3 days, Disp: 45 tablet, Rfl: 0    ALPRAZolam (XANAX) 0.5 MG tablet, Take 0.5 mg by mouth 3 times daily as needed for Sleep. ., Disp: , Rfl:     benazepril (LOTENSIN) 10 MG tablet, take 1 tablet by mouth once daily (Patient taking differently: Take 10 mg BID), Disp: 30 tablet, Rfl: 5    Handicap Placard MISC, by Does not apply route, Disp: 1 each, Rfl: 0    Immune Globulin, Human, 10 GM/100ML SOLN IV solution, Infuse 60 g intravenously every 21 days , Disp: , Rfl:      Social History:     Social History     Socioeconomic History    Marital status:      Spouse name: Alton Tony Number of children: 3    Years of education: Not on file    Highest education level: Not on file   Occupational History    Occupation:      Comment: now on disability   Social Needs    Financial resource strain: Not on file    Food insecurity     Worry: Not on file     Inability: Not on file   [employer] needs     Medical: Not on file     Non-medical: Not on file   Tobacco Use    Smoking status: Former Smoker     Packs/day: 1.00     Years: 31.00     Pack years: 31.00     Types: Cigarettes     Start date: 1972     Quit date: 2004     Years since quittin.2    Smokeless tobacco: Never Used    Tobacco comment: smoke 4ppd for many yrs; quit 8 yrs ago   Substance and Sexual Activity    Alcohol use: Yes     Alcohol/week: 3.0 standard drinks     Types: 3 Cans of beer per week     Comment: about once a week    Drug use:  No  Sexual activity: Yes     Partners: Female   Lifestyle    Physical activity     Days per week: Not on file     Minutes per session: Not on file    Stress: Not on file   Relationships    Social connections     Talks on phone: Not on file     Gets together: Not on file     Attends [employer] service: Not on file     Active member of club or organization: Not on file     Attends meetings of clubs or organizations: Not on file     Relationship status: Not on file    Intimate partner violence     Fear of current or ex partner: Not on file     Emotionally abused: Not on file     Physically abused: Not on file     Forced sexual activity: Not on file   Other Topics Concern    Not on file   Social History Narrative    Not on file       Family History:     Family History   Problem Relation Age of Onset    COPD Father     Coronary Art Dis Father     Heart Attack Father     Lung Cancer Mother     Breast Cancer Sister     No Known Problems Maternal Grandmother     No Known Problems Maternal Grandfather     Cancer Paternal Grandmother     Stroke Paternal Grandfather     Heart Attack Paternal Grandfather     Cancer Paternal Grandfather     Breast Cancer Sister     Cancer Sister         Throat        Allergies:   Patient has no known allergies. Review of Systems:   Constitutional: No fevers or chills. No systemic complaints  Head: No headaches  Eyes: No double vision or blurry vision. ENT: No sore throat or runny nose. . No hearing loss, tinnitus or vertigo. Cardiovascular: No chest pain or palpitations. No shortness of breath. No ECKERT  Lung: No shortness of breath or cough. No sputum production  Abdomen: No nausea, vomiting, diarrhea, or abdominal pain. .  Genitourinary: No increased urinary frequency, or dysuria. No hematuria. No suprapubic or CVA pain  Musculoskeletal: No muscle aches or pains. No joint effusions, swelling or deformities  Hematologic: No bleeding or bruising.   Neurologic: No headache, weakness, numbness, or tingling. Physical Examination :   /68 (Site: Left Upper Arm, Position: Sitting, Cuff Size: Medium Adult)   Pulse 89   Temp 97.9 °F (36.6 °C) (Temporal)   Resp 18   Ht 5' 8\" (1.727 m)   Wt 259 lb 6.4 oz (117.7 kg)   SpO2 97% Comment: 3 liters  BMI 39.44 kg/m²    General Appearance: Awake, alert, and in no apparent distress  Head:  Normocephalic, no trauma  Eyes: Pupils equal, round, reactive, to light and accommodation; extraocular movements intact; sclera anicteric; conjunctivae pink. No embolic phenomena. ENT: Oropharynx clear, without erythema, exudate, or thrush. No tenderness of sinuses. Mouth/throat: mucosa pink and moist. No lesions. Dentition in good repair. Neck:Supple, without lymphadenopathy. Thyroid normal, No bruits. Pulmonary/Chest: Clear to auscultation, without wheezes, rales, or rhonchi. No dullness to percussion. Cardiovascular: Regular rate and rhythm without murmurs, rubs, or gallops. Abdomen: Soft, non tender. Bowel sounds normal. No organomegaly  All four Extremities: No cyanosis, clubbing, edema, or effusions. Neurologic: No gross sensory or motor deficits. Skin: Warm and dry with good turgor. No signs of peripheral arterial or venous insufficiency.     Medical Decision Making:   I have independently reviewed/ordered the following labs:    CBC with Differential:  Lab Results   Component Value Date    WBC 7.1 08/02/2020    WBC 6.8 05/20/2020    HGB 13.4 08/02/2020    HGB 14.7 05/20/2020    HCT 41.1 08/02/2020    HCT 44.5 05/20/2020     08/02/2020     05/20/2020     03/01/2012     02/29/2012    LYMPHOPCT 24 08/02/2020    LYMPHOPCT 17 01/22/2020    MONOPCT 6 08/02/2020    MONOPCT 4 01/22/2020     BMP:   Lab Results   Component Value Date     05/20/2020     02/04/2019    K 4.1 05/20/2020    K 4.0 02/04/2019    CL 98 05/20/2020    CL 99 02/04/2019    CO2 22 05/20/2020    CO2 21 02/04/2019    BUN 12 05/20/2020 BUN 13 02/04/2019    CREATININE 0.86 08/02/2020    CREATININE 0.87 05/20/2020    MG 2.7 06/12/2014    MG 2.7 06/11/2014     Hepatic Function Panel:  Lab Results   Component Value Date    PROT 7.1 05/20/2020    PROT 7.3 02/04/2019    LABALBU 3.9 05/20/2020    LABALBU 3.7 02/04/2019    BILIDIR <0.08 05/20/2020    BILIDIR 0.08 04/15/2014    IBILI CANNOT BE CALCULATED 05/20/2020    IBILI 0.22 04/15/2014    BILITOT 0.23 05/20/2020    BILITOT 0.33 02/04/2019    ALKPHOS 96 05/20/2020    ALKPHOS 73 02/04/2019    ALT 22 08/02/2020    ALT 32 05/20/2020    AST 29 05/20/2020    AST 23 02/04/2019     No results found for: RPR  No results found for: HIV  No results found for: [facility]  Lab Results   Component Value Date    RBC 4.59 08/02/2020    RBC 5.02 03/01/2012    WBC 7.1 08/02/2020    TURBIDITY CLEAR 05/08/2018     Lab Results   Component Value Date    CREATININE 0.86 08/02/2020    GLUCOSE 140 05/20/2020    GLUCOSE 86 06/04/2012       Thank you for allowing us to participate in the care of this patient. Please call with questions. Berna Doran, APRN - CNP     ATTESTATION:    I have discussed the case, including pertinent history and exam findings with the APRN. I have evaluated the  History, physical findings and pictures of the patient and the key elements of the encounter have been performed by me. I have reviewed the laboratory data, other diagnostic studies and discussed them with the APRN. I have updated the medical record where necessary. I agree with the assessment, plan and orders as documented by the APRN.     Ephraim Saxena MD.      Pager: [phone] - Office: [phone]

## 2021-04-15 ENCOUNTER — HOSPITAL ENCOUNTER (OUTPATIENT)
Dept: GENERAL RADIOLOGY | Age: 66
Discharge: HOME | End: 2021-04-17
Payer: MEDICARE

## 2021-04-15 ENCOUNTER — HOSPITAL ENCOUNTER (OUTPATIENT)
Dept: PREADMISSION TESTING | Age: 66
Discharge: HOME | End: 2021-04-19
Payer: MEDICARE

## 2021-04-15 VITALS
BODY MASS INDEX: 39.71 KG/M2 | SYSTOLIC BLOOD PRESSURE: 132 MMHG | DIASTOLIC BLOOD PRESSURE: 86 MMHG | HEIGHT: 68 IN | WEIGHT: 262 LBS | TEMPERATURE: 97.6 F | RESPIRATION RATE: 22 BRPM | HEART RATE: 76 BPM | OXYGEN SATURATION: 98 %

## 2021-04-15 DIAGNOSIS — Z01.818 PRE-OP TESTING: ICD-10-CM

## 2021-04-15 LAB
ALBUMIN SERPL-MCNC: 4.4 G/DL (ref 3.5–5.2)
ALBUMIN/GLOBULIN RATIO: 1.4 (ref 1–2.5)
ALP BLD-CCNC: 101 U/L (ref 40–129)
ALT SERPL-CCNC: 23 U/L (ref 5–41)
ANION GAP SERPL CALCULATED.3IONS-SCNC: 10 MMOL/L (ref 9–17)
AST SERPL-CCNC: 23 U/L
BILIRUB SERPL-MCNC: 0.41 MG/DL (ref 0.3–1.2)
BILIRUBIN URINE: NEGATIVE
BUN BLDV-MCNC: 12 MG/DL (ref 8–23)
BUN/CREAT BLD: ABNORMAL (ref 9–20)
CALCIUM SERPL-MCNC: 9.6 MG/DL (ref 8.6–10.4)
CHLORIDE BLD-SCNC: 102 MMOL/L (ref 98–107)
CO2: 25 MMOL/L (ref 20–31)
COLOR: YELLOW
COMMENT UA: NORMAL
CREAT SERPL-MCNC: 0.79 MG/DL (ref 0.7–1.2)
GFR AFRICAN AMERICAN: >60 ML/MIN
GFR NON-AFRICAN AMERICAN: >60 ML/MIN
GFR SERPL CREATININE-BSD FRML MDRD: ABNORMAL ML/MIN/{1.73_M2}
GFR SERPL CREATININE-BSD FRML MDRD: ABNORMAL ML/MIN/{1.73_M2}
GLUCOSE BLD-MCNC: 102 MG/DL (ref 70–99)
GLUCOSE URINE: NEGATIVE
HCT VFR BLD CALC: 44 % (ref 40.7–50.3)
HEMOGLOBIN: 14.4 G/DL (ref 13–17)
KETONES, URINE: NEGATIVE
LEUKOCYTE ESTERASE, URINE: NEGATIVE
MCH RBC QN AUTO: 28.6 PG (ref 25.2–33.5)
MCHC RBC AUTO-ENTMCNC: 32.7 G/DL (ref 28.4–34.8)
MCV RBC AUTO: 87.5 FL (ref 82.6–102.9)
NITRITE, URINE: NEGATIVE
NRBC AUTOMATED: 0 PER 100 WBC
PDW BLD-RTO: 13.1 % (ref 11.8–14.4)
PH UA: 6.5 (ref 5–8)
PLATELET # BLD: 244 K/UL (ref 138–453)
PMV BLD AUTO: 10.8 FL (ref 8.1–13.5)
POTASSIUM SERPL-SCNC: 4.4 MMOL/L (ref 3.7–5.3)
PROTEIN UA: NEGATIVE
RBC # BLD: 5.03 M/UL (ref 4.21–5.77)
SODIUM BLD-SCNC: 137 MMOL/L (ref 135–144)
SPECIFIC GRAVITY UA: 1.01 (ref 1–1.03)
TOTAL PROTEIN: 7.5 G/DL (ref 6.4–8.3)
TURBIDITY: CLEAR
URINE HGB: NEGATIVE
UROBILINOGEN, URINE: NORMAL
WBC # BLD: 8 K/UL (ref 3.5–11.3)

## 2021-04-15 PROCEDURE — 71046 X-RAY EXAM CHEST 2 VIEWS: CPT

## 2021-04-15 PROCEDURE — 87641 MR-STAPH DNA AMP PROBE: CPT

## 2021-04-15 PROCEDURE — 93005 ELECTROCARDIOGRAM TRACING: CPT

## 2021-04-15 PROCEDURE — 85027 COMPLETE CBC AUTOMATED: CPT

## 2021-04-15 PROCEDURE — 80053 COMPREHEN METABOLIC PANEL: CPT

## 2021-04-15 PROCEDURE — 81003 URINALYSIS AUTO W/O SCOPE: CPT

## 2021-04-15 PROCEDURE — 36415 COLL VENOUS BLD VENIPUNCTURE: CPT

## 2021-04-15 ASSESSMENT — PAIN DESCRIPTION - ORIENTATION: ORIENTATION: RIGHT

## 2021-04-15 ASSESSMENT — PAIN DESCRIPTION - FREQUENCY: FREQUENCY: INTERMITTENT

## 2021-04-15 ASSESSMENT — PAIN DESCRIPTION - DESCRIPTORS: DESCRIPTORS: ACHING;THROBBING;STABBING;SHOOTING

## 2021-04-15 ASSESSMENT — PAIN SCALES - GENERAL: PAINLEVEL_OUTOF10: 4

## 2021-04-15 ASSESSMENT — PAIN DESCRIPTION - PROGRESSION: CLINICAL_PROGRESSION: GRADUALLY WORSENING

## 2021-04-15 ASSESSMENT — PAIN DESCRIPTION - ONSET: ONSET: ON-GOING

## 2021-04-16 LAB
EKG ATRIAL RATE: 68 BPM
EKG P AXIS: 60 DEGREES
EKG P-R INTERVAL: 156 MS
EKG Q-T INTERVAL: 408 MS
EKG QRS DURATION: 96 MS
EKG QTC CALCULATION (BAZETT): 433 MS
EKG R AXIS: 64 DEGREES
EKG T AXIS: 37 DEGREES
EKG VENTRICULAR RATE: 68 BPM
MRSA, DNA, NASAL: NORMAL
SPECIMEN DESCRIPTION: NORMAL

## 2021-04-20 ENCOUNTER — TELEPHONE (OUTPATIENT)
Dept: ORTHOPEDIC SURGERY | Age: 66
End: 2021-04-20

## 2021-04-20 NOTE — TELEPHONE ENCOUNTER
Christy Heart spoke to Dr. Gabriela Meneses about dental clearance and said it was ok to proceed with surgery 4/27/21.

## 2021-04-23 ENCOUNTER — HOSPITAL ENCOUNTER (OUTPATIENT)
Dept: LAB | Age: 66
Setting detail: SPECIMEN
Discharge: HOME | End: 2021-04-23
Payer: MEDICARE

## 2021-04-23 ENCOUNTER — TELEPHONE (OUTPATIENT)
Dept: ORTHOPEDIC SURGERY | Age: 66
End: 2021-04-23

## 2021-04-23 DIAGNOSIS — Z01.818 PREOP TESTING: Primary | ICD-10-CM

## 2021-04-23 LAB
SARS-COV-2: NORMAL
SARS-COV-2: NOT DETECTED
SOURCE: NORMAL

## 2021-04-23 PROCEDURE — U0005 INFEC AGEN DETEC AMPLI PROBE: HCPCS

## 2021-04-23 PROCEDURE — U0003 INFECTIOUS AGENT DETECTION BY NUCLEIC ACID (DNA OR RNA); SEVERE ACUTE RESPIRATORY SYNDROME CORONAVIRUS 2 (SARS-COV-2) (CORONAVIRUS DISEASE [COVID-19]), AMPLIFIED PROBE TECHNIQUE, MAKING USE OF HIGH THROUGHPUT TECHNOLOGIES AS DESCRIBED BY CMS-2020-01-R: HCPCS

## 2021-04-27 ENCOUNTER — APPOINTMENT (OUTPATIENT)
Dept: GENERAL RADIOLOGY | Age: 66
End: 2021-04-27
Attending: ORTHOPAEDIC SURGERY
Payer: MEDICARE

## 2021-04-27 ENCOUNTER — ANESTHESIA EVENT (OUTPATIENT)
Dept: OPERATING ROOM | Age: 66
End: 2021-04-27
Payer: MEDICARE

## 2021-04-27 ENCOUNTER — HOSPITAL ENCOUNTER (OUTPATIENT)
Age: 66
Discharge: HOME | End: 2021-04-29
Attending: ORTHOPAEDIC SURGERY | Admitting: ORTHOPAEDIC SURGERY
Payer: MEDICARE

## 2021-04-27 ENCOUNTER — ANESTHESIA (OUTPATIENT)
Dept: OPERATING ROOM | Age: 66
End: 2021-04-27
Payer: MEDICARE

## 2021-04-27 VITALS — TEMPERATURE: 96.1 F | DIASTOLIC BLOOD PRESSURE: 76 MMHG | SYSTOLIC BLOOD PRESSURE: 113 MMHG | OXYGEN SATURATION: 98 %

## 2021-04-27 DIAGNOSIS — Z96.641 STATUS POST TOTAL REPLACEMENT OF RIGHT HIP: Primary | ICD-10-CM

## 2021-04-27 DIAGNOSIS — G89.18 POST-OPERATIVE PAIN: ICD-10-CM

## 2021-04-27 LAB
GLUCOSE BLD-MCNC: 145 MG/DL (ref 75–110)
GLUCOSE BLD-MCNC: 154 MG/DL (ref 75–110)

## 2021-04-27 PROCEDURE — 6360000002 HC RX W HCPCS: Performed by: STUDENT IN AN ORGANIZED HEALTH CARE EDUCATION/TRAINING PROGRAM

## 2021-04-27 PROCEDURE — 27130 TOTAL HIP ARTHROPLASTY: CPT | Performed by: ORTHOPAEDIC SURGERY

## 2021-04-27 PROCEDURE — 99222 1ST HOSP IP/OBS MODERATE 55: CPT | Performed by: INTERNAL MEDICINE

## 2021-04-27 PROCEDURE — C1713 ANCHOR/SCREW BN/BN,TIS/BN: HCPCS | Performed by: ORTHOPAEDIC SURGERY

## 2021-04-27 PROCEDURE — 6360000002 HC RX W HCPCS: Performed by: INTERNAL MEDICINE

## 2021-04-27 PROCEDURE — 6370000000 HC RX 637 (ALT 250 FOR IP): Performed by: STUDENT IN AN ORGANIZED HEALTH CARE EDUCATION/TRAINING PROGRAM

## 2021-04-27 PROCEDURE — 2500000003 HC RX 250 WO HCPCS: Performed by: ANESTHESIOLOGY

## 2021-04-27 PROCEDURE — 2580000003 HC RX 258: Performed by: STUDENT IN AN ORGANIZED HEALTH CARE EDUCATION/TRAINING PROGRAM

## 2021-04-27 PROCEDURE — 2500000003 HC RX 250 WO HCPCS: Performed by: INTERNAL MEDICINE

## 2021-04-27 PROCEDURE — 97162 PT EVAL MOD COMPLEX 30 MIN: CPT

## 2021-04-27 PROCEDURE — 2580000003 HC RX 258: Performed by: INTERNAL MEDICINE

## 2021-04-27 PROCEDURE — 64447 NJX AA&/STRD FEMORAL NRV IMG: CPT

## 2021-04-27 PROCEDURE — 73502 X-RAY EXAM HIP UNI 2-3 VIEWS: CPT

## 2021-04-27 PROCEDURE — 7100000000 HC PACU RECOVERY - FIRST 15 MIN: Performed by: ORTHOPAEDIC SURGERY

## 2021-04-27 PROCEDURE — 76942 ECHO GUIDE FOR BIOPSY: CPT | Performed by: ANESTHESIOLOGY

## 2021-04-27 PROCEDURE — 3600000004 HC SURGERY LEVEL 4 BASE: Performed by: ORTHOPAEDIC SURGERY

## 2021-04-27 PROCEDURE — 97530 THERAPEUTIC ACTIVITIES: CPT

## 2021-04-27 PROCEDURE — 3209999900 FLUORO FOR SURGICAL PROCEDURES

## 2021-04-27 PROCEDURE — 3600000014 HC SURGERY LEVEL 4 ADDTL 15MIN: Performed by: ORTHOPAEDIC SURGERY

## 2021-04-27 PROCEDURE — 7100000001 HC PACU RECOVERY - ADDTL 15 MIN: Performed by: ORTHOPAEDIC SURGERY

## 2021-04-27 PROCEDURE — 3700000001 HC ADD 15 MINUTES (ANESTHESIA): Performed by: ORTHOPAEDIC SURGERY

## 2021-04-27 PROCEDURE — 2580000003 HC RX 258: Performed by: ORTHOPAEDIC SURGERY

## 2021-04-27 PROCEDURE — C1776 JOINT DEVICE (IMPLANTABLE): HCPCS | Performed by: ORTHOPAEDIC SURGERY

## 2021-04-27 PROCEDURE — 99223 1ST HOSP IP/OBS HIGH 75: CPT | Performed by: INTERNAL MEDICINE

## 2021-04-27 PROCEDURE — 6370000000 HC RX 637 (ALT 250 FOR IP): Performed by: INTERNAL MEDICINE

## 2021-04-27 PROCEDURE — 97535 SELF CARE MNGMENT TRAINING: CPT

## 2021-04-27 PROCEDURE — 2580000003 HC RX 258: Performed by: ANESTHESIOLOGY

## 2021-04-27 PROCEDURE — 97166 OT EVAL MOD COMPLEX 45 MIN: CPT

## 2021-04-27 PROCEDURE — 94640 AIRWAY INHALATION TREATMENT: CPT

## 2021-04-27 PROCEDURE — 3700000000 HC ANESTHESIA ATTENDED CARE: Performed by: ORTHOPAEDIC SURGERY

## 2021-04-27 PROCEDURE — 2500000003 HC RX 250 WO HCPCS: Performed by: ORTHOPAEDIC SURGERY

## 2021-04-27 PROCEDURE — 6360000002 HC RX W HCPCS: Performed by: ANESTHESIOLOGY

## 2021-04-27 PROCEDURE — 2709999900 HC NON-CHARGEABLE SUPPLY: Performed by: ORTHOPAEDIC SURGERY

## 2021-04-27 PROCEDURE — 2580000003 HC RX 258: Performed by: NURSE ANESTHETIST, CERTIFIED REGISTERED

## 2021-04-27 PROCEDURE — 6360000002 HC RX W HCPCS: Performed by: NURSE ANESTHETIST, CERTIFIED REGISTERED

## 2021-04-27 PROCEDURE — 2500000003 HC RX 250 WO HCPCS: Performed by: NURSE ANESTHETIST, CERTIFIED REGISTERED

## 2021-04-27 PROCEDURE — 82947 ASSAY GLUCOSE BLOOD QUANT: CPT

## 2021-04-27 PROCEDURE — 2720000010 HC SURG SUPPLY STERILE: Performed by: ORTHOPAEDIC SURGERY

## 2021-04-27 DEVICE — ACETABULAR SHELL Ø56 TWO HOLES
Type: IMPLANTABLE DEVICE | Site: HIP | Status: FUNCTIONAL
Brand: MPACT ACETABULAR SYSTEM

## 2021-04-27 DEVICE — COMPONENT TOT HIP CAPPED ADV LNR MTL CERM: Type: IMPLANTABLE DEVICE | Site: HIP | Status: FUNCTIONAL

## 2021-04-27 DEVICE — FEMORAL HEAD Ø 36 SIZE L
Type: IMPLANTABLE DEVICE | Site: HIP | Status: FUNCTIONAL
Brand: COCR FEMORAL BALL HEAD

## 2021-04-27 DEVICE — FLAT PE  HC LINER Ø 36 / F
Type: IMPLANTABLE DEVICE | Site: HIP | Status: FUNCTIONAL
Brand: MPACT ACETABULAR SYSTEM

## 2021-04-27 DEVICE — IMPLANTABLE DEVICE: Type: IMPLANTABLE DEVICE | Site: HIP | Status: FUNCTIONAL

## 2021-04-27 RX ORDER — MAGNESIUM HYDROXIDE 1200 MG/15ML
LIQUID ORAL PRN
Status: DISCONTINUED | OUTPATIENT
Start: 2021-04-27 | End: 2021-04-27 | Stop reason: ALTCHOICE

## 2021-04-27 RX ORDER — OXYCODONE HYDROCHLORIDE 5 MG/1
10 TABLET ORAL EVERY 4 HOURS PRN
Status: DISCONTINUED | OUTPATIENT
Start: 2021-04-27 | End: 2021-04-29 | Stop reason: HOSPADM

## 2021-04-27 RX ORDER — ONDANSETRON 2 MG/ML
4 INJECTION INTRAMUSCULAR; INTRAVENOUS EVERY 6 HOURS PRN
Status: DISCONTINUED | OUTPATIENT
Start: 2021-04-27 | End: 2021-04-29 | Stop reason: HOSPADM

## 2021-04-27 RX ORDER — DEXTROSE MONOHYDRATE 25 G/50ML
12.5 INJECTION, SOLUTION INTRAVENOUS PRN
Status: DISCONTINUED | OUTPATIENT
Start: 2021-04-27 | End: 2021-04-29 | Stop reason: HOSPADM

## 2021-04-27 RX ORDER — GLUCAGON 1 MG/ML
1 KIT INJECTION PRN
Status: DISCONTINUED | OUTPATIENT
Start: 2021-04-27 | End: 2021-04-29 | Stop reason: HOSPADM

## 2021-04-27 RX ORDER — LISINOPRIL 10 MG/1
10 TABLET ORAL DAILY
Status: DISCONTINUED | OUTPATIENT
Start: 2021-04-28 | End: 2021-04-29 | Stop reason: HOSPADM

## 2021-04-27 RX ORDER — DEXAMETHASONE SODIUM PHOSPHATE 10 MG/ML
10 INJECTION INTRAMUSCULAR; INTRAVENOUS ONCE
Status: COMPLETED | OUTPATIENT
Start: 2021-04-27 | End: 2021-04-27

## 2021-04-27 RX ORDER — FENTANYL CITRATE 50 UG/ML
100 INJECTION, SOLUTION INTRAMUSCULAR; INTRAVENOUS ONCE
Status: COMPLETED | OUTPATIENT
Start: 2021-04-27 | End: 2021-04-27

## 2021-04-27 RX ORDER — SENNOSIDES 8.8 MG/5ML
5 LIQUID ORAL 2 TIMES DAILY PRN
Status: DISCONTINUED | OUTPATIENT
Start: 2021-04-27 | End: 2021-04-29 | Stop reason: HOSPADM

## 2021-04-27 RX ORDER — OMEPRAZOLE 20 MG/1
20 CAPSULE, DELAYED RELEASE ORAL EVERY MORNING
Status: DISCONTINUED | OUTPATIENT
Start: 2021-04-28 | End: 2021-04-27 | Stop reason: CLARIF

## 2021-04-27 RX ORDER — NICOTINE POLACRILEX 4 MG
15 LOZENGE BUCCAL PRN
Status: DISCONTINUED | OUTPATIENT
Start: 2021-04-27 | End: 2021-04-29 | Stop reason: HOSPADM

## 2021-04-27 RX ORDER — SENNA AND DOCUSATE SODIUM 50; 8.6 MG/1; MG/1
1 TABLET, FILM COATED ORAL 2 TIMES DAILY
Status: DISCONTINUED | OUTPATIENT
Start: 2021-04-27 | End: 2021-04-29 | Stop reason: HOSPADM

## 2021-04-27 RX ORDER — ACETAMINOPHEN 500 MG
1000 TABLET ORAL EVERY 6 HOURS SCHEDULED
Status: DISCONTINUED | OUTPATIENT
Start: 2021-04-27 | End: 2021-04-29 | Stop reason: HOSPADM

## 2021-04-27 RX ORDER — PANTOPRAZOLE SODIUM 40 MG/1
40 TABLET, DELAYED RELEASE ORAL
Status: DISCONTINUED | OUTPATIENT
Start: 2021-04-28 | End: 2021-04-29 | Stop reason: HOSPADM

## 2021-04-27 RX ORDER — TRANEXAMIC ACID 10 MG/ML
1000 INJECTION, SOLUTION INTRAVENOUS ONCE
Status: COMPLETED | OUTPATIENT
Start: 2021-04-27 | End: 2021-04-27

## 2021-04-27 RX ORDER — MAGNESIUM HYDROXIDE 1200 MG/15ML
LIQUID ORAL CONTINUOUS PRN
Status: COMPLETED | OUTPATIENT
Start: 2021-04-27 | End: 2021-04-27

## 2021-04-27 RX ORDER — ONDANSETRON 2 MG/ML
INJECTION INTRAMUSCULAR; INTRAVENOUS PRN
Status: DISCONTINUED | OUTPATIENT
Start: 2021-04-27 | End: 2021-04-27 | Stop reason: SDUPTHER

## 2021-04-27 RX ORDER — ALBUTEROL SULFATE 2.5 MG/3ML
2.5 SOLUTION RESPIRATORY (INHALATION) EVERY 4 HOURS PRN
Status: DISCONTINUED | OUTPATIENT
Start: 2021-04-27 | End: 2021-04-29 | Stop reason: HOSPADM

## 2021-04-27 RX ORDER — DOCUSATE SODIUM 100 MG/1
100 CAPSULE, LIQUID FILLED ORAL 2 TIMES DAILY PRN
Qty: 60 CAPSULE | Refills: 0 | Status: SHIPPED | OUTPATIENT
Start: 2021-04-27

## 2021-04-27 RX ORDER — BUPIVACAINE HYDROCHLORIDE 7.5 MG/ML
INJECTION, SOLUTION INTRASPINAL PRN
Status: DISCONTINUED | OUTPATIENT
Start: 2021-04-27 | End: 2021-04-27 | Stop reason: SDUPTHER

## 2021-04-27 RX ORDER — OXYCODONE HYDROCHLORIDE 5 MG/1
5 TABLET ORAL EVERY 4 HOURS PRN
Status: DISCONTINUED | OUTPATIENT
Start: 2021-04-27 | End: 2021-04-29 | Stop reason: HOSPADM

## 2021-04-27 RX ORDER — PROPOFOL 10 MG/ML
INJECTION, EMULSION INTRAVENOUS CONTINUOUS PRN
Status: DISCONTINUED | OUTPATIENT
Start: 2021-04-27 | End: 2021-04-27 | Stop reason: SDUPTHER

## 2021-04-27 RX ORDER — GABAPENTIN 300 MG/1
300 CAPSULE ORAL NIGHTLY
Status: DISCONTINUED | OUTPATIENT
Start: 2021-04-27 | End: 2021-04-29 | Stop reason: HOSPADM

## 2021-04-27 RX ORDER — MIDAZOLAM HYDROCHLORIDE 2 MG/2ML
2 INJECTION, SOLUTION INTRAMUSCULAR; INTRAVENOUS ONCE
Status: COMPLETED | OUTPATIENT
Start: 2021-04-27 | End: 2021-04-27

## 2021-04-27 RX ORDER — SODIUM CHLORIDE, SODIUM LACTATE, POTASSIUM CHLORIDE, CALCIUM CHLORIDE 600; 310; 30; 20 MG/100ML; MG/100ML; MG/100ML; MG/100ML
INJECTION, SOLUTION INTRAVENOUS CONTINUOUS
Status: DISCONTINUED | OUTPATIENT
Start: 2021-04-27 | End: 2021-04-27

## 2021-04-27 RX ORDER — SODIUM CHLORIDE 0.9 % (FLUSH) 0.9 %
5-40 SYRINGE (ML) INJECTION PRN
Status: DISCONTINUED | OUTPATIENT
Start: 2021-04-27 | End: 2021-04-29 | Stop reason: HOSPADM

## 2021-04-27 RX ORDER — AMLODIPINE BESYLATE 10 MG/1
10 TABLET ORAL DAILY
Status: DISCONTINUED | OUTPATIENT
Start: 2021-04-28 | End: 2021-04-29 | Stop reason: HOSPADM

## 2021-04-27 RX ORDER — SODIUM CHLORIDE 9 MG/ML
INJECTION, SOLUTION INTRAVENOUS CONTINUOUS
Status: DISCONTINUED | OUTPATIENT
Start: 2021-04-27 | End: 2021-04-29 | Stop reason: HOSPADM

## 2021-04-27 RX ORDER — FLUTICASONE PROPIONATE 50 MCG
2 SPRAY, SUSPENSION (ML) NASAL DAILY
Status: DISCONTINUED | OUTPATIENT
Start: 2021-04-28 | End: 2021-04-29 | Stop reason: HOSPADM

## 2021-04-27 RX ORDER — SODIUM CHLORIDE 9 MG/ML
25 INJECTION, SOLUTION INTRAVENOUS PRN
Status: DISCONTINUED | OUTPATIENT
Start: 2021-04-27 | End: 2021-04-29 | Stop reason: HOSPADM

## 2021-04-27 RX ORDER — TRANEXAMIC ACID 10 MG/ML
1000 INJECTION, SOLUTION INTRAVENOUS ONCE
Status: DISCONTINUED | OUTPATIENT
Start: 2021-04-27 | End: 2021-04-29 | Stop reason: HOSPADM

## 2021-04-27 RX ORDER — ALPRAZOLAM 0.5 MG/1
0.5 TABLET ORAL 3 TIMES DAILY PRN
Status: DISCONTINUED | OUTPATIENT
Start: 2021-04-27 | End: 2021-04-29 | Stop reason: HOSPADM

## 2021-04-27 RX ORDER — ONDANSETRON 4 MG/1
4 TABLET, ORALLY DISINTEGRATING ORAL EVERY 8 HOURS PRN
Status: DISCONTINUED | OUTPATIENT
Start: 2021-04-27 | End: 2021-04-29 | Stop reason: HOSPADM

## 2021-04-27 RX ORDER — BUDESONIDE AND FORMOTEROL FUMARATE DIHYDRATE 160; 4.5 UG/1; UG/1
1 AEROSOL RESPIRATORY (INHALATION) 2 TIMES DAILY
Status: DISCONTINUED | OUTPATIENT
Start: 2021-04-27 | End: 2021-04-29 | Stop reason: HOSPADM

## 2021-04-27 RX ORDER — ONDANSETRON 2 MG/ML
4 INJECTION INTRAMUSCULAR; INTRAVENOUS ONCE
Status: DISCONTINUED | OUTPATIENT
Start: 2021-04-27 | End: 2021-04-29 | Stop reason: HOSPADM

## 2021-04-27 RX ORDER — GABAPENTIN 800 MG/1
800 TABLET ORAL ONCE
Status: COMPLETED | OUTPATIENT
Start: 2021-04-27 | End: 2021-04-27

## 2021-04-27 RX ORDER — ACETAMINOPHEN 500 MG
1000 TABLET ORAL ONCE
Status: COMPLETED | OUTPATIENT
Start: 2021-04-27 | End: 2021-04-27

## 2021-04-27 RX ORDER — CELECOXIB 100 MG/1
200 CAPSULE ORAL ONCE
Status: COMPLETED | OUTPATIENT
Start: 2021-04-27 | End: 2021-04-27

## 2021-04-27 RX ORDER — FENTANYL CITRATE 50 UG/ML
50 INJECTION, SOLUTION INTRAMUSCULAR; INTRAVENOUS EVERY 5 MIN PRN
Status: COMPLETED | OUTPATIENT
Start: 2021-04-27 | End: 2021-04-27

## 2021-04-27 RX ORDER — OXYCODONE HYDROCHLORIDE AND ACETAMINOPHEN 5; 325 MG/1; MG/1
1 TABLET ORAL EVERY 4 HOURS PRN
Qty: 42 TABLET | Refills: 0 | Status: SHIPPED | OUTPATIENT
Start: 2021-04-27 | End: 2021-05-04

## 2021-04-27 RX ORDER — BENAZEPRIL HYDROCHLORIDE 5 MG/1
10 TABLET, FILM COATED ORAL DAILY
Status: DISCONTINUED | OUTPATIENT
Start: 2021-04-27 | End: 2021-04-27 | Stop reason: CLARIF

## 2021-04-27 RX ORDER — LIDOCAINE HYDROCHLORIDE 10 MG/ML
INJECTION, SOLUTION EPIDURAL; INFILTRATION; INTRACAUDAL; PERINEURAL PRN
Status: DISCONTINUED | OUTPATIENT
Start: 2021-04-27 | End: 2021-04-27 | Stop reason: SDUPTHER

## 2021-04-27 RX ORDER — MONTELUKAST SODIUM 10 MG/1
10 TABLET ORAL NIGHTLY
Status: DISCONTINUED | OUTPATIENT
Start: 2021-04-28 | End: 2021-04-29 | Stop reason: HOSPADM

## 2021-04-27 RX ORDER — TRAMADOL HYDROCHLORIDE 50 MG/1
50 TABLET ORAL EVERY 6 HOURS PRN
Status: DISCONTINUED | OUTPATIENT
Start: 2021-04-27 | End: 2021-04-29 | Stop reason: HOSPADM

## 2021-04-27 RX ORDER — DEXTROSE MONOHYDRATE 50 MG/ML
100 INJECTION, SOLUTION INTRAVENOUS PRN
Status: DISCONTINUED | OUTPATIENT
Start: 2021-04-27 | End: 2021-04-29 | Stop reason: HOSPADM

## 2021-04-27 RX ORDER — KETOROLAC TROMETHAMINE 30 MG/ML
30 INJECTION, SOLUTION INTRAMUSCULAR; INTRAVENOUS EVERY 6 HOURS PRN
Status: DISCONTINUED | OUTPATIENT
Start: 2021-04-27 | End: 2021-04-29 | Stop reason: HOSPADM

## 2021-04-27 RX ORDER — ALBUTEROL SULFATE 90 UG/1
1 AEROSOL, METERED RESPIRATORY (INHALATION) EVERY 6 HOURS PRN
Status: DISCONTINUED | OUTPATIENT
Start: 2021-04-27 | End: 2021-04-29 | Stop reason: HOSPADM

## 2021-04-27 RX ORDER — FENTANYL CITRATE 50 UG/ML
50 INJECTION, SOLUTION INTRAMUSCULAR; INTRAVENOUS EVERY 5 MIN PRN
Status: DISCONTINUED | OUTPATIENT
Start: 2021-04-27 | End: 2021-04-27 | Stop reason: HOSPADM

## 2021-04-27 RX ORDER — FENTANYL CITRATE 50 UG/ML
25 INJECTION, SOLUTION INTRAMUSCULAR; INTRAVENOUS EVERY 5 MIN PRN
Status: DISCONTINUED | OUTPATIENT
Start: 2021-04-27 | End: 2021-04-27 | Stop reason: HOSPADM

## 2021-04-27 RX ORDER — SODIUM CHLORIDE 0.9 % (FLUSH) 0.9 %
5-40 SYRINGE (ML) INJECTION EVERY 12 HOURS SCHEDULED
Status: DISCONTINUED | OUTPATIENT
Start: 2021-04-27 | End: 2021-04-29 | Stop reason: HOSPADM

## 2021-04-27 RX ADMIN — FENTANYL CITRATE 50 MCG: 50 INJECTION, SOLUTION INTRAMUSCULAR; INTRAVENOUS at 15:13

## 2021-04-27 RX ADMIN — OXYCODONE HYDROCHLORIDE 10 MG: 5 TABLET ORAL at 19:04

## 2021-04-27 RX ADMIN — LIDOCAINE HYDROCHLORIDE 50 MG: 10 INJECTION, SOLUTION EPIDURAL; INFILTRATION; INTRACAUDAL; PERINEURAL at 10:41

## 2021-04-27 RX ADMIN — DOCUSATE SODIUM 50MG AND SENNOSIDES 8.6MG 1 TABLET: 8.6; 5 TABLET, FILM COATED ORAL at 21:32

## 2021-04-27 RX ADMIN — VANCOMYCIN HYDROCHLORIDE 1500 MG: 10 INJECTION, POWDER, LYOPHILIZED, FOR SOLUTION INTRAVENOUS at 22:43

## 2021-04-27 RX ADMIN — MIDAZOLAM HYDROCHLORIDE 2 MG: 1 INJECTION, SOLUTION INTRAMUSCULAR; INTRAVENOUS at 09:49

## 2021-04-27 RX ADMIN — ACETAMINOPHEN 1000 MG: 500 TABLET ORAL at 22:56

## 2021-04-27 RX ADMIN — PHENYLEPHRINE HYDROCHLORIDE 100 MCG: 10 INJECTION INTRAVENOUS at 11:25

## 2021-04-27 RX ADMIN — ACETAMINOPHEN 1000 MG: 500 TABLET ORAL at 09:20

## 2021-04-27 RX ADMIN — OXYCODONE HYDROCHLORIDE 10 MG: 5 TABLET ORAL at 22:47

## 2021-04-27 RX ADMIN — GABAPENTIN 800 MG: 800 TABLET ORAL at 09:22

## 2021-04-27 RX ADMIN — FENTANYL CITRATE 50 MCG: 50 INJECTION, SOLUTION INTRAMUSCULAR; INTRAVENOUS at 13:17

## 2021-04-27 RX ADMIN — PHENYLEPHRINE HYDROCHLORIDE 40 MCG/MIN: 10 INJECTION INTRAVENOUS at 11:28

## 2021-04-27 RX ADMIN — FENTANYL CITRATE 50 MCG: 50 INJECTION, SOLUTION INTRAMUSCULAR; INTRAVENOUS at 13:44

## 2021-04-27 RX ADMIN — SODIUM CHLORIDE, POTASSIUM CHLORIDE, SODIUM LACTATE AND CALCIUM CHLORIDE: 600; 310; 30; 20 INJECTION, SOLUTION INTRAVENOUS at 09:28

## 2021-04-27 RX ADMIN — CEFEPIME 2000 MG: 2 INJECTION, POWDER, FOR SOLUTION INTRAVENOUS at 11:10

## 2021-04-27 RX ADMIN — Medication 1750 MG: at 10:26

## 2021-04-27 RX ADMIN — PHENYLEPHRINE HYDROCHLORIDE 100 MCG: 10 INJECTION INTRAVENOUS at 11:22

## 2021-04-27 RX ADMIN — Medication 1750 MG: at 09:15

## 2021-04-27 RX ADMIN — DEXAMETHASONE SODIUM PHOSPHATE 10 MG: 10 INJECTION INTRAMUSCULAR; INTRAVENOUS at 09:23

## 2021-04-27 RX ADMIN — Medication 40 ML: at 09:52

## 2021-04-27 RX ADMIN — BUPIVACAINE HYDROCHLORIDE IN DEXTROSE 2 ML: 7.5 INJECTION, SOLUTION SUBARACHNOID at 10:36

## 2021-04-27 RX ADMIN — INSULIN LISPRO 1 UNITS: 100 INJECTION, SOLUTION INTRAVENOUS; SUBCUTANEOUS at 21:33

## 2021-04-27 RX ADMIN — PROPOFOL 100 MCG/KG/MIN: 10 INJECTION, EMULSION INTRAVENOUS at 10:41

## 2021-04-27 RX ADMIN — FENTANYL CITRATE 100 MCG: 50 INJECTION, SOLUTION INTRAMUSCULAR; INTRAVENOUS at 09:49

## 2021-04-27 RX ADMIN — PHENYLEPHRINE HYDROCHLORIDE 100 MCG: 10 INJECTION INTRAVENOUS at 11:15

## 2021-04-27 RX ADMIN — FENTANYL CITRATE 50 MCG: 50 INJECTION, SOLUTION INTRAMUSCULAR; INTRAVENOUS at 15:19

## 2021-04-27 RX ADMIN — CELECOXIB 200 MG: 100 CAPSULE ORAL at 09:21

## 2021-04-27 RX ADMIN — PHENYLEPHRINE HYDROCHLORIDE 100 MCG: 10 INJECTION INTRAVENOUS at 11:19

## 2021-04-27 RX ADMIN — ONDANSETRON 4 MG: 2 INJECTION INTRAMUSCULAR; INTRAVENOUS at 11:46

## 2021-04-27 RX ADMIN — KETOROLAC TROMETHAMINE 30 MG: 30 INJECTION, SOLUTION INTRAMUSCULAR; INTRAVENOUS at 17:02

## 2021-04-27 RX ADMIN — SODIUM CHLORIDE: 9 INJECTION, SOLUTION INTRAVENOUS at 16:48

## 2021-04-27 RX ADMIN — SODIUM CHLORIDE: 9 INJECTION, SOLUTION INTRAVENOUS at 22:44

## 2021-04-27 RX ADMIN — OXYCODONE HYDROCHLORIDE 10 MG: 5 TABLET ORAL at 14:22

## 2021-04-27 RX ADMIN — ACETAMINOPHEN 1000 MG: 500 TABLET ORAL at 17:02

## 2021-04-27 RX ADMIN — GABAPENTIN 300 MG: 300 CAPSULE ORAL at 21:32

## 2021-04-27 RX ADMIN — TRANEXAMIC ACID 1 G: 10 INJECTION, SOLUTION INTRAVENOUS at 10:59

## 2021-04-27 RX ADMIN — INSULIN LISPRO 1 UNITS: 100 INJECTION, SOLUTION INTRAVENOUS; SUBCUTANEOUS at 17:38

## 2021-04-27 RX ADMIN — BUDESONIDE AND FORMOTEROL FUMARATE DIHYDRATE 1 PUFF: 160; 4.5 AEROSOL RESPIRATORY (INHALATION) at 21:14

## 2021-04-27 RX ADMIN — ONDANSETRON 4 MG: 2 INJECTION INTRAMUSCULAR; INTRAVENOUS at 17:02

## 2021-04-27 RX ADMIN — TRANEXAMIC ACID 1 G: 10 INJECTION, SOLUTION INTRAVENOUS at 11:52

## 2021-04-27 ASSESSMENT — PAIN SCALES - GENERAL
PAINLEVEL_OUTOF10: 8
PAINLEVEL_OUTOF10: 4
PAINLEVEL_OUTOF10: 8
PAINLEVEL_OUTOF10: 7

## 2021-04-27 ASSESSMENT — PULMONARY FUNCTION TESTS
PIF_VALUE: 0
PIF_VALUE: 1
PIF_VALUE: 0
PIF_VALUE: 1
PIF_VALUE: 0
PIF_VALUE: 1
PIF_VALUE: 0

## 2021-04-27 ASSESSMENT — COPD QUESTIONNAIRES: CAT_SEVERITY: SEVERE

## 2021-04-27 ASSESSMENT — PAIN DESCRIPTION - LOCATION
LOCATION: HIP

## 2021-04-27 ASSESSMENT — ENCOUNTER SYMPTOMS: SHORTNESS OF BREATH: 0

## 2021-04-27 NOTE — PROGRESS NOTES
[phone] Dr Chua [place]ilaire to the bedside, time out performed, Pt monitored, 02, Right Fascia Iliac nerve block completed using Bupivacaine, 0.125% 40 ml.  pt tolerated procedure well,  Site CDI, (see charting)vss  Versed Given: 2 mg  Fentanyl  100 mcg, pt denies co pain or discomfort, family back to the bedside,

## 2021-04-27 NOTE — ANESTHESIA PROCEDURE NOTES
Peripheral Block    Patient location during procedure: OR  Start time: 4/27/2021 9:49 AM  End time: 4/27/2021 9:52 AM  Staffing  Performed: anesthesiologist   Anesthesiologist: Gracie Cantu MD  Preanesthetic Checklist  Completed: patient identified, IV checked, site marked, risks and benefits discussed, surgical consent, monitors and equipment checked, pre-op evaluation, timeout performed, anesthesia consent given, oxygen available and patient being monitored  Peripheral Block  Patient position: supine  Prep: ChloraPrep  Patient monitoring: cardiac monitor, continuous pulse ox, frequent blood pressure checks and IV access  Block type: Fascia iliaca  Laterality: right  Injection technique: single-shot  Guidance: ultrasound guided  Local infiltration: lidocaine  Infiltration strength: 1 %  Dose: 3 mL  Provider prep: mask and sterile gloves  Local infiltration: lidocaine  Needle  Needle type: combined needle/nerve stimulator   Needle gauge: 20 G  Needle length: 10 cm  Needle localization: ultrasound guidance  Assessment  Injection assessment: negative aspiration for heme, no paresthesia on injection and local visualized surrounding nerve on ultrasound  Paresthesia pain: none  Slow fractionated injection: yes  Hemodynamics: stable  Additional Notes  U/S [id number].  (1) Under ultrasound guidance, a 20 gauge needle was inserted and placed in close proximity to the nerves in the fascia iliaca plane.  (2) Ultrasound was also used to visualize the spread of the anesthetic in the plane. (3) The anatomy appeared normal, and (4) there were no apparent abnormal pathological findings on the image that were readily visible and related to the nerves being blocked. (5) A permanent ultrasound image was saved in the patient's record.     40 mL of 0.125% bupivacaine per side          Reason for block: post-op pain management and at surgeon's request

## 2021-04-27 NOTE — PROGRESS NOTES
AROM : WNL  AROM RUE (degrees)  RUE AROM : WNL  AROM LUE (degrees)  LUE AROM : WNL  Strength RLE  Comment: N/T  Strength LLE  Strength LLE: WFL  Strength RUE  Strength RUE: WNL  Strength LUE  Strength LUE: WNL     Sensation  Overall Sensation Status: WFL  Bed mobility  Supine to Sit: Minimal assistance  Sit to Supine: Minimal assistance  Scooting: Minimal assistance     Ambulation  Ambulation?: Yes  Ambulation 1  Surface: level tile  Device: Rolling Walker  Assistance: Minimal assistance  Distance: amb 8 ft with a RW x min assist with WBAT R LE     Balance  Posture: Good  Sitting - Static: Good  Sitting - Dynamic: Poor  Standing - Static: Fair  Standing - Dynamic: Fair  Exercises  Hamstring Sets: x10 R LE  Quad Sets: x 10 R LE  Heelslides: x 10 R LE  Gluteal Sets: x 10  Knee Long Arc Quad: x 10 R LE  Ankle Pumps: x 10 R LE     Plan   Plan  Times per week: BID  Current Treatment Recommendations: Strengthening, Functional Mobility Training, Gait Training, Safety Education & Training, Endurance Training, Stair training, Pain Management  Safety Devices  Type of devices: Nurse notified, Left in bed, Call light within reach    G-Code     OutComes Score     AM-PAC Score  AM-PAC Inpatient Mobility Raw Score : 16 (04/27/21 1610)  AM-PAC Inpatient T-Scale Score : 40.78 (04/27/21 1610)  Mobility Inpatient CMS 0-100% Score: 54.16 (04/27/21 1610)  Mobility Inpatient CMS G-Code Modifier : CK (04/27/21 1610)        Goals  Short term goals  Time Frame for Short term goals: 10 visits  Short term goal 1: transfers with SBA  Short term goal 2: pt to amb ulate 200 ft with a RW x SBA with WBAT R LE  Short term goal 3: ascend/descend 4 steps with SBA  Short term goal 4: total hip exercises x SBA  Patient Goals   Patient goals : Return home       Therapy Time   Individual Concurrent Group Co-treatment   Time In 8489         Time Out 1510         Minutes 25             [address], PT

## 2021-04-27 NOTE — PROGRESS NOTES
(obstructive sleep apnea), Osteoporosis, Reflux, Respiratory failure ([facility] [address] Severe persistent asthma, Spondylosis of cervical spine, TIA (transient ischemic attack), Tobacco use, Under care of team, Under care of team, Under care of team, Vitamin D deficiency, Wears glasses, and Wellness examination. has a past surgical history that includes Finger surgery (Left, 1997); Cardiac catheterization (2005); Tonsillectomy; Throat surgery (2014); Colonoscopy (2003); Tunneled venous port placement (Right, 11/25/2015); nasal endoscopy; pr revise total hip replacement (Left, 05/22/2018); Endoscopy, colon, diagnostic; and Total hip arthroplasty (Right, 04/27/2021). Restrictions  Restrictions/Precautions  Restrictions/Precautions: Weight Bearing  Lower Extremity Weight Bearing Restrictions  Right Lower Extremity Weight Bearing: Weight Bearing As Tolerated  Position Activity Restriction  Other position/activity restrictions: no straight leg raises    Subjective   General  Patient assessed for rehabilitation services?: Yes  Family / Caregiver Present: No  Patient Currently in Pain: Yes  Pain Assessment  Pain Assessment: Faces  Pain Level: 7  Pain Type: Surgical pain  Pain Location: Hip  Pain Orientation: Right  Non-Pharmaceutical Pain Intervention(s): Distraction; Therapeutic presence; Ambulation/Increased Activity  Response to Pain Intervention: Patient Satisfied  Vital Signs  Pulse: 70  Resp: 13  BP: (!) 105/92  MAP (mmHg): 96  Patient Currently in Pain: Yes  Oxygen Therapy  SpO2: 95 %  Pulse Oximeter Device Mode: Continuous  Pulse Oximeter Device Location: Left;Finger  O2 Device: Nasal cannula  O2 Flow Rate (L/min): 2 L/min    Social/Functional History  Social/Functional History  Lives With: Spouse  Type of Home: House  Home Layout: One level  Home Access: Stairs to enter without rails  Entrance Stairs - Number of Steps: 1  Bathroom Shower/Tub: Tub/Shower unit, Shower chair with back  Bathroom Toilet: Standard  Bathroom Equipment: Toilet raiser  Home Equipment: Rolling walker, Catia Dang, [employer]  ADL Assistance: Independent  Homemaking Assistance: Independent  Homemaking Responsibilities: Yes  Meal Prep Responsibility: Primary  Laundry Responsibility: Primary  Cleaning Responsibility: Primary  Shopping Responsibility: Primary  Ambulation Assistance: Independent  Transfer Assistance: Independent  Active : Yes  Mode of Transportation: [facility]  Occupation: Unemployed, On disability     Objective   Vision: Impaired  Vision Exceptions: Wears glasses at all times  Hearing: Exceptions to [facility]  Hearing Exceptions: Hard of hearing/hearing concerns          Balance  Sitting Balance: Modified independent   Standing Balance: Minimal assistance  Standing Balance  Time: 1-2 min  Activity: EOB prior to mobility  Comment: Mod A progressing to Min A  Functional Mobility  Functional - Mobility Device: Rolling Walker  Activity: Other  Assist Level: Minimal assistance  Functional Mobility Comments: for side steps to [address] d/t increased pain  ADL  Feeding: Independent  Grooming: Independent  UE Bathing: Modified independent   LE Bathing: Moderate assistance; Increased time to complete;Setup  UE Dressing: Modified independent   LE Dressing: Moderate assistance;Setup; Increased time to complete(OT facilitated donning socks, requiring Max A sitting EOB d/t nausea)  Toileting: Minimal assistance; Increased time to complete  Additional Comments: Pt had period of emesis limiting ADL engagement  Tone RUE  RUE Tone: Normotonic  Tone LUE  LUE Tone: Normotonic  Coordination  Movements Are Fluid And Coordinated: Yes     Bed mobility  Supine to Sit: Minimal assistance  Sit to Supine: Moderate assistance  Scooting: Contact guard assistance  Comment: Assistance for  B LE progression, increased time to complete  Transfers  Sit to stand:  Moderate assistance;2 Person assistance  Stand to sit: Moderate assistance     Cognition  Overall Cognitive Status: [facility]

## 2021-04-27 NOTE — PROGRESS NOTES
PROTOCOLS  NURSING IMPLEMENTED    TOTAL JOINT DVT/PE  VENOUS THROMBOEMBOLISM PROPHYLAXIS  (Nursing Automatically Implement)    Radhika Akbar  [id number]  @[email or web address]@  4/27/21    YES DVT RISK FACTOR SCORE YES MAJOR BLEEDING RISK FACTORS SCORE     [x] 48years old or greater (1)   [] Hx. Easy Bleeding (1)      [] Heart failure (2)   [] NSAID Use in Last 5 Days (2)      [] Varicose veins - Hx. (1)   [] Gastrointestinal or Genitourinary bleeding in Last 14 Days (2)      [] Myocardial Infarction - Hx. (1)         [] Cancer - Hx. (2)         [] Atrial fibrillation - Hx. (1)         [] Ischemic Stroke - Hx. (1)         [x] Diabetes Mellitus - Hx. (1)         [] Previous DVT/PE - Hx.  (2)         [] Hormone Replacement Therapy (1)         [x] Obesity (1)         [] Paralysis (1)         [] Pregnancy (1)         [] Smoking (1)                   [] Thromophilia (1)   []   Mild to Moderate Bleeding (2)      [x] Total Hip Arthroplasty (1)   [] Active Bleeding (4)      [] Family history of PE or DVT? (4) (Consider the following labs to test for presence of inhibitor deficiency state:) Factor V Leiden, Prothrombin Gene Mutation, Protein S Deficiency, Protien C Deficiency, Antithrombin Deficiency   [] Malignant Hypertension (2)        [] Thrombocytopenia 20k to 100k (2)        [] Thrombocytopenia less than 20k (4)        [] Bleeding Diathesis (4)        [] \"Bloody Stick\" Epidural or Spinal (2)     TOTAL DVT SCORE   TOTAL BLEEDING SCORE      [] CLASS A   Standard Risk DVT (0-3)    [x] CLASS X Standard Risk Bleeding (0-4)      [x] CLASS B Elevated Risk DVT (greater than 3)    [] CLASS Y High Risk Bleeding (greater than 4)     FINAL MATRIX (e.g. AY)       *If allergic to ASA use Warfarin  *BY patient consider no treatment  **Consider venous filter with high risk PE  **If on Coumadin pre-op, then restart night of surgery      []  DVT Prophylaxis: Class AX, AY    Ecotrin 81 mg by mouth BID starting day of surgery for 6 weeks for all total joints. (If allergic to aspirin, give eliquis 2.5mg BID X 14 days (knees) or 35 days  (hips) starting first day postop at 0600). [x]  DVT Prophylaxis:  Class BX (cachorro choice)    [x]Eliquis 2.5mg BID x 14 days (knees) or 35 days (hips) starting first day postop      at 0600      [] Lovenox 40 mg subcu daily starting first day postop at 0600 x 14 days                             (knees) or 35 days (hips)    Ecotrin 81 mg PO BID-start when Lovenox is finished. (Teach injection prior to discharge.)       [] Xarelto 10 mg by mouth daily starting first day postop at 0600     14 days (knees) or 35 days (hips). If creatinine clearance less than 30 mL/min, give Lovenox 30 mg subcu   Daily starting first day postop at 0600. Ecotrin 81 mg PO BID-starting   when Lovenox is finished. (Teach injection prior to      discharge.)  (For discharge fill throughout the 10 mg daily with no    refills.)      []  DVT Prophylaxis:  Class BY (cachorro choice)               []  Eliquis 2.5mg BID x 14 days (knees) or 35 days (hips) starting first day                              postop at 0600        []  Ecotrin 81 mg PO BID x 6weeks (all joints)      []  Lovenox 40mg SQ daily to start at 0600 first day post-Op day. 14 days for knees, 35 days for hips    Ecotrin 81 mg PO BID - start when Lovenox is finished. (Teach injection prior to discharge.)       [] Xarelto 10 mg PO daily starting first day Post-Op at 0600    14 days (knees) or 35 days (hips)    If Creatinine Clearance less than 30ml/min, give Lovenox 30 mg    SQ daily starting first day Post-Op at 0600 x 14 days (knees) or 35   days (hips). Ecotrin 81 mg PO BID - start when Lovenox is finished.     (Teach injection prior to discharge.)  (For discharge fill throughout the   10 mg daily #32 with no refills.)      Electronically signed by Juan Osborne DO on 4/27/2021 at 12:38 PM

## 2021-04-27 NOTE — CONSULTS
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 He receives IVIG every 3 weeks. · Vancomycin 1750 mg every 12 hours   · Cefepime 2 g every 12 hours starting preoperatively on the day of surgery. 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 Patient seen at the request of Dr. Mitzi Donnelly    The patient is known to my practice. I have previously evaluated him on 5/8/2018 in my office.     The patient has a history of combined variable immunodeficiency for which he sees Dr. Romain Ulloa. He receives IVIG and a 3 day steroid course every three weeks. He follows with Dr. Francisca Martin for recurrent pulmonary infections. He states that he has not had any in the past year though.     The patient began to have hip pain about a year ago. He recently had some falls and imaging showed AVN of the right hip. He has developed AVN of the right hip from the chronic steroid use.     He was seen by Dr Gabriela Meneses and the patient has decided to proceed with a right hip arthroplasty.  The patient was sent to us for recommendations for preoperative antibiotics.     The surgery needs to be done as close to his IVIG treatment as possible because that is when his immunoglobulin levels are the highest.  Arrangements were made to move his regular schedule April 12 infusion to 4/23/2021 in order to best protect the patient for the surgery.     We also discussed the antibiotics that he will need to take preoperatively. Since he has an immune deficiency we will have to cover for encapsulated bacteria. He will receive gram-negative treatment with Cefepime and gram-positive treatment with vancomycin.     CURRENT EVALUATION 4/27/2021     The patient underwent a total right hip arthroplasty on 4/27/2021 under spinal anesthesia. He received the preoperative antibiotics as previously planned. We will plan to continue the cefepime and vancomycin and monitor his clinical response. Afebrile  VS stable    RR 13  On 2 L nasal oxygen  02 sat 95    Discussed with patient, RN. I have personally reviewed the past medical history, past surgical history, medications, social history, and family history, and I have updated the database accordingly.   Past Medical History:     Past Medical History:   Diagnosis Date    Allergic rhinitis     Anxiety     Aseptic meningitis     reaction to first infusion in 2012-hospitalized but no problem since then    Asthma     Avascular necrosis of bone of hip, left ([address] 04/2018    CAD (coronary artery disease)     mild on cath, no stents    Common variable immunodeficiency ([address] 12/4/2013    COPD (chronic obstructive pulmonary disease) (HCC)     Deviated nasal septum     Dyspnea     with exertion    HTN (hypertension)     Hyperglycemia     Hypoxia     Immune deficiency disorder ([address] 2012    IGIV every 3 weeks with prednisone    Obesity     On supplemental oxygen therapy     PRECIOUS (obstructive sleep apnea) 2012    CPAP nightly    Osteoporosis     Reflux     Respiratory failure ([address] 2014    Oxygen on    Severe persistent asthma     Spondylosis of cervical spine     TIA (transient ischemic attack) 2005    Tobacco use     stopped 2004    Under care of team 04/15/2021    pulmonology-Dr [facility]-last visit march 2021    Under care of team 04/15/2021    infectious disease-Dr Anurag correa-last visit 2021    Under care of team 04/15/2021    immunology-Dr Rivers-last visit mar 2021    Vitamin D deficiency 3/6/2018    Wears glasses     Wellness examination 04/15/2021    pcp-Dr Vazquez-last visit 2021       Past Surgical  History:     Past Surgical History:   Procedure Laterality Date    CARDIAC CATHETERIZATION      no stents    COLONOSCOPY      ENDOSCOPY, COLON, DIAGNOSTIC      FINGER SURGERY Left     reattachment, index finger    NASAL ENDOSCOPY      NH REVISE TOTAL HIP REPLACEMENT Left 2018    HIP TOTAL ARTHROPLASTY ANTERIOR APPROACH  ([facility]) performed by Mansi Sinclair DO at [address] [place] [address] Dr Smith      lesion excision    TONSILLECTOMY      TOTAL HIP ARTHROPLASTY Right 2021    TOTAL HIP ARTHROPLASTY ANTERIOR (Right Hip)    TUNNELED VENOUS PORT PLACEMENT Right 2015    Rt. chest       Medications:   No current outpatient medications on file. Social History:     Social History     Socioeconomic History    Marital status:      Spouse name: Mike Rosa Number of children: 3    Years of education: Not on file    Highest education level: Not on file   Occupational History    Occupation:      Comment: now on 67 [employer] resource strain: Not on file    Food insecurity     Worry: Not on file     Inability: Not on file   [place] needs     Medical: Not on file     Non-medical: Not on file   Tobacco Use    Smoking status: Former Smoker     Packs/day: 1.00     Years: 31.00     Pack years: 31.00     Types: Cigarettes     Start date: 1972     Quit date: 2004     Years since quittin.3    Smokeless tobacco: Never Used    Tobacco comment: smoke 4ppd for many yrs; quit 8 yrs ago   Substance and Sexual Activity    Alcohol use:  Yes     Alcohol/week: 3.0 standard drinks     Types: 3 Cans of beer per week     Comment: about once a week    Drug use: No    Sexual activity: Yes     Partners: Female   Lifestyle    Physical activity     Days per week: Not on file     Minutes per session: Not on file    Stress: Not on file   Relationships    Social connections     Talks on phone: Not on file     Gets together: Not on file     Attends Advent service: Not on file     Active member of club or organization: Not on file     Attends meetings of clubs or organizations: Not on file     Relationship status: Not on file    Intimate partner violence     Fear of current or ex partner: Not on file     Emotionally abused: Not on file     Physically abused: Not on file     Forced sexual activity: Not on file   Other Topics Concern    Not on file   Social History Narrative    Not on file       Family History:     Family History   Problem Relation Age of Onset    COPD Father     Coronary Art Dis Father     Heart Attack Father     Lung Cancer Mother     Breast Cancer Sister     No Known Problems Maternal Grandmother     No Known Problems Maternal Grandfather     Cancer Paternal Grandmother     Stroke Paternal Grandfather     Heart Attack Paternal Grandfather     Cancer Paternal Grandfather     Breast Cancer Sister     Cancer Sister         Throat        Allergies:   Adhesive tape     Review of Systems:   Constitutional: No fevers or chills. No systemic complaints  Head: No headaches  Eyes: No double vision or blurry vision. ENT: No sore throat or runny nose. . No hearing loss, tinnitus or vertigo. Cardiovascular: No chest pain or palpitations. No shortness of breath. No ECKERT  Lung: No shortness of breath or cough. No sputum production  Abdomen: No nausea, vomiting, diarrhea, or abdominal pain. .  Genitourinary: No increased urinary frequency, or dysuria. No hematuria. No suprapubic or CVA pain  Musculoskeletal: No muscle aches or pains. No joint effusions, swelling or deformities.   Has fresh right hip surgical incision  Hematologic: No bleeding or bruising. Neurologic: No headache, weakness, numbness, or tingling. Physical Examination :   /74   Pulse 59   Temp 97.2 °F (36.2 °C) (Oral)   Resp 13   SpO2 95%    General Appearance: Awake, alert, and in no apparent distress  Head:  Normocephalic, no trauma  Eyes: Pupils equal, round, reactive, to light and accommodation; extraocular movements intact; sclera anicteric; conjunctivae pink. No embolic phenomena. ENT: Oropharynx clear, without erythema, exudate, or thrush. No tenderness of sinuses. Mouth/throat: mucosa pink and moist. No lesions. Dentition in good repair. Neck:Supple, without lymphadenopathy. Thyroid normal, No bruits. Pulmonary/Chest: Clear to auscultation, without wheezes, rales, or rhonchi. No dullness to percussion. Cardiovascular: Regular rate and rhythm without murmurs, rubs, or gallops. Abdomen: Soft, non tender. Bowel sounds normal. No organomegaly  All four Extremities: No cyanosis, clubbing, edema, or effusions. Neurologic: No gross sensory or motor deficits. Skin: Warm and dry with good turgor. No signs of peripheral arterial or venous insufficiency.   Has fresh right hip surgical incision    Medical Decision Making:   I have independently reviewed/ordered the following labs:    CBC with Differential:  Lab Results   Component Value Date    WBC 8.0 04/15/2021    WBC 7.1 08/02/2020    HGB 14.4 04/15/2021    HGB 13.4 08/02/2020    HCT 44.0 04/15/2021    HCT 41.1 08/02/2020     04/15/2021     08/02/2020     03/01/2012     02/29/2012    LYMPHOPCT 24 08/02/2020    LYMPHOPCT 17 01/22/2020    MONOPCT 6 08/02/2020    MONOPCT 4 01/22/2020     BMP:   Lab Results   Component Value Date     04/15/2021     05/20/2020    K 4.4 04/15/2021    K 4.1 05/20/2020     04/15/2021    CL 98 05/20/2020    CO2 25 04/15/2021    CO2 22 05/20/2020    BUN 12 04/15/2021    BUN 12 05/20/2020    CREATININE 0.79 04/15/2021    CREATININE 0.86 08/02/2020 MG 2.7 06/12/2014    MG 2.7 06/11/2014     Hepatic Function Panel:  Lab Results   Component Value Date    PROT 7.5 04/15/2021    PROT 7.1 05/20/2020    LABALBU 4.4 04/15/2021    LABALBU 3.9 05/20/2020    BILIDIR <0.08 05/20/2020    BILIDIR 0.08 04/15/2014    IBILI CANNOT BE CALCULATED 05/20/2020    IBILI 0.22 04/15/2014    BILITOT 0.41 04/15/2021    BILITOT 0.23 05/20/2020    ALKPHOS 101 04/15/2021    ALKPHOS 96 05/20/2020    ALT 23 04/15/2021    ALT 22 08/02/2020    AST 23 04/15/2021    AST 29 05/20/2020     No results found for: RPR  No results found for: HIV  No results found for: [facility]  Lab Results   Component Value Date    RBC 5.03 04/15/2021    RBC 5.02 03/01/2012    WBC 8.0 04/15/2021    TURBIDITY CLEAR 04/15/2021     Lab Results   Component Value Date    CREATININE 0.79 04/15/2021    GLUCOSE 102 04/15/2021    GLUCOSE 86 06/04/2012       Thank you for allowing us to participate in the care of this patient. Please call with questions.     Chris Redmond MD         Pager: [phone] - Office: [phone]

## 2021-04-27 NOTE — OP NOTE
Operative Note                 [address]                  [facility]                                 OPERATIVE REPORT     PATIENT NAME:  Beckie Parra                :   1955  MED REC NO:    [id number]                                ACCOUNT NO:    [de-identified]                          ADMIT DATE: 2021  PROVIDER:     Bennie To DO, FAOAO     DATE OF PROCEDURE:   2021     PREOPERATIVE DIAGNOSIS:   Date hip severe degenerative joint disease. POSTOPERATIVE DIAGNOSIS: Right hip severe degenerative joint disease. PROCEDURE:   Right total hip arthroplasty through an anterior approach. SURGEON:  Bennie To DO, FAOAO    ANESTHESIA: Spinal and regional    ASSISTANT:   Kimi Sahu DO     EBL: 200 mL. COMPLICATIONS:  None. SPECIMEN: None     DISPOSITION:  To PACU in stable condition. NARRATIVE SUMMARY:  The patient is a 53-year-old male who presented to  [facility] Surgical Department for right total hip  arthroplasty. The patient has had progressively worsening right hip  pain, which has failed to improve despite conservative therapy to  include activity modification. His symptoms are greatly affecting his  usual activities of daily living, and as a result, he has indicated that he  would like surgical intervention at this time. The patient was identified preoperatively, where consent was obtained, signed,  placed on the chart. The procedure was described, questions were  answered. The risks of the procedure were described to include, but not  limited to, the risk of anesthesia; infection; bleeding; need for  further surgery in the future; numbness, tingling, weakness, or pain to  the left lower extremity; scar; stiffness; hip dislocation; fracture;  leg-length inequality; DVT; and death. The patient notes understanding of these  risks and states he wishes to proceed.      The pateint was administered IV antibiotics perioperatively. The patient was also  administered 1 gm of IV tranexamic acid just prior to incision and a  second 1-gm aliquot just after closure of incision. General anesthesia  was affected after a regional block was affected to the operativelower  extremity. The operative foot and ankle were well padded and placed in a  traction boot varela for the [facility] Medacta table. The nonoperative lower  extremity was placed on a well-padded Sow stand. The operative lower  extremity was then prepped and draped in sterile fashion after an  appropriate surgical time-out. Incision was made over the muscle belly  of the tensor fascia sindy for a total incision length of approximately  4-4.5 inches. Sharp incision was carried through the skin and  subcutaneous tissue. Full-thickness skin flaps were raised over the  fascia of the fascia sindy, which was split in-line with the skin  incision. A subfascial dissection was carried anteriorly over the  muscle belly of the TFL, exposing the tensor-sartorial interval, where  the circumflex vessels were found, ligated, isolated, and divided. The  pericapsular fat was removed, and a subrectus and iliocapsularis blunt  dissection was carried out over the anterior capsule, exposing it. Anterior capsulectomy was affected with electrocautery, and retractors  were placed over the superior and inferior femoral neck. A femoral neck  cut was then made approximately 1.5-2.0 cm proximal to the lesser  trochanter, and the femoral head was removed from the acetabulum. Labrum and pulvinar were removed from the acetabulum and then sequential  reaming of the acetabulum was made to the appropriate size. The  appropriately sized acetabular cup was then placed and impacted at an  appropriate theta angle and version until fully seated. It was noted be  quite stable, and supplemental screw fixation was not felt to be  necessary.   A neutral lip liner was placed, impacted until fully seated. Attention was then drawn to exposure of the proximal femur, which was  done by release of the pubofemoral and ischiofemoral ligaments,  retractor placed against the medial proximal femur, external rotation,  extension, and adduction, bringing the proximal femur into the incision. Once this was done, sequential box osteotome, canal finding reamer, and  broaching of the femoral canal was made until the appropriately sized  broach was felt to be in place. This was left in place, and trial head  and necks were placed. Hip was reduced, put through range of motion,  was found to be stable throughout the arc of range of motion. Superimposed fluoroscopic images showed restoration of leg length and  offset, and the hip was stable throughout the arc of range of motion. The hip was then gently dislocated. Trials were removed. Implantable  components were placed and impacted until fully seated. Hip was once  again reduced, put through range of motion, was found to be stable with  restoration of leg length and offset on superimposed fluoroscopic  images. The hip was thoroughly irrigated with dilute Betadine solution for 3  minutes and then thoroughly irrigated and suctioned with sterile saline. The fascia was closed using absorbable suture, as was the subcutaneous  tissue. Running subcuticular Stratafix suture was utilized for  subcuticular closure. Dermabond was used cutaneously. Sterile dressing  was applied. Anesthesia was reversed. The patient was taken to the  postoperative recovery room in stable condition. There were no  immediate postoperative complications, and the procedure was tolerated  well. IMPLANTS:           Gila Hammer DO, FAOAO                                Implants:  Implant Name Type Inv.  Item Serial No.  Lot No. LRB No. Used Action   LINER ACET SZ F [id number] FLAT UHMWPE HIGHCROSS MPACT  LINER ACET SZ F [id number] FLAT UHMWPE HIGHCROSS MPACT [facility] [id number] Right 1 Implanted   SHELL ACET [id number] LNR SZ F 2 H MPACT  SHELL ACET [id number] LNR SZ F 2 H MPACT  [employer] [id number] Right 1 Implanted   HEAD FEM L [id number] CO CHROM AMISTEM  HEAD FEM L [id number] CO CHROM AMISTEM  [employer] [facility] [id number] Right 1 Implanted   STEM FEM SLD 9 HIP LAT SMS  STEM FEM SLD 9 HIP LAT SMS  [employer] [facility] [id number] Right 1 Implanted         Electronically signed by Luís Franco DO on 4/27/2021 at 1:44 PM

## 2021-04-27 NOTE — H&P
History and Physical    Pt Name: Tariq Garcia  MRN: [id number]  YOB: 1955  Date of evaluation: 4/27/2021  Primary Care Physician: Norma Thomas MD    SUBJECTIVE:   History of Chief Complaint:    Tariq Garcia is a 63 y.o. male who is scheduled today for TOTAL HIP ARTHROPLASTY ANTERIOR APPROACH - RIGHT. He complains of chronic right hip pain, arthritis. He reports prior left hip surgery for AVN. Allergies  is allergic to adhesive tape. Medications  Prior to Admission medications    Medication Sig Start Date End Date Taking?  Authorizing Provider   montelukast (SINGULAIR) 10 MG tablet take 1 tablet by mouth every evening 3/25/21  Yes Roc Uriarte MD   albuterol sulfate HFA (VENTOLIN HFA) 108 (90 Base) MCG/ACT inhaler inhale 2 puffs by mouth and INTO THE LUNGS every 6 hours if needed for wheezing 3/25/21  Yes Roc Uriarte MD   budesonide-formoterol (SYMBICORT) 160-4.5 MCG/ACT AERO inhale 2 puffs by mouth twice a day 3/25/21  Yes Roc Uriarte MD   fluticasone (FLONASE) 50 MCG/ACT nasal spray instill 2 sprays into each nostril once daily 3/25/21  Yes Roc Uriarte MD   SPIRIVA HANDIHALER 18 MCG inhalation capsule inhale the contents of one capsule in the handihaler once daily 3/25/21  Yes Roc Uriarte MD   predniSONE (DELTASONE) 10 MG tablet 4 tablet once daily for 3 days then 3 tablet once daily for 3 days then 2 tablet once daily for 3 days then 1 tablet once daily for 3 days then discontinue 3/16/21  Yes Roc Uriarte MD   omeprazole (PRILOSEC) 20 MG delayed release capsule take 1 capsule by mouth once daily 8/19/19  Yes Karely Maguire PA-C   amLODIPine (NORVASC) 10 MG tablet take 1 tablet by mouth once daily 8/19/19  Yes Karely Maguire PA-C   benazepril (LOTENSIN) 10 MG tablet take 1 tablet by mouth once daily  Patient taking differently: Take 10 mg BID 12/14/18  Yes Karely A Forche, PA-C   albuterol (PROVENTIL) (2.5 MG/3ML) 0.083% nebulizer solution Take 3 mLs by nebulization every 4 hours as needed for Wheezing or Shortness of Breath 11/13/20   Brian Ramírez MD   fluticasone-salmeterol (ADVAIR DISKUS) 500-50 MCG/DOSE diskus inhaler Inhale 1 puff into the lungs 2 times daily 2/25/20   Roc Uriarte MD   Handicap Placard MISC by Does not apply route For 5 years 7/31/19   Roc Uriarte MD   acetaminophen (TYLENOL) 500 MG tablet Take 500 mg by mouth every 6 hours as needed for Pain    Historical Provider, MD   traMADol (ULTRAM) 50 MG tablet Take 50 mg by mouth as needed for Pain. Historical Provider, MD   predniSONE (DELTASONE) 10 MG tablet Start at 50 mg and taper 10 mg every 3 days 3/20/19   Roc Uriarte MD   ALPRAZolam Christopher Huddle) 0.5 MG tablet Take 0.5 mg by mouth 3 times daily as needed for Sleep. Bill Neighbours Historical Provider, MD   Immune Globulin, Human, 10 GM/100ML SOLN IV solution Infuse 60 g intravenously every 21 days     Historical Provider, MD     Past Medical History    has a past medical history of Allergic rhinitis, Anxiety, Aseptic meningitis, Asthma, Avascular necrosis of bone of hip, left ([address] CAD (coronary artery disease), Common variable immunodeficiency ([address] COPD (chronic obstructive pulmonary disease) ([address] Deviated nasal septum, Dyspnea, HTN (hypertension), Hyperglycemia, Hypoxia, Immune deficiency disorder ([address] Obesity, On supplemental oxygen therapy, PRECIOUS (obstructive sleep apnea), Osteoporosis, Reflux, Respiratory failure ([address] Severe persistent asthma, Spondylosis of cervical spine, TIA (transient ischemic attack), Tobacco use, Under care of team, Under care of team, Under care of team, Vitamin D deficiency, Wears glasses, and Wellness examination. Past Surgical History   has a past surgical history that includes Finger surgery (Left, 1997); Cardiac catheterization (2005); Tonsillectomy; Throat surgery (2014); Colonoscopy (2003); Tunneled venous port placement (Right, 11/25/2015); nasal endoscopy;  Hip Arthroplasty (Left, 05/22/2018); pr revise total hip replacement (Left, 2018); and Endoscopy, colon, diagnostic. Social History   reports that he quit smoking about 17 years ago. His smoking use included cigarettes. He started smoking about 49 years ago. He has a 31.00 pack-year smoking history. He has never used smokeless tobacco.   reports current alcohol use of about 3.0 standard drinks of alcohol per week. reports no history of drug use. Marital Status   Occupation ,   Family History  Family Status   Relation Name Status    Father      Mother     [facility] Sister  Alive    [place]  (Not Specified)    [facility]  (Not Specified)    PGM      [facility]      Sister  [de-identified]    Son 3 Alive     family history includes Breast Cancer in his sister and sister; COPD in his father; Cancer in his paternal grandfather, paternal grandmother, and sister; Coronary Art Dis in his father; Heart Attack in his father and paternal grandfather; Viviane Parra in his mother; No Known Problems in his maternal grandfather and maternal grandmother; Stroke in his paternal grandfather. OBJECTIVE:   VITALS:  temporal temperature is 97.5 °F (36.4 °C). His blood pressure is 130/81 and his pulse is 66. His respiration is 20 and oxygen saturation is 99%. CONSTITUTIONAL:alert & oriented x 3, no acute distress. Friendly. Very pleasant. His wife is at bedside and is very helpful with history. SKIN:  Warm and dry, no rashes on exposed areas of skin right chest port noted  HEAD:  Normocephalic, atraumatic   EYES: PERRL. EOMs intact. EARS:  Equal bilaterally, no edema or thickening, skin is intact without lumps or lesions. No discharge. Hearing grossly WNL. NOSE:  Nares patent. No rhinorrhea   MOUTH/THROAT:  Mucous membranes moist, tongue is pink, uvula midline, teeth appear to be intact  NECK:supple, no lymphadenopathy  LUNGS: Respirations even and non-labored.  Clear to auscultation bilaterally, no wheezes, rales, or rhonchi but overall diminished, he is using oxygen per nasal cannula  CARDIOVASCULAR: Regular rate and rhythm, no murmurs/rubs/gallops   ABDOMEN: soft, non-tender, non-distended, bowel sounds active x 4, rotund  EXTREMITIES: No edema bilateral lower extremities. No varicosities bilateral lower extremities. NEUROLOGIC: CN II-XII are grossly intact. Gait not assessed. IMPRESSIONS:   Right hip arthritis       Diagnosis Date    Allergic rhinitis     Anxiety     Aseptic meningitis     reaction to first infusion in 2012-hospitalized but no problem since then    Asthma     Avascular necrosis of bone of hip, left ([address] 04/2018    CAD (coronary artery disease)     mild on cath, no stents    Common variable immunodeficiency ([facility] 12/4/2013    COPD (chronic obstructive pulmonary disease) (HCC)     Deviated nasal septum     Dyspnea     with exertion    HTN (hypertension)     Hyperglycemia     Hypoxia     Immune deficiency disorder ([facility] 2012    IGIV every 3 weeks with prednisone    Obesity     On supplemental oxygen therapy     PRECIOUS (obstructive sleep apnea) 2012    CPAP nightly    Osteoporosis     Reflux     Respiratory failure ([facility] 2014    Oxygen on    Severe persistent asthma     Spondylosis of cervical spine     TIA (transient ischemic attack) 2005    Tobacco use     stopped 2004    Under care of team 04/15/2021    pulmonology-Dr Hull-[facility]-last visit march 2021    Under care of team 04/15/2021    infectious disease-Dr Chavez[facility]-last visit march 2021    Under care of team 04/15/2021    immunology-Dr Rivers-last visit mar 2021    Vitamin D deficiency 3/6/2018    Wears glasses     Wellness examination 04/15/2021    pcp-Dr Vazquez-last visit apr 2021   1.    PLANS:   TOTAL HIP ARTHROPLASTY ANTERIOR APPROACH - RIGHT    DEVI BETANCUR APRN-CNP  Electronically signed 4/27/2021 at 9:53 AM

## 2021-04-27 NOTE — ANESTHESIA PRE PROCEDURE
Department of Anesthesiology  Preprocedure Note       Name:  Yaniv Barcenas   Age:  63 y.o.  :  1955                                          MRN:  [id number]         Date:  2021      Surgeon: Loraine Vidal):  Rosalino Torres DO    Procedure: Procedure(s):  TOTAL HIP ARTHROPLASTY ANTERIOR APPROACH  (MEDACTA, FASCIA ILLIACA BLOCK PRE-OP, SPINAL VS. GENERAL, MEDACTA TABLE, SUPINE, C-ARM), STANDARD  **SHORT STAY**    Medications prior to admission:   Prior to Admission medications    Medication Sig Start Date End Date Taking?  Authorizing Provider   montelukast (SINGULAIR) 10 MG tablet take 1 tablet by mouth every evening 3/25/21  Yes Elizabeth Liriano MD   albuterol sulfate HFA (VENTOLIN HFA) 108 (90 Base) MCG/ACT inhaler inhale 2 puffs by mouth and INTO THE LUNGS every 6 hours if needed for wheezing 3/25/21  Yes Elizabeth Liriano MD   budesonide-formoterol (SYMBICORT) 160-4.5 MCG/ACT AERO inhale 2 puffs by mouth twice a day 3/25/21  Yes Elizabeth Liriano MD   fluticasone (FLONASE) 50 MCG/ACT nasal spray instill 2 sprays into each nostril once daily 3/25/21  Yes Elizabeth Liriano MD   SPIRIVA HANDIHALER 18 MCG inhalation capsule inhale the contents of one capsule in the handihaler once daily 3/25/21  Yes Elizabeth Liriano MD   predniSONE (DELTASONE) 10 MG tablet 4 tablet once daily for 3 days then 3 tablet once daily for 3 days then 2 tablet once daily for 3 days then 1 tablet once daily for 3 days then discontinue 3/16/21  Yes Elizabeth Liriano MD   omeprazole (PRILOSEC) 20 MG delayed release capsule take 1 capsule by mouth once daily 19  Yes Karely Maguire PA-C   amLODIPine (NORVASC) 10 MG tablet take 1 tablet by mouth once daily 19  Yes Karely Maguire PA-C   benazepril (LOTENSIN) 10 MG tablet take 1 tablet by mouth once daily  Patient taking differently: Take 10 mg BID 18  Yes Karely A Forche, PA-C   albuterol (PROVENTIL) (2.5 MG/3ML) 0.083% nebulizer solution Take 3 mLs by nebulization every 4 hours as Patient Active Problem List   Diagnosis Code    COPD exacerbation ([facility] J44.1    Sleep apnea G47.30    Common variable immunodeficiency ([facility] D83.9    Reflux [id number]    Anxiety F41.9    Obesity (BMI 30-39. 9) E66.9    Hyperglycemia R73.9    Hypoxia R09.02    Low back pain M54.5    Radiculopathy with lower extremity symptoms M54.10    Essential hypertension I10    Toxic encephalopathy G92    Tachycardia R00.0    Common variable hypogammaglobulinemia (HCC) D80.1    Acute on chronic respiratory failure with hypoxia and hypercapnia ([facility]) J96.21, J96.22    Chronic obstructive pulmonary disease ([facility]) J44.9    Abnormality of immune system R89.4    Lung disease-COPD J98.4    Vitamin D deficiency E55.9    Avascular necrosis of bone of hip, right ([facility]) M87.051    Avascular necrosis of bone of left hip ([facility]) M87.052    Status post total replacement of left hip Z96.642    SOB (shortness of breath) R06.02    Asthma exacerbation with COPD (chronic obstructive pulmonary disease) ([facility]) J44.1, J45.901    COPD with exacerbation ([facility]) J44.1    CVID (common variable immunodeficiency) ([facility]) D83.9    Avascular necrosis of bone of right hip ([facility]) M87.051       Past Medical History:        Diagnosis Date    Allergic rhinitis     Anxiety     Aseptic meningitis     reaction to first infusion in 2012-hospitalized but no problem since then    Asthma     Avascular necrosis of bone of hip, left ([facility] 04/2018    CAD (coronary artery disease)     mild on cath, no stents    Common variable immunodeficiency ([facility] 12/4/2013    COPD (chronic obstructive pulmonary disease) ([facility])     Deviated nasal septum     Dyspnea     with exertion    HTN (hypertension)     Hyperglycemia     Hypoxia     Immune deficiency [facility] ([email or web address] 2012    IGIV every 3 weeks with prednisone    Obesity     On supplemental oxygen therapy     PRECIOUS (obstructive sleep apnea) 2012    CPAP nightly    Osteoporosis     Reflux     Respiratory failure ([facility] 2014    Oxygen on    Severe persistent asthma     Spondylosis of cervical spine     TIA (transient ischemic attack)     Tobacco use     stopped     Under care of team 04/15/2021    pulmonology-Dr Hull[facility]-last visit 2021    Under care of team 04/15/2021    infectious disease-Dr [facility]-last visit 2021    Under care of team 04/15/2021    immunology-Dr Rivers-last visit mar 2021    Vitamin D deficiency 3/6/2018    Wears glasses     Wellness examination 04/15/2021    pcp-Dr Vazquez-last visit 2021       Past Surgical History:        Procedure Laterality Date    CARDIAC CATHETERIZATION      no stents    COLONOSCOPY      ENDOSCOPY, COLON, DIAGNOSTIC      FINGER SURGERY Left     reattachment, index finger    HIP ARTHROPLASTY Left 2018    total    NASAL ENDOSCOPY      ID REVISE TOTAL HIP REPLACEMENT Left 2018    HIP TOTAL ARTHROPLASTY ANTERIOR APPROACH  [facility]) performed by Ernesto Ferreira DO at [facility]      lesion excision    TONSILLECTOMY      TUNNELED VENOUS PORT PLACEMENT Right 2015    Rt. chest       Social History:    Social History     Tobacco Use    Smoking status: Former Smoker     Packs/day: 1.00     Years: 31.00     Pack years: 31.00     Types: Cigarettes     Start date: 1972     Quit date: 2004     Years since quittin.3    Smokeless tobacco: Never Used    Tobacco comment: smoke 4ppd for many yrs; quit 8 yrs ago   Substance Use Topics    Alcohol use:  Yes     Alcohol/week: 3.0 standard drinks     Types: 3 Cans of beer per week     Comment: about once a week                                Counseling given: Not Answered  Comment: smoke 4ppd for many yrs; quit 8 yrs ago      Vital Signs (Current):   Vitals:    21 0901   BP: 130/81   Pulse: 66   Resp: 20   Temp: 97.5 °F (36.4 °C)   TempSrc: Temporal   SpO2: 99% BP Readings from Last 3 Encounters:   04/27/21 130/81   04/15/21 132/86   04/01/21 109/68       NPO Status: Time of last liquid consumption: 2300(SIP WITH AM MEDS AT 0700)                        Time of last solid consumption: 2100                        Date of last liquid consumption: 04/26/21                        Date of last solid food consumption: 04/26/21    BMI:   Wt Readings from Last 3 Encounters:   04/15/21 262 lb (118.8 kg)   04/01/21 259 lb 6.4 oz (117.7 kg)   03/29/21 262 lb (118.8 kg)     There is no height or weight on file to calculate BMI.    CBC:   Lab Results   Component Value Date    WBC 8.0 04/15/2021    RBC 5.03 04/15/2021    RBC 5.02 03/01/2012    HGB 14.4 04/15/2021    HCT 44.0 04/15/2021    MCV 87.5 04/15/2021    RDW 13.1 04/15/2021     04/15/2021     03/01/2012       CMP:   Lab Results   Component Value Date     04/15/2021    K 4.4 04/15/2021     04/15/2021    CO2 25 04/15/2021    BUN 12 04/15/2021    CREATININE 0.79 04/15/2021    GFRAA >60 04/15/2021    LABGLOM >60 04/15/2021    GLUCOSE 102 04/15/2021    GLUCOSE 86 06/04/2012    PROT 7.5 04/15/2021    CALCIUM 9.6 04/15/2021    BILITOT 0.41 04/15/2021    ALKPHOS 101 04/15/2021    AST 23 04/15/2021    ALT 23 04/15/2021       POC Tests: No results for input(s): POCGLU, POCNA, POCK, POCCL, POCBUN, POCHEMO, POCHCT in the last 72 hours.     Coags:   Lab Results   Component Value Date    PROTIME 9.6 11/25/2015    INR 0.9 11/25/2015    APTT 26.8 11/25/2015       HCG (If Applicable): No results found for: PREGTESTUR, PREGSERUM, HCG, HCGQUANT     ABGs: No results found for: PHART, PO2ART, SHU7AAB, VHZ1XFO, BEART, E1NBLSSZ     Type & Screen (If Applicable):  No results found for: LABABO, LABRH    Drug/Infectious Status (If Applicable):  Lab Results   Component Value Date    HEPCAB NONREACTIVE 07/18/2012       COVID-19 Screening (If Applicable):   Lab Results   Component Value Date    COVID19 Not Detected

## 2021-04-27 NOTE — CONSULTS
[facility]  Office: Juli Dougherty, DO, Daniel Tay DO, Brandi Garrison, DO, Stephanie Bose, DO, Vincent Bean MD, Brandy Leiva MD, Sam Mohamud MD, Willette Klinefelter, MD, Marion Champagne MD, Candido Solano MD, Sanjana Terry MD, Nino Allan MD, Patrick Hilliard DO, Óscar Mark MD, Kali Bernabe DO, Myles Chua MD,  Jelani Maher DO, Timo Del Toro MD, Willis Vincent MD, Esperanza Celestin MD, Stephanie Sanford MD, Lise Jaimes, [facility], [address], [facility], Joseph Hemp, CNP, Martin Galdamez, CNS, Lisa Coughlin, CNP, Hakan January, CNP, Ana Presley, CNP, Lexii Mahmood, CNP, Rashmi Alan, CNP, Jl Bermudez PA-C, Vini Jiménez, [facility], Kentrell Orr, CNP, Shruthi Ruiz, CNP, Eunice Lambert, CNP, Marland Cowden, CNP, Leda Figueroa, CNP, Fredy Mixon, [facility]         [address] / HISTORY AND PHYSICAL EXAMINATION            Date:   4/27/2021  Patient name:  Akira Bliss  Date of admission:  4/27/2021  8:19 AM  MRN:   [id number]  Account:  [de-identified]  YOB: 1955  PCP:    Tavo Nash MD  Room:   5712/4107-47  Code Status:    Full Code    Physician Requesting Consult: Lashaun Paul DO    Reason for Consult: Medical management    Chief Complaint:     Right hip pain    History Obtained From:     patient, electronic medical record    History of Present Illness: This is a 68-year-old male with history of aseptic meningitis, asthma, avascular necrosis of bone, CAD, common variable immunodeficiency, COPD, hypertension, obesity who came in for right total hip arthroplasty through an anterior approach. Medicine team was consulted for medical management. Currently patient denies any complaints.     Past Medical History:     Past Medical History:   Diagnosis Date    Allergic rhinitis     Anxiety     Aseptic meningitis     reaction to first infusion in 2012-hospitalized but no problem since then    Asthma     Avascular necrosis of bone of hip, left ([facility] 04/2018    CAD (coronary artery disease)     mild on cath, no stents    Common variable immunodeficiency ([facility] 12/4/2013    COPD (chronic obstructive pulmonary disease) (HCC)     Deviated nasal septum     Dyspnea     with exertion    HTN (hypertension)     Hyperglycemia     Hypoxia     Immune deficiency disorder ([facility] 2012    IGIV every 3 weeks with prednisone    Obesity     On supplemental oxygen therapy     PRECIOUS (obstructive sleep apnea) 2012    CPAP nightly    Osteoporosis     Reflux     Respiratory failure ([facility] 2014    Oxygen on    Severe persistent asthma     Spondylosis of cervical spine     TIA (transient ischemic attack) 2005    Tobacco use     stopped 2004    Under care of team 04/15/2021    pulmonology-Dr [facility]-last visit march 2021    Under care of team 04/15/2021    infectious disease-Dr Ch[facility]-last visit march 2021    Under care of team 04/15/2021    immunology-Dr Rivers-last visit mar 2021    Vitamin D deficiency 3/6/2018    Wears glasses     Wellness examination 04/15/2021    pcp-Dr Vazquez-last visit apr 2021        Past Surgical History:     Past Surgical History:   Procedure Laterality Date    CARDIAC CATHETERIZATION  2005    no stents    COLONOSCOPY  2003    ENDOSCOPY, COLON, DIAGNOSTIC      FINGER SURGERY Left 1997    reattachment, index finger    NASAL ENDOSCOPY      [place] RE[place] TOTAL H[place] REPLACEMENT Left 05/22/2018    HIP TOTAL ARTHROPLASTY [facility]TERIOR APPROACH  ([facility]) performed by Shameka Youngblood DO at [facility]  2014    lesion excision    TONSILLECTOMY      TOTAL HIP ARTHROPLASTY Right 04/27/2021    TOTAL HIP ARTHROPLASTY ANTERIOR (Right Hip)    TUNNELED VENOUS PORT PLACEMENT Right 11/25/2015    Rt. chest        Medications Prior to Admission:     Prior to Admission medications    Medication Sig Start Date End Date Taking? Authorizing Provider   montelukast (SINGULAIR) 10 MG tablet take 1 tablet by mouth every evening 3/25/21  Yes Rohan Morales MD   albuterol sulfate HFA (VENTOLIN HFA) 108 (90 Base) MCG/ACT inhaler inhale 2 puffs by mouth and INTO THE LUNGS every 6 hours if needed for wheezing 3/25/21  Yes Rohan Morales MD   budesonide-formoterol (SYMBICORT) 160-4.5 MCG/ACT AERO inhale 2 puffs by mouth twice a day 3/25/21  Yes Rohan Morales MD   fluticasone (FLONASE) 50 MCG/ACT nasal spray instill 2 sprays into each nostril once daily 3/25/21  Yes Rohan Morales MD   SPIRIVA HANDIHALER 18 MCG inhalation capsule inhale the contents of one capsule in the handihaler once daily 3/25/21  Yes Rohan Morales MD   predniSONE (DELTASONE) 10 MG tablet 4 tablet once daily for 3 days then 3 tablet once daily for 3 days then 2 tablet once daily for 3 days then 1 tablet once daily for 3 days then discontinue 3/16/21  Yes Rohan Morales MD   omeprazole (PRILOSEC) 20 MG delayed release capsule take 1 capsule by mouth once daily 8/19/19  Yes Karely Maguire PA-C   amLODIPine (NORVASC) 10 MG tablet take 1 tablet by mouth once daily 8/19/19  Yes Karely Maguire PA-C   benazepril (LOTENSIN) 10 MG tablet take 1 tablet by mouth once daily  Patient taking differently: Take 10 mg BID 12/14/18  Yes Karely Maguire PA-C   albuterol (PROVENTIL) (2.5 MG/3ML) 0.083% nebulizer solution Take 3 mLs by nebulization every 4 hours as needed for Wheezing or Shortness of Breath 11/13/20   Brian Hull MD   fluticasone-salmeterol (ADVAIR DISKUS) 500-50 MCG/DOSE diskus inhaler Inhale 1 puff into the lungs 2 times daily 2/25/20   Rohan Morales MD   Handicap Placard MISC by Does not apply route For 5 years 7/31/19   Rohan Morales MD   acetaminophen (TYLENOL) 500 MG tablet Take 500 mg by mouth every 6 hours as needed for Pain    Historical Provider, MD   traMADol (ULTRAM) 50 MG tablet Take 50 mg by mouth as needed for Pain.     Historical Provider, MD   predniSONE (Radha Love) 10 MG tablet Start at 50 mg and taper 10 mg every 3 days 3/20/19   Muriel Fuentes MD   ALPRAZolam [facility]) 0.5 MG tablet Take 0.5 mg by mouth 3 times daily as needed for Sleep. Ligia Calzada Historical Provider, MD   Immune Globulin, Human, 10 GM/100ML SOLN IV solution Infuse 60 g intravenously every 21 days     Historical Provider, MD        Allergies:     Adhesive tape    Social History:     Tobacco:    reports that he quit smoking about 17 years ago. His smoking use included cigarettes. He started smoking about 49 years ago. He has a 31.00 pack-year smoking history. He has never used smokeless tobacco.  Alcohol:      reports current alcohol use of about 3.0 standard drinks of alcohol per week. Drug Use:  reports no history of drug use. Family History:     Family History   Problem Relation Age of Onset   Dalila Lin COPD Father     Coronary Art Dis Father     Heart Attack Father     Lung Cancer Mother     Breast Cancer Sister     No Known Problems Maternal Grandmother     No Known Problems Maternal Grandfather     Cancer Paternal Grandmother     Stroke Paternal Grandfather     Heart Attack Paternal Grandfather     Cancer Paternal Grandfather     Breast Cancer Sister     Cancer Sister         Throat       Review of Systems:     Positive and Negative as described in HPI. CONSTITUTIONAL:  negative for fevers, chills, sweats, fatigue, weight loss  HEENT:  negative for vision, hearing changes, runny nose, throat pain  RESPIRATORY:  negative for shortness of breath, cough, congestion, wheezing. CARDIOVASCULAR:  negative for chest pain, palpitations.   GASTROINTESTINAL:  negative for nausea, vomiting, diarrhea, constipation, change in bowel habits, abdominal pain   GENITOURINARY:  negative for difficulty of urination, burning with urination, frequency   INTEGUMENT:  negative for rash, skin lesions, easy bruising   HEMATOLOGIC/LYMPHATIC:  negative for swelling/edema   ALLERGIC/IMMUNOLOGIC:  negative for urticaria , found.    Assessment :      Hospital Problems           Last Modified POA    Status post total replacement of right hip 4/27/2021 Yes          Plan:     Right hip severe degenerative joint disease  -Management as per primary  -Prophylactic antibiotics as per ID    C VID  -IVIG every 3 weeks    Hypertension  -Resume amlodipine, lisinopril    TIA  -On Eliquis    COPD  -Continue Symbicort, Flonase, montelukast, Spiriva    Diabetic neuropathy  -Continue gabapentin    Consultations:   IP CONSULT TO SOCIAL WORK  IP CONSULT TO HOSPITALIST  IP CONSULT TO INFECTIOUS DISEASES      Sunil Scott MD  4/27/2021  4:49 PM    Copy sent to Dr. Buffy Moore MD

## 2021-04-28 LAB
ABSOLUTE EOS #: <0.03 K/UL (ref 0–0.44)
ABSOLUTE IMMATURE GRANULOCYTE: 0.04 K/UL (ref 0–0.3)
ABSOLUTE LYMPH #: 0.94 K/UL (ref 1.1–3.7)
ABSOLUTE MONO #: 0.56 K/UL (ref 0.1–1.2)
BASOPHILS # BLD: 0 % (ref 0–2)
BASOPHILS ABSOLUTE: <0.03 K/UL (ref 0–0.2)
DIFFERENTIAL TYPE: ABNORMAL
EOSINOPHILS RELATIVE PERCENT: 0 % (ref 1–4)
GLUCOSE BLD-MCNC: 122 MG/DL (ref 75–110)
GLUCOSE BLD-MCNC: 136 MG/DL (ref 75–110)
GLUCOSE BLD-MCNC: 139 MG/DL (ref 75–110)
GLUCOSE BLD-MCNC: 179 MG/DL (ref 75–110)
HCT VFR BLD CALC: 37.9 % (ref 40.7–50.3)
HEMOGLOBIN: 12.4 G/DL (ref 13–17)
IMMATURE GRANULOCYTES: 0 %
LYMPHOCYTES # BLD: 9 % (ref 24–43)
MCH RBC QN AUTO: 29.5 PG (ref 25.2–33.5)
MCHC RBC AUTO-ENTMCNC: 32.7 G/DL (ref 28.4–34.8)
MCV RBC AUTO: 90.2 FL (ref 82.6–102.9)
MONOCYTES # BLD: 5 % (ref 3–12)
NRBC AUTOMATED: 0 PER 100 WBC
PDW BLD-RTO: 13.2 % (ref 11.8–14.4)
PLATELET # BLD: 201 K/UL (ref 138–453)
PLATELET ESTIMATE: ABNORMAL
PMV BLD AUTO: 10.9 FL (ref 8.1–13.5)
RBC # BLD: 4.2 M/UL (ref 4.21–5.77)
RBC # BLD: ABNORMAL 10*6/UL
SEG NEUTROPHILS: 86 % (ref 36–65)
SEGMENTED NEUTROPHILS ABSOLUTE COUNT: 9.34 K/UL (ref 1.5–8.1)
WBC # BLD: 10.9 K/UL (ref 3.5–11.3)
WBC # BLD: ABNORMAL 10*3/UL

## 2021-04-28 PROCEDURE — 97116 GAIT TRAINING THERAPY: CPT

## 2021-04-28 PROCEDURE — 94640 AIRWAY INHALATION TREATMENT: CPT

## 2021-04-28 PROCEDURE — 97530 THERAPEUTIC ACTIVITIES: CPT

## 2021-04-28 PROCEDURE — 85025 COMPLETE CBC W/AUTO DIFF WBC: CPT

## 2021-04-28 PROCEDURE — 6360000002 HC RX W HCPCS: Performed by: STUDENT IN AN ORGANIZED HEALTH CARE EDUCATION/TRAINING PROGRAM

## 2021-04-28 PROCEDURE — 83036 HEMOGLOBIN GLYCOSYLATED A1C: CPT

## 2021-04-28 PROCEDURE — 6370000000 HC RX 637 (ALT 250 FOR IP): Performed by: STUDENT IN AN ORGANIZED HEALTH CARE EDUCATION/TRAINING PROGRAM

## 2021-04-28 PROCEDURE — 82947 ASSAY GLUCOSE BLOOD QUANT: CPT

## 2021-04-28 PROCEDURE — 36415 COLL VENOUS BLD VENIPUNCTURE: CPT

## 2021-04-28 PROCEDURE — 2580000003 HC RX 258: Performed by: STUDENT IN AN ORGANIZED HEALTH CARE EDUCATION/TRAINING PROGRAM

## 2021-04-28 PROCEDURE — 2580000003 HC RX 258: Performed by: INTERNAL MEDICINE

## 2021-04-28 PROCEDURE — 99232 SBSQ HOSP IP/OBS MODERATE 35: CPT | Performed by: INTERNAL MEDICINE

## 2021-04-28 PROCEDURE — 97535 SELF CARE MNGMENT TRAINING: CPT

## 2021-04-28 PROCEDURE — 94761 N-INVAS EAR/PLS OXIMETRY MLT: CPT

## 2021-04-28 PROCEDURE — 97110 THERAPEUTIC EXERCISES: CPT

## 2021-04-28 PROCEDURE — 6360000002 HC RX W HCPCS: Performed by: INTERNAL MEDICINE

## 2021-04-28 PROCEDURE — 2700000000 HC OXYGEN THERAPY PER DAY

## 2021-04-28 RX ADMIN — ACETAMINOPHEN 1000 MG: 500 TABLET ORAL at 23:01

## 2021-04-28 RX ADMIN — KETOROLAC TROMETHAMINE 30 MG: 30 INJECTION, SOLUTION INTRAMUSCULAR; INTRAVENOUS at 18:16

## 2021-04-28 RX ADMIN — BUDESONIDE AND FORMOTEROL FUMARATE DIHYDRATE 1 PUFF: 160; 4.5 AEROSOL RESPIRATORY (INHALATION) at 08:46

## 2021-04-28 RX ADMIN — OXYCODONE HYDROCHLORIDE 10 MG: 5 TABLET ORAL at 07:20

## 2021-04-28 RX ADMIN — GABAPENTIN 300 MG: 300 CAPSULE ORAL at 20:42

## 2021-04-28 RX ADMIN — CEFEPIME HYDROCHLORIDE 2000 MG: 2 INJECTION, POWDER, FOR SOLUTION INTRAVENOUS at 13:17

## 2021-04-28 RX ADMIN — ACETAMINOPHEN 1000 MG: 500 TABLET ORAL at 18:16

## 2021-04-28 RX ADMIN — VANCOMYCIN HYDROCHLORIDE 1500 MG: 10 INJECTION, POWDER, LYOPHILIZED, FOR SOLUTION INTRAVENOUS at 11:30

## 2021-04-28 RX ADMIN — OXYCODONE HYDROCHLORIDE 10 MG: 5 TABLET ORAL at 11:30

## 2021-04-28 RX ADMIN — DOCUSATE SODIUM 50MG AND SENNOSIDES 8.6MG 1 TABLET: 8.6; 5 TABLET, FILM COATED ORAL at 20:42

## 2021-04-28 RX ADMIN — CEFEPIME HYDROCHLORIDE 2000 MG: 2 INJECTION, POWDER, FOR SOLUTION INTRAVENOUS at 00:50

## 2021-04-28 RX ADMIN — KETOROLAC TROMETHAMINE 30 MG: 30 INJECTION, SOLUTION INTRAMUSCULAR; INTRAVENOUS at 12:33

## 2021-04-28 RX ADMIN — ALPRAZOLAM 0.5 MG: 0.5 TABLET ORAL at 23:01

## 2021-04-28 RX ADMIN — LISINOPRIL 10 MG: 10 TABLET ORAL at 08:31

## 2021-04-28 RX ADMIN — PANTOPRAZOLE SODIUM 40 MG: 40 TABLET, DELAYED RELEASE ORAL at 06:48

## 2021-04-28 RX ADMIN — OXYCODONE HYDROCHLORIDE 10 MG: 5 TABLET ORAL at 19:42

## 2021-04-28 RX ADMIN — ALPRAZOLAM 0.5 MG: 0.5 TABLET ORAL at 08:34

## 2021-04-28 RX ADMIN — DOCUSATE SODIUM 50MG AND SENNOSIDES 8.6MG 1 TABLET: 8.6; 5 TABLET, FILM COATED ORAL at 08:31

## 2021-04-28 RX ADMIN — FLUTICASONE PROPIONATE 2 SPRAY: 50 SPRAY, METERED NASAL at 08:31

## 2021-04-28 RX ADMIN — MONTELUKAST SODIUM 10 MG: 10 TABLET, FILM COATED ORAL at 20:42

## 2021-04-28 RX ADMIN — SODIUM CHLORIDE: 9 INJECTION, SOLUTION INTRAVENOUS at 06:52

## 2021-04-28 RX ADMIN — BUDESONIDE AND FORMOTEROL FUMARATE DIHYDRATE 1 PUFF: 160; 4.5 AEROSOL RESPIRATORY (INHALATION) at 20:38

## 2021-04-28 RX ADMIN — APIXABAN 2.5 MG: 2.5 TABLET, FILM COATED ORAL at 08:31

## 2021-04-28 RX ADMIN — APIXABAN 2.5 MG: 2.5 TABLET, FILM COATED ORAL at 20:42

## 2021-04-28 RX ADMIN — ALPRAZOLAM 0.5 MG: 0.5 TABLET ORAL at 00:50

## 2021-04-28 RX ADMIN — TRAMADOL HYDROCHLORIDE 50 MG: 50 TABLET, FILM COATED ORAL at 23:01

## 2021-04-28 RX ADMIN — OXYCODONE HYDROCHLORIDE 10 MG: 5 TABLET ORAL at 15:44

## 2021-04-28 RX ADMIN — VANCOMYCIN HYDROCHLORIDE 1500 MG: 10 INJECTION, POWDER, LYOPHILIZED, FOR SOLUTION INTRAVENOUS at 23:01

## 2021-04-28 RX ADMIN — OXYCODONE HYDROCHLORIDE 5 MG: 5 TABLET ORAL at 03:39

## 2021-04-28 RX ADMIN — ACETAMINOPHEN 1000 MG: 500 TABLET ORAL at 11:30

## 2021-04-28 RX ADMIN — ACETAMINOPHEN 1000 MG: 500 TABLET ORAL at 06:48

## 2021-04-28 RX ADMIN — AMLODIPINE BESYLATE 10 MG: 10 TABLET ORAL at 08:31

## 2021-04-28 ASSESSMENT — PAIN DESCRIPTION - FREQUENCY
FREQUENCY: CONTINUOUS
FREQUENCY: CONTINUOUS

## 2021-04-28 ASSESSMENT — PAIN SCALES - GENERAL
PAINLEVEL_OUTOF10: 8
PAINLEVEL_OUTOF10: 3
PAINLEVEL_OUTOF10: 7
PAINLEVEL_OUTOF10: 7
PAINLEVEL_OUTOF10: 4
PAINLEVEL_OUTOF10: 7
PAINLEVEL_OUTOF10: 5

## 2021-04-28 ASSESSMENT — PAIN DESCRIPTION - LOCATION
LOCATION: HIP
LOCATION: HIP

## 2021-04-28 ASSESSMENT — PAIN DESCRIPTION - DESCRIPTORS: DESCRIPTORS: ACHING;DISCOMFORT;SORE;SHARP

## 2021-04-28 ASSESSMENT — PAIN DESCRIPTION - ORIENTATION
ORIENTATION: RIGHT

## 2021-04-28 ASSESSMENT — PAIN DESCRIPTION - PAIN TYPE
TYPE: ACUTE PAIN;SURGICAL PAIN

## 2021-04-28 ASSESSMENT — PAIN - FUNCTIONAL ASSESSMENT: PAIN_FUNCTIONAL_ASSESSMENT: ACTIVITIES ARE NOT PREVENTED

## 2021-04-28 ASSESSMENT — PAIN DESCRIPTION - PROGRESSION: CLINICAL_PROGRESSION: NOT CHANGED

## 2021-04-28 ASSESSMENT — PAIN DESCRIPTION - ONSET: ONSET: ON-GOING

## 2021-04-28 NOTE — PROGRESS NOTES
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 He receives IVIG every 3 weeks. · Vancomycin 1750 mg every 12 hours   · Cefepime 2 g every 12 hours starting preoperatively on the day of surgery. 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 Patient seen at the request of Dr. Nba Dow    The patient is known to my practice. I have previously evaluated him on 5/8/2018 in my office.     The patient has a history of combined variable immunodeficiency for which he sees Dr. Aron Braswell. He receives IVIG and a 3 day steroid course every three weeks. He follows with Dr. Farooq Victoria for recurrent pulmonary infections. He states that he has not had any in the past year though.     The patient began to have hip pain about a year ago. He recently had some falls and imaging showed AVN of the right hip. He has developed AVN of the right hip from the chronic steroid use.     He was seen by Dr Ernie Horowitz and the patient has decided to proceed with a right hip arthroplasty.  The patient was sent to us for recommendations for preoperative antibiotics.     The surgery needs to be done as close to his IVIG treatment as possible because that is when his immunoglobulin levels are the highest.  Arrangements were made to move his regular schedule April 12 infusion to 4/23/2021 in order to best protect the patient for the surgery.     We also discussed the antibiotics that he will need to take preoperatively. Since he has an immune deficiency we will have to cover for encapsulated bacteria. He will receive gram-negative treatment with Cefepime and gram-positive treatment with vancomycin.     CURRENT EVALUATION 4/28/2021     Afebrile  V/S Stable   Satting well on 2 L NC    The patient underwent a total right hip arthroplasty on 4/27/2021 under spinal anesthesia. He received the preoperative antibiotics as previously planned. Patient endorses 5/10 pain to his Rt hip that is worse with flexion. Is working with PT but ROM is limited. Has not had a BM but is having flatus. Endorses constipation. Denies nausea, vomiting, chest pain, SOB, abdominal pain, dysuria, numbness, and tingling. Currently on Cefepime and Vancomycin. No leukocytosis. Discussed with patient, RN. Labs and Imaging    WBC: 10.9  HgB: 12.4  Plt: 201    XR Hip (4/27/21)  FINDINGS:   Visualized portions of the pelvis appear to be intact.  Superior iliac bones   are excluded from the field of view. Magdalene Van has been interval placement of a   right total hip arthroplasty, which appears to be anatomic in alignment.  No   acute periprosthetic fracture.  Left total hip arthroplasty remains in place.       Impression:       1.  Status post right total hip arthroplasty without radiographic evidence of   acute bony complication. 2.  Left total hip arthroplasty remains in place. I have personally reviewed the past medical history, past surgical history, medications, social history, and family history, and I have updated the database accordingly.   Past Medical History:     Past Medical History:   Diagnosis Date    Allergic rhinitis     Anxiety     Aseptic meningitis     reaction to first infusion in 2012-hospitalized but no problem since then    Asthma     Avascular necrosis of bone of hip, left ([facility] 04/2018    CAD (coronary artery disease)     mild on cath, no stents    Common variable immunodeficiency ([facility] 12/4/2013    COPD (chronic obstructive pulmonary disease) (HCC)     Deviated nasal septum     Dyspnea     with exertion    HTN (hypertension)     Hyperglycemia     Hypoxia     Immune deficiency disorder ([facility] 2012    IGIV every 3 weeks with prednisone    Obesity     On supplemental oxygen therapy     PRECIOUS (obstructive sleep apnea) 2012    CPAP nightly    Osteoporosis     Reflux     Respiratory failure ([facility] 2014    Oxygen on    Severe persistent asthma     Spondylosis of cervical spine     TIA (transient ischemic attack) 2005    Tobacco use     stopped 2004    Under care of team 04/15/2021    pulmonology-Dr Hull[facility]-last visit march 2021    Under care of team 04/15/2021    infectious disease-Dr [facility]-last visit march 2021    Under care of team 04/15/2021    immunology-Dr Rivers-last visit mar 2021    Vitamin D deficiency 3/6/2018    Wears glasses     Wellness examination 04/15/2021    pcp-Dr Vazquez-last visit apr 2021       Past Surgical  History:     Past Surgical History:   Procedure Laterality Date    CARDIAC CATHETERIZATION  2005    no stents    COLONOSCOPY  2003    ENDOSCOPY, COLON, DIAGNOSTIC      FINGER SURGERY Left 1997    reattachment, index finger    NASAL ENDOSCOPY      [place] REVISE TOTAL HIP REPLACEMENT Left 05/22/2018    HIP TOTAL ARTHROPLASTY ANTERIOR APPROACH  ([facility]) performed by Domonique Blood DO at [facility]  2014    lesion excision    TONSILLECTOMY      TOTAL HIP ARTHROPLASTY Right 04/27/2021    TOTAL HIP ARTHROPLASTY ANTERIOR (Right Hip)    TOTAL HIP ARTHROPLASTY Right 4/27/2021    TOTAL HIP ARTHROPLASTY ANTERIOR performed by Domonique Blood DO at [address] [place] [address] TUNNELED VENOUS PORT PLACEMENT Right 11/25/2015    Rt.  chest       Medications:     Current Outpatient Medications:     apixaban (ELIQUIS) 2.5 MG TABS tablet, Take 1 tablet by mouth 2 times daily, Disp: 60 tablet, Rfl: 0    oxyCODONE-acetaminophen (PERCOCET) 5-325 MG per tablet, Take 1 tablet by mouth every 4 hours as needed for Pain for up to 7 days. Intended supply: 7 days. Take lowest dose possible to manage pain, Disp: 42 tablet, Rfl: 0    docusate sodium (COLACE) 100 MG capsule, Take 1 capsule by mouth 2 times daily as needed for Constipation, Disp: 60 capsule, Rfl: 0     Social History:     Social History     Socioeconomic History    Marital status:      Spouse name: Stephanie Turner Number of children: 3    Years of education: Not on file    Highest education level: Not on file   Occupational History    Occupation:      Comment: now on 67 [employer] strain: Not on file    Food insecurity     Worry: Not on file     Inability: Not on file   [employer] needs     Medical: Not on file     Non-medical: Not on file   Tobacco Use    Smoking status: Former Smoker     Packs/day: 1.00     Years: 31.00     Pack years: 31.00     Types: Cigarettes     Start date: 1972     Quit date: 2004     Years since quittin.3    Smokeless tobacco: Never Used    Tobacco comment: smoke 4ppd for many yrs; quit 8 yrs ago   Substance and Sexual Activity    Alcohol use:  Yes     Alcohol/week: 3.0 standard drinks     Types: 3 Cans of beer per week     Comment: about once a week    Drug use: No    Sexual activity: Yes     Partners: Female   Lifestyle    Physical activity     Days per week: Not on file     Minutes per session: Not on file    Stress: Not on file   Relationships    Social connections     Talks on phone: Not on file     Gets together: Not on file     Attends Holiness service: Not on file     Active member of club or organization: Not on file     Attends meetings of clubs or organizations: Not on file     Relationship status: Not on file    Intimate partner violence     Fear of current or ex partner: Not on file     Emotionally abused: Not on file     Physically abused: Not on file     Forced sexual activity: Not on file   Other Topics Concern    Not on file   Social History Narrative    Not on file       Family History:     Family History   Problem Relation Age of Onset    COPD Father     Coronary Art Dis Father     Heart Attack Father     Lung Cancer Mother     Breast Cancer Sister     No Known Problems Maternal Grandmother     No Known Problems Maternal Grandfather     Cancer Paternal Grandmother     Stroke Paternal Grandfather     Heart Attack Paternal Grandfather     Cancer Paternal Grandfather     Breast Cancer Sister     Cancer Sister         Throat        Allergies:   Adhesive tape     Review of Systems:   Constitutional: No fevers or chills. No systemic complaints  Head: No headaches  Eyes: No double vision or blurry vision. ENT: No sore throat or runny nose. . No hearing loss, tinnitus or vertigo. Cardiovascular: No chest pain or palpitations. No shortness of breath. No ECKERT  Lung: No shortness of breath or cough. No sputum production  Abdomen: No nausea, vomiting, diarrhea, or abdominal pain. .  Genitourinary: No increased urinary frequency, or dysuria. No hematuria. No suprapubic or CVA pain  Musculoskeletal: No muscle aches or pains. No joint effusions, swelling or deformities. Has fresh right hip surgical incision  Hematologic: No bleeding or bruising. Neurologic: No headache, weakness, numbness, or tingling. Physical Examination :   BP (!) 140/74   Pulse 72   Temp 97.6 °F (36.4 °C) (Oral)   Resp 16   Ht 5' 8\" (1.727 m)   Wt 262 lb (118.8 kg)   SpO2 95%   BMI 39.84 kg/m²    General Appearance: Awake, alert, and in no apparent distress  Head:  Normocephalic, no trauma  Eyes: Pupils equal, round, reactive, to light and accommodation; extraocular movements intact; sclera anicteric; conjunctivae pink. No embolic phenomena.   ENT: Oropharynx clear, without erythema, exudate, or thrush. No tenderness of sinuses. Mouth/throat: mucosa pink and moist. No lesions. Dentition in good repair. Neck:Supple, without lymphadenopathy. Thyroid normal, No bruits. Pulmonary/Chest: Clear to auscultation, without wheezes, rales, or rhonchi. No dullness to percussion. Cardiovascular: Regular rate and rhythm without murmurs, rubs, or gallops. Abdomen: Soft, non tender. Bowel sounds normal. No organomegaly  All four Extremities: No cyanosis, clubbing, edema, or effusions. Neurologic: No gross sensory or motor deficits. Skin: Warm and dry with good turgor. No signs of peripheral arterial or venous insufficiency.   Has fresh right hip surgical incision    Medical Decision Making:   I have independently reviewed/ordered the following labs:    CBC with Differential:  Lab Results   Component Value Date    WBC 10.9 04/28/2021    WBC 8.0 04/15/2021    HGB 12.4 04/28/2021    HGB 14.4 04/15/2021    HCT 37.9 04/28/2021    HCT 44.0 04/15/2021     04/28/2021     04/15/2021     03/01/2012     02/29/2012    LYMPHOPCT 9 04/28/2021    LYMPHOPCT 24 08/02/2020    MONOPCT 5 04/28/2021    MONOPCT 6 08/02/2020     BMP:   Lab Results   Component Value Date     04/15/2021     05/20/2020    K 4.4 04/15/2021    K 4.1 05/20/2020     04/15/2021    CL 98 05/20/2020    CO2 25 04/15/2021    CO2 22 05/20/2020    BUN 12 04/15/2021    BUN 12 05/20/2020    CREATININE 0.79 04/15/2021    CREATININE 0.86 08/02/2020    MG 2.7 06/12/2014    MG 2.7 06/11/2014     Hepatic Function Panel:  Lab Results   Component Value Date    PROT 7.5 04/15/2021    PROT 7.1 05/20/2020    LABALBU 4.4 04/15/2021    LABALBU 3.9 05/20/2020    BILIDIR <0.08 05/20/2020    BILIDIR 0.08 04/15/2014    IBILI CANNOT BE CALCULATED 05/20/2020    IBILI 0.22 04/15/2014    BILITOT 0.41 04/15/2021    BILITOT 0.23 05/20/2020    ALKPHOS 101 04/15/2021    ALKPHOS 96 05/20/2020    ALT 23 04/15/2021    ALT 22 08/02/2020 AST 23 04/15/2021    AST 29 05/20/2020     No results found for: RPR  No results found for: HIV  No results found for: [facility]  Lab Results   Component Value Date    RBC 4.20 04/28/2021    RBC 5.02 03/01/2012    WBC 10.9 04/28/2021    TURBIDITY CLEAR 04/15/2021     Lab Results   Component Value Date    CREATININE 0.79 04/15/2021    GLUCOSE 102 04/15/2021    GLUCOSE 86 06/04/2012       Thank you for allowing us to participate in the care of this patient. Please call with questions. Stephanie Cheek MD     ATTESTATION:    I have discussed the case, including pertinent history and exam findings with the residents and students. I have seen and examined the patient and the key elements of the encounter have been performed by me. I was present when the student obtained his information or examined the patient. I have reviewed the laboratory data, other diagnostic studies and discussed them with the residents. I have updated the medical record where necessary. I agree with the assessment, plan and orders as documented by the resident/ student.     Leela Higginbotham MD.      Pager: [phone] - Office: [phone]

## 2021-04-28 NOTE — PROGRESS NOTES
edema, palpitations  Gastrointestinal:  negative for abdominal pain, constipation, diarrhea, nausea, vomiting  Neurological:  negative for dizziness, headache    Medications: Allergies: Allergies   Allergen Reactions    Adhesive Tape Other (See Comments)     Severe reaction to a bandage used with hip replacement.        Current Meds:   Scheduled Meds:    tranexamic acid-NaCl  1,000 mg Intravenous Once    amLODIPine  10 mg Oral Daily    montelukast  10 mg Oral Nightly    budesonide-formoterol  1 puff Inhalation BID    fluticasone  2 spray Each Nostril Daily    sodium chloride flush  5-40 mL Intravenous 2 times per day    sennosides-docusate sodium  1 tablet Oral BID    cefepime  2,000 mg Intravenous Q12H    apixaban  2.5 mg Oral BID    acetaminophen  1,000 mg Oral 4 times per day    gabapentin  300 mg Oral Nightly    ondansetron  4 mg Intravenous Once    lisinopril  10 mg Oral Daily    pantoprazole  40 mg Oral QAM AC    tiotropium  2 puff Inhalation Daily    insulin lispro  0-6 Units Subcutaneous TID WC    insulin lispro  0-3 Units Subcutaneous Nightly    vancomycin (VANCOCIN) IV  1,500 mg Intravenous Q12H     Continuous Infusions:    sodium chloride 125 mL/hr at 04/28/21 5959    sodium chloride      dextrose       PRN Meds: ALPRAZolam, albuterol, albuterol sulfate HFA, sodium chloride flush, sodium chloride, magnesium hydroxide, senna, ondansetron **OR** ondansetron, ketorolac, oxyCODONE, oxyCODONE, traMADol, glucose, dextrose, glucagon (rDNA), dextrose    Data:     Past Medical History:   has a past medical history of Allergic rhinitis, Anxiety, Aseptic meningitis, Asthma, Avascular necrosis of bone of hip, left ([address] CAD (coronary artery disease), Common variable immunodeficiency ([facility] [address] COPD (chronic obstructive pulmonary disease) ([facility] [address] Deviated nasal septum, Dyspnea, HTN (hypertension), Hyperglycemia, Hypoxia, Immune deficiency disorder ([facility] [address] Obesity, On supplemental oxygen therapy, PRECIOUS (obstructive sleep apnea), Osteoporosis, Reflux, Respiratory failure ([address] Severe persistent asthma, Spondylosis of cervical spine, TIA (transient ischemic attack), Tobacco use, Under care of team, Under care of team, Under care of team, Vitamin D deficiency, Wears glasses, and Wellness examination. Social History:   reports that he quit smoking about 17 years ago. His smoking use included cigarettes. He started smoking about 49 years ago. He has a 31.00 pack-year smoking history. He has never used smokeless tobacco. He reports current alcohol use of about 3.0 standard drinks of alcohol per week. He reports that he does not use drugs. Family History:   Family History   Problem Relation Age of Onset   Wilhelminia Blazing COPD Father     Coronary Art Dis Father     Heart Attack Father     Lung Cancer Mother     Breast Cancer Sister     No Known Problems Maternal Grandmother     No Known Problems Maternal Grandfather     Cancer Paternal Grandmother     Stroke Paternal Grandfather     Heart Attack Paternal Grandfather     Cancer Paternal Grandfather     Breast Cancer Sister     Cancer Sister         Throat       Vitals:  BP (!) 140/74   Pulse 72   Temp 97.6 °F (36.4 °C) (Oral)   Resp 16   Ht 5' 8\" (1.727 m)   Wt 262 lb (118.8 kg)   SpO2 95%   BMI 39.84 kg/m²   Temp (24hrs), Av.2 °F (35.7 °C), Min:95.7 °F (35.4 °C), Max:97.7 °F (36.5 °C)    Recent Labs     21  1722 21  2123 21  0647   POCGLU 154* 145* 122*       I/O (24Hr):     Intake/Output Summary (Last 24 hours) at 2021 1128  Last data filed at 2021 4858  Gross per 24 hour   Intake 5792 ml   Output 3350 ml   Net 2442 ml       Labs:  Hematology:  Recent Labs     21  0932   WBC 10.9   RBC 4.20*   HGB 12.4*   HCT 37.9*   MCV 90.2   MCH 29.5   MCHC 32.7   RDW 13.2      MPV 10.9     Chemistry:No results for input(s): NA, K, CL, CO2, GLUCOSE, BUN, CREATININE, MG, ANIONGAP, LABGLOM, GFRAA, CALCIUM, CAION, PHOS, PSA, PROBNP, TROPHS, CKTOTAL, CKMB, CKMBINDEX, MYOGLOBIN, DIGOXIN, LACTACIDWB in the last 72 hours. Recent Labs     04/27/21  1722 04/27/21  2123 04/28/21  0647   POCGLU 154* 145* 122*     ABG:  Lab Results   Component Value Date    FIO2 NOT REPORTED 05/03/2015     Lab Results   Component Value Date/Time    SPECIAL NOT REPORTED 02/03/2019 10:31 AM     Lab Results   Component Value Date/Time    CULTURE NO GROWTH 6 DAYS 01/14/2019 07:51 AM       Radiology:  Maurice Escamilla Hip 2-3 Vw W Pelvis Right    Result Date: 4/28/2021  1. Status post right total hip arthroplasty without radiographic evidence of acute bony complication. 2.  Left total hip arthroplasty remains in place.        Physical Examination:        General appearance:  alert, cooperative and no distress  Mental Status:  oriented to person, place and time and normal affect  Lungs:  clear to auscultation bilaterally, normal effort  Heart:  regular rate and rhythm, no murmur  Abdomen:  soft, nontender, nondistended, normal bowel sounds, no masses, hepatomegaly, splenomegaly  Extremities:  no edema, redness, tenderness in the calves  Skin:  no gross lesions, rashes, induration    Assessment:        Hospital Problems           Last Modified POA    Status post total replacement of right hip 4/27/2021 Yes          Plan:        Right hip severe degenerative joint disease  -Management as per primary  -Prophylactic antibiotics as per ID     CVID  -IVIG every 3 weeks     Hypertension  -Resume amlodipine, lisinopril     TIA  -On Eliquis     COPD  -Continue Symbicort, Flonase, montelukast, Spiriva     Diabetic neuropathy  -Continue gabapentin    Sunil Scott MD  4/28/2021  11:28 AM

## 2021-04-28 NOTE — CARE COORDINATION
Case Management Initial Discharge Plan  Deepa Shipman,             Met with:patient to discuss discharge plans. Information verified: address, contacts, phone number, , insurance Yes    Emergency Contact/Next of Kin name & number:   Lexus Addison (wife) [phone]  Mario Alberto Cortez (son) [phone]    PCP: Viky Beal MD  Date of last visit: virtual visit 1 month ago    Insurance Provider: Medicare    Discharge Planning    Living Arrangements:  Spouse/Significant Other   Support Systems:  Spouse/Significant Other    Home has 1 story with ramp  0 stairs to climb to get into front door,   Location of bedroom/bathroom in home main    Patient able to perform ADL's:Assisted    Current Services (outpatient & in home) Receives IVIG home infusion every 3 weeks ( does not know name of company that does this)  DME equipment: cane, walker,O2 @3 L nc, shower chair,CPAP  DME provider: Ishmaelne Dhaval    Receiving oral anticoagulation therapy? No    If indicated:   Physician managing anticoagulation treatment: n/a  Where does patient obtain lab work for ATC treatment? n/a      Potential Assistance Needed:  Home Care    Patient agreeable to home care: Yes  Freedom of choice provided:  yes    Prior SNF/Rehab Placement and Facility: [facility]  Agreeable to SNF/Rehab: No  [place] of choice provided: n/a     Evaluation: n/a    Expected Discharge date:  21    Patient expects to be discharged to:  Home with home care  Follow Up Appointment: Best Day/ Time: Tuesday AM    Transportation provider: wife  Transportation arrangements needed for discharge: No    Readmission Risk              Risk of Unplanned Readmission:        0             Does patient have a readmission risk score greater than 14?: No  If yes, follow-up appointment must be made within 7 days of discharge. Goals of Care: Independent ADL's      Discharge Plan: Home with wife and home care.  Wants referral to [place] for home care for

## 2021-04-28 NOTE — PROGRESS NOTES
Occupational Therapy  Facility/Department: [address] ORTHO/MED SURG  Daily Treatment Note  NAME: Ayana Gaviria  : 1955  MRN: [id number]    Date of Service: 2021    Discharge Recommendations:  Patient would benefit from continued therapy after discharge       Assessment   Performance deficits / Impairments: Decreased functional mobility ; Decreased ADL status; Decreased balance;Decreased endurance;Decreased high-level IADLs;Decreased strength  Prognosis: Good  Patient Education: OT POC, transfer/walker safety, importance of participation in therapy, use of surgical soap, use of incentive spirometer, hip precautions, bed mobility - pt verbalized/demo understanding. REQUIRES OT FOLLOW UP: Yes  Activity Tolerance  Activity Tolerance: Patient Tolerated treatment well;Patient limited by pain  Safety Devices  Safety Devices in place: Yes  Type of devices: Bed alarm in place;Call light within reach;Gait belt;Patient at risk for falls; Left in bed;Nurse notified         Patient Diagnosis(es): The primary encounter diagnosis was Status post total replacement of right hip. A diagnosis of Post-operative pain was also pertinent to this visit. has a past medical history of Allergic rhinitis, Anxiety, Aseptic meningitis, Asthma, Avascular necrosis of bone of hip, left ([address] CAD (coronary artery disease), Common variable immunodeficiency ([address] COPD (chronic obstructive pulmonary disease) ([address] Deviated nasal septum, Dyspnea, HTN (hypertension), Hyperglycemia, Hypoxia, Immune deficiency disorder ([address] Obesity, On supplemental oxygen therapy, PRECIOUS (obstructive sleep apnea), Osteoporosis, Reflux, Respiratory failure ([address] Severe persistent asthma, Spondylosis of cervical spine, TIA (transient ischemic attack), Tobacco use, Under care of team, Under care of team, Under care of team, Vitamin D deficiency, Wears glasses, and Wellness examination.    has a past surgical history that includes Finger surgery (Left, ); Cardiac catheterization (2005); Tonsillectomy; Throat surgery (2014); Colonoscopy (2003); Tunneled venous port placement (Right, 11/25/2015); nasal endoscopy; pr revise total hip replacement (Left, 05/22/2018); Endoscopy, colon, diagnostic; Total hip arthroplasty (Right, 04/27/2021); and Total hip arthroplasty (Right, 4/27/2021). Restrictions  Restrictions/Precautions  Restrictions/Precautions: Weight Bearing  Required Braces or Orthoses?: No  Lower Extremity Weight Bearing Restrictions  Right Lower Extremity Weight Bearing: Weight Bearing As Tolerated  Position Activity Restriction  Other position/activity restrictions: no straight leg raises  Subjective   General  Patient assessed for rehabilitation services?: Yes  Family / Caregiver Present: Yes(sig other)  Pain Assessment  Pain Assessment: 0-10  Pain Level: 6  Pain Type: Acute pain;Surgical pain  Pain Location: Hip(Thigh)  Pain Orientation: Right  Pain Descriptors: Aching;Discomfort;Sore;Sharp  Pain Frequency: Continuous  Non-Pharmaceutical Pain Intervention(s): Relaxation techniques;Repositioned;Rest;Therapeutic presence  Vital Signs  Patient Currently in Pain: Yes   Orientation  Orientation  Overall Orientation Status: Within Functional Limits  Objective    ADL  Feeding: Independent  Grooming: Supervision;Setup(Oral care standing at sink.)  UE Bathing: Setup; Increased time to complete;Supervision(Mod A for back standing at sink d/t hip precautions.)  LE Bathing: Setup;Stand by assistance(Pericare/hips standing at sink.)  UE Dressing: Minimal assistance(To manage gown.)  LE Dressing: Moderate assistance(To manage yashira hose.)  Toileting: Setup;Stand by assistance  Additional Comments: Pt transferred to standing at sink to complete ADL care,surgical soap used appropriately. Pt educated on adhering to hip precautions during functional standing activities with good return.   Balance  Sitting Balance: Supervision(Seated EOB.)  Standing Balance: Stand by assistance  Standing Balance  Time: 25 minutes  Activity: Functional mobility to/from bathroom using RW, ADL care standing at sink, stand to urinate at toilet. Functional Mobility  Functional - Mobility Device: Rolling Walker  Activity: To/from bathroom  Assist Level: Contact guard assistance  Functional Mobility Comments: Pt educated on hip precautions during turning and ambulation along with gait pattern for maximum comfort with good return. Toilet Transfers  Toilet - Technique: Ambulating  Equipment Used: Standard toilet  Toilet Transfer: Stand by assistance  Toilet Transfers Comments: Stand at toilet to urinate. Bed mobility  Supine to Sit: Minimal assistance(To manage RLE to EOB.)  Sit to Supine: Minimal assistance(To manage RLE up onto EOB.)  Scooting: Stand by assistance  Transfers  Sit to stand: Minimal assistance  Stand to sit: Contact guard assistance  Transfer Comments: Pt educated on extending RLE forward during stand/sit transfer to ease discomfort R hip with good return.    Cognition  Overall Cognitive Status: [facility]  Plan   Plan  Times per week: 5-7x/wk (LANRE)  Current Treatment Recommendations: Strengthening, Endurance Training, Patient/Caregiver Education & Training, Equipment Evaluation, Education, & procurement, Self-Care / ADL, Safety Education & Training, Functional Mobility Training, Balance Training, Home Management Training  Goals  Short term goals  Time Frame for Short term goals: Patient will, by discharge  Short term goal 1: demo LB ADLs at SBA using AE PRN  Short term goal 2: demo functional transfers/mobility using LRD at [facility] to engage in ADLs safely  Short term goal 3: demo 8+ min of dynamic standing tolerance at SBA using LRD to engage in ADLs  Short term goal 4: demo bed mobility at SBA maintaining precautions to increase OOB activity       Therapy Time   Individual Concurrent Group Co-treatment   Time In 1305         Time Out 1415         Minutes 70         Timed Code Treatment Minutes: 70 Minutes     Pt supine in bed upon therapist arrival. Pleasant and agreeable to therapy. See above for LOF for all tasks. Pt retired to supine in bed at end of session with bed alarm activated and call light within reach.     GUERITA Lora

## 2021-04-28 NOTE — DISCHARGE INSTR - COC
Immunization History:   Immunization History   Administered Date(s) Administered    Influenza Vaccine, unspecified formulation 10/06/2017    Influenza Virus Vaccine 10/07/2014, 10/05/2015    Influenza, Quadv, IM, PF (6 mo and older Fluzone, Flulaval, Fluarix, and 3 yrs and older Afluria) 09/12/2018, 10/22/2019, 09/21/2020    Pneumococcal Conjugate 13-valent ([id number]) 10/05/2015    Pneumococcal Polysaccharide ([id number]) 12/02/2016       Active Problems:  Patient Active Problem List   Diagnosis Code    COPD exacerbation ([facility] [address] J44.1    Sleep apnea G47.30    Common variable immunodeficiency ([facility] D83.9    Reflux [id number]    Anxiety F41.9    Obesity (BMI 30-39. 9) E66.9    Hyperglycemia R73.9    Hypoxia R09.02    Low back pain M54.5    Radiculopathy with lower extremity symptoms M54.10    Essential hypertension I10    Toxic encephalopathy G92    Tachycardia R00.0    Common variable hypogammaglobulinemia ([facility]) D80.1    Acute on chronic respiratory failure with hypoxia and hypercapnia ([facility]) J96.21, J96.22    Chronic obstructive pulmonary disease ([facility]) J44.9    Abnormality of immune system R89.4    Lung disease-COPD J98.4    Vitamin D deficiency E55.9    Avascular necrosis of bone of hip, right ([facility]) M87.051    Avascular necrosis of bone of left hip ([facility]) M87.052    Status post total replacement of left hip Z96.642    SOB (shortness of breath) R06.02    Asthma exacerbation with COPD (chronic obstructive pulmonary disease) ([facility]) J44.1, J45.901    COPD with exacerbation ([facility]) J44.1    CVID (common variable immunodeficiency) ([facility]) D83.9    Avascular necrosis of bone of right hip ([facility]) M87.051    Status post total replacement of right hip Z96.641       Isolation/Infection:   Isolation            No Isolation          Patient Infection Status       Infection Onset Added Last Indicated Last Indicated By Review Planned Expiration Resolved Resolved By    None active    Resolved    COVID-19 Rule Out 04/23/21 04/23/21 04/23/21 COVID-19 (Ordered)   04/23/21 Rule-Out Test Resulted            Nurse Assessment:  Last Vital Signs: /70   Pulse 78   Temp 97.7 °F (36.5 °C) (Oral)   Resp 16   Ht 5' 8\" (1.727 m)   Wt 262 lb (118.8 kg)   SpO2 95%   BMI 39.84 kg/m²     Last documented pain score (0-10 scale): Pain Level: 8(Simultaneous filing. User may not have seen previous data.)  Last Weight:   Wt Readings from Last 1 Encounters:   04/27/21 262 lb (118.8 kg)     Mental Status:  oriented and alert    IV Access:  - None    Nursing Mobility/ADLs:  Walking   Assisted  Transfer  Assisted  Bathing  Assisted  Dressing  Independent  [address]  Independent  Med Delivery   whole    Wound Care Documentation and Therapy:        Elimination:  Continence:   · Bowel: Yes  · Bladder: Yes  Urinary Catheter: None   Colostomy/Ileostomy/Ileal Conduit: No       Date of Last BM: ***    Intake/Output Summary (Last 24 hours) at 4/28/2021 1641  Last data filed at 4/28/2021 1512  Gross per 24 hour   Intake 3841 ml   Output 3550 ml   Net 291 ml     I/O last 3 completed shifts: In: 9331 [P.O.:2080; I.V.:2661]  Out: 3250 [Urine:3250]    Safety Concerns:      At Risk for Falls    Impairments/Disabilities:      None    Nutrition Therapy:  Current Nutrition Therapy:   - Oral Diet:  General    Routes of Feeding: Oral  Liquids: No Restrictions  Daily Fluid Restriction: no  Last Modified Barium Swallow with Video (Video Swallowing Test): not done      Rehab Therapies: Physical Therapy and Occupational Therapy  Weight Bearing Status/Restrictions: No weight bearing restirctions  Other Medical Equipment (for information only, NOT a DME order):  walker  Other Treatments: skilled nursing assessment    Patient's personal belongings (please select all that are sent with patient):  pt belonging bag    RN SIGNATURE:  Electronically signed by Millie Chinchilla RN on 4/29/21 at 10:30 AM EDT    CASE MANAGEMENT/SOCIAL WORK SECTION    Inpatient Status Date: 4-    Readmission Risk Assessment Score:  Readmission Risk              Risk of Unplanned Readmission:        0           Discharging to Facility/ Agency   · Name:   [id number]               [address] [facility] [address]       Phone: [phone]       Fax: [phone]          Dialysis Facility (if applicable)   · Name:  · Address:  · Dialysis Schedule:  · Phone:  · Fax:    / signature: {Esignature:[id number]}    PHYSICIAN SECTION    Prognosis: Good    Condition at Discharge: Stable    Rehab Potential (if transferring to Rehab): Good    Recommended Labs or Other Treatments After Discharge: PT/OT    Physician Certification: I certify the above information and transfer of Rahel Barba  is necessary for the continuing treatment of the diagnosis listed and that he requires Home Care for greater 30 days.      Update Admission H&P: No change in H&P    PHYSICIAN SIGNATURE:  Dr. Ceasar Barbosa

## 2021-04-28 NOTE — PROGRESS NOTES
Progress Note    Patient:  Gerhardt Slain  YOB: 1955     63 y.o. male    Subjective:  Patient seen and examined at bedside this morning. No new complaints or concerns per patient this morning. No acute issues overnight per nursing. Pain overall controlled. Denies: fever/chills, HA, CP, SOB, N/V, dysuria, or numbness/tingling in extremities. No BM/ + flatus. PT: 8ft    Objective:   Vitals:    04/28/21 0336   BP: 119/73   Pulse: 72   Resp: 16   Temp: 97.3 °F (36.3 °C)   SpO2:      Gen: NAD, cooperative   Cardiovascular: Regular rate  Respiratory: no audible wheezing, symmetrical chest expansion   MSK: Right lower extremity: Optifoam on, which is clean, dry, and intact. No surrounding ecchymoses, abrasions, deformity, or lacerations. Skin intact. Non tender to palpation. Compartments soft and compressible. EHL/FHL/TA/GSC gross motor intact. Sural, saphenous, superificial/deep peroneal, and plantar nerve distribution SILT. DP pulses 2+ with BCR; foot warm and perfused. No results for input(s): WBC, HGB, HCT, PLT, INR, PTT, NA, K, BUN, CREATININE, GLUCOSE in the last 72 hours. Invalid input(s): PT,  ESR,  CRP    Meds: Eliquis, cefepime, vancomycin    See rec for complete list    Impression: 63 y.o. male being seen and evaluated s/p right total hip arthroplasty, POD #1       Plan:     -Weight bearing as tolerated to the right lower extremity   -Post-op Hb not yet drawn   -Final antibiotics per infectious disease recommendations   -Pain control: multimodal pain management protocol. Try to wean off narcotic medication as tolerated. -Ice (20 min, 1 hour off)for edema/pain control  -Encourage deep breathing and IS  -DVT ppx: Eliquis and EPC  -Plan is to follow up with Dr. Jetty Councilman 10-14 days after surgery   -Please page Ortho with any questions or concerns      Betsey Spencer DO  PGY-1 Orthopedic Surgery  5:06 AM 4/28/2021      PGY 3 Addendum  Pt seen and examined.  Agree with above. Pain controlled right hip. Denies numbness, tingling. Exam as above. POD1 right LANRE. Pt with significant medical hx including immune deficiency disorder, medicine and infectious disease on board. Final abx recs while per ID team, on cefepime/vanc for now. WBAT RLE. Eliquis 2.5mg bid ok to resume POD#1, continue 14 days post op. PT/OT. MMT pain control. Dispo: Ok to DC home once cleared by medicine and  IV abx completed per ID recs.     Fabio Grmies,   5:40 AM 4/28/2021

## 2021-04-28 NOTE — PROGRESS NOTES
Physical Therapy  Facility/Department: [address] ORTHO/MED SURG  Daily Treatment Note  NAME: Beth Gardner  : 1955  MRN: [id number]    Date of Service: 2021    Discharge Recommendations:  Patient would benefit from continued therapy after discharge   PT Equipment Recommendations  Equipment Needed: No  Other: Pt has a RW    Assessment   Body structures, Functions, Activity limitations: Decreased functional mobility ; Decreased endurance;Decreased strength; Increased pain  Assessment: The pt ambulated ~120 ft with a RW x min assist with WBAT R LE. He had a c/o of shooting pain in  R  anterior hip and quadricep. Pt is limited by increased pain and fatigue with mobility. He could benefit from a continuation of PT to return to PLOF. Prognosis: Good  REQUIRES PT FOLLOW UP: Yes  Activity Tolerance  Activity Tolerance: Patient limited by fatigue;Patient limited by pain; Patient limited by endurance     Patient Diagnosis(es): The primary encounter diagnosis was Status post total replacement of right hip. A diagnosis of Post-operative pain was also pertinent to this visit. has a past medical history of Allergic rhinitis, Anxiety, Aseptic meningitis, Asthma, Avascular necrosis of bone of hip, left ([address] CAD (coronary artery disease), Common variable immunodeficiency ([address] COPD (chronic obstructive pulmonary disease) ([address] Deviated nasal septum, Dyspnea, HTN (hypertension), Hyperglycemia, Hypoxia, Immune deficiency disorder ([address] Obesity, On supplemental oxygen therapy, PRECIOUS (obstructive sleep apnea), Osteoporosis, Reflux, Respiratory failure ([address] Severe persistent asthma, Spondylosis of cervical spine, TIA (transient ischemic attack), Tobacco use, Under care of team, Under care of team, Under care of team, Vitamin D deficiency, Wears glasses, and Wellness examination. has a past surgical history that includes Finger surgery (Left, ); Cardiac catheterization (); Tonsillectomy;  Throat surgery (); Colonoscopy (2003); Tunneled venous port placement (Right, 11/25/2015); nasal endoscopy; pr revise total hip replacement (Left, 05/22/2018); Endoscopy, colon, diagnostic; and Total hip arthroplasty (Right, 04/27/2021). Restrictions  Restrictions/Precautions  Restrictions/Precautions: Weight Bearing  Lower Extremity Weight Bearing Restrictions  Right Lower Extremity Weight Bearing: Weight Bearing As Tolerated  Position Activity Restriction  Other position/activity restrictions: no straight leg raises  Subjective   General  Chart Reviewed: Yes  Response To Previous Treatment: Patient with no complaints from previous session. Family / Caregiver Present: No  Subjective  Subjective: RN and pt agreeable to PT. Pt supine in bed upon arrival  General Comment  Comments: Pt retired in recliner. Pain Screening  Patient Currently in Pain: Yes  Pain Assessment  Pain Assessment: 0-10  Pain Level: 7  Patient's Stated Pain Goal: No pain  Pain Type: Surgical pain  Pain Location: Hip  Pain Orientation: Right  Pain Descriptors: Aching;Constant  Pain Frequency: Continuous  Pain Onset: On-going  Clinical Progression: Not changed  Functional Pain Assessment: Activities are not prevented  Non-Pharmaceutical Pain Intervention(s): Ambulation/Increased Activity; Distraction  Response to Pain Intervention: Patient Satisfied  Vital Signs  Patient Currently in Pain: Yes       Orientation  Orientation  Overall Orientation Status: Within Normal Limits  Cognition      Objective   Bed mobility  Supine to Sit: Stand by assistance  Scooting: Stand by assistance  Transfers  Sit to Stand: Contact guard assistance  Stand to sit: Contact guard assistance  Ambulation  Ambulation?: Yes  Ambulation 1  Surface: level tile  Device: Rolling Walker  Assistance: Minimal assistance  Gait Deviations: Slow Roxana;Decreased step length;Decreased step height  Distance: ~120 ft  Comments: multiple standing rest breaks required to complete due to increased pain

## 2021-04-28 NOTE — ANESTHESIA POSTPROCEDURE EVALUATION
Department of Anesthesiology  Postprocedure Note    Patient: Kael Martinez  MRN: [id number]  [place]: 1955  Date of evaluation: 4/28/2021  Time:  7:06 AM     Procedure Summary     Date: 04/27/21 Room / Location: [address]    Anesthesia Start: 1026 Anesthesia Stop: 8105    Procedure: TOTAL HIP ARTHROPLASTY ANTERIOR (Right Hip) Diagnosis: (RIGHT HIP ARTHRITIS)    Surgeons: Reji Hernandez,  Responsible Provider: Olga Rodriges MD    Anesthesia Type: MAC, regional, spinal ASA Status: 3          Anesthesia Type: MAC, regional, spinal    Darryl Phase I: Darryl Score: 9    Darryl Phase II:      Last vitals: Reviewed and per EMR flowsheets.        Anesthesia Post Evaluation    Patient location during evaluation: PACU  Patient participation: complete - patient participated  Level of consciousness: awake and alert  Pain score: 5  Nausea & Vomiting: no nausea  Cardiovascular status: hemodynamically stable  Respiratory status: nasal cannula

## 2021-04-28 NOTE — PLAN OF CARE
Problem: Falls - Risk of:  Goal: Will remain free from falls  Description: Will remain free from falls  Outcome: Met This Shift  Goal: Absence of physical injury  Description: Absence of physical injury  Outcome: Met This Shift     Problem: Musculor/Skeletal Functional Status  Goal: Highest potential functional level  Outcome: Ongoing     Problem: Pain:  Goal: Pain level will decrease  Description: Pain level will decrease  4/28/2021 0547 by Hyacinth Halsted, RN  Outcome: Ongoing  4/27/2021 1839 by Aida Rosales RN  Outcome: Ongoing  Goal: Control of acute pain  Description: Control of acute pain  Outcome: Ongoing  Goal: Control of chronic pain  Description: Control of chronic pain  Outcome: Ongoing

## 2021-04-28 NOTE — PROGRESS NOTES
Writer educated patient on use of incentive spirometer. Patient was able to demonstrate effective use and verbalize understanding of use. Patient denied any additional questions or concerns.

## 2021-04-28 NOTE — PROGRESS NOTES
Physical Therapy  Facility/Department: [address] ORTHO/MED SURG  Daily Treatment Note  NAME: Pancho Griffin  : 1955  MRN: [id number]    Date of Service: 2021    Discharge Recommendations:  Patient would benefit from continued therapy after discharge   PT Equipment Recommendations  Equipment Needed: No  Other: Pt has a RW    Assessment   Body structures, Functions, Activity limitations: Decreased functional mobility ; Decreased endurance;Decreased strength; Increased pain  Assessment: The pt ambulated ~200 ft with a RW x min assist with WBAT R LE. He had a c/o of shooting pain in  R  anterior hip and quadricep. Pt is limited by increased pain and fatigue with mobility. He could benefit from a continuation of PT to return to PLOF. Prognosis: Good  REQUIRES PT FOLLOW UP: Yes  Activity Tolerance  Activity Tolerance: Patient limited by fatigue;Patient limited by pain; Patient limited by endurance     Patient Diagnosis(es): The primary encounter diagnosis was Status post total replacement of right hip. A diagnosis of Post-operative pain was also pertinent to this visit. has a past medical history of Allergic rhinitis, Anxiety, Aseptic meningitis, Asthma, Avascular necrosis of bone of hip, left ([address] CAD (coronary artery disease), Common variable immunodeficiency ([address] COPD (chronic obstructive pulmonary disease) ([address] Deviated nasal septum, Dyspnea, HTN (hypertension), Hyperglycemia, Hypoxia, Immune deficiency disorder ([address] Obesity, On supplemental oxygen therapy, PRECIOUS (obstructive sleep apnea), Osteoporosis, Reflux, Respiratory failure ([address] Severe persistent asthma, Spondylosis of cervical spine, TIA (transient ischemic attack), Tobacco use, Under care of team, Under care of team, Under care of team, Vitamin D deficiency, Wears glasses, and Wellness examination. has a past surgical history that includes Finger surgery (Left, ); Cardiac catheterization (); Tonsillectomy;  Throat surgery (); Colonoscopy (2003); Tunneled venous port placement (Right, 11/25/2015); nasal endoscopy; pr revise total hip replacement (Left, 05/22/2018); Endoscopy, colon, diagnostic; Total hip arthroplasty (Right, 04/27/2021); and Total hip arthroplasty (Right, 4/27/2021). Restrictions  Restrictions/Precautions  Restrictions/Precautions: Weight Bearing  Required Braces or Orthoses?: No  Lower Extremity Weight Bearing Restrictions  Right Lower Extremity Weight Bearing: Weight Bearing As Tolerated  Position Activity Restriction  Other position/activity restrictions: no straight leg raises  Subjective   General  Chart Reviewed: Yes  Response To Previous Treatment: Patient with no complaints from previous session. Family / Caregiver Present: Yes(Wife was present)  Subjective  Subjective: RN and pt agreeable to PT. Pt supine in bed upon arrival  General Comment  Comments: Pt retired supine in bed after session  Pain Screening  Patient Currently in Pain: Yes  Pain Assessment  Pain Assessment: 0-10  Pain Level: 8(Simultaneous filing. User may not have seen previous data.)  Patient's Stated Pain Goal: No pain  Pain Type: Acute pain;Surgical pain  Pain Location: Hip  Pain Orientation: Right  Pain Descriptors: Aching;Discomfort  Pain Frequency: Continuous  Pain Onset: On-going  Clinical Progression: Not changed  Functional Pain Assessment: Activities are not prevented  Non-Pharmaceutical Pain Intervention(s): Ambulation/Increased Activity; Distraction  Response to Pain Intervention: Patient Satisfied(Simultaneous filing.  User may not have seen previous data.)  Vital Signs  Patient Currently in Pain: Yes       Orientation  Orientation  Overall Orientation Status: Within Normal Limits  Cognition      Objective   Bed mobility  Supine to Sit: Minimal assistance  Sit to Supine: Minimal assistance  Scooting: Stand by assistance  Transfers  Sit to Stand: Contact guard assistance  Stand to sit: Contact guard assistance  Ambulation  Ambulation?: Yes  Ambulation 1  Surface: level tile  Device: Rolling Walker  Assistance: Minimal assistance  Gait Deviations: Slow Roxana;Decreased step length;Decreased step height  Distance: ~220 ft  Comments: multiple standing rest breaks required to complete due to increased pain with ambulation. Pt c/o shooting pain from anterior hip through Quad .   Stairs/Curb  Stairs?: No     Balance  Posture: Good  Sitting - Static: Good  Sitting - Dynamic: Fair;+  Standing - Static: Fair  Standing - Dynamic: Fair  Comments: standing balanced assessed with RW  Exercises  Hamstring Sets: x10 R LE  Quad Sets: x 10 R LE  Heelslides: x 10 R LE  Gluteal Sets: x 10  Knee Long Arc Quad: x 10 R LE  Ankle Pumps: x 10 R LE    Goals  Short term goals  Time Frame for Short term goals: 10 visits  Short term goal 1: transfers with SBA  Short term goal 2: pt to amb ulate 200 ft with a RW x SBA with WBAT R LE  Short term goal 3: ascend/descend 4 steps with SBA  Short term goal 4: total hip exercises x SBA  Patient Goals   Patient goals : Return home    Plan    Plan  Times per week: BID  Current Treatment Recommendations: Strengthening, Functional Mobility Training, Gait Training, Safety Education & Training, Endurance Training, Stair training, Pain Management  Safety Devices  Type of devices: Nurse notified, Left in bed, Call light within reach     Therapy Time   Individual Concurrent Group Co-treatment   Time In 1443         Time Out 1530         Minutes 47         Timed Code Treatment Minutes: [address], PTA

## 2021-04-28 NOTE — PROGRESS NOTES
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 He receives IVIG every 3 weeks. · Vancomycin 1750 mg every 12 hours   · Cefepime 2 g every 12 hours starting preoperatively on the day of surgery. 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 Patient seen at the request of Dr. Joel Heart    The patient is known to my practice. I have previously evaluated him on 5/8/2018 in my office.     The patient has a history of combined variable immunodeficiency for which he sees Dr. Mitch Merchant. He receives IVIG and a 3 day steroid course every three weeks. He follows with Dr. Yeison Urias for recurrent pulmonary infections. He states that he has not had any in the past year though.     The patient began to have hip pain about a year ago. He recently had some falls and imaging showed AVN of the right hip. He has developed AVN of the right hip from the chronic steroid use.     He was seen by Dr Annabelle Jacob and the patient has decided to proceed with a right hip arthroplasty.  The patient was sent to us for recommendations for preoperative antibiotics.     The surgery needs to be done as close to his IVIG treatment as possible because that is when his immunoglobulin levels are the highest.  Arrangements were made to move his regular schedule April 12 infusion to 4/23/2021 in order to best protect the patient for the surgery.     We also discussed the antibiotics that he will need to take preoperatively. Since he has an immune deficiency we will have to cover for encapsulated bacteria. He will receive gram-negative treatment with Cefepime and gram-positive treatment with vancomycin.     CURRENT EVALUATION 4/28/2021     Afebrile  V/S Stable   Satting well on 2 L NC    The patient underwent a total right hip arthroplasty on 4/27/2021 under spinal anesthesia. He received the preoperative antibiotics as previously planned. Patient endorses 5/10 pain to his Rt hip that is worse with flexion. Is working with PT but ROM is limited. Has not had a BM but is having flatus. Endorses constipation. Denies nausea, vomiting, chest pain, SOB, abdominal pain, dysuria, numbness, and tingling. Currently on Cefepime and Vancomycin. No leukocytosis. Discussed with patient, RN. Labs and Imaging    WBC: 10.9  HgB: 12.4  Plt: 201    XR Hip (4/27/21)  FINDINGS:   Visualized portions of the pelvis appear to be intact.  Superior iliac bones   are excluded from the field of view. Rich Barry has been interval placement of a   right total hip arthroplasty, which appears to be anatomic in alignment.  No   acute periprosthetic fracture.  Left total hip arthroplasty remains in place.       Impression:       1.  Status post right total hip arthroplasty without radiographic evidence of   acute bony complication. 2.  Left total hip arthroplasty remains in place. I have personally reviewed the past medical history, past surgical history, medications, social history, and family history, and I have updated the database accordingly.   Past Medical History:     Past Medical History:   Diagnosis Date    Allergic rhinitis     Anxiety     Aseptic meningitis     reaction to first infusion in 2012-hospitalized but no problem since then    Asthma     Avascular necrosis of bone of hip, left ([facility] 04/2018    CAD tablet, Take 1 tablet by mouth every 4 hours as needed for Pain for up to 7 days. Intended supply: 7 days. Take lowest dose possible to manage pain, Disp: 42 tablet, Rfl: 0    docusate sodium (COLACE) 100 MG capsule, Take 1 capsule by mouth 2 times daily as needed for Constipation, Disp: 60 capsule, Rfl: 0     Social History:     Social History     Socioeconomic History    Marital status:      Spouse name: Alton Tony Number of children: 3    Years of education: Not on file    Highest education level: Not on file   Occupational History    Occupation:      Comment: now on 67 [employer] strain: Not on file    Food insecurity     Worry: Not on file     Inability: Not on file   [employer] needs     Medical: Not on file     Non-medical: Not on file   Tobacco Use    Smoking status: Former Smoker     Packs/day: 1.00     Years: 31.00     Pack years: 31.00     Types: Cigarettes     Start date: 1972     Quit date: 2004     Years since quittin.3    Smokeless tobacco: Never Used    Tobacco comment: smoke 4ppd for many yrs; quit 8 yrs ago   Substance and Sexual Activity    Alcohol use:  Yes     Alcohol/week: 3.0 standard drinks     Types: 3 Cans of beer per week     Comment: about once a week    Drug use: No    Sexual activity: Yes     Partners: Female   Lifestyle    Physical activity     Days per week: Not on file     Minutes per session: Not on file    Stress: Not on file   Relationships    Social connections     Talks on phone: Not on file     Gets together: Not on file     Attends Jew service: Not on file     Active member of club or organization: Not on file     Attends meetings of clubs or organizations: Not on file     Relationship status: Not on file    Intimate partner violence     Fear of current or ex partner: Not on file     Emotionally abused: Not on file     Physically abused: Not on file     Forced sexual activity: Not on file   Other Topics Concern    Not on file   Social History Narrative    Not on file       Family History:     Family History   Problem Relation Age of Onset   [facility] COPD Father     Coronary Art Dis Father     Heart Attack Father     Lung Cancer Mother     Breast Cancer Sister     No Known Problems Maternal Grandmother     No Known Problems Maternal Grandfather     Cancer Paternal Grandmother     Stroke Paternal Grandfather     Heart Attack Paternal Grandfather     Cancer Paternal Grandfather     Breast Cancer Sister     Cancer Sister         Throat        Allergies:   Adhesive tape     Review of Systems:   Constitutional: No fevers or chills. No systemic complaints  Head: No headaches  Eyes: No double vision or blurry vision. ENT: No sore throat or runny nose. . No hearing loss, tinnitus or vertigo. Cardiovascular: No chest pain or palpitations. No shortness of breath. No ECKERT  Lung: No shortness of breath or cough. No sputum production  Abdomen: No nausea, vomiting, diarrhea, or abdominal pain. .  Genitourinary: No increased urinary frequency, or dysuria. No hematuria. No suprapubic or CVA pain  Musculoskeletal: No muscle aches or pains. No joint effusions, swelling or deformities. Has fresh right hip surgical incision  Hematologic: No bleeding or bruising. Neurologic: No headache, weakness, numbness, or tingling. Physical Examination :   BP (!) 140/74   Pulse 72   Temp 97.6 °F (36.4 °C) (Oral)   Resp 16   Ht 5' 8\" (1.727 m)   Wt 262 lb (118.8 kg)   SpO2 95%   BMI 39.84 kg/m²    General Appearance: Awake, alert, and in no apparent distress  Head:  Normocephalic, no trauma  Eyes: Pupils equal, round, reactive, to light and accommodation; extraocular movements intact; sclera anicteric; conjunctivae pink. No embolic phenomena. ENT: Oropharynx clear, without erythema, exudate, or thrush. No tenderness of sinuses. Mouth/throat: mucosa pink and moist. No lesions. Dentition in good repair. Neck:Supple, without lymphadenopathy. Thyroid normal, No bruits. Pulmonary/Chest: Clear to auscultation, without wheezes, rales, or rhonchi. No dullness to percussion. Cardiovascular: Regular rate and rhythm without murmurs, rubs, or gallops. Abdomen: Soft, non tender. Bowel sounds normal. No organomegaly  All four Extremities: No cyanosis, clubbing, edema, or effusions. Neurologic: No gross sensory or motor deficits. Skin: Warm and dry with good turgor. No signs of peripheral arterial or venous insufficiency.   Has fresh right hip surgical incision    Medical Decision Making:   I have independently reviewed/ordered the following labs:    CBC with Differential:  Lab Results   Component Value Date    WBC 10.9 04/28/2021    WBC 8.0 04/15/2021    HGB 12.4 04/28/2021    HGB 14.4 04/15/2021    HCT 37.9 04/28/2021    HCT 44.0 04/15/2021     04/28/2021     04/15/2021     03/01/2012     02/29/2012    LYMPHOPCT 9 04/28/2021    LYMPHOPCT 24 08/02/2020    MONOPCT 5 04/28/2021    MONOPCT 6 08/02/2020     BMP:   Lab Results   Component Value Date     04/15/2021     05/20/2020    K 4.4 04/15/2021    K 4.1 05/20/2020     04/15/2021    CL 98 05/20/2020    CO2 25 04/15/2021    CO2 22 05/20/2020    BUN 12 04/15/2021    BUN 12 05/20/2020    CREATININE 0.79 04/15/2021    CREATININE 0.86 08/02/2020    MG 2.7 06/12/2014    MG 2.7 06/11/2014     Hepatic Function Panel:  Lab Results   Component Value Date    PROT 7.5 04/15/2021    PROT 7.1 05/20/2020    LABALBU 4.4 04/15/2021    LABALBU 3.9 05/20/2020    BILIDIR <0.08 05/20/2020    BILIDIR 0.08 04/15/2014    IBILI CANNOT BE CALCULATED 05/20/2020    IBILI 0.22 04/15/2014    BILITOT 0.41 04/15/2021    BILITOT 0.23 05/20/2020    ALKPHOS 101 04/15/2021    ALKPHOS 96 05/20/2020    ALT 23 04/15/2021    ALT 22 08/02/2020    AST 23 04/15/2021    AST 29 05/20/2020     No results found for: RPR  No results found for: HIV  No results found for: [facility] Lab Results   Component Value Date    RBC 4.20 04/28/2021    RBC 5.02 03/01/2012    WBC 10.9 04/28/2021    TURBIDITY CLEAR 04/15/2021     Lab Results   Component Value Date    CREATININE 0.79 04/15/2021    GLUCOSE 102 04/15/2021    GLUCOSE 86 06/04/2012       Thank you for allowing us to participate in the care of this patient. Please call with questions. Tyesha Dee     ATTESTATION:    I have discussed the case, including pertinent history and exam findings with the residents and students. I have seen and examined the patient and the key elements of the encounter have been performed by me. I was present when the student obtained his information or examined the patient. I have reviewed the laboratory data, other diagnostic studies and discussed them with the residents. I have updated the medical record where necessary. I agree with the assessment, plan and orders as documented by the resident/ student.     Joon Sanchez MD.      Pager: [phone] - Office: [phone]

## 2021-04-29 VITALS
HEIGHT: 68 IN | BODY MASS INDEX: 39.71 KG/M2 | HEART RATE: 69 BPM | DIASTOLIC BLOOD PRESSURE: 65 MMHG | SYSTOLIC BLOOD PRESSURE: 120 MMHG | RESPIRATION RATE: 16 BRPM | TEMPERATURE: 97.9 F | WEIGHT: 262 LBS | OXYGEN SATURATION: 97 %

## 2021-04-29 LAB
ESTIMATED AVERAGE GLUCOSE: 108 MG/DL
GLUCOSE BLD-MCNC: 115 MG/DL (ref 75–110)
GLUCOSE BLD-MCNC: 140 MG/DL (ref 75–110)
HBA1C MFR BLD: 5.4 % (ref 4–6)

## 2021-04-29 PROCEDURE — 82947 ASSAY GLUCOSE BLOOD QUANT: CPT

## 2021-04-29 PROCEDURE — 99232 SBSQ HOSP IP/OBS MODERATE 35: CPT | Performed by: INTERNAL MEDICINE

## 2021-04-29 PROCEDURE — 6370000000 HC RX 637 (ALT 250 FOR IP): Performed by: STUDENT IN AN ORGANIZED HEALTH CARE EDUCATION/TRAINING PROGRAM

## 2021-04-29 PROCEDURE — 2580000003 HC RX 258: Performed by: STUDENT IN AN ORGANIZED HEALTH CARE EDUCATION/TRAINING PROGRAM

## 2021-04-29 PROCEDURE — 6360000002 HC RX W HCPCS: Performed by: STUDENT IN AN ORGANIZED HEALTH CARE EDUCATION/TRAINING PROGRAM

## 2021-04-29 PROCEDURE — 97535 SELF CARE MNGMENT TRAINING: CPT

## 2021-04-29 PROCEDURE — 97110 THERAPEUTIC EXERCISES: CPT

## 2021-04-29 PROCEDURE — 97530 THERAPEUTIC ACTIVITIES: CPT

## 2021-04-29 PROCEDURE — 6360000002 HC RX W HCPCS: Performed by: INTERNAL MEDICINE

## 2021-04-29 PROCEDURE — 2700000000 HC OXYGEN THERAPY PER DAY

## 2021-04-29 PROCEDURE — 97116 GAIT TRAINING THERAPY: CPT

## 2021-04-29 PROCEDURE — 2580000003 HC RX 258: Performed by: INTERNAL MEDICINE

## 2021-04-29 PROCEDURE — 94640 AIRWAY INHALATION TREATMENT: CPT

## 2021-04-29 PROCEDURE — 94761 N-INVAS EAR/PLS OXIMETRY MLT: CPT

## 2021-04-29 RX ADMIN — OXYCODONE HYDROCHLORIDE 10 MG: 5 TABLET ORAL at 11:45

## 2021-04-29 RX ADMIN — ACETAMINOPHEN 1000 MG: 500 TABLET ORAL at 08:19

## 2021-04-29 RX ADMIN — TRAMADOL HYDROCHLORIDE 50 MG: 50 TABLET, FILM COATED ORAL at 13:02

## 2021-04-29 RX ADMIN — OXYCODONE HYDROCHLORIDE 10 MG: 5 TABLET ORAL at 01:54

## 2021-04-29 RX ADMIN — SODIUM CHLORIDE, PRESERVATIVE FREE 10 ML: 5 INJECTION INTRAVENOUS at 08:20

## 2021-04-29 RX ADMIN — BUDESONIDE AND FORMOTEROL FUMARATE DIHYDRATE 1 PUFF: 160; 4.5 AEROSOL RESPIRATORY (INHALATION) at 09:00

## 2021-04-29 RX ADMIN — DOCUSATE SODIUM 50MG AND SENNOSIDES 8.6MG 1 TABLET: 8.6; 5 TABLET, FILM COATED ORAL at 08:20

## 2021-04-29 RX ADMIN — FLUTICASONE PROPIONATE 2 SPRAY: 50 SPRAY, METERED NASAL at 08:20

## 2021-04-29 RX ADMIN — TRAMADOL HYDROCHLORIDE 50 MG: 50 TABLET, FILM COATED ORAL at 06:19

## 2021-04-29 RX ADMIN — APIXABAN 2.5 MG: 2.5 TABLET, FILM COATED ORAL at 08:20

## 2021-04-29 RX ADMIN — PANTOPRAZOLE SODIUM 40 MG: 40 TABLET, DELAYED RELEASE ORAL at 06:19

## 2021-04-29 RX ADMIN — ACETAMINOPHEN 1000 MG: 500 TABLET ORAL at 12:59

## 2021-04-29 RX ADMIN — KETOROLAC TROMETHAMINE 30 MG: 30 INJECTION, SOLUTION INTRAMUSCULAR; INTRAVENOUS at 08:20

## 2021-04-29 RX ADMIN — CEFEPIME HYDROCHLORIDE 2000 MG: 2 INJECTION, POWDER, FOR SOLUTION INTRAVENOUS at 01:54

## 2021-04-29 RX ADMIN — AMLODIPINE BESYLATE 10 MG: 10 TABLET ORAL at 08:20

## 2021-04-29 RX ADMIN — VANCOMYCIN HYDROCHLORIDE 1500 MG: 10 INJECTION, POWDER, LYOPHILIZED, FOR SOLUTION INTRAVENOUS at 10:48

## 2021-04-29 RX ADMIN — CEFEPIME HYDROCHLORIDE 2000 MG: 2 INJECTION, POWDER, FOR SOLUTION INTRAVENOUS at 12:59

## 2021-04-29 RX ADMIN — OXYCODONE HYDROCHLORIDE 10 MG: 5 TABLET ORAL at 15:52

## 2021-04-29 RX ADMIN — LISINOPRIL 10 MG: 10 TABLET ORAL at 08:20

## 2021-04-29 RX ADMIN — OXYCODONE HYDROCHLORIDE 10 MG: 5 TABLET ORAL at 07:33

## 2021-04-29 ASSESSMENT — PAIN SCALES - GENERAL
PAINLEVEL_OUTOF10: 9
PAINLEVEL_OUTOF10: 3
PAINLEVEL_OUTOF10: 5
PAINLEVEL_OUTOF10: 6
PAINLEVEL_OUTOF10: 7
PAINLEVEL_OUTOF10: 5
PAINLEVEL_OUTOF10: 9

## 2021-04-29 ASSESSMENT — PAIN DESCRIPTION - PAIN TYPE: TYPE: ACUTE PAIN;SURGICAL PAIN

## 2021-04-29 ASSESSMENT — PAIN DESCRIPTION - FREQUENCY: FREQUENCY: CONTINUOUS

## 2021-04-29 ASSESSMENT — PAIN DESCRIPTION - DESCRIPTORS: DESCRIPTORS: ACHING;DISCOMFORT;SORE

## 2021-04-29 ASSESSMENT — PAIN DESCRIPTION - ORIENTATION: ORIENTATION: RIGHT

## 2021-04-29 NOTE — PROGRESS NOTES
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 He receives IVIG every 3 weeks. · Vancomycin 1750 mg every 12 hours (stop date 4/29)  · Received cefepime 2g BID. 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 Patient seen at the request of Dr. Yost    The patient is known to my practice. I have previously evaluated him on 5/8/2018 in my office.     The patient has a history of combined variable immunodeficiency for which he sees Dr. Wili Masters. He receives IVIG and a 3 day steroid course every three weeks. He follows with Dr. Rohan Ge for recurrent pulmonary infections. He states that he has not had any in the past year though.     The patient began to have hip pain about a year ago. He recently had some falls and imaging showed AVN of the right hip. He has developed AVN of the right hip from the chronic steroid use.     He was seen by Dr Caterina Mackenzie and the patient has decided to proceed with a right hip arthroplasty.  The patient was sent to us for recommendations for preoperative antibiotics.     The surgery needs to be done as close to his IVIG treatment as possible because that is when his immunoglobulin levels are the highest.  Arrangements were made to move his regular schedule April 12 infusion to 4/23/2021 in order to best protect the patient for the surgery.     We also discussed the antibiotics that he will need to take preoperatively. Since he has an immune deficiency we will have to cover for encapsulated bacteria. He will receive gram-negative treatment with Cefepime and gram-positive treatment with vancomycin. CURRENT EVALUATION 4/29/2021   Afebrile  V/s stable    Endorses 4/10 pain over his Rt hip area. Pt was able to ambulate down the entirety of the floor yesterday with walker assistance, accompanied by PT. Pt has ongoing constipation with associated abdominal cramps. He states that he is having flatus. Endorses urinary frequency. Denies nausea, vomiting, fevers, chills, diarrhea, chest pain, or SOB. The patient underwent a total right hip arthroplasty on 4/27/2021 under spinal anesthesia. He received the preoperative antibiotics as previously planned. (Cefepime 2g BID and Vancomycin)    Currently on Cefepime and Vancomycin (stop date 4/29/21)  No leukocytosis. Discussed with patient and RN. Patient to be discharged today. Will receive his next IVIG on May 10-21    Labs and Imaging    WBC: 10.9  HgB: 12.4  Plt: 201    XR Hip (4/27/21)  FINDINGS:   Visualized portions of the pelvis appear to be intact.  Superior iliac bones   are excluded from the field of view. Annelle Epley has been interval placement of a   right total hip arthroplasty, which appears to be anatomic in alignment.  No   acute periprosthetic fracture.  Left total hip arthroplasty remains in place.       Impression:       1.  Status post right total hip arthroplasty without radiographic evidence of   acute bony complication. 2.  Left total hip arthroplasty remains in place. I have personally reviewed the past medical history, past surgical history, medications, social history, and family history, and I have updated the database accordingly.   Past Medical History:     Past Medical History:   Diagnosis Date    Allergic ANTERIOR performed by Shannon Abad DO at  [facility] TUNNELED VENOUS PORT PLACEMENT Right 2015    Rt. chest       Medications:     Current Outpatient Medications:     apixaban (ELIQUIS) 2.5 MG TABS tablet, Take 1 tablet by mouth 2 times daily, Disp: 60 tablet, Rfl: 0    oxyCODONE-acetaminophen (PERCOCET) 5-325 MG per tablet, Take 1 tablet by mouth every 4 hours as needed for Pain for up to 7 days. Intended supply: 7 days. Take lowest dose possible to manage pain, Disp: 42 tablet, Rfl: 0    docusate sodium (COLACE) 100 MG capsule, Take 1 capsule by mouth 2 times daily as needed for Constipation, Disp: 60 capsule, Rfl: 0     Social History:     Social History     Socioeconomic History    Marital status:      Spouse name: Fritz Hardin Number of children: 3    Years of education: Not on file    Highest education level: Not on file   Occupational History    Occupation:      Comment: now on [address] [place] [address] [email or web address] resource strain: Not on file    Food insecurity     Worry: Not on file     Inability: Not on file   [employer] needs     Medical: Not on file     Non-medical: Not on file   Tobacco Use    Smoking status: Former Smoker     Packs/day: 1.00     Years: 31.00     Pack years: 31.00     Types: Cigarettes     Start date: 1972     Quit date: 2004     Years since quittin.3    Smokeless tobacco: Never Used    Tobacco comment: smoke 4ppd for many yrs; quit 8 yrs ago   Substance and Sexual Activity    Alcohol use:  Yes     Alcohol/week: 3.0 standard drinks     Types: 3 Cans of beer per week     Comment: about once a week    Drug use: No    Sexual activity: Yes     Partners: Female   Lifestyle    Physical activity     Days per week: Not on file     Minutes per session: Not on file    Stress: Not on file   Relationships    Social connections     Talks on phone: Not on file     Gets together: Not on file     Attends [employer] service: Not on file Active member of club or organization: Not on file     Attends meetings of clubs or organizations: Not on file     Relationship status: Not on file    Intimate partner violence     Fear of current or ex partner: Not on file     Emotionally abused: Not on file     Physically abused: Not on file     Forced sexual activity: Not on file   Other Topics Concern    Not on file   Social History Narrative    Not on file       Family History:     Family History   Problem Relation Age of Onset    COPD Father     Coronary Art Dis Father     Heart Attack Father     Lung Cancer Mother     Breast Cancer Sister     No Known Problems Maternal Grandmother     No Known Problems Maternal Grandfather     Cancer Paternal Grandmother     Stroke Paternal Grandfather     Heart Attack Paternal Grandfather     Cancer Paternal Grandfather     Breast Cancer Sister     Cancer Sister         Throat        Allergies:   Adhesive tape     Review of Systems:   Constitutional: No fevers or chills. No systemic complaints  Head: No headaches  Eyes: No double vision or blurry vision. ENT: No sore throat or runny nose. . No hearing loss, tinnitus or vertigo. Cardiovascular: No chest pain or palpitations. No shortness of breath. No ECKERT  Lung: No shortness of breath or cough. No sputum production  Abdomen: No nausea, vomiting, diarrhea, or abdominal pain. .  Genitourinary: No increased urinary frequency, or dysuria. No hematuria. No suprapubic or CVA pain  Musculoskeletal: No muscle aches or pains. No joint effusions, swelling or deformities. Has fresh right hip surgical incision  Hematologic: No bleeding or bruising. Neurologic: No headache, weakness, numbness, or tingling.     Physical Examination :   /65   Pulse 69   Temp 97.9 °F (36.6 °C) (Oral)   Resp 16   Ht 5' 8\" (1.727 m)   Wt 262 lb (118.8 kg)   SpO2 97%   BMI 39.84 kg/m²    General Appearance: Awake, alert, and in no apparent distress  Head:  Normocephalic, no trauma  Eyes: Pupils equal, round, reactive, to light and accommodation; extraocular movements intact; sclera anicteric; conjunctivae pink. No embolic phenomena. ENT: Oropharynx clear, without erythema, exudate, or thrush. No tenderness of sinuses. Mouth/throat: mucosa pink and moist. No lesions. Dentition in good repair. Neck:Supple, without lymphadenopathy. Thyroid normal, No bruits. Pulmonary/Chest: Clear to auscultation, without wheezes, rales, or rhonchi. No dullness to percussion. Cardiovascular: Regular rate and rhythm without murmurs, rubs, or gallops. Abdomen: Soft, non tender. Bowel sounds normal. No organomegaly  All four Extremities: No cyanosis, clubbing, edema, or effusions. Neurologic: No gross sensory or motor deficits. Skin: Warm and dry with good turgor. No signs of peripheral arterial or venous insufficiency.   Has fresh right hip surgical incision    Medical Decision Making:   I have independently reviewed/ordered the following labs:    CBC with Differential:  Lab Results   Component Value Date    WBC 10.9 04/28/2021    WBC 8.0 04/15/2021    HGB 12.4 04/28/2021    HGB 14.4 04/15/2021    HCT 37.9 04/28/2021    HCT 44.0 04/15/2021     04/28/2021     04/15/2021     03/01/2012     02/29/2012    LYMPHOPCT 9 04/28/2021    LYMPHOPCT 24 08/02/2020    MONOPCT 5 04/28/2021    MONOPCT 6 08/02/2020     BMP:   Lab Results   Component Value Date     04/15/2021     05/20/2020    K 4.4 04/15/2021    K 4.1 05/20/2020     04/15/2021    CL 98 05/20/2020    CO2 25 04/15/2021    CO2 22 05/20/2020    BUN 12 04/15/2021    BUN 12 05/20/2020    CREATININE 0.79 04/15/2021    CREATININE 0.86 08/02/2020    MG 2.7 06/12/2014    MG 2.7 06/11/2014     Hepatic Function Panel:  Lab Results   Component Value Date    PROT 7.5 04/15/2021    PROT 7.1 05/20/2020    LABALBU 4.4 04/15/2021    LABALBU 3.9 05/20/2020    BILIDIR <0.08 05/20/2020    BILIDIR 0.08 04/15/2014    IBILI CANNOT BE CALCULATED 05/20/2020    IBILI 0.22 04/15/2014    BILITOT 0.41 04/15/2021    BILITOT 0.23 05/20/2020    ALKPHOS 101 04/15/2021    ALKPHOS 96 05/20/2020    ALT 23 04/15/2021    ALT 22 08/02/2020    AST 23 04/15/2021    AST 29 05/20/2020     No results found for: RPR  No results found for: HIV  No results found for: [facility]  Lab Results   Component Value Date    RBC 4.20 04/28/2021    RBC 5.02 03/01/2012    WBC 10.9 04/28/2021    TURBIDITY CLEAR 04/15/2021     Lab Results   Component Value Date    CREATININE 0.79 04/15/2021    GLUCOSE 102 04/15/2021    GLUCOSE 86 06/04/2012       Thank you for allowing us to participate in the care of this patient. Please call with questions. James Matos participated in the evaluation of the patient under my direct supervision    Kristofer Choudhury MD     ATTESTATION:    I have discussed the case, including pertinent history and exam findings with the residents and students. I have seen and examined the patient and the key elements of the encounter have been performed by me. I was present when the student obtained his information or examined the patient. I have reviewed the laboratory data, other diagnostic studies and discussed them with the residents. I have updated the medical record where necessary. I agree with the assessment, plan and orders as documented by the resident/ student.     Michelle Walters MD.      Pager: [phone] - Office: [phone]

## 2021-04-29 NOTE — PROGRESS NOTES
Physical Therapy  Facility/Department: [address] ORTHO/MED SURG  Daily Treatment Note  NAME: Max Lawton  : 1955  MRN: [id number]    Date of Service: 2021    Discharge Recommendations:  Patient would benefit from continued therapy after discharge   PT Equipment Recommendations  Equipment Needed: No  Other: Pt has a RW    Assessment   Body structures, Functions, Activity limitations: Decreased functional mobility ; Decreased endurance;Decreased strength; Increased pain  Assessment: The pt ambulated ~170 ft with a RW SBA, WBAT R LE. step to gait progressing to step through, Slow pace and extended time needed to complete. Pt is limited by endurance and fatigue with mobility. He could benefit from a continuation of PT to return to PLOF. Prognosis: Good  PT Education: Goals;PT Role;Plan of Care;Gait Training;Home Exercise Program  REQUIRES PT FOLLOW UP: Yes  Activity Tolerance  Activity Tolerance: Patient limited by endurance; Patient Tolerated treatment well     Patient Diagnosis(es): The primary encounter diagnosis was Status post total replacement of right hip. A diagnosis of Post-operative pain was also pertinent to this visit. has a past medical history of Allergic rhinitis, Anxiety, Aseptic meningitis, Asthma, Avascular necrosis of bone of hip, left ([address] CAD (coronary artery disease), Common variable immunodeficiency ([address] COPD (chronic obstructive pulmonary disease) ([address] Deviated nasal septum, Dyspnea, HTN (hypertension), Hyperglycemia, Hypoxia, Immune deficiency disorder ([address] Obesity, On supplemental oxygen therapy, PRECIOUS (obstructive sleep apnea), Osteoporosis, Reflux, Respiratory failure ([address] Severe persistent asthma, Spondylosis of cervical spine, TIA (transient ischemic attack), Tobacco use, Under care of team, Under care of team, Under care of team, Vitamin D deficiency, Wears glasses, and Wellness examination.    has a past surgical history that includes Finger surgery (Left, ); Cardiac catheterization (2005); Tonsillectomy; Throat surgery (2014); Colonoscopy (2003); Tunneled venous port placement (Right, 11/25/2015); nasal endoscopy; pr revise total hip replacement (Left, 05/22/2018); Endoscopy, colon, diagnostic; Total hip arthroplasty (Right, 04/27/2021); and Total hip arthroplasty (Right, 4/27/2021). Restrictions  Restrictions/Precautions  Restrictions/Precautions: Weight Bearing  Required Braces or Orthoses?: No  Lower Extremity Weight Bearing Restrictions  Right Lower Extremity Weight Bearing: Weight Bearing As Tolerated  Position Activity Restriction  Other position/activity restrictions: no straight leg raises  Subjective   General  Response To Previous Treatment: Patient with no complaints from previous session.   Family / Caregiver Present: No  Subjective  Subjective: RN and pt agreed to PT, pt awake in bed upon arrival and c/o 3-5/10 pain  Pain Screening  Patient Currently in Pain: Yes  Vital Signs  Patient Currently in Pain: Yes       Orientation  Orientation  Overall Orientation Status: Within Normal Limits       Objective   Bed mobility  Supine to Sit: Stand by assistance  Sit to Supine: (Left up in chair)  Scooting: Stand by assistance  Transfers  Sit to Stand: Stand by assistance  Stand to sit: Stand by assistance  Ambulation  Ambulation?: Yes  Ambulation 1  Surface: level tile  Device: Rolling Walker  Assistance: Stand by assistance  Quality of Gait: Initially step to gait progressing to step through with distance  Gait Deviations: Slow Roxana;Decreased step length;Decreased step height  Distance: 170ft  Comments: Very slow pace and increased time needed to complete, pt ambulated on room air, O2 sats 94% upon completion  Stairs/Curb  Stairs?: No        Exercises  Quad Sets: x 15 B LE  Heelslides: x 15 B LE  Gluteal Sets: x 15  Knee Long Arc Quad: x 15 B LE  Ankle Pumps: x 20 B LE    Comments: Educated pt on anterior hip precautions                     Goals  Short term goals  Time Frame for Short term goals: 10 visits  Short term goal 1: transfers with SBA  Short term goal 2: pt to amb ulate 200 ft with a RW x SBA with WBAT R LE  Short term goal 3: ascend/descend 4 steps with SBA  Short term goal 4: total hip exercises x SBA  Patient Goals   Patient goals : Return home    Plan    Plan  Times per week: BID  Current Treatment Recommendations: Strengthening, Functional Mobility Training, Gait Training, Safety Education & Training, Endurance Training, Stair training, Pain Management  Safety Devices  Type of devices: Nurse notified, Call light within reach, Left in chair     Therapy Time   Individual Concurrent Group Co-treatment   Time In 0951         Time Out 1031         Minutes 40         Timed Code Treatment Minutes: [address], PTA

## 2021-04-29 NOTE — PLAN OF CARE
Problem: Musculor/Skeletal Functional Status  Goal: Highest potential functional level  Outcome: Met This Shift     Problem: Pain:  Goal: Pain level will decrease  Description: Pain level will decrease  Outcome: Met This Shift  Goal: Control of acute pain  Description: Control of acute pain  Outcome: Met This Shift  Goal: Control of chronic pain  Description: Control of chronic pain  Outcome: Met This Shift     Problem: Falls - Risk of:  Goal: Will remain free from falls  Description: Will remain free from falls  Outcome: Met This Shift  Goal: Absence of physical injury  Description: Absence of physical injury  Outcome: Met This Shift

## 2021-04-29 NOTE — PROGRESS NOTES
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 He receives IVIG every 3 weeks. · Vancomycin 1750 mg every 12 hours (stop date )  · Received cefepime 2g BID. 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 Patient seen at the request of Dr. Keturah Palmer    The patient is known to my practice. I have previously evaluated him on 2018 in my office.     The patient has a history of combined variable immunodeficiency for which he sees Dr. Meghan Apple. He receives IVIG and a 3 day steroid course every three weeks. He follows with Dr. Adrián Da Silva for recurrent pulmonary infections. He states that he has not had any in the past year though.     The patient began to have hip pain about a year ago. He recently had some falls and imaging showed AVN of the right hip. He has developed AVN of the right hip from the chronic steroid use.     He was seen by Dr Tricia Domingo and the patient has decided to proceed with a right hip arthroplasty.  The patient was sent to us for recommendations for preoperative antibiotics.     The surgery needs to be done as close to his IVIG treatment as possible because that is when his immunoglobulin levels are the highest.  Arrangements were made to move his regular schedule  infusion to 2021 in order to best protect the patient for the surgery.     We also discussed the antibiotics that he will need to take preoperatively. Since he has an immune deficiency we will have to cover for encapsulated bacteria. He will receive gram-negative treatment with Cefepime and gram-positive treatment with vancomycin. CURRENT EVALUATION 4/29/2021   Afebrile  V/s stable    Endorses 4/10 pain over his Rt hip area. Pt was able to ambulate down the entirety of the floor yesterday with walker assistance, accompanied by PT. Pt has ongoing constipation with associated abdominal cramps. He states that he is having flatus. Endorses urinary frequency. Denies nausea, vomiting, fevers, chills, diarrhea, chest pain, or SOB. The patient underwent a total right hip arthroplasty on 4/27/2021 under spinal anesthesia. He received the preoperative antibiotics as previously planned. (Cefepime 2g BID and Vancomycin)    Currently on Cefepime and Vancomycin (stop date 4/29/21)  No leukocytosis. Discussed with patient and RN. Patient to be discharged today. Will receive his next IVIG on May 10-21    Labs and Imaging    WBC: 10.9  HgB: 12.4  Plt: 201    XR Hip (4/27/21)  FINDINGS:   Visualized portions of the pelvis appear to be intact.  Superior iliac bones   are excluded from the field of view. Dahliahine Payer has been interval placement of a   right total hip arthroplasty, which appears to be anatomic in alignment.  No   acute periprosthetic fracture.  Left total hip arthroplasty remains in place.       Impression:       1.  Status post right total hip arthroplasty without radiographic evidence of   acute bony complication. 2.  Left total hip arthroplasty remains in place. I have personally reviewed the past medical history, past surgical history, medications, social history, and family history, and I have updated the database accordingly.   Past Medical History:     Past Medical History:   Diagnosis Date    Allergic ANTERIOR performed by Reji Hernandez DO at [address] TUNNELED VENOUS PORT PLACEMENT Right 2015    Rt. chest       Medications:     Current Outpatient Medications:     apixaban (ELIQUIS) 2.5 MG TABS tablet, Take 1 tablet by mouth 2 times daily, Disp: 60 tablet, Rfl: 0    oxyCODONE-acetaminophen (PERCOCET) 5-325 MG per tablet, Take 1 tablet by mouth every 4 hours as needed for Pain for up to 7 days. Intended supply: 7 days. Take lowest dose possible to manage pain, Disp: 42 tablet, Rfl: 0    docusate sodium (COLACE) 100 MG capsule, Take 1 capsule by mouth 2 times daily as needed for Constipation, Disp: 60 capsule, Rfl: 0     Social History:     Social History     Socioeconomic History    Marital status:      Spouse name: Jermain Almendarez Number of children: 3    Years of education: Not on file    Highest education level: Not on file   Occupational History    Occupation:      Comment: now on 67 [employer] resource strain: Not on file    Food insecurity     Worry: Not on file     Inability: Not on file   Infogram needs     Medical: Not on file     Non-medical: Not on file   Tobacco Use    Smoking status: Former Smoker     Packs/day: 1.00     Years: 31.00     Pack years: 31.00     Types: Cigarettes     Start date: 1972     Quit date: 2004     Years since quittin.3    Smokeless tobacco: Never Used    Tobacco comment: smoke 4ppd for many yrs; quit 8 yrs ago   Substance and Sexual Activity    Alcohol use:  Yes     Alcohol/week: 3.0 standard drinks     Types: 3 Cans of beer per week     Comment: about once a week    Drug use: No    Sexual activity: Yes     Partners: Female   Lifestyle    Physical activity     Days per week: Not on file     Minutes per session: Not on file    Stress: Not on file   Relationships    Social connections     Talks on phone: Not on file     Gets together: Not on file     Attends Restorationist service: Not on file Active member of club or organization: Not on file     Attends meetings of clubs or organizations: Not on file     Relationship status: Not on file    Intimate partner violence     Fear of current or ex partner: Not on file     Emotionally abused: Not on file     Physically abused: Not on file     Forced sexual activity: Not on file   Other Topics Concern    Not on file   Social History Narrative    Not on file       Family History:     Family History   Problem Relation Age of Onset    COPD Father     Coronary Art Dis Father     Heart Attack Father     Lung Cancer Mother     Breast Cancer Sister     No Known Problems Maternal Grandmother     No Known Problems Maternal Grandfather     Cancer Paternal Grandmother     Stroke Paternal Grandfather     Heart Attack Paternal Grandfather     Cancer Paternal Grandfather     Breast Cancer Sister     Cancer Sister         Throat        Allergies:   Adhesive tape     Review of Systems:   Constitutional: No fevers or chills. No systemic complaints  Head: No headaches  Eyes: No double vision or blurry vision. ENT: No sore throat or runny nose. . No hearing loss, tinnitus or vertigo. Cardiovascular: No chest pain or palpitations. No shortness of breath. No ECKERT  Lung: No shortness of breath or cough. No sputum production  Abdomen: No nausea, vomiting, diarrhea, or abdominal pain. .  Genitourinary: No increased urinary frequency, or dysuria. No hematuria. No suprapubic or CVA pain  Musculoskeletal: No muscle aches or pains. No joint effusions, swelling or deformities. Has fresh right hip surgical incision  Hematologic: No bleeding or bruising. Neurologic: No headache, weakness, numbness, or tingling.     Physical Examination :   /65   Pulse 69   Temp 97.9 °F (36.6 °C) (Oral)   Resp 16   Ht 5' 8\" (1.727 m)   Wt 262 lb (118.8 kg)   SpO2 96%   BMI 39.84 kg/m²    General Appearance: Awake, alert, and in no apparent distress  Head:  Normocephalic, no CANNOT BE CALCULATED 05/20/2020    IBILI 0.22 04/15/2014    BILITOT 0.41 04/15/2021    BILITOT 0.23 05/20/2020    ALKPHOS 101 04/15/2021    ALKPHOS 96 05/20/2020    ALT 23 04/15/2021    ALT 22 08/02/2020    AST 23 04/15/2021    AST 29 05/20/2020     No results found for: RPR  No results found for: HIV  No results found for: [facility]  Lab Results   Component Value Date    RBC 4.20 04/28/2021    RBC 5.02 03/01/2012    WBC 10.9 04/28/2021    TURBIDITY CLEAR 04/15/2021     Lab Results   Component Value Date    CREATININE 0.79 04/15/2021    GLUCOSE 102 04/15/2021    GLUCOSE 86 06/04/2012       Thank you for allowing us to participate in the care of this patient. Please call with questions. Tyesha Dee     ATTESTATION:    I have discussed the case, including pertinent history and exam findings with the residents and students. I have seen and examined the patient and the key elements of the encounter have been performed by me. I was present when the student obtained his information or examined the patient. I have reviewed the laboratory data, other diagnostic studies and discussed them with the residents. I have updated the medical record where necessary. I agree with the assessment, plan and orders as documented by the resident/ student.     Harmony Hodges MD.      Pager: [phone] - Office: [phone]

## 2021-04-29 NOTE — PROGRESS NOTES
CLINICAL PHARMACY NOTE: MEDS TO [address] Select Patient?: No  Total # of Prescriptions Filled: 3   The following medications were delivered to the patient:  · eliquis 2.5  · Percocet 5-325  · Stool softener  Total # of Interventions Completed: 0  Time Spent (min): 60    Additional Documentation:  Payment over the phone ($23.49);  delivered to pt room 248 @3:15

## 2021-04-29 NOTE — PROGRESS NOTES
Occupational Therapy  Facility/Department: [address] ORTHO/MED SURG  Daily Treatment Note  NAME: Maciel Garcia  : 1955  MRN: [id number]    Date of Service: 2021    Discharge Recommendations:  Patient would benefit from continued therapy after discharge       Assessment   Performance deficits / Impairments: Decreased functional mobility ; Decreased ADL status; Decreased balance;Decreased endurance;Decreased high-level IADLs;Decreased strength  Prognosis: Good  Patient Education: OT POC, transfer/walker safety, importance of participation in therapy, use of surgical soap, use of incentive spirometer, hip precautions, ez-slide to don yashira hose, LB dressing techniques - pt verbalized/demo understanding. REQUIRES OT FOLLOW UP: Yes  Activity Tolerance  Activity Tolerance: Patient Tolerated treatment well  Safety Devices  Safety Devices in place: Yes  Type of devices: Call light within reach;Gait belt;Left in chair;Nurse notified         Patient Diagnosis(es): The primary encounter diagnosis was Status post total replacement of right hip. A diagnosis of Post-operative pain was also pertinent to this visit. has a past medical history of Allergic rhinitis, Anxiety, Aseptic meningitis, Asthma, Avascular necrosis of bone of hip, left ([address] CAD (coronary artery disease), Common variable immunodeficiency ([address] COPD (chronic obstructive pulmonary disease) ([address] Deviated nasal septum, Dyspnea, HTN (hypertension), Hyperglycemia, Hypoxia, Immune deficiency disorder ([address] Obesity, On supplemental oxygen therapy, PRECIOUS (obstructive sleep apnea), Osteoporosis, Reflux, Respiratory failure ([address] Severe persistent asthma, Spondylosis of cervical spine, TIA (transient ischemic attack), Tobacco use, Under care of team, Under care of team, Under care of team, Vitamin D deficiency, Wears glasses, and Wellness examination. has a past surgical history that includes Finger surgery (Left, );  Cardiac catheterization (); Tonsillectomy; Throat surgery (2014); Colonoscopy (2003); Tunneled venous port placement (Right, 11/25/2015); nasal endoscopy; pr revise total hip replacement (Left, 05/22/2018); Endoscopy, colon, diagnostic; Total hip arthroplasty (Right, 04/27/2021); and Total hip arthroplasty (Right, 4/27/2021). Restrictions  Restrictions/Precautions  Restrictions/Precautions: Weight Bearing  Required Braces or Orthoses?: No  Lower Extremity Weight Bearing Restrictions  Right Lower Extremity Weight Bearing: Weight Bearing As Tolerated  Position Activity Restriction  Other position/activity restrictions: no straight leg raises  Subjective   General  Patient assessed for rehabilitation services?: Yes  Family / Caregiver Present: No  Pain Assessment  Pain Assessment: 0-10  Pain Level: 4  Pain Type: Acute pain;Surgical pain  Pain Location: Hip(Thigh)  Pain Orientation: Right  Pain Descriptors: Aching;Discomfort; Sore  Pain Frequency: Continuous  Non-Pharmaceutical Pain Intervention(s): Repositioned;Rest;Therapeutic presence  Vital Signs  Patient Currently in Pain: Yes   Orientation  Orientation  Overall Orientation Status: Within Functional Limits  Objective    ADL  Feeding: Independent;Setup  Grooming: Setup;Modified independent (Oral care standing at sink.)  UE Bathing: Setup;Supervision(Mod A for back standing at sink d/t hip precautions.)  LE Bathing: Setup;Stand by assistance(Surgical site R hip/thigh.)  UE Dressing: Stand by assistance;Setup(Don t-shirt seated in chair.)  LE Dressing: Setup; Moderate assistance(Don pants over feet seated in chair. Pt able to pull pants knees to over hips in standing at chair CGA.)  Toileting: Setup;Stand by assistance([facility]icare standing at sink.)  Additional Comments: Pt transferred to standing at sink to complete ADL care, surgical soap used appropriately. Pt educated on adhering to hip precautions during standing dynamic activities with good return. Pt stood at toilet to urinate.  Pt transferred back to seated in chair to don personal clothing. Pt educated on LB dressing techniques with good return. Pt educated on use of ez-slide to don yashira hose wih good return. Pt educated on EC/WS techniques when participating in dynamic activities with good return, pt states wearing O2 at home PRN during times of SOB. Balance  Sitting Balance: Modified independent (Seated in chair.)  Standing Balance: Stand by assistance  Standing Balance  Time: 20 minutes  Activity: Functional mobility to/from bathroom using RW, ADL care standing at sink, stand to urinate at toilet. Functional Mobility  Functional - Mobility Device: Rolling Walker  Activity: To/from bathroom  Assist Level: Stand by assistance  Functional Mobility Comments: Pt educated on hip precautions during turning and ambulation along with gait pattern for maximum comfort with good return, pt demo understanding. Toilet Transfers  Toilet - Technique: Ambulating  Equipment Used: Standard toilet  Toilet Transfer: Supervision  Toilet Transfers Comments: Stand at toilet to urinate. Transfers  Sit to stand: Contact guard assistance  Stand to sit: Contact guard assistance  Transfer Comments: Pt educated on extending RLE forward during stand/sit transfer to ease discomfort R hip with good return.   Cognition  Overall Cognitive Status: [facility]    Plan   Plan  Times per week: 5-7x/wk (LANRE)  Current Treatment Recommendations: Strengthening, Endurance Training, Patient/Caregiver Education & Training, Equipment Evaluation, Education, & procurement, Self-Care / ADL, Safety Education & Training, Functional Mobility Training, Balance Training, Home Management Training  Goals  Short term goals  Time Frame for Short term goals: Patient will, by discharge  Short term goal 1: demo LB ADLs at SBA using AE PRN  Short term goal 2: demo functional transfers/[facility]ility [facility] to engage in ADLs safely  Short term goal 3: demo 8+ min of dynamic standing tolerance at SBA using LRD to engage in ADLs  Short term goal 4: demo bed mobility at SBA maintaining precautions to increase OOB activity       Therapy Time   Individual Concurrent Group Co-treatment   Time In 1115         Time Out 1209         Minutes 54         Timed Code Treatment Minutes: 47 Minutes     Pt seated in chair upon therapist arrival. Pleasant and agreeable to therapy. See above for LOF for all tasks. Pt retired to seated in chair at end of session with call light within reach and lunch tray set up on tray table in front of pt at end of session.     LELIA Limon/GUERO

## 2021-04-29 NOTE — FLOWSHEET NOTE
Assessment: Caty Aguilar engaged in conversation said that he felt happy to be feeling better and \"to be leaving the resort. \"    Intervention:  provided active listening and emotional support. Outcome and Plan: Patient expressed gratitude for visit. Follow as needed/requested.

## 2021-04-29 NOTE — PROGRESS NOTES
[facility]  Office: [address], DO, Liudmila Orville, DO, Cece Eitan, DO, Tray Alan Blood, DO, Jeremy Santizo MD, Regino Mccoy MD, Kareem Marcial MD, Lauren Fuchs MD, Amisha Downing MD, Vivian Romero MD, Kenisha Krishna MD, Kyleigh Jaquez MD, Liz Gasca DO, Jacobo Cardenas MD, Taye June, DO, Tez Silvestre MD,  Larisa Webb DO, Quentin Knox MD, Candido Rivers MD, Kai Casitllo MD, Bill Alexander MD, Sofia May, Fernando Cedillo, CNP, Janett Camilo, CNP, Mariya Garza, CNS, Jenn Vidales, CNP, Barby Mendiola, CNP, Adrianna Florez, CNP, Aimee Shah, CNP, Delmar Butler, CNP, Pito Sylvester PA-C, Prema Velasquez, [facility], Brianna Choudhury, CNP, Felicity Dick, CNP, Paloma Liu, CNP, Nicoletta Kocher, CNP, Eric Chavez, CNP, Artice Siemens, [address] [place] [address]    Progress Note    4/29/2021    8:24 AM    Name:   Rehan Tripp  MRN:     [id number]     Acct:      [de-identified]   Room:   [facility]4427-04   Day:  0  Admit Date:  4/27/2021  8:19 AM    PCP:   Augusta Logan MD  Code Status:  Full Code    Subjective:     C/C: Right hip pain    Interval History Status: improved. Patient was seen and evaluated at bedside this morning. He reports feeling much better this morning. Patient denies any complaints. Brief History: This is a 42-year-old male with history of aseptic meningitis, asthma, avascular necrosis of bone, CAD, common variable immunodeficiency, COPD, hypertension, obesity who came in for right total hip arthroplasty through an anterior approach. Medicine team was consulted for medical management. Currently patient denies any complaints.     Review of Systems:     Constitutional:  negative for chills, fevers, sweats  Respiratory:  negative for cough, dyspnea on exertion, shortness of breath, wheezing  Cardiovascular:  negative for chest pain, chest pressure/discomfort, lower extremity edema, palpitations  Gastrointestinal:  negative for abdominal pain, constipation, diarrhea, nausea, vomiting  Neurological:  negative for dizziness, headache    Medications: Allergies: Allergies   Allergen Reactions    Adhesive Tape Other (See Comments)     Severe reaction to a bandage used with hip replacement.        Current Meds:   Scheduled Meds:    tranexamic acid-NaCl  1,000 mg Intravenous Once    amLODIPine  10 mg Oral Daily    montelukast  10 mg Oral Nightly    budesonide-formoterol  1 puff Inhalation BID    fluticasone  2 spray Each Nostril Daily    sodium chloride flush  5-40 mL Intravenous 2 times per day    sennosides-docusate sodium  1 tablet Oral BID    cefepime  2,000 mg Intravenous Q12H    apixaban  2.5 mg Oral BID    acetaminophen  1,000 mg Oral 4 times per day    gabapentin  300 mg Oral Nightly    ondansetron  4 mg Intravenous Once    lisinopril  10 mg Oral Daily    pantoprazole  40 mg Oral QAM AC    tiotropium  2 puff Inhalation Daily    insulin lispro  0-6 Units Subcutaneous TID WC    insulin lispro  0-3 Units Subcutaneous Nightly    vancomycin (VANCOCIN) IV  1,500 mg Intravenous Q12H     Continuous Infusions:    sodium chloride 125 mL/hr at 04/28/21 2861    sodium chloride      dextrose       PRN Meds: ALPRAZolam, albuterol, albuterol sulfate HFA, sodium chloride flush, sodium chloride, magnesium hydroxide, senna, ondansetron **OR** ondansetron, ketorolac, oxyCODONE, oxyCODONE, traMADol, glucose, dextrose, glucagon (rDNA), dextrose    Data:     Past Medical History:   has a past medical history of Allergic rhinitis, Anxiety, Aseptic meningitis, Asthma, Avascular necrosis of bone of hip, left ([address] CAD (coronary artery disease), Common variable immunodeficiency [address] [address] [facility] COPD (chronic obstructive pulmonary disease) ([facility] [address] Deviated nasal septum, Dyspnea, HTN (hypertension), Hyperglycemia, Hypoxia, Immune deficiency disorder ([facility] [address] Obesity, On supplemental oxygen therapy, PRECIOUS (obstructive sleep apnea), Osteoporosis, Reflux, Respiratory failure ([address] Severe persistent asthma, Spondylosis of cervical spine, TIA (transient ischemic attack), Tobacco use, Under care of team, Under care of team, Under care of team, Vitamin D deficiency, Wears glasses, and Wellness examination. Social History:   reports that he quit smoking about 17 years ago. His smoking use included cigarettes. He started smoking about 49 years ago. He has a 31.00 pack-year smoking history. He has never used smokeless tobacco. He reports current alcohol use of about 3.0 standard drinks of alcohol per week. He reports that he does not use drugs. Family History:   Family History   Problem Relation Age of Onset   Joy Hilario COPD Father     Coronary Art Dis Father     Heart Attack Father     Lung Cancer Mother     Breast Cancer Sister     No Known Problems Maternal Grandmother     No Known Problems Maternal Grandfather     Cancer Paternal Grandmother     Stroke Paternal Grandfather     Heart Attack Paternal Grandfather     Cancer Paternal Grandfather     Breast Cancer Sister     Cancer Sister         Throat       Vitals:  /65   Pulse 69   Temp 97.9 °F (36.6 °C) (Oral)   Resp 16   Ht 5' 8\" (1.727 m)   Wt 262 lb (118.8 kg)   SpO2 96%   BMI 39.84 kg/m²   Temp (24hrs), Av.9 °F (36.6 °C), Min:97.7 °F (36.5 °C), Max:98 °F (36.7 °C)    Recent Labs     21  1202 21  1600 21  2045 21  0733   POCGLU 136* 179* 139* 115*       I/O (24Hr):     Intake/Output Summary (Last 24 hours) at 2021 0824  Last data filed at 2021 0234  Gross per 24 hour   Intake --   Output 1800 ml   Net -1800 ml       Labs:  Hematology:  Recent Labs     21  0932   WBC 10.9   RBC 4.20*   HGB 12.4*   HCT 37.9*   MCV 90.2   MCH 29.5   MCHC 32.7   RDW 13.2      MPV 10.9     Chemistry:No results for input(s): NA, K, CL, CO2, GLUCOSE, BUN, CREATININE, MG, ANIONGAP, LABGLOM, GFRAA, CALCIUM, CAION, PHOS, PSA, PROBNP, TROPHS, CKTOTAL, CKMB, CKMBINDEX, MYOGLOBIN, DIGOXIN, LACTACIDWB in the last 72 hours. Recent Labs     04/27/21  2123 04/28/21  0647 04/28/21  1202 04/28/21  1600 04/28/21  2045 04/29/21  0733   POCGLU 145* 122* 136* 179* 139* 115*     ABG:  Lab Results   Component Value Date    FIO2 NOT REPORTED 05/03/2015     Lab Results   Component Value Date/Time    SPECIAL NOT REPORTED 02/03/2019 10:31 AM     Lab Results   Component Value Date/Time    CULTURE NO GROWTH 6 DAYS 01/14/2019 07:51 AM       Radiology:  Yumiko Crater Hip 2-3 Vw W Pelvis Right    Result Date: 4/28/2021  1. Status post right total hip arthroplasty without radiographic evidence of acute bony complication. 2.  Left total hip arthroplasty remains in place.        Physical Examination:        General appearance:  alert, cooperative and no distress  Mental Status:  oriented to person, place and time and normal affect  Lungs:  clear to auscultation bilaterally, normal effort  Heart:  regular rate and rhythm, no murmur  Abdomen:  soft, nontender, nondistended, normal bowel sounds, no masses, hepatomegaly, splenomegaly  Extremities:  no edema, redness, tenderness in the calves  Skin:  no gross lesions, rashes, induration    Assessment:        Hospital Problems           Last Modified POA    Status post total replacement of right hip 4/27/2021 Yes          Plan:        Right hip severe degenerative joint disease  -Management as per primary  -Prophylactic antibiotics as per ID     CVID  -IVIG every 3 weeks     Hypertension  -Resume amlodipine, lisinopril     TIA  -On Eliquis     COPD  -Continue Symbicort, Flonase, montelukast, Spiriva     Diabetic neuropathy  -Continue gabapentin    Okay for discharge from [address] Jair Campos MD  4/29/2021  8:24 AM

## 2021-04-29 NOTE — PROGRESS NOTES
Progress Note    Patient:  Thomas Rivera  YOB: 1955     63 y.o. male    Subjective:  Patient seen and examined at bedside this morning. No new complaints or concerns per patient this morning. No acute issues overnight per nursing. Pain overall controlled. Denies: fever/chills, HA, CP, SOB, N/V, dysuria, or numbness/tingling in extremities. No BM/ + flatus. PT: 320 ft    Objective:   Vitals:    04/28/21 2013   BP: 121/62   Pulse: 72   Resp: 16   Temp: 98 °F (36.7 °C)   SpO2: 97%     Gen: NAD, cooperative     Right lower extremity: Optifoam on, which is clean, dry, and intact. No surrounding ecchymoses, abrasions, deformity, or lacerations. Tender at hip with ROM. Tedhose on up to mid thigh. Compartments soft and compressible. EHL/FHL/TA/GSC gross motor intact. Sural, saphenous, superificial/deep peroneal, and plantar nerve distribution SILT. DP pulses 2+ with BCR; foot warm and perfused. Recent Labs     04/28/21  0932   WBC 10.9   HGB 12.4*   HCT 37.9*          Meds: Eliquis, cefepime, vancomycin    See rec for complete list    Impression: 63 y.o. male being seen and evaluated s/p right total hip arthroplasty, POD2    Plan:     -Weight bearing as tolerated to the right lower extremity   -Post-op Hgb 12.4  -Final antibiotics per infectious disease recommendations   -Pain control: multimodal pain management protocol. Try to wean off narcotic medication as tolerated. -Ice (20 min, 1 hour off) for edema/pain control  -Encourage deep breathing and IS  -DVT ppx: Eliquis and EPC  -PT/OT to eval and treat.  -Dispo: okay to discharge from orthopaedic surgery perspective. Awaiting outpatient antibiotic recommendations from ID.  -Plan is to follow up with Dr. Kristel Espinosa 10-14 days after surgery   -Please page Ortho with any questions or concerns    Electronically signed by Memo Lowery MD PGY-1 at 5:01 AM 04/29/21.     PGY-2 Addendum    Patient seen and examined personally, and I agree with subjective portion as stated above by Dr. Azucena Plunkett. All disagreements have been changed in the above note. Patient doing well. Walked 300+ ft yesterday using RW. NVI. No motor or sensory deficits. 2+ DP pulse. [facility] for discharge from orthopedic standpoint. Needs final ID recommendations for antibiotics, once placed plan for discharge. Eliquis 2.5mg BID for DVT ppx. WBAT to RLE. Follow up with Dr. Dread Wynne in 10-14 days.     Electronically signed by Hillary Blancas DO, on 4/29/2021 at 5:23 AM.

## 2021-04-29 NOTE — PROGRESS NOTES
Physical Therapy  Facility/Department: [address] ORTHO/MED SURG  Daily Treatment Note  NAME: Francisca Garcia  : 1955  MRN: [id number]    Date of Service: 2021    Discharge Recommendations:  Patient would benefit from continued therapy after discharge   PT Equipment Recommendations  Equipment Needed: No  Other: Pt has a RW    Assessment   Body structures, Functions, Activity limitations: Decreased functional mobility ; Decreased endurance;Decreased strength; Increased pain  Assessment: The pt ambulated ~170 ft with a RW SBA, WBAT R LE. step through gait, slow pace, Pt is limited by endurance and fatigue with mobility. He could benefit from a continuation of PT to return to PLOF. Prognosis: Good  PT Education: Goals;PT Role;Plan of Care;Gait Training;Home Exercise Program  REQUIRES PT FOLLOW UP: Yes  Activity Tolerance  Activity Tolerance: Patient limited by endurance; Patient Tolerated treatment well     Patient Diagnosis(es): The primary encounter diagnosis was Status post total replacement of right hip. A diagnosis of Post-operative pain was also pertinent to this visit. has a past medical history of Allergic rhinitis, Anxiety, Aseptic meningitis, Asthma, Avascular necrosis of bone of hip, left ([address] CAD (coronary artery disease), Common variable immunodeficiency ([address] COPD (chronic obstructive pulmonary disease) ([address] Deviated nasal septum, Dyspnea, HTN (hypertension), Hyperglycemia, Hypoxia, Immune deficiency disorder ([address] Obesity, On supplemental oxygen therapy, PRECIOUS (obstructive sleep apnea), Osteoporosis, Reflux, Respiratory failure ([address] Severe persistent asthma, Spondylosis of cervical spine, TIA (transient ischemic attack), Tobacco use, Under care of team, Under care of team, Under care of team, Vitamin D deficiency, Wears glasses, and Wellness examination. has a past surgical history that includes Finger surgery (Left, ); Cardiac catheterization (); Tonsillectomy;  Throat surgery (2014); Colonoscopy (2003); Tunneled venous port placement (Right, 11/25/2015); nasal endoscopy; pr revise total hip replacement (Left, 05/22/2018); Endoscopy, colon, diagnostic; Total hip arthroplasty (Right, 04/27/2021); and Total hip arthroplasty (Right, 4/27/2021). Restrictions  Restrictions/Precautions  Restrictions/Precautions: Weight Bearing  Required Braces or Orthoses?: No  Lower Extremity Weight Bearing Restrictions  Right Lower Extremity Weight Bearing: Weight Bearing As Tolerated  Position Activity Restriction  Other position/activity restrictions: no straight leg raises  Subjective   General  Response To Previous Treatment: Patient with no complaints from previous session.   Family / Caregiver Present: No  Subjective  Subjective: RN and pt agreed to PT, pt awake in chair upon arrival and c/o 4/10 pain  General Comment  Comments: Pt on 3L O2 via nc  Pain Screening  Patient Currently in Pain: Yes  Vital Signs  Patient Currently in Pain: Yes       Orientation  Orientation  Overall Orientation Status: Within Normal Limits       Objective   Bed mobility  Supine to Sit: Stand by assistance  Sit to Supine: (Left up in chair)  Scooting: Stand by assistance  Transfers  Sit to Stand: Stand by assistance  Stand to sit: Stand by assistance  Ambulation  Ambulation?: Yes  Ambulation 1  Surface: level tile  Device: Rolling Walker  Assistance: Stand by assistance  Quality of Gait: Step through gait  Gait Deviations: Slow Roxana;Decreased step length;Decreased step height  Distance: ~170ft with one standing rest breaks  Comments: Very slow pace and increased time needed to compete  Stairs/Curb  Stairs?: Yes  Stairs  Comment: 8- 6in step ups w/RW CGA; proper gait sequencing maintianed        Exercises  Quad Sets: x 15 B LE  Heelslides: x 15 B LE  Gluteal Sets: x 15  Knee Long Arc Quad: x 15 B LE  Ankle Pumps: x 20 B LE  Comments: RLE 6in toe taps x 10 with RW                         Goals  Short term goals  Time Frame for Short term goals: 10 visits  Short term goal 1: transfers with SBA  Short term goal 2: pt to amb ulate 200 ft with a RW x SBA with WBAT R LE  Short term goal 3: ascend/descend 4 steps with SBA  Short term goal 4: total hip exercises x SBA  Patient Goals   Patient goals : Return home    Plan    Plan  Times per week: BID  Current Treatment Recommendations: Strengthening, Functional Mobility Training, Gait Training, Safety Education & Training, Endurance Training, Stair training, Pain Management  Safety Devices  Type of devices: Nurse notified, Call light within reach, Left in chair     Therapy Time   Individual Concurrent Group Co-treatment   Time In 1324         Time Out 1350         Minutes 26         Timed Code Treatment Minutes: Melum 50, PTA

## 2021-05-05 DIAGNOSIS — M16.12 ARTHRITIS OF LEFT HIP: Primary | ICD-10-CM

## 2021-05-07 ENCOUNTER — OFFICE VISIT (OUTPATIENT)
Dept: ORTHOPEDIC SURGERY | Age: 66
End: 2021-05-07

## 2021-05-07 ENCOUNTER — TELEPHONE (OUTPATIENT)
Dept: ORTHOPEDIC SURGERY | Age: 66
End: 2021-05-07

## 2021-05-07 VITALS — HEIGHT: 68 IN | WEIGHT: 262 LBS | BODY MASS INDEX: 39.71 KG/M2

## 2021-05-07 DIAGNOSIS — Z96.641 S/P HIP REPLACEMENT, RIGHT: ICD-10-CM

## 2021-05-07 DIAGNOSIS — M16.11 ARTHRITIS OF RIGHT HIP: Primary | ICD-10-CM

## 2021-05-07 PROCEDURE — 99024 POSTOP FOLLOW-UP VISIT: CPT | Performed by: ORTHOPAEDIC SURGERY

## 2021-05-07 RX ORDER — OXYCODONE HYDROCHLORIDE AND ACETAMINOPHEN 5; 325 MG/1; MG/1
1 TABLET ORAL EVERY 6 HOURS PRN
Qty: 28 TABLET | Refills: 0 | Status: SHIPPED | OUTPATIENT
Start: 2021-05-07 | End: 2021-05-14

## 2021-05-07 ASSESSMENT — ENCOUNTER SYMPTOMS
VOMITING: 0
ABDOMINAL PAIN: 0
COUGH: 0
CHEST TIGHTNESS: 0
APNEA: 0

## 2021-05-07 NOTE — TELEPHONE ENCOUNTER
Spoke with patient wife. Advised to wear until next appointment. If they still need to be worn it can be re evaluated then.

## 2021-05-07 NOTE — PROGRESS NOTES
[facility]  [id number]  Dept: [phone]  Dept Fax: [phone]        Postoperative follow-up note    Subjective:   Radhika Akbar is a 63y.o. year old male who presents to our office today for postoperative followup regarding his   1. Arthritis of right hip    2. S/P hip replacement, right    . Chief Complaint   Patient presents with    Post-Op Check     R LANRE 4/27/2021     Radhika Akbar  is a 63y.o. year old male who presents to our office today for postoperative follow up after having undergone a right total hip arthroplasty on 4/27/21. The patient denies fevers, chills, nausea, vomiting, diarrhea. The patient has started physical therapy in his home. The patient states that he is up and walking around but still having pain. The patient states that he will be ready for outpatient therapy soon. He has swelling that comes and goes to the right thigh. Review of Systems   Constitutional: Negative for chills and fever. Respiratory: Negative for apnea, cough and chest tightness. Cardiovascular: Negative for chest pain and palpitations. Gastrointestinal: Negative for abdominal pain and vomiting. Genitourinary: Negative for difficulty urinating. Musculoskeletal: Positive for arthralgias (right hip). Negative for gait problem, joint swelling and myalgias. Neurological: Negative for dizziness, weakness and numbness. I have reviewed the CC, HPI, ROS, PMH, FHX, Social History, and if not present in this note, I have reviewed in the patient's chart. I agree with the documentation provided by other staff and have reviewed their documentation prior to providing my signature indicating agreement. Objective :   General: Radhika Akbar is a 63 y.o. male who is alert and oriented and sitting comfortably in our office. Ortho Exam  MS:   Patient ambulates with a walker in the office.  Incision healing well to the right hip without sign of infection. Calves are supple bilaterally. Motor, sensory, vascular examination of the right lower extremity is grossly intact without focal deficits. Neuro: alert. oriented  Eyes: Extra-ocular muscles intact  Mouth: Oral mucosa moist. No perioral lesions  Pulm: Respirations unlabored and regular. Skin: warm, well perfused  Psych:   Patient has good fund of knowledge and displays understanging of exam, diagnosis, and plan. Radiology:     Xr Chest (2 Vw)    Result Date: 4/15/2021  EXAMINATION: TWO XRAY VIEWS OF THE CHEST 4/15/2021 11:39 am COMPARISON: February 4, 2019, chest exam HISTORY: ORDERING SYSTEM PROVIDED HISTORY: Pre-op testing TECHNOLOGIST PROVIDED HISTORY: pre-op Reason for Exam: pre op hip, sob FINDINGS: Stable normal cardiopericardial silhouette and right IJ port catheter. Emphysematous changes of the lungs with mild biapical capping. There are no significant pleural or parenchymal findings     Stable port catheter No acute cardiopulmonary findings     Xr Hip Right (2-3 Views)    Result Date: 5/9/2021  History:   Status post Right  total hip arthroplasty Findings:    Low AP pelvis, AP Right  hip, cross table lateral xrays Right hip done in the office today shows total hip arthroplasty in good position without signs complication. Leg lengths are approximate. Components are in good position without signs of loosening. No evidence of fracture, subluxation, dislocation, radioopaque foreign body/tumor is noted. Impression:  Right  total hip arthroplasty in good position without complication noted. Fluoro For Surgical Procedures    Result Date: 4/27/2021  Radiology exam is complete. No Radiologist dictation. Please follow up with ordering provider. Xr Hip 2-3 Vw W Pelvis Right    Result Date: 4/28/2021  EXAMINATION: ONE XRAY VIEW OF THE PELVIS AND TWO XRAY VIEWS RIGHT HIP 4/27/2021 1:00 pm COMPARISON: Radiographs dated 03/29/2021.  HISTORY: ORDERING SYSTEM PROVIDED 5/10/2021 9:48 AM      I have reviewed and made changes accordingly to the work scribed by Ann Mahan LPN. The documentation accurately reflects work and decisions made by me. I have also reviewed documentation completed by clinical staff.     Girma Billingsley DO, [facility]  5/10/2021 9:48 AM    This note is created with the assistance of a speech recognition program.  While intending to generate a document that actually reflects the content of the visit, the document can still have some errors including those of syntax and sound a like substitutions which may escape proof reading.  In such instances, actual meaning can be extrapolated by contextual diversion      Electronically signed by Jeannine Senior on 5/10/2021 at 9:48 AM

## 2021-05-17 DIAGNOSIS — M16.11 ARTHRITIS OF RIGHT HIP: Primary | ICD-10-CM

## 2021-05-17 DIAGNOSIS — Z96.641 S/P HIP REPLACEMENT, RIGHT: ICD-10-CM

## 2021-05-17 RX ORDER — TRAMADOL HYDROCHLORIDE 50 MG/1
50 TABLET ORAL EVERY 6 HOURS PRN
Qty: 28 TABLET | Refills: 0 | Status: SHIPPED | OUTPATIENT
Start: 2021-05-17 | End: 2021-05-24

## 2021-05-20 ENCOUNTER — HOSPITAL ENCOUNTER (OUTPATIENT)
Dept: PHYSICAL THERAPY | Facility: CLINIC | Age: 66
Setting detail: THERAPIES SERIES
Discharge: HOME | End: 2021-05-20
Payer: MEDICARE

## 2021-05-20 PROCEDURE — 97161 PT EVAL LOW COMPLEX 20 MIN: CPT

## 2021-05-20 PROCEDURE — 97140 MANUAL THERAPY 1/> REGIONS: CPT

## 2021-05-20 NOTE — CONSULTS
difficulty sleeping in bed (side sleeper), pt continues to sleep in his recliner without disturbances. Pt notes prior to surgery, he was not ambulating with an AD and was independent with all ADLs. Pt has a pertinent medical hx of being on O2, gets infusions every 3 weeks, and hx of Left LANRE. Comorbidities:   [] Obesity [] Dialysis  [x] Other: HTN   [x] Asthma/COPD [] Dementia [x] Other: OA/hx of AVN B hips  Left hip replacement    [] Stroke [x] Sleep apnea [x] Other: common variable immunodeficiency    [] Vascular disease [] Rheumatic disease [] Other:     Tests: [x] X-Ray: [] MRI:  [] Other:    Narrative   History:   Status post Right  total hip arthroplasty       Findings:    Low AP pelvis, AP Right  hip, cross table lateral xrays Right    hip done in the office today shows total hip arthroplasty in good position    without signs complication.   Leg lengths are approximate.   Components    are in good position without signs of loosening.    No evidence of    fracture, subluxation, dislocation, radioopaque foreign body/tumor is    noted.        Impression:  Right  total hip arthroplasty in good position without    complication noted.         Medications: [x] Refer to full medical record [] None [] Other:  Allergies:      [x] Refer to full medical record [] None [] Other:    Function:  Hand Dominance  [x] Right  [] Left  Patient lives with: wife   In what type of home [x]  One story   [] Two story   [] Split level   Number of stairs to enter Front: 1 step  Back: ramp   Bathroom has a []  Tub only  [x] Tub/shower combo   [] Walk in shower    []  Grab bars   Washing machine is on [x]  Main level   [] Second level   [] Basement   Job Status []  Normal duty   [] Light duty   [] Off due to condition    []  Retired   [] Not employed   [x] Disability  [] Other:  []  Return to work:       ADL/IADL Previous level of function Current level of function Who currently assists the patient with task   Bathing  [x] Independent  [] Assist [x] Independent  [] Assist Using a bench; has grab bars; can  the shouder   Dress/grooming [x] Independent  [] Assist [x] Independent  [] Assist Helps pull pants up   Transfer/mobility [x] Independent  [] Assist [] Independent  [x] Assist    Feeding [x] Independent  [] Assist [x] Independent  [] Assist    Toileting [x] Independent  [] Assist [x] Independent  [] Assist    Driving [x] Independent  [] Assist [] Independent  [x] Assist    Housekeeping [x] Independent  [] Assist [] Independent  [x] Assist    Grocery shop/meal prep [x] Independent  [] Assist [x] Independent  [] Assist Wife does grocery shopping (pre surgery)      Gait Prior level of function Current level of function    [x] Independent  [] Assist [] Independent  [x] Assist   Device: [] Independent [] Independent    [] Straight Cane [] Quad cane [] Straight Cane [] Quad cane    [] Standard walker [] Rolling walker   [] 4 wheeled walker [] Standard walker [x] Rolling walker   [] 4 wheeled walker    [] Wheelchair [] Wheelchair          Yes  No Comments   Fatigue [x] []    Fever/chills/sweats [] [x]    Malaise  [] [x]    Mental status change [] [x]    Paresthesias/numbness/weakness [] [x]    Unexplained weight changes [] [x]    Lightheaded/dizziness [] [x]    Shortness of breath [] [x]        Objective:     OBSERVATION No Deficit Deficit Not Tested Comments   Palpation [] [x] [] Tenderness along the medial and lateral aspect of the knee  Proximal 1/3 of the quad tender to palpation  Tenderness around the area surrounding the incision site  Tender over lateral hip- proximal to the greater trochanter     Palpable pedal pulse present    Sensation [] [x] [] Decreased sensation over lateral hip   Edema [x] [] []  No observed edema over the lateral hip         Patellar Mobility [x] [] []  reactive muscle guarding with medial patellar glide  Quad set comp with the glute  Pain in the distal quad with quad set and inferior glide to the patella  soemtimes has butt an   Patellar Orientation [x] [] []     Gait [] [x] [] FWW step to  R foot in ER position  27\" with walker; picks up walker; trialed with sliders and was able to complete reciprical gait pattern             *= pain ROM  ° A/P STRENGTH     Left Right Left Right   Hip Flex (0-120)    85* OKC  65 heel slide 5 sitting 3-* sitting  Reclined position   Ext (0-30)           ER (0-45)      5 sitting  3-* sitting   IR (0-45)      4+ sitting  3-* sitting   ABD (0-45)    9 P  pain at the knee cap  good    sitting  fair- pain over the lateral hip  sitting   ADD (0-30)      good  sitting  good  sitting   Knee Flex (0-135)   4+ 5   Ext (10-0)   5 4+   Ankle DF (0-20)     5 5   PF (0-50)      3- 3-    Supine to sit: able to negotiate RLE without use of UE  Sit to supine: use of UEs to negotiate RLE      FUNCTION Normal Difficult Unable Comments   Sitting [x] [] []     Standing [] [x] []     Ambulation [] [x] []     Groom/Dress [] [x] []     Lift/Carry [] [x] []     Stairs [] [x] []     Bending [] [x] []     Squat [] [x] []     Kneel [] [x] []        Outcome Measure:  LEFI: 6/76, 8%% function       Assessment: Candy Campbell is a 63 y.o. male is 3 weeks s/p R LANRE with anterior approach by Dr. Mily Nevarez. 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 Pt's prior level of activity included independent ambulation without an AD and independence with all ADLs. Pt's current level of function includes pt ambulating with a FWW and requiring assistance for driving, dressing, bathing (use of DME), and is not able to complete all housework he previously did. Pt presents with constant R hip pain that is controlled with medication and ice. Pt with increased R hip pain with A/PROM, RLE strength deficits, and referred knee pain with R hip movements.  Due to these deficits, pt demonstrates altered gait mechanics and a TUG time of 27\" and reliance of UEs to assist the RLE when completing sit to supine. Pt will benefit from skilled therapeutic interventions including, manual therapy, therapeutic exercise/activity, gait training, and neuromuscular retraining, in order to improve R hip pain, ROM, and strength to assist with improved gait and overall QOL. Problems:    [x] ? Pain: 1-6/10  [x] ? ROM: decreased and painful R hip AROM  [x] ? Strength: decreased RLE strength  [x] ? Function: LEFI: , 8%% function   [x] ? Balance: decreased WBing on RLE  [x] Gait Deviations: FWW, TU\"                Short Term Goals: MEET IN 6 VISITS Status   Pain: Pt will report less than or equal to 3-4/10 R hip pain with walking around his house, walking up/down his ramp, standing, and completing ADLs in order to progress to prior level of activity. Ongoing   ROM: Pt will improve R AROM to 90° hip flexion and passive hip abduction to 15 degrees with 0-1/10 pain in order to promote an efficient gait pattern, improve ability to negotiate stairs, and enter/exit bed/car. Ongoing   Strength: Pt will be able to demonstrate ability to complete sit to supine without the use of UEs to negotiate the RLE to promote independent bed mobility. Ongoing   Gait: Pt will be able to complete the TUG in less than or equal to 20\" with the least restrictive AD and improved gait mechanics in order to promote an efficient gait pattern and safety with walking. Ongoing    Function: Pt will score greater than or equal to 25% on the LEFI in order to demonstrate improved function with ADLs and work related tasks. Ongoing   HEP: Pt will be independent in with HEP. Ongoing   Fall Prevention: Pt will acknowledge fall prevention interventions in order to prevent falls at home, work, or in the community.  Ongoing    Long Term Goal: MEET IN 15 VISITS     Pain: Pt will report less than or equal to 1-2/10 R hip pain with walking on even and uneven ground, negotiating stairs, performing repeated sit to stands, and completing ADLs in order to progress to prior level of activity Ongoing   ROM: Pt will improve R hip AROM to 95° and active hip abd to 15 degrees in order to climb stairs, ambulate, and progress to prior level of activity. Ongoing   Strength: Pt will demonstrate gross RLE strength to 4/5 strength in order to promote dynamic stability and strength to assist with walking, standing, ramp/stair negotiation, and completion of ADLs. Ongoing   Gait: Pt will be able to complete the TUG without an AD in less than or equal to 18\" while demonstrating improved stance time on the RLE and terminal stance to promote and efficient and safe gait pattern. Ongoing     Outcome Measure: Pt will report greater than or equal to 45% on the LEFI in order to demonstrate improved function. Ongoing                                 Patient goals: \"be able to walk out the backdoor to my garage and drink beer\"    Rehab Potential:  [x] Good  [] Fair  [] Poor   Suggested Professional Referral:  [x] No  [] Yes:  Barriers to Goal Achievement[de-identified]  [x] No  [] Yes:  Domestic Concerns:  [x] No  [] Yes:    Pt. Education:  [x] Plans/Goals, Risks/Benefits discussed  [x] Home exercise program- see exercise log  Method of Education: [x] Verbal  [x] Demo  [x] Written  Comprehension of Education:  [x] Verbalizes understanding. [x] Demonstrates understanding. [x] Needs Review. [] Demonstrates/verbalizes understanding of HEP/Ed previously given.     Treatment Plan:  [x] Therapeutic Exercise  [] Modalities:  [x] Therapeutic Activity  [] Ultrasound  [] Electrical Stimulation  [x] Gait Training   [] Massage       [] Lumbar/Cervical Traction  [x] Neuromuscular Re-education [x] Cold/hotpack [] Iontophoresis: 4 mg/mL  [x] Instruction in HEP             Dexamethasone Sodium  [x] Manual Therapy             Phosphate 40-80 mAmin  [] Aquatic Therapy                   [x] Vasocompression/    [] Other:            Game Ready   []  Medication allergies reviewed for use of Dexamethasone Sodium Phosphate 4mg/ml     with iontophoresis treatments. Pt is not allergic. Frequency: 2 x/week for 12 visits    Todays Treatment:  Modalities:  Precautions: R hip LANRE- no SLR and avoid bending past 90 degrees   Exercises:  Exercise Reps/ Time Weight/ Level Comments   Scar mobilization 6'  Hand out provided   Quad sets x     Calf raises x  reviewed   Proper squat techniue x  Avoid anterior tibial translation   Gait x  Ambulated with FWW with sliders for demo; pt encouraged to get sliders to allow for sliding FWW vs picking up FWW   Other:    Specific Instructions for next treatment: provide fall prevention handout; R anterior hip LANRE with anterior approach    Evaluation Complexity:  History (Personal factors, comorbidities) [] 0 [] 1-2 [x] 3+   Exam (limitations, restrictions) [] 1-2 [] 3 [x] 4+   Clinical presentation (progression) [x] Stable [] Evolving  [] Unstable   Decision Making [x] Low [] Moderate [] High    [x] Low Complexity [] Moderate Complexity [] High Complexity         Treatment Charges: Mins Units   Evaluation  [x] Low  [] Mod  [] High ----- 1   []  Modalities     [x]  Ther Exercise 5 0   [x]  Manual Therapy 6 1   []  Ther Activities     []  Aquatics     []  Vasocompression     []  Other     Medicare Cost (as of 5/20/21): $104.52    Time in: 853 a    Time Out: 1004    Electronically signed by: Birdie Dawn PT        Physician Signature:________________________________Date:__________________  By signing above or cosigning this note, I have reviewed this plan of care and certify a need for medically necessary rehabilitation services.      *PLEASE SIGN ABOVE AND FAX BACK ALL PAGES*

## 2021-05-21 NOTE — FLOWSHEET NOTE
[facility] Fall Risk Assessment    Patient Name:  Radhika Akbar  : 1955        Risk Factor Scale  Score   History of Falls [] Yes  [x] No 25  0 0   Secondary Diagnosis [x] Yes- post op LANRE  [] No 15  0 15   Ambulatory Aid [] Furniture  [x] Crutches/cane/walker  [] None/bedrest/wheelchair/nurse 30  15  0 15   IV/Heparin Lock [] Yes  [x] No 20  0 0   Gait/Transferring [] Impaired  [x] Weak  [] Normal/bedrest/immobile 20  10  0 10   Mental Status [] Forgets limitations  [x] Oriented to own ability 15  0 0      Total: 40     Based on the Assessment score: check the appropriate box.     []  No intervention needed   Low =   Score of 0-24    [x]  Use standard prevention interventions Moderate =  Score of 24-44   [x] Give patient handout and discuss fall prevention strategies   [x] Establish goal of education for patient/family RE: fall prevention strategies    []  Use high risk prevention interventions High = Score of 45 and higher   [] Give patient handout and discuss fall prevention strategies   [] Establish goal of education for patient/family Re: fall prevention strategies   [] Discuss lifeline / other resources    Electronically signed by:   Francisca Naik PT  Date: 2021

## 2021-05-21 NOTE — PLAN OF CARE
[] [facility]) - [facility] &  Therapy  [address]  [phone]  F: [phone] [x] [address] [facility]   [address]  P: [phone]  F: [phone] [] [address] &  Therapy  [address] [facility]  P: [phone]  F: [phone] [] [address]  P: [phone]  F: [phone] [] [address]  [address] [address] [facility] [address]   [address]   [place]: [phone]  F: [phone]        Physical Therapy Plan of Care    Date:  2021  Patient: Pancho Griffin  : 1955  MRN: [id number]   Physician: Dr. Nolasco Lunch: Medicare (Part A/B)  Medical Diagnosis:   M16.11 (ICD-10-CM) - Arthritis of right hip   Z96.641 (ICD-10-CM) - S/P hip replacement, right     Rehab Codes: M25.551, M25.651, R26.2NEC, M62.81  Onset Date: 21               Next 's appt: 21      Subjective:  CC: R hip pain  HPI: (onset date): Pt is a 63 y.o. male is 3 weeks s/p R LANRE with anterior approach by Dr. Giovany Silva. Pt was discharged on post-op day 2 and reports the only complication was high pain levels secondary to pain block lasting 2 hours. Pt reports his pain has improved since surgery and is being managed with tramadol and tylenol. Pt also ices the R hip frequently. Pt is using compression stockings and eloquis for DVT prophylaxis. Pt states his pain currently is a 4/10 and can increase to 5-6/10 at the worst. Pt notes with medication and ice his pain will decrease to 1/10. Pt describes his pain as constant, dull, aching, shooting, and burning. Pt notes his pain is in the front of his hip over the incision along with over the side of the hip (burning), and has been having knee pain. Pt does note some intermittent groin pain.  Pt states he does have some numbness over the lateral hip. Pt states his hip pain is the worst at night and with repeatedly walking up and down the ramp in his backyard. Pt also notes increased activity and laying on his R side increase his pain. Due to difficulty sleeping in bed (side sleeper), pt continues to sleep in his recliner without disturbances. Pt notes prior to surgery, he was not ambulating with an AD and was independent with all ADLs. Pt has a pertinent medical hx of being on O2, gets infusions every 3 weeks, and hx of Left LANRE.        Assessment: Dottie Long is a 63 y.o. male is 3 weeks s/p R LANRE with anterior approach by Dr. Catherine Armijo. 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 Pt's prior level of activity included independent ambulation without an AD and independence with all ADLs. Pt's current level of function includes pt ambulating with a FWW and requiring assistance for driving, dressing, bathing (use of DME), and is not able to complete all housework he previously did. Pt presents with constant R hip pain that is controlled with medication and ice. Pt with increased R hip pain with A/PROM, RLE strength deficits, and referred knee pain with R hip movements. Due to these deficits, pt demonstrates altered gait mechanics and a TUG time of 27\" and reliance of UEs to assist the RLE when completing sit to supine. Pt will benefit from skilled therapeutic interventions including, manual therapy, therapeutic exercise/activity, gait training, and neuromuscular retraining, in order to improve R hip pain, ROM, and strength to assist with improved gait and overall QOL. Problems:    [x]? ? Pain: 1-6/10  [x]? ? ROM: decreased and painful R hip AROM  [x]? ? Strength: decreased RLE strength  [x]? ? Function: LEFI: , 8%% function   [x]? ? Balance: decreased WBing on RLE  [x]?  Gait Deviations: FWW, TU\"                 Short Term Goals: MEET IN 6 VISITS Status Pain: Pt will report less than or equal to 3-4/10 R hip pain with walking around his house, walking up/down his ramp, standing, and completing ADLs in order to progress to prior level of activity. Ongoing   ROM: Pt will improve R AROM to 90° hip flexion and passive hip abduction to 15 degrees with 0-1/10 pain in order to promote an efficient gait pattern, improve ability to negotiate stairs, and enter/exit bed/car. Ongoing   Strength: Pt will be able to demonstrate ability to complete sit to supine without the use of UEs to negotiate the RLE to promote independent bed mobility. Ongoing   Gait: Pt will be able to complete the TUG in less than or equal to 20\" with the least restrictive AD and improved gait mechanics in order to promote an efficient gait pattern and safety with walking. Ongoing    Function: Pt will score greater than or equal to 25% on the LEFI in order to demonstrate improved function with ADLs and work related tasks. Ongoing   HEP: Pt will be independent in with HEP. Ongoing   Fall Prevention: Pt will acknowledge fall prevention interventions in order to prevent falls at home, work, or in the community. Ongoing    Long Term Goal: MEET IN 12 VISITS     Pain: Pt will report less than or equal to 1-2/10 R hip pain with walking on even and uneven ground, negotiating stairs, performing repeated sit to stands, and completing ADLs in order to progress to prior level of activity Ongoing   ROM: Pt will improve R hip AROM to 95° and active hip abd to 15 degrees in order to climb stairs, ambulate, and progress to prior level of activity. Ongoing   Strength: Pt will demonstrate gross RLE strength to 4/5 strength in order to promote dynamic stability and strength to assist with walking, standing, ramp/stair negotiation, and completion of ADLs.   Ongoing   Gait: Pt will be able to complete the TUG without an AD in less than or equal to 18\" while demonstrating improved stance time on the RLE and terminal stance Signature:________________________________Date:__________________  By signing above or cosigning this note, I have reviewed this plan of care and certify a need for medically necessary rehabilitation services.      *PLEASE SIGN ABOVE AND FAX BACK ALL PAGES*

## 2021-05-24 ENCOUNTER — HOSPITAL ENCOUNTER (OUTPATIENT)
Dept: PHYSICAL THERAPY | Facility: CLINIC | Age: 66
Setting detail: THERAPIES SERIES
Discharge: HOME | End: 2021-05-24
Payer: MEDICARE

## 2021-05-24 PROCEDURE — 97016 VASOPNEUMATIC DEVICE THERAPY: CPT

## 2021-05-24 PROCEDURE — 97110 THERAPEUTIC EXERCISES: CPT

## 2021-05-24 NOTE — FLOWSHEET NOTE
[] [facility]) [facility] &  Therapy  [address]  [phone]  F: [phone] [x] [address]  [place] [address]   [address]  P: [phone]  F: [phone] [] [place]  [address]  P: [phone]  F: [phone] [] [address]  P: [phone]  F: [phone] [] [address]  [facility]   [address]   [place]: [phone]  F: [phone]      Physical Therapy Daily Treatment Note    Date:  2021  Patient Name:  Danna Amaro    :  1955  MRN: [id number]  Physician: Dr. Cristiana Allred: Medicare (Part A/B)  Medical Diagnosis:   M16.11 (ICD-10-CM) - Arthritis of right hip   Z96.641 (ICD-10-CM) - S/P hip replacement, right      Rehab Codes: M25.551, M25.651, R26.2NEC, M62.81  Onset Date: 21               Next 's appt: 21    Visit# / total visits: ; Progress note for Medicare patient due at visit 10     Cancels/No Shows: 0/0    Subjective:    Pain:  [x] Yes  [] No Location: R hip Pain Rating: (0-10 scale) 3-4/10  Pain altered Tx:  [x] No  [] Yes  Action:    Comments: pt reports he is walking better, still in the wheeled walker. He reports he has had some stabbing sharp pains in the R quad/hip flexor and some tenderness in posterior hip. Overall better, however having some increased pain.     Objective:  Modalities:   Precautions: R hip LANRE- no SLR and avoid bending past 90 degrees   Exercises:  Exercise Reps/Time Weight/level  Comments   nustep  7min           SUPINE      Ankle pumps      Quad set 10x5\"     Hs set 10x5\"     Glute set 10x5\"     gastroc stretch 2x20\"  Manual    Hip abd - horizontal 15x  With assistance from therapist   Heel slides 10x  Manual    SAQ 20x Hip add - hooklying 20x3\" ball    Hip abd - hooklying 20x Lime           SIDELYING      Hip abd 10x AA          Prone      Hip extension    With pillows under pelvis--To neutral if anterior approach   HS curls 20x     Quad stretch  3x20\"  Manual          SEATED      LAQ 20x A    March  20xea           STANDING      Heel toe raises      Mini squats      3 way hip   To neutral if anterior approach   TKE      Marches      Step up      Step down      Step lateral      Lateral side steps                                Other:      Treatment Charges: Mins Units   []  Modalities     [x]  Ther Exercise 45 3   []  Manual Therapy     []  Ther Activities     []  Aquatics     [x]  Vasocompression 15 1   []  Other     Total Treatment time 60 4   Medicare Cost (as of 21): $177.22    Assessment: [x] Progressing toward goals. Completed mat exercises today to work on flexibility and LE strength. Tightness noted in quad/hip flexor and calf during the stretches. Pt shows fair recall of the exercises from his last hip. Fatigued at the end without notes of increase pain. [] No change. [] Other:  [x] Patient would continue to benefit from skilled physical therapy services in order to: improve R hip pain, ROM, and strength to assist with improved gait and overall QOL. Problems:    [x]? ? ? Pain: 1-6/10  [x]? ? ? ROM: decreased and painful R hip AROM  [x]? ? ? Strength: decreased RLE strength  [x]? ? ? Function: LEFI: , 8%% function   [x]? ? ? Balance: decreased WBing on RLE  [x]? ? Gait Deviations: FWW, TU\"                 Short Term Goals: MEET IN 6 VISITS Status   Pain: Pt will report less than or equal to 3-4/10 R hip pain with walking around his house, walking up/down his ramp, standing, and completing ADLs in order to progress to prior level of activity.  Ongoing   ROM: Pt will improve R AROM to 90° hip flexion and passive hip abduction to 15 degrees with 0-1/10 pain in order to promote an efficient gait pattern, improve ability to negotiate stairs, and enter/exit bed/car.  Ongoing   Strength: Pt will be able to demonstrate ability to complete sit to supine without the use of UEs to negotiate the RLE to promote independent bed mobility.  Ongoing   Gait: Pt will be able to complete the TUG in less than or equal to 20\" with the least restrictive AD and improved gait mechanics in order to promote an efficient gait pattern and safety with walking. Ongoing    Function: Pt will score greater than or equal to 25% on the LEFI in order to demonstrate improved function with ADLs and work related tasks. Ongoing   HEP: Pt will be independent in with HEP. Ongoing   Fall Prevention: Pt will acknowledge fall prevention interventions in order to prevent falls at home, work, or in the community. Ongoing    Long Term Goal: MEET IN 12 VISITS     Pain: Pt will report less than or equal to 1-2/10 R hip pain with walking on even and uneven ground, negotiating stairs, performing repeated sit to stands, and completing ADLs in order to progress to prior level of activity Ongoing   ROM: Pt will improve R hip AROM to 95° and active hip abd to 15 degrees in order to climb stairs, ambulate, and progress to prior level of activity. Ongoing   Strength: Pt will demonstrate gross RLE strength to 4/5 strength in order to promote dynamic stability and strength to assist with walking, standing, ramp/stair negotiation, and completion of ADLs.   Ongoing   Gait: Pt will be able to complete the TUG without an AD in less than or equal to 18\" while demonstrating improved stance time on the RLE and terminal stance to promote and efficient and safe gait pattern. Ongoing     Outcome Measure: Pt will report greater than or equal to 45% on the LEFI in order to demonstrate improved function. Ongoing                                 Patient goals: \"be able to walk out the backdoor to my garage and drink beer      Pt.  Education:  [x] Yes  [] No  [] Reviewed Prior HEP/Ed  Method of Education: [x] Verbal  [x] Demo charted exercises   [] Written  Comprehension of Education:  [x] Verbalizes understanding. [x] Demonstrates understanding. [] Needs review. [x] Demonstrates/verbalizes HEP/Ed previously given. Plan: [x] Continue current frequency toward long and short term goals.     [x] Specific Instructions for subsequent treatments: provide fall prevention handout; R anterior hip LANRE with anterior approach      Time In:10:00am            Time Out: 11:08    Electronically signed by:  Ten Lopes PTA

## 2021-05-27 ENCOUNTER — HOSPITAL ENCOUNTER (OUTPATIENT)
Dept: PHYSICAL THERAPY | Facility: CLINIC | Age: 66
Setting detail: THERAPIES SERIES
Discharge: HOME | End: 2021-05-27
Payer: MEDICARE

## 2021-05-27 DIAGNOSIS — Z96.641 S/P HIP REPLACEMENT, RIGHT: ICD-10-CM

## 2021-05-27 DIAGNOSIS — M16.11 ARTHRITIS OF RIGHT HIP: Primary | ICD-10-CM

## 2021-05-27 PROCEDURE — 97140 MANUAL THERAPY 1/> REGIONS: CPT

## 2021-05-27 PROCEDURE — 97110 THERAPEUTIC EXERCISES: CPT

## 2021-05-27 PROCEDURE — 97016 VASOPNEUMATIC DEVICE THERAPY: CPT

## 2021-05-27 RX ORDER — TRAMADOL HYDROCHLORIDE 50 MG/1
50 TABLET ORAL EVERY 6 HOURS PRN
Qty: 21 TABLET | Refills: 0 | Status: SHIPPED | OUTPATIENT
Start: 2021-05-27 | End: 2021-06-03

## 2021-05-27 NOTE — FLOWSHEET NOTE
[] [facility]  [facility] &  Therapy  [address]  [phone]  F: [phone] [x] [address]  [facility]   [address]  P: [phone]  F: [phone] [] [address] &  Therapy  [address] [facility]  P: [phone]  F: [phone] [] [address]  P: [phone]  F: [phone] [] [address]  [facility]   [address]   [place]: [phone]  F: [phone]      Physical Therapy Daily Treatment Note    Date:  2021  Patient Name:  Maciel Garcia    :  1955  MRN: [id number]  Physician: Dr. Francisco Bloom: Medicare (Part A/B)  Medical Diagnosis:   M16.11 (ICD-10-CM) - Arthritis of right hip   Z96.641 (ICD-10-CM) - S/P hip replacement, right      Rehab Codes: M25.551, M25.651, R26.2NEC, M62.81  Onset Date: 21               Next 's appt: 21    Visit# / total visits: 3/12; Progress note for Medicare patient due at visit 10     Cancels/No Shows: 0/0    Subjective:    Pain:  [x] Yes  [] No Location: R hip Pain Rating: (0-10 scale) 3-4/10  Pain altered Tx:  [x] No  [] Yes  Action:    Comments: pt enters with wheeled walker, states increased pain in the quad and a knot in the distal quad, not much pain in the hip.      Objective:  Modalities:   Precautions: R hip LANRE- no SLR and avoid bending past 90 degrees   Exercises:  Exercise Reps/Time Weight/level  Comments   nustep  7min           SUPINE      Ankle pumps      Quad set 10x5\"     Hs set 10x5\"     Glute set 10x5\"     gastroc stretch 2x20\"  Manual    Hip abd - horizontal 15x  With assistance from therapist   Heel slides 10x  Manual    SAQ 20x     Hip add - hooklying 20x3\" ball    Hip abd - hooklying 20x Lime           SIDELYING      Hip abd 10x AA Prone      Hip extension    With pillows under pelvis--To neutral if anterior approach   HS curls 20x     Quad stretch  3x20\"  Manual          SEATED      LAQ 20x A    March  20xea           STANDING      Heel raises 20x     Mini squats      3 way hip   To neutral if anterior approach   TKE      Marches      Step up      Step down      Step lateral      Lateral side steps                                Other: Manual muscle stick to R quad and lateral hip-10min    Treatment Charges: Mins Units   []  Modalities     [x]  Ther Exercise 35 2   [x]  Manual Therapy 10 1   []  Ther Activities     []  Aquatics     [x]  Vasocompression 15 1   []  Other     Total Treatment time 60 4   Medicare Cost (as of 21): $248.63    Assessment: [x] Progressing toward goals. Initiated some manual work to the R thigh today due to pt complaints of some tightness and tenderness in the area. Fair tolerance to manual work, good tolerance and fair recall of the exercises. [] No change. [] Other:  [x] Patient would continue to benefit from skilled physical therapy services in order to: improve R hip pain, ROM, and strength to assist with improved gait and overall QOL. Problems:    [x]? ? ? Pain: 1-6/10  [x]? ? ? ROM: decreased and painful R hip AROM  [x]? ? ? Strength: decreased RLE strength  [x]? ? ? Function: LEFI: , 8%% function   [x]? ? ? Balance: decreased WBing on RLE  [x]? ? Gait Deviations: FWW, TU\"                 Short Term Goals: MEET IN 6 VISITS Status   Pain: Pt will report less than or equal to 3-4/10 R hip pain with walking around his house, walking up/down his ramp, standing, and completing ADLs in order to progress to prior level of activity.  Ongoing   ROM: Pt will improve R AROM to 90° hip flexion and passive hip abduction to 15 degrees with 0-1/10 pain in order to promote an efficient gait pattern, improve ability to negotiate stairs, and enter/exit bed/car.  Ongoing   Strength: Pt will be able to demonstrate ability to complete sit to supine without the use of UEs to negotiate the RLE to promote independent bed mobility.  Ongoing   Gait: Pt will be able to complete the TUG in less than or equal to 20\" with the least restrictive AD and improved gait mechanics in order to promote an efficient gait pattern and safety with walking. Ongoing    Function: Pt will score greater than or equal to 25% on the LEFI in order to demonstrate improved function with ADLs and work related tasks. Ongoing   HEP: Pt will be independent in with HEP. Ongoing   Fall Prevention: Pt will acknowledge fall prevention interventions in order to prevent falls at home, work, or in the community. Ongoing    Long Term Goal: MEET IN 12 VISITS     Pain: Pt will report less than or equal to 1-2/10 R hip pain with walking on even and uneven ground, negotiating stairs, performing repeated sit to stands, and completing ADLs in order to progress to prior level of activity Ongoing   ROM: Pt will improve R hip AROM to 95° and active hip abd to 15 degrees in order to climb stairs, ambulate, and progress to prior level of activity. Ongoing   Strength: Pt will demonstrate gross RLE strength to 4/5 strength in order to promote dynamic stability and strength to assist with walking, standing, ramp/stair negotiation, and completion of ADLs.   Ongoing   Gait: Pt will be able to complete the TUG without an AD in less than or equal to 18\" while demonstrating improved stance time on the RLE and terminal stance to promote and efficient and safe gait pattern. Ongoing     Outcome Measure: Pt will report greater than or equal to 45% on the LEFI in order to demonstrate improved function. Ongoing                                 Patient goals: \"be able to walk out the backdoor to my garage and drink beer      Pt.  Education:  [x] Yes  [] No  [] Reviewed Prior HEP/Ed  Method of Education: [x] Verbal charted exercises  [] Demo   [] Written  Comprehension of

## 2021-05-27 NOTE — TELEPHONE ENCOUNTER
Patient called and would like a refill on his tramadol. He says he really only needs it for his therapy days. DOS:4/27/2021-right total hip arthroplasty    Tramadol TID pended.

## 2021-06-02 ENCOUNTER — HOSPITAL ENCOUNTER (OUTPATIENT)
Dept: PHYSICAL THERAPY | Facility: CLINIC | Age: 66
Setting detail: THERAPIES SERIES
Discharge: HOME | End: 2021-06-02
Payer: MEDICARE

## 2021-06-02 PROCEDURE — 97016 VASOPNEUMATIC DEVICE THERAPY: CPT

## 2021-06-02 PROCEDURE — 97110 THERAPEUTIC EXERCISES: CPT

## 2021-06-02 NOTE — FLOWSHEET NOTE
With pillows under pelvis--To neutral if anterior approach   HS curls 20x     Quad stretch  3x20\"  Manual          SEATED      LAQ 20x A    March  20xea           STANDING      Heel raises 20x     Mini squats 15x     3 way hip 15xea  To neutral if anterior approach, OKC and CKC   TKE      Marches      Step up      Step down      Step lateral      Lateral side steps                                Other: Manual muscle stick to R quad and lateral hip-10min-not today    Treatment Charges: Mins Units   []  Modalities     [x]  Ther Exercise 40 3   [x]  Manual Therapy     []  Ther Activities     []  Aquatics     [x]  Vasocompression 15 1   []  Other     Total Treatment time 55 4   Medicare Cost (as of 21): $248.63    Assessment: [x] Progressing toward goals. Pt did well with charted exercises, able to complete more standing exercises today as charted. Pt notes less pain overall in the LE. Some cuing required for form with the exercises and for pt to stay on task. [] No change. [] Other:  [x] Patient would continue to benefit from skilled physical therapy services in order to: improve R hip pain, ROM, and strength to assist with improved gait and overall QOL. Problems:    [x]? ? ? Pain: 1-6/10  [x]? ? ? ROM: decreased and painful R hip AROM  [x]? ? ? Strength: decreased RLE strength  [x]? ? ? Function: LEFI: , 8%% function   [x]? ? ? Balance: decreased WBing on RLE  [x]? ? Gait Deviations: FWW, TU\"                 Short Term Goals: MEET IN 6 VISITS Status   Pain: Pt will report less than or equal to 3-4/10 R hip pain with walking around his house, walking up/down his ramp, standing, and completing ADLs in order to progress to prior level of activity.  Ongoing   ROM: Pt will improve R AROM to 90° hip flexion and passive hip abduction to 15 degrees with 0-1/10 pain in order to promote an efficient gait pattern, improve ability to negotiate stairs, and enter/exit bed/car.  Ongoing   Strength: Pt will be able to demonstrate ability to complete sit to supine without the use of UEs to negotiate the RLE to promote independent bed mobility.  Ongoing   Gait: Pt will be able to complete the TUG in less than or equal to 20\" with the least restrictive AD and improved gait mechanics in order to promote an efficient gait pattern and safety with walking. Ongoing    Function: Pt will score greater than or equal to 25% on the LEFI in order to demonstrate improved function with ADLs and work related tasks. Ongoing   HEP: Pt will be independent in with HEP. Ongoing   Fall Prevention: Pt will acknowledge fall prevention interventions in order to prevent falls at home, work, or in the community. Ongoing    Long Term Goal: MEET IN 12 VISITS     Pain: Pt will report less than or equal to 1-2/10 R hip pain with walking on even and uneven ground, negotiating stairs, performing repeated sit to stands, and completing ADLs in order to progress to prior level of activity Ongoing   ROM: Pt will improve R hip AROM to 95° and active hip abd to 15 degrees in order to climb stairs, ambulate, and progress to prior level of activity. Ongoing   Strength: Pt will demonstrate gross RLE strength to 4/5 strength in order to promote dynamic stability and strength to assist with walking, standing, ramp/stair negotiation, and completion of ADLs.   Ongoing   Gait: Pt will be able to complete the TUG without an AD in less than or equal to 18\" while demonstrating improved stance time on the RLE and terminal stance to promote and efficient and safe gait pattern. Ongoing     Outcome Measure: Pt will report greater than or equal to 45% on the LEFI in order to demonstrate improved function. Ongoing                                 Patient goals: \"be able to walk out the backdoor to my garage and drink beer      Pt.  Education:  [x] Yes  [] No  [] Reviewed Prior HEP/Ed  Method of Education: [x] Verbal charted exercises  [x] Demo standing exercises    [] Written  Comprehension of Education:  [x] Verbalizes understanding. [x] Demonstrates understanding. [] Needs review. [x] Demonstrates/verbalizes HEP/Ed previously given. Plan: [x] Continue current frequency toward long and short term goals.     [x] Specific Instructions for subsequent treatments: provide fall prevention handout; R anterior hip LANRE with anterior approach      Time In: 3:00m            Time Out: 4:05am    Electronically signed by:  Ten Lopes PTA

## 2021-06-04 ENCOUNTER — OFFICE VISIT (OUTPATIENT)
Dept: ORTHOPEDIC SURGERY | Age: 66
End: 2021-06-04

## 2021-06-04 ENCOUNTER — APPOINTMENT (OUTPATIENT)
Dept: PHYSICAL THERAPY | Facility: CLINIC | Age: 66
End: 2021-06-04
Payer: MEDICARE

## 2021-06-04 VITALS — BODY MASS INDEX: 39.71 KG/M2 | WEIGHT: 262 LBS | HEIGHT: 68 IN

## 2021-06-04 DIAGNOSIS — M16.11 ARTHRITIS OF RIGHT HIP: Primary | ICD-10-CM

## 2021-06-04 DIAGNOSIS — Z96.641 S/P HIP REPLACEMENT, RIGHT: ICD-10-CM

## 2021-06-04 PROCEDURE — 99024 POSTOP FOLLOW-UP VISIT: CPT | Performed by: ORTHOPAEDIC SURGERY

## 2021-06-04 RX ORDER — TRAMADOL HYDROCHLORIDE 50 MG/1
50 TABLET ORAL 2 TIMES DAILY
Qty: 14 TABLET | Refills: 0 | Status: SHIPPED | OUTPATIENT
Start: 2021-06-04 | End: 2021-06-11

## 2021-06-04 ASSESSMENT — ENCOUNTER SYMPTOMS
APNEA: 0
VOMITING: 0
CHEST TIGHTNESS: 0
COUGH: 0
ABDOMINAL PAIN: 0

## 2021-06-04 NOTE — PROGRESS NOTES
[facility]  [id number]  Dept: [phone]  Dept Fax: [phone]        Postoperative follow-up note    Subjective:   Samy Viramontes is a 63y.o. year old male who presents to our office today for postoperative followup regarding his   1. Arthritis of right hip    2. S/P hip replacement, right    . Chief Complaint   Patient presents with    Follow-up     R HIP dos 4/27/2021       Samy Viramontes  is a 63y.o. year old male who presents to our office today for postoperative follow up after having undergone a left total hip arthroplasty on 4/27/21. The patient denies fevers, chills, nausea, vomiting, diarrhea. The patient has started physical therapy. The patient notes that he is doing well but his right buttock and thigh muscles are tight. He continues to work with this at therapy. Overall he feels like he is improving daily. Review of Systems   Constitutional: Negative for chills and fever. Respiratory: Negative for apnea, cough and chest tightness. Cardiovascular: Negative for chest pain and palpitations. Gastrointestinal: Negative for abdominal pain and vomiting. Genitourinary: Negative for difficulty urinating. Musculoskeletal: Positive for arthralgias (right hip). Negative for gait problem, joint swelling and myalgias. Neurological: Negative for dizziness, weakness and numbness. I have reviewed the CC, HPI, ROS, PMH, FHX, Social History, and if not present in this note, I have reviewed in the patient's chart. I agree with the documentation provided by other staff and have reviewed their documentation prior to providing my signature indicating agreement. Objective :   General: Samy Viramontes is a 63 y.o. male who is alert and oriented and sitting comfortably in our office. Ortho Exam  MS:   Patient ambulates with a walker with a very mild limp.  Incision healing well to the right hip without sign of infection. Calves are supple bilaterally. Motor, sensory, vascular examination of right lower extremity is grossly intact without focal deficits. Neuro: alert. oriented  Eyes: Extra-ocular muscles intact  Mouth: Oral mucosa moist. No perioral lesions  Pulm: Respirations unlabored and regular. Skin: warm, well perfused  Psych:   Patient has good fund of knowledge and displays understanging of exam, diagnosis, and plan. Radiology:     XR HIP RIGHT (2-3 VIEWS)    Result Date: 5/9/2021  History:   Status post Right  total hip arthroplasty Findings:    Low AP pelvis, AP Right  hip, cross table lateral xrays Right hip done in the office today shows total hip arthroplasty in good position without signs complication. Leg lengths are approximate. Components are in good position without signs of loosening. No evidence of fracture, subluxation, dislocation, radioopaque foreign body/tumor is noted. Impression:  Right  total hip arthroplasty in good position without complication noted. Assessment:      1. Arthritis of right hip    2. S/P hip replacement, right         Plan:      Discussed with the patient that he should continue with therapy and a home exercise program. The patient to complete activities as tolerates. Patient can take tramadol for formal therapy. Will prescribe it for twice a day as needed. He can have one more refill for once a day as needed for a week then he needs to transfer to over the counter medications. The patient to follow up in the office in 6 weeks and knows to call with any questions or concerns. Follow up: Return in about 6 weeks (around 7/16/2021). Orders Placed This Encounter   Medications    traMADol (ULTRAM) 50 MG tablet     Sig: Take 1 tablet by mouth 2 times daily for 7 days. Dispense:  14 tablet     Refill:  0     Reduce doses taken as pain becomes manageable       No orders of the defined types were placed in this encounter.     Mariposa Cordero LPN am scribing for and in the presence of Dr. Azar Younger  6/5/2021 3:37 PM    I have reviewed and made changes accordingly to the work scribed by Ruben Adams LPN. The documentation accurately reflects work and decisions made by me. I have also reviewed documentation completed by clinical staff.     Azar Younger DO, [facility]  6/5/2021 3:37 PM    This note is created with the assistance of a speech recognition program.  While intending to generate a document that actually reflects the content of the visit, the document can still have some errors including those of syntax and sound a like substitutions which may escape proof reading.  In such instances, actual meaning can be extrapolated by contextual diversion      Electronically signed by Shameka Youngblood DO, FAOAO on 6/5/2021 at 3:37 PM

## 2021-06-07 ENCOUNTER — HOSPITAL ENCOUNTER (OUTPATIENT)
Dept: PHYSICAL THERAPY | Facility: CLINIC | Age: 66
Setting detail: THERAPIES SERIES
Discharge: HOME | End: 2021-06-07
Payer: MEDICARE

## 2021-06-07 PROCEDURE — 97110 THERAPEUTIC EXERCISES: CPT

## 2021-06-07 PROCEDURE — 97016 VASOPNEUMATIC DEVICE THERAPY: CPT

## 2021-06-07 NOTE — FLOWSHEET NOTE
activity. Ongoing   ROM: Pt will improve R AROM to 90° hip flexion and passive hip abduction to 15 degrees with 0-1/10 pain in order to promote an efficient gait pattern, improve ability to negotiate stairs, and enter/exit bed/car.  Ongoing   Strength: Pt will be able to demonstrate ability to complete sit to supine without the use of UEs to negotiate the RLE to promote independent bed mobility.  Ongoing   Gait: Pt will be able to complete the TUG in less than or equal to 20\" with the least restrictive AD and improved gait mechanics in order to promote an efficient gait pattern and safety with walking. Ongoing    Function: Pt will score greater than or equal to 25% on the LEFI in order to demonstrate improved function with ADLs and work related tasks. Ongoing   HEP: Pt will be independent in with HEP. Ongoing   Fall Prevention: Pt will acknowledge fall prevention interventions in order to prevent falls at home, work, or in the community. Ongoing    Long Term Goal: MEET IN 12 VISITS     Pain: Pt will report less than or equal to 1-2/10 R hip pain with walking on even and uneven ground, negotiating stairs, performing repeated sit to stands, and completing ADLs in order to progress to prior level of activity Ongoing   ROM: Pt will improve R hip AROM to 95° and active hip abd to 15 degrees in order to climb stairs, ambulate, and progress to prior level of activity. Ongoing   Strength: Pt will demonstrate gross RLE strength to 4/5 strength in order to promote dynamic stability and strength to assist with walking, standing, ramp/stair negotiation, and completion of ADLs.   Ongoing   Gait: Pt will be able to complete the TUG without an AD in less than or equal to 18\" while demonstrating improved stance time on the RLE and terminal stance to promote and efficient and safe gait pattern. Ongoing     Outcome Measure: Pt will report greater than or equal to 45% on the LEFI in order to demonstrate improved function.  Ongoing                               Patient goals: \"be able to walk out the backdoor to my garage and drink beer      Pt. Education:  [x] Yes  [] No  [] Reviewed Prior HEP/Ed  Method of Education: [x] Verbal charted exercises, advancements   [] Demo    [] Written  Comprehension of Education:  [x] Verbalizes understanding. [x] Demonstrates understanding. [] Needs review. [x] Demonstrates/verbalizes HEP/Ed previously given. Plan: [x] Continue current frequency toward long and short term goals.     [x] Specific Instructions for subsequent treatments: provide fall prevention handout; R anterior hip LANRE with anterior approach      Time In: 11:00am           Time Out: 12:05pm    Electronically signed by:  Rolf Avina PTA

## 2021-06-10 ENCOUNTER — HOSPITAL ENCOUNTER (OUTPATIENT)
Dept: PHYSICAL THERAPY | Facility: CLINIC | Age: 66
Setting detail: THERAPIES SERIES
Discharge: HOME | End: 2021-06-10
Payer: MEDICARE

## 2021-06-10 ENCOUNTER — HOSPITAL ENCOUNTER (OUTPATIENT)
Age: 66
Setting detail: SPECIMEN
Discharge: HOME | End: 2021-06-10
Payer: MEDICARE

## 2021-06-10 LAB — SARS-COV-2 ANTIBODY, TOTAL: NEGATIVE

## 2021-06-10 PROCEDURE — 97016 VASOPNEUMATIC DEVICE THERAPY: CPT

## 2021-06-10 PROCEDURE — 97110 THERAPEUTIC EXERCISES: CPT

## 2021-06-10 NOTE — FLOWSHEET NOTE
anterior approach   HS curls 20x 2#  Added weight    Quad stretch  3x20\"  Manual          SEATED      LAQ 20x 2# Added weight   20xea 2# Added weight          STANDING      Heel raises 20x     Mini squats 15x     3 way hip 20xea Lime  Added resistance 6/10, increased reps    TKE      Calf stretch  3x20\"           Step up      Step down      Step lateral      Lateral side steps            Ambulation  2 laps  1 lap with walker, 1 lap with cane                  Other: Manual muscle stick to R quad and lateral hip-10min-not today    Treatment Charges: Mins Units   []  Modalities     [x]  Ther Exercise 43 3   [x]  Manual Therapy     []  Ther Activities     []  Aquatics     [x]  Vasocompression 15 1   [x]  Other gait 5 0   Total Treatment time 63 4   Medicare Cost (as of 21): $394.03    Assessment: [x] Progressing toward goals. Added resistance for 3 way hip. Pt able to walk with minimal WB into RW, advance to walking with SPC. Pt did well with all, no complaints of pain with any exercise, only fatigue noted at the end.           [] No change. [] Other:  [x] Patient would continue to benefit from skilled physical therapy services in order to: improve R hip pain, ROM, and strength to assist with improved gait and overall QOL. Problems:    [x]? ? ? Pain: 1-6/10  [x]? ? ? ROM: decreased and painful R hip AROM  [x]? ? ? Strength: decreased RLE strength  [x]? ? ? Function: LEFI: , 8%% function   [x]? ? ? Balance: decreased WBing on RLE  [x]? ? Gait Deviations: FWW, TU\"                 Short Term Goals: MEET IN 6 VISITS Status   Pain: Pt will report less than or equal to 3-4/10 R hip pain with walking around his house, walking up/down his ramp, standing, and completing ADLs in order to progress to prior level of activity.  Ongoing   ROM: Pt will improve R AROM to 90° hip flexion and passive hip abduction to 15 degrees with 0-1/10 pain in order to promote an efficient gait pattern, improve ability to negotiate stairs, and enter/exit bed/car.  Ongoing   Strength: Pt will be able to demonstrate ability to complete sit to supine without the use of UEs to negotiate the RLE to promote independent bed mobility.  Ongoing   Gait: Pt will be able to complete the TUG in less than or equal to 20\" with the least restrictive AD and improved gait mechanics in order to promote an efficient gait pattern and safety with walking. Ongoing    Function: Pt will score greater than or equal to 25% on the LEFI in order to demonstrate improved function with ADLs and work related tasks. Ongoing   HEP: Pt will be independent in with HEP. Ongoing   Fall Prevention: Pt will acknowledge fall prevention interventions in order to prevent falls at home, work, or in the community. Ongoing    Long Term Goal: MEET IN 12 VISITS     Pain: Pt will report less than or equal to 1-2/10 R hip pain with walking on even and uneven ground, negotiating stairs, performing repeated sit to stands, and completing ADLs in order to progress to prior level of activity Ongoing   ROM: Pt will improve R hip AROM to 95° and active hip abd to 15 degrees in order to climb stairs, ambulate, and progress to prior level of activity. Ongoing   Strength: Pt will demonstrate gross RLE strength to 4/5 strength in order to promote dynamic stability and strength to assist with walking, standing, ramp/stair negotiation, and completion of ADLs.   Ongoing   Gait: Pt will be able to complete the TUG without an AD in less than or equal to 18\" while demonstrating improved stance time on the RLE and terminal stance to promote and efficient and safe gait pattern. Ongoing     Outcome Measure: Pt will report greater than or equal to 45% on the LEFI in order to demonstrate improved function. Ongoing                                 Patient goals: \"be able to walk out the backdoor to my garage and drink beer      Pt.  Education:  [x] Yes  [] No  [] Reviewed Prior HEP/Ed  Method of Education: [x] Verbal charted exercises, advancements   [] Demo    [] Written  Comprehension of Education:  [x] Verbalizes understanding. [x] Demonstrates understanding. [] Needs review. [x] Demonstrates/verbalizes HEP/Ed previously given. Plan: [x] Continue current frequency toward long and short term goals.     [x] Specific Instructions for subsequent treatments: provide fall prevention handout; R anterior hip LANRE with anterior approach      Time In: 9:02am           Time Out: 10:10am    Electronically signed by:  Ten Lopes PTA

## 2021-06-14 ENCOUNTER — TELEPHONE (OUTPATIENT)
Dept: PULMONOLOGY | Age: 66
End: 2021-06-14

## 2021-06-14 ENCOUNTER — HOSPITAL ENCOUNTER (OUTPATIENT)
Dept: PHYSICAL THERAPY | Facility: CLINIC | Age: 66
Setting detail: THERAPIES SERIES
Discharge: HOME | End: 2021-06-14
Payer: MEDICARE

## 2021-06-14 PROCEDURE — 97110 THERAPEUTIC EXERCISES: CPT

## 2021-06-14 NOTE — FLOWSHEET NOTE
[] [facility]) - [facility]  Therapy  [address]  [phone]  F: [phone] [x] [address]  [facility]   [address]  P: [phone]  F: [phone] [] [address] &  Therapy  [address] [facility]  P: [phone]  F: [phone] [] [address]  P: [phone]  F: [phone] [] [address]  [facility]   [address]   [place]: [phone]  F: [phone]      Physical Therapy Daily Treatment Note    Date:  2021  Patient Name:  Td Harvey    :  1955  MRN: [id number]   Physician: Dr. Jose Labor: Medicare (Part A/B)  Medical Diagnosis:   M16.11 (ICD-10-CM) - Arthritis of right hip   Z96.641 (ICD-10-CM) - S/P hip replacement, right     Rehab Codes: M25.551, M25.651, R26.2NEC, M62.81  Onset Date: 21               Next 's appt: 21    Visit# / total visits: ; Progress note for Medicare patient due at visit 12     Cancels/No Shows: 0/0    Subjective:    Pain:  [x] Yes  [] No Location: R hip Pain Rating: (0-10 scale) mild/10  Pain altered Tx:  [x] No  [] Yes  Action:    Comments: pt notes some mild pain in his R leg this date. Pt notes an increase in R leg pain when he is walking up and down his driveway and up the ramp. Pt reports he notes he has more pain when he is walking with his cane compared to his walker. Pt reports he did walk in the grass with the walker and this did not cause pain. Pt reports is still having difficulty stepping over the tub to get in from standing but is able to do so from a sitting position on the bench.  Pt notes his knee no longer bothers him    Objective:  Modalities: GAMEREADY, mod compression, 15' -- pt deferred 21   Precautions: R hip LANRE- no SLR and avoid bending past 90 degrees   Exercises: bolded completed 06/14/21   Exercise Reps/Time Weight/level  Comments   nustep  5min           SUPINE      Ankle pumps      Quad set 10x5\"     gastroc stretch 2x20\"  Manual    Marches  10x ea     SAQ 10x3\" holds 2# Added weight 6/7   Hip add - hooklying 20x3\" ball    Hip abd - hooklying 20x Blue  Increased resistance 6/7   Bridges  15x           SIDELYING      Hip abd 2x10 2# Added weight 6/7   clamshells 10x  A          Prone      Hip extension    With pillows under pelvis--To neutral if anterior approach   HS curls 20x 2#  Added weight 6/7   Quad stretch  3x20\"  Manual          SEATED      LAQ 20x 2# Added weight 6/7 March  20xea 2# Added weight 6/7         STANDING      Heel raises 25x     Mini squats 15x     3 way hip 20xea Lime  Added resistance 6/10, increased reps 6/7   TKE      Calf stretch  3x20\"     Marches      Step up 10x 6\"    Step down 10x 6\" Progressively greater ER of the RLE-- cueing to correct with minimal carryover   Step lateral      Lateral side steps            Ambulation  1 laps  With cane-- cueing to focus on keeping the R foot neutral  Slight out toe B                   Treatment Charges: Mins Units   []  Modalities     [x]  Ther Exercise 45 3   [x]  Manual Therapy     []  Ther Activities     []  Aquatics     []  Vasocompression     [x]  Other gait 5 0   Total Treatment time 50 3   Medicare Cost (as of 06/14/21): $470.19    Assessment: [x] Progressing toward goals. Re-assessment of goals completed this date with improvements in pain with walking up and down ramps continuing to be the most painful. Pt demonstrates improvements in active and passive ROM of the R hip with only mild groin pain with active hip flexion. Pt demonstrates improvements in ability to complete bed mobility without the use of the UEs to manage the RLE, however, does report continued use of UEs to assist with R leg when getting out of the car.  Pt demonstrates improvements in TUG time to 16\" with a cane with notable ER of the R foot. Pt with ability to correct RLE position when ambulating around the track with the cane, however, notes greater comfort when ambulating with the foot ER. Introduced stepping activities this date to improve tolerance to ascending and descending stairs at home and in the community with good tolerance. Pt notes fatigue in the RLE and only minimal discomfort. Pt requires cueing to maintain R foot in neutral position with minimal carryover. Pt continues to benefit from skilled therapy in order to progress functional strength and return to ambulation without an AD.       [] No change. [] Other:  [x] Patient would continue to benefit from skilled physical therapy services in order to: improve R hip pain, ROM, and strength to assist with improved gait and overall QOL. Problems:    [x]? ? ? Pain: 1-6/10  [x]? ? ? ROM: decreased and painful R hip AROM  [x]? ? ? Strength: decreased RLE strength  [x]? ? ? Function: LEFI: , 8%% function   [x]? ? ? Balance: decreased WBing on RLE  [x]? ? Gait Deviations: FWW, TU\"                 Short Term Goals: MEET IN 6 VISITS Status   Pain: Pt will report less than or equal to 3-4/10 R hip pain with walking around his house, walking up/down his ramp, standing, and completing ADLs in order to progress to prior level of activity.  Partially met   Walking around house: 3-4/10  Ramp: 4-5/10  ADLs: 0/10   ROM: Pt will improve R AROM to 90° hip flexion and passive hip abduction to 15 degrees with 0-1/10 pain in order to promote an efficient gait pattern, improve ability to negotiate stairs, and enter/exit bed/car.  MET   Flexion: 91° (mild groin pain)  Abd: 34° (passive)   Strength: Pt will be able to demonstrate ability to complete sit to supine without the use of UEs to negotiate the RLE to promote independent bed mobility.  MET    Gait: Pt will be able to complete the TUG in less than or equal to 20\" of Education:  [x] Verbalizes understanding. [x] Demonstrates understanding. [] Needs review. [x] Demonstrates/verbalizes HEP/Ed previously given. Plan: [x] Continue current frequency toward long and short term goals.     [x] Specific Instructions for subsequent treatments:R anterior hip LANRE with anterior approach      Time In: 1010 am           Time Out: 1105 am    Electronically signed by:  Otilia Reyez PT

## 2021-06-15 ENCOUNTER — TELEPHONE (OUTPATIENT)
Dept: ORTHOPEDIC SURGERY | Age: 66
End: 2021-06-15

## 2021-06-15 DIAGNOSIS — M16.11 ARTHRITIS OF RIGHT HIP: Primary | ICD-10-CM

## 2021-06-15 DIAGNOSIS — Z96.641 S/P HIP REPLACEMENT, RIGHT: ICD-10-CM

## 2021-06-15 DIAGNOSIS — M16.11 ARTHRITIS OF RIGHT HIP: ICD-10-CM

## 2021-06-15 RX ORDER — TRAMADOL HYDROCHLORIDE 50 MG/1
50 TABLET ORAL DAILY PRN
Qty: 7 TABLET | Refills: 0 | Status: SHIPPED | OUTPATIENT
Start: 2021-06-15 | End: 2021-06-22

## 2021-06-15 NOTE — TELEPHONE ENCOUNTER
Patient requesting refill for the following medication:  Tramadol 50 mg tablets    Please send refill order to:  [id number] [facility] [address]

## 2021-06-15 NOTE — TELEPHONE ENCOUNTER
DOS:4/27/2021-right total hip arthroplasty    Per last office note, the patient can have tramadol daily for 7 days. Medication is pended at this time. Please advise, thank you.

## 2021-06-16 ENCOUNTER — TELEPHONE (OUTPATIENT)
Dept: INFECTIOUS DISEASES | Age: 66
End: 2021-06-16

## 2021-06-16 NOTE — TELEPHONE ENCOUNTER
Pt last seen in April -  · Patient suffers from hypogammaglobulinemia. He receives IVIG 3 3 weeks. · He will need to move his current schedule of IVIG infusion from April 12 to April 23, 2021 in order to be best protected for the right hip replacement surgery on 4/27/2021. · Patient will require 1750 mg of vancomycin and 2 g of cefepime every 12 hours starting preoperatively on the day of surgery. · Patient has been given prescriptions with the above requests to present to the hospital personnel on the day of his surgery. Wife calling office with non related question. Isra Chilel just completed allergy testing and immunologist is recommending a 3rd covid booster vaccine, telling him he has no antibodies. Pt received pfizer vaccine - last one on 3/15/21     She is asking for your opinion, she feels like he is being used as a guennie pig. Please advise.

## 2021-06-16 NOTE — TELEPHONE ENCOUNTER
Per Dr Lesia Griffith - he thinks it's a great idea for him to get 3rd vaccine. Called Randy back to inform.

## 2021-06-17 ENCOUNTER — HOSPITAL ENCOUNTER (OUTPATIENT)
Dept: PHYSICAL THERAPY | Facility: CLINIC | Age: 66
Setting detail: THERAPIES SERIES
Discharge: HOME | End: 2021-06-17
Payer: MEDICARE

## 2021-06-17 PROCEDURE — 97110 THERAPEUTIC EXERCISES: CPT

## 2021-06-17 PROCEDURE — 97016 VASOPNEUMATIC DEVICE THERAPY: CPT

## 2021-06-17 NOTE — FLOWSHEET NOTE
[] Yoni Roach 47  Outpatient Rehabilitation &  Therapy  [address]  [phone]  F: [phone] [x] [address]  [facility]   [address]  P: [phone]  F: [phone] [] [address] &  Therapy  [address] [facility] [address]  P: [phone]  F: [phone] [] [address]  P: [phone]  F: [phone] [] [address]  [facility]   [address]   [place]: [phone]  F: [phone]      Physical Therapy Daily Treatment Note    Date:  2021  Patient Name:  Maciel Garcia    :  1955  MRN: [id number]   Physician: Dr. Nolan Bloom: Medicare (Part A/B)  Medical Diagnosis:   M16.11 (ICD-10-CM) - Arthritis of right hip   Z96.641 (ICD-10-CM) - S/P hip replacement, right     Rehab Codes: M25.551, M25.651, R26.2NEC, M62.81  Onset Date: 21               Next 's appt: 21    Visit# / total visits: ; Progress note for Medicare patient due at visit 12     Cancels/No Shows: 0/0    Subjective:    Pain:  [x] Yes  [] No Location: R hip Pain Rating: (0-10 scale) 2-3/10  Pain altered Tx:  [x] No  [] Yes  Action:    Comments: Pt arrives amb with rolling walking and states sciatic nerve is acting up this date resulting in pain traveling down RLE from hip to ankle. Reports taking pain meds this morning to help reduce pain.    Objective:  Modalities: GAMEREADY, mod compression, 10'  Precautions: R hip LANRE- no SLR and avoid bending past 90 degrees   Exercises: bolded completed 21   Exercise Reps/Time Weight/level  Comments   nustep  5min           SUPINE      Ankle pumps      Quad set 10x5\"     gastroc stretch 2x20\"  Manual    Marches  10x ea     SAQ 20x3\" holds 2# Added weight 6/7   Hip add - hooklying 20x3\" ball    Hip abd - hooklying 20x Blue  Increased resistance 6/7   Bridges  15x           SIDELYING      Hip abd 2x10 2# Added weight 6/7   clamshells 2x10 A          Prone      Hip extension    With pillows under pelvis--To neutral if anterior approach   HS curls 20x 2#  Added weight 6/7   Quad stretch  3x20\"  Manual          SEATED      LAQ 20x 2# Added weight 6/7 March  20xea 2# Added weight 6/7         STANDING      Heel raises 25x     Mini squats 10x2     3 way hip 20xea Lime  Added resistance 6/10, increased reps 6/7   TKE      Calf stretch  3x20\"     Marches      Step up 10x 6\"    Step down 10x 6\" Progressively greater ER of the RLE-- cueing to correct with minimal carryover   Step lateral      Lateral side steps 4x // bars  Added 6/17         Ambulation  1 lap  With cane-- cueing to focus on keeping the R foot neutral  Slight out toe B                   Treatment Charges: Mins Units   []  Modalities     [x]  Ther Exercise 47 3   [x]  Manual Therapy     []  Ther Activities     []  Aquatics     [x]  Vasocompression 10 1   [x]  Other gait 5 0   Total Treatment time 62 4   Medicare Cost (as of 06/17/21): $542.89    Assessment: [x] Progressing toward goals. Initiated session with mat exercises charted above. Vc's throughout session to maintain an upright posture along with completing exercises within a pain free range kamran with seated marches to alleviate popping in R hip. Progressed reps for SAQ's and mini squats with addition of lateral side stepping in // bars. Pt notes discomfort in quad after completion of squats this date, therefore, implemented seated rest break. Vc's to maintain a neutral foot with steps, hip abduction, and ambulation with fair carryover. Ended with vaso to R hip in supine to reduce pain and muscle soreness this date. [] No change.      [] Other:  [x] Patient would continue to benefit from skilled physical therapy services in order to: improve R hip pain, ROM, and strength to assist with improved gait and overall QOL. Problems:    [x]? ? ? Pain: 1-6/10  [x]? ? ? ROM: decreased and painful R hip AROM  [x]? ? ? Strength: decreased RLE strength  [x]? ? ? Function: LEFI: , 8%% function   [x]? ? ? Balance: decreased WBing on RLE  [x]? ? Gait Deviations: FWW, TU\"                 Short Term Goals: MEET IN 6 VISITS Status   Pain: Pt will report less than or equal to 3-4/10 R hip pain with walking around his house, walking up/down his ramp, standing, and completing ADLs in order to progress to prior level of activity. Partially met   Walking around house: 3-4/10  Ramp: 4-5/10  ADLs: 0/10   ROM: Pt will improve R AROM to 90° hip flexion and passive hip abduction to 15 degrees with 0-1/10 pain in order to promote an efficient gait pattern, improve ability to negotiate stairs, and enter/exit bed/car.  MET   Flexion: 91° (mild groin pain)  Abd: 34° (passive)   Strength: Pt will be able to demonstrate ability to complete sit to supine without the use of UEs to negotiate the RLE to promote independent bed mobility.  MET    Gait: Pt will be able to complete the TUG in less than or equal to 20\" with the least restrictive AD and improved gait mechanics in order to promote an efficient gait pattern and safety with walking. MET   16\" with cane   Function: Pt will score greater than or equal to 25% on the LEFI in order to demonstrate improved function with ADLs and work related tasks. MET   45% MAX FUNCTION   HEP: Pt will be independent in with HEP. MET    Fall Prevention: Pt will acknowledge fall prevention interventions in order to prevent falls at home, work, or in the community.  MET    Long Term Goal: MEET IN 12 VISITS     Pain: Pt will report less than or equal to 1-2/10 R hip pain with walking on even and uneven ground, negotiating stairs, performing repeated sit to stands, and completing ADLs in order to progress to prior level of activity Ongoing   ROM: Pt will improve R hip AROM to 95° and active hip abd to 15 degrees in order to climb stairs, ambulate, and progress to prior level of activity. Ongoing   Strength: Pt will demonstrate gross RLE strength to 4/5 strength in order to promote dynamic stability and strength to assist with walking, standing, ramp/stair negotiation, and completion of ADLs.   Ongoing   Gait: Pt will be able to complete the TUG without an AD in less than or equal to 18\" while demonstrating improved stance time on the RLE and terminal stance to promote and efficient and safe gait pattern. Ongoing     Outcome Measure: Pt will report greater than or equal to 45% on the LEFI in order to demonstrate improved function. Outcome Measure: Pt will report greater than or equal to 60% on the LEFI in order to demonstrate improved function. MET 6/14  45% max function                                       Patient goals: \"be able to walk out the backdoor to my garage and drink beer      Pt. Education:  [x] Yes  [] No  [] Reviewed Prior HEP/Ed  Method of Education: [x] Verbal charted exercises [x] Demo    [] Written  6/14: fall handout provided  Comprehension of Education:  [x] Verbalizes understanding. [x] Demonstrates understanding. [] Needs review. [x] Demonstrates/verbalizes HEP/Ed previously given. Plan: [x] Continue current frequency toward long and short term goals.     [x] Specific Instructions for subsequent treatments:R anterior hip LANRE with anterior approach      Time In: 10:06 am           Time Out: 11:08 am    Electronically signed by:  Mitch Ordoñez PTA

## 2021-06-21 ENCOUNTER — APPOINTMENT (OUTPATIENT)
Dept: PHYSICAL THERAPY | Facility: CLINIC | Age: 66
End: 2021-06-21
Payer: MEDICARE

## 2021-06-21 ENCOUNTER — HOSPITAL ENCOUNTER (OUTPATIENT)
Dept: PHYSICAL THERAPY | Facility: CLINIC | Age: 66
Setting detail: THERAPIES SERIES
Discharge: HOME | End: 2021-06-21
Payer: MEDICARE

## 2021-06-21 PROCEDURE — 97110 THERAPEUTIC EXERCISES: CPT

## 2021-06-21 PROCEDURE — 97016 VASOPNEUMATIC DEVICE THERAPY: CPT

## 2021-06-21 PROCEDURE — 97140 MANUAL THERAPY 1/> REGIONS: CPT

## 2021-06-21 NOTE — FLOWSHEET NOTE
[] [facility]) - [facility] &  Therapy  [address]  [phone]  F: [phone] [x] [address]  [facility]   [address]  P: [phone]  F: [phone] [] [address] &  Therapy  [address] [facility] [address]  P: [phone]  F: [phone] [] [address] [employer]  P: [phone]  F: [phone] [] [address]  [facility]   [address]   [place]: [phone]  F: [phone]      Physical Therapy Daily Treatment Note    Date:  2021  Patient Name:  Max Lawton    :  1955  MRN: [id number]   Physician: Dr. Josiane Dalal: Medicare (Part A/B)  Medical Diagnosis:   M16.11 (ICD-10-CM) - Arthritis of right hip   Z96.641 (ICD-10-CM) - S/P hip replacement, right     Rehab Codes: M25.551, M25.651, R26.2NEC, M62.81  Onset Date: 21               Next 's appt: 21    Visit# / total visits: ; Progress note for Medicare patient due at visit 12     Cancels/No Shows: 0/0    Subjective:    Pain:  [x] Yes  [] No Location: R hip Pain Rating: (0-10 scale) 3-4/10  Pain altered Tx:  [x] No  [] Yes  Action:    Comments: Pt arrives stating he had a bad weekend since he left therapy last. Pt reports he has had a constant pain in the R leg in the front and back of the leg. Pt also notes he has some pain in the side of his hip. Pt reports he did take a percocet and tylenol today which seems to have helped today.       Objective:    Modalities: GAMEREADY, mod compression, 10'  Precautions: R hip LANRE- no SLR and avoid bending past 90 degrees   Exercises: bolded completed 21   Exercise Reps/Time Weight/level  Comments   nustep  6min     manual 8'  Stick roll to quad and ITB/glute   SUPINE      Ankle pumps      Quad set 10x5\" the end of the session and vasocompression was completed to assist with residual soreness in RLE. [] No change. [] Other:  [x] Patient would continue to benefit from skilled physical therapy services in order to: improve R hip pain, ROM, and strength to assist with improved gait and overall QOL. Problems:    [x]? ? ? Pain: 1-6/10  [x]? ? ? ROM: decreased and painful R hip AROM  [x]? ? ? Strength: decreased RLE strength  [x]? ? ? Function: LEFI: , 8%% function   [x]? ? ? Balance: decreased WBing on RLE  [x]? ? Gait Deviations: FWW, TU\"                 Short Term Goals: MEET IN 6 VISITS Status   Pain: Pt will report less than or equal to 3-4/10 R hip pain with walking around his house, walking up/down his ramp, standing, and completing ADLs in order to progress to prior level of activity. Partially met   Walking around house: 3-4/10  Ramp: 4-5/10  ADLs: 0/10   ROM: Pt will improve R AROM to 90° hip flexion and passive hip abduction to 15 degrees with 0-1/10 pain in order to promote an efficient gait pattern, improve ability to negotiate stairs, and enter/exit bed/car.  MET   Flexion: 91° (mild groin pain)  Abd: 34° (passive)   Strength: Pt will be able to demonstrate ability to complete sit to supine without the use of UEs to negotiate the RLE to promote independent bed mobility.  MET    Gait: Pt will be able to complete the TUG in less than or equal to 20\" with the least restrictive AD and improved gait mechanics in order to promote an efficient gait pattern and safety with walking. MET   16\" with cane   Function: Pt will score greater than or equal to 25% on the LEFI in order to demonstrate improved function with ADLs and work related tasks. MET   45% MAX FUNCTION   HEP: Pt will be independent in with HEP. MET    Fall Prevention: Pt will acknowledge fall prevention interventions in order to prevent falls at home, work, or in the community.  MET    Long Term Goal: MEET IN 12 VISITS     Pain: Pt will report less than or equal to 1-2/10 R hip pain with walking on even and uneven ground, negotiating stairs, performing repeated sit to stands, and completing ADLs in order to progress to prior level of activity Ongoing   ROM: Pt will improve R hip AROM to 95° and active hip abd to 15 degrees in order to climb stairs, ambulate, and progress to prior level of activity. Ongoing   Strength: Pt will demonstrate gross RLE strength to 4/5 strength in order to promote dynamic stability and strength to assist with walking, standing, ramp/stair negotiation, and completion of ADLs.   Ongoing   Gait: Pt will be able to complete the TUG without an AD in less than or equal to 18\" while demonstrating improved stance time on the RLE and terminal stance to promote and efficient and safe gait pattern. Ongoing     Outcome Measure: Pt will report greater than or equal to 45% on the LEFI in order to demonstrate improved function. Outcome Measure: Pt will report greater than or equal to 60% on the LEFI in order to demonstrate improved function. MET 6/14  45% max function                                       Patient goals: \"be able to walk out the backdoor to my garage and drink beer      Pt. Education:  [x] Yes  [] No  [] Reviewed Prior HEP/Ed  Method of Education: [x] Verbal increased use of cane can result in increased soreness of the RLE [] Demo    [] Written  6/14: fall handout provided  Comprehension of Education:  [x] Verbalizes understanding. [x] Demonstrates understanding. [] Needs review. [x] Demonstrates/verbalizes HEP/Ed previously given. Plan: [x] Continue current frequency toward long and short term goals.     [x] Specific Instructions for subsequent treatments:R anterior hip LANRE with anterior approach      Time In: 903am           Time Out: 1015 am    Electronically signed by:  Abhishek Britton, PT

## 2021-06-22 RX ORDER — SODIUM CHLORIDE 0.9 % (FLUSH) 0.9 %
5-40 SYRINGE (ML) INJECTION PRN
Status: CANCELLED | OUTPATIENT
Start: 2021-06-23

## 2021-06-22 RX ORDER — SODIUM CHLORIDE 9 MG/ML
INJECTION, SOLUTION INTRAVENOUS CONTINUOUS
Status: CANCELLED | OUTPATIENT
Start: 2021-06-23

## 2021-06-23 ENCOUNTER — HOSPITAL ENCOUNTER (OUTPATIENT)
Dept: INTERVENTIONAL RADIOLOGY/VASCULAR | Age: 66
Discharge: HOME | End: 2021-06-25
Payer: MEDICARE

## 2021-06-23 VITALS
TEMPERATURE: 96.8 F | HEIGHT: 68 IN | BODY MASS INDEX: 37.28 KG/M2 | OXYGEN SATURATION: 98 % | SYSTOLIC BLOOD PRESSURE: 133 MMHG | HEART RATE: 82 BPM | DIASTOLIC BLOOD PRESSURE: 83 MMHG | WEIGHT: 246 LBS | RESPIRATION RATE: 16 BRPM

## 2021-06-23 DIAGNOSIS — D83.9 COMMON VARIABLE IMMUNODEFICIENCY (HCC): ICD-10-CM

## 2021-06-23 LAB
-: NORMAL
INR BLD: 1
INR BLD: 1
PARTIAL THROMBOPLASTIN TIME: 30.7 SEC (ref 23.9–33.8)
PLATELET # BLD: 235 K/UL (ref 138–453)
PROTHROMBIN TIME: 12.8 SEC (ref 11.5–14.2)
PROTHROMBIN TIME: 12.8 SEC (ref 11.5–14.2)
REASON FOR REJECTION: NORMAL
ZZ NTE CLEAN UP: ORDERED TEST: NORMAL
ZZ NTE WITH NAME CLEAN UP: SPECIMEN SOURCE: NORMAL

## 2021-06-23 PROCEDURE — 36561 INSERT TUNNELED CV CATH: CPT | Performed by: RADIOLOGY

## 2021-06-23 PROCEDURE — 99152 MOD SED SAME PHYS/QHP 5/>YRS: CPT | Performed by: RADIOLOGY

## 2021-06-23 PROCEDURE — 2580000003 HC RX 258: Performed by: RADIOLOGY

## 2021-06-23 PROCEDURE — C1894 INTRO/SHEATH, NON-LASER: HCPCS

## 2021-06-23 PROCEDURE — 77001 FLUOROGUIDE FOR VEIN DEVICE: CPT | Performed by: RADIOLOGY

## 2021-06-23 PROCEDURE — 85610 PROTHROMBIN TIME: CPT

## 2021-06-23 PROCEDURE — 85730 THROMBOPLASTIN TIME PARTIAL: CPT

## 2021-06-23 PROCEDURE — 7100000011 HC PHASE II RECOVERY - ADDTL 15 MIN

## 2021-06-23 PROCEDURE — 85049 AUTOMATED PLATELET COUNT: CPT

## 2021-06-23 PROCEDURE — 99153 MOD SED SAME PHYS/QHP EA: CPT | Performed by: RADIOLOGY

## 2021-06-23 PROCEDURE — 36590 REMOVAL TUNNELED CV CATH: CPT | Performed by: RADIOLOGY

## 2021-06-23 PROCEDURE — 36415 COLL VENOUS BLD VENIPUNCTURE: CPT

## 2021-06-23 PROCEDURE — 6360000002 HC RX W HCPCS: Performed by: RADIOLOGY

## 2021-06-23 PROCEDURE — 76937 US GUIDE VASCULAR ACCESS: CPT | Performed by: RADIOLOGY

## 2021-06-23 PROCEDURE — 7100000010 HC PHASE II RECOVERY - FIRST 15 MIN

## 2021-06-23 RX ORDER — SODIUM CHLORIDE 9 MG/ML
INJECTION, SOLUTION INTRAVENOUS CONTINUOUS
Status: DISCONTINUED | OUTPATIENT
Start: 2021-06-23 | End: 2021-06-26 | Stop reason: HOSPADM

## 2021-06-23 RX ORDER — FENTANYL CITRATE 50 UG/ML
INJECTION, SOLUTION INTRAMUSCULAR; INTRAVENOUS
Status: COMPLETED | OUTPATIENT
Start: 2021-06-23 | End: 2021-06-23

## 2021-06-23 RX ORDER — ACETAMINOPHEN 500 MG
1000 TABLET ORAL EVERY 6 HOURS PRN
COMMUNITY

## 2021-06-23 RX ORDER — SODIUM CHLORIDE 0.9 % (FLUSH) 0.9 %
5-40 SYRINGE (ML) INJECTION PRN
Status: DISCONTINUED | OUTPATIENT
Start: 2021-06-23 | End: 2021-06-26 | Stop reason: HOSPADM

## 2021-06-23 RX ORDER — HEPARIN SODIUM (PORCINE) LOCK FLUSH IV SOLN 100 UNIT/ML 100 UNIT/ML
SOLUTION INTRAVENOUS
Status: COMPLETED | OUTPATIENT
Start: 2021-06-23 | End: 2021-06-23

## 2021-06-23 RX ORDER — ACETAMINOPHEN 325 MG/1
650 TABLET ORAL EVERY 4 HOURS PRN
Status: DISCONTINUED | OUTPATIENT
Start: 2021-06-23 | End: 2021-06-26 | Stop reason: HOSPADM

## 2021-06-23 RX ADMIN — Medication 50 MCG: at 12:55

## 2021-06-23 RX ADMIN — CEFAZOLIN 1000 MG: 1 INJECTION, POWDER, FOR SOLUTION INTRAMUSCULAR; INTRAVENOUS at 11:52

## 2021-06-23 RX ADMIN — Medication 500 UNITS: at 12:24

## 2021-06-23 RX ADMIN — Medication 50 MCG: at 12:34

## 2021-06-23 RX ADMIN — SODIUM CHLORIDE: 9 INJECTION, SOLUTION INTRAVENOUS at 10:32

## 2021-06-23 RX ADMIN — Medication 50 MCG: at 12:01

## 2021-06-23 ASSESSMENT — PAIN DESCRIPTION - DESCRIPTORS: DESCRIPTORS: ACHING

## 2021-06-23 ASSESSMENT — PAIN SCALES - GENERAL
PAINLEVEL_OUTOF10: 0

## 2021-06-23 ASSESSMENT — PAIN - FUNCTIONAL ASSESSMENT: PAIN_FUNCTIONAL_ASSESSMENT: 0-10

## 2021-06-23 NOTE — H&P
Interval H&P Note    Pt Name: Leda Mercado  MRN: [id number]  YOB: 1955  Date of evaluation: 6/23/2021      [x] I have reviewed the hardcopy Progress Note by Dr Amber Norman dated 6/8/21 labeled in short chart for an Interval History and Physical note. .    [x] I have examined  Leda Mercado  There are no changes to the patient who is scheduled for a port placement in IR by Dr Cheyenne Villalba for common variable immunodeficiency, unspecified. The patient follow with Dr Selma Flanagan and was seen 6/8/2021  He denies new health changes since his last visit  Hx Asthma/COPD Took Singulair, used Symbicort and Spiriva. He denies fever, chills, wheezing, cough, increased SOB, chest pain, open sores or wounds.     Past Medical History:     Past Medical History:   Diagnosis Date    Allergic rhinitis     Anxiety     Aseptic meningitis     reaction to first infusion in 2012-hospitalized but no problem since then    Asthma     Avascular necrosis of bone of hip, left ([address] 04/2018    CAD (coronary artery disease)     mild on cath, no stents    Common variable immunodeficiency ([address] 12/4/2013    COPD (chronic obstructive pulmonary disease) (HCC)     Deviated nasal septum     Dyspnea     with exertion    HTN (hypertension)     Hyperglycemia     Hypoxia     Immune deficiency disorder ([address] 2012    IGIV every 3 weeks with prednisone    Obesity     On supplemental oxygen therapy     PRECIOUS (obstructive sleep apnea) 2012    CPAP nightly    Osteoporosis     Reflux     Respiratory failure ([address] 2014    Oxygen on    Severe persistent asthma     Spondylosis of cervical spine     TIA (transient ischemic attack) 2005    Tobacco use     stopped 2004    Under care of team 04/15/2021    pulmonology-Dr Hull-[facility] correa-last visit march 2021    Under care of team 04/15/2021    infectious disease-Dr Chavez-[facility]-last visit march 2021    Under care of team 04/15/2021    immunology-Dr Rivers-last visit Food Insecurity:     Worried About Running Out of Food in the Last Year:     [address] in the Last Year:    Transportation Needs:     Lack of Transportation (Medical):      Lack of Transportation (Non-Medical):    Physical Activity:     Days of Exercise per Week:     Minutes of Exercise per Session:    Stress:     Feeling of Stress :    Social Connections:     Frequency of Communication with Friends and Family:     Frequency of Social Gatherings with Friends and Family:     Attends Worship Services:     Active Member of Clubs or Organizations:     Attends Club or Organization Meetings:     Marital Status:    Intimate Partner Violence:     Fear of Current or Ex-Partner:     Emotionally Abused:     Physically Abused:     Sexually Abused:        Family History:     Family History   Problem Relation Age of Onset    COPD Father     Coronary Art Dis Father     Heart Attack Father     Lung Cancer Mother     Breast Cancer Sister     No Known Problems Maternal Grandmother     No Known Problems Maternal Grandfather     Cancer Paternal Grandmother     Stroke Paternal Grandfather     Heart Attack Paternal Grandfather     Cancer Paternal Grandfather     Breast Cancer Sister     Cancer Sister         Throat       Medication History:     Current Outpatient Medications on File Prior to Encounter   Medication Sig Dispense Refill    acetaminophen (TYLENOL) 500 MG tablet Take 1,000 mg by mouth every 6 hours as needed for Pain      montelukast (SINGULAIR) 10 MG tablet take 1 tablet by mouth every evening 90 tablet 3    budesonide-formoterol (SYMBICORT) 160-4.5 MCG/ACT AERO inhale 2 puffs by mouth twice a day 3 Inhaler 3    fluticasone (FLONASE) 50 MCG/ACT nasal spray instill 2 sprays into each nostril once daily 3 Bottle 3    SPIRIVA HANDIHALER 18 MCG inhalation capsule inhale the contents of one capsule in the handihaler once daily 90 capsule 3    omeprazole (PRILOSEC) 20 MG delayed release capsule take 1 capsule by mouth once daily 60 capsule 3    amLODIPine (NORVASC) 10 MG tablet take 1 tablet by mouth once daily 60 tablet 3    benazepril (LOTENSIN) 10 MG tablet take 1 tablet by mouth once daily (Patient taking differently: Take 10 mg BID) 30 tablet 5    docusate sodium (COLACE) 100 MG capsule Take 1 capsule by mouth 2 times daily as needed for Constipation 60 capsule 0    albuterol sulfate HFA (VENTOLIN HFA) 108 (90 Base) MCG/ACT inhaler inhale 2 puffs by mouth and INTO THE LUNGS every 6 hours if needed for wheezing 3 Inhaler 3    predniSONE (DELTASONE) 10 MG tablet 4 tablet once daily for 3 days then 3 tablet once daily for 3 days then 2 tablet once daily for 3 days then 1 tablet once daily for 3 days then discontinue 30 tablet 0    albuterol (PROVENTIL) (2.5 MG/3ML) 0.083% nebulizer solution Take 3 mLs by nebulization every 4 hours as needed for Wheezing or Shortness of Breath 375 mL 5    fluticasone-salmeterol (ADVAIR DISKUS) 500-50 MCG/DOSE diskus inhaler Inhale 1 puff into the lungs 2 times daily 60 each 11    Handicap Placard MISC by Does not apply route For 5 years 1 each 0    traMADol (ULTRAM) 50 MG tablet Take 50 mg by mouth as needed for Pain.  predniSONE (DELTASONE) 10 MG tablet Start at 50 mg and taper 10 mg every 3 days 45 tablet 0    ALPRAZolam (XANAX) 0.5 MG tablet Take 0.5 mg by mouth 3 times daily as needed for Sleep. Latricia Gordon Immune Globulin, Human, 10 GM/100ML SOLN IV solution Infuse 60 g intravenously every 21 days        No current facility-administered medications on file prior to encounter. Vital signs: /77   Pulse 85   Temp 96.4 °F (35.8 °C) (Temporal)   Resp 16   Ht 5' 8\" (1.727 m)   Wt 246 lb (111.6 kg)   SpO2 97%   BMI 37.40 kg/m²     Allergies:  Adhesive tape    This is a 63 y.o.obese male who is pleasant, cooperative, alert and oriented x3, in no acute distress.     Heart: Port right upper chest Heart sounds are normal.  HR 85 regular rate

## 2021-06-23 NOTE — BRIEF OP NOTE
Brief Postoperative Note    Kenneth Hopkins  YOB: 1955  [id number]    Pre-operative Diagnosis: Immunodeficiency. Nonfunction port. Post-operative Diagnosis: Same    Procedure: Port removal, and placement new port    Medications Given: fentanyl    Anesthesia: Local    Surgeons/Assistants: Carol Hernandez MD    Estimated Blood Loss: minimal    Complications: none    Specimens: were not obtained    Findings: Rt IJ port removed uneventfully. New left IJ port inserted with catheter tip position in the upper RA. Port is flushed and ready for use at this time.       Electronically signed by Carol Hernandez MD on 6/23/2021 at 1:17 PM

## 2021-06-24 ENCOUNTER — HOSPITAL ENCOUNTER (OUTPATIENT)
Dept: PHYSICAL THERAPY | Facility: CLINIC | Age: 66
Setting detail: THERAPIES SERIES
Discharge: HOME | End: 2021-06-24
Payer: MEDICARE

## 2021-06-24 ENCOUNTER — TELEPHONE (OUTPATIENT)
Dept: ORTHOPEDIC SURGERY | Age: 66
End: 2021-06-24

## 2021-06-24 NOTE — FLOWSHEET NOTE
[] [facility]) - [facility] &  Therapy  [address]    [phone]  F: [phone]   [x] [address] [facility] [address]   [address]  P: [phone]  F: [phone]  [] [place]  [address] [place] [address]  P: [phone]  F: [phone] [] [address] [place] [address]  P: [phone]  F: [phone]  [] [address] [facility]   [address]   [place]: [phone]  F: [phone]   [] [place] [id number] [facility] [address]  [place]: [phone]   F: [phone]     Physical Therapy Cancel/No Show note    Date: 2021  Patient: Michael Caal  : 1955  MRN: [id number]    Cancels/No Shows to date:     For today's appointment patient:    [x]  Cancelled    [] Rescheduled appointment    [] No-show     Reason given by patient:    []  Patient ill    []  Conflicting appointment    [] No transportation      [] Conflict with work    [] No reason given    [] Weather related    [] COVID-19    [x] Other: surgery for port replacement 21       Comments:        [x] Next appointment was confirmed    Electronically signed by: Harrison Augustine PT

## 2021-06-28 ENCOUNTER — HOSPITAL ENCOUNTER (OUTPATIENT)
Dept: PHYSICAL THERAPY | Facility: CLINIC | Age: 66
Setting detail: THERAPIES SERIES
Discharge: HOME | End: 2021-06-28
Payer: MEDICARE

## 2021-06-28 PROCEDURE — 97110 THERAPEUTIC EXERCISES: CPT

## 2021-06-28 PROCEDURE — 97140 MANUAL THERAPY 1/> REGIONS: CPT

## 2021-06-28 NOTE — FLOWSHEET NOTE
[] [facility]) - [facility] &  Therapy  [address]  [phone]  F: [phone] [x] [address]  [facility] [address]   [address]  P: [phone]  F: [phone] [] [address] &  Therapy  [address] [facility]  P: [phone]  F: [phone] [] [address] [facility]  P: [phone]  F: [phone] [] [address]  [facility]   [address]   [place]: [phone]  F: [phone]      Physical Therapy Daily Treatment Note    Date:  2021  Patient Name:  Kenneth Hopkins    :  1955  MRN: [id number]   Physician: Dr. Miguel Ángel Santamaria: Medicare (Part A/B)  Medical Diagnosis:   M16.11 (ICD-10-CM) - Arthritis of right hip   Z96.641 (ICD-10-CM) - S/P hip replacement, right     Rehab Codes: M25.551, M25.651, R26.2NEC, M62.81  Onset Date: 21               Next 's appt: 21    Visit# / total visits: 10/12; Progress note for Medicare patient due at visit 12     Cancels/No Shows: 0/0    Subjective:    Pain:  [x] Yes  [] No Location: R hip Pain Rating: (0-10 scale) 2/10  Pain altered Tx:  [x] No  [] Yes  Action:    Comments: Pt states he is feeling much better. Has been busy with yard work. Enters carrying the cane.        Objective:    Modalities: GAMEREADY, mod compression, 10' not today per pt  Precautions: R hip LANRE- no SLR and avoid bending past 90 degrees   Exercises: bolded completed 21   Exercise Reps/Time Weight/level  Comments   nustep  6min     manual 8'  Stick roll to quad and ITB/glute   SUPINE      Ankle pumps      Quad set 10x5\"     gastroc stretch 2x20\"  Manual    Marches  10x ea     SAQ 20x3\" holds 2# Added weight    Hip add - hooklying 20x3\" ball    Hip abd - hooklying 20x Blue  Increased resistance  Bridges  15x           SIDELYING      Hip abd 2x10 2# Added weight    clamshells 2x10 A Modified          Prone      Hip extension    With pillows under pelvis--To neutral if anterior approach   HS curls 20x 2#  Added weight    Quad stretch  3x20\"  Manual          SEATED      LAQ 2x10 2# Added weight   20xea 2# Added weight          STANDING      Heel raises 25x     Mini squats 10x2     3 way hip (abd/ext only ) 25xea Lime  Added resistance 6/10, increased reps    TKE      Calf stretch  3x20\"           Step up 10x 6\"    Step down 10x 6\" Progressively greater ER of the RLE-- cueing to correct with minimal carryover   Step lateral      Lateral side steps 2x25\"ea  Added          Ambulation  1 lap  With cane-- cueing to focus on keeping the R foot neutral  Slight out toe B                   Treatment Charges: Mins Units   []  Modalities     [x]  Ther Exercise 36 2   [x]  Manual Therapy 8 1   []  Ther Activities     []  Aquatics     []  Vasocompression     []  Other gait     Total Treatment time 44 3   Medicare Cost (as of 21): $690.34    Assessment: [x] Progressing toward goals. Pt doing much better today. Continued with exercises as charted and pt had no complaints. Pt denied the need for vaso at the end of the session, states he feels good. [] No change. [] Other:  [x] Patient would continue to benefit from skilled physical therapy services in order to: improve R hip pain, ROM, and strength to assist with improved gait and overall QOL. Problems:    [x]? ? ? Pain: 1-6/10  [x]? ? ? ROM: decreased and painful R hip AROM  [x]? ? ? Strength: decreased RLE strength  [x]? ? ? Function: LEFI: , 8%% function   [x]? ? ? Balance: decreased WBing on RLE  [x]? ? Gait Deviations: FWW, TU\"                 Short Term Goals: MEET IN 6 VISITS Status   Pain: Pt will report less than or equal to 3-4/10 R hip pain with walking around his house, walking up/down his ramp, time on the RLE and terminal stance to promote and efficient and safe gait pattern. Ongoing     Outcome Measure: Pt will report greater than or equal to 45% on the LEFI in order to demonstrate improved function. Outcome Measure: Pt will report greater than or equal to 60% on the LEFI in order to demonstrate improved function. MET 6/14  45% max function                                       Patient goals: \"be able to walk out the backdoor to my garage and drink beer      Pt. Education:  [x] Yes  [] No  [] Reviewed Prior HEP/Ed  Method of Education: [x] Verbal increased use of cane can result in increased soreness of the RLE [] Demo    [] Written  6/14: fall handout provided  Comprehension of Education:  [x] Verbalizes understanding. [x] Demonstrates understanding. [] Needs review. [x] Demonstrates/verbalizes HEP/Ed previously given. Plan: [x] Continue current frequency toward long and short term goals.     [x] Specific Instructions for subsequent treatments:R anterior hip LANRE with anterior approach      Time In: 9:00am           Time Out: 9:50 am    Electronically signed by:  Rolf Avina PTA

## 2021-07-01 ENCOUNTER — HOSPITAL ENCOUNTER (OUTPATIENT)
Dept: PHYSICAL THERAPY | Facility: CLINIC | Age: 66
Setting detail: THERAPIES SERIES
Discharge: HOME | End: 2021-07-01
Payer: MEDICARE

## 2021-07-01 PROCEDURE — 97110 THERAPEUTIC EXERCISES: CPT

## 2021-07-01 NOTE — FLOWSHEET NOTE
[] [facility]) - [facility] &  Therapy  [address]  [phone]  F: [phone] [x] [address]   [address]  P: [phone]  F: [phone] [] [address] &  Therapy  [address]  P: [phone]  F: [phone] [] [address]  P: [phone]  F: [phone] [] [address]  [facility]   [address]   [place]: [phone]  F: [phone]      Physical Therapy Daily Treatment Note    Date:  2021  Patient Name:  Manisha Dodson    :  1955  MRN: [id number]   Physician: Dr. Stephenson Friend: Medicare (Part A/B)  Medical Diagnosis:   M16.11 (ICD-10-CM) - Arthritis of right hip   Z96.641 (ICD-10-CM) - S/P hip replacement, right     Rehab Codes: M25.551, M25.651, R26.2NEC, M62.81  Onset Date: 21               Next 's appt: 21    Visit# / total visits: ; Progress note for Medicare patient due at visit 12     Cancels/No Shows: 0/0    Subjective:    Pain:  [x] Yes  [] No Location: R hip Pain Rating: (0-10 scale) 1/10  Pain altered Tx:  [x] No  [] Yes  Action:    Comments: Pt arrives stating he is feeling great and isn't really using his cane much. Pt reports he continues to have some pain but it is minimal. Pt reports he will be going out of town on Monday. Pt reports he is taking ibuprofen/tylenol as needed. Pt reports he is not waking up due to pain but does still have some tenderness on the R hip when side sleeping.        Objective:   Modalities: GAMEREADY, mod compression, 10' not today per pt  Precautions: R hip LANRE- no SLR and avoid bending past 90 degrees   Exercises: bolded completed 21   Exercise Reps/Time Weight/level  Comments   nustep  6min LV2    manual 8' Stick roll to quad and ITB/glute   SUPINE      Ankle pumps      Quad set 10x5\"     gastroc stretch 2x20\"  Manual    Marches  10x ea     SAQ 20x3\" holds 2# Added weight 6/7   Hip add - hooklying 20x3\" ball    Hip abd - hooklying 20x Blue  Increased resistance 6/7   Bridges  25x           SIDELYING      Hip abd 2x10 2# Added weight 6/7   clamshells 2x10 A Modified          Prone      Hip extension  x  With pillows under pelvis--To neutral if anterior approach   HS curls 20x 2#  Added weight 6/7   Quad stretch  3x30\"  Manual          SEATED      LAQ 2x10 2# Added weight 6/7 March  20xea 2# Added weight 6/7         STANDING      Heel raises 25x     Mini squats 10x2     3 way hip  15x ea Lime     TKE      Calf stretch  3x20\"     Marches      stairs 2 flights 8\" 1 handrail  Step to leading with R   Step up 10x 6\"    Step down 10x 6\" Progressively greater ER of the RLE-- cueing to correct with minimal carryover   Step lateral      Lateral side steps 1x25' ea orange Band above knees         Ambulation  1 lap  With cane-- cueing to focus on keeping the R foot neutral  Slight out toe B                 Re-assessment 7/1/21   ROM  ° A/P STRENGTH     Left Right Left Right   Hip Flex (0-120)   103°A 5 sitting 5   Ext (0-30)    5°  MILD HIP ROT    4+   ER (0-45)      5 sitting 4+ sitting   IR (0-45)     5 5   ABD (0-45)   32° A   good     sitting 4+   ADD (0-30)      good  sitting  good  sitting   Knee Flex (0-135)     5 5   Ext (10-0)     5 5   Ankle DF (0-20)     5 5   PF (0-50)      3- 3-    TUG: 10\" without cane      Treatment Charges: Mins Units   []  Modalities     [x]  Ther Exercise 35 2   []  Manual Therapy     []  Ther Activities     []  Aquatics     []  Vasocompression     []  Other gait     Total Treatment time 35 2   Medicare Cost (as of 07/01/21): $743.28    Assessment: [x] Progressing toward goals.  Alexandra Ledbetter is 2 months s/p R hip replacement with appropriate post-operative progressions in pain, ROM, strength, and function. Pt reports taking ibuprofen/tylenl prn and denies sleep disturbances. Pt is able to demonstrate efficient gait mechanics with and without a straight cane. Pt completed the TUG in 10\" and demonstrates ability to ascend/descend a flight of stairs with 1 handrail with a step to pattern. Pt also demonstrates gross improvements in R hip AROM along with generalized RLE strength to being greater than 4/5. At this time, pt reports confidence with continued exercise completion at home. Pt will be going out of town for 1 week and pending response to travel, pt will be discharged upon his return. [] No change. [] Other:  [x] Patient would continue to benefit from skilled physical therapy services in order to: improve R hip pain, ROM, and strength to assist with improved gait and overall QOL. Problems:    [x]? ? ? Pain: 1-6/10  [x]? ? ? ROM: decreased and painful R hip AROM  [x]? ? ? Strength: decreased RLE strength  [x]? ? ? Function: LEFI: , 8%% function   [x]? ? ? Balance: decreased WBing on RLE  [x]? ? Gait Deviations: FWW, TU\"                 Short Term Goals: MEET IN 6 VISITS Status   Pain: Pt will report less than or equal to 3-4/10 R hip pain with walking around his house, walking up/down his ramp, standing, and completing ADLs in order to progress to prior level of activity.  MET    ROM: Pt will improve R AROM to 90° hip flexion and passive hip abduction to 15 degrees with 0-1/10 pain in order to promote an efficient gait pattern, improve ability to negotiate stairs, and enter/exit bed/car.  MET   Flexion: 91° (mild groin pain)  Abd: 34° (passive)   Strength: Pt will be able to demonstrate ability to complete sit to supine without the use of UEs to negotiate the RLE to promote independent bed mobility.  MET    Gait: Pt will be able to complete the TUG in less than or equal to 20\" with the least restrictive AD and improved gait mechanics in order to promote an efficient gait pattern and safety with walking. MET 6/14  16\" with cane   Function: Pt will score greater than or equal to 25% on the LEFI in order to demonstrate improved function with ADLs and work related tasks. MET 6/14  45% MAX FUNCTION   HEP: Pt will be independent in with HEP. MET 6/14   Fall Prevention: Pt will acknowledge fall prevention interventions in order to prevent falls at home, work, or in the community. MET 6/14   Long Term Goal: MEET IN 12 VISITS     Pain: Pt will report less than or equal to 1-2/10 R hip pain with walking on even and uneven ground, negotiating stairs, performing repeated sit to stands, and completing ADLs in order to progress to prior level of activity MET 7/1/21   ROM: Pt will improve R hip AROM to 95° and active hip abd to 15 degrees in order to climb stairs, ambulate, and progress to prior level of activity. MET 7/1/21   Strength: Pt will demonstrate gross RLE strength to 4/5 strength in order to promote dynamic stability and strength to assist with walking, standing, ramp/stair negotiation, and completion of ADLs.   MET 7/1/21   Gait: Pt will be able to complete the TUG without an AD in less than or equal to 18\" while demonstrating improved stance time on the RLE and terminal stance to promote and efficient and safe gait pattern. MET 7/1  10\"   Outcome Measure: Pt will report greater than or equal to 45% on the LEFI in order to demonstrate improved function. Outcome Measure: Pt will report greater than or equal to 60% on the LEFI in order to demonstrate improved function. MET 6/14  45% max function    MET 7/1  75% max function                                 Patient goals: \"be able to walk out the backdoor to my garage and drink beer      Pt.  Education:  [x] Yes  [] No  [] Reviewed Prior HEP/Ed  Method of Education: [x] Verbal[] Demo    [] Written  6/14: fall handout provided  7/1: educated to wear compression stockings on the airplane; educated on continued improvements in strength and pain with time and encouraged continue HEP completion. Comprehension of Education:  [x] Verbalizes understanding. [x] Demonstrates understanding. [] Needs review. [x] Demonstrates/verbalizes HEP/Ed previously given. Plan: [x] Continue current frequency toward long and short term goals. - pt has been advised to contact the PT office following his trip regarding completion of the final or additional visits.    [x] Specific Instructions for subsequent treatments:R anterior hip LANRE with anterior approach      Time In: 908 am           Time Out: 956 am    Electronically signed by:  Winnie Simmons PT

## 2021-07-16 ENCOUNTER — OFFICE VISIT (OUTPATIENT)
Dept: ORTHOPEDIC SURGERY | Age: 66
End: 2021-07-16

## 2021-07-16 VITALS — WEIGHT: 255.4 LBS | BODY MASS INDEX: 38.71 KG/M2 | HEIGHT: 68 IN

## 2021-07-16 DIAGNOSIS — M16.11 ARTHRITIS OF RIGHT HIP: Primary | ICD-10-CM

## 2021-07-16 DIAGNOSIS — Z96.641 S/P HIP REPLACEMENT, RIGHT: ICD-10-CM

## 2021-07-16 PROCEDURE — 99024 POSTOP FOLLOW-UP VISIT: CPT | Performed by: ORTHOPAEDIC SURGERY

## 2021-07-16 ASSESSMENT — ENCOUNTER SYMPTOMS
COUGH: 0
NAUSEA: 0
CONSTIPATION: 0
DIARRHEA: 0

## 2021-07-16 NOTE — PROGRESS NOTES
[facility]  [facility]  [address]  Dept: [phone]  Dept Fax: [phone]        Postoperative follow-up note    Subjective:   Wing Salgado is a 63y.o. year old male who presents to our office today for postoperative followup regarding his   1. Arthritis of right hip    2. S/P hip replacement, right    . Chief Complaint   Patient presents with    Post-Op Check     DOS 4/27/21 R LANRE      Wing Salgado  is a 63y.o. year old male who presents to our office today for postoperative follow up after having undergone a left total hip arthroplasty on 4/27/21. The patient denies fevers, chills, nausea, vomiting, diarrhea. The patient has started physical therapy. Patient is doing really well. Patient is not using any ambulatory devices. Patient is taking tylenol and ibuprofen for pain. Review of Systems   Constitutional: Negative for chills and fever. Respiratory: Negative for cough. Gastrointestinal: Negative for constipation, diarrhea and nausea. Musculoskeletal: Positive for arthralgias (right hip). Negative for gait problem, joint swelling and myalgias. Neurological: Negative for dizziness, weakness and numbness. I have reviewed the CC, HPI, ROS, PMH, FHX, Social History, and if not present in this note, I have reviewed in the patient's chart. I agree with the documentation provided by other staff and have reviewed their documentation prior to providing my signature indicating agreement. Objective :   General: Wing Salgado is a 63 y.o. male who is alert and oriented and sitting comfortably in our office. Ortho Exam  MS:   Walks without a limp. Motor, sensory, vascular examination of bilateral lower extremities is grossly intact without focal deficits. Neuro: alert. oriented  Eyes: Extra-ocular muscles intact  Mouth: Oral mucosa moist. No perioral lesions  Pulm: Respirations unlabored and regular.   Skin: warm, well perfused  Psych:   Patient has good fund of knowledge and displays understanging of exam, diagnosis, and plan. Assessment:      1. Arthritis of right hip    2. S/P hip replacement, right         Plan:      Patient doing really well. Will have patient continue his home exercise program. Patient to continue his tylenol and ibuprofen. Will see patient back in one year for surveillance x-rays of both hips. Follow up: Return in about 1 year (around 7/16/2022) for x rays. No orders of the defined types were placed in this encounter. No orders of the defined types were placed in this encounter. Nancy George RN am scribing for and in the presence of Dr. Gely Sahni  7/18/2021 6:04 PM      I have reviewed and made changes accordingly to the work scribed by Pepe Lincoln RN. The documentation accurately reflects work and decisions made by me. I have also reviewed documentation completed by clinical staff.     Gely Sahni DO, [facility]  7/18/2021 6:04 PM    This note is created with the assistance of a speech recognition program.  While intending to generate a document that actually reflects the content of the visit, the document can still have some errors including those of syntax and sound a like substitutions which may escape proof reading.  In such instances, actual meaning can be extrapolated by contextual diversion      Electronically signed by [place] Barahona on 7/18/2021 at 6:04 PM

## 2021-07-20 ENCOUNTER — OFFICE VISIT (OUTPATIENT)
Dept: PULMONOLOGY | Age: 66
End: 2021-07-20
Payer: MEDICARE

## 2021-07-20 VITALS
HEART RATE: 79 BPM | TEMPERATURE: 96.2 F | RESPIRATION RATE: 22 BRPM | BODY MASS INDEX: 38.19 KG/M2 | WEIGHT: 252 LBS | SYSTOLIC BLOOD PRESSURE: 120 MMHG | HEIGHT: 68 IN | DIASTOLIC BLOOD PRESSURE: 72 MMHG | OXYGEN SATURATION: 99 %

## 2021-07-20 DIAGNOSIS — J96.11 CHRONIC RESPIRATORY FAILURE WITH HYPOXIA (HCC): ICD-10-CM

## 2021-07-20 DIAGNOSIS — D83.9 CVID (COMMON VARIABLE IMMUNODEFICIENCY) (HCC): ICD-10-CM

## 2021-07-20 DIAGNOSIS — J45.50 SEVERE PERSISTENT ASTHMA WITHOUT COMPLICATION: ICD-10-CM

## 2021-07-20 DIAGNOSIS — J30.9 ALLERGIC RHINITIS, UNSPECIFIED SEASONALITY, UNSPECIFIED TRIGGER: ICD-10-CM

## 2021-07-20 DIAGNOSIS — Z87.891 HISTORY OF SMOKING AT LEAST 1 PACK PER DAY FOR AT LEAST 30 YEARS: ICD-10-CM

## 2021-07-20 DIAGNOSIS — J44.9 CHRONIC OBSTRUCTIVE PULMONARY DISEASE, UNSPECIFIED COPD TYPE (HCC): Primary | ICD-10-CM

## 2021-07-20 DIAGNOSIS — G47.33 OSA ON CPAP: ICD-10-CM

## 2021-07-20 DIAGNOSIS — Z99.89 OSA ON CPAP: ICD-10-CM

## 2021-07-20 PROCEDURE — 99214 OFFICE O/P EST MOD 30 MIN: CPT | Performed by: INTERNAL MEDICINE

## 2021-07-20 NOTE — PROGRESS NOTES
PULMONARY OUTPATIENT PROGRESS NOTE      Patient:  Jim Rivero  YOB: 1955    MRN: [id number]     Acct:        Pt seen and Chart reviewed. Mr/Ms Jim Rivero is here in followup for    Diagnosis Orders   1. Chronic obstructive pulmonary disease, unspecified COPD type ([facility] [address]     2. Severe persistent asthma without complication     3. CVID (common variable immunodeficiency) ([facility] [address]     4. PRECIOUS on CPAP     5. Chronic respiratory failure with hypoxia (HCC)     6. Allergic rhinitis, unspecified seasonality, unspecified trigger     7. History of smoking at least 1 pack per day for at least 30 years       He is for follow up of COPD, PRECIOUS, CVID, severe persistent asthma, chronic respiratory failure on home O2/obstructive sleep apnea. Since he was seen last time about 4 months ago he had right hip replacement done because of avascular necrosis of hip. According to patient he did well post surgery he did not have exacerbation of asthma COPD for surgery and did not require steroids or antibiotic. He slowly recovered from surgery with physical therapy and in June he started walking without a cane. Since he was seen last time he did fairly well he had not had any asthma or COPD exacerbation no respiratory infection and he did not require antibiotic or steroids. He had 2 doses of COVID-19 vaccine done by his immunologist.  According to patient his antibody titers were negative after 2 doses of COVID-19 vaccine as he was told by his immunologist likely secondary to his immune deficiency and likely will need a third dose  He is getting IVIG every 3 weeks and had been followed by hematologist  He has dyspnea on exertion activity he is able to do regular activities at home with some limitation from his hip. He is using oxygen all the time at 2 L. He use oxygen at night. He does not complain of daily or persistent cough cough is intermittent.   He does not complain of sputum production except sometimes when he has a sputum it is usually whitish in color denies purulent sputum. He has occasional wheezing denies daily or persistent wheezing. Denies nocturnal awakening with cough wheezing chest tightness and shortness of breath. He denies orthopnea PND or pedal edema and calf pain denies chest pain or hemoptysis. He is very compliant with CPAP use CPAP every night denies much problem with CPAP or the mask. Sleep is refreshing and when he wake up in the morning he usually feels good he does not usually take nap during the daytime denies dozing off during the daytime although he is not very active as he is staying most of the time at home because of Covid pandemic and his immunosuppressive status secondary to CVI D. Compliance data is available and reviewed. Data available from 04/21/2021 to 07/19/2021. Compliance is 98%. Average usage is 7 hours 53 minutes. Average AHI is 1.3 on CPAP of 12 cm. He had a screening low-dose CT scan ordered in November had not been done supposed to be in February 2021      Last hospitalization in January and February 2019  He was admitted to hospital twice for COPD/asthma exacerbation initially in second or third week of January 2019 when he was treated with COPD exhibition with bronchodilators and steroids and Levaquin, his chest x-ray that time did not show any infiltrate he was discharged on tapered dose of prednisone and Levaquin. He was admitted again in early February 2019 for almost 2 weeks later and at that time he was finished with steroids and Levaquin, his flu a was positive this time he was treated with Tamiflu, he had a chest x-ray done which did not show infiltrates or evidence of pneumonia, he had a CT scan of the chest done which shows minimal residual or dependent atelectasis and/or pneumonia or infiltrate was seen this time he was again treated with Levaquin and prednisone and was discharged home.       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, he c/o pains in legs and joints a week prior to infusion and resolve usually the next day after infusion, he is tolerating it well and followed with allergist/ immunologist and recent Igg was > 800 and getting infusion every 3 weeks   H/O Severe persistent Asthma and COPD with h/o smoking for about > 30 years with h/o frequent exacerbations of Asthmatic Bronchitis  He is on home O2 and use O2 at night with cpap and also during daytime most of the time, He has PRECIOUS and very complaint with cpap at 12 cm and denies any problems with cpap since seen last time  With h/o all rhinitis and gerd and h/o cough denies ch cough, allergy symptoms are better since he stop mowing his lawn       Subjective:   Review of Systems -   General ROS: Negative for fatigue, negative for  - , chills, fever, night sweats or weight loss  ENT ROS: Negative for - nasal congestion, postnasal dripping, sinus pain, nasal discharge and no sneezing or epistaxis  Allergy and Immunology ROS: Negative for - nasal congestion and seasonal allergies  Respiratory ROS:  Positive-intermittent  cough, positive shortness of breath on exertion, positive clear sputum production, occasional wheezing, no chest pain or hemoptysis  Cardiovascular ROS: positive for - dyspnea on exertion,  negative for chest pain, orthopnea, PND, pedal edema. Gastrointestinal ROS: no abdominal pain,  nausea, vomiting, diarrhea, change in bowel habits, hematochezia, melena. Musculoskeletal ROS: Negative for joints pain, negative  for - joint stiffness and swelling and muscle pain  CNS: negative for weakness, dizziness, diplopia, syncope, seizure, headache, tingling, dysphagia.    Hem/Onc: negative for bleeding and bruisingand petechia  Skin: negative for rash and skin lesions  : negative for hematuria, dysuria nocturia and frequency. Sleep Medicine 3/16/2021 7/29/2020 2/25/2020 3/20/2019 3/28/2018 12/6/2016   Sitting and reading 2 0 1 3 0 3   Watching TV 0 0 0 1 0 0   Sitting, inactive in a public place (e.g. a theatre or a meeting) 0 0 0 1 0 0   As a passenger in a car for an hour without a break 1 0 0 0 0 2   Lying down to rest in the afternoon when circumstances permit 3 3 3 3 2 3   Sitting and talking to someone 0 0 0 0 0 0   Sitting quietly after a lunch without alcohol 3 3 3 3 0 3   In a car, while stopped for a few minutes in traffic 0 0 0 0 0 0   Total score 9 6 7 11 2 11       Allergies: Allergies   Allergen Reactions    Adhesive Tape Other (See Comments)     Severe reaction to a bandage used with hip replacement.        Medications:  Outpatient Encounter Medications as of 7/20/2021   Medication Sig Dispense Refill    acetaminophen (TYLENOL) 500 MG tablet Take 1,000 mg by mouth every 6 hours as needed for Pain      docusate sodium (COLACE) 100 MG capsule Take 1 capsule by mouth 2 times daily as needed for Constipation 60 capsule 0    montelukast (SINGULAIR) 10 MG tablet take 1 tablet by mouth every evening 90 tablet 3    albuterol sulfate HFA (VENTOLIN HFA) 108 (90 Base) MCG/ACT inhaler inhale 2 puffs by mouth and INTO THE LUNGS every 6 hours if needed for wheezing 3 Inhaler 3    budesonide-formoterol (SYMBICORT) 160-4.5 MCG/ACT AERO inhale 2 puffs by mouth twice a day 3 Inhaler 3    fluticasone (FLONASE) 50 MCG/ACT nasal spray instill 2 sprays into each nostril once daily 3 Bottle 3    SPIRIVA HANDIHALER 18 MCG inhalation capsule inhale the contents of one capsule in the handihaler once daily 90 capsule 3    predniSONE (DELTASONE) 10 MG tablet 4 tablet once daily for 3 days then 3 tablet once daily for 3 days then 2 tablet once daily for 3 days then 1 tablet once daily for 3 days then discontinue 30 tablet 0    albuterol (PROVENTIL) (2.5 MG/3ML) 0.083% nebulizer solution Take 3 mLs by nebulization every 4 hours as needed for Wheezing or Shortness of Breath 375 mL 5    fluticasone-salmeterol (ADVAIR DISKUS) 500-50 MCG/DOSE diskus inhaler Inhale 1 puff into the lungs 2 times daily 60 each 11    omeprazole (PRILOSEC) 20 MG delayed release capsule take 1 capsule by mouth once daily 60 capsule 3    amLODIPine (NORVASC) 10 MG tablet take 1 tablet by mouth once daily 60 tablet 3    Handicap Placard MISC by Does not apply route For 5 years 1 each 0    traMADol (ULTRAM) 50 MG tablet Take 50 mg by mouth as needed for Pain.  predniSONE (DELTASONE) 10 MG tablet Start at 50 mg and taper 10 mg every 3 days 45 tablet 0    ALPRAZolam (XANAX) 0.5 MG tablet Take 0.5 mg by mouth 3 times daily as needed for Sleep. .      benazepril (LOTENSIN) 10 MG tablet take 1 tablet by mouth once daily (Patient taking differently: Take 10 mg BID) 30 tablet 5    Immune Globulin, Human, 10 GM/100ML SOLN IV solution Infuse 60 g intravenously every 21 days        No facility-administered encounter medications on file as of 7/20/2021. Objective:    Physical Exam:  Vitals:   /72 (Site: Right Upper Arm, Position: Sitting, Cuff Size: Medium Adult)   Pulse 79   Temp 96.2 °F (35.7 °C) (Temporal)   Resp 22   Ht 5' 8\" (1.727 m)   Wt 252 lb (114.3 kg)   SpO2 99% Comment: sitting in room air  BMI 38.32 kg/m²   Last 3 weights: Wt Readings from Last 3 Encounters:   07/20/21 252 lb (114.3 kg)   07/16/21 255 lb 6.4 oz (115.8 kg)   06/23/21 246 lb (111.6 kg)     Body mass index is 38.32 kg/m². Physical Examination:   General appearance - alert, over weight not tachypnic and in no distress  Mental status - alert, oriented to person, place, and time  Eyes - pupils equal and reactive, extraocular eye movements intact  Nose - normal and patent, no erythema, , positive pale and edematous nasal mucosa, no purulent secretion.   Mouth - mucous membranes moist, pharynx normal without lesions, large tongue, thick uvula, small oropharynx, Mallampati 1   Neck - supple, no significant adenopathy, very short and thick  Chest - No distress is noted at rest, no cyanosis no accessory muscles use. Bilateral symmetrical chest movements, distant breath sounds bilaterally,  fair air entry with prolonged expiration, no wheezing no crackles and no rhonchi. Heart - normal rate, regular rhythm, normal S1, S2, no murmurs, rubs, clicks or gallops  Abdomen - soft, nontender, nondistended, no masses or organomegaly  Neurological - alert, oriented, normal speech, no focal findings or movement disorder noted  Extremities - peripheral pulses normal, no pedal edema, no clubbing or cyanosis  Skin - normal coloration and turgor, no rashes, no suspicious skin lesions noted       Labs:  None    CBC:   WBC   Date Value Ref Range Status   04/28/2021 10.9 3.5 - 11.3 k/uL Final     Hemoglobin   Date Value Ref Range Status   04/28/2021 12.4 (L) 13.0 - 17.0 g/dL Final     Platelet Count   Date Value Ref Range Status   03/01/2012 202 140 - 450 k/uL Final     Platelets   Date Value Ref Range Status   06/23/2021 235 138 - 453 k/uL Final     BMP:   Sodium   Date Value Ref Range Status   04/15/2021 137 135 - 144 mmol/L Final     Potassium   Date Value Ref Range Status   04/15/2021 4.4 3.7 - 5.3 mmol/L Final     Chloride   Date Value Ref Range Status   04/15/2021 102 98 - 107 mmol/L Final     CO2   Date Value Ref Range Status   04/15/2021 25 20 - 31 mmol/L Final     BUN   Date Value Ref Range Status   04/15/2021 12 8 - 23 mg/dL Final     CREATININE   Date Value Ref Range Status   04/15/2021 0.79 0.70 - 1.20 mg/dL Final   08/02/2020 0.86 0.70 - 1.20 mg/dL Final   05/20/2020 0.87 0.70 - 1.20 mg/dL Final     Glucose   Date Value Ref Range Status   04/15/2021 102 (H) 70 - 99 mg/dL Final   06/04/2012 86 74 - 106 mg/dL Final     Comment:     [employer] [address] [facility], [facility] 3 [phone]     S.  Calcium:   Calcium   Date Value Ref Range Status   04/15/2021 9.6 8.6 - 10.4 mg/dL Final     S. Ionized Calcium: No results found for: IONCA  S. Magnesium:   Magnesium   Date Value Ref Range Status   06/12/2014 2.7 (H) 1.6 - 2.6 mg/dL Final     Comment:     [facility] 3 [phone]     S. Phosphorus:   Phosphorus   Date Value Ref Range Status   01/08/2013 3.4 2.5 - 4.5 mg/dL Final     Comment:     [facility] 3 [phone]     S. Glucose:   POC Glucose   Date Value Ref Range Status   04/29/2021 140 (H) 75 - 110 mg/dL Final   04/29/2021 115 (H) 75 - 110 mg/dL Final   04/28/2021 139 (H) 75 - 110 mg/dL Final     Glycosylated hemoglobin A1C:   Hemoglobin A1C   Date Value Ref Range Status   04/28/2021 5.4 4.0 - 6.0 % Final       Pulmonary Functions Testing Results:  07/31/2019  Spirometry shows FVC is 3.14 79% predicted. FEV1 is 2.60 82% predicted. FEV1 FVC ratio is normal.  Lung volume shows residual volume is 2.93 130% predicted consistent with hyperinflation and airway trapping. Diffusion capacity is 21.24 81% predicted which was within normal limit. 9/6/2016:  FEV1 2.78 86%, FVC 3.64 90%, SYT2TWV 95% , TLC 7.02  114%, DLCO 20.32  77%  8/26/2015: FEV1 2.30 69%, FVC 3.00 64%, SST1BAW, TLC 6.64  95%, DLCO 21.54  79%    Blood Gases: No results found for: PH, PCO2, PO2, HCO3, O2SAT    Radiological reports:    CXR    CT Scans     CT lung screening.  02/19/2020  Mediastinum:  Suboptimal evaluation due to low dose technique.  Right-sided   port is in place.  Thoracic aorta demonstrates mild calcification without   aneurysm.  Pulmonary trunk appears nondilated.  Heart appears normal in size   without pericardial effusion.  No lymphadenopathy.  The esophagus is grossly   unremarkable.       Lungs/Pleura:  Emphysematous changes.  No focal consolidation, pneumothorax   or pleural effusion.  Trachea and distal airways appear patent.  No   suspicious noncalcified lung nodule or mass.      02/05/19  Basilar subsegmental atelectasis without evidence for consolidation.       Emphysema and sequela of old granulomatous disease.  Return to low-dose chest   CT screening schedule is recommended, if clinically appropriate. CT Scan Low dose     9/19/17  *Stable 5 mm nodular thickening along the minor fissure.  No new or enlarging   nodule. *Paraseptal emphysema. 9/27/16  Mild apical paraseptal emphysematous changes, more on the right. Dependent/atelectatic findings posteriorly toward the bases.       Unchanged 5 mm nodule contiguous with the medial minor fissure. EKG:     ECHO:     7/24/17  Left ventricle is normal in size  Global left ventricular systolic function is normal Estimated ejection  fraction is 65 % . No significant valvular regurgitation or stenosis seen. 11/8/13  Technically difficult study. No gross segmental wall motion abnormality. Estimated ejection fraction is 55 %. Mild left ventricular hypertrophy. Evidence of diastolic dysfunction. Left atrium is mildly dilated. No significant valvular regurgitation or stenosis seen. Trivial anterior pericardial effusion  Right Atrium  Right atrium is normal in size. Right Ventricle  Normal right ventricular size and function. 6 minutes walk test 03/20/19  Total distance walk 750 feet 48% predicted. Room air oxygen was 88% on walking slow to 250 feet started on 2 L nasal cannula and O2 saturation 88% after walking 250 feet and increased to 3 L and his saturation went to 95% with walking to 250 feet. Compliance data from CPAP  11/27/2019 to 02/24/2020  Compliance 100%  Average usage of 8 hours 16 minutes  Average AHI 0.3    Assessment:      1. Chronic obstructive pulmonary disease, unspecified COPD type ([facility] [address]   2. Severe persistent asthma without complication   3. CVID (common variable immunodeficiency) ([facility] [address]   4. Allergic rhinitis, unspecified seasonality, unspecified trigger   5. PRECIOUS on CPAP   6.  Chronic respiratory failure with hypoxia (HCC) Plan:    Continue Symbicort 160/4.5 space 2 puff twice daily  Continue spiriva once daily. Continue Flonase. Continue Singulair. Reflux precautions and continue PPI  Continue albuterol aerosol as needed  Follow up Allergist and immunologist for CVID/ IVIG. Had Flu vaccine last year  Annual flu vaccine recommended in fall  He had 2 doses of Covid vaccine but negative antibody titer and was told that he will need third dose/booster dose  Up to date with vaccinations from pulm perspective and recommendations per allergist for vaccination  Maintain an active lifestyle    Supplemental O2 to continue, he is benefiting from oxygen therapy and advised to continue with oxygen     CPAP at 12 cm to continue. We will get compliance data and will evaluate AHI and compliance. If he complains of increased fatigue tiredness or sleepiness during the daytime then may consider repeat sleep study  Wt loss is recommended and discussed  Follow good sleep hygeine instructions  Use humidifier  Questions answered pertaining to diagnosis and management explained importance of compliance with therapy. Last compliance data reviewed and pt is compliant per insurance requirements. RTC in 4 months. Please note that this chart was generated using voice recognition Dragon dictation software. Although every effort was made to ensure the accuracy of this automated transcription, some errors in transcription may have occurred.     Timothy Howard MD, MD             7/20/2021, 11:32 AM

## 2021-07-21 ENCOUNTER — TELEPHONE (OUTPATIENT)
Dept: PULMONOLOGY | Age: 66
End: 2021-07-21

## 2021-07-21 NOTE — TELEPHONE ENCOUNTER
----- Message from Felton Chavez sent at 7/21/2021  7:01 AM EDT -----  Danielle, please see message from Dr Nessa Hubbard and arrange. Thank you.   ----- Message -----  From: Jene Simmonds, MD  Sent: 7/20/2021   5:35 PM EDT  To: [facility] Clinical Staff    I saw him in the office today. There is an active order for low-dose screening CT scan it was ordered in November 2020 to be done in February 2021 but it had not been done.   Please schedule low-dose screening CT scan order is still active if needed new order please let me know

## 2021-07-21 NOTE — TELEPHONE ENCOUNTER
Called pt- CT was scheduled and [facility] [address] [place] because pt hasn't been a smoker for over 15 yrs. Please place new order for CT scan if needed.

## 2021-08-02 ENCOUNTER — HOSPITAL ENCOUNTER (OUTPATIENT)
Dept: PHYSICAL THERAPY | Facility: CLINIC | Age: 66
Setting detail: THERAPIES SERIES
Discharge: HOME | End: 2021-08-02
Payer: MEDICARE

## 2021-08-02 NOTE — DISCHARGE SUMMARY
[] [facility]) - [facility] &  Therapy  [address]  [phone]  F: [phone] [x] [address]  [facility]   [address]  P: [phone]  F: [phone] [] [address] &  Therapy  [address]  P: [phone]  F: [phone] [] [address]  P: [phone]  F: [phone] [] [address]  [address] [facility]   [address]   [place]: [phone]  F: [phone]      Physical Therapy Discharge Note    Date: 2021      Patient: Rasheeda Stanton  : 1955  MRN: [id number] Physician: Dr. Decker: Medicare (Part A/B)  Medical Diagnosis:   M16.11 (ICD-10-CM) - Arthritis of right hip   Z96.641 (ICD-10-CM) - S/P hip replacement, right      Rehab Codes: M25.551, M25.651, R26.2NEC, M62.81  Onset Date: 21               Next 's appt: 21     Visit# / total visits: ; Progress note for Medicare patient due at visit 12                                  Cancels/No Shows: 0/0    Date of initial visit: 21                Date of final visit: 21      Subjective:    Pain:  [x]? Yes  []? No   Location: R hip            Pain Rating: (0-10 scale) 1/10  Pain altered Tx:  [x]? No  []? Yes  Action:     Comments: Pt arrives stating he is feeling great and isn't really using his cane much. Pt reports he continues to have some pain but it is minimal. Pt reports he will be going out of town on Monday. Pt reports he is taking ibuprofen/tylenol as needed. Pt reports he is not waking up due to pain but does still have some tenderness on the R hip when side sleeping.      Objective:  Re-assessment 21           ROM  ° A/P STRENGTH     Left Right Left Right   Hip Flex (0-120)   103°A 5 sitting 5   Ext (0-30)    5°  MILD HIP ROT    4+   ER (0-45)      5 sitting 4+ sitting   IR (0-45)     5 5   ABD (0-45)   32° A   good     sitting 4+   ADD (0-30)      good  sitting  good  sitting   Knee Flex (0-135)     5 5   Ext (10-0)     5 5   Ankle DF (0-20)     5 5   PF (0-50)      3- 3-    TUG: 10\" without cane       Assessment:  [x]? Progressing toward goals. Lul Power is 2 months s/p R hip replacement with appropriate post-operative progressions in pain, ROM, strength, and function. Pt reports taking ibuprofen/tylenl prn and denies sleep disturbances. Pt is able to demonstrate efficient gait mechanics with and without a straight cane. Pt completed the TUG in 10\" and demonstrates ability to ascend/descend a flight of stairs with 1 handrail with a step to pattern. Pt also demonstrates gross improvements in R hip AROM along with generalized RLE strength to being greater than 4/5. At this time, pt reports confidence with continued exercise completion at home. Pt will be going out of town for 1 week and pending response to travel, pt will be discharged upon his return. []? No change. []? Other:  [x]? Patient would continue to benefit from skilled physical therapy services in order to: improve R hip pain, ROM, and strength to assist with improved gait and overall QOL.    Problems:    [x]? ?? ? Pain: 1-6/10  [x]? ?? ? ROM: decreased and painful R hip AROM  [x]? ?? ? Strength: decreased RLE strength  [x]??? ? Function: LEFI: , 8%% function   [x]? ?? ? Balance: decreased WBing on RLE  [x]? ?? Gait Deviations: FWW, TU\"                 Short Term Goals: MEET IN 6 VISITS Status   Pain: Pt will report less than or equal to 3-4/10 R hip pain with walking around his house, walking up/down his ramp, standing, and completing ADLs in order to progress to prior level of activity.  MET    ROM: Pt will improve R AROM to 90° hip flexion and passive hip abduction to 15 degrees with 0-1/10 pain in order to promote an efficient gait pattern, improve ability to negotiate stairs, and enter/exit bed/car.  MET 6/14  Flexion: 91° (mild groin pain)  Abd: 34° (passive)   Strength: Pt will be able to demonstrate ability to complete sit to supine without the use of UEs to negotiate the RLE to promote independent bed mobility.  MET 6/14   Gait: Pt will be able to complete the TUG in less than or equal to 20\" with the least restrictive AD and improved gait mechanics in order to promote an efficient gait pattern and safety with walking. MET 6/14  16\" with cane   Function: Pt will score greater than or equal to 25% on the LEFI in order to demonstrate improved function with ADLs and work related tasks. MET 6/14  45% MAX FUNCTION   HEP: Pt will be independent in with HEP. MET 6/14   Fall Prevention: Pt will acknowledge fall prevention interventions in order to prevent falls at home, work, or in the community. MET 6/14   Long Term Goal: MEET IN 12 VISITS     Pain: Pt will report less than or equal to 1-2/10 R hip pain with walking on even and uneven ground, negotiating stairs, performing repeated sit to stands, and completing ADLs in order to progress to prior level of activity MET 7/1/21   ROM: Pt will improve R hip AROM to 95° and active hip abd to 15 degrees in order to climb stairs, ambulate, and progress to prior level of activity. MET 7/1/21   Strength: Pt will demonstrate gross RLE strength to 4/5 strength in order to promote dynamic stability and strength to assist with walking, standing, ramp/stair negotiation, and completion of ADLs.   MET 7/1/21   Gait: Pt will be able to complete the TUG without an AD in less than or equal to 18\" while demonstrating improved stance time on the RLE and terminal stance to promote and efficient and safe gait pattern.   MET 7/1  10\"   Outcome Measure: Pt will report greater than or equal to 45% on the LEFI in order to demonstrate improved function.     Outcome Measure: Pt will report greater than or equal to 60% on the LEFI in order to demonstrate improved function. MET 6/14  45% max function     MET 7/1  75% max function                                 Patient goals: \"be able to walk out the backdoor to my garage and drink beer         Treatment to Date:  [x] Therapeutic Exercise    [] Modalities:  [] Therapeutic Activity    [] Ultrasound  [] Electrical Stimulation  [x] Gait Training     [] Massage       [] Lumbar/Cervical Traction  [] Neuromuscular Re-education [] Cold/hotpack [] Iontophoresis: 4 mg/mL  [x] Instruction in Home Exercise Program                     Dexamethasone Sodium  [x] Manual Therapy             Phosphate 40-80 mAmin  [] Aquatic Therapy                   [] Vasocompression/    [] Other:             Game Ready    Discharge Status:     [x] Pt recovered from conditions. Treatment goals were met. [] Pt received maximum benefit. No further therapy indicated at this time. [x] Pt to continue exercise/home instructions independently. [] Therapy interrupted due to:    [] Pt has 2 or more no shows/cancels, is discontinued per our policy. [] Pt has completed prescribed number of treatment sessions. [] Other:         Electronically signed by Kareem Carty PT on 8/2/2021 at 10:49 AM      If you have any questions or concerns, please don't hesitate to call.   Thank you for your referral.

## 2021-08-10 ENCOUNTER — TELEPHONE (OUTPATIENT)
Dept: ORTHOPEDIC SURGERY | Age: 66
End: 2021-08-10

## 2021-08-10 DIAGNOSIS — Z96.641 S/P HIP REPLACEMENT, RIGHT: ICD-10-CM

## 2021-08-10 DIAGNOSIS — M16.11 ARTHRITIS OF RIGHT HIP: Primary | ICD-10-CM

## 2021-08-10 NOTE — TELEPHONE ENCOUNTER
04/27 - RIGHT TOTAL HIP ARTHROPLASTY ANTERIOR    Patient went out of town and missed his last few Physical therapy appts so the PT office closed his visits. He is still having a lot of pain after sitting and then standing, has gone back to using his cane at all times. He would like to know if you can send an order for him to continue his physical therapy at [facility]?      Or do you need to see him again, his last appt with you was 07/16/2021    Please call patient with decision  Thank you

## 2021-08-19 ENCOUNTER — HOSPITAL ENCOUNTER (OUTPATIENT)
Dept: PHYSICAL THERAPY | Facility: CLINIC | Age: 66
Setting detail: THERAPIES SERIES
Discharge: HOME | End: 2021-08-19
Payer: MEDICARE

## 2021-08-19 PROCEDURE — 97161 PT EVAL LOW COMPLEX 20 MIN: CPT

## 2021-08-19 PROCEDURE — 97140 MANUAL THERAPY 1/> REGIONS: CPT

## 2021-08-19 NOTE — CONSULTS
[] [facility] [facility]  [address]  [phone]  F: [phone] [x] [address]  [place] [address]   [address]  P: [phone]  F: [phone] [] [place]  [address]  P: [phone]  F: [phone] [] [address]  P: [phone]  F: [phone] [] [address]  [facility]   [address]   [place]: [phone]  F: [phone]        Physical Therapy Lower Extremity Evaluation    Date:  2021  Patient: Juli November  : 1955  MRN: [id number]  Physician: Dr. Patino Breanna: Shanti Loving Medicare ($30 copay)  Medical Diagnosis: Z96.641 (ICD-10-CM) - S/P hip replacement, right  Rehab Codes: M25.551, R26.89, M62.81  Onset Date: 2021   Next 's appt: TBD    Subjective:     CC: R hip pain/tightness  HPI: (onset date): Pt is a 63 y.o. male who is 4 months s/p R hip replacement with anterior approach. Pt completed formal physical therapy in early July with improvements in pain and was ambulating independently. Pt reports going on a trip for a week and started to notice some more soreness in the R hip/leg. Pt does not recall a mechanism of injury. Pt notes the pain has gotten worse since then and some days it is okay and other days it is bad. Pt reports the pain is similar to the tightness he felt following his surgery, however, more intense. Pt notes his pain is over the incision, down the front and sides of the thigh, and down the back of the thigh. Pt notes some of the pain radiates into the lateral calf. Pt notes some knee pain on both sides. Pt describes a constant aching pain in the R thigh with an increasing in intensity to include aching, burning, sharp/stabbing, shooting pains.  Pt denies any swelling, groin pain, buckling of the hip, or numbness/tingling down the leg. Pt notes his pain when laying in bed can be a 10/10 and when trying to stand up, his pain can be a 7-8/10. Pt notes his pain is worsened with increased length of sitting, static standing, and laying on his R side. Pt notes he is not waking due to pain. Pt notes after sitting for a period of time, he has increased difficulty standing up and putting weight through the RLE. Pt notes when he stands it feels like the leg is going to buckle but doesn't. Pt states sometimes when the pain is so bad he cant straighten out his leg when sitting. Pt notes because of the pain he has to walk with the cane. Pt notes if he walks for an extended amount of time, he still has some R thigh pain but is able to walk without the cane. Pt denies any presence of back pain. Pt does report one near miss/fall when he was stepping off the porch step and fall off to the R side because the R knee gave out when placing weight through the foot. Pt notes he did not follow up with his surgeon and did not have any updated x-rays. Pt notes his pain is improved with walking, heat from the shower/massage, and ice. Pt reports all of these provide temporary relief. Pt notes he would be doing more activity if he didn't have to use the cane. Pt reports he does continue to mow the grass with his push mower and riding mower along with doing his exercises. Pt isnt sure why he is having this pain and thinks it may be due to \"doing too much. \"    Comorbidities:   [x]? Obesity []? Dialysis  [x]? Other: HTN   [x]? Asthma/COPD []? Dementia [x]? Other: OA/hx of AVN B hips  Left hip replacement    []? Stroke [x]? Sleep apnea [x]? Other: common variable immunodeficiency    []? Vascular disease []? Rheumatic disease []?  Other         Tests: [x] X-Ray: May 2021 [] MRI:  [] Other:  Narrative   History:   Status post Right  total hip arthroplasty       Findings:    Low AP pelvis, AP Right  hip, cross table lateral xrays Right    hip done in the office today shows total hip arthroplasty in good position    without signs complication.   Leg lengths are approximate.   Components    are in good position without signs of loosening.    No evidence of    fracture, subluxation, dislocation, radioopaque foreign body/tumor is    noted.        Impression:  Right  total hip arthroplasty in good position without    complication noted.         Medications: [x]? Refer to full medical record            []? None          []? Other:  Allergies:      [x]? Refer to full medical record []? None          []? Other:     Function:  Hand Dominance  [x]? Right  []? Left  Patient lives with: wife   In what type of home [x]? One story   []? Two story   []? Split level   Number of stairs to enter Front: 1 step  Back: ramp   Bathroom has a []? Tub only  [x]? Tub/shower combo   []? Walk in shower    []? Grab bars   Washing machine is on [x]? Main level   []? Second level   []? Basement   Job Status []? Normal duty   []? Light duty   []? Off due to condition    []? Retired   []? Not employed   [x]? Disability  []? Other:  []?   Return to work:          ADL/IADL Previous level of function Current level of function Who currently assists the patient with task   Bathing  [x] Independent  [] Assist [x] Independent  [] Assist    Dress/grooming [x] Independent  [] Assist [x] Independent  [] Assist    Transfer/mobility [] Independent  [] Assist [] Independent  [x] Assist [employer] [x] Independent  [] Assist [x] Independent  [] Assist    Toileting [x] Independent  [] Assist [x] Independent  [] Assist    Driving [x] Independent  [] Assist [x] Independent  [] Assist    Housekeeping [x] Independent  [] Assist [x] Independent  [] Assist    Grocery shop/meal prep [] Independent  [] Assist [x] Independent  [] Assist         Yes  No Comments   Fatigue [] [x]    Fever/chills/sweats [] [x]    Malaise  [] [x]    Mental status change thigh    4 (pain inner thigh)        ADD (0-30)                Knee Flex (0-135)        4+ (pain in quad and medial thigh)        Ext (10-0)       5        Ankle DF (0-20)        5        PF (0-50)        WFL           BRIDGING: pain on anterior thigh    Hip flexor: minimal restriction with assessment on R    HS stretch: felt in calf vs HS    BALANCE/PROPRIOCEPTION              [x] Not tested   Single leg stance       R                     L                                PAIN   Eyes open                             Sec. Sec                  . []    Eyes closed                          Sec. Sec                  . []         FUNCTION Normal Difficult Unable Comments   Sitting [] [x] []     Standing [] [x] []     Ambulation [] [x] [] Use of cane    Groom/Dress [] [x] []     Lift/Carry [] [x] []     Stairs [] [x] []     Bending [] [x] []     Squat [] [x] []     Kneel [] [x] []        Outcome Measure:  LEFI: 26/56, 46% function       Assessment: Wing Salgado s a 63 y.o. male who is 4 months s/p R hip replacement with anterior approach. Pt completed formal physical therapy in early July with improvements in pain and was ambulating independently. Pt arrives to the clinic utilizing a cane for ambulation and presents with an antalgic gait after sitting for an extended period of time with difficulty standing. Pt completes a TUG in 32\" with a cane compared to the previous discharge when pt completed TUG in 10\" without an AD. Pt also with reports of increased R hip pain that appears muscular in nature with increases in pain with extended rest and improvements with walking. Pt is able to improve tolerance to gait and gait mechanics with increased walking time when completing a 6MWT. Pt's lumbar spine was cleared for pain or radicular symptoms, however, based on hx, could be a component of current pain.  At this time, pt's current level of function on the LEFI has declined from 75% to 46% and is ambulating with a cane secondary to RLE pain and difficulty with transitions. Pt does not present with signs of a hip fx or dislocation. Pt will benefit from skilled therapeutic interventions to improve muscle extensibility, ROM, and strength in order to improve pain and activity tolerance. If pt's symptoms persist, pt will be referred back to his surgeon for further evaluation. Problems:    [x] ? Pain: 7-8/10 with transitions; 10/10 sidelying pain   [x] ? ROM: painful RLE movement  [x] ? Strength: decreased RLE strength  [x] ? Function: LEFI: 26/56, 46% function  [x] ? Balance: decreased SLS during gait  [] Edema:   [] Postural Deviations  [x] Gait Deviations: antalgic gait with cane  [] Other:                 Short Term Goals: MEET IN 8 VISITS Status   Pain: Pt will report less than or equal to 2-3/10 at rest when sitting/standing for extended periods of time and moving to standing from a sitting position in order to improve tolerance to mobility without difficulty and independent ambulation. Ongoing   ROM: Pt will improve R hip AROM to 90° without use of UEs to assist the leg, seated IR/ER with 0/10 pain, and supine hip abduction to 36 degrees with 0/10 pain in order to promote an efficient gait pattern, stair negotiation, and ADL completion. Ongoing   Strength: Pt will be able to demonstrate 5/5 RLE strength with 4/5 glute strength in order to improve activity tolerance. Ongoing   Gait: Pt will be able to complete a TUG in less than or equal to 20\" without the use of an AD in order to demonstrate improved transitioning tolerance and improved gait mechanics. Pt will be able to complete a 6MWT without the use of an AD and notable improvements in R stance time and R weight shift in order to promote an efficient and independent gait pattern. Ongoing    Function: Pt will score greater than or equal to 10 points on the LEFI in order to demonstrate improved function with ADLs and work related tasks.  Ongoing position with pillow between knees:   ITB, glute med, HS    Good response to manual therapy with improved transitions from rest into walking                            Other:    Specific Instructions for next treatment: response to manual therapy; progress activity     Evaluation Complexity:  History (Personal factors, comorbidities) [] 0 [] 1-2 [x] 3+   Exam (limitations, restrictions) [] 1-2 [x] 3 [] 4+   Clinical presentation (progression) [x] Stable [] Evolving  [] Unstable   Decision Making [x] Low [] Moderate [] High    [x] Low Complexity [] Moderate Complexity [] High Complexity         Treatment Charges: Mins Units   Evaluation  [x] Low  [] Mod  [] High ----- 1   []  Modalities     []  Ther Exercise     [x]  Manual Therapy 12 1   []  Ther Activities     []  Aquatics     []  Vasocompression     []  Other     Estimated Medicare Cost (as of 8/19/21): 862.63  Time in: 100    Time Out: 200    Electronically signed by: Amandeep Cruz PT        Physician Signature:________________________________Date:__________________  By signing above or cosigning this note, I have reviewed this plan of care and certify a need for medically necessary rehabilitation services.      *PLEASE SIGN ABOVE AND FAX BACK ALL PAGES*

## 2021-08-20 NOTE — PLAN OF CARE
[] [facility]) [facility]  Therapy  [address]  [phone]  F: [phone] [x] [address]  [facility] [address]   [address]  P: [phone]  F: [phone] [] [address]  [address]  P: [phone]  F: [phone] [] [address]  P: [phone]  F: [phone] [] [address]  [facility]   [address]   [place]: [phone]  F: [phone]        Physical Therapy Plan of Care    Date:  2021  Patient: Korin Mccoy  : 1955  MRN: [id number]  Physician: Dr. Coelho Odor: Lita Kalsarika Medicare ($30 copay)  Medical Diagnosis: Z96.641 (ICD-10-CM) - S/P hip replacement, right                 Rehab Codes: M25.551, R26.89, M62.81  Onset Date: 2021               Next 's appt: TBD     Subjective:      CC: R hip pain/tightness  HPI: (onset date): Pt is a 63 y.o. male who is 4 months s/p R hip replacement with anterior approach. Pt completed formal physical therapy in early July with improvements in pain and was ambulating independently. Pt reports going on a trip for a week and started to notice some more soreness in the R hip/leg. Pt does not recall a mechanism of injury. Pt notes the pain has gotten worse since then and some days it is okay and other days it is bad. Pt reports the pain is similar to the tightness he felt following his surgery, however, more intense. Pt notes his pain is over the incision, down the front and sides of the thigh, and down the back of the thigh. Pt notes some of the pain radiates into the lateral calf. Pt notes some knee pain on both sides.  Pt describes a constant aching pain in the R thigh with an increasing in intensity to include aching, burning, sharp/stabbing, shooting pains. Pt denies any swelling, groin pain, buckling of the hip, or numbness/tingling down the leg. Pt notes his pain when laying in bed can be a 10/10 and when trying to stand up, his pain can be a 7-8/10. Pt notes his pain is worsened with increased length of sitting, static standing, and laying on his R side. Pt notes he is not waking due to pain. Pt notes after sitting for a period of time, he has increased difficulty standing up and putting weight through the RLE. Pt notes when he stands it feels like the leg is going to buckle but doesn't. Pt states sometimes when the pain is so bad he cant straighten out his leg when sitting. Pt notes because of the pain he has to walk with the cane. Pt notes if he walks for an extended amount of time, he still has some R thigh pain but is able to walk without the cane. Pt denies any presence of back pain. Pt does report one near miss/fall when he was stepping off the porch step and fall off to the R side because the R knee gave out when placing weight through the foot. Pt notes he did not follow up with his surgeon and did not have any updated x-rays. Pt notes his pain is improved with walking, heat from the shower/massage, and ice. Pt reports all of these provide temporary relief. Pt notes he would be doing more activity if he didn't have to use the cane. Pt reports he does continue to mow the grass with his push mower and riding mower along with doing his exercises. Pt isnt sure why he is having this pain and thinks it may be due to \"doing too much. \"     Assessment: Rosenda Cobb s a 63 y.o. male who is 4 months s/p R hip replacement with anterior approach. Pt completed formal physical therapy in early July with improvements in pain and was ambulating independently. Pt arrives to the clinic utilizing a cane for ambulation and presents with an antalgic gait after sitting for an extended period of time with difficulty standing.  Pt completes a TUG in 32\" with a cane compared to the previous discharge when pt completed TUG in 10\" without an AD. Pt also with reports of increased R hip pain that appears muscular in nature with increases in pain with extended rest and improvements with walking. Pt is able to improve tolerance to gait and gait mechanics with increased walking time when completing a 6MWT. Pt's lumbar spine was cleared for pain or radicular symptoms, however, based on hx, could be a component of current pain. At this time, pt's current level of function on the LEFI has declined from 75% to 46% and is ambulating with a cane secondary to RLE pain and difficulty with transitions. Pt does not present with signs of a hip fx or dislocation. Pt will benefit from skilled therapeutic interventions to improve muscle extensibility, ROM, and strength in order to improve pain and activity tolerance. If pt's symptoms persist, pt will be referred back to his surgeon for further evaluation. Problems:    [x]? ? Pain: 7-8/10 with transitions; 10/10 sidelying pain   [x]? ? ROM: painful RLE movement  [x]? ? Strength: decreased RLE strength  [x]? ? Function: LEFI: 26/56, 46% function  [x]? ? Balance: decreased SLS during gait  []? Edema:   []? Postural Deviations  [x]?  Gait Deviations: antalgic gait with cane  []? Other:                  Short Term Goals: MEET IN 8 VISITS Status   Pain: Pt will report less than or equal to 2-3/10 at rest when sitting/standing for extended periods of time and moving to standing from a sitting position in order to improve tolerance to mobility without difficulty and independent ambulation. Ongoing   ROM: Pt will improve R hip AROM to 90° without use of UEs to assist the leg, seated IR/ER with 0/10 pain, and supine hip abduction to 36 degrees with 0/10 pain in order to promote an efficient gait pattern, stair negotiation, and ADL completion.  Ongoing   Strength: Pt will be able to demonstrate 5/5 RLE strength with 4/5 Lumbar/Cervical Traction  [id number]   []? Aquatic Therapy           [id number] [x]? Cold/hotpack     []? Massage   77938      []? Dry Needling, 1 or 2 muscles  84122   []? Biofeedback, first 15 minutes   48779  []? Biofeedback, additional 15 minutes   34870 []? Dry Needling, 3 or more muscles  16062      []? Medication allergies reviewed for use of               Dexamethasone Sodium Phosphate 4mg/ml                with iontophoresis treatments. Pt is not allergic.        Frequency: 2 x/week for 8 visits      Electronically signed by: Sherri Jean PT        Physician Signature:________________________________Date:__________________  By signing above or cosigning this note, I have reviewed this plan of care and certify a need for medically necessary rehabilitation services.      *PLEASE SIGN ABOVE AND FAX BACK ALL PAGES*

## 2021-08-24 ENCOUNTER — HOSPITAL ENCOUNTER (OUTPATIENT)
Dept: PHYSICAL THERAPY | Facility: CLINIC | Age: 66
Setting detail: THERAPIES SERIES
Discharge: HOME | End: 2021-08-24
Payer: MEDICARE

## 2021-08-24 PROCEDURE — 97140 MANUAL THERAPY 1/> REGIONS: CPT

## 2021-08-24 PROCEDURE — 97110 THERAPEUTIC EXERCISES: CPT

## 2021-08-24 NOTE — FLOWSHEET NOTE
[] [facility] [facility]  [address]  [phone]  F: [phone] [] [address]  [address]   [address]  P: [phone]  F: [phone] [] [place]  [address]  P: [phone]  F: [phone] [] [address]  P: [phone]  F: [phone] [] [address]  [facility]   [address]   [place]: [phone]  F: [phone]      Physical Therapy Daily Treatment Note    Date:  2021  Patient Name:  Alexander Michelle    :  1955  MRN: [id number]  Physician: Dr. Connelly Listen: Derl Hotter Medicare ($30 copay)  Medical Diagnosis: W51.788 (ICD-10-CM) - S/P hip replacement, right                 Rehab Codes: M25.551, R26.89, M62.81  Onset Date: 2021                      Next 's appt: TBD  Visit# / total visits: ; Progress note for Medicare patient due at visit 8     Cancels/No Shows: 0/0    Subjective:    Pain:  [x] Yes  [] No Location: R hip Pain Rating: (0-10 scale) 4/10  Pain altered Tx:  [x] No  [] Yes  Action:  Comments: improved the rest of the day; increased with sitting but not as bed; next day was kind of bad but it is over; still limping some but not as bad    Objective:  Modalities:   Precautions:  Exercises:  Exercise Reps/ Time Weight/ Level Comments   Nu-step 5. 5' lv1    Manaul 26'  IASTM to distal quad- next session  Hypervolt to anterior thigh in supine and lateral thigh in sidelying  MFR to the ITB including quad and HS interfaces    Hip flexor stretch 3x20\"  Over pressure into knee flexion   Quad sets 10x  5x     Posterior pelvic tilt 10x     TA activation 10x     Supine clamshells  10x lime    Supine hip aDd 10x     sidelying knee flexed hip abd 10x     LAQ 2x10 A Decreased weight due to increased pain over lateral knee   gait x           Other:      Treatment Charges: Mins Units   []  Modalities     [x]  Ther Exercise 25 1   [x]  Manual Therapy 26 2   []  Ther Activities     []  Aquatics     []  Vasocompression     []  Other     Total Treatment time 51 3       Assessment: [x] Progressing toward goals. Pt arrives with antalgic gait with cane when standing from sitting in the waiting room. Initiated treatment on the Nu-step for warm-up prior to manual therapy. Pt with increased tenderness and decreased tissue extensibility over the quad tendon, distal and mid quad muscle belly, and ITB insertion of the glute and lateral leg. Initiated manual therapy with Hypervolt which decreased generalized soreness, however, continued sensitivity is noted over the lateral aspect of the thigh. Pt MFR in sidelying was completed to the ITB and muscular insertions which demonstrated to be hypomobility. Positive reaction is noted with manual therapy despite tenderness to increased tissue mobility, however, restrictions continue to be present. Progressed from manual to complete quad/RF stretching. Completed core muscle activation with notable difficulty isolating abdominal muscles and coordinating breathing. Pt also is observed to lift his head off the table when completing TA activation and posterior pelvic tilt. Attempted to complete weighted LAQ with emphasis on eccentric lowering, however, pt noted an increase in lateral knee/thigh pain. Regression of weight resolved lateral thigh pain. Pt was able to report some improvement in pain following manual therapy and exercise, however, continues to be minimally present when ambulating following treatment. Pt advised to continue completing quad sets and quad activation exercises at home along with increasing water intake to optimize effects of manual therapy. [] No change.      [] Other:  [] Patient would continue to benefit from skilled physical therapy services in order to: \ improve muscle extensibility, ROM, and strength in order to improve pain and activity tolerance. Short Term Goals: MEET IN 8 VISITS Status   Pain: Pt will report less than or equal to 2-3/10 at rest when sitting/standing for extended periods of time and moving to standing from a sitting position in order to improve tolerance to mobility without difficulty and independent ambulation. Ongoing   ROM: Pt will improve R hip AROM to 90° without use of UEs to assist the leg, seated IR/ER with 0/10 pain, and supine hip abduction to 36 degrees with 0/10 pain in order to promote an efficient gait pattern, stair negotiation, and ADL completion. Ongoing   Strength: Pt will be able to demonstrate 5/5 RLE strength with 4/5 glute strength in order to improve activity tolerance. Ongoing   Gait: Pt will be able to complete a TUG in less than or equal to 20\" without the use of an AD in order to demonstrate improved transitioning tolerance and improved gait mechanics.        Pt will be able to complete a 6MWT without the use of an AD and notable improvements in R stance time and R weight shift in order to promote an efficient and independent gait pattern. Ongoing    Function: Pt will score greater than or equal to 10 points on the LEFI in order to demonstrate improved function with ADLs and work related tasks. Ongoing   HEP: Pt will be independent in with HEP. Ongoing   Fall Prevention: Pt will acknowledge fall prevention interventions in order to prevent falls at home, work, or in the community. Ongoing                   Patient goals: improve RLE pain and tightness       Pt. Education:  [x] Yes  [] No  [] Reviewed Prior HEP/Ed  Method of Education: [x] Verbal  [] Demo  [] Written  Comprehension of Education:  [x] Verbalizes understanding. [] Demonstrates understanding. [] Needs review. [] Demonstrates/verbalizes HEP/Ed previously given.      Plan: [x] Continue current frequency toward long and short term goals.     [x] Specific Instructions for subsequent treatments: see above      Time In: 200            Time Out: 302    Electronically signed by:  Jean Villalobos PT

## 2021-08-26 ENCOUNTER — HOSPITAL ENCOUNTER (OUTPATIENT)
Dept: PHYSICAL THERAPY | Facility: CLINIC | Age: 66
Setting detail: THERAPIES SERIES
Discharge: HOME | End: 2021-08-26
Payer: MEDICARE

## 2021-08-26 PROCEDURE — 97140 MANUAL THERAPY 1/> REGIONS: CPT

## 2021-08-26 PROCEDURE — 97110 THERAPEUTIC EXERCISES: CPT

## 2021-08-26 NOTE — FLOWSHEET NOTE
[] [facility] [address]  [address]  [phone]  F: [phone] [] [address]  [address]   [address]  P: [phone]  F: [phone] [] [place]  [address]  P: [phone]  F: [phone] [] [address]  P: [phone]  F: [phone] [] [address]  [facility]   [address]   [place]: [phone]  F: [phone]      Physical Therapy Daily Treatment Note    Date:  2021  Patient Name:  Miah Martínez    :  1955  MRN: [id number]  Physician: Dr. Cabrerafort: [facility] Medicare ($30 copay)  Medical Diagnosis: C41.177 (ICD-10-CM) - S/P hip replacement, right                 Rehab Codes: M25.551, R26.89, M62.81  Onset Date: 2021                      Next 's appt: TBD  Visit# / total visits: 3/8; Progress note for Medicare patient due at visit 8     Cancels/No Shows: 0/0    Subjective:    Pain:  [x] Yes  [] No Location: R hip Pain Rating: (0-10 scale) 4/10  Pain altered Tx:  [x] No  [] Yes  Action:  Comments: Pt reports his pain remained improved the rest of the evening but pain still present. Pt notes he was able to stand without having to pause before walking. Pt reports he has been doing his quad sets but stops at about 6 reps due to pain.      Objective:  Modalities:   Precautions:  Exercises:  Exercise Reps/ Time Weight/ Level Comments   Nu-step 5.5' 6'    Manaul 30'  IASTM to distal quad, MFR to the lateral ITB proximal and distal  Stick rolling to lateral ITB and anterior/poosterior insertions and anterior proximal thigh    Tenderness noted over the greater trochanter and just distal to the GT   Hip flexor stretch 3x30\"  Over pressure into knee flexion   Quad sets 10x     SAQ 10x A    Posterior pelvic tilt 10x     TA activation 10x     Supine clamshells  10x lime    Supine hip aDd 10x     sidelying knee flexed hip abd 10x     Reverse clamshells 2x5 A     LAQ 2x10 A Decreased weight due to increased pain over lateral knee   gait x           Other:      Treatment Charges: Mins Units   []  Modalities     [x]  Ther Exercise 20 1   [x]  Manual Therapy 30 2   []  Ther Activities     []  Aquatics     []  Vasocompression     []  Other     Total Treatment time 50 3       Assessment: [x] Progressing toward goals. Pt with reports of pain improvement at the end of the session. Pt with continued tightness and lack of tissue mobility in the quadriceps tendon, quad muscle belly, and ITB. IASTM to the quad tendon was completed with good result and continued ITB MFR was completed to increase mobility of the tissues. Pt with greater trochanter tenderness is noted and will continue to be monitored. Pt with ability to complete strengthening activities of the RLE with good tolerance. Pt with reports of pressure pain noted in the lateral hip with reverse clamshells and minimal reps were completed. Pt continues to require tactile cues with TA and posterior pelvic tilts with continued difficulty coordinating movements. Pt advised to continue completing his HEP and monitor symptoms during and after his infusion today. [] No change. [] Other:  [] Patient would continue to benefit from skilled physical therapy services in order to: \ improve muscle extensibility, ROM, and strength in order to improve pain and activity tolerance. Short Term Goals: MEET IN 8 VISITS Status   Pain: Pt will report less than or equal to 2-3/10 at rest when sitting/standing for extended periods of time and moving to standing from a sitting position in order to improve tolerance to mobility without difficulty and independent ambulation.  Ongoing   ROM: Pt will improve R hip AROM to 90° without use of UEs to assist the leg, seated IR/ER with 0/10 pain, and supine hip abduction to 36 degrees with 0/10 pain in order to promote an efficient gait pattern, stair negotiation, and ADL completion. Ongoing   Strength: Pt will be able to demonstrate 5/5 RLE strength with 4/5 glute strength in order to improve activity tolerance. Ongoing   Gait: Pt will be able to complete a TUG in less than or equal to 20\" without the use of an AD in order to demonstrate improved transitioning tolerance and improved gait mechanics.        Pt will be able to complete a 6MWT without the use of an AD and notable improvements in R stance time and R weight shift in order to promote an efficient and independent gait pattern. Ongoing    Function: Pt will score greater than or equal to 10 points on the LEFI in order to demonstrate improved function with ADLs and work related tasks. Ongoing   HEP: Pt will be independent in with HEP. Ongoing   Fall Prevention: Pt will acknowledge fall prevention interventions in order to prevent falls at home, work, or in the community. Ongoing                   Patient goals: improve RLE pain and tightness       Pt. Education:  [x] Yes  [] No  [] Reviewed Prior HEP/Ed  Method of Education: [x] Verbal  [] Demo  [] Written  Comprehension of Education:  [x] Verbalizes understanding. [] Demonstrates understanding. [] Needs review. [] Demonstrates/verbalizes HEP/Ed previously given. Plan: [x] Continue current frequency toward long and short term goals.     [x] Specific Instructions for subsequent treatments: see above      Time In: 700            Time Out: 800    Electronically signed by:  Tricia Holden PT

## 2021-08-30 ENCOUNTER — HOSPITAL ENCOUNTER (OUTPATIENT)
Dept: PHYSICAL THERAPY | Facility: CLINIC | Age: 66
Setting detail: THERAPIES SERIES
Discharge: HOME | End: 2021-08-30
Payer: MEDICARE

## 2021-08-30 NOTE — FLOWSHEET NOTE
[] [facility] [address]  [address]  [phone]  F: [phone] [] [address]  [place] [address]   [address]  P: [phone]  F: [phone] [] [place]  [address]  P: [phone]  F: [phone] [] [address]  P: [phone]  F: [phone] [] [address]  [facility]   [address]   [place]: [phone]  F: [phone]      Physical Therapy Daily Treatment Note    Date:  2021  Patient Name:  Aisha Stone    :  1955  MRN: [id number]  Physician: Dr. Yolie Garcia: Eulalio Piña Medicare ($30 copay)  Medical Diagnosis: L26.443 (ICD-10-CM) - S/P hip replacement, right                 Rehab Codes: M25.551, R26.89, M62.81  Onset Date: 2021                      Next 's appt: TBD  Visit# / total visits: 3/8; Progress note for Medicare patient due at visit 8- visit not counted     Cancels/No Shows: 0/0    Subjective:    Pain:  [x] Yes  [] No Location: R hip Pain Rating: (0-10 scale) not obtained/10  Pain altered Tx:  [x] No  [] Yes  Action:  Comments: Pt arrives stating he continues to only have temporary pain relief with therapy. Pt reports he continues to have increased pain in the R anterior hip and thigh with prolonged sitting and continues to have to use a cane to ambulate safely. Pt notes his pain is constant and worsens with initial standing. Pt also notes pain into the anterior shin. Pt states he has been doing the exercises at home and reports pain with reverse clamshells. Pt continues to present with antalgic gait when standing from sitting and intermittent episodes of knee buckling when standing.       Objective: no treatment 21 Modalities:   Precautions:  Exercises:  Exercise Reps/ Time Weight/ Level Comments   Nu-step 5.5' 6'    Manaul 30'  IASTM to distal quad, MFR to the lateral ITB proximal and distal  Stick rolling to lateral ITB and anterior/poosterior insertions and anterior proximal thigh    Tenderness noted over the greater trochanter and just distal to the GT   Hip flexor stretch 3x30\"  Over pressure into knee flexion   Quad sets 10x     SAQ 10x A    Posterior pelvic tilt 10x     TA activation 10x     Supine clamshells  10x lime    Supine hip aDd 10x     sidelying knee flexed hip abd 10x     Reverse clamshells 2x5 A     LAQ 2x10 A Decreased weight due to increased pain over lateral knee   gait x           Other:      Treatment Charges: Mins Units   []  Modalities     []  Ther Exercise     []  Manual Therapy     []  Ther Activities     []  Aquatics     []  Vasocompression     []  Other     Total Treatment time 5 0       Assessment: [] Progressing toward goals. [] No change. [x] Other: Treatment held this date secondary to continued complaints of pain with only temporary relief with manual therapy and exercise. Due to pt's near miss of a fall that initiated the pain in July, pt is being referred back to his ortho office for further evaluation. Pt has been advised to continue to complete quad and glute sets along with LAQ, however, pt is advised to hold on exercises that are painful. Pt's surgeon will be contacted. [] Patient would continue to benefit from skilled physical therapy services in order to: \ improve muscle extensibility, ROM, and strength in order to improve pain and activity tolerance. Short Term Goals: MEET IN 8 VISITS Status   Pain: Pt will report less than or equal to 2-3/10 at rest when sitting/standing for extended periods of time and moving to standing from a sitting position in order to improve tolerance to mobility without difficulty and independent ambulation.  Ongoing   ROM: Pt will improve R hip AROM to 90° without use of UEs to assist the leg, seated IR/ER with 0/10 pain, and supine hip abduction to 36 degrees with 0/10 pain in order to promote an efficient gait pattern, stair negotiation, and ADL completion. Ongoing   Strength: Pt will be able to demonstrate 5/5 RLE strength with 4/5 glute strength in order to improve activity tolerance. Ongoing   Gait: Pt will be able to complete a TUG in less than or equal to 20\" without the use of an AD in order to demonstrate improved transitioning tolerance and improved gait mechanics.        Pt will be able to complete a 6MWT without the use of an AD and notable improvements in R stance time and R weight shift in order to promote an efficient and independent gait pattern. Ongoing    Function: Pt will score greater than or equal to 10 points on the LEFI in order to demonstrate improved function with ADLs and work related tasks. Ongoing   HEP: Pt will be independent in with HEP. Ongoing   Fall Prevention: Pt will acknowledge fall prevention interventions in order to prevent falls at home, work, or in the community. Ongoing                   Patient goals: improve RLE pain and tightness       Pt. Education:  [x] Yes  [] No  [] Reviewed Prior HEP/Ed  Method of Education: [x] Verbal  [] Demo  [] Written  Comprehension of Education:  [x] Verbalizes understanding. [] Demonstrates understanding. [] Needs review. [] Demonstrates/verbalizes HEP/Ed previously given. Plan: [x] Continue current frequency toward long and short term goals.     [x] Specific Instructions for subsequent treatments: see above      Time In: 900            Time Out: 905    Electronically signed by:  Amandeep Cruz, PT

## 2021-09-01 ENCOUNTER — OFFICE VISIT (OUTPATIENT)
Dept: ORTHOPEDIC SURGERY | Age: 66
End: 2021-09-01
Payer: MEDICARE

## 2021-09-01 ENCOUNTER — TELEPHONE (OUTPATIENT)
Dept: ORTHOPEDIC SURGERY | Age: 66
End: 2021-09-01

## 2021-09-01 ENCOUNTER — HOSPITAL ENCOUNTER (OUTPATIENT)
Dept: CT IMAGING | Facility: CLINIC | Age: 66
Discharge: HOME | End: 2021-09-03
Payer: MEDICARE

## 2021-09-01 VITALS
SYSTOLIC BLOOD PRESSURE: 129 MMHG | HEART RATE: 80 BPM | DIASTOLIC BLOOD PRESSURE: 85 MMHG | HEIGHT: 68 IN | BODY MASS INDEX: 38.19 KG/M2 | RESPIRATION RATE: 16 BRPM | WEIGHT: 252 LBS

## 2021-09-01 DIAGNOSIS — M25.561 ACUTE PAIN OF RIGHT KNEE: Primary | ICD-10-CM

## 2021-09-01 DIAGNOSIS — Z96.641 S/P HIP REPLACEMENT, RIGHT: ICD-10-CM

## 2021-09-01 DIAGNOSIS — S72.001A CLOSED FRACTURE OF RIGHT HIP, INITIAL ENCOUNTER (HCC): Primary | ICD-10-CM

## 2021-09-01 DIAGNOSIS — S72.001A CLOSED FRACTURE OF RIGHT HIP, INITIAL ENCOUNTER (HCC): ICD-10-CM

## 2021-09-01 DIAGNOSIS — M70.61 GREATER TROCHANTERIC BURSITIS OF RIGHT HIP: ICD-10-CM

## 2021-09-01 PROCEDURE — 99214 OFFICE O/P EST MOD 30 MIN: CPT | Performed by: PHYSICIAN ASSISTANT

## 2021-09-01 PROCEDURE — 73700 CT LOWER EXTREMITY W/O DYE: CPT

## 2021-09-01 ASSESSMENT — ENCOUNTER SYMPTOMS
VOMITING: 0
ABDOMINAL DISTENTION: 0
DIARRHEA: 0
ABDOMINAL PAIN: 0
APNEA: 0
NAUSEA: 0
RESPIRATORY NEGATIVE: 1
CHEST TIGHTNESS: 0
COLOR CHANGE: 0
SHORTNESS OF BREATH: 0
COUGH: 0
CONSTIPATION: 0

## 2021-09-01 NOTE — PROGRESS NOTES
[facility]  [address] [place] [id number]  Dept: [phone]  Dept Fax: [phone]          Right Knee/Right hip -established patient of Dr. Shawn Hawk:     Chief Complaint   Patient presents with    Knee Pain      R Hip/Knee Pain after fall x 2 weeks     HPI:     Korin Mccoy presents today for Right knee/hip pain. He had a right total hip arthroplasty on 4/27/2021 by Dr. Fe Marques. Pain has been present for approximately a month after 2 falls. He states he fell onto his right hip once. The other fall consisted of his knee giving out and him [place] knee on the dock. He states after his fall he called Dr. Bermudez Dry office because he felt he needed more physical therapy and has been doing physical therapy for last couple of weeks. He states the physical therapist says he needs to get an x-ray before she will do any more treatment since he is not feeling better. He presents today with a cane. He states his right knee is painful but his biggest complaint is on the lateral side of his hip. He states that when he stands sometimes his leg gives out. He states he has been taking Tylenol for pain. He presents today with his wife    ROS:   Review of Systems   Constitutional: Positive for activity change. Negative for appetite change, fatigue and fever. Respiratory: Negative. Negative for apnea, cough, chest tightness and shortness of breath. Cardiovascular: Negative. Negative for chest pain, palpitations and leg swelling. Gastrointestinal: Negative for abdominal distention, abdominal pain, constipation, diarrhea, nausea and vomiting. Genitourinary: Negative for difficulty urinating, dysuria and hematuria. Musculoskeletal: Positive for arthralgias and gait problem. Negative for joint swelling and myalgias. Skin: Negative for color change and rash. Neurological: Positive for weakness.  Negative for dizziness, numbness and headaches. Psychiatric/Behavioral: Negative for sleep disturbance.        Past Medical History:    Past Medical History:   Diagnosis Date    Allergic rhinitis     Anxiety     Aseptic meningitis     reaction to first infusion in 2012-hospitalized but no problem since then    Asthma     Avascular necrosis of bone of hip, left ([facility] [address] 04/2018    CAD (coronary artery disease)     mild on cath, no stents    Common variable immunodeficiency ([facility] [address] 12/4/2013    COPD (chronic obstructive pulmonary disease) (HCC)     Deviated nasal septum     Dyspnea     with exertion    HTN (hypertension)     Hyperglycemia     Hypoxia     Immune deficiency disorder ([facility] [address] 2012    IGIV every 3 weeks with prednisone    Obesity     On supplemental oxygen therapy     PRECIOUS (obstructive sleep apnea) 2012    CPAP nightly    Osteoporosis     Reflux     Respiratory failure ([facility] [address] 2014    Oxygen on    Severe persistent asthma     Spondylosis of cervical spine     TIA (transient ischemic attack) 2005    Tobacco use     stopped 2004    Under care of team 04/15/2021    pulmonology-[facility]-last visit march 2021    Under care of team 04/15/2021    infectious disease-Dr [facility]-last visit march 2021    Under care of team 04/15/2021    immunology-Dr Rivers-last visit mar 2021    Vitamin D deficiency 3/6/2018    Wears glasses     Wellness examination 04/15/2021    pcp-Dr Vazquez-last visit apr 2021       Past Surgical History:    Past Surgical History:   Procedure Laterality Date    CARDIAC CATHETERIZATION  2005    no stents    COLONOSCOPY  2003    ENDOSCOPY, COLON, DIAGNOSTIC      FINGER SURGERY Left 1997    reattachment, index finger    IR PORT PLACEMENT EQUAL OR GREATER THAN 5 YEARS  6/23/2021    IR PORT PLACEMENT EQUAL OR GREATER THAN 5 YEARS 6/23/2021 [facility]ED[facility]    NASAL ENDOSCOPY      FL REVISE TOTAL HIP REPLACEMENT Left 05/22/2018    HIP TOTAL ARTHROPLASTY ANTERIOR APPROACH  ([facility]) performed by Riya Villatoro DO at [place] [address]    lesion excision    TONSILLECTOMY      TOTAL HIP ARTHROPLASTY Right 04/27/2021    TOTAL HIP ARTHROPLASTY ANTERIOR performed by Riya Villatoro DO at [address] [place] [address] Drive TUNNELED VENOUS PORT PLACEMENT Right 11/25/2015    Rt.  chest       CurrentMedications:   Current Outpatient Medications   Medication Sig Dispense Refill    acetaminophen (TYLENOL) 500 MG tablet Take 1,000 mg by mouth every 6 hours as needed for Pain      docusate sodium (COLACE) 100 MG capsule Take 1 capsule by mouth 2 times daily as needed for Constipation 60 capsule 0    montelukast (SINGULAIR) 10 MG tablet take 1 tablet by mouth every evening 90 tablet 3    albuterol sulfate HFA (VENTOLIN HFA) 108 (90 Base) MCG/ACT inhaler inhale 2 puffs by mouth and INTO THE LUNGS every 6 hours if needed for wheezing 3 Inhaler 3    budesonide-formoterol (SYMBICORT) 160-4.5 MCG/ACT AERO inhale 2 puffs by mouth twice a day 3 Inhaler 3    fluticasone (FLONASE) 50 MCG/ACT nasal spray instill 2 sprays into each nostril once daily 3 Bottle 3    SPIRIVA HANDIHALER 18 MCG inhalation capsule inhale the contents of one capsule in the handihaler once daily 90 capsule 3    predniSONE (DELTASONE) 10 MG tablet 4 tablet once daily for 3 days then 3 tablet once daily for 3 days then 2 tablet once daily for 3 days then 1 tablet once daily for 3 days then discontinue 30 tablet 0    albuterol (PROVENTIL) (2.5 MG/3ML) 0.083% nebulizer solution Take 3 mLs by nebulization every 4 hours as needed for Wheezing or Shortness of Breath 375 mL 5    fluticasone-salmeterol (ADVAIR DISKUS) 500-50 MCG/DOSE diskus inhaler Inhale 1 puff into the lungs 2 times daily 60 each 11    omeprazole (PRILOSEC) 20 MG delayed release capsule take 1 capsule by mouth once daily 60 capsule 3    amLODIPine (NORVASC) 10 MG tablet take 1 tablet by mouth once daily 60 tablet 3    Handicap Placard MISC by Does not apply route For 5 years 1 each 0    traMADol (ULTRAM) 50 MG tablet Take 50 mg by mouth as needed for Pain.  predniSONE (DELTASONE) 10 MG tablet Start at 50 mg and taper 10 mg every 3 days 45 tablet 0    ALPRAZolam (XANAX) 0.5 MG tablet Take 0.5 mg by mouth 3 times daily as needed for Sleep. .      benazepril (LOTENSIN) 10 MG tablet take 1 tablet by mouth once daily (Patient taking differently: Take 10 mg BID) 30 tablet 5    Immune Globulin, Human, 10 GM/100ML SOLN IV solution Infuse 60 g intravenously every 21 days        No current facility-administered medications for this visit. Allergies:    Adhesive tape    Social History:   Social History     Socioeconomic History    Marital status:      Spouse name: Lulu Simpson Number of children: 3    Years of education: None    Highest education level: None   Occupational History    Occupation:      Comment: now on disability   Tobacco Use    Smoking status: Former Smoker     Packs/day: 1.00     Years: 31.00     Pack years: 31.00     Types: Cigarettes     Start date: 1972     Quit date: 2004     Years since quittin.6    Smokeless tobacco: Never Used    Tobacco comment: smoke 4ppd for many yrs; quit 8 yrs ago   Vaping Use    Vaping Use: Never used   Substance and Sexual Activity    Alcohol use: Yes     Alcohol/week: 3.0 standard drinks     Types: 3 Cans of beer per week     Comment: about once a week    Drug use: No    Sexual activity: Yes     Partners: Female   Other Topics Concern    None   Social History Narrative    None     Social Determinants of Health     Financial Resource Strain:     Difficulty of Paying Living Expenses:    Food Insecurity:     Worried About Running Out of Food in the Last Year:     Ran Out of Food in the Last Year:    Transportation Needs:     Lack of Transportation (Medical):      Lack of Transportation (Non-Medical):    Physical Activity:     Days of effusion. MUSC: good quad tone  LIGAMENT: Lachman's test is Negative with Good endpoint. Anterior drawer Negative. Posterior drawer Negative. There is No varus instability at 0 degrees and No varus instability at 30 degrees. There is No valgus instability at 0 degrees and No valgus instability at 30 degrees. SPECIAL: Kelechi test is negative with no clunks, no crepitation, and no pain. PALP: There is no joint line pain. Right Hip     GEN: Alert and oriented X 3, in no acute distress. GAIT: The patient's gait was observed while entering the exam room and was noted to be antalgic. The extremity is in anatomic alignment. SKIN: Intact without rashes, lesions, or ulcerations. No obvious deformity or swelling. NEURO: The patient responds to light touch throughout bilateral LE. Patellar and Achilles reflexes are 2/4. VASC: The bilateral LE is neurovascularly intact with 2/4 DP and 2/4 PT pulses. Brisk capillary refill. ROM: 0 degree flexion contracture, 90 degrees flexion, 30 degrees abduction, 10 degrees adduction, 20 degrees of internal rotation, 40 degrees of external rotation. MUSC: Strength is 4/5 flexion, abduction, internal rotation, external rotation. PALP: The patient is  tender to palpation over the greater trochanter and IT band pain to palpation. TEST: Log roll is negative. No apparent leg length discrepancy. + Stenchfield test.     Assessment:     1. Closed fracture of right hip, initial encounter ([facility] [address]      Procedures:    Procedure: no  Radiology:   XR KNEE RIGHT (MIN 4 VIEWS)    Result Date: 9/1/2021  KNEE X-RAY 4 views of the right knee including AP, bilateral tunnel, and lateral in the upright position, and skyline views reveal anatomic alignment with no fracture or dislocation. Kellgren grade II changes of osteoarthritis (joint space narrowing, osteophyte, subchondral sclerosis, bony deformity/cyst) of the tricompartment(s). No osseous loose bodies.   No bony erosion or periosteal reaction. No soft tissue masses. Impression: mild arthritic changes of the right knee. CT HIP RIGHT WO CONTRAST    Result Date: 9/1/2021  EXAMINATION: CT OF THE RIGHT HIP WITHOUT CONTRAST 9/1/2021 10:17 am TECHNIQUE: CT of the right hip was performed without the administration of intravenous contrast.  Multiplanar reformatted images are provided for review. Dose modulation, iterative reconstruction, and/or weight based adjustment of the mA/kV was utilized to reduce the radiation dose to as low as reasonably achievable. COMPARISON: Radiographs same day, 05/07/2021. HISTORY ORDERING SYSTEM PROVIDED HISTORY: Closed fracture of right hip, initial encounter [facility]) TECHNOLOGIST PROVIDED HISTORY: occult fracture around LANRE post fall Reason for Exam: Fall 3 weeks ago, Right hip pain. ? occult fracture. Hx of bilateral hip replacements Acuity: Acute Type of Exam: Initial Mechanism of Injury: Fall FINDINGS: Postsurgical changes are seen from bilateral total hip arthroplasties. No significant perihardware lucency is seen. No acute fracture is seen. No evidence of dislocation. Comparing radiographs from 05/07/2021 and 09/01/2021, there is a new calcific density along the right lateral aspect of the acetabulum, without a clear donor site on this CT, favored to represent heterotopic ossification. Heterotopic ossification seen adjacent to the trochanters. Fat containing inguinal hernias, right greater than left. Postsurgical changes from a right total hip arthroplasty. No acute fracture is seen. There is a calcific density along the right lateral margin of the acetabulum, not seen on radiographs from 05/07/2021, without a donor site, favored to represent heterotopic ossification. Additional sites of heterotopic ossifications seen near the trochanters. Attention can be made on follow-up imaging.      XR HIP RIGHT MIN 4VW W PELVIS    Result Date: 9/1/2021  HIP X-RAY AP and standing AP of the pelvis and supine AP and frog leg lateral of the right hip reveals a total hip arthroplasty in proper position with no subsidence, loosening (DeLee-Charnley zones 1-3 of the acetabulum or Gruen zones 1-7 of the femoral component) or osteolysis present. There is no acute bony abnormalities including soft tissue masses, periosteal reaction or lytic bony lesions. Impression: right total hip arthroplasty with no complicating features. Plan:   Treatment : I reviewed the x-ray and CT with the patient and I informed them that the tray does not show any fractures or issues with his total hip replacement. He does have some mild arthritic changes of his right knee. We discussed the etiologies and natural histories of greater trochanteric bursitis versus an occult fracture of his greater trochanter after a fall. We discussed the various treatment alternatives including anti-inflammatory medications, physical therapy, injections, further imaging studies and as a last result surgery. During today's visit, we discussed that his pain seems to be out of proportion so I am concerned for an occult fracture around his prosthesis that I cannot see on x-ray. If the CT is negative, I would suggest we try an injection into the greater trochanteric bursa to see if that helps relieve his pain. I did review the x-ray and CT with Dr. Carole Figueroa. We do not see any kind of an occult fracture. The patient can resume physical therapy. He can also follow-up in the office with me and I can do a greater trochanteric bursa injection. After reviewing all the imaging I think it would be safe for him to come in for a bursa injection and Dr. Carole Figueroa agrees. The patient was called regarding the results of the CT and the plan. The patient will call the office immediately with any problems. No orders of the defined types were placed in this encounter.       Orders Placed This Encounter   Procedures    CT HIP RIGHT WO CONTRAST     Standing Status:   Future     Number

## 2021-09-02 NOTE — TELEPHONE ENCOUNTER
pts wife asking if this means pt has bursitis? And when can pt resume therapy ?  Pt is schedule for injection on 09/07/21

## 2021-09-03 ENCOUNTER — HOSPITAL ENCOUNTER (OUTPATIENT)
Dept: PHYSICAL THERAPY | Facility: CLINIC | Age: 66
Setting detail: THERAPIES SERIES
Discharge: HOME | End: 2021-09-03
Payer: MEDICARE

## 2021-09-03 NOTE — FLOWSHEET NOTE
[] [facility]) - [facility]  Therapy  [address]    [phone]  F: [phone]   [x] [address]  [facility] [address]   [address]  P: [phone]  F: [phone]  [] [address] &  Therapy  [address] [facility]  P: [phone]  F: [phone] [] [address]  P: [phone]  F: [phone]  [] [address]  [facility]   [address]   [place]: [phone]  F: [phone]   [] [facility]  [address] [facility] [address]  [place]: [phone]   F: [phone]     Physical Therapy Cancel/No Show note    Date: 9/3/2021  Patient: Beth Holland  : 1955  MRN: [id number]    Cancels/No Shows to date:     For today's appointment patient:    [x]  Cancelled    [] Rescheduled appointment    [] No-show     Reason given by patient:    []  Patient ill    []  Conflicting appointment    [] No transportation      [] Conflict with work    [] No reason given    [] Weather related    [] [id number]    [x] Other: Steroid injection on 21   Comments:        [] Next appointment was confirmed    Electronically signed by: Fernanda Mccarty PT

## 2021-09-07 ENCOUNTER — OFFICE VISIT (OUTPATIENT)
Dept: ORTHOPEDIC SURGERY | Age: 66
End: 2021-09-07
Payer: MEDICARE

## 2021-09-07 VITALS
HEART RATE: 78 BPM | RESPIRATION RATE: 16 BRPM | HEIGHT: 68 IN | DIASTOLIC BLOOD PRESSURE: 77 MMHG | BODY MASS INDEX: 38.19 KG/M2 | SYSTOLIC BLOOD PRESSURE: 124 MMHG | WEIGHT: 252 LBS

## 2021-09-07 DIAGNOSIS — Z96.641 S/P HIP REPLACEMENT, RIGHT: ICD-10-CM

## 2021-09-07 DIAGNOSIS — M70.61 GREATER TROCHANTERIC BURSITIS OF RIGHT HIP: Primary | ICD-10-CM

## 2021-09-07 PROCEDURE — 20611 DRAIN/INJ JOINT/BURSA W/US: CPT | Performed by: PHYSICIAN ASSISTANT

## 2021-09-07 RX ORDER — LIDOCAINE HYDROCHLORIDE 10 MG/ML
2 INJECTION, SOLUTION INFILTRATION; PERINEURAL ONCE
Status: COMPLETED | OUTPATIENT
Start: 2021-09-07 | End: 2021-09-07

## 2021-09-07 RX ORDER — METHYLPREDNISOLONE ACETATE 80 MG/ML
80 INJECTION, SUSPENSION INTRA-ARTICULAR; INTRALESIONAL; INTRAMUSCULAR; SOFT TISSUE ONCE
Status: CANCELLED | OUTPATIENT
Start: 2021-09-07 | End: 2021-09-07

## 2021-09-07 RX ORDER — LIDOCAINE HYDROCHLORIDE 10 MG/ML
4 INJECTION, SOLUTION INFILTRATION; PERINEURAL ONCE
Status: DISCONTINUED | OUTPATIENT
Start: 2021-09-07 | End: 2021-09-07

## 2021-09-07 RX ORDER — METHYLPREDNISOLONE ACETATE 80 MG/ML
80 INJECTION, SUSPENSION INTRA-ARTICULAR; INTRALESIONAL; INTRAMUSCULAR; SOFT TISSUE ONCE
Status: COMPLETED | OUTPATIENT
Start: 2021-09-07 | End: 2021-09-07

## 2021-09-07 RX ADMIN — LIDOCAINE HYDROCHLORIDE 2 ML: 10 INJECTION, SOLUTION INFILTRATION; PERINEURAL at 12:03

## 2021-09-07 RX ADMIN — METHYLPREDNISOLONE ACETATE 80 MG: 80 INJECTION, SUSPENSION INTRA-ARTICULAR; INTRALESIONAL; INTRAMUSCULAR; SOFT TISSUE at 12:04

## 2021-09-07 ASSESSMENT — ENCOUNTER SYMPTOMS
CONSTIPATION: 0
APNEA: 0
DIARRHEA: 0
COLOR CHANGE: 0
SHORTNESS OF BREATH: 0
NAUSEA: 0
RESPIRATORY NEGATIVE: 1
ABDOMINAL DISTENTION: 0
VOMITING: 0
ABDOMINAL PAIN: 0
CHEST TIGHTNESS: 0
COUGH: 0

## 2021-09-07 NOTE — PROGRESS NOTES
[address] [facility]IC[facility]  [address] [place] [address]  Dept: [phone]  Dept Fax: [phone]                                       Right Knee/Right hip -established patient    Subjective:     Chief Complaint   Patient presents with    Follow-up     Right Hip & Knee Pain     HPI:     Kasia Escalante presents today for Right knee/hip pain. He had a right total hip arthroplasty on 4/27/2021 by Dr. Honey Santoro. Pain has been present for approximately a month after 2 falls. He states he fell onto his right hip once. The other fall consisted of his knee giving out and him [place] knee [place] the dock. He states after his fall he called Dr. Natividad Vincent office because he felt he needed more physical therapy and has been doing physical therapy for last couple of weeks. He states the physical therapist says he needs to get an x-ray before she will do any more treatment since he is not feeling better. He presents today with a cane. He states his right knee is painful but his biggest complaint is on the lateral side of his hip. He states that when he stands sometimes his leg gives out. He states he has been taking Tylenol for pain. He presents today with his wife. He is here today to review his CT scan and to possibly get an injection. ROS:     Review of Systems   Constitutional: Positive for activity change. Negative for appetite change, fatigue and fever. Respiratory: Negative. Negative for apnea, cough, chest tightness and shortness of breath. Cardiovascular: Negative. Negative for chest pain, palpitations and leg swelling. Gastrointestinal: Negative for abdominal distention, abdominal pain, constipation, diarrhea, nausea and vomiting. Genitourinary: Negative for difficulty urinating, dysuria and hematuria. Musculoskeletal: Positive for arthralgias and gait problem. Negative for joint swelling and myalgias.    Skin: Negative for color change and rash. Neurological: Negative for dizziness, weakness, numbness and headaches. Psychiatric/Behavioral: Positive for sleep disturbance.        Past Medical History:    Past Medical History:   Diagnosis Date    Allergic rhinitis     Anxiety     Aseptic meningitis     reaction to first infusion in 2012-hospitalized but no problem since then    Asthma     Avascular necrosis of bone of hip, left ([facility] 04/2018    CAD (coronary artery disease)     mild on cath, no stents    Common variable immunodeficiency ([facility] 12/4/2013    COPD (chronic obstructive pulmonary disease) (HCC)     Deviated nasal septum     Dyspnea     with exertion    HTN (hypertension)     Hyperglycemia     Hypoxia     Immune deficiency disorder ([facility] 2012    IGIV every 3 weeks with prednisone    Obesity     On supplemental oxygen therapy     PRECIOUS (obstructive sleep apnea) 2012    CPAP nightly    Osteoporosis     Reflux     Respiratory failure ([facility] 2014    Oxygen on    Severe persistent asthma     Spondylosis of cervical spine     TIA (transient ischemic attack) 2005    Tobacco use     stopped 2004    Under care of team 04/15/2021    pulmonology-Dr [facility]-last visit march 2021    Under care of team 04/15/2021    infectious disease-Dr Chavez-[facility]-last visit march 2021    Under care of team 04/15/2021    immunology-Dr Rivers-last visit mar 2021    Vitamin D deficiency 3/6/2018    Wears glasses     Wellness examination 04/15/2021    pcp-Dr Vazquez-last visit apr 2021       Past Surgical History:    Past Surgical History:   Procedure Laterality Date    CARDIAC CATHETERIZATION  2005    no stents    COLONOSCOPY  2003    ENDOSCOPY, COLON, DIAGNOSTIC      FINGER SURGERY Left 1997    reattachment, index finger    IR PORT PLACEMENT EQUAL OR GREATER THAN 5 YEARS  6/23/2021    IR PORT PLACEMENT EQUAL OR GREATER THAN 5 YEARS 6/23/2021 [facility]ED[facility]    NASAL ENDOSCOPY      IL REVISE TOTAL HIP REPLACEMENT Left 05/22/2018    HIP TOTAL ARTHROPLASTY ANTERIOR APPROACH  ([facility]) performed by Freida Canela DO at [facility]  2014    lesion excision    TONSILLECTOMY      TOTAL HIP ARTHROPLASTY Right 04/27/2021    TOTAL HIP ARTHROPLASTY ANTERIOR performed by Freida Canela DO at [address] [place] [address] TUNNELED VENOUS PORT PLACEMENT Right 11/25/2015    Rt.  chest       CurrentMedications:   Current Outpatient Medications   Medication Sig Dispense Refill    acetaminophen (TYLENOL) 500 MG tablet Take 1,000 mg by mouth every 6 hours as needed for Pain      docusate sodium (COLACE) 100 MG capsule Take 1 capsule by mouth 2 times daily as needed for Constipation 60 capsule 0    montelukast (SINGULAIR) 10 MG tablet take 1 tablet by mouth every evening 90 tablet 3    albuterol sulfate HFA (VENTOLIN HFA) 108 (90 Base) MCG/ACT inhaler inhale 2 puffs by mouth and INTO THE LUNGS every 6 hours if needed for wheezing 3 Inhaler 3    budesonide-formoterol (SYMBICORT) 160-4.5 MCG/ACT AERO inhale 2 puffs by mouth twice a day 3 Inhaler 3    fluticasone (FLONASE) 50 MCG/ACT nasal spray instill 2 sprays into each nostril once daily 3 Bottle 3    SPIRIVA HANDIHALER 18 MCG inhalation capsule inhale the contents of one capsule in the handihaler once daily 90 capsule 3    predniSONE (DELTASONE) 10 MG tablet 4 tablet once daily for 3 days then 3 tablet once daily for 3 days then 2 tablet once daily for 3 days then 1 tablet once daily for 3 days then discontinue 30 tablet 0    albuterol (PROVENTIL) (2.5 MG/3ML) 0.083% nebulizer solution Take 3 mLs by nebulization every 4 hours as needed for Wheezing or Shortness of Breath 375 mL 5    fluticasone-salmeterol (ADVAIR DISKUS) 500-50 MCG/DOSE diskus inhaler Inhale 1 puff into the lungs 2 times daily 60 each 11    omeprazole (PRILOSEC) 20 MG delayed release capsule take 1 capsule by mouth once daily 60 capsule 3    amLODIPine (NORVASC) 10 MG tablet take 1 tablet by mouth once daily 60 tablet 3    Handicap Placard MISC by Does not apply route For 5 years 1 each 0    traMADol (ULTRAM) 50 MG tablet Take 50 mg by mouth as needed for Pain.  predniSONE (DELTASONE) 10 MG tablet Start at 50 mg and taper 10 mg every 3 days 45 tablet 0    ALPRAZolam (XANAX) 0.5 MG tablet Take 0.5 mg by mouth 3 times daily as needed for Sleep. .      benazepril (LOTENSIN) 10 MG tablet take 1 tablet by mouth once daily (Patient taking differently: Take 10 mg BID) 30 tablet 5    Immune Globulin, Human, 10 GM/100ML SOLN IV solution Infuse 60 g intravenously every 21 days        No current facility-administered medications for this visit. Allergies:    Adhesive tape    Social History:   Social History     Socioeconomic History    Marital status:      Spouse name: Jaycee Junior Number of children: 3    Years of education: None    Highest education level: None   Occupational History    Occupation:      Comment: now on disability   Tobacco Use    Smoking status: Former Smoker     Packs/day: 1.00     Years: 31.00     Pack years: 31.00     Types: Cigarettes     Start date: 1972     Quit date: 2004     Years since quittin.6    Smokeless tobacco: Never Used    Tobacco comment: smoke 4ppd for many yrs; quit 8 yrs ago   Vaping Use    Vaping Use: Never used   Substance and Sexual Activity    Alcohol use: Yes     Alcohol/week: 3.0 standard drinks     Types: 3 Cans of beer per week     Comment: about once a week    Drug use: No    Sexual activity: Yes     Partners: Female   Other Topics Concern    None   Social History Narrative    None     Social Determinants of Health     Financial Resource Strain:     Difficulty of Paying Living Expenses:    Food Insecurity:     Worried About Running Out of Food in the Last Year:     Ran Out of Food in the Last Year:    Transportation Needs:     Lack of Transportation (Medical):      Lack of Transportation (Non-Medical):    Physical Activity:     Days of Exercise per Week:     Minutes of Exercise per Session:    Stress:     Feeling of Stress :    Social Connections:     Frequency of Communication with Friends and Family:     Frequency of Social Gatherings with Friends and Family:     Attends Jainism Services:     Active Member of Clubs or Organizations:     Attends Club or Organization Meetings:     Marital Status:    Intimate Partner Violence:     Fear of Current or Ex-Partner:     Emotionally Abused:     Physically Abused:     Sexually Abused:        Family History:  Family History   Problem Relation Age of Onset    COPD Father     Coronary Art Dis Father     Heart Attack Father     Lung Cancer Mother     Breast Cancer Sister     No Known Problems Maternal Grandmother     No Known Problems Maternal Grandfather     Cancer Paternal Grandmother     Stroke Paternal Grandfather     Heart Attack Paternal Grandfather     Cancer Paternal Grandfather     Breast Cancer Sister     Cancer Sister         Throat       Vitals:   /77   Pulse 78   Resp 16   Ht 5' 8\" (1.727 m)   Wt 252 lb (114.3 kg)   BMI 38.32 kg/m²  Body mass index is 38.32 kg/m². Physical Examination:     Orthopedics:    GENERAL: Alert and oriented X3 in no acute distress. SKIN: Intact without lesions or ulcerations. NEURO: Intact to sensory and motor testing. VASC: Capillary refill is less than 3 seconds. Right Hip      GEN: Alert and oriented X 3, in no acute distress. GAIT: The patient's gait was observed while entering the exam room and was noted to be antalgic. The extremity is in anatomic alignment. SKIN: Intact without rashes, lesions, or ulcerations. No obvious deformity or swelling. NEURO: The patient responds to light touch throughout bilateral LE. Patellar and Achilles reflexes are 2/4. VASC: The bilateral LE is neurovascularly intact with 2/4 DP and 2/4 PT pulses.  Brisk capillary refill. ROM: 0 degree flexion contracture, 90 degrees flexion, 30 degrees abduction, 10 degrees adduction, 20 degrees of internal rotation, 40 degrees of external rotation. MUSC: Strength is 4/5 flexion, abduction, internal rotation, external rotation. PALP: The patient is  tender to palpation over the greater trochanter and IT band pain to palpation. TEST: Log roll is negative. No apparent leg length discrepancy. + Stenchfield test.  Assessment:     1. Greater trochanteric bursitis of right hip    2. S/P hip replacement, right      Procedures:    Procedure: yes    Greater Trochanteric Bursa Injection     Procedure: Annia November agreed today for a Corticosteroid injection into the right greater trochanteric bursa. The patient was placed in the lateral position with the affected side up. The point of the maximum tenderness was marked with the closed end of a click type pen. The skin was prepped with betadine in a sterile fashion. Utilizing a [employer] ultrasound unit with a variable frequency linear transducer and sterile technique a 33 cc solution containing 2cc of 1% Lidocaine and 1 cc containing 80mg of depomedrol was injected into the greater trochanteric bursa with a 22 gauge spinal needle. The wound was cleansed and a Band-Aid was placed. The patient tolerated the procedure without difficulty. Adverse reactions of the injection was discussed with the patient including signs of infection (increasing pain, redness, swelling) and the patient was instructed to call immediately with any of these symptoms. The images are stored on SD card in the machine until downloaded to the patient's chart. Radiology:   XR KNEE RIGHT (MIN 4 VIEWS)    Result Date: 9/1/2021  KNEE X-RAY 4 views of the right knee including AP, bilateral tunnel, and lateral in the upright position, and skyline views reveal anatomic alignment with no fracture or dislocation.   Kellgren grade II changes of osteoarthritis (joint space narrowing, osteophyte, subchondral sclerosis, bony deformity/cyst) of the tricompartment(s). No osseous loose bodies. No bony erosion or periosteal reaction. No soft tissue masses. Impression: mild arthritic changes of the right knee. CT HIP RIGHT WO CONTRAST    Result Date: 9/2/2021  EXAMINATION: CT OF THE RIGHT HIP WITHOUT CONTRAST 9/1/2021 10:17 am TECHNIQUE: CT of the right hip was performed without the administration of intravenous contrast.  Multiplanar reformatted images are provided for review. Dose modulation, iterative reconstruction, and/or weight based adjustment of the mA/kV was utilized to reduce the radiation dose to as low as reasonably achievable. COMPARISON: Radiographs same day, 05/07/2021. HISTORY ORDERING SYSTEM PROVIDED HISTORY: Closed fracture of right hip, initial encounter [facility] TECHNOLOGIST PROVIDED HISTORY: occult fracture around LANRE post fall Reason for Exam: Fall 3 weeks ago, Right hip pain. ? occult fracture. Hx of bilateral hip replacements Acuity: Acute Type of Exam: Initial Mechanism of Injury: Fall FINDINGS: Postsurgical changes are seen from bilateral total hip arthroplasties. No significant perihardware lucency is seen. No acute fracture is seen. No evidence of dislocation. Comparing radiographs from 05/07/2021 and 09/01/2021, there is a new calcific density along the right lateral aspect of the acetabulum, without a clear donor site on this CT, favored to represent heterotopic ossification. Heterotopic ossification seen adjacent to the trochanters. Fat containing inguinal hernias, right greater than left. Postsurgical changes from a right total hip arthroplasty. No acute fracture is seen. There is a calcific density along the right lateral margin of the acetabulum, not seen on radiographs from 05/07/2021, without a donor site, favored to represent heterotopic ossification. Additional sites of heterotopic ossifications seen near the trochanters.   Attention can be made on follow-up imaging. XR HIP RIGHT MIN 4VW W PELVIS    Result Date: 9/1/2021  HIP X-RAY AP and standing AP of the pelvis and supine AP and frog leg lateral of the right hip reveals a total hip arthroplasty in proper position with no subsidence, loosening (DeLee-Charnley zones 1-3 of the acetabulum or Gruen zones 1-7 of the femoral component) or osteolysis present. There is no acute bony abnormalities including soft tissue masses, periosteal reaction or lytic bony lesions. Impression: right total hip arthroplasty with no complicating features. Plan:   Treatment : I reviewed the CT Scan with the patient and I informed them that the CT was negative for fracture. I reviewed the CT scan with Dr. Luis Treadwell, he agrees that we can try a cortisone injection into the greater trochanteric bursa. We discussed the etiologies and natural histories of greater trochanteric bursitis. We discussed the various treatment alternatives including anti-inflammatory medications, physical therapy, injections, further imaging studies and as a last result surgery. During today's visit, discussed that he did not hurt his hip replacement in the fall. I think a injection into the greater trochanteric bursa will help significantly. I do want him to hold therapy for 1 week after having the injection. If he is still having knee pain in 3 weeks he will come back and just get a knee injection with me. If he is doing well or still having issues, he will follow up with Dr. Luis Treadwell in 6 weeks. The patient will call the office immediately with any problems. Orders Placed This Encounter   Medications    DISCONTD: lidocaine 1 % injection 4 mL    methylPREDNISolone acetate (DEPO-MEDROL) injection 80 mg    lidocaine 1 % injection 2 mL       No orders of the defined types were placed in this encounter.           Electronically signed by Jermaine Cao PA-C, on 9/7/2021 at 3:49 PM

## 2021-09-24 ENCOUNTER — TELEPHONE (OUTPATIENT)
Dept: PULMONOLOGY | Age: 66
End: 2021-09-24

## 2021-09-24 DIAGNOSIS — J96.11 CHRONIC RESPIRATORY FAILURE WITH HYPOXIA (HCC): ICD-10-CM

## 2021-09-24 DIAGNOSIS — J44.9 CHRONIC OBSTRUCTIVE PULMONARY DISEASE, UNSPECIFIED COPD TYPE (HCC): Primary | ICD-10-CM

## 2021-09-24 NOTE — TELEPHONE ENCOUNTER
Patient wife called stating insurance changed and [employer] [employer] is not in contract with their new insurance. Need to switch patient to medical service company.

## 2021-10-06 ENCOUNTER — TELEPHONE (OUTPATIENT)
Dept: PULMONOLOGY | Age: 66
End: 2021-10-06

## 2021-10-06 DIAGNOSIS — J20.9 ACUTE BRONCHITIS, UNSPECIFIED ORGANISM: Primary | ICD-10-CM

## 2021-10-06 RX ORDER — PREDNISONE 20 MG/1
40 TABLET ORAL DAILY
Qty: 10 TABLET | Refills: 0 | Status: SHIPPED | OUTPATIENT
Start: 2021-10-06 | End: 2021-10-11

## 2021-10-06 RX ORDER — AZITHROMYCIN 250 MG/1
250 TABLET, FILM COATED ORAL SEE ADMIN INSTRUCTIONS
Qty: 6 TABLET | Refills: 0 | Status: SHIPPED | OUTPATIENT
Start: 2021-10-06 | End: 2021-10-11

## 2021-10-06 NOTE — TELEPHONE ENCOUNTER
Since Saturday patient has had a cough and has been horse and has not gone away he went and had a Covid test Sunday and it was negative. Do you want to do an RX for an ABX.

## 2021-10-06 NOTE — TELEPHONE ENCOUNTER
I put a prescription for Zithromax pack and short course of prednisone in the epic. He may have prednisone at home already.   If he starts getting worse he need to go the emergency room

## 2021-10-15 ENCOUNTER — TELEPHONE (OUTPATIENT)
Dept: PULMONOLOGY | Age: 66
End: 2021-10-15

## 2021-10-15 NOTE — TELEPHONE ENCOUNTER
----- Message from Elliott Church MD sent at 4/28/2021  3:35 PM EDT -----    ----- Message -----  From: Leona Santana Incoming Radiant Results From [employer]  Sent: 4/27/2021   1:24 PM EDT  To: Elliott Church MD

## 2021-10-18 ENCOUNTER — TELEPHONE (OUTPATIENT)
Dept: PULMONOLOGY | Age: 66
End: 2021-10-18

## 2021-10-18 NOTE — TELEPHONE ENCOUNTER
Patient's wife called and she states he finished the Z-horacio and prednisone. He was not feeling better still coughing etc.  They had some Levaquin and prednisone on hand so he started taking last Thursday. . I put them in for a sick call appointment tomorrow unless you want to do something else. Please advise.

## 2021-10-19 ENCOUNTER — HOSPITAL ENCOUNTER (OUTPATIENT)
Age: 66
Discharge: HOME | End: 2021-10-21
Payer: MEDICARE

## 2021-10-19 ENCOUNTER — HOSPITAL ENCOUNTER (OUTPATIENT)
Age: 66
Discharge: HOME | End: 2021-10-19
Payer: MEDICARE

## 2021-10-19 ENCOUNTER — OFFICE VISIT (OUTPATIENT)
Dept: PULMONOLOGY | Age: 66
End: 2021-10-19
Payer: MEDICARE

## 2021-10-19 ENCOUNTER — HOSPITAL ENCOUNTER (OUTPATIENT)
Dept: GENERAL RADIOLOGY | Age: 66
Discharge: HOME | End: 2021-10-21
Payer: MEDICARE

## 2021-10-19 ENCOUNTER — HOSPITAL ENCOUNTER (OUTPATIENT)
Age: 66
Setting detail: SPECIMEN
Discharge: HOME | End: 2021-10-19
Payer: MEDICARE

## 2021-10-19 VITALS
TEMPERATURE: 97.6 F | DIASTOLIC BLOOD PRESSURE: 89 MMHG | BODY MASS INDEX: 39.1 KG/M2 | RESPIRATION RATE: 20 BRPM | WEIGHT: 258 LBS | OXYGEN SATURATION: 97 % | SYSTOLIC BLOOD PRESSURE: 135 MMHG | HEIGHT: 68 IN | HEART RATE: 88 BPM

## 2021-10-19 DIAGNOSIS — G47.33 OSA ON CPAP: ICD-10-CM

## 2021-10-19 DIAGNOSIS — J45.50 SEVERE PERSISTENT ASTHMA WITHOUT COMPLICATION: ICD-10-CM

## 2021-10-19 DIAGNOSIS — D83.9 CVID (COMMON VARIABLE IMMUNODEFICIENCY) (HCC): ICD-10-CM

## 2021-10-19 DIAGNOSIS — Z87.891 HISTORY OF SMOKING AT LEAST 1 PACK PER DAY FOR AT LEAST 30 YEARS: ICD-10-CM

## 2021-10-19 DIAGNOSIS — J30.9 ALLERGIC RHINITIS, UNSPECIFIED SEASONALITY, UNSPECIFIED TRIGGER: ICD-10-CM

## 2021-10-19 DIAGNOSIS — J20.9 ACUTE BRONCHITIS, UNSPECIFIED ORGANISM: ICD-10-CM

## 2021-10-19 DIAGNOSIS — J96.11 CHRONIC RESPIRATORY FAILURE WITH HYPOXIA (HCC): ICD-10-CM

## 2021-10-19 DIAGNOSIS — J44.9 CHRONIC OBSTRUCTIVE PULMONARY DISEASE, UNSPECIFIED COPD TYPE (HCC): ICD-10-CM

## 2021-10-19 DIAGNOSIS — Z99.89 OSA ON CPAP: ICD-10-CM

## 2021-10-19 DIAGNOSIS — J20.9 ACUTE BRONCHITIS, UNSPECIFIED ORGANISM: Primary | ICD-10-CM

## 2021-10-19 PROCEDURE — 71046 X-RAY EXAM CHEST 2 VIEWS: CPT

## 2021-10-19 PROCEDURE — 99214 OFFICE O/P EST MOD 30 MIN: CPT | Performed by: INTERNAL MEDICINE

## 2021-10-19 RX ORDER — PREDNISONE 10 MG/1
TABLET ORAL
Qty: 30 TABLET | Refills: 0 | Status: SHIPPED | OUTPATIENT
Start: 2021-10-19 | End: 2022-04-01 | Stop reason: SDUPTHER

## 2021-10-19 RX ORDER — LEVOFLOXACIN 500 MG/1
500 TABLET, FILM COATED ORAL DAILY
Qty: 10 TABLET | Refills: 0 | Status: SHIPPED | OUTPATIENT
Start: 2021-10-19 | End: 2022-04-01 | Stop reason: SDUPTHER

## 2021-10-19 NOTE — PROGRESS NOTES
cxr                                                          PULMONARY OUTPATIENT PROGRESS NOTE      Patient:  Shiela Thorpe  YOB: 1955    MRN: [id number]     Acct:        Pt seen and Chart reviewed. Mr/Ms Shiela Thorpe is here in followup for    Diagnosis Orders   1. Acute bronchitis, unspecified organism     2. Chronic obstructive pulmonary disease, unspecified COPD type ([facility]     3. Chronic respiratory failure with hypoxia (HCC)     4. Severe persistent asthma without complication     5. CVID (common variable immunodeficiency) ([facility]     6. PRECIOUS on CPAP     7. Allergic rhinitis, unspecified seasonality, unspecified trigger     8. History of smoking at least 1 pack per day for at least 30 years       He is for follow up of COPD, PRECIOUS, CVID, severe persistent asthma, chronic respiratory failure on home O2/obstructive sleep apnea. This is a sick call visit. Patient is accompanied with his wife complaining of 2 weeks of development of sore throat and hoarseness of voice according to patient he developed laryngitis and he did not have body ache mild headache or fever at that time he was seen in urgent care according to patient and had a Covid rapid test done which was negative. He had levofloxacin left from before as it was given for purulent exacerbation of acute bronchitis and he took levofloxacin for about a week. Last 4 to 5 days he started having flareup of his COPD which he usually develops more cough wheezing and he has sputum production. According to patient he is not having fever does not have chest pain or hemoptysis he does have some yellowish sputum. He does feel slightly more short of breath than his usual he is using oxygen 2 L at home and he is maintaining saturation 96% on home O2 most of the time. He is hoarseness of voice and sore throat had improved but he still have mild hoarseness of voice. He had prednisone also which he started using the taper dose and down to 2 tablets now. He was not feeling better need to call the office yesterday so this appointment was scheduled. He is very compliant with CPAP he denies any problem using his CPAP with full mask. Sleep is usually refreshing and restorative he does not usually take nap during the daytime but since he has symptoms he had more sleepiness during the daytime. Last compliance data is available from 04/21/2021 to 07/90/21 compliance is 98% average usage is 7 hours 53 minutes and average AHI is 1.3 on CPAP of 12 cm    He had 2 doses of COVID-19 vaccine done by his immunologist.  According to patient his antibody titers were negative after 2 doses of COVID-19 vaccine as he was told by his immunologist likely secondary to his immune deficiency and likely will need a third dose. He already had the booster/third dose of Covid vaccination done by his immunologist.  He is getting IVIG every 3 weeks and had been followed by immunologist    He had a screening low-dose CT scan ordered in November had not been done supposed to be in February 2021      Last hospitalization in January and February 2019  He was admitted to hospital twice for COPD/asthma exacerbation initially in second or third week of January 2019 when he was treated with COPD exhibition with bronchodilators and steroids and Levaquin, his chest x-ray that time did not show any infiltrate he was discharged on tapered dose of prednisone and Levaquin. He was admitted again in early February 2019 for almost 2 weeks later and at that time he was finished with steroids and Levaquin, his flu a was positive this time he was treated with Tamiflu, he had a chest x-ray done which did not show infiltrates or evidence of pneumonia, he had a CT scan of the chest done which shows minimal residual or dependent atelectasis and/or pneumonia or infiltrate was seen this time he was again treated with Levaquin and prednisone and was discharged home.       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, he c/o pains in legs and joints a week prior to infusion and resolve usually the next day after infusion, he is tolerating it well and followed with allergist/ immunologist and recent Igg was > 800 and getting infusion every 3 weeks   H/O Severe persistent Asthma and COPD with h/o smoking for about > 30 years with h/o frequent exacerbations of Asthmatic Bronchitis  He is on home O2 and use O2 at night with cpap and also during daytime most of the time, He has PRECIOUS and very complaint with cpap at 12 cm and denies any problems with cpap since seen last time  With h/o all rhinitis and gerd and h/o cough denies ch cough, allergy symptoms are better since he stop mowing his lawn       Subjective:   Review of Systems -   General ROS: Negative for fatigue, negative for  - , chills, fever, night sweats or weight loss  ENT ROS: Positive for hoarseness and sore throat negative for - nasal congestion, postnasal dripping, sinus pain and no sneezing or epistaxis  Allergy and Immunology ROS: Negative for - nasal congestion and seasonal allergies  Respiratory ROS:  Positive cough, positive shortness of breath on activities, positive yellow sputum production, occasional wheezing, no chest pain or hemoptysis  Cardiovascular ROS: positive for - dyspnea on exertion,  negative for chest pain, orthopnea, PND, pedal edema. Gastrointestinal ROS: no abdominal pain,  nausea, vomiting, diarrhea, change in bowel habits, hematochezia, melena. Musculoskeletal ROS: Negative for joints pain, negative  for - joint stiffness and swelling and muscle pain  CNS: negative for weakness, dizziness, diplopia, syncope, seizure, headache, tingling, dysphagia. Hem/Onc: negative for bleeding and bruisingand petechia  Skin: negative for rash and skin lesions  : negative for hematuria, dysuria nocturia and frequency. Sleep Medicine 3/16/2021 7/29/2020 2/25/2020 3/20/2019 3/28/2018 12/6/2016   Sitting and reading 2 0 1 3 0 3   Watching TV 0 0 0 1 0 0   Sitting, inactive in a public place (e.g. a theatre or a meeting) 0 0 0 1 0 0   As a passenger in a car for an hour without a break 1 0 0 0 0 2   Lying down to rest in the afternoon when circumstances permit 3 3 3 3 2 3   Sitting and talking to someone 0 0 0 0 0 0   Sitting quietly after a lunch without alcohol 3 3 3 3 0 3   In a car, while stopped for a few minutes in traffic 0 0 0 0 0 0   Total score 9 6 7 11 2 11       Allergies: Allergies   Allergen Reactions    Adhesive Tape Other (See Comments)     Severe reaction to a bandage used with hip replacement.        Medications:  Outpatient Encounter Medications as of 10/19/2021   Medication Sig Dispense Refill    acetaminophen (TYLENOL) 500 MG tablet Take 1,000 mg by mouth every 6 hours as needed for Pain      montelukast (SINGULAIR) 10 MG tablet take 1 tablet by mouth every evening 90 tablet 3    albuterol sulfate HFA (VENTOLIN HFA) 108 (90 Base) MCG/ACT inhaler inhale 2 puffs by mouth and INTO THE LUNGS every 6 hours if needed for wheezing 3 Inhaler 3    budesonide-formoterol (SYMBICORT) 160-4.5 MCG/ACT AERO inhale 2 puffs by mouth twice a day 3 Inhaler 3    fluticasone (FLONASE) 50 MCG/ACT nasal spray instill 2 sprays into each nostril once daily 3 Bottle 3    SPIRIVA HANDIHALER 18 MCG inhalation capsule inhale the contents of one capsule in the handihaler once daily 90 capsule 3    predniSONE (DELTASONE) 10 MG tablet 4 tablet once daily for 3 days then 3 tablet once daily for 3 days then 2 tablet once daily for 3 days then 1 tablet once daily for 3 days then discontinue 30 tablet 0    albuterol (PROVENTIL) (2.5 MG/3ML) 0.083% nebulizer solution Take 3 mLs by nebulization every 4 hours as needed for Wheezing or Shortness of Breath 375 mL 5    fluticasone-salmeterol (ADVAIR DISKUS) 500-50 MCG/DOSE diskus inhaler Inhale 1 puff into the lungs 2 times daily 60 each 11    omeprazole (PRILOSEC) 20 MG delayed release capsule take 1 capsule by mouth once daily 60 capsule 3    amLODIPine (NORVASC) 10 MG tablet take 1 tablet by mouth once daily 60 tablet 3    Handicap Placard MISC by Does not apply route For 5 years 1 each 0    traMADol (ULTRAM) 50 MG tablet Take 50 mg by mouth as needed for Pain.  predniSONE (DELTASONE) 10 MG tablet Start at 50 mg and taper 10 mg every 3 days 45 tablet 0    ALPRAZolam (XANAX) 0.5 MG tablet Take 0.5 mg by mouth 3 times daily as needed for Sleep. .      benazepril (LOTENSIN) 10 MG tablet take 1 tablet by mouth once daily (Patient taking differently: Take 10 mg BID) 30 tablet 5    Immune Globulin, Human, 10 GM/100ML SOLN IV solution Infuse 60 g intravenously every 21 days       docusate sodium (COLACE) 100 MG capsule Take 1 capsule by mouth 2 times daily as needed for Constipation (Patient not taking: Reported on 10/19/2021) 60 capsule 0     No facility-administered encounter medications on file as of 10/19/2021. Objective:    Physical Exam:  Vitals:   /89 (Site: Left Lower Arm, Position: Sitting, Cuff Size: Medium Adult)   Pulse 88   Temp 97.6 °F (36.4 °C) (Temporal)   Resp 20   Ht 5' 8\" (1.727 m)   Wt 258 lb (117 kg)   SpO2 97% Comment: 3 liters  BMI 39.23 kg/m²   Last 3 weights: Wt Readings from Last 3 Encounters:   10/19/21 258 lb (117 kg)   09/07/21 252 lb (114.3 kg)   09/01/21 252 lb (114.3 kg)     Body mass index is 39.23 kg/m². Physical Examination:   General appearance - alert, over weight not tachypnic and in no distress  Mental status - alert, oriented to person, place, and time  Eyes - pupils equal and reactive, extraocular eye movements intact  Nose - normal and patent, no erythema, , positive pale and edematous nasal mucosa, no purulent secretion.   Mouth - mucous membranes moist, pharynx normal without lesions, large tongue, thick uvula, small oropharynx, Mallampati 1   Neck - supple, no significant adenopathy, very short and thick  Chest - No distress is noted at rest, no cyanosis no accessory muscles use. Bilateral symmetrical chest movements, distant breath sounds bilaterally,  fair air entry with prolonged expiration, mild expiratory wheezing no crackles and no rhonchi. Heart - normal rate, regular rhythm, normal S1, S2, no murmurs, rubs, clicks or gallops  Abdomen - soft, nontender, nondistended, no masses or organomegaly  Neurological - alert, oriented, normal speech, no focal findings or movement disorder noted  Extremities - peripheral pulses normal, no pedal edema, no clubbing or cyanosis  Skin - normal coloration and turgor, no rashes, no suspicious skin lesions noted       Labs:  None    CBC:   WBC   Date Value Ref Range Status   04/28/2021 10.9 3.5 - 11.3 k/uL Final     Hemoglobin   Date Value Ref Range Status   04/28/2021 12.4 (L) 13.0 - 17.0 g/dL Final     Platelet Count   Date Value Ref Range Status   03/01/2012 202 140 - 450 k/uL Final     Platelets   Date Value Ref Range Status   06/23/2021 235 138 - 453 k/uL Final     BMP:   Sodium   Date Value Ref Range Status   04/15/2021 137 135 - 144 mmol/L Final     Potassium   Date Value Ref Range Status   04/15/2021 4.4 3.7 - 5.3 mmol/L Final     Chloride   Date Value Ref Range Status   04/15/2021 102 98 - 107 mmol/L Final     CO2   Date Value Ref Range Status   04/15/2021 25 20 - 31 mmol/L Final     BUN   Date Value Ref Range Status   04/15/2021 12 8 - 23 mg/dL Final     CREATININE   Date Value Ref Range Status   04/15/2021 0.79 0.70 - 1.20 mg/dL Final   08/02/2020 0.86 0.70 - 1.20 mg/dL Final   05/20/2020 0.87 0.70 - 1.20 mg/dL Final     Glucose   Date Value Ref Range Status   04/15/2021 102 (H) 70 - 99 mg/dL Final   06/04/2012 86 74 - 106 mg/dL Final     Comment:     [facility] [id number] [place] [address], [place] 3 [phone]     S.  Calcium:   Calcium   Date Value Ref Range Status 04/15/2021 9.6 8.6 - 10.4 mg/dL Final     S. Ionized Calcium: No results found for: IONCA  S. Magnesium:   Magnesium   Date Value Ref Range Status   06/12/2014 2.7 (H) 1.6 - 2.6 mg/dL Final     Comment:     [employer] [address], [address] [phone]     S. Phosphorus:   Phosphorus   Date Value Ref Range Status   01/08/2013 3.4 2.5 - 4.5 mg/dL Final     Comment:     [employer] [address], [address] [phone]     S. Glucose:   POC Glucose   Date Value Ref Range Status   04/29/2021 140 (H) 75 - 110 mg/dL Final   04/29/2021 115 (H) 75 - 110 mg/dL Final   04/28/2021 139 (H) 75 - 110 mg/dL Final     Glycosylated hemoglobin A1C:   Hemoglobin A1C   Date Value Ref Range Status   04/28/2021 5.4 4.0 - 6.0 % Final       Pulmonary Functions Testing Results:  07/31/2019  Spirometry shows FVC is 3.14 79% predicted. FEV1 is 2.60 82% predicted. FEV1 FVC ratio is normal.  Lung volume shows residual volume is 2.93 130% predicted consistent with hyperinflation and airway trapping. Diffusion capacity is 21.24 81% predicted which was within normal limit. 9/6/2016:  FEV1 2.78 86%, FVC 3.64 90%, [id number] 95% , TLC 7.02  114%, DLCO 20.32  77%  8/26/2015: FEV1 2.30 69%, FVC 3.00 64%, [id number], TLC 6.64  95%, DLCO 21.54  79%    Blood Gases: No results found for: PH, PCO2, PO2, HCO3, O2SAT    Radiological reports:    CXR    CT Scans     CT lung screening.  02/19/2020  Mediastinum:  Suboptimal evaluation due to low dose technique.  Right-sided   port is in place.  Thoracic aorta demonstrates mild calcification without   aneurysm.  Pulmonary trunk appears nondilated.  Heart appears normal in size   without pericardial effusion.  No lymphadenopathy.  The esophagus is grossly   unremarkable.       Lungs/Pleura:  Emphysematous changes.  No focal consolidation, pneumothorax   or pleural effusion.  Trachea and distal airways appear patent.  No   suspicious noncalcified lung nodule or mass. 02/05/19  Basilar subsegmental atelectasis without evidence for consolidation.       Emphysema and sequela of old granulomatous disease.  Return to low-dose chest   CT screening schedule is recommended, if clinically appropriate. CT Scan Low dose     9/19/17  *Stable 5 mm nodular thickening along the minor fissure.  No new or enlarging   nodule. *Paraseptal emphysema. 9/27/16  Mild apical paraseptal emphysematous changes, more on the right. Dependent/atelectatic findings posteriorly toward the bases.       Unchanged 5 mm nodule contiguous with the medial minor fissure. EKG:     ECHO:     7/24/17  Left ventricle is normal in size  Global left ventricular systolic function is normal Estimated ejection  fraction is 65 % . No significant valvular regurgitation or stenosis seen. 11/8/13  Technically difficult study. No gross segmental wall motion abnormality. Estimated ejection fraction is 55 %. Mild left ventricular hypertrophy. Evidence of diastolic dysfunction. Left atrium is mildly dilated. No significant valvular regurgitation or stenosis seen. Trivial anterior pericardial effusion  Right Atrium  Right atrium is normal in size. Right Ventricle  Normal right ventricular size and function. 6 minutes walk test 03/20/19  Total distance walk 750 feet 48% predicted. Room air oxygen was 88% on walking slow to 250 feet started on 2 L nasal cannula and O2 saturation 88% after walking 250 feet and increased to 3 L and his saturation went to 95% with walking to 250 feet. Compliance data from CPAP  11/27/2019 to 02/24/2020  Compliance 100%  Average usage of 8 hours 16 minutes  Average AHI 0.3    Assessment:      1. Chronic obstructive pulmonary disease, unspecified COPD type ([address]   2. Severe persistent asthma without complication   3. CVID (common variable immunodeficiency) ([facility]   4. Allergic rhinitis, unspecified seasonality, unspecified trigger   5. PRECIOUS on CPAP   6.  Chronic respiratory failure with hypoxia ([facility]         Plan: We will get chest x-ray PA lateral view. Nasopharyngeal respiratory molecular panel was ordered including Covid PCR  Prednisone taper dose advised to take 40 mg today then 30 mg and 20 mg in 10 mg taper every 3 days. He had finished levofloxacin just few days ago. Advised to continue albuterol aerosol every 4 hours for now and once symptoms improve then as needed  Continue Symbicort 160/4.5 space 2 puff twice daily  Continue spiriva once daily. Continue Flonase. Continue Singulair. Reflux precautions and continue PPI  Follow up Allergist and immunologist for CVID/ IVIG. Had flu vaccine last year. Annual flu vaccine recommended in fall  He had posterior/third dose of COVID vaccine  Up to date with vaccinations from pulm perspective and recommendations per allergist for vaccination  Maintain an active lifestyle. Supplemental O2 to continue at 2 L, he is benefiting from oxygen therapy and advised to continue with oxygen    he should proceed with low-dose CT scan of the chest which was ordered he was supposed to be done in February 2021. CPAP at 12 cm to continue. We will get compliance data and will evaluate AHI and compliance. If he complains of increased fatigue tiredness or sleepiness during the daytime then may consider repeat sleep study  Wt loss is recommended and discussed  Follow good sleep hygeine instructions  Use humidifier  Questions answered pertaining to diagnosis and management explained importance of compliance with therapy. Last compliance data reviewed and pt is compliant per insurance requirements. Please keep appointment as scheduled from last visit    Please note that this chart was generated using voice recognition Dragon dictation software. Although every effort was made to ensure the accuracy of this automated transcription, some errors in transcription may have occurred.     Shary Mcburney, MD, MD 10/19/2021, 9:38 AM

## 2021-10-20 DIAGNOSIS — J20.9 ACUTE BRONCHITIS, UNSPECIFIED ORGANISM: ICD-10-CM

## 2021-10-20 LAB
ADENOVIRUS PCR: NOT DETECTED
BORDETELLA PARAPERTUSSIS: NOT DETECTED
BORDETELLA PERTUSSIS PCR: NOT DETECTED
CHLAMYDIA PNEUMONIAE BY PCR: NOT DETECTED
CORONAVIRUS 229E PCR: NOT DETECTED
CORONAVIRUS HKU1 PCR: NOT DETECTED
CORONAVIRUS NL63 PCR: NOT DETECTED
CORONAVIRUS OC43 PCR: NOT DETECTED
HUMAN METAPNEUMOVIRUS PCR: NOT DETECTED
INFLUENZA A BY PCR: NOT DETECTED
INFLUENZA A H1 (2009) PCR: NORMAL
INFLUENZA A H1 PCR: NORMAL
INFLUENZA A H3 PCR: NORMAL
INFLUENZA B BY PCR: NOT DETECTED
MYCOPLASMA PNEUMONIAE PCR: NOT DETECTED
PARAINFLUENZA 1 PCR: NOT DETECTED
PARAINFLUENZA 2 PCR: NOT DETECTED
PARAINFLUENZA 3 PCR: NOT DETECTED
PARAINFLUENZA 4 PCR: NOT DETECTED
RESP SYNCYTIAL VIRUS PCR: NOT DETECTED
RHINO/ENTEROVIRUS PCR: NOT DETECTED
SARS-COV-2, PCR: NOT DETECTED
SPECIMEN DESCRIPTION: NORMAL

## 2021-11-15 ENCOUNTER — HOSPITAL ENCOUNTER (OUTPATIENT)
Age: 66
Discharge: HOME | End: 2021-11-15
Payer: MEDICARE

## 2021-11-15 LAB — SARS-COV-2 ANTIBODY, TOTAL: POSITIVE

## 2021-11-15 PROCEDURE — 86769 SARS-COV-2 COVID-19 ANTIBODY: CPT

## 2021-11-15 PROCEDURE — 36415 COLL VENOUS BLD VENIPUNCTURE: CPT

## 2021-12-08 ENCOUNTER — OFFICE VISIT (OUTPATIENT)
Dept: ORTHOPEDIC SURGERY | Age: 66
End: 2021-12-08
Payer: MEDICARE

## 2021-12-08 VITALS
RESPIRATION RATE: 16 BRPM | HEIGHT: 68 IN | SYSTOLIC BLOOD PRESSURE: 118 MMHG | WEIGHT: 259 LBS | DIASTOLIC BLOOD PRESSURE: 74 MMHG | HEART RATE: 85 BPM | BODY MASS INDEX: 39.25 KG/M2

## 2021-12-08 DIAGNOSIS — Z96.641 HX OF TOTAL HIP ARTHROPLASTY, RIGHT: Primary | ICD-10-CM

## 2021-12-08 DIAGNOSIS — M24.851 RIGHT SNAPPING HIP: ICD-10-CM

## 2021-12-08 DIAGNOSIS — M70.61 GREATER TROCHANTERIC BURSITIS OF RIGHT HIP: ICD-10-CM

## 2021-12-08 PROCEDURE — 99213 OFFICE O/P EST LOW 20 MIN: CPT | Performed by: PHYSICIAN ASSISTANT

## 2021-12-08 PROCEDURE — 20611 DRAIN/INJ JOINT/BURSA W/US: CPT | Performed by: PHYSICIAN ASSISTANT

## 2021-12-08 RX ORDER — METHYLPREDNISOLONE ACETATE 80 MG/ML
80 INJECTION, SUSPENSION INTRA-ARTICULAR; INTRALESIONAL; INTRAMUSCULAR; SOFT TISSUE ONCE
Status: COMPLETED | OUTPATIENT
Start: 2021-12-08 | End: 2021-12-08

## 2021-12-08 RX ORDER — LIDOCAINE HYDROCHLORIDE 10 MG/ML
2 INJECTION, SOLUTION INFILTRATION; PERINEURAL ONCE
Status: COMPLETED | OUTPATIENT
Start: 2021-12-08 | End: 2021-12-08

## 2021-12-08 RX ADMIN — LIDOCAINE HYDROCHLORIDE 2 ML: 10 INJECTION, SOLUTION INFILTRATION; PERINEURAL at 10:55

## 2021-12-08 RX ADMIN — METHYLPREDNISOLONE ACETATE 80 MG: 80 INJECTION, SUSPENSION INTRA-ARTICULAR; INTRALESIONAL; INTRAMUSCULAR; SOFT TISSUE at 10:56

## 2021-12-08 ASSESSMENT — ENCOUNTER SYMPTOMS
CHEST TIGHTNESS: 0
ABDOMINAL PAIN: 0
VOMITING: 0
ABDOMINAL DISTENTION: 0
CONSTIPATION: 0
APNEA: 0
COUGH: 0
SHORTNESS OF BREATH: 0
COLOR CHANGE: 0
NAUSEA: 0
RESPIRATORY NEGATIVE: 1
DIARRHEA: 0

## 2021-12-08 NOTE — PROGRESS NOTES
[facility]  [address] [place] [id number]  Dept: [phone]  Dept Fax: [phone]        Right Hip Office Visit    Subjective:     Chief Complaint   Patient presents with    Follow-up     Right Hip & Knee      HPI:     Alma Collins presents today for Right hip pain. Celeste Scott had a right total hip arthroplasty on 4/27/2021 by Dr. Tomy Sethi had had a couple of falls after his surgery. He ended up having a CT scan to rule out a fracture of his prosthesis which was negative. He states he has had increased pain on his side which wakes him up at night. He also has minimal groin pain and that most of his pain is on the lateral side. On 9/7/2021, we did an injection into his greater trochanteric bursa with excellent relief until 3 weeks ago. He has ibuprofen, Tylenol and tramadol to help. He did not return to therapy. He is working around the house and was doing yard work. He presents today with oxygen. ROS:     Review of Systems   Constitutional: Positive for activity change. Negative for appetite change, fatigue and fever. Respiratory: Negative. Negative for apnea, cough, chest tightness and shortness of breath. Cardiovascular: Negative. Negative for chest pain, palpitations and leg swelling. Gastrointestinal: Negative for abdominal distention, abdominal pain, constipation, diarrhea, nausea and vomiting. Genitourinary: Negative for difficulty urinating, dysuria and hematuria. Musculoskeletal: Positive for arthralgias and gait problem. Negative for joint swelling and myalgias. Skin: Negative for color change and rash. Neurological: Negative for dizziness, weakness, numbness and headaches. Psychiatric/Behavioral: Positive for sleep disturbance.        Past Medical History:    Past Medical History:   Diagnosis Date    Allergic rhinitis     Anxiety     Aseptic meningitis     reaction to first infusion in 2012-hospitalized but no problem since then    Asthma     Avascular necrosis of bone of hip, left ([address] [address] 04/2018    CAD (coronary artery disease)     mild on cath, no stents    Common variable immunodeficiency ([facility] [address] [facility] 12/4/2013    COPD (chronic obstructive pulmonary disease) (HCC)     Deviated nasal septum     Dyspnea     with exertion    HTN (hypertension)     Hyperglycemia     Hypoxia     Immune deficiency disorder ([facility] [address] [facility] 2012    IGIV every 3 weeks with prednisone    Obesity     On supplemental oxygen therapy     PRECIOUS (obstructive sleep apnea) 2012    CPAP nightly    Osteoporosis     Reflux     Respiratory failure ([facility] [address] [facility] 2014    Oxygen on    Severe persistent asthma     Spondylosis of cervical spine     TIA (transient ischemic attack) 2005    Tobacco use     stopped 2004    Under care of team 04/15/2021    pulmonology-Dr Hull[facility]-last visit march 2021    Under care of team 04/15/2021    infectious disease-Dr Ch[facility]-last visit march 2021    Under care of team 04/15/2021    immunology-Dr Rivers-last visit mar 2021    Vitamin D deficiency 3/6/2018    Wears glasses     Wellness examination 04/15/2021    pcp-Dr Vazquez-last visit apr 2021       Past Surgical History:    Past Surgical History:   Procedure Laterality Date    CARDIAC CATHETERIZATION  2005    no stents    COLONOSCOPY  2003    ENDOSCOPY, COLON, DIAGNOSTIC      FINGER SURGERY Left 1997    reattachment, index finger    IR PORT PLACEMENT EQUAL OR GREATER THAN 5 YEARS  6/23/2021    IR PORT PLACEMENT EQUAL OR GREATER THAN 5 YEARS 6/23/2021 [facility]ED[facility]    NASAL ENDOSCOPY      OK REVISE TOTAL HIP REPLACEMENT Left 05/22/2018    HIP TOTAL ARTHROPLASTY ANTERIOR APPROACH  ([facility]) performed by Vida Pena DO at [facility]  2014    lesion excision    TONSILLECTOMY      TOTAL HIP ARTHROPLASTY Right 04/27/2021    TOTAL HIP ARTHROPLASTY ANTERIOR performed by Pedro Valle Thomas Mcginnis DO at [address] TUNNELED VENOUS PORT PLACEMENT Right 11/25/2015    Rt.  chest       CurrentMedications:   Current Outpatient Medications   Medication Sig Dispense Refill    predniSONE (DELTASONE) 10 MG tablet 4 tab once daily for 3 days then 3 tab once daily for 3 days then 2 tab once daily for 3 days and 1 tab once daily for 3 days 30 tablet 0    acetaminophen (TYLENOL) 500 MG tablet Take 1,000 mg by mouth every 6 hours as needed for Pain      docusate sodium (COLACE) 100 MG capsule Take 1 capsule by mouth 2 times daily as needed for Constipation (Patient not taking: Reported on 10/19/2021) 60 capsule 0    montelukast (SINGULAIR) 10 MG tablet take 1 tablet by mouth every evening 90 tablet 3    albuterol sulfate HFA (VENTOLIN HFA) 108 (90 Base) MCG/ACT inhaler inhale 2 puffs by mouth and INTO THE LUNGS every 6 hours if needed for wheezing 3 Inhaler 3    budesonide-formoterol (SYMBICORT) 160-4.5 MCG/ACT AERO inhale 2 puffs by mouth twice a day 3 Inhaler 3    fluticasone (FLONASE) 50 MCG/ACT nasal spray instill 2 sprays into each nostril once daily 3 Bottle 3    SPIRIVA HANDIHALER 18 MCG inhalation capsule inhale the contents of one capsule in the handihaler once daily 90 capsule 3    predniSONE (DELTASONE) 10 MG tablet 4 tablet once daily for 3 days then 3 tablet once daily for 3 days then 2 tablet once daily for 3 days then 1 tablet once daily for 3 days then discontinue 30 tablet 0    albuterol (PROVENTIL) (2.5 MG/3ML) 0.083% nebulizer solution Take 3 mLs by nebulization every 4 hours as needed for Wheezing or Shortness of Breath 375 mL 5    fluticasone-salmeterol (ADVAIR DISKUS) 500-50 MCG/DOSE diskus inhaler Inhale 1 puff into the lungs 2 times daily 60 each 11    omeprazole (PRILOSEC) 20 MG delayed release capsule take 1 capsule by mouth once daily 60 capsule 3    amLODIPine (NORVASC) 10 MG tablet take 1 tablet by mouth once daily 60 tablet 3    Handicap Placard MISC by Does not apply route For 5 years 1 each 0    traMADol (ULTRAM) 50 MG tablet Take 50 mg by mouth as needed for Pain.  predniSONE (DELTASONE) 10 MG tablet Start at 50 mg and taper 10 mg every 3 days 45 tablet 0    ALPRAZolam (XANAX) 0.5 MG tablet Take 0.5 mg by mouth 3 times daily as needed for Sleep. .      benazepril (LOTENSIN) 10 MG tablet take 1 tablet by mouth once daily (Patient taking differently: Take 10 mg BID) 30 tablet 5    Immune Globulin, Human, 10 GM/100ML SOLN IV solution Infuse 60 g intravenously every 21 days        No current facility-administered medications for this visit. Allergies:    Adhesive tape    Social History:   Social History     Socioeconomic History    Marital status:      Spouse name: Coral Bo Number of children: 3    Years of education: None    Highest education level: None   Occupational History    Occupation:      Comment: now on disability   Tobacco Use    Smoking status: Former Smoker     Packs/day: 1.00     Years: 31.00     Pack years: 31.00     Types: Cigarettes     Start date: 1972     Quit date: 2004     Years since quittin.9    Smokeless tobacco: Never Used    Tobacco comment: smoke 4ppd for many yrs; quit 8 yrs ago   Vaping Use    Vaping Use: Never used   Substance and Sexual Activity    Alcohol use: Yes     Alcohol/week: 3.0 standard drinks     Types: 3 Cans of beer per week     Comment: about once a week    Drug use: No    Sexual activity: Yes     Partners: Female   Other Topics Concern    None   Social History Narrative    None     Social Determinants of Health     Financial Resource Strain:     Difficulty of Paying Living Expenses: Not on file   Food Insecurity:     Worried About Running Out of Food in the Last Year: Not on file    Rosanna of Food in the Last Year: Not on file   Transportation Needs:     Lack of Transportation (Medical): Not on file    Lack of Transportation (Non-Medical):  Not on file   Physical Activity:     Days of Exercise per Week: Not on file    Minutes of Exercise per Session: Not on file   Stress:     Feeling of Stress : Not on file   Social Connections:     Frequency of Communication with Friends and Family: Not on file    Frequency of Social Gatherings with Friends and Family: Not on file    Attends [employer] Services: Not on file    Active Member of Clubs or Organizations: Not on file    Attends Club or Organization Meetings: Not on file    Marital Status: Not on file   Intimate Partner Violence:     Fear of Current or Ex-Partner: Not on file    Emotionally Abused: Not on file    Physically Abused: Not on file    Sexually Abused: Not on file   Housing Stability:     Unable to Pay for Housing in the Last Year: Not on file    Number of [place] in the Last Year: Not on file    Unstable Housing in the Last Year: Not on file       Family History:  Family History   Problem Relation Age of Onset    COPD Father     Coronary Art Dis Father     Heart Attack Father     Lung Cancer Mother     Breast Cancer Sister     No Known Problems Maternal Grandmother     No Known Problems Maternal Grandfather     Cancer Paternal Grandmother     Stroke Paternal Grandfather     Heart Attack Paternal Grandfather     Cancer Paternal Grandfather     Breast Cancer Sister     Cancer Sister         Throat       Vitals:   /74   Pulse 85   Resp 16   Ht 5' 8\" (1.727 m)   Wt 259 lb (117.5 kg)   BMI 39.38 kg/m²  Body mass index is 39.38 kg/m². Physical Examination:     Orthopedics:    GENERAL: Alert and oriented X3 in no acute distress. SKIN: Intact without lesions or ulcerations. NEURO: Intact to sensory and motor testing. VASC: Capillary refill is less than 3 seconds. Right Hip     GEN: Alert and oriented X 3, in no acute distress. GAIT: The patient's gait was observed while entering the exam room and was noted to be antalgic. The extremity is in anatomic alignment.   SKIN: Intact without rashes, lesions, or ulcerations. No obvious deformity or swelling. NEURO: The patient responds to light touch throughout bilateral LE. Patellar and Achilles reflexes are 2/4. VASC: The bilateral LE is neurovascularly intact with 2/4 DP and 2/4 PT pulses. Brisk capillary refill. ROM: 0 degree flexion contracture, 90 degrees flexion, 30 degrees abduction, 10 degrees adduction, 10 degrees of internal rotation, 30 degrees of external rotation. MUSC: Strength is 4-/5 flexion, abduction, internal rotation, external rotation. PALP: The patient is  tender to palpation over the greater trochanter. TEST: Log roll is neagtive. No apparent leg length discrepancy. + Stenchfield test. + Impingement test. Negative Trendelenburg test. Negative Herberth's test. +C sign. No labral clunks. No pain on compression. Snapping hip with FADIR and FRACISCO  Assessment:     1. Hx of total hip arthroplasty, right    2. Greater trochanteric bursitis of right hip    3. Right snapping hip      Procedures:    Procedure: yes    Greater Trochanteric Bursa Injection     Procedure: Kobelicha Tae agreed today for a Corticosteroid injection into the right greater trochanteric bursa. The patient was placed in the lateral position with the affected side up. The point of the maximum tenderness was marked with the closed end of a click type pen. The skin was prepped with betadine in a sterile fashion. Utilizing a [employer] ultrasound unit with a variable frequency linear transducer and sterile technique a 3 cc solution containing 2cc of 1% lidocaine  and 1 cc containing 80 mg of depomedrol was injected into the greater trochanteric bursa with a 22 gauge spinal needle. The wound was cleansed and a Band-Aid was placed. The patient tolerated the procedure without difficulty.  Adverse reactions of the injection was discussed with the patient including signs of infection (increasing pain, redness, swelling) and the patient was instructed to call immediately with any of these symptoms. The images are stored on SD card in the machine until downloaded to the patient's chart. Radiology:   No results found. Plan:   Treatment : I reviewed the X-ray. We discussed the etiologies and natural histories of trochanteric bursitis, snapping hip and weakness after a total hip arthroplasty. We discussed the various treatment alternatives including anti-inflammatory medications, physical therapy, injections, further imaging studies and as a last result surgery. During today's visit, we discussed that he has extreme weakness due to not completing his therapy. I am concerned because he is climbing ladders and doing things like that that he is going to fall. I am very glad that the last injection helped significantly. He was able to return to most activities with significantly less hip pain. He did not return to therapy like he was instructed and he still has significant weakness. I feel we can do another injection into the greater trochanteric bursa today to help relieve his pain but I do feel it is really important for him to go to therapy. He states therapy cost $35 every time he goes so it is just hard financially. I suggested even going once a week and getting exercises and then doing them on his own. I will give him a new prescription for physical therapy. The patient has opted for a cortisone injection into the right greater trochanteric bursa to help reduce inflammation and pain. The injection site should never get red, hot, or swollen and if it does the patient will contact our office right away. The patient may experience a increase in soreness the first 24-48 hours due to a cortisone flair and can take anti-inflammatories for a short period of time to reduce that soreness. The patient should not submerge the injection site in water for a minimum of 24 hours to avoid infection. This means no lakes, pools, ponds, or hot tubs for 24 hours.  If the patient is diabetic the injection may increase their blood sugar for up to one week. The patient can do this cortisone injection once every 3 months as needed. If the injections stop working and do not give the patient relief the patient should consider surgical interventions to produce long term relief. A physical therapy prescription was given. He is also on high-dose prednisone right now due to his aggravation of COPD. Would advise he uses a cane since he is limping. Patient should return to the clinic in 6 weeks or as needed to follow up with  Ander Bland PA-C. The patient will call the office immediately with any problems. No orders of the defined types were placed in this encounter. Orders Placed This Encounter   Procedures    [facility]     Referral Priority:   Routine     Referral Type:   Eval and Treat     Referral Reason:   Specialty Services Required     Requested Specialty:   Physical Therapy     Number of Visits Requested:   1       This note is created with the assistance of a speech recognition program.  While intending to generate a document that actually reflects the content of the visit, the document can still have some errors including those of syntax and sound a like substitutions which may escape proof reading.   In such instances, actual meaning can be extrapolated by contextual diversion      Electronically signed by Trenton Luna PA-C, on 12/8/2021 at 10:49 AM

## 2021-12-08 NOTE — PATIENT INSTRUCTIONS
[address] [place] [address] and Sports Medicine    Radha Granado PA-C, ATC    Over the counter anti-inflammatory protocol (NSAIDS)    You may take the following over the counter medication for only 10-14 days to  reduce inflammation. If you develop an upset stomach, diarrhea, heartburn/GERD symptoms   discontinue immediately. If you need the medication on a long-term basis >greater than 10-14 days. Please contact your family doctor/PCP to discuss your options as you   will require ongoing medical monitoring. Over the Counter/OTC             Ibuprofen/Advil/Motrin                                                                                                            200 mg tablets                  3-4 tables 3 times a day with food             OR            Naproxen Sodium, Aleve                    220 mg tablets          2 tablets 2 times a day with food           You May Add                           Tylenol extra strength, Acetaminophen           500 mg tablets               2 tablets every 8 hours    You may alternate with the NSAIDS for pain. NO more than 3000 mg of Tylenol/acetaminophen in 24 hours. '  Look at any OTC medications for added  Tylenol/acetaminophen--cold/sinus/flu medication. Patient Education        Snapping Hip Syndrome: Exercises  Introduction  Here are some examples of exercises for you to try. The exercises may be suggested for a condition or for rehabilitation. Start each exercise slowly. Ease off the exercises if you start to have pain. You will be told when to start these exercises and which ones will work best for you. How to do the exercises  Iliotibial band stretch    1. Lean sideways against a wall. If you are not steady on your feet, hold on to a chair or counter. 2. Stand on the leg with the affected hip, with that leg close to the wall. Then cross your other leg in front of it.   3. Let your affected hip drop out to the side of your body and against the wall. Then lean away from your affected hip until you feel a stretch. 4. Hold the stretch for 15 to 30 seconds. 5. Repeat 2 to 4 times. Hip flexor stretch (kneeling)    1. Kneel on your affected leg, and bend your good leg out in front of you, with that foot flat on the floor. If you feel discomfort in the front of your knee, place a towel under your knee. 2. Keeping your back straight, slowly push your hips forward until you feel a stretch in the upper thigh of your back leg and hip. 3. Hold the stretch for at least 15 to 30 seconds. 4. Repeat 2 to 4 times. Piriformis stretch    1. Lie on your back with both knees bent and your feet flat on the floor. 2. Put the ankle of your affected leg on your opposite thigh near your knee. 3. Use your hands to gently lift the knee of your good leg off the floor. Gently pull that knee toward your chest until you feel a stretch in the buttock and hip of your affected leg. 4. Hold the stretch for at least 15 to 30 seconds. 5. Repeat 2 to 4 times. Hamstring stretch (lying down)    1. Lie flat on your back with your legs straight. If you feel discomfort in your back, place a small towel roll under your lower back. 2. Holding the back of your affected leg for support, lift that leg straight up and toward your body until you feel a stretch at the back of your thigh. 3. Hold the stretch for at least 30 seconds. 4. Repeat 2 to 4 times. Bridging    1. Lie on your back with both knees bent. Your knees should be bent about 90 degrees. 2. Then push your feet into the floor, squeeze your buttocks, and lift your hips off the floor until your shoulders, hips, and knees are all in a straight line. 3. Hold for about 6 seconds as you continue to breathe normally, and then slowly lower your hips back down to the floor and rest for up to 10 seconds. 4. Repeat 8 to 12 times. Clamshell    1.  Lie on your side, with your affected leg on top and your head propped on a pillow. Keep your feet and knees together and your knees bent. 2. Raise your top knee, but keep your feet together. Do not let your hips roll back. Your legs should open up like a clamshell. 3. Hold for 6 seconds. 4. Slowly lower your knee back down. Rest for 10 seconds. 5. Repeat 8 to 12 times. Alternate arm and leg (bird dog) exercise    Do this exercise slowly. Try to keep your body straight at all times. 1. Start on the floor, on your hands and knees. 2. Tighten your belly muscles by pulling your belly button in toward your spine. Be sure you continue to breathe normally and do not hold your breath. 3. Raise one leg off the floor, and hold it straight out behind you. Be careful not to let your hip drop down, because that will twist your trunk. 4. Hold for about 6 seconds, then lower your leg and switch to your other leg. 5. Repeat 8 to 12 times on each leg. 6. When you can do this exercise with ease and no pain, repeat steps 1 through 5 using your arms instead of your legs. Raise one arm off the floor, holding your arm straight out in front of you. Be careful not to let your shoulder drop down, because that will twist your trunk. Then switch to your other arm. 7. When holding your arm straight out becomes easier, try raising your opposite leg at the same time, and repeat steps 1 through 5. Lower abdominal strengthening    1. Lie on your back with your knees bent and your feet flat on the floor. 2. Tighten your belly muscles by pulling your belly button in toward your spine. 3. Lift one foot off the floor and bring your knee toward your chest, so that your knee is straight above your hip and your leg is bent like the letter \"L. \"  4. Lift the other knee up to the same position. 5. Lower one leg at a time to the starting position. 6. Keep alternating legs until you have lifted each leg 8 to 12 times. 7. Be sure to keep your belly muscles tight and your back still as you are moving your legs.  Be sure to breathe normally. Follow-up care is a key part of your treatment and safety. Be sure to make and go to all appointments, and call your doctor if you are having problems. It's also a good idea to know your test results and keep a list of the medicines you take. Where can you learn more? Go to https://chpepiceweb.[employer]. org and sign in to your [employer] account. Enter V111 in the Match box to learn more about \"Snapping Hip Syndrome: Exercises. \"     If you do not have an account, please click on the \"Sign Up Now\" link. Current as of: July 1, 2021               Content Version: 13.0  © 2006-2021 [employer]. Care instructions adapted under license by [facility] ([facility]). If you have questions about a medical condition or this instruction, always ask your healthcare professional. [address] any warranty or liability for your use of this information.

## 2022-01-04 ENCOUNTER — OFFICE VISIT (OUTPATIENT)
Dept: ORTHOPEDIC SURGERY | Age: 67
End: 2022-01-04
Payer: MEDICARE

## 2022-01-04 VITALS
DIASTOLIC BLOOD PRESSURE: 76 MMHG | WEIGHT: 259 LBS | HEART RATE: 94 BPM | SYSTOLIC BLOOD PRESSURE: 118 MMHG | BODY MASS INDEX: 39.25 KG/M2 | HEIGHT: 68 IN | RESPIRATION RATE: 16 BRPM

## 2022-01-04 DIAGNOSIS — S80.02XA CONTUSION OF LEFT KNEE, INITIAL ENCOUNTER: ICD-10-CM

## 2022-01-04 DIAGNOSIS — M25.562 ACUTE PAIN OF LEFT KNEE: Primary | ICD-10-CM

## 2022-01-04 DIAGNOSIS — S80.212A ABRASION OF LEFT KNEE, INITIAL ENCOUNTER: ICD-10-CM

## 2022-01-04 DIAGNOSIS — M75.102 ROTATOR CUFF SYNDROME OF LEFT SHOULDER: ICD-10-CM

## 2022-01-04 DIAGNOSIS — S76.312A STRAIN OF LEFT HAMSTRING, INITIAL ENCOUNTER: Primary | ICD-10-CM

## 2022-01-04 PROCEDURE — 99214 OFFICE O/P EST MOD 30 MIN: CPT | Performed by: PHYSICIAN ASSISTANT

## 2022-01-04 ASSESSMENT — ENCOUNTER SYMPTOMS
ABDOMINAL PAIN: 0
CONSTIPATION: 0
NAUSEA: 0
COLOR CHANGE: 0
RESPIRATORY NEGATIVE: 1
SHORTNESS OF BREATH: 0
COUGH: 0
ABDOMINAL DISTENTION: 0
APNEA: 0
VOMITING: 0
DIARRHEA: 0
CHEST TIGHTNESS: 0

## 2022-01-04 NOTE — PATIENT INSTRUCTIONS
Patient Education        Hamstring Strain: Rehab Exercises  Introduction  Here are some examples of exercises for you to try. The exercises may be suggested for a condition or for rehabilitation. Start each exercise slowly. Ease off the exercises if you start to have pain. You will be told when to start these exercises and which ones will work best for you. How to do the exercises  Hamstring set (heel dig)    1. Sit with your affected leg bent. Your good leg should be straight and supported on the floor. 2. Tighten the muscles on the back of your bent leg (hamstring) by pressing your heel into the floor. 3. Hold for about 6 seconds, and then rest for up to 10 seconds. 4. Repeat 8 to 12 times. Hamstring curl    1. Lie on your stomach with your knees straight. Place a pillow under your stomach. If your kneecap is uncomfortable, roll up a washcloth and put it under your leg just above your kneecap. 2. Lift the foot of your affected leg by bending your knee so that you bring your foot up toward your buttock. If this motion hurts, try it without bending your knee quite as far. This may help you avoid any painful motion. 3. Slowly move your leg up and down. 4. Repeat 8 to 12 times. 5. When you can do this exercise with ease and no pain, add some resistance. To do this:  6. Tie the ends of an exercise band together to form a loop. Attach one end of the loop to a secure object or shut a door on it to hold it in place. (Or you can have someone hold one end of the loop to provide resistance.)  7. Loop the other end of the exercise band around the lower part of your affected leg. 8. Repeat steps 1 through 4, slowly pulling back on the exercise band with your leg. Hip extension    1. Stand facing a wall with your hands on the wall at about chest level. 2. Keeping the knee of your affected leg straight, kick that leg straight back behind you. 3. Relax, and lower your leg back to the starting position.   4. Repeat 8 to 12 times. 5. When you can do this exercise with ease and no pain, add some resistance. To do this:  6. Tie the ends of an exercise band together to form a loop. Attach one end of the loop to a secure object or shut a door on it to hold it in place. (Or you can have someone hold one end of the loop to provide resistance.)  7. Loop the other end of the exercise band around the lower part of your affected leg. 8. Repeat steps 1 through 4, slowly pulling back on the exercise band with your leg. Hamstring wall stretch    1. Lie on your back in a doorway, with your good leg through the open door. 2. Slide your affected leg up the wall to straighten your knee. You should feel a gentle stretch down the back of your leg. 3. Hold the stretch for at least 1 minute to begin. Then try to lengthen the time you hold the stretch to as long as 6 minutes. 4. Repeat 2 to 4 times. 5. If you do not have a place to do this exercise in a doorway, there is another way to do it:  6. Lie on your back, and bend the knee of your affected leg. 7. Loop a towel under the ball and toes of that foot, and hold the ends of the towel in your hands. 8. Straighten your knee, and slowly pull back on the towel. You should feel a gentle stretch down the back of your leg. 9. Hold the stretch for 15 to 30 seconds. Or even better, hold the stretch for 1 minute if you can. 10. Repeat 2 to 4 times. 1. Do not arch your back. 2. Do not bend either knee. 3. Keep one heel touching the floor and the other heel touching the wall. Do not point your toes. Calf stretch    1. Stand facing a wall with your hands on the wall at about eye level. Put your affected leg about a step behind your other leg. 2. Keeping your back leg straight and your back heel on the floor, bend your front knee and gently bring your hip and chest toward the wall until you feel a stretch in the calf of your back leg. 3. Hold the stretch for 15 to 30 seconds.   4. Repeat 2 to 4 times.  5. Repeat steps 1 through 4, but this time keep your back knee bent. Single-leg balance    1. Stand on a flat surface with your arms stretched out to your sides like you are making the letter \"T. \" Then lift your good leg off the floor, bending it at the knee. If you are not steady on your feet, use one hand to hold on to a chair, counter, or wall. 2. Standing on your affected leg, keep that knee straight. Try to balance on that leg for up to 30 seconds. Then rest for up to 10 seconds. 3. Repeat 6 to 8 times. 4. When you can balance on your affected leg for 30 seconds with your eyes open, try to balance on it with your eyes closed. 5. When you can do this exercise with your eyes closed for 30 seconds and with ease and no pain, try standing on a pillow or piece of foam, and repeat steps 1 through 4. Follow-up care is a key part of your treatment and safety. Be sure to make and go to all appointments, and call your doctor if you are having problems. It's also a good idea to know your test results and keep a list of the medicines you take. Where can you learn more? Go to https://[employer]. org and sign in to your [employer] account. Enter [id number] in the [facility] box to learn more about \"Hamstring Strain: Rehab Exercises. \"     If you do not have an account, please click on the \"Sign Up Now\" link. Current as of: July 1, 2021               Content Version: 13.1  © 2006-2021 [employer], Incorporated. Care instructions adapted under license by [facility] ([facility]). If you have questions about a medical condition or this instruction, always ask your healthcare professional. [place] [address] any warranty or liability for your use of this information. Patient Education        Rotator Cuff: Exercises  Introduction  Here are some examples of exercises for you to try. The exercises may be suggested for a condition or for rehabilitation. Start each exercise slowly.  Ease off the exercises if you start to have pain. You will be told when to start these exercises and which ones will work best for you. How to do the exercises  Pendulum swing    If you have pain in your back, do not do this exercise. 1. Hold on to a table or the back of a chair with your good arm. Then bend forward a little and let your sore arm hang straight down. This exercise does not use the arm muscles. Rather, use your legs and your hips to create movement that makes your arm swing freely. 2. Use the movement from your hips and legs to guide the slightly swinging arm back and forth like a pendulum (or elephant trunk). Then guide it in circles that start small (about the size of a dinner plate). Make the circles a bit larger each day, as your pain allows. 3. Do this exercise for 5 minutes, 5 to 7 times each day. 4. As you have less pain, try bending over a little farther to do this exercise. This will increase the amount of movement at your shoulder. Posterior stretching exercise    1. Hold the elbow of your injured arm with your other hand. 2. Use your hand to pull your injured arm gently up and across your body. You will feel a gentle stretch across the back of your injured shoulder. 3. Hold for at least 15 to 30 seconds. Then slowly lower your arm. 4. Repeat 2 to 4 times. Up-the-back stretch    Your doctor or physical therapist may want you to wait to do this stretch until you have regained most of your range of motion and strength. You can do this stretch in different ways. Hold any of these stretches for at least 15 to 30 seconds. Repeat them 2 to 4 times. 1. Light stretch: Put your hand in your back pocket. Let it rest there to stretch your shoulder. 2. Moderate stretch: With your other hand, hold your injured arm (palm outward) behind your back by the wrist. Pull your arm up gently to stretch your shoulder. 3. Advanced stretch: Put a towel over your other shoulder.  Put the hand of your injured arm behind your back. Now hold the back end of the towel. With the other hand, hold the front end of the towel in front of your body. Pull gently on the front end of the towel. This will bring your hand farther up your back to stretch your shoulder. Overhead stretch    1. Standing about an arm's length away, grasp onto a solid surface. You could use a countertop, a doorknob, or the back of a sturdy chair. 2. With your knees slightly bent, bend forward with your arms straight. Lower your upper body, and let your shoulders stretch. 3. As your shoulders are able to stretch farther, you may need to take a step or two backward. 4. Hold for at least 15 to 30 seconds. Then stand up and relax. If you had stepped back during your stretch, step forward so you can keep your hands on the solid surface. 5. Repeat 2 to 4 times. Shoulder flexion (lying down)    To make a wand for this exercise, use a piece of PVC pipe or a broom handle with the broom removed. Make the wand about a foot wider than your shoulders. 1. Lie on your back, holding a wand with both hands. Your palms should face down as you hold the wand. 2. Keeping your elbows straight, slowly raise your arms over your head. Raise them until you feel a stretch in your shoulders, upper back, and chest.  3. Hold for 15 to 30 seconds. 4. Repeat 2 to 4 times. Shoulder rotation (lying down)    To make a wand for this exercise, use a piece of PVC pipe or a broom handle with the broom removed. Make the wand about a foot wider than your shoulders. 1. Lie on your back. Hold a wand with both hands with your elbows bent and palms up. 2. Keep your elbows close to your body, and move the wand across your body toward the sore arm. 3. Hold for 8 to 12 seconds. 4. Repeat 2 to 4 times. Wall climbing (to the side)    Avoid any movement that is straight to your side, and be careful not to arch your back.  Your arm should stay about 30 degrees to the front of your side.  1. Stand with your side to a wall so that your fingers can just touch it at an angle about 30 degrees toward the front of your body. 2. Walk the fingers of your injured arm up the wall as high as pain permits. Try not to shrug your shoulder up toward your ear as you move your arm up. 3. Hold that position for a count of at least 15 to 20.  4. Walk your fingers back down to the starting position. 5. Repeat at least 2 to 4 times. Try to reach higher each time. Wall climbing (to the front)    During this stretching exercise, be careful not to arch your back. 1. Face a wall, and stand so your fingers can just touch it. 2. Keeping your shoulder down, walk the fingers of your injured arm up the wall as high as pain permits. (Don't shrug your shoulder up toward your ear.)  3. Hold your arm in that position for at least 15 to 30 seconds. 4. Slowly walk your fingers back down to where you started. 5. Repeat at least 2 to 4 times. Try to reach higher each time. Shoulder blade squeeze    1. Stand with your arms at your sides, and squeeze your shoulder blades together. Do not raise your shoulders up as you squeeze. 2. Hold 6 seconds. 3. Repeat 8 to 12 times. Scapular exercise: Arm reach    1. Lie flat on your back. This exercise is a very slight motion that starts with your arms raised (elbows straight, arms straight). 2. From this position, reach higher toward the yomi or ceiling. Keep your elbows straight. All motion should be from your shoulder blade only. 3. Relax your arms back to where you started. 4. Repeat 8 to 12 times. Arm raise to the side    During this strengthening exercise, your arm should stay about 30 degrees to the front of your side. 1. Slowly raise your injured arm to the side, with your thumb facing up. Raise your arm 60 degrees at the most (shoulder level is 90 degrees). 2. Hold the position for 3 to 5 seconds. Then lower your arm back to your side.  If you need to, bring your \"good\" arm across your body and place it under the elbow as you lower your injured arm. Use your good arm to keep your injured arm from dropping down too fast.  3. Repeat 8 to 12 times. 4. When you first start out, don't hold any extra weight in your hand. As you get stronger, you may use a 1-pound to 2-pound dumbbell or a small can of food. Shoulder flexor and extensor exercise    These are isometric exercises. That means you contract your muscles without actually moving. 1. Push forward (flex): Stand facing a wall or doorjamb, about 6 inches or less back. Hold your injured arm against your body. Make a closed fist with your thumb on top. Then gently push your hand forward into the wall with about 25% to 50% of your strength. Don't let your body move backward as you push. Hold for about 6 seconds. Relax for a few seconds. Repeat 8 to 12 times. 2. Push backward (extend): Stand with your back flat against a wall. Your upper arm should be against the wall, with your elbow bent 90 degrees (your hand straight ahead). Push your elbow gently back against the wall with about 25% to 50% of your strength. Don't let your body move forward as you push. Hold for about 6 seconds. Relax for a few seconds. Repeat 8 to 12 times. Scapular exercise: Wall push-ups    This exercise is best done with your fingers somewhat turned out, rather than straight up and down. 1. Stand facing a wall, about 12 inches to 18 inches away. 2. Place your hands on the wall at shoulder height. 3. Slowly bend your elbows and bring your face to the wall. Keep your back and hips straight. 4. Push back to where you started. 5. Repeat 8 to 12 times. 6. When you can do this exercise against a wall comfortably, you can try it against a counter. You can then slowly progress to the end of a couch, then to a sturdy chair, and finally to the floor.   Scapular exercise: Retraction    For this exercise, you will need elastic exercise material, such as surgical tubing or Thera-Band. 1. Put the band around a solid object at about waist level. (A bedpost will work well.) Each hand should hold an end of the band. 2. With your elbows at your sides and bent to 90 degrees, pull the band back. Your shoulder blades should move toward each other. Then move your arms back where you started. 3. Repeat 8 to 12 times. 4. If you have good range of motion in your shoulders, try this exercise with your arms lifted out to the sides. Keep your elbows at a 90-degree angle. Raise the elastic band up to about shoulder level. Pull the band back to move your shoulder blades toward each other. Then move your arms back where you started. Internal rotator strengthening exercise    1. Start by tying a piece of elastic exercise material to a doorknob. You can use surgical tubing or Thera-Band. 2. Stand or sit with your shoulder relaxed and your elbow bent 90 degrees. Your upper arm should rest comfortably against your side. Squeeze a rolled towel between your elbow and your body for comfort. This will help keep your arm at your side. 3. Hold one end of the elastic band in the hand of the painful arm. 4. Slowly rotate your forearm toward your body until it touches your belly. Slowly move it back to where you started. 5. Keep your elbow and upper arm firmly tucked against the towel roll or at your side. 6. Repeat 8 to 12 times. External rotator strengthening exercise    1. Start by tying a piece of elastic exercise material to a doorknob. You can use surgical tubing or Thera-Band. (You may also hold one end of the band in each hand.)  2. Stand or sit with your shoulder relaxed and your elbow bent 90 degrees. Your upper arm should rest comfortably against your side. Squeeze a rolled towel between your elbow and your body for comfort. This will help keep your arm at your side. 3. Hold one end of the elastic band with the hand of the painful arm. 4. Start with your forearm across your belly. Slowly rotate the forearm out away from your body. Keep your elbow and upper arm tucked against the towel roll or the side of your body until you begin to feel tightness in your shoulder. Slowly move your arm back to where you started. 5. Repeat 8 to 12 times. Follow-up care is a key part of your treatment and safety. Be sure to make and go to all appointments, and call your doctor if you are having problems. It's also a good idea to know your test results and keep a list of the medicines you take. Where can you learn more? Go to https://[employer] org and sign in to your [employer] account. Enter Kristal Herminiotoshia in the [employer] box to learn more about \"Rotator Cuff: Exercises. \"     If you do not have an account, please click on the \"Sign Up Now\" link. Current as of: July 1, 2021               Content Version: 13.1  © 2006-2021 Healthwise, Incorporated. Care instructions adapted under license by [facility] ([facility]). If you have questions about a medical condition or this instruction, always ask your healthcare professional. [place] [address] any warranty or liability for your use of this information.

## 2022-01-04 NOTE — PROGRESS NOTES
[address] AND SPORTS MEDICINE  [address] [place] [id number]  Dept: [phone]  Dept Fax: [phone]          Left Knee -established patient post fall    Subjective:     Chief Complaint   Patient presents with    Follow-up     Left Knee Injury doi 1/4/22     HPI:     Deepa Shipman presents today for Left knee pain. The pain has been present for a few hours. The patient recalls a specific injury where he fell in the garage after his ankle gave out this morning. He was able to get up and get into the house. He has iced, Tramadol and a Tylenol. He has a history of osteopenia. The pain is now described as sharp in the back of his knee. He has an abrasion on the anterior knee where he hit the ground. There is  pain on weight bearing. The knee has not swelled. There is not painful popping and clicking. The knee has not caught or locked up. The knee has not given out. His biggest complaint is pain in the posterior knee that goes down into his calf. He has never had physical therapy or cortisone injection in that left knee. He is having trouble ambulating and had to use a walker. He presents today with oxygen and in a wheelchair. ROS:   Review of Systems   Constitutional: Positive for activity change. Negative for appetite change, fatigue and fever. Respiratory: Negative. Negative for apnea, cough, chest tightness and shortness of breath. Cardiovascular: Negative. Negative for chest pain, palpitations and leg swelling. Gastrointestinal: Negative for abdominal distention, abdominal pain, constipation, diarrhea, nausea and vomiting. Genitourinary: Negative for difficulty urinating, dysuria and hematuria. Musculoskeletal: Positive for arthralgias and gait problem. Negative for joint swelling and myalgias. Skin: Negative for color change and rash.    Neurological: Negative for dizziness, weakness, numbness and headaches. Psychiatric/Behavioral: Negative for sleep disturbance.        Past Medical History:    Past Medical History:   Diagnosis Date    Allergic rhinitis     Anxiety     Aseptic meningitis     reaction to first infusion in 2012-hospitalized but no problem since then    Asthma     Avascular necrosis of bone of hip, left ([facility] [address] 04/2018    CAD (coronary artery disease)     mild on cath, no stents    Common variable immunodeficiency ([facility] [address] 12/4/2013    COPD (chronic obstructive pulmonary disease) (HCC)     Deviated nasal septum     Dyspnea     with exertion    HTN (hypertension)     Hyperglycemia     Hypoxia     Immune deficiency disorder ([facility] [address] [facility] 2012    IGIV every 3 weeks with prednisone    Obesity     On supplemental oxygen therapy     PRECIOUS (obstructive sleep apnea) 2012    CPAP nightly    Osteoporosis     Reflux     Respiratory failure ([facility] [address] [facility] 2014    Oxygen on    Severe persistent asthma     Spondylosis of cervical spine     TIA (transient ischemic attack) 2005    Tobacco use     stopped 2004    Under care of team 04/15/2021    pulmonology-Dr Hull[facility]-last visit march 2021    Under care of team 04/15/2021    infectious disease-Dr Chavez[facility]-last visit march 2021    Under care of team 04/15/2021    immunology-Dr Rivers-last visit mar 2021    Vitamin D deficiency 3/6/2018    Wears glasses     Wellness examination 04/15/2021    pcp-Dr Vazquez-last visit apr 2021       Past Surgical History:    Past Surgical History:   Procedure Laterality Date    CARDIAC CATHETERIZATION  2005    no stents    COLONOSCOPY  2003    ENDOSCOPY, COLON, DIAGNOSTIC      FINGER SURGERY Left 1997    reattachment, index finger    IR PORT PLACEMENT EQUAL OR GREATER THAN 5 YEARS  6/23/2021    IR PORT PLACEMENT EQUAL OR GREATER THAN 5 YEARS 6/23/2021 [facility]ED[facility]    NASAL ENDOSCOPY      MN REVISE TOTAL HIP REPLACEMENT Left 05/22/2018    HIP TOTAL ARTHROPLASTY ANTERIOR APPROACH  ([facility]) performed by Rocael Harvey DO at [place] [address]    lesion excision    TONSILLECTOMY      TOTAL HIP ARTHROPLASTY Right 04/27/2021    TOTAL HIP ARTHROPLASTY ANTERIOR performed by Rocael Harvey DO at [address] [place] [address] TUNNELED VENOUS PORT PLACEMENT Right 11/25/2015    Rt.  chest       CurrentMedications:   Current Outpatient Medications   Medication Sig Dispense Refill    predniSONE (DELTASONE) 10 MG tablet 4 tab once daily for 3 days then 3 tab once daily for 3 days then 2 tab once daily for 3 days and 1 tab once daily for 3 days 30 tablet 0    acetaminophen (TYLENOL) 500 MG tablet Take 1,000 mg by mouth every 6 hours as needed for Pain      docusate sodium (COLACE) 100 MG capsule Take 1 capsule by mouth 2 times daily as needed for Constipation (Patient not taking: Reported on 10/19/2021) 60 capsule 0    montelukast (SINGULAIR) 10 MG tablet take 1 tablet by mouth every evening 90 tablet 3    albuterol sulfate HFA (VENTOLIN HFA) 108 (90 Base) MCG/ACT inhaler inhale 2 puffs by mouth and INTO THE LUNGS every 6 hours if needed for wheezing 3 Inhaler 3    budesonide-formoterol (SYMBICORT) 160-4.5 MCG/ACT AERO inhale 2 puffs by mouth twice a day 3 Inhaler 3    fluticasone (FLONASE) 50 MCG/ACT nasal spray instill 2 sprays into each nostril once daily 3 Bottle 3    SPIRIVA HANDIHALER 18 MCG inhalation capsule inhale the contents of one capsule in the handihaler once daily 90 capsule 3    predniSONE (DELTASONE) 10 MG tablet 4 tablet once daily for 3 days then 3 tablet once daily for 3 days then 2 tablet once daily for 3 days then 1 tablet once daily for 3 days then discontinue 30 tablet 0    albuterol (PROVENTIL) (2.5 MG/3ML) 0.083% nebulizer solution Take 3 mLs by nebulization every 4 hours as needed for Wheezing or Shortness of Breath 375 mL 5    fluticasone-salmeterol (ADVAIR DISKUS) 500-50 MCG/DOSE diskus inhaler Inhale 1 puff into the lungs 2 times daily 60 each 11    omeprazole (PRILOSEC) 20 MG delayed release capsule take 1 capsule by mouth once daily 60 capsule 3    amLODIPine (NORVASC) 10 MG tablet take 1 tablet by mouth once daily 60 tablet 3    Handicap Placard MISC by Does not apply route For 5 years 1 each 0    traMADol (ULTRAM) 50 MG tablet Take 50 mg by mouth as needed for Pain.  predniSONE (DELTASONE) 10 MG tablet Start at 50 mg and taper 10 mg every 3 days 45 tablet 0    ALPRAZolam (XANAX) 0.5 MG tablet Take 0.5 mg by mouth 3 times daily as needed for Sleep. .      benazepril (LOTENSIN) 10 MG tablet take 1 tablet by mouth once daily (Patient taking differently: Take 10 mg BID) 30 tablet 5    Immune Globulin, Human, 10 GM/100ML SOLN IV solution Infuse 60 g intravenously every 21 days        No current facility-administered medications for this visit. Allergies:    Adhesive tape    Social History:   Social History     Socioeconomic History    Marital status:      Spouse name: Chioma Stevenson Number of children: 3    Years of education: None    Highest education level: None   Occupational History    Occupation:      Comment: now on disability   Tobacco Use    Smoking status: Former Smoker     Packs/day: 1.00     Years: 31.00     Pack years: 31.00     Types: Cigarettes     Start date: 1972     Quit date: 2004     Years since quittin.0    Smokeless tobacco: Never Used    Tobacco comment: smoke 4ppd for many yrs; quit 8 yrs ago   Vaping Use    Vaping Use: Never used   Substance and Sexual Activity    Alcohol use:  Yes     Alcohol/week: 3.0 standard drinks     Types: 3 Cans of beer per week     Comment: about once a week    Drug use: No    Sexual activity: Yes     Partners: Female   Other Topics Concern    None   Social History Narrative    None     Social Determinants of Health     Financial Resource Strain:     Difficulty of Paying Living Expenses: Not on file   Food Insecurity:     Worried About Running Out of Food in the Last Year: Not on file    Ran Out of Food in the Last Year: Not on file   Transportation Needs:     Lack of Transportation (Medical): Not on file    Lack of Transportation (Non-Medical): Not on file   Physical Activity:     Days of Exercise per Week: Not on file    Minutes of Exercise per Session: Not on file   Stress:     Feeling of Stress : Not on file   Social Connections:     Frequency of Communication with Friends and Family: Not on file    Frequency of Social Gatherings with Friends and Family: Not on file    Attends Jehovah's witness Services: Not on file    Active Member of Clubs or Organizations: Not on file    Attends Club or Organization Meetings: Not on file    Marital Status: Not on file   Intimate Partner Violence:     Fear of Current or Ex-Partner: Not on file    Emotionally Abused: Not on file    Physically Abused: Not on file    Sexually Abused: Not on file   Housing Stability:     Unable to Pay for Housing in the Last Year: Not on file    Number of [place] in the Last Year: Not on file    Unstable Housing in the Last Year: Not on file       Family History:  Family History   Problem Relation Age of Onset    COPD Father     Coronary Art Dis Father     Heart Attack Father     Lung Cancer Mother     Breast Cancer Sister     No Known Problems Maternal Grandmother     No Known Problems Maternal Grandfather     Cancer Paternal Grandmother     Stroke Paternal Grandfather     Heart Attack Paternal Grandfather     Cancer Paternal Grandfather     Breast Cancer Sister     Cancer Sister         Throat       Vitals:   /76   Pulse 94   Resp 16   Ht 5' 8\" (1.727 m)   Wt 259 lb (117.5 kg)   BMI 39.38 kg/m²  Body mass index is 39.38 kg/m². Physical Examination:     Orthopedics:    GENERAL: Alert and oriented X3 in no acute distress. SKIN: Intact without lesions or ulcerations. Abrasion on the anterior knee.   No signs of infection  NEURO: Intact to sensory and motor testing. VASC: Capillary refill is less than 3 seconds. KNEE EXAM    LOCATION: Left Knee  GEN: Alert and oriented X 3, in no acute distress. GAIT: The patient's gait was observed while entering the exam room and was noted to be antalgic. The extremity is in anatomic alignment. SKIN: Intact without rashes, lesions, or ulcerations. No obvious deformity or swelling. NEURO: The patient responds to light touch throughout bilateral LE. Patellar and Achilles reflexes are 2/4. VASC: The bilateral LE is neurovascularly intact with 2/4 DP and 2/4 PT pulses. Brisk capillary refill. ROM: 0/110 degrees. There is no effusion. MUSC: decreased quad tone  LIGAMENT: Lachman's test is Negative with Good endpoint. Anterior drawer Negative. Posterior drawer Negative. There is No varus instability at 0 degrees and No varus instability at 30 degrees. There is No valgus instability at 0 degrees and No valgus instability at 30 degrees. SPECIAL: Kelechi test is negative with no clunks, no crepitation, and + pain. PALP: There is no joint line pain. Pain of the lateral hamstring tendon posterior knee. Pain to the proximal gastroc    left SHOULDER  GEN:  Alert and oriented X 3, in no acute distress. SKIN:  Intact without rashes, lesions, or ulcerations. Incisions are well healed. NEURO:  Musculoskeletal ans axillary nerves intact to sensory and motor testing. VASC:  Cap refill less than than 3 secs. Negative Adson's test, Negative Miriam's test.  ROM: Forward elevation 140 degrees, external rotation in neutral 45 degrees, external rotation in abduction 90 degrees, internal rotation to L3. MUSC:  No atrophy, 4/5 supraspinatus, 4/5 external rotators, negative subscrap lift off or belly press test.  IMP:  +Neer's sign, +Hawkin's sign, no painful arc, no pain with cross body abduction. PALP: no pain over anterolateral acromion, no pain over AC joint, no pain over traps/rhomboids.   INST:   negative Oakland's test, negative Speeds    Assessment:     1. Strain of left hamstring, initial encounter    2. Rotator cuff syndrome of left shoulder    3. Contusion of left knee, initial encounter    4. Abrasion of left knee, initial encounter      Procedures:    Procedure: no  Radiology:   XR KNEE LEFT (MIN 4 VIEWS)    Result Date: 1/5/2022  KNEE X-RAY 4 views of the left knee including AP, bilateral tunnel, and lateral in the upright position, and skyline views reveal anatomic alignment with no fracture or dislocation. Kellgren grade II changes of osteoarthritis (joint space narrowing, osteophyte, subchondral sclerosis, bony deformity/cyst) of the tricompartment(s). No osseous loose bodies. No bony erosion or periosteal reaction. No soft tissue masses. Impression: Mild arthritic changes of the left knee. Plan:   Treatment : I reviewed the x-ray with the patient and his wife and I informed them that the x-ray was negative for any bony abnormality of his left knee. We discussed the etiologies and natural histories of hamstring strain and a bone contusion of the left knee. We discussed the various treatment alternatives including anti-inflammatory medications, physical therapy, injections, further imaging studies and as a last result surgery. During today's visit, we discussed that I believe that he strained his hamstring tendon when he fell. I cannot find anything mechanically wrong with his knee joint. This injury only happened a few hours ago so I would suggest icing and Tylenol and tramadol. He cannot take anti-inflammatories. I would like to give the knee and the shoulder some time to settle down to come to see what gets better with time. I would like him to follow-up in 1 week for both the knee and the shoulder to see how he is doing. If he is not better at that appointment we can consider further treatments.   He wants to avoid physical therapy because he does not have antibodies even after having his Covid vaccine. He also has significant COPD and he is on oxygen. I also included some hamstring stretches in his after visit summary that he can do on his own at home. I also put in some shoulder exercises that he can begin. He can do ice for the first 72 hours and then he can switch to heat. The patient has opted for giving the knee and shoulder time to see how they do. If his shoulder is still bothering him at his next appointment we will get x-rays of his left shoulder. I highly suggested that he uses his walker at all times. Patient should return to the clinic in 1 weeks to follow up with  Evelyn Tijerina PA-C. The patient will call the office immediately with any problems. No orders of the defined types were placed in this encounter. No orders of the defined types were placed in this encounter. This note is created with the assistance of a speech recognition program.  While intending to generate a document that actually reflects the content of the visit, the document can still have some errors including those of syntax and sound a like substitutions which may escape proof reading.   In such instances, actual meaning can be extrapolated by contextual diversion    Electronically signed by Tripp Gray PA-C, on 1/5/2022 at 3:02 PM

## 2022-01-12 ENCOUNTER — TELEPHONE (OUTPATIENT)
Dept: PULMONOLOGY | Age: 67
End: 2022-01-12

## 2022-01-12 NOTE — TELEPHONE ENCOUNTER
Your request has been approved  Approved. This drug has been approved under the Member's Medicare Part D benefit. Approved quantity: 30 <> per 30 day(s). You may fill up to a 90 day supply except for those on Specialty Tier 5, which can be filled up to a 30 day supply. Please call the pharmacy to process the prescription claim.

## 2022-01-26 ENCOUNTER — HOSPITAL ENCOUNTER (OUTPATIENT)
Age: 67
Setting detail: SPECIMEN
Discharge: HOME | End: 2022-01-26
Payer: MEDICARE

## 2022-01-26 LAB — SARS-COV-2 ANTIBODY, TOTAL: POSITIVE

## 2022-01-26 PROCEDURE — 86769 SARS-COV-2 COVID-19 ANTIBODY: CPT

## 2022-01-26 PROCEDURE — 36415 COLL VENOUS BLD VENIPUNCTURE: CPT

## 2022-01-31 ENCOUNTER — HOSPITAL ENCOUNTER (OUTPATIENT)
Dept: VASCULAR LAB | Age: 67
Discharge: HOME | End: 2022-01-31
Payer: MEDICARE

## 2022-01-31 DIAGNOSIS — R55 SYNCOPE, UNSPECIFIED SYNCOPE TYPE: ICD-10-CM

## 2022-01-31 PROCEDURE — 93880 EXTRACRANIAL BILAT STUDY: CPT

## 2022-04-01 RX ORDER — LEVOFLOXACIN 500 MG/1
500 TABLET, FILM COATED ORAL DAILY
Qty: 10 TABLET | Refills: 0 | Status: SHIPPED | OUTPATIENT
Start: 2022-04-01 | End: 2022-04-11

## 2022-04-01 RX ORDER — PREDNISONE 10 MG/1
TABLET ORAL
Qty: 30 TABLET | Refills: 0 | Status: SHIPPED | OUTPATIENT
Start: 2022-04-01

## 2022-04-01 NOTE — TELEPHONE ENCOUNTER
Pts wife called to request a refill on Levaquin and prednisone. Pt has congestion and a cough. COVID tests x2 were negative. Orders placed for your review. Please sign if you agree. Thank you.

## 2022-04-04 ENCOUNTER — HOSPITAL ENCOUNTER (OUTPATIENT)
Age: 67
Setting detail: SPECIMEN
Discharge: HOME | End: 2022-04-04
Payer: MEDICARE

## 2022-04-04 LAB
ABSOLUTE EOS #: 0.07 K/UL (ref 0–0.44)
ABSOLUTE IMMATURE GRANULOCYTE: 0.03 K/UL (ref 0–0.3)
ABSOLUTE LYMPH #: 0.9 K/UL (ref 1.1–3.7)
ABSOLUTE MONO #: 0.25 K/UL (ref 0.1–1.2)
ALT SERPL-CCNC: 27 U/L (ref 5–41)
BASOPHILS # BLD: 1 % (ref 0–2)
BASOPHILS ABSOLUTE: 0.03 K/UL (ref 0–0.2)
CREAT SERPL-MCNC: 0.89 MG/DL (ref 0.7–1.2)
EOSINOPHILS RELATIVE PERCENT: 1 % (ref 1–4)
GFR AFRICAN AMERICAN: >60 ML/MIN
GFR NON-AFRICAN AMERICAN: >60 ML/MIN
GFR SERPL CREATININE-BSD FRML MDRD: NORMAL ML/MIN/{1.73_M2}
HCT VFR BLD CALC: 41.2 % (ref 40.7–50.3)
HEMOGLOBIN: 13.4 G/DL (ref 13–17)
IGG: 820 MG/DL (ref 700–1600)
IMMATURE GRANULOCYTES: 1 %
LYMPHOCYTES # BLD: 14 % (ref 24–43)
MCH RBC QN AUTO: 28.8 PG (ref 25.2–33.5)
MCHC RBC AUTO-ENTMCNC: 32.5 G/DL (ref 28.4–34.8)
MCV RBC AUTO: 88.6 FL (ref 82.6–102.9)
MONOCYTES # BLD: 4 % (ref 3–12)
NRBC AUTOMATED: 0 PER 100 WBC
PDW BLD-RTO: 13.1 % (ref 11.8–14.4)
PLATELET # BLD: 218 K/UL (ref 138–453)
PMV BLD AUTO: 11.5 FL (ref 8.1–13.5)
RBC # BLD: 4.65 M/UL (ref 4.21–5.77)
SEG NEUTROPHILS: 79 % (ref 36–65)
SEGMENTED NEUTROPHILS ABSOLUTE COUNT: 4.99 K/UL (ref 1.5–8.1)
WBC # BLD: 6.3 K/UL (ref 3.5–11.3)

## 2022-04-04 PROCEDURE — 84460 ALANINE AMINO (ALT) (SGPT): CPT

## 2022-04-04 PROCEDURE — 85025 COMPLETE CBC W/AUTO DIFF WBC: CPT

## 2022-04-04 PROCEDURE — 82784 ASSAY IGA/IGD/IGG/IGM EACH: CPT

## 2022-04-04 PROCEDURE — 82565 ASSAY OF CREATININE: CPT

## 2022-04-05 RX ORDER — FLUTICASONE PROPIONATE 50 MCG
SPRAY, SUSPENSION (ML) NASAL
Qty: 3 EACH | Refills: 1 | Status: SHIPPED | OUTPATIENT
Start: 2022-04-05

## 2022-04-05 RX ORDER — TIOTROPIUM BROMIDE 18 UG/1
CAPSULE ORAL; RESPIRATORY (INHALATION)
Qty: 90 CAPSULE | Refills: 1 | Status: SHIPPED | OUTPATIENT
Start: 2022-04-05

## 2022-04-05 RX ORDER — BUDESONIDE AND FORMOTEROL FUMARATE DIHYDRATE 160; 4.5 UG/1; UG/1
AEROSOL RESPIRATORY (INHALATION)
Qty: 3 EACH | Refills: 1 | Status: SHIPPED | OUTPATIENT
Start: 2022-04-05

## 2022-04-15 ENCOUNTER — OFFICE VISIT (OUTPATIENT)
Dept: PULMONOLOGY | Age: 67
End: 2022-04-15
Payer: MEDICARE

## 2022-04-15 VITALS
RESPIRATION RATE: 18 BRPM | DIASTOLIC BLOOD PRESSURE: 110 MMHG | HEART RATE: 75 BPM | HEIGHT: 68 IN | SYSTOLIC BLOOD PRESSURE: 134 MMHG | BODY MASS INDEX: 39.71 KG/M2 | TEMPERATURE: 97.7 F | OXYGEN SATURATION: 98 % | WEIGHT: 262 LBS

## 2022-04-15 DIAGNOSIS — G47.33 OSA ON CPAP: ICD-10-CM

## 2022-04-15 DIAGNOSIS — Z99.89 OSA ON CPAP: ICD-10-CM

## 2022-04-15 DIAGNOSIS — D83.9 CVID (COMMON VARIABLE IMMUNODEFICIENCY) (HCC): ICD-10-CM

## 2022-04-15 DIAGNOSIS — J20.9 ACUTE BRONCHITIS, UNSPECIFIED ORGANISM: Primary | ICD-10-CM

## 2022-04-15 DIAGNOSIS — Z87.891 HISTORY OF SMOKING AT LEAST 1 PACK PER DAY FOR AT LEAST 30 YEARS: ICD-10-CM

## 2022-04-15 DIAGNOSIS — J96.11 CHRONIC RESPIRATORY FAILURE WITH HYPOXIA (HCC): ICD-10-CM

## 2022-04-15 DIAGNOSIS — J30.9 ALLERGIC RHINITIS, UNSPECIFIED SEASONALITY, UNSPECIFIED TRIGGER: ICD-10-CM

## 2022-04-15 DIAGNOSIS — J45.50 SEVERE PERSISTENT ASTHMA WITHOUT COMPLICATION: ICD-10-CM

## 2022-04-15 DIAGNOSIS — J44.9 CHRONIC OBSTRUCTIVE PULMONARY DISEASE, UNSPECIFIED COPD TYPE (HCC): ICD-10-CM

## 2022-04-15 PROCEDURE — 99214 OFFICE O/P EST MOD 30 MIN: CPT | Performed by: INTERNAL MEDICINE

## 2022-04-15 RX ORDER — LEVOFLOXACIN 500 MG/1
500 TABLET, FILM COATED ORAL DAILY
Qty: 10 TABLET | Refills: 0 | Status: SHIPPED | OUTPATIENT
Start: 2022-04-15 | End: 2022-04-25

## 2022-04-15 RX ORDER — PREDNISONE 10 MG/1
TABLET ORAL
Qty: 36 TABLET | Refills: 0 | Status: SHIPPED | OUTPATIENT
Start: 2022-04-15

## 2022-04-15 RX ORDER — ALBUTEROL SULFATE 2.5 MG/3ML
2.5 SOLUTION RESPIRATORY (INHALATION) EVERY 4 HOURS PRN
Qty: 120 EACH | Refills: 11 | Status: SHIPPED | OUTPATIENT
Start: 2022-04-15

## 2022-04-15 ASSESSMENT — SLEEP AND FATIGUE QUESTIONNAIRES
HOW LIKELY ARE YOU TO NOD OFF OR FALL ASLEEP WHILE SITTING AND READING: 1
HOW LIKELY ARE YOU TO NOD OFF OR FALL ASLEEP WHILE LYING DOWN TO REST IN THE AFTERNOON WHEN CIRCUMSTANCES PERMIT: 3
HOW LIKELY ARE YOU TO NOD OFF OR FALL ASLEEP WHEN YOU ARE A PASSENGER IN A CAR FOR AN HOUR WITHOUT A BREAK: 0
ESS TOTAL SCORE: 6
HOW LIKELY ARE YOU TO NOD OFF OR FALL ASLEEP WHILE SITTING AND TALKING TO SOMEONE: 0
HOW LIKELY ARE YOU TO NOD OFF OR FALL ASLEEP WHILE WATCHING TV: 0
HOW LIKELY ARE YOU TO NOD OFF OR FALL ASLEEP IN A CAR, WHILE STOPPED FOR A FEW MINUTES IN TRAFFIC: 0
HOW LIKELY ARE YOU TO NOD OFF OR FALL ASLEEP WHILE SITTING INACTIVE IN A PUBLIC PLACE: 0
HOW LIKELY ARE YOU TO NOD OFF OR FALL ASLEEP WHILE SITTING QUIETLY AFTER LUNCH WITHOUT ALCOHOL: 2

## 2022-04-15 NOTE — PROGRESS NOTES
cxr                                                          PULMONARY OUTPATIENT PROGRESS NOTE      Patient:  Mikhail Lozano  YOB: 1955    MRN: [id number]     Acct:        Pt seen and Chart reviewed. Mr/Ms Mikhail Lozano is here in followup for    Diagnosis Orders   1. Acute bronchitis, unspecified organism     2. Chronic obstructive pulmonary disease, unspecified COPD type ([facility]     3. Chronic respiratory failure with hypoxia (HCC)     4. Severe persistent asthma without complication     5. CVID (common variable immunodeficiency) ([facility]     6. PRECIOUS on CPAP     7. Allergic rhinitis, unspecified seasonality, unspecified trigger     8. History of smoking at least 1 pack per day for at least 30 years       He is for follow up of COPD, PRECIOUS, CVID, severe persistent asthma, chronic respiratory failure on home O2, CVID on IVIG. Since he was seen last time about 3 to 4 months ago he did not have any exacerbation of asthma/COPD but about 2 weeks ago he had called the office he started having symptoms started with postnasal drip sinus congestion and then started having cough he was wheezing and he was having increasing shortness of breath he started using more frequent albuterol aerosol he had called the office given a prescription for levofloxacin and prednisone taper dose. He had finished prednisone taper dose and also levofloxacin more than a week ago according to patient his sputum is much better he still have slightly yellow to pale-colored sputum but shortness of breath is back to baseline he does not complain of wheezing cough is almost to baseline. He does have history of intermittent dry cough and sputum production. He does have chronic dyspnea on exertion and on activity which has been stable now. He denies nocturnal awakening with cough wheezing chest tightness or shortness of breath. He denies any weight loss but has some weight gain denies any fever chest pain hemoptysis.   He is using Symbicort and using Spiriva once daily using albuterol nebulizer as needed he is also on Singulair. He is using oxygen all the time usually at 2 L at home and he is checking his saturation which is staying above 92%. He does not complain of hoarseness of voice current postnasal drip. Is very compliant with CPAP denies any problem using CPAP use CPAP with full mask. Sleep is usually refreshing and restorative usually does not take nap during the daytime denies dozing off during the daytime. Compliance data is available for 90 days from 01/16/2022 to 04/15/2022 compliance is 99% average usage is 8 hours 25 minutes on CPAP of 12 cm with average AHI of 0.6. He had 2 doses of COVID-vaccine according to patient his antibody titers were negative after 2 doses he got the booster dose and his antibody titers were better according to patient he got booster in August he did not get the second booster dose yet. He is getting IV IgG every 3 weeks by immunologist.    His last low-dose CT screening done in February 2020 and in November it was requested to be done in February 2021 but it was not done apparently he had to stop smoking in 2004 so he was 16 years that he is stop smoking he did not fit the criteria for low-dose screening. Last chest x-ray was 10/19/2021 we did not show any infiltrate effusion or acute changes. Hyperinflation and flattened diaphragm are present    Last hospitalization in January and February 2019  He was admitted to hospital twice for COPD/asthma exacerbation initially in second or third week of January 2019 when he was treated with COPD exhibition with bronchodilators and steroids and Levaquin, his chest x-ray that time did not show any infiltrate he was discharged on tapered dose of prednisone and Levaquin.   He was admitted again in early February 2019 for almost 2 weeks later and at that time he was finished with steroids and Levaquin, his flu a was positive this time he was treated with Tamiflu, he had a chest x-ray done which did not show infiltrates or evidence of pneumonia, he had a CT scan of the chest done which shows minimal residual or dependent atelectasis and/or pneumonia or infiltrate was seen this time he was again treated with Levaquin and prednisone and was discharged home. 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, he c/o pains in legs and joints a week prior to infusion and resolve usually the next day after infusion, he is tolerating it well and followed with allergist/ immunologist and recent Igg was > 800 and getting infusion every 3 weeks   H/O Severe persistent Asthma and COPD with h/o smoking for about > 30 years with h/o frequent exacerbations of Asthmatic Bronchitis  He is on home O2 and use O2 at night with cpap and also during daytime most of the time, He has PRECIOUS and very complaint with cpap at 12 cm and denies any problems with cpap since seen last time  With h/o all rhinitis and gerd and h/o cough denies ch cough, allergy symptoms are better since he stop mowing his lawn       Subjective:   Review of Systems -   General ROS: Negative for fatigue, negative for  - , chills, fever, night sweats or weight loss  ENT ROS: Negative for hoarseness and sore throat negative for - nasal congestion, postnasal dripping, sinus pain and no sneezing or epistaxis  Allergy and Immunology ROS: Negative for - nasal congestion and seasonal allergies  Respiratory ROS:  Positive dry cough, sputum production,, positive shortness of breath on activities, negative for wheezing, no chest pain or hemoptysis  Cardiovascular ROS: positive for - dyspnea on exertion,  negative for chest pain, orthopnea, PND, pedal edema. Gastrointestinal ROS: no abdominal pain,  nausea, vomiting, diarrhea, change in bowel habits, hematochezia, melena.     Musculoskeletal ROS: Negative for joints pain, negative  for - joint stiffness and swelling and muscle pain  CNS: negative for weakness, dizziness, diplopia, syncope, seizure, headache, tingling, dysphagia. Hem/Onc: negative for bleeding and bruisingand petechia  Skin: negative for rash and skin lesions  : negative for hematuria, dysuria nocturia and frequency. Sleep Medicine 4/15/2022 3/16/2021 7/29/2020 2/25/2020 3/20/2019 3/28/2018 12/6/2016   Sitting and reading 1 2 0 1 3 0 3   Watching TV 0 0 0 0 1 0 0   Sitting, inactive in a public place (e.g. a theatre or a meeting) 0 0 0 0 1 0 0   As a passenger in a car for an hour without a break 0 1 0 0 0 0 2   Lying down to rest in the afternoon when circumstances permit 3 3 3 3 3 2 3   Sitting and talking to someone 0 0 0 0 0 0 0   Sitting quietly after a lunch without alcohol 2 3 3 3 3 0 3   In a car, while stopped for a few minutes in traffic 0 0 0 0 0 0 0   Total score 6 9 6 7 11 2 11       Allergies: Allergies   Allergen Reactions    Adhesive Tape Other (See Comments)     Severe reaction to a bandage used with hip replacement.     Other Rash     Alcohol prep pad       Medications:  Outpatient Encounter Medications as of 4/15/2022   Medication Sig Dispense Refill    fluticasone (FLONASE) 50 MCG/ACT nasal spray instill 2 sprays into each nostril once daily 3 each 1    budesonide-formoterol (SYMBICORT) 160-4.5 MCG/ACT AERO inhale 2 puffs by mouth twice a day Rinse mouth after use 3 each 1    SPIRIVA HANDIHALER 18 MCG inhalation capsule inhale the contents of one capsule in the handihaler once daily 90 capsule 1    acetaminophen (TYLENOL) 500 MG tablet Take 1,000 mg by mouth every 6 hours as needed for Pain      montelukast (SINGULAIR) 10 MG tablet take 1 tablet by mouth every evening 90 tablet 3    albuterol sulfate HFA (VENTOLIN HFA) 108 (90 Base) MCG/ACT inhaler inhale 2 puffs by mouth and INTO THE LUNGS every 6 hours if needed for wheezing 3 Inhaler 3    albuterol (PROVENTIL) (2.5 MG/3ML) 0.083% nebulizer solution Take 3 mLs by nebulization every 4 hours as needed for Wheezing or Shortness of Breath 375 mL 5    omeprazole (PRILOSEC) 20 MG delayed release capsule take 1 capsule by mouth once daily 60 capsule 3    amLODIPine (NORVASC) 10 MG tablet take 1 tablet by mouth once daily 60 tablet 3    Handicap Placard MISC by Does not apply route For 5 years 1 each 0    traMADol (ULTRAM) 50 MG tablet Take 50 mg by mouth as needed for Pain.  ALPRAZolam (XANAX) 0.5 MG tablet Take 0.5 mg by mouth 3 times daily as needed for Sleep. Verflorencia Mays benazepril (LOTENSIN) 10 MG tablet take 1 tablet by mouth once daily (Patient taking differently: Take 10 mg BID) 30 tablet 5    Immune Globulin, Human, 10 GM/100ML SOLN IV solution Infuse 60 g intravenously every 21 days       predniSONE (DELTASONE) 10 MG tablet 4 tab once daily for 3 days then 3 tab once daily for 3 days then 2 tab once daily for 3 days and 1 tab once daily for 3 days 30 tablet 0    docusate sodium (COLACE) 100 MG capsule Take 1 capsule by mouth 2 times daily as needed for Constipation 60 capsule 0    predniSONE (DELTASONE) 10 MG tablet 4 tablet once daily for 3 days then 3 tablet once daily for 3 days then 2 tablet once daily for 3 days then 1 tablet once daily for 3 days then discontinue 30 tablet 0    fluticasone-salmeterol (ADVAIR DISKUS) 500-50 MCG/DOSE diskus inhaler Inhale 1 puff into the lungs 2 times daily 60 each 11    predniSONE (DELTASONE) 10 MG tablet Start at 50 mg and taper 10 mg every 3 days 45 tablet 0     No facility-administered encounter medications on file as of 4/15/2022. Objective:    Physical Exam:  Vitals:   BP (!) 134/110 (Site: Right Upper Arm, Position: Sitting, Cuff Size: Large Adult)   Pulse 75   Temp 97.7 °F (36.5 °C) (Temporal)   Resp 18   Ht 5' 8\" (1.727 m)   Wt 262 lb (118.8 kg)   SpO2 98% Comment: on 2 lpm  BMI 39.84 kg/m²   Last 3 weights:    Wt Readings from Last 3 Encounters: 04/15/22 262 lb (118.8 kg)   01/04/22 259 lb (117.5 kg)   12/08/21 259 lb (117.5 kg)     Body mass index is 39.84 kg/m². Physical Examination:   General appearance - alert, over weight not tachypnic and in no distress  Mental status - alert, oriented to person, place, and time  Eyes - pupils equal and reactive, extraocular eye movements intact  Nose - normal and patent, no erythema, , positive pale and edematous nasal mucosa, no purulent secretion. Mouth - mucous membranes moist, pharynx normal without lesions, large tongue, thick uvula, small oropharynx, Mallampati 1   Neck - supple, no significant adenopathy, very short and thick  Chest - No distress is noted at rest, no cyanosis no accessory muscles use. Bilateral symmetrical chest movements, distant breath sounds bilaterally,  fair air entry with prolonged expiration, no expiratory wheezing no crackles and no rhonchi.   Heart - normal rate, regular rhythm, normal S1, S2, no murmurs, rubs, clicks or gallops  Abdomen - soft, nontender, nondistended, no masses or organomegaly  Neurological - alert, oriented, normal speech, no focal findings or movement disorder noted  Extremities - peripheral pulses normal, no pedal edema, no clubbing or cyanosis  Skin - normal coloration and turgor, no rashes, no suspicious skin lesions noted       Labs:  None    CBC:   WBC   Date Value Ref Range Status   04/04/2022 6.3 3.5 - 11.3 k/uL Final     Hemoglobin   Date Value Ref Range Status   04/04/2022 13.4 13.0 - 17.0 g/dL Final     Platelet Count   Date Value Ref Range Status   03/01/2012 202 140 - 450 k/uL Final     Platelets   Date Value Ref Range Status   04/04/2022 218 138 - 453 k/uL Final     BMP:   Sodium   Date Value Ref Range Status   04/15/2021 137 135 - 144 mmol/L Final     Potassium   Date Value Ref Range Status   04/15/2021 4.4 3.7 - 5.3 mmol/L Final     Chloride   Date Value Ref Range Status   04/15/2021 102 98 - 107 mmol/L Final     CO2   Date Value Ref Range Status   04/15/2021 25 20 - 31 mmol/L Final     BUN   Date Value Ref Range Status   04/15/2021 12 8 - 23 mg/dL Final     CREATININE   Date Value Ref Range Status   04/04/2022 0.89 0.70 - 1.20 mg/dL Final   04/15/2021 0.79 0.70 - 1.20 mg/dL Final   08/02/2020 0.86 0.70 - 1.20 mg/dL Final     Glucose   Date Value Ref Range Status   04/15/2021 102 (H) 70 - 99 mg/dL Final   06/04/2012 86 74 - 106 mg/dL Final     Comment:     [facility] [address] [facility] 3 [phone]     S. Calcium:   Calcium   Date Value Ref Range Status   04/15/2021 9.6 8.6 - 10.4 mg/dL Final     S. Ionized Calcium: No results found for: IONCA  S. Magnesium:   Magnesium   Date Value Ref Range Status   06/12/2014 2.7 (H) 1.6 - 2.6 mg/dL Final     Comment:     [address] 3 [phone]     S. Phosphorus:   Phosphorus   Date Value Ref Range Status   01/08/2013 3.4 2.5 - 4.5 mg/dL Final     Comment:     [address] 3 [phone]     S. Glucose:   POC Glucose   Date Value Ref Range Status   04/29/2021 140 (H) 75 - 110 mg/dL Final   04/29/2021 115 (H) 75 - 110 mg/dL Final   04/28/2021 139 (H) 75 - 110 mg/dL Final     Glycosylated hemoglobin A1C:   Hemoglobin A1C   Date Value Ref Range Status   04/28/2021 5.4 4.0 - 6.0 % Final       Pulmonary Functions Testing Results:  07/31/2019  Spirometry shows FVC is 3.14 79% predicted. FEV1 is 2.60 82% predicted. FEV1 FVC ratio is normal.  Lung volume shows residual volume is 2.93 130% predicted consistent with hyperinflation and airway trapping. Diffusion capacity is 21.24 81% predicted which was within normal limit.     9/6/2016:  FEV1 2.78 86%, FVC 3.64 90%, GMV0WDL 95% , TLC 7.02  114%, DLCO 20.32  77%  8/26/2015: FEV1 2.30 69%, FVC 3.00 64%, ALI6VQK, TLC 6.64  95%, DLCO 21.54  79%    Blood Gases: No results found for: PH, PCO2, PO2, HCO3, O2SAT    Radiological reports:    CXR    CT Scans     CT lung screening.  02/19/2020  Mediastinum:  Suboptimal evaluation due to low dose technique.  Right-sided   port is in place.  Thoracic aorta demonstrates mild calcification without   aneurysm.  Pulmonary trunk appears nondilated.  Heart appears normal in size   without pericardial effusion.  No lymphadenopathy.  The esophagus is grossly   unremarkable.       Lungs/Pleura:  Emphysematous changes.  No focal consolidation, pneumothorax   or pleural effusion.  Trachea and distal airways appear patent.  No   suspicious noncalcified lung nodule or mass. 02/05/19  Basilar subsegmental atelectasis without evidence for consolidation.       Emphysema and sequela of old granulomatous disease.  Return to low-dose chest   CT screening schedule is recommended, if clinically appropriate. CT Scan Low dose     9/19/17  *Stable 5 mm nodular thickening along the minor fissure.  No new or enlarging   nodule. *Paraseptal emphysema. 9/27/16  Mild apical paraseptal emphysematous changes, more on the right. Dependent/atelectatic findings posteriorly toward the bases.       Unchanged 5 mm nodule contiguous with the medial minor fissure. EKG:     ECHO:     7/24/17  Left ventricle is normal in size  Global left ventricular systolic function is normal Estimated ejection  fraction is 65 % . No significant valvular regurgitation or stenosis seen. 11/8/13  Technically difficult study. No gross segmental wall motion abnormality. Estimated ejection fraction is 55 %. Mild left ventricular hypertrophy. Evidence of diastolic dysfunction. Left atrium is mildly dilated. No significant valvular regurgitation or stenosis seen. Trivial anterior pericardial effusion  Right Atrium  Right atrium is normal in size. Right Ventricle  Normal right ventricular size and function. 6 minutes walk test 03/20/19  Total distance walk 750 feet 48% predicted.   Room air oxygen was 88% on walking slow to 250 feet started on 2 L nasal Dragon dictation software. Although every effort was made to ensure the accuracy of this automated transcription, some errors in transcription may have occurred.     Nithya Kennedy MD, MD             4/15/2022, 2:58 PM

## 2022-04-15 NOTE — LETTER
[facility], [address] [place] [id number]  Phone: [phone]  Fax: [phone]    Lieutenant Aparna MD    April 15, 2022     [facility] [id number]    Patient: Miah Martínez   MR Number: [id number]   YOB: 1955   Date of Visit: 4/15/2022       Dear Dayna Renee:    Thank you for referring Kristie Alvarado to me for evaluation/treatment. Below are the relevant portions of my assessment and plan of care. If you have questions, please do not hesitate to call me. I look forward to following Mary Minor along with you.     Sincerely,      Lieutenant Aparna MD

## 2022-04-26 ENCOUNTER — TELEPHONE (OUTPATIENT)
Dept: ORTHOPEDIC SURGERY | Age: 67
End: 2022-04-26

## 2022-04-26 NOTE — TELEPHONE ENCOUNTER
Patient contacted the office today. He is going out of town and would like to know if it is safe for him to go horseback riding. States he has had Bilateral LANRE. Please adivse, thank you.

## 2022-05-03 ENCOUNTER — TELEPHONE (OUTPATIENT)
Dept: PULMONOLOGY | Age: 67
End: 2022-05-03

## 2022-05-03 NOTE — TELEPHONE ENCOUNTER
She need to do all the precautions so he does not get expose to her although you might have already exposed but I do not think that there is anything which can be prescribed just to prevent unless he is positive or he has symptoms. She can still call his immunologist also.

## 2022-05-03 NOTE — TELEPHONE ENCOUNTER
Patient's wife called and she tested positive for Covid this morning she started feeling sick Sunday an knows she was exposed to someone with Covid. She is really worried about getting patient sick she feels he could die if she gets him sick. She is calling her family doc for them to advise her on her symptoms, I advised her to disinfect, wear a mask stay at lest 6 feet away from each other. She wants to know if there is a \"preventative\" you can give preventative so he does not get sick? Please advise, Thanks!

## 2022-05-09 RX ORDER — MONTELUKAST SODIUM 10 MG/1
TABLET ORAL
Qty: 90 TABLET | Refills: 2 | Status: SHIPPED | OUTPATIENT
Start: 2022-05-09

## 2022-05-09 NOTE — TELEPHONE ENCOUNTER
LAST VISIT: 4/15/22  NEXT VISIT: 8/17/22    Per last dictation patient to continue this medication. Please sign for refill if ok. Thank you.

## 2022-06-27 ENCOUNTER — HOSPITAL ENCOUNTER (OUTPATIENT)
Age: 67
Setting detail: SPECIMEN
Discharge: HOME | End: 2022-06-27
Payer: MEDICARE

## 2022-06-27 LAB
ABSOLUTE EOS #: 0.03 K/UL (ref 0–0.44)
ABSOLUTE IMMATURE GRANULOCYTE: 0.02 K/UL (ref 0–0.3)
ABSOLUTE LYMPH #: 1.62 K/UL (ref 1.1–3.7)
ABSOLUTE MONO #: 0.52 K/UL (ref 0.1–1.2)
ALT SERPL-CCNC: 25 U/L (ref 5–41)
BASOPHILS # BLD: 1 % (ref 0–2)
BASOPHILS ABSOLUTE: 0.04 K/UL (ref 0–0.2)
CREAT SERPL-MCNC: 0.81 MG/DL (ref 0.7–1.2)
EOSINOPHILS RELATIVE PERCENT: 1 % (ref 1–4)
GFR AFRICAN AMERICAN: >60 ML/MIN
GFR NON-AFRICAN AMERICAN: >60 ML/MIN
GFR SERPL CREATININE-BSD FRML MDRD: NORMAL ML/MIN/{1.73_M2}
HCT VFR BLD CALC: 41.8 % (ref 40.7–50.3)
HEMOGLOBIN: 13.4 G/DL (ref 13–17)
IGG: 916 MG/DL (ref 700–1600)
IMMATURE GRANULOCYTES: 0 %
LYMPHOCYTES # BLD: 27 % (ref 24–43)
MCH RBC QN AUTO: 28.8 PG (ref 25.2–33.5)
MCHC RBC AUTO-ENTMCNC: 32.1 G/DL (ref 28.4–34.8)
MCV RBC AUTO: 89.9 FL (ref 82.6–102.9)
MONOCYTES # BLD: 9 % (ref 3–12)
NRBC AUTOMATED: 0 PER 100 WBC
PDW BLD-RTO: 13.1 % (ref 11.8–14.4)
PLATELET # BLD: 214 K/UL (ref 138–453)
PMV BLD AUTO: 11.6 FL (ref 8.1–13.5)
RBC # BLD: 4.65 M/UL (ref 4.21–5.77)
SEG NEUTROPHILS: 62 % (ref 36–65)
SEGMENTED NEUTROPHILS ABSOLUTE COUNT: 3.73 K/UL (ref 1.5–8.1)
WBC # BLD: 6 K/UL (ref 3.5–11.3)

## 2022-06-27 PROCEDURE — 84460 ALANINE AMINO (ALT) (SGPT): CPT

## 2022-06-27 PROCEDURE — 82565 ASSAY OF CREATININE: CPT

## 2022-06-27 PROCEDURE — 85025 COMPLETE CBC W/AUTO DIFF WBC: CPT

## 2022-06-27 PROCEDURE — 82784 ASSAY IGA/IGD/IGG/IGM EACH: CPT

## 2022-09-15 ENCOUNTER — HOSPITAL ENCOUNTER (OUTPATIENT)
Age: 67
Setting detail: SPECIMEN
Discharge: HOME | End: 2022-09-15
Payer: MEDICARE

## 2022-09-15 LAB
ABSOLUTE EOS #: 0.11 K/UL (ref 0–0.44)
ABSOLUTE IMMATURE GRANULOCYTE: 0.02 K/UL (ref 0–0.3)
ABSOLUTE LYMPH #: 1.78 K/UL (ref 1.1–3.7)
ABSOLUTE MONO #: 0.47 K/UL (ref 0.1–1.2)
ALT SERPL-CCNC: 25 U/L (ref 5–41)
BASOPHILS # BLD: 1 % (ref 0–2)
BASOPHILS ABSOLUTE: 0.05 K/UL (ref 0–0.2)
CREAT SERPL-MCNC: 0.77 MG/DL (ref 0.7–1.2)
EOSINOPHILS RELATIVE PERCENT: 2 % (ref 1–4)
GFR AFRICAN AMERICAN: >60 ML/MIN
GFR NON-AFRICAN AMERICAN: >60 ML/MIN
GFR SERPL CREATININE-BSD FRML MDRD: NORMAL ML/MIN/{1.73_M2}
HCT VFR BLD CALC: 41.6 % (ref 40.7–50.3)
HEMOGLOBIN: 13.4 G/DL (ref 13–17)
IGG: 853 MG/DL (ref 700–1600)
IMMATURE GRANULOCYTES: 0 %
LYMPHOCYTES # BLD: 28 % (ref 24–43)
MCH RBC QN AUTO: 29 PG (ref 25.2–33.5)
MCHC RBC AUTO-ENTMCNC: 32.2 G/DL (ref 28.4–34.8)
MCV RBC AUTO: 90 FL (ref 82.6–102.9)
MONOCYTES # BLD: 7 % (ref 3–12)
NRBC AUTOMATED: 0 PER 100 WBC
PDW BLD-RTO: 13.1 % (ref 11.8–14.4)
PLATELET # BLD: 218 K/UL (ref 138–453)
PMV BLD AUTO: 11.3 FL (ref 8.1–13.5)
RBC # BLD: 4.62 M/UL (ref 4.21–5.77)
SEG NEUTROPHILS: 62 % (ref 36–65)
SEGMENTED NEUTROPHILS ABSOLUTE COUNT: 3.89 K/UL (ref 1.5–8.1)
WBC # BLD: 6.3 K/UL (ref 3.5–11.3)

## 2022-09-15 PROCEDURE — 82565 ASSAY OF CREATININE: CPT

## 2022-09-15 PROCEDURE — 84460 ALANINE AMINO (ALT) (SGPT): CPT

## 2022-09-15 PROCEDURE — 85025 COMPLETE CBC W/AUTO DIFF WBC: CPT

## 2022-09-15 PROCEDURE — 82784 ASSAY IGA/IGD/IGG/IGM EACH: CPT

## 2022-10-12 RX ORDER — ALBUTEROL SULFATE 90 UG/1
AEROSOL, METERED RESPIRATORY (INHALATION)
Qty: 1 EACH | Refills: 3 | Status: SHIPPED | OUTPATIENT
Start: 2022-10-12

## 2022-10-12 NOTE — TELEPHONE ENCOUNTER
Pt is current. Pharmacy called to request refills of Ventolin HFA, qty 1 with 3 refills. Please sign if you agree.

## 2022-11-22 ENCOUNTER — OFFICE VISIT (OUTPATIENT)
Dept: PULMONOLOGY | Age: 67
End: 2022-11-22
Payer: MEDICARE

## 2022-11-22 VITALS
HEIGHT: 68 IN | RESPIRATION RATE: 16 BRPM | SYSTOLIC BLOOD PRESSURE: 128 MMHG | DIASTOLIC BLOOD PRESSURE: 77 MMHG | WEIGHT: 260 LBS | BODY MASS INDEX: 39.4 KG/M2 | OXYGEN SATURATION: 95 %

## 2022-11-22 DIAGNOSIS — J96.11 CHRONIC RESPIRATORY FAILURE WITH HYPOXIA (HCC): ICD-10-CM

## 2022-11-22 DIAGNOSIS — J44.9 CHRONIC OBSTRUCTIVE PULMONARY DISEASE, UNSPECIFIED COPD TYPE (HCC): Primary | ICD-10-CM

## 2022-11-22 DIAGNOSIS — J20.9 ACUTE BRONCHITIS, UNSPECIFIED ORGANISM: ICD-10-CM

## 2022-11-22 DIAGNOSIS — D83.9 CVID (COMMON VARIABLE IMMUNODEFICIENCY) (HCC): ICD-10-CM

## 2022-11-22 DIAGNOSIS — Z87.891 HISTORY OF SMOKING AT LEAST 1 PACK PER DAY FOR AT LEAST 30 YEARS: ICD-10-CM

## 2022-11-22 DIAGNOSIS — Z99.89 OSA ON CPAP: ICD-10-CM

## 2022-11-22 DIAGNOSIS — G47.33 OSA ON CPAP: ICD-10-CM

## 2022-11-22 DIAGNOSIS — J30.9 ALLERGIC RHINITIS, UNSPECIFIED SEASONALITY, UNSPECIFIED TRIGGER: ICD-10-CM

## 2022-11-22 DIAGNOSIS — J45.50 SEVERE PERSISTENT ASTHMA WITHOUT COMPLICATION: ICD-10-CM

## 2022-11-22 PROCEDURE — 99214 OFFICE O/P EST MOD 30 MIN: CPT | Performed by: INTERNAL MEDICINE

## 2022-11-22 PROCEDURE — 1123F ACP DISCUSS/DSCN MKR DOCD: CPT | Performed by: INTERNAL MEDICINE

## 2022-11-22 PROCEDURE — 3074F SYST BP LT 130 MM HG: CPT | Performed by: INTERNAL MEDICINE

## 2022-11-22 PROCEDURE — 3078F DIAST BP <80 MM HG: CPT | Performed by: INTERNAL MEDICINE

## 2022-11-22 RX ORDER — PREDNISONE 10 MG/1
TABLET ORAL
Qty: 30 TABLET | Refills: 0 | Status: SHIPPED | OUTPATIENT
Start: 2022-11-22

## 2022-11-22 RX ORDER — LEVOFLOXACIN 500 MG/1
500 TABLET, FILM COATED ORAL DAILY
Qty: 7 TABLET | Refills: 0 | Status: SHIPPED | OUTPATIENT
Start: 2022-11-22 | End: 2022-11-29

## 2022-11-22 NOTE — PROGRESS NOTES
cxr                                                          PULMONARY OUTPATIENT PROGRESS NOTE      Patient:  Charles Weaver  YOB: 1955    MRN: [id number]     Acct:        Pt seen and Chart reviewed. Mr/Ms Charles Weaver is here in followup for    Diagnosis Orders   1. Chronic obstructive pulmonary disease, unspecified COPD type ([facility]        2. Acute bronchitis, unspecified organism        3. Chronic respiratory failure with hypoxia (HCC)        4. Severe persistent asthma without complication        5. CVID (common variable immunodeficiency) ([facility]        6. PRECIOUS on CPAP        7. Allergic rhinitis, unspecified seasonality, unspecified trigger        8. History of smoking at least 1 pack per day for at least 30 years          He is for follow up of COPD, PRECIOUS, CVID, severe persistent asthma, chronic respiratory failure on home O2, CVID on IVIG. Since he was seen last time and especially with weather changes in the last month or so according to patient he has been having more symptoms of like flareups. He does have intermittent yellow sputum sometimes it is light yellow and sometimes it is darker yellow although he does not complain of increased cough does not complain of increased wheezing. In September he has symptoms of flareup/exacerbation when he use antibiotic and apparently 5 days of prednisone. According to patient currently he is almost to the baseline. Per patient he used to have saturation on room air when he take his oxygen off 95 at rest and more and he had noticed that saturation is 93-95 when he take his oxygen off and walk from bathroom to back. He also get more fluid when he gets IV IgG and according to patient he diuresed after that in some time gained weight. Last time he had used nebulizer which he usually during flareup was in September. He is compliant with medication using Symbicort and he is on a Spiriva use albuterol as needed denies daily use of albuterol.   He denies nocturnal awakening with cough wheezing chest tightness or shortness of breath. He does have dyspnea on exertional activity he is not very active outside especially in winter he denies shortness of breath or change while he is in regular activities. He denies daily or persistent wheezing except wheezing sometimes. He is using oxygen 2 L most of the time and also use oxygen bleed into CPAP at night. He denies increasing postnasal drip nasal congestion epistaxis or nasal obstruction. Denies significant reflux symptoms are controlled on PPI. He is very compliant with CPAP compliance data last time this year was 100% compliance. He could benefit from CPAP therapy. He is sleep is usually refreshing. He does not usually take nap during the daytime. He does not usually doze off except occasionally. According to patient he is CPAP is not working properly anymore CPAP is more than 11years old actually 8 years anything that he needs new CPAP machine he also need prescription for CPAP supplies. Last compliance data is available for 90 days from 01/16/2022 to 04/15/2022 compliance is 99% average usage is 8 hours 25 minutes on CPAP of 12 cm with average AHI of 0.6. His last low-dose CT screening done in February 2020 and in November it was requested to be done in February 2021 but it was not done apparently he had to stop smoking in 2004 he did not fit the criteria for low-dose screening. Last chest x-ray was 10/19/2021 we did not show any infiltrate effusion or acute changes. Hyperinflation and flattened diaphragm are present    Last hospitalization in January and February 2019  He was admitted to hospital twice for COPD/asthma exacerbation initially in second or third week of January 2019 when he was treated with COPD exhibition with bronchodilators and steroids and Levaquin, his chest x-ray that time did not show any infiltrate he was discharged on tapered dose of prednisone and Levaquin.   He was admitted again in early February 2019 for almost 2 weeks later and at that time he was finished with steroids and Levaquin, his flu a was positive this time he was treated with Tamiflu, he had a chest x-ray done which did not show infiltrates or evidence of pneumonia, he had a CT scan of the chest done which shows minimal residual or dependent atelectasis and/or pneumonia or infiltrate was seen this time he was again treated with Levaquin and prednisone and was discharged home.       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, he c/o pains in legs and joints a week prior to infusion and resolve usually the next day after infusion, he is tolerating it well and followed with allergist/ immunologist and recent Igg was > 800 and getting infusion every 3 weeks   H/O Severe persistent Asthma and COPD with h/o smoking for about > 30 years with h/o frequent exacerbations of Asthmatic Bronchitis  He is on home O2 and use O2 at night with cpap and also during daytime most of the time, He has PRECIOUS and very complaint with cpap at 12 cm and denies any problems with cpap since seen last time  With h/o all rhinitis and gerd and h/o cough denies ch cough, allergy symptoms are better since he stop mowing his lawn       Subjective:   Review of Systems -   General ROS: Negative for fatigue, negative for  - , chills, fever, night sweats or weight loss  ENT ROS: Negative for hoarseness and sore throat negative for - nasal congestion, postnasal dripping, sinus pain and no sneezing or epistaxis  Allergy and Immunology ROS: Negative for - nasal congestion and seasonal allergies  Respiratory ROS:  Positive dry cough, sputum production,, positive shortness of breath on activities, negative for wheezing, no chest pain or hemoptysis  Cardiovascular ROS: positive for - dyspnea on exertion,  negative for chest pain, orthopnea, PND, pedal edema. Gastrointestinal ROS: no abdominal pain,  nausea, vomiting, diarrhea, change in bowel habits, hematochezia, melena. Musculoskeletal ROS: Negative for joints pain, negative  for - joint stiffness and swelling and muscle pain  CNS: negative for weakness, dizziness, diplopia, syncope, seizure, headache, tingling, dysphagia. Hem/Onc: negative for bleeding and bruisingand petechia  Skin: negative for rash and skin lesions  : negative for hematuria, dysuria nocturia and frequency. Sleep Medicine 4/15/2022 3/16/2021 7/29/2020 2/25/2020 3/20/2019 3/28/2018 12/6/2016   Sitting and reading 1 2 0 1 3 0 3   Watching TV 0 0 0 0 1 0 0   Sitting, inactive in a public place (e.g. a theatre or a meeting) 0 0 0 0 1 0 0   As a passenger in a car for an hour without a break 0 1 0 0 0 0 2   Lying down to rest in the afternoon when circumstances permit 3 3 3 3 3 2 3   Sitting and talking to someone 0 0 0 0 0 0 0   Sitting quietly after a lunch without alcohol 2 3 3 3 3 0 3   In a car, while stopped for a few minutes in traffic 0 0 0 0 0 0 0   [place] Sleepiness Score 6 9 6 7 11 2 11       Allergies: Allergies   Allergen Reactions    Adhesive Tape Other (See Comments)     Severe reaction to a bandage used with hip replacement.     Other Rash     Alcohol prep pad       Medications:  Outpatient Encounter Medications as of 11/22/2022   Medication Sig Dispense Refill    albuterol sulfate HFA (VENTOLIN HFA) 108 (90 Base) MCG/ACT inhaler inhale 2 puffs by mouth and INTO THE LUNGS every 6 hours if needed for wheezing 1 each 3    montelukast (SINGULAIR) 10 MG tablet take 1 tablet by mouth every evening 90 tablet 2    predniSONE (DELTASONE) 10 MG tablet 4 tablet once daily for 3 days then 3 tablet once daily for 3 days then 2 tablet once daily for 3 days then 1 tablet once daily for 3 days then discontinue 36 tablet 0    albuterol (PROVENTIL) (2.5 MG/3ML) 0.083% nebulizer solution Take 3 mLs by nebulization every 4 hours as needed for Wheezing or Shortness of Breath 120 each 11    fluticasone (FLONASE) 50 MCG/ACT nasal spray instill 2 sprays into each nostril once daily 3 each 1    budesonide-formoterol (SYMBICORT) 160-4.5 MCG/ACT AERO inhale 2 puffs by mouth twice a day Rinse mouth after use 3 each 1    SPIRIVA HANDIHALER 18 MCG inhalation capsule inhale the contents of one capsule in the handihaler once daily 90 capsule 1    predniSONE (DELTASONE) 10 MG tablet 4 tab once daily for 3 days then 3 tab once daily for 3 days then 2 tab once daily for 3 days and 1 tab once daily for 3 days 30 tablet 0    acetaminophen (TYLENOL) 500 MG tablet Take 1,000 mg by mouth every 6 hours as needed for Pain      docusate sodium (COLACE) 100 MG capsule Take 1 capsule by mouth 2 times daily as needed for Constipation 60 capsule 0    fluticasone-salmeterol (ADVAIR DISKUS) 500-50 MCG/DOSE diskus inhaler Inhale 1 puff into the lungs 2 times daily 60 each 11    omeprazole (PRILOSEC) 20 MG delayed release capsule take 1 capsule by mouth once daily 60 capsule 3    amLODIPine (NORVASC) 10 MG tablet take 1 tablet by mouth once daily 60 tablet 3    Handicap Placard MISC by Does not apply route For 5 years 1 each 0    traMADol (ULTRAM) 50 MG tablet Take 50 mg by mouth as needed for Pain. predniSONE (DELTASONE) 10 MG tablet Start at 50 mg and taper 10 mg every 3 days 45 tablet 0    ALPRAZolam (XANAX) 0.5 MG tablet Take 0.5 mg by mouth 3 times daily as needed for Sleep. .      benazepril (LOTENSIN) 10 MG tablet take 1 tablet by mouth once daily (Patient taking differently: No sig reported) 30 tablet 5    Immune Globulin, Human, 10 GM/100ML SOLN IV solution Infuse 60 g intravenously every 21 days        No facility-administered encounter medications on file as of 11/22/2022.          Objective:    Physical Exam:  Vitals:   /77 (Site: Left Upper Arm, Position: Sitting, Cuff Size: Medium Adult)   Resp 16   Ht 5' 8\" (1.727 m) Wt 260 lb (117.9 kg)   SpO2 95% Comment: on room air  BMI 39.53 kg/m²   Last 3 weights: Wt Readings from Last 3 Encounters:   11/22/22 260 lb (117.9 kg)   04/15/22 262 lb (118.8 kg)   01/04/22 259 lb (117.5 kg)     Body mass index is 39.53 kg/m². Physical Examination:   General appearance - alert, over weight not tachypnic and in no distress  Mental status - alert, oriented to person, place, and time  Eyes - pupils equal and reactive, extraocular eye movements intact  Nose - normal and patent, no erythema, , positive pale and edematous nasal mucosa, no purulent secretion. Mouth - mucous membranes moist, pharynx normal without lesions, large tongue, thick uvula, small oropharynx, Mallampati 1   Neck - supple, no significant adenopathy, very short and thick  Chest - No distress is noted at rest, no cyanosis no accessory muscles use. Bilateral symmetrical chest movements, distant breath sounds bilaterally,  fair air entry with prolonged expiration, no expiratory wheezing no crackles and no rhonchi.   Heart - normal rate, regular rhythm, normal S1, S2, no murmurs, rubs, clicks or gallops  Abdomen - soft, nontender, nondistended, no masses or organomegaly  Neurological - alert, oriented, normal speech, no focal findings or movement disorder noted  Extremities - peripheral pulses normal, no pedal edema, no clubbing or cyanosis  Skin - normal coloration and turgor, no rashes, no suspicious skin lesions noted       Labs:  None    CBC:   WBC   Date Value Ref Range Status   09/15/2022 6.3 3.5 - 11.3 k/uL Final     Hemoglobin   Date Value Ref Range Status   09/15/2022 13.4 13.0 - 17.0 g/dL Final     Platelet Count   Date Value Ref Range Status   03/01/2012 202 140 - 450 k/uL Final     Platelets   Date Value Ref Range Status   09/15/2022 218 138 - 453 k/uL Final     BMP:   Sodium   Date Value Ref Range Status   04/15/2021 137 135 - 144 mmol/L Final     Potassium   Date Value Ref Range Status   04/15/2021 4.4 3.7 - 5.3 mmol/L Final     Chloride   Date Value Ref Range Status   04/15/2021 102 98 - 107 mmol/L Final     CO2   Date Value Ref Range Status   04/15/2021 25 20 - 31 mmol/L Final     BUN   Date Value Ref Range Status   04/15/2021 12 8 - 23 mg/dL Final     Creatinine   Date Value Ref Range Status   09/15/2022 0.77 0.70 - 1.20 mg/dL Final   06/27/2022 0.81 0.70 - 1.20 mg/dL Final   04/04/2022 0.89 0.70 - 1.20 mg/dL Final     Glucose   Date Value Ref Range Status   04/15/2021 102 (H) 70 - 99 mg/dL Final   06/04/2012 86 74 - 106 mg/dL Final     Comment:     [facility] [address] [facility] 3 [phone]     S. Calcium:   Calcium   Date Value Ref Range Status   04/15/2021 9.6 8.6 - 10.4 mg/dL Final     S. Ionized Calcium: No results found for: IONCA  S. Magnesium:   Magnesium   Date Value Ref Range Status   06/12/2014 2.7 (H) 1.6 - 2.6 mg/dL Final     Comment:     [facility] [address] 3 [phone]     S. Phosphorus:   Phosphorus   Date Value Ref Range Status   01/08/2013 3.4 2.5 - 4.5 mg/dL Final     Comment:     [address] 3 [phone]     S. Glucose:   POC Glucose   Date Value Ref Range Status   04/29/2021 140 (H) 75 - 110 mg/dL Final   04/29/2021 115 (H) 75 - 110 mg/dL Final   04/28/2021 139 (H) 75 - 110 mg/dL Final     Glycosylated hemoglobin A1C:   Hemoglobin A1C   Date Value Ref Range Status   04/28/2021 5.4 4.0 - 6.0 % Final       Pulmonary Functions Testing Results:  07/31/2019  Spirometry shows FVC is 3.14 79% predicted. FEV1 is 2.60 82% predicted. FEV1 FVC ratio is normal.  Lung volume shows residual volume is 2.93 130% predicted consistent with hyperinflation and airway trapping. Diffusion capacity is 21.24 81% predicted which was within normal limit.     9/6/2016:  FEV1 2.78 86%, FVC 3.64 90%, [id number] 95% , TLC 7.02  114%, DLCO 20.32  77%  8/26/2015: FEV1 2.30 69%, FVC 3.00 64%, [id number], TLC 6.64  95%, DLCO 21.54 79%    Blood Gases: No results found for: PH, PCO2, PO2, HCO3, O2SAT    Radiological reports:    CXR    CT Scans     CT lung screening.  02/19/2020  Mediastinum:  Suboptimal evaluation due to low dose technique. Right-sided   port is in place. Thoracic aorta demonstrates mild calcification without   aneurysm. Pulmonary trunk appears nondilated. Heart appears normal in size   without pericardial effusion. No lymphadenopathy. The esophagus is grossly   unremarkable. Lungs/Pleura:  Emphysematous changes. No focal consolidation, pneumothorax   or pleural effusion. Trachea and distal airways appear patent. No   suspicious noncalcified lung nodule or mass. 02/05/19  Basilar subsegmental atelectasis without evidence for consolidation. Emphysema and sequela of old granulomatous disease. Return to low-dose chest   CT screening schedule is recommended, if clinically appropriate. CT Scan Low dose     9/19/17  *Stable 5 mm nodular thickening along the minor fissure. No new or enlarging   nodule. *Paraseptal emphysema. 9/27/16  Mild apical paraseptal emphysematous changes, more on the right. Dependent/atelectatic findings posteriorly toward the bases. Unchanged 5 mm nodule contiguous with the medial minor fissure. EKG:     ECHO:     7/24/17  Left ventricle is normal in size  Global left ventricular systolic function is normal Estimated ejection  fraction is 65 % . No significant valvular regurgitation or stenosis seen. 11/8/13  Technically difficult study. No gross segmental wall motion abnormality. Estimated ejection fraction is 55 %. Mild left ventricular hypertrophy. Evidence of diastolic dysfunction. Left atrium is mildly dilated. No significant valvular regurgitation or stenosis seen. Trivial anterior pericardial effusion  Right Atrium  Right atrium is normal in size. Right Ventricle  Normal right ventricular size and function.     6 minutes walk test 03/20/19  Total distance walk 750 feet 48% predicted. Room air oxygen was 88% on walking slow to 250 feet started on 2 L nasal cannula and O2 saturation 88% after walking 250 feet and increased to 3 L and his saturation went to 95% with walking to 250 feet. Compliance data from CPAP  11/27/2019 to 02/24/2020  Compliance 100%  Average usage of 8 hours 16 minutes  Average AHI 0.3    Assessment:      1. Chronic obstructive pulmonary disease, unspecified COPD type ([facility]   2. Severe persistent asthma without complication   3. CVID (common variable immunodeficiency) ([facility]   4. Allergic rhinitis, unspecified seasonality, unspecified trigger   5. PRECIOUS on CPAP   6. Chronic respiratory failure with hypoxia ([facility]         Plan: Will get chest x-ray PA lateral view as he having slightly more symptoms in last 4 to 6 weeks. Does not look like that he has exacerbation currently. Will order pulmonary function test on next visit   Prednisone taper dose to keep  Levofloxacin to keep in case of acute purulent bronchitis  Albuterol aerosol to use every 4-6 hours as needed  Continue Symbicort 160/4.5 space 2 puff twice daily  Continue spiriva once daily. Continue Flonase. Continue Singulair. Reflux precautions and continue PPI  Follow up Allergist and immunologist for CVID/ IVIG. Had flu vaccine last year. Annual flu vaccine recommended in fall. He had flu vaccine this year. He had COVID-vaccine and to booster he will discuss with his immunologist about the timing of the further variant booster  Up to date with vaccinations from pulm perspective and recommendations per allergist for vaccination  Maintain an active lifestyle. Supplemental O2 to continue at 2 L, he is benefiting from oxygen therapy and advised to continue with oxygen   He does not qualify for low-dose screening CT anymore as he had stopped smoking in 2004    CPAP at 12 cm to continue. Prescription for new CPAP machine.   Prescription for CPAP mask and supplies  CPAP to be used at least 4 hours every night  Wt loss is recommended and discussed  Follow good sleep hygeine instructions  Use humidifier  Questions answered pertaining to diagnosis and management explained importance of compliance with therapy. Last compliance data reviewed and pt is compliant per insurance requirements. Follow-up in office in 4 months    Please note that this chart was generated using voice recognition Dragon dictation software. Although every effort was made to ensure the accuracy of this automated transcription, some errors in transcription may have occurred.     Sally Cobb MD, MD             11/22/2022, 12:02 PM

## 2022-11-25 ENCOUNTER — HOSPITAL ENCOUNTER (OUTPATIENT)
Dept: GENERAL RADIOLOGY | Age: 67
Discharge: HOME | End: 2022-11-27
Payer: MEDICARE

## 2022-11-25 ENCOUNTER — HOSPITAL ENCOUNTER (OUTPATIENT)
Age: 67
Discharge: HOME | End: 2022-11-27
Payer: MEDICARE

## 2022-11-25 DIAGNOSIS — J44.9 CHRONIC OBSTRUCTIVE PULMONARY DISEASE, UNSPECIFIED COPD TYPE (HCC): ICD-10-CM

## 2022-11-25 DIAGNOSIS — J45.50 SEVERE PERSISTENT ASTHMA WITHOUT COMPLICATION: ICD-10-CM

## 2022-11-25 PROCEDURE — 71046 X-RAY EXAM CHEST 2 VIEWS: CPT

## 2022-11-25 NOTE — PATIENT INSTRUCTIONS
11/25/22-Faxed bobby, ins card, order w/settings and last dictation to [facility] #[phone] (was not able to fine ANY of his sleep studies in the chart. IF they need them, I'll research further to obtain).  Kvng Schmitt

## 2022-12-09 ENCOUNTER — TELEPHONE (OUTPATIENT)
Dept: PULMONOLOGY | Age: 67
End: 2022-12-09

## 2022-12-09 NOTE — TELEPHONE ENCOUNTER
Pt has a new [employer] company and none of his previous studies are found in his chart. Call the sleep center  #[phone] and left a message for Anirudh Leiva to call or fax them to me.

## 2022-12-12 ENCOUNTER — HOSPITAL ENCOUNTER (OUTPATIENT)
Age: 67
Setting detail: SPECIMEN
Discharge: HOME | End: 2022-12-12
Payer: MEDICARE

## 2022-12-12 LAB
ABSOLUTE EOS #: 0.24 K/UL (ref 0–0.44)
ABSOLUTE IMMATURE GRANULOCYTE: 0.04 K/UL (ref 0–0.3)
ABSOLUTE LYMPH #: 1.57 K/UL (ref 1.1–3.7)
ABSOLUTE MONO #: 0.65 K/UL (ref 0.1–1.2)
ALT SERPL-CCNC: 25 U/L (ref 5–41)
BASOPHILS # BLD: 1 % (ref 0–2)
BASOPHILS ABSOLUTE: 0.05 K/UL (ref 0–0.2)
CREAT SERPL-MCNC: 0.8 MG/DL (ref 0.7–1.2)
EOSINOPHILS RELATIVE PERCENT: 3 % (ref 1–4)
GFR SERPL CREATININE-BSD FRML MDRD: >60 ML/MIN/1.73M2
HCT VFR BLD CALC: 42.4 % (ref 40.7–50.3)
HEMOGLOBIN: 14.1 G/DL (ref 13–17)
IGG: 856 MG/DL (ref 700–1600)
IMMATURE GRANULOCYTES: 1 %
LYMPHOCYTES # BLD: 19 % (ref 24–43)
MCH RBC QN AUTO: 28.8 PG (ref 25.2–33.5)
MCHC RBC AUTO-ENTMCNC: 33.3 G/DL (ref 28.4–34.8)
MCV RBC AUTO: 86.7 FL (ref 82.6–102.9)
MONOCYTES # BLD: 8 % (ref 3–12)
NRBC AUTOMATED: 0 PER 100 WBC
PDW BLD-RTO: 12.8 % (ref 11.8–14.4)
PLATELET # BLD: 225 K/UL (ref 138–453)
PMV BLD AUTO: 10.9 FL (ref 8.1–13.5)
RBC # BLD: 4.89 M/UL (ref 4.21–5.77)
SEG NEUTROPHILS: 68 % (ref 36–65)
SEGMENTED NEUTROPHILS ABSOLUTE COUNT: 5.92 K/UL (ref 1.5–8.1)
WBC # BLD: 8.5 K/UL (ref 3.5–11.3)

## 2022-12-12 PROCEDURE — 85025 COMPLETE CBC W/AUTO DIFF WBC: CPT

## 2022-12-12 PROCEDURE — 82784 ASSAY IGA/IGD/IGG/IGM EACH: CPT

## 2022-12-12 PROCEDURE — 82565 ASSAY OF CREATININE: CPT

## 2022-12-12 PROCEDURE — 84460 ALANINE AMINO (ALT) (SGPT): CPT

## 2022-12-15 ENCOUNTER — TELEPHONE (OUTPATIENT)
Dept: PULMONOLOGY | Age: 67
End: 2022-12-15

## 2022-12-15 RX ORDER — PREDNISONE 20 MG/1
40 TABLET ORAL DAILY
Qty: 10 TABLET | Refills: 0 | Status: SHIPPED | OUTPATIENT
Start: 2022-12-15 | End: 2022-12-20

## 2022-12-15 RX ORDER — LEVOFLOXACIN 500 MG/1
500 TABLET, FILM COATED ORAL DAILY
Qty: 7 TABLET | Refills: 0 | Status: SHIPPED | OUTPATIENT
Start: 2022-12-15 | End: 2022-12-22

## 2022-12-15 NOTE — TELEPHONE ENCOUNTER
Pt seen 11/22/22 got Covid shortly after [facility] asking if you have any \"tricks up your sleeve\" to help his cough. He is using his nebulizer, but he is still winded- wheezing   Couging white phlegm. Wants to know how long this cough will last   Is requesting an AB to have on hand, and asking if prednisone would help. Advised him to continue his Nebulizer and there is no way to give him a date as far as when he will stop coughing.

## 2023-01-04 ENCOUNTER — TELEPHONE (OUTPATIENT)
Dept: PULMONOLOGY | Age: 68
End: 2023-01-04

## 2023-01-04 DIAGNOSIS — J44.9 CHRONIC OBSTRUCTIVE PULMONARY DISEASE, UNSPECIFIED COPD TYPE (HCC): Primary | ICD-10-CM

## 2023-02-21 RX ORDER — MONTELUKAST SODIUM 10 MG/1
TABLET ORAL
Qty: 90 TABLET | Refills: 2 | Status: SHIPPED | OUTPATIENT
Start: 2023-02-21

## 2023-02-21 NOTE — TELEPHONE ENCOUNTER
LAST VISIT: 11/22/22  NEXT VISIT: 3/22/23    Per last dictation patient is to continue this medication. Please sign for refill if ok. Thank you.

## 2023-03-06 ENCOUNTER — HOSPITAL ENCOUNTER (OUTPATIENT)
Age: 68
Setting detail: SPECIMEN
Discharge: HOME | End: 2023-03-06
Payer: MEDICARE

## 2023-03-06 LAB
ABSOLUTE EOS #: 0.11 K/UL (ref 0–0.44)
ABSOLUTE IMMATURE GRANULOCYTE: 0.08 K/UL (ref 0–0.3)
ABSOLUTE LYMPH #: 1.6 K/UL (ref 1.1–3.7)
ABSOLUTE MONO #: 0.47 K/UL (ref 0.1–1.2)
ALT SERPL-CCNC: 25 U/L (ref 5–41)
BASOPHILS # BLD: 1 % (ref 0–2)
BASOPHILS ABSOLUTE: 0.05 K/UL (ref 0–0.2)
CREAT SERPL-MCNC: 0.85 MG/DL (ref 0.7–1.2)
EOSINOPHILS RELATIVE PERCENT: 2 % (ref 1–4)
GFR SERPL CREATININE-BSD FRML MDRD: >60 ML/MIN/1.73M2
HCT VFR BLD AUTO: 42.8 % (ref 40.7–50.3)
HGB BLD-MCNC: 14.1 G/DL (ref 13–17)
IGG: 946 MG/DL (ref 700–1600)
IMMATURE GRANULOCYTES: 1 %
LYMPHOCYTES # BLD: 22 % (ref 24–43)
MCH RBC QN AUTO: 29.1 PG (ref 25.2–33.5)
MCHC RBC AUTO-ENTMCNC: 32.9 G/DL (ref 28.4–34.8)
MCV RBC AUTO: 88.4 FL (ref 82.6–102.9)
MONOCYTES # BLD: 6 % (ref 3–12)
NRBC AUTOMATED: 0 PER 100 WBC
PDW BLD-RTO: 13.2 % (ref 11.8–14.4)
PLATELET # BLD AUTO: 208 K/UL (ref 138–453)
PMV BLD AUTO: 11.7 FL (ref 8.1–13.5)
RBC # BLD: 4.84 M/UL (ref 4.21–5.77)
SEG NEUTROPHILS: 68 % (ref 36–65)
SEGMENTED NEUTROPHILS ABSOLUTE COUNT: 5.11 K/UL (ref 1.5–8.1)
WBC # BLD AUTO: 7.4 K/UL (ref 3.5–11.3)

## 2023-03-06 PROCEDURE — 85025 COMPLETE CBC W/AUTO DIFF WBC: CPT

## 2023-03-06 PROCEDURE — 82784 ASSAY IGA/IGD/IGG/IGM EACH: CPT

## 2023-03-06 PROCEDURE — 82565 ASSAY OF CREATININE: CPT

## 2023-03-06 PROCEDURE — 84460 ALANINE AMINO (ALT) (SGPT): CPT

## 2023-03-22 ENCOUNTER — OFFICE VISIT (OUTPATIENT)
Dept: PULMONOLOGY | Age: 68
End: 2023-03-22
Payer: MEDICARE

## 2023-03-22 VITALS
SYSTOLIC BLOOD PRESSURE: 136 MMHG | HEIGHT: 68 IN | BODY MASS INDEX: 38.34 KG/M2 | RESPIRATION RATE: 16 BRPM | OXYGEN SATURATION: 95 % | DIASTOLIC BLOOD PRESSURE: 86 MMHG | WEIGHT: 253 LBS | HEART RATE: 97 BPM

## 2023-03-22 DIAGNOSIS — J45.50 SEVERE PERSISTENT ASTHMA WITHOUT COMPLICATION: ICD-10-CM

## 2023-03-22 DIAGNOSIS — J96.11 CHRONIC RESPIRATORY FAILURE WITH HYPOXIA (HCC): ICD-10-CM

## 2023-03-22 DIAGNOSIS — J44.9 CHRONIC OBSTRUCTIVE PULMONARY DISEASE, UNSPECIFIED COPD TYPE (HCC): Primary | ICD-10-CM

## 2023-03-22 DIAGNOSIS — G47.33 OSA ON CPAP: ICD-10-CM

## 2023-03-22 DIAGNOSIS — Z87.891 HISTORY OF SMOKING AT LEAST 1 PACK PER DAY FOR AT LEAST 30 YEARS: ICD-10-CM

## 2023-03-22 DIAGNOSIS — J30.9 ALLERGIC RHINITIS, UNSPECIFIED SEASONALITY, UNSPECIFIED TRIGGER: ICD-10-CM

## 2023-03-22 DIAGNOSIS — Z99.89 OSA ON CPAP: ICD-10-CM

## 2023-03-22 DIAGNOSIS — D83.9 CVID (COMMON VARIABLE IMMUNODEFICIENCY) (HCC): ICD-10-CM

## 2023-03-22 PROCEDURE — 3079F DIAST BP 80-89 MM HG: CPT | Performed by: INTERNAL MEDICINE

## 2023-03-22 PROCEDURE — 99214 OFFICE O/P EST MOD 30 MIN: CPT | Performed by: INTERNAL MEDICINE

## 2023-03-22 PROCEDURE — 94010 BREATHING CAPACITY TEST: CPT | Performed by: INTERNAL MEDICINE

## 2023-03-22 PROCEDURE — 3075F SYST BP GE 130 - 139MM HG: CPT | Performed by: INTERNAL MEDICINE

## 2023-03-22 PROCEDURE — 1123F ACP DISCUSS/DSCN MKR DOCD: CPT | Performed by: INTERNAL MEDICINE

## 2023-03-22 NOTE — PROGRESS NOTES
Patient had hard time performing PFT due to frequent cough
Pulmonary function test.  Date of study 03/22/2023. Spirometry was performed multiple attempts were done to check lung volume and DLCO and patient was not able to finish. Spirometry shows FEV1 is 2.32 75% predicted consistent with mild obstructive ventilatory impairment. FVC is 3.00 78% predicted which could be just above mild restrictive mental impairment and correlation with lung volumes recommended if available. FEV1/FVC ratio is consistent with mild obstruction. Lung volume measurement and diffusion capacity is recommended if clinically indicated. Impression:     This spirometry is consistent with mild obstructive ventilatory impairment there could be a mild concomitant restrictive mental impairment and lung volume measurement and diffusion capacity is recommended if clinically indicated gated
infiltrate was seen this time he was again treated with Levaquin and prednisone and was discharged home. 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, he c/o pains in legs and joints a week prior to infusion and resolve usually the next day after infusion, he is tolerating it well and followed with allergist/ immunologist and recent Igg was > 800 and getting infusion every 3 weeks   H/O Severe persistent Asthma and COPD with h/o smoking for about > 30 years with h/o frequent exacerbations of Asthmatic Bronchitis  He is on home O2 and use O2 at night with cpap and also during daytime most of the time, He has PRECIOUS and very complaint with cpap at 12 cm and denies any problems with cpap since seen last time  With h/o all rhinitis and gerd and h/o cough denies ch cough, allergy symptoms are better since he stop mowing his lawn       Subjective:   Review of Systems -   General ROS: Negative for fatigue, negative for  - , chills, fever, night sweats or weight loss  ENT ROS: Negative for hoarseness and sore throat negative for - nasal congestion, postnasal dripping, sinus pain and no sneezing or epistaxis  Allergy and Immunology ROS: Negative for - nasal congestion and seasonal allergies  Respiratory ROS:  Positive dry cough, sputum production, positive shortness of breath on activities, negative for wheezing, no chest pain or hemoptysis  Cardiovascular ROS: positive for - dyspnea on exertion,  negative for chest pain, orthopnea, PND, pedal edema. Gastrointestinal ROS: no abdominal pain,  nausea, vomiting, diarrhea, change in bowel habits, hematochezia, melena. Musculoskeletal ROS: Negative for joints pain, negative  for - joint stiffness and swelling and muscle pain  CNS: negative for weakness, dizziness, diplopia, syncope, seizure, headache, tingling, dysphagia.    Hem/Onc: negative for bleeding

## 2023-03-24 DIAGNOSIS — J44.9 CHRONIC OBSTRUCTIVE PULMONARY DISEASE, UNSPECIFIED COPD TYPE (HCC): ICD-10-CM

## 2023-04-05 RX ORDER — BUDESONIDE AND FORMOTEROL FUMARATE DIHYDRATE 160; 4.5 UG/1; UG/1
AEROSOL RESPIRATORY (INHALATION)
Qty: 3 EACH | Refills: 3 | Status: SHIPPED | OUTPATIENT
Start: 2023-04-05

## 2023-04-05 NOTE — TELEPHONE ENCOUNTER
LAST VISIT: 3/22/23  NEXT VISIT: 7/19/23    Per last dictation patient to continue this medication. Please sign for refill if ok. Thank you.

## 2023-06-01 ENCOUNTER — HOSPITAL ENCOUNTER (OUTPATIENT)
Age: 68
Setting detail: SPECIMEN
Discharge: HOME | End: 2023-06-01
Payer: MEDICARE

## 2023-06-01 LAB
ALT SERPL-CCNC: 23 U/L (ref 5–41)
BASOPHILS # BLD: 0.05 K/UL (ref 0–0.2)
BASOPHILS NFR BLD: 1 % (ref 0–2)
CREAT SERPL-MCNC: 0.8 MG/DL (ref 0.7–1.2)
EOSINOPHIL # BLD: 0.09 K/UL (ref 0–0.44)
EOSINOPHILS RELATIVE PERCENT: 1 % (ref 1–4)
ERYTHROCYTE [DISTWIDTH] IN BLOOD BY AUTOMATED COUNT: 13.2 % (ref 11.8–14.4)
GFR SERPL CREATININE-BSD FRML MDRD: >60 ML/MIN/1.73M2
HCT VFR BLD AUTO: 43.8 % (ref 40.7–50.3)
HGB BLD-MCNC: 14.2 G/DL (ref 13–17)
IGG SERPL-MCNC: 888 MG/DL (ref 700–1600)
IMM GRANULOCYTES # BLD AUTO: 0.02 K/UL (ref 0–0.3)
IMM GRANULOCYTES NFR BLD: 0 %
LYMPHOCYTES # BLD: 27 % (ref 24–43)
LYMPHOCYTES NFR BLD: 1.77 K/UL (ref 1.1–3.7)
MCH RBC QN AUTO: 28.5 PG (ref 25.2–33.5)
MCHC RBC AUTO-ENTMCNC: 32.4 G/DL (ref 28.4–34.8)
MCV RBC AUTO: 87.8 FL (ref 82.6–102.9)
MONOCYTES NFR BLD: 0.56 K/UL (ref 0.1–1.2)
MONOCYTES NFR BLD: 8 % (ref 3–12)
NEUTROPHILS NFR BLD: 63 % (ref 36–65)
NEUTS SEG NFR BLD: 4.18 K/UL (ref 1.5–8.1)
NRBC AUTOMATED: 0 PER 100 WBC
PLATELET # BLD AUTO: 226 K/UL (ref 138–453)
PMV BLD AUTO: 11.6 FL (ref 8.1–13.5)
RBC # BLD AUTO: 4.99 M/UL (ref 4.21–5.77)
WBC OTHER # BLD: 6.7 K/UL (ref 3.5–11.3)

## 2023-06-01 PROCEDURE — 82784 ASSAY IGA/IGD/IGG/IGM EACH: CPT

## 2023-06-01 PROCEDURE — 85027 COMPLETE CBC AUTOMATED: CPT

## 2023-06-01 PROCEDURE — 82565 ASSAY OF CREATININE: CPT

## 2023-06-01 PROCEDURE — 84460 ALANINE AMINO (ALT) (SGPT): CPT

## 2023-06-05 RX ORDER — FLUTICASONE PROPIONATE 50 MCG
SPRAY, SUSPENSION (ML) NASAL
Qty: 48 G | Refills: 3 | Status: SHIPPED | OUTPATIENT
Start: 2023-06-05

## 2023-06-05 NOTE — TELEPHONE ENCOUNTER
Received a refill request from [address] [place] [address] for flonase. Patient is current with us.   Please sign or make any necessary changes

## 2023-06-28 RX ORDER — TIOTROPIUM BROMIDE 18 UG/1
CAPSULE ORAL; RESPIRATORY (INHALATION)
Qty: 90 CAPSULE | Refills: 3 | Status: SHIPPED | OUTPATIENT
Start: 2023-06-28

## 2023-07-19 ENCOUNTER — OFFICE VISIT (OUTPATIENT)
Dept: PULMONOLOGY | Age: 68
End: 2023-07-19
Payer: MEDICARE

## 2023-07-19 VITALS
OXYGEN SATURATION: 97 % | HEIGHT: 68 IN | BODY MASS INDEX: 37.28 KG/M2 | RESPIRATION RATE: 12 BRPM | SYSTOLIC BLOOD PRESSURE: 119 MMHG | WEIGHT: 246 LBS | DIASTOLIC BLOOD PRESSURE: 72 MMHG | HEART RATE: 91 BPM

## 2023-07-19 DIAGNOSIS — J45.50 SEVERE PERSISTENT ASTHMA WITHOUT COMPLICATION: ICD-10-CM

## 2023-07-19 DIAGNOSIS — J30.9 ALLERGIC RHINITIS, UNSPECIFIED SEASONALITY, UNSPECIFIED TRIGGER: ICD-10-CM

## 2023-07-19 DIAGNOSIS — Z87.891 HISTORY OF SMOKING AT LEAST 1 PACK PER DAY FOR AT LEAST 30 YEARS: ICD-10-CM

## 2023-07-19 DIAGNOSIS — J44.9 CHRONIC OBSTRUCTIVE PULMONARY DISEASE, UNSPECIFIED COPD TYPE (HCC): Primary | ICD-10-CM

## 2023-07-19 DIAGNOSIS — J96.11 CHRONIC RESPIRATORY FAILURE WITH HYPOXIA (HCC): ICD-10-CM

## 2023-07-19 DIAGNOSIS — Z99.89 OSA ON CPAP: ICD-10-CM

## 2023-07-19 DIAGNOSIS — G47.33 OSA ON CPAP: ICD-10-CM

## 2023-07-19 PROCEDURE — 3078F DIAST BP <80 MM HG: CPT | Performed by: INTERNAL MEDICINE

## 2023-07-19 PROCEDURE — 99214 OFFICE O/P EST MOD 30 MIN: CPT | Performed by: INTERNAL MEDICINE

## 2023-07-19 PROCEDURE — 3074F SYST BP LT 130 MM HG: CPT | Performed by: INTERNAL MEDICINE

## 2023-07-19 PROCEDURE — 1123F ACP DISCUSS/DSCN MKR DOCD: CPT | Performed by: INTERNAL MEDICINE

## 2023-07-19 NOTE — PROGRESS NOTES
slow to 250 feet started on 2 L nasal cannula and O2 saturation 88% after walking 250 feet and increased to 3 L and his saturation went to 95% with walking to 250 feet. Compliance data from CPAP  11/27/2019 to 02/24/2020  Compliance 100%  Average usage of 8 hours 16 minutes  Average AHI 0.3    Assessment:      1. Chronic obstructive pulmonary disease, unspecified COPD type ([address])   2. Severe persistent asthma without complication   3. CVID (common variable immunodeficiency) ([address])   4. Allergic rhinitis, unspecified seasonality, unspecified trigger   5. PRECIOUS on CPAP   6. Chronic respiratory failure with hypoxia ([address])         Plan: We will order chest x-ray to be done this week. He had a chest x-ray done on 11/25/2022 which was negative for mass/consolidation, had hyperinflation present. Patient is getting work-up for weight loss including thyroid function studies by primary care physician. If continued weight loss or further weight loss then we will request CT scan of the chest at this time patient attributed weight loss to his better diet and activity. Spirometry results seen from March 2023 consistent with mild obstruction    Albuterol aerosol to use every 4-6 hours as needed  Continue Symbicort 160/4.5 space 2 puff twice daily  Continue spiriva once daily. Continue Flonase. Continue Singulair. Reflux precautions and continue PPI  Follow up Allergist and immunologist for CVID/ IVIG. Had flu vaccine last year. Annual flu vaccine recommended in fall. He had flu vaccine this year. He had COVID-vaccine and to booster he will discuss with his immunologist about the timing of the further variant booster  Up to date with vaccinations from pulm perspective and recommendations per allergist for vaccination  Maintain an active lifestyle.     Supplemental O2 to continue at 2 L, he is benefiting from oxygen therapy and advised to continue with oxygen   He does not qualify for low-dose screening CT anymore as he

## 2023-09-11 ENCOUNTER — HOSPITAL ENCOUNTER (OUTPATIENT)
Dept: GENERAL RADIOLOGY | Age: 68
Discharge: HOME | End: 2023-09-13
Payer: MEDICARE

## 2023-09-11 ENCOUNTER — HOSPITAL ENCOUNTER (OUTPATIENT)
Age: 68
Discharge: HOME | End: 2023-09-13
Payer: MEDICARE

## 2023-09-11 ENCOUNTER — HOSPITAL ENCOUNTER (OUTPATIENT)
Age: 68
Discharge: HOME | End: 2023-09-11
Payer: MEDICARE

## 2023-09-11 DIAGNOSIS — J44.9 CHRONIC OBSTRUCTIVE PULMONARY DISEASE, UNSPECIFIED COPD TYPE (HCC): ICD-10-CM

## 2023-09-11 LAB
T3FREE SERPL-MCNC: 3.21 PG/ML (ref 2.02–4.43)
T4 FREE SERPL-MCNC: 1.4 NG/DL (ref 0.9–1.7)
TSH SERPL DL<=0.05 MIU/L-ACNC: 1.01 UIU/ML (ref 0.3–5)

## 2023-09-11 PROCEDURE — 71046 X-RAY EXAM CHEST 2 VIEWS: CPT

## 2023-09-11 PROCEDURE — 84439 ASSAY OF FREE THYROXINE: CPT

## 2023-09-11 PROCEDURE — 84443 ASSAY THYROID STIM HORMONE: CPT

## 2023-09-11 PROCEDURE — 84481 FREE ASSAY (FT-3): CPT

## 2023-09-11 PROCEDURE — 36415 COLL VENOUS BLD VENIPUNCTURE: CPT

## 2023-09-21 ENCOUNTER — HOSPITAL ENCOUNTER (OUTPATIENT)
Age: 68
Setting detail: SPECIMEN
Discharge: HOME | End: 2023-09-21
Payer: MEDICARE

## 2023-09-21 LAB
BASOPHILS # BLD: 0.04 K/UL (ref 0–0.2)
BASOPHILS NFR BLD: 1 % (ref 0–2)
BUN SERPL-MCNC: 12 MG/DL (ref 8–23)
CREAT SERPL-MCNC: 0.8 MG/DL (ref 0.7–1.2)
EOSINOPHIL # BLD: 0.15 K/UL (ref 0–0.44)
EOSINOPHILS RELATIVE PERCENT: 3 % (ref 1–4)
ERYTHROCYTE [DISTWIDTH] IN BLOOD BY AUTOMATED COUNT: 13 % (ref 11.8–14.4)
GFR SERPL CREATININE-BSD FRML MDRD: >60 ML/MIN/1.73M2
HCT VFR BLD AUTO: 37.1 % (ref 40.7–50.3)
HGB BLD-MCNC: 12.6 G/DL (ref 13–17)
IGG SERPL-MCNC: 538 MG/DL (ref 700–1600)
IMM GRANULOCYTES # BLD AUTO: 0.04 K/UL (ref 0–0.3)
IMM GRANULOCYTES NFR BLD: 1 %
LYMPHOCYTES NFR BLD: 2.03 K/UL (ref 1.1–3.7)
LYMPHOCYTES RELATIVE PERCENT: 33 % (ref 24–43)
MCH RBC QN AUTO: 29.7 PG (ref 25.2–33.5)
MCHC RBC AUTO-ENTMCNC: 34 G/DL (ref 28.4–34.8)
MCV RBC AUTO: 87.5 FL (ref 82.6–102.9)
MONOCYTES NFR BLD: 0.47 K/UL (ref 0.1–1.2)
MONOCYTES NFR BLD: 8 % (ref 3–12)
NEUTROPHILS NFR BLD: 54 % (ref 36–65)
NEUTS SEG NFR BLD: 3.35 K/UL (ref 1.5–8.1)
NRBC BLD-RTO: 0 PER 100 WBC
PLATELET # BLD AUTO: 190 K/UL (ref 138–453)
PMV BLD AUTO: 10.8 FL (ref 8.1–13.5)
RBC # BLD AUTO: 4.24 M/UL (ref 4.21–5.77)
REASON FOR REJECTION: NORMAL
SPECIMEN SOURCE: NORMAL
WBC OTHER # BLD: 6.1 K/UL (ref 3.5–11.3)
ZZ NTE CLEAN UP: ORDERED TEST: NORMAL

## 2023-09-21 PROCEDURE — 84520 ASSAY OF UREA NITROGEN: CPT

## 2023-09-21 PROCEDURE — 82784 ASSAY IGA/IGD/IGG/IGM EACH: CPT

## 2023-09-21 PROCEDURE — 85025 COMPLETE CBC W/AUTO DIFF WBC: CPT

## 2023-09-21 PROCEDURE — 82565 ASSAY OF CREATININE: CPT

## 2023-09-28 ENCOUNTER — HOSPITAL ENCOUNTER (OUTPATIENT)
Age: 68
Discharge: HOME | End: 2023-09-28
Payer: MEDICARE

## 2023-09-28 LAB — IGG SERPL-MCNC: 1438 MG/DL (ref 700–1600)

## 2023-09-28 PROCEDURE — 82784 ASSAY IGA/IGD/IGG/IGM EACH: CPT

## 2023-09-28 PROCEDURE — 36415 COLL VENOUS BLD VENIPUNCTURE: CPT

## 2023-10-10 ENCOUNTER — HOSPITAL ENCOUNTER (OUTPATIENT)
Age: 68
Setting detail: SPECIMEN
Discharge: HOME | End: 2023-10-10
Payer: MEDICARE

## 2023-10-10 LAB — IGG SERPL-MCNC: 906 MG/DL (ref 700–1600)

## 2023-10-10 PROCEDURE — 82784 ASSAY IGA/IGD/IGG/IGM EACH: CPT

## 2023-10-10 PROCEDURE — 36415 COLL VENOUS BLD VENIPUNCTURE: CPT

## 2023-10-12 NOTE — PROGRESS NOTES
Genaro called stating that he received a message to schedule for pain management. He decided to go with a different pain management office that is close to his home. He is coming up to one of the offices to  the referral tomorrow.    Melinda Bowling arrives ambulatory alone  Using oxygen at 2l/nasal cannula  C/o generalized body aches all over  Rt chest port accessed per policy with #87 G 3/4 L Carlos needle   Good blood return noted   Pre hydration initiated and completed  See MAR for pre medications  922 ivig 60 G initiated at 40ml/hr x 20 minutes  No reaction noted  945 iv rate increased to 80ml/hr x 20 minutes   No reaction noted  1013 iv rate increased to 160ml/hr x 20 minutes  No reaction noted  1036 iv rate increased to 250ml/hr until completion of infusion  Vital signs taken every thirty minutes and charted on flow sheet  Post hydration completed  Carlos needle flushed and carlos needle removed with needle intact  Band aid applied  Discharged per ambulatory

## 2023-11-02 RX ORDER — ALBUTEROL SULFATE 2.5 MG/3ML
SOLUTION RESPIRATORY (INHALATION)
Qty: 360 ML | Refills: 3 | Status: SHIPPED | OUTPATIENT
Start: 2023-11-02

## 2023-11-02 NOTE — TELEPHONE ENCOUNTER
Pt is current - last filled 4/15/22 - f/u scheduled 11/28/23   Please sign pending Rx received from pharmacy.

## 2023-11-03 RX ORDER — ALBUTEROL SULFATE 90 UG/1
AEROSOL, METERED RESPIRATORY (INHALATION)
Qty: 8.5 G | OUTPATIENT
Start: 2023-11-03

## 2023-11-24 RX ORDER — MONTELUKAST SODIUM 10 MG/1
TABLET ORAL
Qty: 90 TABLET | Refills: 2 | Status: SHIPPED | OUTPATIENT
Start: 2023-11-24

## 2023-11-24 NOTE — TELEPHONE ENCOUNTER
Last Visit: 7/19/2023  Next Visit: 11/28/2023    Rx request for Montelukast to be sent to [facility] in [facility]. Please sign if you agree.

## 2023-11-28 ENCOUNTER — OFFICE VISIT (OUTPATIENT)
Dept: PULMONOLOGY | Age: 68
End: 2023-11-28
Payer: MEDICARE

## 2023-11-28 VITALS
RESPIRATION RATE: 16 BRPM | WEIGHT: 257 LBS | SYSTOLIC BLOOD PRESSURE: 136 MMHG | HEIGHT: 68 IN | BODY MASS INDEX: 38.95 KG/M2 | HEART RATE: 64 BPM | OXYGEN SATURATION: 98 % | DIASTOLIC BLOOD PRESSURE: 80 MMHG

## 2023-11-28 DIAGNOSIS — J44.9 CHRONIC OBSTRUCTIVE PULMONARY DISEASE, UNSPECIFIED COPD TYPE (HCC): Primary | ICD-10-CM

## 2023-11-28 DIAGNOSIS — D83.9 CVID (COMMON VARIABLE IMMUNODEFICIENCY) (HCC): ICD-10-CM

## 2023-11-28 DIAGNOSIS — J96.11 CHRONIC RESPIRATORY FAILURE WITH HYPOXIA (HCC): ICD-10-CM

## 2023-11-28 DIAGNOSIS — J45.50 SEVERE PERSISTENT ASTHMA WITHOUT COMPLICATION: ICD-10-CM

## 2023-11-28 DIAGNOSIS — Z87.891 HISTORY OF SMOKING AT LEAST 1 PACK PER DAY FOR AT LEAST 30 YEARS: ICD-10-CM

## 2023-11-28 DIAGNOSIS — J30.9 ALLERGIC RHINITIS, UNSPECIFIED SEASONALITY, UNSPECIFIED TRIGGER: ICD-10-CM

## 2023-11-28 DIAGNOSIS — G47.33 OSA ON CPAP: ICD-10-CM

## 2023-11-28 PROCEDURE — 99214 OFFICE O/P EST MOD 30 MIN: CPT | Performed by: INTERNAL MEDICINE

## 2023-11-28 PROCEDURE — 1123F ACP DISCUSS/DSCN MKR DOCD: CPT | Performed by: INTERNAL MEDICINE

## 2023-11-28 PROCEDURE — 3075F SYST BP GE 130 - 139MM HG: CPT | Performed by: INTERNAL MEDICINE

## 2023-11-28 PROCEDURE — 3079F DIAST BP 80-89 MM HG: CPT | Performed by: INTERNAL MEDICINE

## 2023-11-28 RX ORDER — UMECLIDINIUM 62.5 UG/1
1 AEROSOL, POWDER ORAL DAILY
Qty: 30 EACH | Refills: 5 | Status: SHIPPED | OUTPATIENT
Start: 2023-11-28

## 2023-11-28 RX ORDER — ALBUTEROL SULFATE 90 UG/1
2 AEROSOL, METERED RESPIRATORY (INHALATION) EVERY 6 HOURS PRN
Qty: 18 G | Refills: 11 | Status: SHIPPED | OUTPATIENT
Start: 2023-11-28

## 2023-11-28 NOTE — PROGRESS NOTES
cxr                                                          PULMONARY OUTPATIENT PROGRESS NOTE      Patient:  Marianne Chavez  YOB: 1955    MRN: [id number]     Acct:        Pt seen and Chart reviewed. Mr/Ms Marianne Chavez is here in followup for    Diagnosis Orders   1. Chronic obstructive pulmonary disease, unspecified COPD type ([address] [facility])        2. Chronic respiratory failure with hypoxia (HCC)        3. Severe persistent asthma without complication        4. PRECIOUS on CPAP        5. Allergic rhinitis, unspecified seasonality, unspecified trigger        6. History of smoking at least 1 pack per day for at least 30 years        7. CVID (common variable immunodeficiency) ([address])          He is for follow up of COPD, PRECIOUS, CVID, severe persistent asthma, chronic respiratory failure on home O2, CVID on IVIG. He has been followed by immunologist and he is getting IV IgG every 3 weeks. Since he was seen last time about 4 months ago he did not have exacerbation no steroids or antibiotic use. He does have intermittent cough he denies any change in the cough he does not complain of sputum production or change in color or volume of the sputum. He is using oxygen most of the time except when he is at rest and sitting he does not use oxygen but on any activity and at night he use oxygen. He is been out of his Spiriva as he was not able to afford Spiriva he is taking Symbicort and he is compliant with Symbicort since he is not taking his Spiriva he can feel that he has more shortness of breath on activities. He does not require albuterol some days he take more and some days he does not take albuterol he take albuterol aerosol as needed. He is on maximum therapy including for allergic rhinitis and postnasal drip he take Flonase does not complain of epistaxis denies purulent nasal discharge he is using Singulair once daily. He is on PPI for GERD and has been compliant with medication.   He denies nocturnal

## 2024-02-24 NOTE — FLOWSHEET NOTE
Situation:  Writer visited with Mr. Renyn Crockett in the treatment cubicle of the infusion clinic. Assessment:  Mr. Renny Crockett smiled and greeted writer. He reported that he was doing well. He responded to writer's questions with brief answers. He expressed hopes of not getting the flu, as he did last year. He laughed and agreed with writer's comments about a good day. When RN entered to provide care, he joked with RN. Intervention:  Writer provided supportive presence and explored Pt's coping and needs; offered words of encouragement and support. Outcome:  Mr. Renny Crockett acknowledged writer's words of encouragement and support. 01/22/20 1049   Encounter Summary   Services provided to: Patient   Referral/Consult From: [address] Family members; Children   Continue Visiting   (1/22/20)   Complexity of Encounter Low   Length of Encounter 15 minutes   Routine   Type Follow up   Assessment Approachable;Calm;Coping   Intervention Active listening;Explored feelings, thoughts, concerns;Explored coping resources;Sustaining presence/ Ministry of presence   Outcome Expressed gratitude;Coping     Electronically signed by Jovon Rushing, Oncology Outpatient [address], [address] [facility]  1/22/2020  10:50 AM Patient care and plan discussed and reviewed with Advanced Care Provider. Plan as outlined above edited by me to reflect our discussion. Patient care and plan discussed and reviewed with Advanced Care Provider. Plan as outlined above edited by me to reflect our discussion.

## 2024-02-28 ENCOUNTER — TELEPHONE (OUTPATIENT)
Dept: PULMONOLOGY | Age: 69
End: 2024-02-28

## 2024-02-28 RX ORDER — BUDESONIDE AND FORMOTEROL FUMARATE DIHYDRATE 160; 4.5 UG/1; UG/1
2 AEROSOL RESPIRATORY (INHALATION) 2 TIMES DAILY
Qty: 2 EACH | Refills: 0 | Status: SHIPPED | OUTPATIENT
Start: 2024-02-28 | End: 2024-03-29

## 2024-02-28 NOTE — TELEPHONE ENCOUNTER
Patient states that his insurance [employer] will no longer cover Symbicort but they will cover Breyna.     For right now he only wants a 1 month supply sent to his local pharmacy.     He will call back to have that and the rest of his scripts sent to [facility] for a 90 day fill.     Script is pending, please sign it.

## 2024-02-29 ENCOUNTER — TELEPHONE (OUTPATIENT)
Dept: PULMONOLOGY | Age: 69
End: 2024-02-29

## 2024-02-29 RX ORDER — PREDNISONE 10 MG/1
TABLET ORAL
Qty: 10 TABLET | Refills: 0 | Status: SHIPPED | OUTPATIENT
Start: 2024-02-29

## 2024-02-29 NOTE — TELEPHONE ENCOUNTER
Called Maddy back and informed. She also stated she will take him to get swabbed for the flu.  I asked she call us back after if patient does not feel better after taking the prednisone.

## 2024-02-29 NOTE — TELEPHONE ENCOUNTER
Patient's wife Maddy called and stated that patient has been coughing up \"slight yellow\" he has some wheezing. He is about out of prednisone but has Levaquin. She wants to know how to proceed. Please advise. Thanks

## 2024-03-01 NOTE — TELEPHONE ENCOUNTER
[facility] pharmacy called due to the script for prednisone is written for a tapered dose and and only had a quantity of 10.  According to script and note it is a tapered dosing and should be 30 tabs.

## 2024-03-08 ENCOUNTER — TELEPHONE (OUTPATIENT)
Dept: PULMONOLOGY | Age: 69
End: 2024-03-08

## 2024-03-08 RX ORDER — LEVOFLOXACIN 500 MG/1
500 TABLET, FILM COATED ORAL DAILY
Qty: 7 TABLET | Refills: 0 | Status: SHIPPED | OUTPATIENT
Start: 2024-03-08 | End: 2024-03-15

## 2024-03-08 RX ORDER — PREDNISONE 10 MG/1
TABLET ORAL
Qty: 30 TABLET | Refills: 0 | Status: SHIPPED | OUTPATIENT
Start: 2024-03-08

## 2024-03-08 RX ORDER — MONTELUKAST SODIUM 10 MG/1
10 TABLET ORAL NIGHTLY
Qty: 90 TABLET | Refills: 3 | Status: SHIPPED | OUTPATIENT
Start: 2024-03-08 | End: 2025-03-03

## 2024-03-08 RX ORDER — FLUTICASONE PROPIONATE 50 MCG
2 SPRAY, SUSPENSION (ML) NASAL DAILY
Qty: 3 EACH | Refills: 3 | Status: SHIPPED | OUTPATIENT
Start: 2024-03-08 | End: 2024-06-06

## 2024-03-08 RX ORDER — ALBUTEROL SULFATE 2.5 MG/3ML
2.5 SOLUTION RESPIRATORY (INHALATION) EVERY 4 HOURS PRN
Qty: 360 ML | Refills: 3 | Status: SHIPPED | OUTPATIENT
Start: 2024-03-08 | End: 2024-06-06

## 2024-03-08 RX ORDER — UMECLIDINIUM 62.5 UG/1
1 AEROSOL, POWDER ORAL DAILY
Qty: 3 EACH | Refills: 3 | Status: SHIPPED | OUTPATIENT
Start: 2024-03-08 | End: 2024-06-06

## 2024-03-08 RX ORDER — ALBUTEROL SULFATE 90 UG/1
AEROSOL, METERED RESPIRATORY (INHALATION)
Qty: 1 EACH | Refills: 3 | Status: SHIPPED | OUTPATIENT
Start: 2024-03-08

## 2024-03-08 RX ORDER — ALBUTEROL SULFATE 90 UG/1
2 AEROSOL, METERED RESPIRATORY (INHALATION) EVERY 6 HOURS PRN
Qty: 3 EACH | Refills: 3 | Status: SHIPPED | OUTPATIENT
Start: 2024-03-08 | End: 2024-06-06

## 2024-03-08 RX ORDER — BUDESONIDE AND FORMOTEROL FUMARATE DIHYDRATE 160; 4.5 UG/1; UG/1
2 AEROSOL RESPIRATORY (INHALATION) 2 TIMES DAILY
Qty: 3 EACH | Refills: 3 | Status: SHIPPED | OUTPATIENT
Start: 2024-03-08 | End: 2024-09-04

## 2024-03-08 RX ORDER — ALBUTEROL SULFATE 2.5 MG/3ML
SOLUTION RESPIRATORY (INHALATION)
Qty: 360 ML | Refills: 3 | Status: CANCELLED | OUTPATIENT
Start: 2024-03-08

## 2024-03-08 NOTE — TELEPHONE ENCOUNTER
Patient needs refills on all other meds [facility].I did pending orders. Thanks.

## 2024-04-10 ENCOUNTER — TELEPHONE (OUTPATIENT)
Dept: INFECTIOUS DISEASES | Age: 69
End: 2024-04-10

## 2024-04-10 DIAGNOSIS — J44.9 CHRONIC OBSTRUCTIVE PULMONARY DISEASE, UNSPECIFIED COPD TYPE (HCC): Primary | ICD-10-CM

## 2024-04-10 NOTE — TELEPHONE ENCOUNTER
Patient took his last Levaquin yesterday and is not feeling a whole lot better. Wife is asking for an order for a CXR to rule out pneumonia.  She also states he is out of his Levaquin and Prednisone that he keeps on hand. I pended orders if you agree please sign.  I advised wife that if patient starts to feel worse to go to ER. She understood.

## 2024-04-11 RX ORDER — PREDNISONE 10 MG/1
TABLET ORAL
Qty: 10 TABLET | Refills: 0 | Status: SHIPPED | OUTPATIENT
Start: 2024-04-11

## 2024-04-11 RX ORDER — LEVOFLOXACIN 500 MG/1
500 TABLET, FILM COATED ORAL DAILY
Qty: 10 TABLET | Refills: 0 | Status: SHIPPED | OUTPATIENT
Start: 2024-04-11 | End: 2024-04-21

## 2024-04-22 ENCOUNTER — TELEPHONE (OUTPATIENT)
Dept: PULMONOLOGY | Age: 69
End: 2024-04-22

## 2024-04-22 ENCOUNTER — HOSPITAL ENCOUNTER (OUTPATIENT)
Dept: GENERAL RADIOLOGY | Age: 69
Discharge: HOME | End: 2024-04-24
Payer: MEDICARE

## 2024-04-22 ENCOUNTER — HOSPITAL ENCOUNTER (OUTPATIENT)
Age: 69
Discharge: HOME | End: 2024-04-24
Payer: MEDICARE

## 2024-04-22 DIAGNOSIS — J44.9 CHRONIC OBSTRUCTIVE PULMONARY DISEASE, UNSPECIFIED COPD TYPE (HCC): ICD-10-CM

## 2024-04-22 PROCEDURE — 71046 X-RAY EXAM CHEST 2 VIEWS: CPT

## 2024-04-22 NOTE — TELEPHONE ENCOUNTER
Spoke to wife Maddy and let her know to be here at 10:30am 4-23-24 visit with Lavinia.  Maddy also understands that if symptoms get worse to go to ER.

## 2024-04-22 NOTE — TELEPHONE ENCOUNTER
Patient's wife called and last night he felt really sick. He slept in this AM and when he coughed up this Am it was the first time he coughed up pea green. He just does not feel well. The Levaquin does not seem to be working has some wheezing not a lot. Would you be OK with them getting in tomorrow for an appointment with Lavinia ? Just to try to figure out what is going on at this time? Please advise.

## 2024-04-23 ENCOUNTER — OFFICE VISIT (OUTPATIENT)
Dept: PULMONOLOGY | Age: 69
End: 2024-04-23
Payer: MEDICARE

## 2024-04-23 VITALS
BODY MASS INDEX: 39.71 KG/M2 | HEIGHT: 68 IN | WEIGHT: 262 LBS | HEART RATE: 69 BPM | RESPIRATION RATE: 16 BRPM | OXYGEN SATURATION: 97 % | DIASTOLIC BLOOD PRESSURE: 77 MMHG | TEMPERATURE: 97.1 F | SYSTOLIC BLOOD PRESSURE: 124 MMHG

## 2024-04-23 DIAGNOSIS — J44.9 CHRONIC OBSTRUCTIVE PULMONARY DISEASE, UNSPECIFIED COPD TYPE (HCC): Primary | ICD-10-CM

## 2024-04-23 DIAGNOSIS — D83.9 CVID (COMMON VARIABLE IMMUNODEFICIENCY) (HCC): ICD-10-CM

## 2024-04-23 DIAGNOSIS — G47.33 OSA ON CPAP: ICD-10-CM

## 2024-04-23 DIAGNOSIS — J30.9 ALLERGIC RHINITIS, UNSPECIFIED SEASONALITY, UNSPECIFIED TRIGGER: ICD-10-CM

## 2024-04-23 DIAGNOSIS — J96.11 CHRONIC RESPIRATORY FAILURE WITH HYPOXIA (HCC): ICD-10-CM

## 2024-04-23 PROCEDURE — 3074F SYST BP LT 130 MM HG: CPT | Performed by: NURSE PRACTITIONER

## 2024-04-23 PROCEDURE — 3078F DIAST BP <80 MM HG: CPT | Performed by: NURSE PRACTITIONER

## 2024-04-23 PROCEDURE — 99214 OFFICE O/P EST MOD 30 MIN: CPT | Performed by: NURSE PRACTITIONER

## 2024-04-23 PROCEDURE — 1123F ACP DISCUSS/DSCN MKR DOCD: CPT | Performed by: NURSE PRACTITIONER

## 2024-04-23 RX ORDER — PREDNISONE 10 MG/1
TABLET ORAL
Qty: 36 TABLET | Refills: 0 | Status: SHIPPED | OUTPATIENT
Start: 2024-04-23

## 2024-04-23 RX ORDER — LEVOFLOXACIN 750 MG/1
750 TABLET, FILM COATED ORAL DAILY
Qty: 5 TABLET | Refills: 0 | Status: SHIPPED | OUTPATIENT
Start: 2024-04-23 | End: 2024-04-28

## 2024-04-23 RX ORDER — CETIRIZINE HYDROCHLORIDE 10 MG/1
10 TABLET ORAL DAILY
Qty: 30 TABLET | Refills: 2 | Status: SHIPPED | OUTPATIENT
Start: 2024-04-23

## 2024-04-23 ASSESSMENT — ENCOUNTER SYMPTOMS
EYES NEGATIVE: 1
ALLERGIC/IMMUNOLOGIC NEGATIVE: 1
GASTROINTESTINAL NEGATIVE: 1

## 2024-04-23 NOTE — PROGRESS NOTES
Subjective:      Patient ID: Wilfred Collins is a 68 y.o. male.     Patient must see physician at next appointment    HPI  Patient here for sick call. Last seen in office per Dr. Lynch November 2023    Per last office note patient follows with immunology and is on IVIG.  For CVID.  Patient follows with allergy as well    Patient accompanied by his wife.  Wife provides the majority of the history.  Wife indicating that patient was sick for 2 weeks prior to 4/8/2024 and was on a course of Levaquin and steroids at that time.  Reports that he started feeling better on 4/8/2024 and was able to enjoy the Eclipse.  But then a few days later started to have relapse of symptoms with head congestion that then went to his chest.  States that he was using his nebulizer every 2-1/2 hours.  Patient endorses that he has a sore throat and chest discomfort with coughing.  He is endorsing that he is having more shortness of breath than his baseline and that his cough is productive of more colorful secretions.  Specifically endorsing that in the morning they are brighter in color with darker yellow and clear throughout the day to clear/milky in color.  He endorses yellow/dark green secretions in the last 2 days.  Denies any hemoptysis.  Continues on IVIG with last treatment done on 4/17/2024.  Wife states that labs were drawn at that time, not available for my review but when she spoke with the office at Dr. Nguyen's office that she was told that they were good.    Patient endorses head congestion, sinus congestion.  He is endorsing posterior molar pain and left sinus pain.  Patient is audibly sniffing to clear his nose and pulling drainage down the back of his throat.  He endorses a \"dry\" throat that is at times painful.    Lengthy discussion with patient and with his wife that I suspect his symptoms are related to his sinus drainage.  Lung sounds were clear on exam today diminished in bases but no adventitious breath sounds

## 2024-09-18 DIAGNOSIS — M25.511 RIGHT SHOULDER PAIN, UNSPECIFIED CHRONICITY: Primary | ICD-10-CM

## 2024-09-18 NOTE — PROGRESS NOTES
----- Message from Kasey Bantes-Behmke, PA-C sent at 9/18/2024 12:19 PM CDT -----  Patient noted during cardiology visit that she hid her head in July. As she noted symptoms and is not seeing neurology until end of November would recommend order for Head Ct w/o contrast, dx headache       ----- Message -----  From: Miguelina Golden MD  Sent: 9/18/2024   9:21 AM CDT  To: Kasey Lynn Bantes-Behmke, PA-C    Patient had syncopal episode back in July of this year and questioning if she needs head CT scan.   Pt here for IVIG. Tolerated very well. Denies any problems. Labs drawn and hydration given pre and post.  Will return 6/7 for treatment.

## 2024-09-19 ENCOUNTER — OFFICE VISIT (OUTPATIENT)
Dept: ORTHOPEDIC SURGERY | Age: 69
End: 2024-09-19
Payer: MEDICARE

## 2024-09-19 VITALS — RESPIRATION RATE: 15 BRPM | OXYGEN SATURATION: 98 % | HEIGHT: 68 IN | BODY MASS INDEX: 39.1 KG/M2 | WEIGHT: 258 LBS

## 2024-09-19 DIAGNOSIS — S46.819A TRAPEZIUS MUSCLE STRAIN, UNSPECIFIED LATERALITY, INITIAL ENCOUNTER: ICD-10-CM

## 2024-09-19 DIAGNOSIS — M54.12 CERVICAL RADICULOPATHY: ICD-10-CM

## 2024-09-19 DIAGNOSIS — M54.2 NECK PAIN: Primary | ICD-10-CM

## 2024-09-19 PROCEDURE — 99214 OFFICE O/P EST MOD 30 MIN: CPT | Performed by: PHYSICIAN ASSISTANT

## 2024-09-19 PROCEDURE — 1123F ACP DISCUSS/DSCN MKR DOCD: CPT | Performed by: PHYSICIAN ASSISTANT

## 2024-09-19 RX ORDER — METHYLPREDNISOLONE 4 MG
TABLET, DOSE PACK ORAL
Qty: 1 KIT | Refills: 0 | Status: SHIPPED | OUTPATIENT
Start: 2024-09-19 | End: 2024-09-25

## 2024-09-19 RX ORDER — CYCLOBENZAPRINE HCL 10 MG
10 TABLET ORAL 3 TIMES DAILY PRN
Qty: 21 TABLET | Refills: 0 | Status: CANCELLED | OUTPATIENT
Start: 2024-09-19 | End: 2024-09-29

## 2024-09-19 ASSESSMENT — ENCOUNTER SYMPTOMS
VOMITING: 0
ABDOMINAL DISTENTION: 0
GASTROINTESTINAL NEGATIVE: 1
COLOR CHANGE: 0
RESPIRATORY NEGATIVE: 1
COUGH: 0
DIARRHEA: 0
CHEST TIGHTNESS: 0
SHORTNESS OF BREATH: 0
BLOOD IN STOOL: 0
CONSTIPATION: 0
NAUSEA: 0
APNEA: 0
ABDOMINAL PAIN: 0

## 2024-09-30 ENCOUNTER — HOSPITAL ENCOUNTER (OUTPATIENT)
Dept: PHYSICAL THERAPY | Facility: CLINIC | Age: 69
Setting detail: THERAPIES SERIES
Discharge: HOME | End: 2024-09-30
Payer: MEDICARE

## 2024-09-30 PROCEDURE — 97161 PT EVAL LOW COMPLEX 20 MIN: CPT

## 2024-09-30 PROCEDURE — 97110 THERAPEUTIC EXERCISES: CPT

## 2024-09-30 NOTE — CONSULTS
[]  Moderate       []  High 30 1   []  Modalities     [x]  Ther Exercise 8 1   [x]  Manual Therapy 2 -   []  Ther Activities     []  Aquatics     []  Vasocompression     []  Other       TOTAL BILLABLE TIME: 40 minutes    Time in: 0901      Time out: 0950    Electronically signed by: Willian Bazzi PT        Physician Signature:________________________________Date:__________________  By signing above or cosigning this note, I have reviewed this plan of care and certify a need for medically necessary rehabilitation services.     *PLEASE SIGN ABOVE AND FAX BACK ALL PAGES*

## 2024-10-07 ENCOUNTER — HOSPITAL ENCOUNTER (OUTPATIENT)
Dept: PHYSICAL THERAPY | Facility: CLINIC | Age: 69
Setting detail: THERAPIES SERIES
Discharge: HOME | End: 2024-10-07
Payer: MEDICARE

## 2024-10-07 PROCEDURE — 97110 THERAPEUTIC EXERCISES: CPT

## 2024-10-07 NOTE — FLOWSHEET NOTE
[] [facility]  Outpatient Rehabilitation &  Therapy  [address]  [phone]  [phone] [x] [facility]  Outpatient Rehabilitation &  Therapy  [address]  P: [phone]  F: [phone] [] [facility]  Outpatient Rehabilitation &  Therapy  [address]  P: [phone]  F: [phone] [] [facility]  Outpatient Rehabilitation &  Therapy  [address]  [phone]  [phone] [] [facility]  Outpatient Rehabilitation &  Therapy  [address]   P: [phone]  F: [phone]  [] [facility]  Outpatient Rehabilitation &  Therapy  [address]  P: [phone]  F: [phone] [] [facility]  Outpatient Rehabilitation &  Therapy  [address]  P: [phone]  F: [phone] [] [facility]  Outpatient Rehabilitation &  Therapy  [address]  P: [phone]  F: [phone] [] [facility]  Outpatient Rehabilitation &  Therapy  [address]  P: [phone]  F: [phone]  [] [facility] Outpatient Rehabilitation &  Therapy  [address]  P: [phone]  F: [phone]     Physical Therapy Daily Treatment Note    Date:  10/7/2024  Patient Name:  Wilfred Collins    :  1955  MRN: [id number]  Physician: Faith Prince PA-C                                Insurance: [facility] DUAL ACCESS HMO - [place] (AUTH AFTER EVAL)  Medical Diagnosis:   M54.2 (ICD-10-CM) - Neck pain   S46.819A (ICD-10-CM) - Trapezius muscle strain, unspecified laterality, initial encounter   M54.12 (ICD-10-CM) - Cervical radiculopathy                           Rehab Codes: M54.2;M79.601;R29.3  Onset Date: 24 (Acute)             Next 's appt.: 10/31/24  Visit# / total visits: ; Progress note for Medicare patient due

## 2024-10-09 ENCOUNTER — HOSPITAL ENCOUNTER (OUTPATIENT)
Dept: PHYSICAL THERAPY | Facility: CLINIC | Age: 69
Setting detail: THERAPIES SERIES
Discharge: HOME | End: 2024-10-09
Payer: MEDICARE

## 2024-10-09 NOTE — FLOWSHEET NOTE
[] [facility]  Outpatient Rehabilitation &  Therapy  [address]  [phone]  [phone] [x] [facility]  Outpatient Rehabilitation &  Therapy  [address]  P: [phone]  F: [phone] [] [facility]  Outpatient Rehabilitation &  Therapy  [address]  P: [phone]  F: [phone] [] [facility]  Outpatient Rehabilitation &  Therapy  [address]  [phone]  [phone] [] [facility]  Outpatient Rehabilitation &  Therapy  [address]   P: [phone]  F: [phone]  [] [facility]  Outpatient Rehabilitation &  Therapy  [address]  P: [phone]  F: [phone] [] [facility]  Outpatient Rehabilitation &  Therapy  [address]  P: [phone]  F: [phone] [] [facility]  Outpatient Rehabilitation &  Therapy  [address]  P: [phone]  F: [phone] [] [facility]  Outpatient Rehabilitation &  Therapy  [address]  P: [phone]  F: [phone]  [] [facility] Outpatient Rehabilitation &  Therapy  [address]  P: [phone]  F: [phone]     Therapy Cancel/No Show note    Date: 10/9/2024  Patient: Wilfred Collins  : 1955  MRN: [id number]    Cancels/No Shows to date:     For today's appointment patient:    [x]  Cancelled    [] Rescheduled appointment    [] No-show     Reason given by patient:    [x]  Patient ill from infusion     []  Conflicting appointment    [] No transportation      [] Conflict with work    [] No reason given    [] Weather related    [] COVID-19    [] Other:      Comments:        [x] Next appointment was confirmed    Electronically signed by: Remington Ferrari PTA

## 2024-10-14 ENCOUNTER — HOSPITAL ENCOUNTER (OUTPATIENT)
Dept: PHYSICAL THERAPY | Facility: CLINIC | Age: 69
Setting detail: THERAPIES SERIES
Discharge: HOME | End: 2024-10-14
Payer: MEDICARE

## 2024-10-14 PROCEDURE — 97110 THERAPEUTIC EXERCISES: CPT

## 2024-10-14 NOTE — FLOWSHEET NOTE
toward long and short term goals.    [x] Specific Instructions for subsequent treatments: Progress as able      Time In: 0901            Time Out: 0955    Electronically signed by:  Willian Bazzi PT

## 2024-10-15 ENCOUNTER — TELEPHONE (OUTPATIENT)
Dept: PULMONOLOGY | Age: 69
End: 2024-10-15

## 2024-10-15 DIAGNOSIS — J44.9 CHRONIC OBSTRUCTIVE PULMONARY DISEASE, UNSPECIFIED COPD TYPE (HCC): Primary | ICD-10-CM

## 2024-10-15 RX ORDER — CETIRIZINE HYDROCHLORIDE 10 MG/1
10 TABLET ORAL DAILY
Qty: 30 TABLET | Refills: 2 | Status: SHIPPED | OUTPATIENT
Start: 2024-10-15

## 2024-10-15 RX ORDER — PREDNISONE 10 MG/1
TABLET ORAL
Qty: 36 TABLET | Refills: 0 | Status: SHIPPED | OUTPATIENT
Start: 2024-10-15

## 2024-10-15 RX ORDER — LEVOFLOXACIN 750 MG/1
750 TABLET, FILM COATED ORAL DAILY
Qty: 7 TABLET | Refills: 0 | Status: SHIPPED | OUTPATIENT
Start: 2024-10-15 | End: 2024-10-22

## 2024-10-15 NOTE — TELEPHONE ENCOUNTER
Patients wife called and stated that patient has used up antibiotic and prednisone and would like another called into pharmacy as a back up.  They also requested a refill on Zyrtec and a new order for a handicap placard. Patient was last seen 4/23/24.  Please dont sign until in office Script for handicap placard has to print

## 2024-10-16 NOTE — TELEPHONE ENCOUNTER
Spoke with Maddy and informed all medications were sent to pharmacy.  Per her request I put the handicap placard script in the mail to address on file

## 2024-10-17 ENCOUNTER — HOSPITAL ENCOUNTER (OUTPATIENT)
Dept: PHYSICAL THERAPY | Facility: CLINIC | Age: 69
Setting detail: THERAPIES SERIES
Discharge: HOME | End: 2024-10-17
Payer: MEDICARE

## 2024-10-17 NOTE — FLOWSHEET NOTE
[] [facility]  Outpatient Rehabilitation &  Therapy  [address]  [phone]  [phone] [x] [facility]  Outpatient Rehabilitation &  Therapy  [address]  P: [phone]  F: [phone] [] [facility]  Outpatient Rehabilitation &  Therapy  [address]  P: [phone]  F: [phone] [] [facility]  Outpatient Rehabilitation &  Therapy  [address]  [phone]  [phone] [] [facility]  Outpatient Rehabilitation &  Therapy  [address]   P: [phone]  F: [phone]  [] [facility]  Outpatient Rehabilitation &  Therapy  [address]  P: [phone]  F: [phone] [] [facility]  Outpatient Rehabilitation &  Therapy  [address]  P: [phone]  F: [phone] [] [facility]  Outpatient Rehabilitation &  Therapy  [address]  P: [phone]  F: [phone] [] [facility]  Outpatient Rehabilitation &  Therapy  [address]  P: [phone]  F: [phone]  [] [facility] Outpatient Rehabilitation &  Therapy  [address]  P: [phone]  F: [phone]     Therapy Cancel/No Show note    Date: 10/17/2024  Patient: Wilfred Collins  : 1955  MRN: [id number]    Cancels/No Shows to date: 0    For today's appointment patient:    [x]  Cancelled    [] Rescheduled appointment    [] No-show     Reason given by patient:    [x]  Patient ill    []  Conflicting appointment    [] No transportation      [] Conflict with work    [] No reason given    [] Weather related    [] COVID-19    [] Other:      Comments:        [x] Next appointment was confirmed    Electronically signed by: Willian Bazzi PT

## 2024-10-21 ENCOUNTER — HOSPITAL ENCOUNTER (OUTPATIENT)
Dept: PHYSICAL THERAPY | Facility: CLINIC | Age: 69
Setting detail: THERAPIES SERIES
Discharge: HOME | End: 2024-10-21
Payer: MEDICARE

## 2024-10-21 PROCEDURE — 97110 THERAPEUTIC EXERCISES: CPT

## 2024-10-21 NOTE — FLOWSHEET NOTE
Written  Access Code: [email or web address]  URL: [email or web address]  Date: 09/30/2024  Prepared by: Willian Bazzi     Exercises  - Supine Cervical Retraction with Towel  - 2 x daily - 7 x weekly - 1 sets - 10 reps - 5 second hold  - Supine Cervical Rotation AROM on Pillow  - 1 x daily - 7 x weekly - 1 sets - 5 reps - 5 second hold    Date: 10/14/2024  Prepared by: Willian Bazzi  Orange and lime band provided  Exercises  - Supine Cervical Retraction with Towel  - 2 x daily - 7 x weekly - 1 sets - 10 reps - 5 second hold  - Supine Cervical Rotation AROM on Pillow  - 1 x daily - 7 x weekly - 1 sets - 5 reps - 5 second hold  - Standing Isometric Cervical Sidebending with Manual Resistance  - 1 x daily - 7 x weekly - 1 sets - 5-10 reps - 5\" hold  - Standing Isometric Cervical Flexion with Manual Resistance  - 1 x daily - 7 x weekly - 3 sets - 5-10 reps - 5\" hold  - Standing Isometric Cervical Extension with Manual Resistance  - 1 x daily - 7 x weekly - 3 sets - 5-10 reps - 5\" hold  - Seated Cervical Traction  - 1 x daily - 7 x weekly - 1 sets - 5 reps - 5\" hold  - Seated Scapular Retraction  - 1 x daily - 7 x weekly - 3 sets - 10 reps  - Supine Shoulder Flexion Extension AAROM with Dowel  - 1 x daily - 7 x weekly - 3 sets - 10 reps  - Supine Shoulder Abduction AAROM with Dowel  - 1 x daily - 7 x weekly - 3 sets - 10 reps  - Supine Shoulder Protraction with Dowel  - 1 x daily - 7 x weekly - 3 sets - 10 reps  - Supine Shoulder Horizontal Abduction with Resistance  - 1 x daily - 7 x weekly - 3 sets - 10 reps  - Shoulder External Rotation and Scapular Retraction  - 1 x daily - 7 x weekly - 3 sets - 10 reps  - Standing Shoulder Row with Anchored Resistance  - 1 x daily - 7 x weekly - 3 sets - 10 reps  - Shoulder extension with resistance - Neutral  - 1 x daily - 7 x weekly - 3 sets - 10 reps  - Standing Elbow Extension with Anchored Resistance  - 1 x daily - 7 x weekly - 3 sets - 10 reps  - Standing Bicep Curls with Dumbbells

## 2024-10-24 ENCOUNTER — HOSPITAL ENCOUNTER (OUTPATIENT)
Dept: PHYSICAL THERAPY | Facility: CLINIC | Age: 69
Setting detail: THERAPIES SERIES
Discharge: HOME | End: 2024-10-24
Payer: MEDICARE

## 2024-10-24 PROCEDURE — 97110 THERAPEUTIC EXERCISES: CPT

## 2024-10-24 NOTE — FLOWSHEET NOTE
P       Treatment Charges: Mins Units   []  Modalities - MHP     [x]  Ther Exercise 54 4   []  Manual Therapy     []  Ther Activities     []  Neuro Re-ed     []  Vasocompression     [] Gait     []  Other     Total Billable time 54 4     Assessment: [x] Progressing toward goals: Initiated warm up on the UBE in order to promote a decrease in any present muscle tension, increase available pain free ROM of the R UE, and increase blood flow prior to exercise performances. Immediately following warm up patient performed a series of stretches in order to further decrease any remaining UE/cervical muscle tension. Continued with previously charted ther-ex interventions to challenge strength and stability ans well as mobility. Pt. Required reminders on how to perform stretches in a pain free manner as well as TC's and VC's in order to prevent UT compensation throughout session. Added BOW to Pt's ex grid in order to increased periscapular strength per PT POC. Pt. Was also given several ex progressions this date in which he noted fatigue, but denied any increased pain or discomfort. Will continue to progress patient as he is able to tolerate in the future.      [] No change.     [x] Other: Shoulder flexion AROM ~160 and shoulder abduction ~130 degrees  [x] Patient would continue to benefit from skilled physical therapy services in order to: Reduce pain, address ROM, and strength deficits detailed above for return to previous level of function with daily and recreational activities       Goals  MET NOT MET ON-  GOING  Details   Date Addressed:            LTG: To be met in 8 treatments            1. ? Pain: Decrease pain levels to 4/10 to ease ADL progression. []  []  []      2. ? ROM: Pt will demonstrate full pain free cervical AROM in all planes for ease of driving []  []  []      3. ? Strength: Pt will demonstrate improve postural strength by completing 10 chin tucks in sitting without requiring verbal cues for ease of desk

## 2024-10-28 ENCOUNTER — HOSPITAL ENCOUNTER (OUTPATIENT)
Dept: PHYSICAL THERAPY | Facility: CLINIC | Age: 69
Setting detail: THERAPIES SERIES
Discharge: HOME | End: 2024-10-28
Payer: MEDICARE

## 2024-10-28 PROCEDURE — 97110 THERAPEUTIC EXERCISES: CPT

## 2024-10-28 NOTE — FLOWSHEET NOTE
weekly - 1 sets - 10 reps - 5 second hold  - Supine Cervical Rotation AROM on Pillow  - 1 x daily - 7 x weekly - 1 sets - 5 reps - 5 second hold    Date: 10/14/2024  Prepared by: Willian Bazzi  Orange and lime band provided  Exercises  - Supine Cervical Retraction with Towel  - 2 x daily - 7 x weekly - 1 sets - 10 reps - 5 second hold  - Supine Cervical Rotation AROM on Pillow  - 1 x daily - 7 x weekly - 1 sets - 5 reps - 5 second hold  - Standing Isometric Cervical Sidebending with Manual Resistance  - 1 x daily - 7 x weekly - 1 sets - 5-10 reps - 5\" hold  - Standing Isometric Cervical Flexion with Manual Resistance  - 1 x daily - 7 x weekly - 3 sets - 5-10 reps - 5\" hold  - Standing Isometric Cervical Extension with Manual Resistance  - 1 x daily - 7 x weekly - 3 sets - 5-10 reps - 5\" hold  - Seated Cervical Traction  - 1 x daily - 7 x weekly - 1 sets - 5 reps - 5\" hold  - Seated Scapular Retraction  - 1 x daily - 7 x weekly - 3 sets - 10 reps  - Supine Shoulder Flexion Extension AAROM with Dowel  - 1 x daily - 7 x weekly - 3 sets - 10 reps  - Supine Shoulder Abduction AAROM with Dowel  - 1 x daily - 7 x weekly - 3 sets - 10 reps  - Supine Shoulder Protraction with Dowel  - 1 x daily - 7 x weekly - 3 sets - 10 reps  - Supine Shoulder Horizontal Abduction with Resistance  - 1 x daily - 7 x weekly - 3 sets - 10 reps  - Shoulder External Rotation and Scapular Retraction  - 1 x daily - 7 x weekly - 3 sets - 10 reps  - Standing Shoulder Row with Anchored Resistance  - 1 x daily - 7 x weekly - 3 sets - 10 reps  - Shoulder extension with resistance - Neutral  - 1 x daily - 7 x weekly - 3 sets - 10 reps  - Standing Elbow Extension with Anchored Resistance  - 1 x daily - 7 x weekly - 3 sets - 10 reps  - Standing Bicep Curls with Dumbbells and Rotation  - 1 x daily - 7 x weekly - 3 sets - 10 reps  Comprehension of Education:  [x] Verbalizes understanding.  [x] Demonstrates understanding.  [] Needs review.  []

## 2024-10-31 ENCOUNTER — OFFICE VISIT (OUTPATIENT)
Dept: ORTHOPEDIC SURGERY | Age: 69
End: 2024-10-31

## 2024-10-31 VITALS — WEIGHT: 264 LBS | BODY MASS INDEX: 40.01 KG/M2 | RESPIRATION RATE: 17 BRPM | HEIGHT: 68 IN | OXYGEN SATURATION: 97 %

## 2024-10-31 DIAGNOSIS — M54.12 CERVICAL RADICULOPATHY: ICD-10-CM

## 2024-10-31 DIAGNOSIS — M87.021 AVASCULAR NECROSIS OF HUMERAL HEAD, RIGHT: Primary | ICD-10-CM

## 2024-10-31 ASSESSMENT — ENCOUNTER SYMPTOMS
ABDOMINAL DISTENTION: 0
ABDOMINAL PAIN: 0
CONSTIPATION: 0
COLOR CHANGE: 0
APNEA: 0
NAUSEA: 0
DIARRHEA: 0
SHORTNESS OF BREATH: 0
VOMITING: 0
COUGH: 0
RESPIRATORY NEGATIVE: 1
GASTROINTESTINAL NEGATIVE: 1
CHEST TIGHTNESS: 0

## 2024-10-31 NOTE — FLOWSHEET NOTE
[] [facility]  Outpatient Rehabilitation &  Therapy  [address]  [phone]  [phone] [x] [facility]  Outpatient Rehabilitation &  Therapy  [address]  P: [phone]  F: [phone] [] [facility]  Outpatient Rehabilitation &  Therapy  [address]  P: [phone]  F: [phone] [] [facility]  Outpatient Rehabilitation &  Therapy  [address]  [phone]  [phone] [] [facility]  Outpatient Rehabilitation &  Therapy  [address]   P: [phone]  F: [phone]  [] [facility]  Outpatient Rehabilitation &  Therapy  [address]  P: [phone]  F: [phone] [] [facility]  Outpatient Rehabilitation &  Therapy  [address]  P: [phone]  F: [phone] [] [facility]  Outpatient Rehabilitation &  Therapy  [address]  P: [phone]  F: [phone] [] [facility]  Outpatient Rehabilitation &  Therapy  [address]  P: [phone]  F: [phone]  [] [facility] Outpatient Rehabilitation &  Therapy  [address]  P: [phone]  F: [phone]     Therapy Cancel/No Show note    Date: 10/31/2024  Patient: Wilfred Collins  : 1955  MRN: [id number]    Cancels/No Shows to date: 0    For today's appointment patient:    [x]  Cancelled    [] Rescheduled appointment    [] No-show     Reason given by patient:    []  Patient ill    []  Conflicting appointment    [] No transportation      [] Conflict with work    [] No reason given    [] Weather related    [] COVID-19    [x] Other:      Comments:  Instructed to hold therapy until MRI is completed      [] Next appointment was confirmed    Electronically signed by: Willian Bazzi PT

## 2024-10-31 NOTE — PATIENT INSTRUCTIONS
PATIENTIQ:  PatientIQ helps [facility] stay in touch with you to know how you're feeling, and provides education and care instructions to you at various time points.   Your answers help your care team track your progress to provide the best care possible. PatientIQ will contact you pre-op and post-op via email or text with:  Educational Videos and Care Instructions  Questionnaires About How You're Feeling    Your participation provides you valuable education and helps [facility] continue to provide quality care to all patients. Thank you

## 2024-10-31 NOTE — PROGRESS NOTES
[facility] ORTHOPEDICS AND SPORTS MEDICINE  [address]  [place] [address]  Dept: [phone]  Dept Fax: [phone]        Ambulatory Follow Up      Subjective:   Wilfred Collins is a 69 y.o. year old male who presents to our office today for routine followup regarding his   1. Avascular necrosis of humeral head, right    2. Cervical radiculopathy    .    Chief Complaint   Patient presents with    Shoulder Pain     Right shoulder pain/neck         HPI Wilfred Collins  is a 69 y.o. Right hand dominant  male who presents today in follow for right shoulder and neck pain.  The patient was last seen on 9/19/2024 and underwent treatment in the form of oral steroids, muscle relaxers and a prescription for physical therapy.  The patient has been going to physical therapy for his neck and shoulder.  He states that he did have improvement with the Medrol Dosepak and he does get some relief from the muscle relaxers.  He did have to some prednisone again recently from his pulmonologist and that was on 10/15/2024.  He presents today on 24/7 oxygen.  He states yesterday was a bad day where he had significant pain in the shoulder and his neck.  He states that his fourth and fifth digit tingling has improved but they do still feel heavy and it seems to be less often.  He is here today with his wife.     Review of Systems   Constitutional:  Positive for activity change. Negative for appetite change, fatigue and fever.   Respiratory: Negative.  Negative for apnea, cough, chest tightness and shortness of breath.    Cardiovascular: Negative.  Negative for chest pain, palpitations and leg swelling.   Gastrointestinal: Negative.  Negative for abdominal distention, abdominal pain, constipation, diarrhea, nausea and vomiting.   Genitourinary: Negative.  Negative for difficulty urinating, dysuria and hematuria.   Musculoskeletal:  Positive for

## 2024-11-01 ENCOUNTER — HOSPITAL ENCOUNTER (OUTPATIENT)
Dept: PHYSICAL THERAPY | Facility: CLINIC | Age: 69
Setting detail: THERAPIES SERIES
Discharge: HOME | End: 2024-11-01

## 2024-11-07 ENCOUNTER — HOSPITAL ENCOUNTER (OUTPATIENT)
Dept: MRI IMAGING | Age: 69
Discharge: HOME | End: 2024-11-09
Payer: MEDICARE

## 2024-11-07 DIAGNOSIS — M87.021 AVASCULAR NECROSIS OF HUMERAL HEAD, RIGHT: ICD-10-CM

## 2024-11-07 DIAGNOSIS — M54.12 CERVICAL RADICULOPATHY: ICD-10-CM

## 2024-11-07 PROCEDURE — 73221 MRI JOINT UPR EXTREM W/O DYE: CPT

## 2024-11-07 PROCEDURE — 72141 MRI NECK SPINE W/O DYE: CPT

## 2024-11-11 ENCOUNTER — TELEPHONE (OUTPATIENT)
Dept: ORTHOPEDIC SURGERY | Age: 69
End: 2024-11-11

## 2024-11-11 RX ORDER — BUDESONIDE AND FORMOTEROL FUMARATE DIHYDRATE 160; 4.5 UG/1; UG/1
2 AEROSOL RESPIRATORY (INHALATION) 2 TIMES DAILY
Qty: 3 EACH | Refills: 1 | Status: SHIPPED | OUTPATIENT
Start: 2024-11-11 | End: 2025-05-10

## 2024-11-11 NOTE — TELEPHONE ENCOUNTER
----- Message from Faith Prince PA-C sent at 11/9/2024  9:50 AM EST -----  Patient needs a follow up with Dr. Aggarwal or me when he is here.    Faith Prince PA-C

## 2024-11-11 NOTE — TELEPHONE ENCOUNTER
LAST VISIT: 4/23/24 with YANELIS Fitzgerald  NEXT VISIT: 3/12/25 with Dr Hull     Per last dictation patient is on Breyna. Please sign for refill if ok. Thank you.

## 2024-11-13 ENCOUNTER — OFFICE VISIT (OUTPATIENT)
Dept: ORTHOPEDIC SURGERY | Age: 69
End: 2024-11-13

## 2024-11-13 VITALS — HEIGHT: 68 IN | OXYGEN SATURATION: 100 % | BODY MASS INDEX: 40.16 KG/M2 | RESPIRATION RATE: 16 BRPM | WEIGHT: 265 LBS

## 2024-11-13 DIAGNOSIS — M87.021 AVASCULAR NECROSIS OF HUMERAL HEAD, RIGHT: Primary | ICD-10-CM

## 2024-11-13 DIAGNOSIS — M48.02 CERVICAL STENOSIS OF SPINAL CANAL: ICD-10-CM

## 2024-11-13 DIAGNOSIS — M50.30 DDD (DEGENERATIVE DISC DISEASE), CERVICAL: ICD-10-CM

## 2024-11-13 ASSESSMENT — ENCOUNTER SYMPTOMS
ABDOMINAL DISTENTION: 0
NAUSEA: 0
CONSTIPATION: 0
GASTROINTESTINAL NEGATIVE: 1
CHEST TIGHTNESS: 0
COUGH: 0
DIARRHEA: 0
COLOR CHANGE: 0
APNEA: 0
SHORTNESS OF BREATH: 0
VOMITING: 0
RESPIRATORY NEGATIVE: 1
ABDOMINAL PAIN: 0

## 2024-11-13 NOTE — PROGRESS NOTES
reviewed the patient's previous x-ray and his MRIs.  The MRI of his right shoulder shows AVN of the humeral head with subchondral flattening but no collapse.  It also shows tendinitis of the rotator cuff with less than 50% wearing of the supraspinatus and infraspinatus.  It also shows moderate glenohumeral chondromalacia.  The MRI of his cervical spine shows multilevel degenerative changes with canal stenosis at C3-C4 and C4-C5.  We discussed the various treatment alternatives including anti-inflammatory medications, physical therapy, injections, further imaging studies and a last resort surgery.  During today's visit, we discussed that he is got 2 things going on, he does have avascular necrosis of his right shoulder.  Eventually the only thing that would fix his shoulder would be a shoulder replacement and most likely reverse shoulder replacement since he does have wearing of his rotator cuff.  As for the neck he does have significant degenerative changes with canal stenosis at C3-4 and C4-5.  We discussed getting an opinion from neurosurgery.  We also discussed getting an EMG of his right upper extremity.  He may need vascular workup.  I did speak with Dr. Seven Aggarwal, .  We discussed that the only thing that would fix his shoulder is a total shoulder replacement versus a reverse total shoulder arthroplasty.  He stated that if the patient cannot clear he would be willing to do it.  Patient does have chronic COPD and is on prednisone often to control his breathing.  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  After long discussion with the patient and his wife, we opted to do a EMG and get an opinion from neurosurgery regarding his neck.  He still has acceptable range of motion of his shoulder even though it is painful.  We discussed timing of a surgery if they opt to do it might be better to be done in the spring when he does not seem to have his much trouble

## 2024-11-13 NOTE — PATIENT INSTRUCTIONS
PATIENTIQ:  PatientIQ helps [facility] stay in touch with you to know how you're feeling, and provides education and care instructions to you at various time points.   Your answers help your care team track your progress to provide the best care possible. PatientIQ will contact you pre-op and post-op via email or text with:  Educational Videos and Care Instructions  Questionnaires About How You're Feeling    Your participation provides you valuable education and helps [facility] continue to provide quality care to all patients. Thank you

## 2024-11-18 ENCOUNTER — OFFICE VISIT (OUTPATIENT)
Dept: NEUROSURGERY | Age: 69
End: 2024-11-18
Payer: MEDICARE

## 2024-11-18 VITALS
BODY MASS INDEX: 39.86 KG/M2 | HEART RATE: 79 BPM | SYSTOLIC BLOOD PRESSURE: 109 MMHG | DIASTOLIC BLOOD PRESSURE: 69 MMHG | HEIGHT: 68 IN | WEIGHT: 263 LBS

## 2024-11-18 DIAGNOSIS — M47.22 CERVICAL SPONDYLOSIS WITH RADICULOPATHY: Primary | ICD-10-CM

## 2024-11-18 DIAGNOSIS — M48.02 CERVICAL STENOSIS OF SPINAL CANAL: ICD-10-CM

## 2024-11-18 PROCEDURE — 99204 OFFICE O/P NEW MOD 45 MIN: CPT

## 2024-11-18 PROCEDURE — 3078F DIAST BP <80 MM HG: CPT

## 2024-11-18 PROCEDURE — 1160F RVW MEDS BY RX/DR IN RCRD: CPT

## 2024-11-18 PROCEDURE — 3074F SYST BP LT 130 MM HG: CPT

## 2024-11-18 PROCEDURE — 1159F MED LIST DOCD IN RCRD: CPT

## 2024-11-18 PROCEDURE — 1123F ACP DISCUSS/DSCN MKR DOCD: CPT

## 2024-11-18 NOTE — PROGRESS NOTES
Paravertebral soft tissues  unremarkable.  Multilevel degenerative changes are seen in the cervical spine  with facet joint arthrosis and endplate osteophytes.  No significant canal  stenosis.     IMPRESSION:  Multilevel degenerative changes in the cervical spine. No acute fracture or  traumatic malalignment.     Physical Therapy and Orthopedic Surgery Notes Reviewed     Assessment and Plan:     1. Cervical spondylosis with radiculopathy    2. Cervical stenosis of spinal canal         Plan: Patient presents with radiating neck pain with paresthesia in the right arm. Cervical MRI shows canal stenosis at C3-4 and C4-5 and foraminal narrowing. Cervical flexion/extension Xrays are ordered to assess alignment and for instability. Follow up with surgeon before pain management due to canal stenosis, difficulty with balance, and slight weakness.       Followup: Return in 2 months (on 1/18/2025), or if symptoms worsen or fail to improve.    Prescriptions Ordered:  No orders of the defined types were placed in this encounter.     Orders Placed:  Orders Placed This Encounter   Procedures    XR CERVICAL SPINE (4-5 VIEWS)     Standing Status:   Future     Standing Expiration Date:   11/18/2025     Scheduling Instructions:      Please obtain upright AP; lateral views: neutral/flexion/extension     Order Specific Question:   Reason for exam:     Answer:   assess alignment and for instability        Electronically signed by MARILIN Corado CNP on 11/18/2024 at 1:01 PM    Please note that this chart was generated using voice recognition Dragon dictation software.  Although every effort was made to ensure the accuracy of this automated transcription, some errors in transcription may have occurred.

## 2024-11-21 ENCOUNTER — TELEPHONE (OUTPATIENT)
Dept: ORTHOPEDIC SURGERY | Age: 69
End: 2024-11-21

## 2024-11-21 NOTE — TELEPHONE ENCOUNTER
Spoke with Maddy (pt's wife). Per LOV note patient needs to schedule an EMG then follow up with Dr Aggarwal to discuss surgery.  I am sending a referral to Dr Mtz office and the information to the patient to get this procedure set up.  Patient will follow up in office after EMG is complete    Statement Selected

## 2024-11-21 NOTE — TELEPHONE ENCOUNTER
Patient wife called stating patient would like to move forward with shoulder surgery. Please call to discuss    Thank you

## 2024-11-26 ENCOUNTER — HOSPITAL ENCOUNTER (OUTPATIENT)
Dept: GENERAL RADIOLOGY | Age: 69
Discharge: HOME | End: 2024-11-28
Payer: MEDICARE

## 2024-11-26 ENCOUNTER — HOSPITAL ENCOUNTER (OUTPATIENT)
Age: 69
Discharge: HOME | End: 2024-11-28
Payer: MEDICARE

## 2024-11-26 DIAGNOSIS — M47.22 CERVICAL SPONDYLOSIS WITH RADICULOPATHY: ICD-10-CM

## 2024-11-26 PROCEDURE — 72050 X-RAY EXAM NECK SPINE 4/5VWS: CPT

## 2025-01-06 ENCOUNTER — TELEPHONE (OUTPATIENT)
Dept: PULMONOLOGY | Age: 70
End: 2025-01-06

## 2025-01-06 DIAGNOSIS — J44.9 CHRONIC OBSTRUCTIVE PULMONARY DISEASE, UNSPECIFIED COPD TYPE (HCC): Primary | ICD-10-CM

## 2025-01-06 RX ORDER — ALBUTEROL SULFATE 90 UG/1
2 INHALANT RESPIRATORY (INHALATION) EVERY 6 HOURS PRN
Qty: 3 EACH | Refills: 3 | Status: SHIPPED | OUTPATIENT
Start: 2025-01-06 | End: 2025-04-06

## 2025-01-06 RX ORDER — UMECLIDINIUM 62.5 UG/1
1 AEROSOL, POWDER ORAL DAILY
Qty: 3 EACH | Refills: 3 | Status: SHIPPED | OUTPATIENT
Start: 2025-01-06 | End: 2025-04-06

## 2025-01-06 RX ORDER — BUDESONIDE AND FORMOTEROL FUMARATE DIHYDRATE 160; 4.5 UG/1; UG/1
2 AEROSOL RESPIRATORY (INHALATION) 2 TIMES DAILY
Qty: 3 EACH | Refills: 3 | Status: SHIPPED | OUTPATIENT
Start: 2025-01-06 | End: 2025-07-05

## 2025-01-06 RX ORDER — MONTELUKAST SODIUM 10 MG/1
10 TABLET ORAL NIGHTLY
Qty: 90 TABLET | Refills: 3 | Status: SHIPPED | OUTPATIENT
Start: 2025-01-06 | End: 2026-01-01

## 2025-01-06 RX ORDER — ALBUTEROL SULFATE 0.83 MG/ML
2.5 SOLUTION RESPIRATORY (INHALATION) EVERY 6 HOURS PRN
Qty: 360 EACH | Refills: 3 | Status: SHIPPED | OUTPATIENT
Start: 2025-01-06 | End: 2025-04-06

## 2025-01-06 NOTE — TELEPHONE ENCOUNTER
Wife called, requesting new scripts to be sent to [facility]. Writer verified that they are asking for Breyna, Incruse, Albuterol HFA and nebulizer solution, Montelukast, Fluticasone. Confirmed 6 in total.

## 2025-01-28 ENCOUNTER — APPOINTMENT (OUTPATIENT)
Dept: ULTRASOUND IMAGING | Age: 70
End: 2025-01-28
Payer: COMMERCIAL

## 2025-01-28 ENCOUNTER — HOSPITAL ENCOUNTER (EMERGENCY)
Age: 70
Discharge: HOME | End: 2025-01-28
Payer: COMMERCIAL

## 2025-01-28 ENCOUNTER — APPOINTMENT (OUTPATIENT)
Dept: CT IMAGING | Age: 70
End: 2025-01-28
Payer: COMMERCIAL

## 2025-01-28 VITALS
WEIGHT: 260 LBS | TEMPERATURE: 97.7 F | SYSTOLIC BLOOD PRESSURE: 142 MMHG | HEART RATE: 77 BPM | DIASTOLIC BLOOD PRESSURE: 94 MMHG | BODY MASS INDEX: 39.53 KG/M2 | OXYGEN SATURATION: 96 % | RESPIRATION RATE: 22 BRPM

## 2025-01-28 DIAGNOSIS — R10.9 ABDOMINAL PAIN, UNSPECIFIED ABDOMINAL LOCATION: ICD-10-CM

## 2025-01-28 DIAGNOSIS — K40.90 UNILATERAL INGUINAL HERNIA WITHOUT OBSTRUCTION OR GANGRENE, RECURRENCE NOT SPECIFIED: Primary | ICD-10-CM

## 2025-01-28 DIAGNOSIS — K56.7 ILEUS (HCC): ICD-10-CM

## 2025-01-28 LAB
ANION GAP SERPL CALCULATED.3IONS-SCNC: 13 MMOL/L (ref 9–16)
BASOPHILS # BLD: 0.04 K/UL (ref 0–0.2)
BASOPHILS NFR BLD: 1 % (ref 0–2)
BILIRUB UR QL STRIP: NEGATIVE
BUN SERPL-MCNC: 12 MG/DL (ref 8–23)
CALCIUM SERPL-MCNC: 9 MG/DL (ref 8.8–10.2)
CHLORIDE SERPL-SCNC: 101 MMOL/L (ref 98–107)
CLARITY UR: CLEAR
CO2 SERPL-SCNC: 21 MMOL/L (ref 20–31)
COLOR UR: YELLOW
CREAT SERPL-MCNC: 0.9 MG/DL (ref 0.7–1.2)
EOSINOPHIL # BLD: 0.14 K/UL (ref 0–0.44)
EOSINOPHILS RELATIVE PERCENT: 2 % (ref 1–4)
EPI CELLS #/AREA URNS HPF: NORMAL /HPF (ref 0–5)
ERYTHROCYTE [DISTWIDTH] IN BLOOD BY AUTOMATED COUNT: 12.4 % (ref 11.8–14.4)
GFR, ESTIMATED: >90 ML/MIN/1.73M2
GLUCOSE SERPL-MCNC: 101 MG/DL (ref 82–115)
GLUCOSE UR STRIP-MCNC: NEGATIVE MG/DL
HCT VFR BLD AUTO: 43.8 % (ref 40.7–50.3)
HGB BLD-MCNC: 15.4 G/DL (ref 13–17)
HGB UR QL STRIP.AUTO: NEGATIVE
IMM GRANULOCYTES # BLD AUTO: 0.02 K/UL (ref 0–0.3)
IMM GRANULOCYTES NFR BLD: 0 %
KETONES UR STRIP-MCNC: NEGATIVE MG/DL
LEUKOCYTE ESTERASE UR QL STRIP: NEGATIVE
LYMPHOCYTES NFR BLD: 2.08 K/UL (ref 1.1–3.7)
LYMPHOCYTES RELATIVE PERCENT: 31 % (ref 24–43)
MCH RBC QN AUTO: 30.3 PG (ref 25.2–33.5)
MCHC RBC AUTO-ENTMCNC: 35.2 G/DL (ref 28.4–34.8)
MCV RBC AUTO: 86.1 FL (ref 82.6–102.9)
MONOCYTES NFR BLD: 0.44 K/UL (ref 0.1–1.2)
MONOCYTES NFR BLD: 7 % (ref 3–12)
NEUTROPHILS NFR BLD: 59 % (ref 36–65)
NEUTS SEG NFR BLD: 4.01 K/UL (ref 1.5–8.1)
NITRITE UR QL STRIP: NEGATIVE
NRBC BLD-RTO: 0 PER 100 WBC
PH UR STRIP: 6.5 [PH] (ref 5–8)
PLATELET # BLD AUTO: 233 K/UL (ref 138–453)
PMV BLD AUTO: 10.6 FL (ref 8.1–13.5)
POTASSIUM SERPL-SCNC: 4.1 MMOL/L (ref 3.7–5.3)
PROT UR STRIP-MCNC: NEGATIVE MG/DL
RBC # BLD AUTO: 5.09 M/UL (ref 4.21–5.77)
RBC #/AREA URNS HPF: NORMAL /HPF (ref 0–2)
SODIUM SERPL-SCNC: 135 MMOL/L (ref 136–145)
SP GR UR STRIP: 1.01 (ref 1–1.03)
UROBILINOGEN UR STRIP-ACNC: NORMAL EU/DL (ref 0–1)
WBC #/AREA URNS HPF: NORMAL /HPF (ref 0–5)
WBC OTHER # BLD: 6.7 K/UL (ref 3.5–11.3)

## 2025-01-28 PROCEDURE — 80048 BASIC METABOLIC PNL TOTAL CA: CPT

## 2025-01-28 PROCEDURE — 81001 URINALYSIS AUTO W/SCOPE: CPT

## 2025-01-28 PROCEDURE — 2580000003 HC RX 258: Performed by: NURSE PRACTITIONER

## 2025-01-28 PROCEDURE — 74177 CT ABD & PELVIS W/CONTRAST: CPT

## 2025-01-28 PROCEDURE — 2500000003 HC RX 250 WO HCPCS: Performed by: NURSE PRACTITIONER

## 2025-01-28 PROCEDURE — 93976 VASCULAR STUDY: CPT

## 2025-01-28 PROCEDURE — 6370000000 HC RX 637 (ALT 250 FOR IP): Performed by: NURSE PRACTITIONER

## 2025-01-28 PROCEDURE — 6360000004 HC RX CONTRAST MEDICATION: Performed by: NURSE PRACTITIONER

## 2025-01-28 PROCEDURE — 99285 EMERGENCY DEPT VISIT HI MDM: CPT

## 2025-01-28 PROCEDURE — 76870 US EXAM SCROTUM: CPT

## 2025-01-28 PROCEDURE — 85025 COMPLETE CBC W/AUTO DIFF WBC: CPT

## 2025-01-28 RX ORDER — SODIUM CHLORIDE 0.9 % (FLUSH) 0.9 %
10 SYRINGE (ML) INJECTION ONCE
Status: COMPLETED | OUTPATIENT
Start: 2025-01-28 | End: 2025-01-28

## 2025-01-28 RX ORDER — POLYETHYLENE GLYCOL 3350 17 G/17G
17 POWDER, FOR SOLUTION ORAL DAILY
Qty: 510 G | Refills: 0 | Status: SHIPPED | OUTPATIENT
Start: 2025-01-28 | End: 2025-02-27

## 2025-01-28 RX ORDER — 0.9 % SODIUM CHLORIDE 0.9 %
80 INTRAVENOUS SOLUTION INTRAVENOUS ONCE
Status: COMPLETED | OUTPATIENT
Start: 2025-01-28 | End: 2025-01-28

## 2025-01-28 RX ORDER — IOPAMIDOL 755 MG/ML
75 INJECTION, SOLUTION INTRAVASCULAR
Status: COMPLETED | OUTPATIENT
Start: 2025-01-28 | End: 2025-01-28

## 2025-01-28 RX ORDER — LACTULOSE 10 G/15ML
20 SOLUTION ORAL ONCE
Status: COMPLETED | OUTPATIENT
Start: 2025-01-28 | End: 2025-01-28

## 2025-01-28 RX ADMIN — IOPAMIDOL 75 ML: 755 INJECTION, SOLUTION INTRAVENOUS at 17:41

## 2025-01-28 RX ADMIN — SODIUM CHLORIDE 80 ML: 9 INJECTION, SOLUTION INTRAVENOUS at 17:41

## 2025-01-28 RX ADMIN — LACTULOSE 20 G: 20 SOLUTION ORAL at 19:12

## 2025-01-28 RX ADMIN — SODIUM CHLORIDE, PRESERVATIVE FREE 10 ML: 5 INJECTION INTRAVENOUS at 17:41

## 2025-01-28 ASSESSMENT — ENCOUNTER SYMPTOMS
COLOR CHANGE: 0
NAUSEA: 0
ABDOMINAL PAIN: 1
DIARRHEA: 0
SHORTNESS OF BREATH: 0
BACK PAIN: 0
VOMITING: 0

## 2025-01-28 ASSESSMENT — PAIN SCALES - GENERAL: PAINLEVEL_OUTOF10: 8

## 2025-01-28 ASSESSMENT — PAIN - FUNCTIONAL ASSESSMENT: PAIN_FUNCTIONAL_ASSESSMENT: 0-10

## 2025-01-28 NOTE — ED PROVIDER NOTES
[facility] EMERGENCY DEPARTMENT  eMERGENCY dEPARTMENT eNCOUnter      Pt Name: Wilfred Collins  MRN: [id number]  Birthdate 1955  Date of evaluation: 1/28/2025  Provider: MARILIN Green CNP    CHIEF COMPLAINT       Chief Complaint   Patient presents with    Groin Swelling     R. Testicular pain and swelling          HISTORY OF PRESENT ILLNESS  (Location/Symptom, Timing/Onset, Context/Setting, Quality, Duration, Modifying Factors, Severity.)   Wilfred Collins is a 69 y.o. male who presents to the emergency department. C/o right testicular pain, scrotal swelling. Onset was within the past few days. Reports pain to his right groin and lower abdomen as well. Reports difficulty urinating, decreased urine output. Denies fever, chills, injury, N/V/D, dysuria, hematuria. Denies flank pain. Reports he has been having regular bowel movements. He has been passing gas today. Rates his pain 8/10.      Nursing Notes were reviewed.    ALLERGIES     Adhesive tape and Other    CURRENT MEDICATIONS       Discharge Medication List as of 1/28/2025  7:09 PM        CONTINUE these medications which have NOT CHANGED    Details   fluticasone (FLONASE) 50 MCG/ACT nasal spray 2 sprays by Each Nostril route daily as needed for Rhinitis, Disp-3 each, R-3Normal      albuterol (PROVENTIL) (2.5 MG/3ML) 0.083% nebulizer solution Take 3 mLs by nebulization every 6 hours as needed for Wheezing, Disp-360 each, R-3Normal      !! albuterol sulfate HFA (PROAIR HFA) 108 (90 Base) MCG/ACT inhaler Inhale 2 puffs into the lungs every 6 hours as needed for Wheezing, Disp-3 each, R-3Normal      !! budesonide-formoterol (BREYNA) 160-4.5 MCG/ACT AERO Inhale 2 puffs into the lungs 2 times daily, Disp-3 each, R-3Normal      !! montelukast (SINGULAIR) 10 MG tablet Take 1 tablet by mouth nightly, Disp-90 tablet, R-3Normal      umeclidinium bromide (INCRUSE ELLIPTA) 62.5 MCG/ACT inhaler Inhale 1 puff into the lungs daily, Disp-3 each,

## 2025-01-28 NOTE — ED NOTES
Pt arrived to the ED with c/o groin and testicle swelling. Pt states he is having a hard time urinating. Pt states he feels a hard lump and burning sensation in his groin when he is trying to urinate. Pt rates pain 8/10. Pt is A&O x4, vitals are stable and breathing is even and non-labored. Pt denies needs at this time and call light is within reach. Wife is at bedside.

## 2025-01-29 ENCOUNTER — TELEPHONE (OUTPATIENT)
Dept: PULMONOLOGY | Age: 70
End: 2025-01-29

## 2025-01-29 NOTE — TELEPHONE ENCOUNTER
Patient was recently in the ER and needs a hernia repair. Patient is asking if you have a preference in GI physicians? Patient is currently seeing Dr Argenis Arambula that is with Dr Chacon. Patient and wife are aware that if different than these physicians, he would need an evaluation by that physician and we would not be the one to make that referral.     Please advise.

## 2025-02-03 ENCOUNTER — HOSPITAL ENCOUNTER (OUTPATIENT)
Dept: SURGERY | Age: 70
Discharge: HOME | End: 2025-02-03
Payer: COMMERCIAL

## 2025-02-03 VITALS
HEIGHT: 68 IN | BODY MASS INDEX: 40.01 KG/M2 | OXYGEN SATURATION: 95 % | WEIGHT: 264 LBS | RESPIRATION RATE: 20 BRPM | SYSTOLIC BLOOD PRESSURE: 117 MMHG | HEART RATE: 85 BPM | DIASTOLIC BLOOD PRESSURE: 73 MMHG

## 2025-02-03 PROCEDURE — 99202 OFFICE O/P NEW SF 15 MIN: CPT | Performed by: STUDENT IN AN ORGANIZED HEALTH CARE EDUCATION/TRAINING PROGRAM

## 2025-02-03 NOTE — CONSULTS
told before that complete general anesthesia was not a good idea and he was given an epidural and nerve blocks for his bilateral hip replacements.  He additionally had previously seen infectious disease prior to his hip replacements as he needed to have his IVIG closer to surgery due to his immunodeficiency and needed IV antibiotics inpatient post operatively.    Current Outpatient Medications on File Prior to Encounter   Medication Sig Dispense Refill    polyethylene glycol (GLYCOLAX) 17 GM/SCOOP powder Take 17 g by mouth daily 510 g 0    fluticasone (FLONASE) 50 MCG/ACT nasal spray 2 sprays by Each Nostril route daily as needed for Rhinitis 3 each 3    albuterol (PROVENTIL) (2.5 MG/3ML) 0.083% nebulizer solution Take 3 mLs by nebulization every 6 hours as needed for Wheezing 360 each 3    montelukast (SINGULAIR) 10 MG tablet Take 1 tablet by mouth nightly 90 tablet 3    umeclidinium bromide (INCRUSE ELLIPTA) 62.5 MCG/ACT inhaler Inhale 1 puff into the lungs daily 3 each 3    budesonide-formoterol (BREYNA) 160-4.5 MCG/ACT AERO Inhale 2 puffs into the lungs 2 times daily 3 each 1    cetirizine (ZYRTEC) 10 MG tablet Take 1 tablet by mouth daily 30 tablet 2    amLODIPine (NORVASC) 10 MG tablet take 1 tablet by mouth once daily 60 tablet 3    albuterol sulfate HFA (PROAIR HFA) 108 (90 Base) MCG/ACT inhaler Inhale 2 puffs into the lungs every 6 hours as needed for Wheezing 3 each 3    budesonide-formoterol (BREYNA) 160-4.5 MCG/ACT AERO Inhale 2 puffs into the lungs 2 times daily 3 each 3    Handicap Placard MISC by Does not apply route Diagnosis COPD   Expiration 5 years 1 each 0    predniSONE (DELTASONE) 10 MG tablet 4 tablet once daily for 3 days then 3 tablet once daily for 3 days then 2 tablet once daily for 3 days then 1 tablet once daily for 3 days then discontinue (Patient not taking: Reported on 2/3/2025) 36 tablet 0    BREYNA 160-4.5 MCG/ACT AERO Inhale 2 puffs into the lungs 2 times daily 3 each 3    albuterol

## 2025-02-10 ENCOUNTER — OFFICE VISIT (OUTPATIENT)
Dept: PULMONOLOGY | Age: 70
End: 2025-02-10
Payer: COMMERCIAL

## 2025-02-10 VITALS
DIASTOLIC BLOOD PRESSURE: 74 MMHG | OXYGEN SATURATION: 97 % | WEIGHT: 262 LBS | RESPIRATION RATE: 16 BRPM | HEART RATE: 98 BPM | BODY MASS INDEX: 39.71 KG/M2 | SYSTOLIC BLOOD PRESSURE: 115 MMHG | HEIGHT: 68 IN

## 2025-02-10 DIAGNOSIS — Z87.891 HISTORY OF SMOKING AT LEAST 1 PACK PER DAY FOR AT LEAST 30 YEARS: ICD-10-CM

## 2025-02-10 DIAGNOSIS — J44.9 CHRONIC OBSTRUCTIVE PULMONARY DISEASE, UNSPECIFIED COPD TYPE (HCC): Primary | ICD-10-CM

## 2025-02-10 DIAGNOSIS — J45.50 SEVERE PERSISTENT ASTHMA WITHOUT COMPLICATION: ICD-10-CM

## 2025-02-10 DIAGNOSIS — J96.11 CHRONIC RESPIRATORY FAILURE WITH HYPOXIA: ICD-10-CM

## 2025-02-10 DIAGNOSIS — G47.33 OSA ON CPAP: ICD-10-CM

## 2025-02-10 DIAGNOSIS — J30.9 ALLERGIC RHINITIS, UNSPECIFIED SEASONALITY, UNSPECIFIED TRIGGER: ICD-10-CM

## 2025-02-10 DIAGNOSIS — D83.9 CVID (COMMON VARIABLE IMMUNODEFICIENCY) (HCC): ICD-10-CM

## 2025-02-10 PROCEDURE — 1159F MED LIST DOCD IN RCRD: CPT | Performed by: INTERNAL MEDICINE

## 2025-02-10 PROCEDURE — 3078F DIAST BP <80 MM HG: CPT | Performed by: INTERNAL MEDICINE

## 2025-02-10 PROCEDURE — 1123F ACP DISCUSS/DSCN MKR DOCD: CPT | Performed by: INTERNAL MEDICINE

## 2025-02-10 PROCEDURE — 99214 OFFICE O/P EST MOD 30 MIN: CPT | Performed by: INTERNAL MEDICINE

## 2025-02-10 PROCEDURE — 3074F SYST BP LT 130 MM HG: CPT | Performed by: INTERNAL MEDICINE

## 2025-02-10 RX ORDER — BUDESONIDE AND FORMOTEROL FUMARATE 160; 4.5 UG/1; UG/1
2 AEROSOL, METERED RESPIRATORY (INHALATION) 2 TIMES DAILY
Qty: 3 EACH | Refills: 3 | Status: SHIPPED | OUTPATIENT
Start: 2025-02-10 | End: 2025-08-09

## 2025-02-10 RX ORDER — MONTELUKAST SODIUM 10 MG/1
10 TABLET ORAL NIGHTLY
Qty: 90 TABLET | Refills: 3 | Status: SHIPPED | OUTPATIENT
Start: 2025-02-10 | End: 2026-02-05

## 2025-02-10 RX ORDER — ALBUTEROL SULFATE 90 UG/1
2 INHALANT RESPIRATORY (INHALATION) EVERY 6 HOURS PRN
Qty: 3 EACH | Refills: 3 | Status: SHIPPED | OUTPATIENT
Start: 2025-02-10 | End: 2025-05-11

## 2025-02-10 RX ORDER — UMECLIDINIUM 62.5 UG/1
1 AEROSOL, POWDER ORAL DAILY
Qty: 3 EACH | Refills: 3 | Status: SHIPPED | OUTPATIENT
Start: 2025-02-10 | End: 2025-05-11

## 2025-02-10 RX ORDER — ALBUTEROL SULFATE 0.83 MG/ML
2.5 SOLUTION RESPIRATORY (INHALATION) EVERY 4 HOURS PRN
Qty: 360 ML | Refills: 3 | Status: SHIPPED | OUTPATIENT
Start: 2025-02-10 | End: 2025-05-11

## 2025-02-10 RX ORDER — BUDESONIDE AND FORMOTEROL FUMARATE DIHYDRATE 160; 4.5 UG/1; UG/1
2 AEROSOL RESPIRATORY (INHALATION) 2 TIMES DAILY
Qty: 3 EACH | Refills: 3 | Status: SHIPPED | OUTPATIENT
Start: 2025-02-10 | End: 2025-08-09

## 2025-02-10 RX ORDER — FLUTICASONE PROPIONATE 50 MCG
2 SPRAY, SUSPENSION (ML) NASAL DAILY
Qty: 3 EACH | Refills: 3 | Status: SHIPPED | OUTPATIENT
Start: 2025-02-10 | End: 2025-05-11

## 2025-02-10 NOTE — PROGRESS NOTES
weight not tachypnic and in no distress  Mental status - alert, oriented to person, place, and time  Eyes - pupils equal and reactive, extraocular eye movements intact  Nose - normal and patent, no erythema, , positive pale and edematous nasal mucosa, no purulent secretion.  Mouth - mucous membranes moist, pharynx normal without lesions, large tongue, thick uvula, small oropharynx, Mallampati 1   Neck - supple, no significant adenopathy, very short and thick  Chest - No distress is noted at rest, no cyanosis no accessory muscles use.  Bilateral symmetrical chest movements, distant breath sounds bilaterally,  fair air entry with prolonged expiration, no expiratory wheezing no crackles and no rhonchi.  Heart - normal rate, regular rhythm, normal S1, S2, no murmurs, rubs, clicks or gallops  Abdomen - soft, nontender, nondistended, no masses or organomegaly  Neurological - alert, oriented, normal speech, no focal findings or movement disorder noted  Extremities - peripheral pulses normal, no pedal edema, no clubbing or cyanosis  Skin - normal coloration and turgor, no rashes, no suspicious skin lesions noted       Labs:  None    CBC:   WBC   Date Value Ref Range Status   01/28/2025 6.7 3.5 - 11.3 k/uL Final     Hemoglobin   Date Value Ref Range Status   01/28/2025 15.4 13.0 - 17.0 g/dL Final     Platelet Count   Date Value Ref Range Status   03/01/2012 202 140 - 450 k/uL Final     Platelets   Date Value Ref Range Status   01/28/2025 233 138 - 453 k/uL Final     BMP:   Sodium   Date Value Ref Range Status   01/28/2025 135 (L) 136 - 145 mmol/L Final     Potassium   Date Value Ref Range Status   01/28/2025 4.1 3.7 - 5.3 mmol/L Final     Chloride   Date Value Ref Range Status   01/28/2025 101 98 - 107 mmol/L Final     CO2   Date Value Ref Range Status   01/28/2025 21 20 - 31 mmol/L Final     BUN   Date Value Ref Range Status   01/28/2025 12 8 - 23 mg/dL Final     Creatinine   Date Value Ref Range Status   01/28/2025 0.9

## 2025-02-12 ENCOUNTER — HOSPITAL ENCOUNTER (OUTPATIENT)
Age: 70
Setting detail: SPECIMEN
Discharge: HOME | End: 2025-02-12
Payer: COMMERCIAL

## 2025-02-12 LAB
BASOPHILS # BLD: 0.05 K/UL (ref 0–0.2)
BASOPHILS NFR BLD: 1 % (ref 0–2)
EOSINOPHIL # BLD: 0.17 K/UL (ref 0–0.44)
EOSINOPHILS RELATIVE PERCENT: 3 % (ref 1–4)
ERYTHROCYTE [DISTWIDTH] IN BLOOD BY AUTOMATED COUNT: 12.6 % (ref 11.8–14.4)
HCT VFR BLD AUTO: 43.2 % (ref 40.7–50.3)
HGB BLD-MCNC: 14.8 G/DL (ref 13–17)
IGG SERPL-MCNC: 1039 MG/DL (ref 700–1600)
IMM GRANULOCYTES # BLD AUTO: 0.04 K/UL (ref 0–0.3)
IMM GRANULOCYTES NFR BLD: 1 %
LYMPHOCYTES NFR BLD: 2.02 K/UL (ref 1.1–3.7)
LYMPHOCYTES RELATIVE PERCENT: 30 % (ref 24–43)
MCH RBC QN AUTO: 30 PG (ref 25.2–33.5)
MCHC RBC AUTO-ENTMCNC: 34.3 G/DL (ref 28.4–34.8)
MCV RBC AUTO: 87.6 FL (ref 82.6–102.9)
MONOCYTES NFR BLD: 0.56 K/UL (ref 0.1–1.2)
MONOCYTES NFR BLD: 8 % (ref 3–12)
NEUTROPHILS NFR BLD: 57 % (ref 36–65)
NEUTS SEG NFR BLD: 3.96 K/UL (ref 1.5–8.1)
NRBC BLD-RTO: 0 PER 100 WBC
PLATELET # BLD AUTO: 208 K/UL (ref 138–453)
PMV BLD AUTO: 11.6 FL (ref 8.1–13.5)
RBC # BLD AUTO: 4.93 M/UL (ref 4.21–5.77)
WBC OTHER # BLD: 6.8 K/UL (ref 3.5–11.3)

## 2025-02-12 PROCEDURE — 82565 ASSAY OF CREATININE: CPT

## 2025-02-12 PROCEDURE — 82784 ASSAY IGA/IGD/IGG/IGM EACH: CPT

## 2025-02-12 PROCEDURE — 85025 COMPLETE CBC W/AUTO DIFF WBC: CPT

## 2025-02-12 PROCEDURE — 84460 ALANINE AMINO (ALT) (SGPT): CPT

## 2025-02-13 ENCOUNTER — HOSPITAL ENCOUNTER (OUTPATIENT)
Dept: PREADMISSION TESTING | Age: 70
Discharge: HOME | End: 2025-02-17
Payer: COMMERCIAL

## 2025-02-13 VITALS
RESPIRATION RATE: 14 BRPM | HEART RATE: 80 BPM | DIASTOLIC BLOOD PRESSURE: 78 MMHG | SYSTOLIC BLOOD PRESSURE: 148 MMHG | HEIGHT: 68 IN | BODY MASS INDEX: 39.43 KG/M2 | WEIGHT: 260.14 LBS | OXYGEN SATURATION: 99 % | TEMPERATURE: 98 F

## 2025-02-13 LAB
ALT SERPL-CCNC: 32 U/L (ref 10–50)
CREAT SERPL-MCNC: 0.8 MG/DL (ref 0.7–1.2)
GFR, ESTIMATED: >90 ML/MIN/1.73M2

## 2025-02-13 PROCEDURE — 93005 ELECTROCARDIOGRAM TRACING: CPT | Performed by: ANESTHESIOLOGY

## 2025-02-13 RX ORDER — TIZANIDINE HYDROCHLORIDE 4 MG/1
4 CAPSULE, GELATIN COATED ORAL 3 TIMES DAILY PRN
COMMUNITY
Start: 2024-12-03

## 2025-02-13 RX ORDER — OMEPRAZOLE 20 MG/1
20 CAPSULE, DELAYED RELEASE ORAL DAILY
COMMUNITY

## 2025-02-13 RX ORDER — SILDENAFIL 100 MG/1
100 TABLET, FILM COATED ORAL PRN
COMMUNITY

## 2025-02-13 NOTE — PRE-PROCEDURE INSTRUCTIONS
ARRIVE AT THE HOSPITAL ON Friday, Feb 28,2025 at 08:30 AM    Once you enter the hospital lobby, take the elevators to the second floor.  Check-In is at the surgery registration desk.      Continue to take your home medications as you normally do up to and including the night before surgery with the exception of any blood thinning medications.    Please stop any blood thinning medications as directed by your surgeon or prescribing physician. Failure to stop certain medications may interfere with your scheduled surgery.    These may include:  Aspirin, Warfarin (Coumadin), Clopidogrel (Plavix), Ibuprofen (Motrin, Advil), Naproxen (Aleve), Meloxicam (Mobic), Celecoxib (Celebrex), Eliquis, Pradaxa, Xarelto, Effient, Fish Oil, Herbal supplements.   No ibuprofen,aspirin & aleve for 7 days before surgery    Tylenol is okay to take up until day of surgery    Please take the following medication(s) the day of surgery with a small sip of water:  amlodipine      Please use your inhalers & aerosol treatment and bring your inhaler(s) from home the day of surgery.      PREPARING FOR YOUR SURGERY:     Before surgery, you can play an important role in your own health. Because skin is not sterile, we need to be sure that your skin is as free of germs as possible before surgery by carefully washing before surgery.  Preparing or “prepping” skin before surgery can reduce the risk of a “surgical site infection.”  Do not shave the area of your body where your surgery will be performed unless you received specific permission from your physician.    You will need to shower at home the night before surgery and the morning of surgery with a special soap called chlorhexidine gluconate (CHG*).     *Not to be used by people allergic to Chlorhexidine Gluconate (CHG).    Following these instructions will help you be sure that your skin is clean before surgery.    Instructions on cleaning your skin before surgery:     The night before your  surgery          Bring your home oxygen with you to surgery      If you are on C-PAP or Bi-PAP at home and plan on staying in the hospital overnight for your surgery please bring the machine with you.    Do not wear any jewelry or body piercings day of surgery.  Also, NO lotion, perfume or deodorant to be used the day of surgery.  No nail polish on the operative extremity (arm/leg surgeries)  Please wear loose, comfortable clothing.  If you are potentially going to have a cast or brace bring clothing that will fit over them.                                                                                                          In case of illness - If you have cold or flu like symptoms (high fever, runny nose, sore throat, cough, etc.) rash, nausea, vomiting, loose stools, and/or recent contact with someone who has a contagious disease (chicken pox, measles, etc.) Please call your doctor before coming to the hospital.         Day of Surgery/Procedure:    As a patient at [facility] you can expect quality medical and nursing care that is centered on your individual needs.  Our goal is to make your surgical experience as comfortable as possible    .  Transportation After Your Surgery/Procedure:    You will need a friend or family member to drive you home after your procedure.  Your  must be 18 years of age or older and able to sign off on your discharge instructions.  A taxi cab or any other form of public transportation is not acceptable.  Your friend or family member must stay at the hospital throughout your procedure.    Someone must remain with you for the first 24 hours after your surgery if you receive anesthesia or medication.  If you do not have someone to stay with you, your procedure may be cancelled.      If you have any other questions regarding your procedure or the day of surgery, please call [phone]      _________________________  ____________________________  Signature

## 2025-02-14 LAB
EKG ATRIAL RATE: 95 BPM
EKG P AXIS: 63 DEGREES
EKG P-R INTERVAL: 158 MS
EKG Q-T INTERVAL: 354 MS
EKG QRS DURATION: 92 MS
EKG QTC CALCULATION (BAZETT): 444 MS
EKG R AXIS: 78 DEGREES
EKG T AXIS: 44 DEGREES
EKG VENTRICULAR RATE: 95 BPM

## 2025-02-14 PROCEDURE — 93010 ELECTROCARDIOGRAM REPORT: CPT | Performed by: INTERNAL MEDICINE

## 2025-02-28 ENCOUNTER — HOSPITAL ENCOUNTER (OUTPATIENT)
Age: 70
Setting detail: OUTPATIENT SURGERY
Discharge: HOME | End: 2025-02-28
Attending: STUDENT IN AN ORGANIZED HEALTH CARE EDUCATION/TRAINING PROGRAM | Admitting: STUDENT IN AN ORGANIZED HEALTH CARE EDUCATION/TRAINING PROGRAM
Payer: COMMERCIAL

## 2025-02-28 ENCOUNTER — ANESTHESIA (OUTPATIENT)
Dept: OPERATING ROOM | Age: 70
End: 2025-02-28
Payer: COMMERCIAL

## 2025-02-28 ENCOUNTER — ANESTHESIA EVENT (OUTPATIENT)
Dept: OPERATING ROOM | Age: 70
End: 2025-02-28
Payer: COMMERCIAL

## 2025-02-28 VITALS
HEART RATE: 73 BPM | SYSTOLIC BLOOD PRESSURE: 176 MMHG | DIASTOLIC BLOOD PRESSURE: 103 MMHG | BODY MASS INDEX: 39.1 KG/M2 | RESPIRATION RATE: 21 BRPM | WEIGHT: 258 LBS | TEMPERATURE: 97.5 F | HEIGHT: 68 IN | OXYGEN SATURATION: 95 %

## 2025-02-28 DIAGNOSIS — G89.18 ACUTE POST-OPERATIVE PAIN: Primary | ICD-10-CM

## 2025-02-28 PROCEDURE — 7100000001 HC PACU RECOVERY - ADDTL 15 MIN: Performed by: STUDENT IN AN ORGANIZED HEALTH CARE EDUCATION/TRAINING PROGRAM

## 2025-02-28 PROCEDURE — 7100000010 HC PHASE II RECOVERY - FIRST 15 MIN: Performed by: STUDENT IN AN ORGANIZED HEALTH CARE EDUCATION/TRAINING PROGRAM

## 2025-02-28 PROCEDURE — 7100000011 HC PHASE II RECOVERY - ADDTL 15 MIN: Performed by: STUDENT IN AN ORGANIZED HEALTH CARE EDUCATION/TRAINING PROGRAM

## 2025-02-28 PROCEDURE — 6360000002 HC RX W HCPCS: Performed by: ANESTHESIOLOGY

## 2025-02-28 PROCEDURE — 3600000002 HC SURGERY LEVEL 2 BASE: Performed by: STUDENT IN AN ORGANIZED HEALTH CARE EDUCATION/TRAINING PROGRAM

## 2025-02-28 PROCEDURE — C1781 MESH (IMPLANTABLE): HCPCS | Performed by: STUDENT IN AN ORGANIZED HEALTH CARE EDUCATION/TRAINING PROGRAM

## 2025-02-28 PROCEDURE — 3700000001 HC ADD 15 MINUTES (ANESTHESIA): Performed by: STUDENT IN AN ORGANIZED HEALTH CARE EDUCATION/TRAINING PROGRAM

## 2025-02-28 PROCEDURE — 2709999900 HC NON-CHARGEABLE SUPPLY: Performed by: STUDENT IN AN ORGANIZED HEALTH CARE EDUCATION/TRAINING PROGRAM

## 2025-02-28 PROCEDURE — 6370000000 HC RX 637 (ALT 250 FOR IP): Performed by: ANESTHESIOLOGY

## 2025-02-28 PROCEDURE — 3700000000 HC ANESTHESIA ATTENDED CARE: Performed by: STUDENT IN AN ORGANIZED HEALTH CARE EDUCATION/TRAINING PROGRAM

## 2025-02-28 PROCEDURE — 2500000003 HC RX 250 WO HCPCS: Performed by: STUDENT IN AN ORGANIZED HEALTH CARE EDUCATION/TRAINING PROGRAM

## 2025-02-28 PROCEDURE — 6360000002 HC RX W HCPCS: Performed by: STUDENT IN AN ORGANIZED HEALTH CARE EDUCATION/TRAINING PROGRAM

## 2025-02-28 PROCEDURE — 7100000000 HC PACU RECOVERY - FIRST 15 MIN: Performed by: STUDENT IN AN ORGANIZED HEALTH CARE EDUCATION/TRAINING PROGRAM

## 2025-02-28 PROCEDURE — 3600000012 HC SURGERY LEVEL 2 ADDTL 15MIN: Performed by: STUDENT IN AN ORGANIZED HEALTH CARE EDUCATION/TRAINING PROGRAM

## 2025-02-28 PROCEDURE — 49505 PRP I/HERN INIT REDUC >5 YR: CPT | Performed by: STUDENT IN AN ORGANIZED HEALTH CARE EDUCATION/TRAINING PROGRAM

## 2025-02-28 PROCEDURE — 6360000002 HC RX W HCPCS: Performed by: NURSE ANESTHETIST, CERTIFIED REGISTERED

## 2025-02-28 PROCEDURE — 2580000003 HC RX 258: Performed by: NURSE ANESTHETIST, CERTIFIED REGISTERED

## 2025-02-28 PROCEDURE — 62325 NJX INTERLAMINAR CRV/THRC: CPT | Performed by: ANESTHESIOLOGY

## 2025-02-28 PROCEDURE — 2580000003 HC RX 258: Performed by: ANESTHESIOLOGY

## 2025-02-28 DEVICE — MESH HERN W3XL4.75IN L PLA PRECUT FLAP STYL PARTIALLY ABSRB: Type: IMPLANTABLE DEVICE | Site: GROIN | Status: FUNCTIONAL

## 2025-02-28 RX ORDER — BUPIVACAINE HYDROCHLORIDE AND EPINEPHRINE 5; 5 MG/ML; UG/ML
INJECTION, SOLUTION EPIDURAL; INTRACAUDAL; PERINEURAL PRN
Status: DISCONTINUED | OUTPATIENT
Start: 2025-02-28 | End: 2025-02-28 | Stop reason: ALTCHOICE

## 2025-02-28 RX ORDER — OXYCODONE HYDROCHLORIDE 5 MG/1
5 TABLET ORAL
Status: COMPLETED | OUTPATIENT
Start: 2025-02-28 | End: 2025-02-28

## 2025-02-28 RX ORDER — ONDANSETRON 2 MG/ML
4 INJECTION INTRAMUSCULAR; INTRAVENOUS ONCE
Status: COMPLETED | OUTPATIENT
Start: 2025-02-28 | End: 2025-02-28

## 2025-02-28 RX ORDER — NALOXONE HYDROCHLORIDE 0.4 MG/ML
INJECTION, SOLUTION INTRAMUSCULAR; INTRAVENOUS; SUBCUTANEOUS PRN
Status: DISCONTINUED | OUTPATIENT
Start: 2025-02-28 | End: 2025-02-28 | Stop reason: HOSPADM

## 2025-02-28 RX ORDER — OXYCODONE HYDROCHLORIDE 5 MG/1
5 TABLET ORAL EVERY 6 HOURS PRN
Qty: 12 TABLET | Refills: 0 | Status: SHIPPED | OUTPATIENT
Start: 2025-02-28 | End: 2025-03-03

## 2025-02-28 RX ORDER — MIDAZOLAM HYDROCHLORIDE 1 MG/ML
INJECTION, SOLUTION INTRAMUSCULAR; INTRAVENOUS
Status: DISCONTINUED | OUTPATIENT
Start: 2025-02-28 | End: 2025-02-28 | Stop reason: SDUPTHER

## 2025-02-28 RX ORDER — SODIUM CHLORIDE 0.9 % (FLUSH) 0.9 %
5-40 SYRINGE (ML) INJECTION PRN
Status: DISCONTINUED | OUTPATIENT
Start: 2025-02-28 | End: 2025-02-28 | Stop reason: HOSPADM

## 2025-02-28 RX ORDER — CEFAZOLIN SODIUM/WATER 2 G/20 ML
2000 SYRINGE (ML) INTRAVENOUS ONCE
Status: COMPLETED | OUTPATIENT
Start: 2025-02-28 | End: 2025-02-28

## 2025-02-28 RX ORDER — METOCLOPRAMIDE HYDROCHLORIDE 5 MG/ML
10 INJECTION INTRAMUSCULAR; INTRAVENOUS
Status: COMPLETED | OUTPATIENT
Start: 2025-02-28 | End: 2025-02-28

## 2025-02-28 RX ORDER — PROPOFOL 10 MG/ML
INJECTION, EMULSION INTRAVENOUS
Status: DISCONTINUED | OUTPATIENT
Start: 2025-02-28 | End: 2025-02-28 | Stop reason: SDUPTHER

## 2025-02-28 RX ORDER — SODIUM CHLORIDE 9 MG/ML
INJECTION, SOLUTION INTRAVENOUS PRN
Status: DISCONTINUED | OUTPATIENT
Start: 2025-02-28 | End: 2025-02-28 | Stop reason: HOSPADM

## 2025-02-28 RX ORDER — SODIUM CHLORIDE 9 MG/ML
INJECTION, SOLUTION INTRAVENOUS CONTINUOUS
Status: DISCONTINUED | OUTPATIENT
Start: 2025-02-28 | End: 2025-02-28 | Stop reason: HOSPADM

## 2025-02-28 RX ORDER — LIDOCAINE HYDROCHLORIDE 10 MG/ML
1 INJECTION, SOLUTION EPIDURAL; INFILTRATION; INTRACAUDAL; PERINEURAL
Status: DISCONTINUED | OUTPATIENT
Start: 2025-03-01 | End: 2025-02-28 | Stop reason: HOSPADM

## 2025-02-28 RX ORDER — FENTANYL CITRATE 50 UG/ML
25 INJECTION, SOLUTION INTRAMUSCULAR; INTRAVENOUS EVERY 5 MIN PRN
Status: DISCONTINUED | OUTPATIENT
Start: 2025-02-28 | End: 2025-02-28 | Stop reason: HOSPADM

## 2025-02-28 RX ORDER — SODIUM CHLORIDE 0.9 % (FLUSH) 0.9 %
5-40 SYRINGE (ML) INJECTION EVERY 12 HOURS SCHEDULED
Status: DISCONTINUED | OUTPATIENT
Start: 2025-02-28 | End: 2025-02-28 | Stop reason: HOSPADM

## 2025-02-28 RX ORDER — SODIUM CHLORIDE, SODIUM LACTATE, POTASSIUM CHLORIDE, CALCIUM CHLORIDE 600; 310; 30; 20 MG/100ML; MG/100ML; MG/100ML; MG/100ML
INJECTION, SOLUTION INTRAVENOUS CONTINUOUS
Status: DISCONTINUED | OUTPATIENT
Start: 2025-02-28 | End: 2025-02-28 | Stop reason: HOSPADM

## 2025-02-28 RX ORDER — SODIUM CHLORIDE, SODIUM LACTATE, POTASSIUM CHLORIDE, CALCIUM CHLORIDE 600; 310; 30; 20 MG/100ML; MG/100ML; MG/100ML; MG/100ML
INJECTION, SOLUTION INTRAVENOUS
Status: DISCONTINUED | OUTPATIENT
Start: 2025-02-28 | End: 2025-02-28 | Stop reason: SDUPTHER

## 2025-02-28 RX ORDER — HALOPERIDOL 5 MG/ML
1 INJECTION INTRAMUSCULAR
Status: DISCONTINUED | OUTPATIENT
Start: 2025-02-28 | End: 2025-02-28 | Stop reason: HOSPADM

## 2025-02-28 RX ORDER — FENTANYL CITRATE 50 UG/ML
INJECTION, SOLUTION INTRAMUSCULAR; INTRAVENOUS
Status: DISCONTINUED | OUTPATIENT
Start: 2025-02-28 | End: 2025-02-28 | Stop reason: SDUPTHER

## 2025-02-28 RX ORDER — HYDROMORPHONE HYDROCHLORIDE 1 MG/ML
0.5 INJECTION, SOLUTION INTRAMUSCULAR; INTRAVENOUS; SUBCUTANEOUS EVERY 5 MIN PRN
Status: DISCONTINUED | OUTPATIENT
Start: 2025-02-28 | End: 2025-02-28 | Stop reason: HOSPADM

## 2025-02-28 RX ORDER — KETOROLAC TROMETHAMINE 30 MG/ML
30 INJECTION, SOLUTION INTRAMUSCULAR; INTRAVENOUS ONCE
Status: COMPLETED | OUTPATIENT
Start: 2025-02-28 | End: 2025-02-28

## 2025-02-28 RX ADMIN — MIDAZOLAM 1 MG: 1 INJECTION INTRAMUSCULAR; INTRAVENOUS at 10:02

## 2025-02-28 RX ADMIN — KETOROLAC TROMETHAMINE 30 MG: 30 INJECTION, SOLUTION INTRAMUSCULAR at 13:38

## 2025-02-28 RX ADMIN — SODIUM CHLORIDE, POTASSIUM CHLORIDE, SODIUM LACTATE AND CALCIUM CHLORIDE: 600; 310; 30; 20 INJECTION, SOLUTION INTRAVENOUS at 09:55

## 2025-02-28 RX ADMIN — PROPOFOL 20 MG: 10 INJECTION, EMULSION INTRAVENOUS at 10:25

## 2025-02-28 RX ADMIN — OXYCODONE HYDROCHLORIDE 5 MG: 5 TABLET ORAL at 12:31

## 2025-02-28 RX ADMIN — PROPOFOL 75 MCG/KG/MIN: 10 INJECTION, EMULSION INTRAVENOUS at 10:15

## 2025-02-28 RX ADMIN — MIDAZOLAM 1 MG: 1 INJECTION INTRAMUSCULAR; INTRAVENOUS at 09:55

## 2025-02-28 RX ADMIN — SODIUM CHLORIDE, SODIUM LACTATE, POTASSIUM CHLORIDE, AND CALCIUM CHLORIDE: .6; .31; .03; .02 INJECTION, SOLUTION INTRAVENOUS at 09:26

## 2025-02-28 RX ADMIN — ONDANSETRON 4 MG: 2 INJECTION, SOLUTION INTRAMUSCULAR; INTRAVENOUS at 13:38

## 2025-02-28 RX ADMIN — METOCLOPRAMIDE HYDROCHLORIDE 10 MG: 5 INJECTION INTRAMUSCULAR; INTRAVENOUS at 12:40

## 2025-02-28 RX ADMIN — SODIUM CHLORIDE, POTASSIUM CHLORIDE, SODIUM LACTATE AND CALCIUM CHLORIDE: 600; 310; 30; 20 INJECTION, SOLUTION INTRAVENOUS at 11:53

## 2025-02-28 RX ADMIN — Medication 2000 MG: at 10:17

## 2025-02-28 RX ADMIN — FENTANYL CITRATE 25 MCG: 50 INJECTION INTRAMUSCULAR; INTRAVENOUS at 10:02

## 2025-02-28 ASSESSMENT — PAIN DESCRIPTION - LOCATION
LOCATION: GROIN
LOCATION: GROIN

## 2025-02-28 ASSESSMENT — PAIN SCALES - GENERAL
PAINLEVEL_OUTOF10: 7
PAINLEVEL_OUTOF10: 4

## 2025-02-28 ASSESSMENT — ENCOUNTER SYMPTOMS: SHORTNESS OF BREATH: 0

## 2025-02-28 ASSESSMENT — PAIN DESCRIPTION - DESCRIPTORS
DESCRIPTORS: BURNING
DESCRIPTORS: BURNING

## 2025-02-28 ASSESSMENT — PAIN - FUNCTIONAL ASSESSMENT: PAIN_FUNCTIONAL_ASSESSMENT: 0-10

## 2025-02-28 ASSESSMENT — PAIN DESCRIPTION - ORIENTATION
ORIENTATION: RIGHT
ORIENTATION: RIGHT

## 2025-02-28 ASSESSMENT — COPD QUESTIONNAIRES: CAT_SEVERITY: SEVERE

## 2025-02-28 NOTE — DISCHARGE INSTR - ACTIVITY
Patient Discharge Instructions  Surgery Patient Discharge Instructions    RESUME ACTIVITY:     WOUND CARE:   OK to remove the outer dressing in 24 hours. Skin glue used. Do not peel off, allow to fall off naturally; if glue has not fallen off within 10-14 days, OK to gently remove.     BATHING:  Ok to shower 24 hours after surgery. Do not submerge surgical incisions in water (baths, pools, hot tubs, lakes) until cleared by surgeon at post operative follow up visit.    DRIVING: No driving for while on pain medication    RETURN TO WORK:  No lifting more than 10 pounds  The above restrictions are in effect for 6 week(s)    WALKING:    Yes    LIFTING: Avoid lifting objects heavier than 5-10 lbs for 6 weeks.    DIET:   Ok to resume regular diet.     MEDICATIONS: Take medications as prescribed. For adequate pain control, take tylenol and motrin in alternating cycles (e.g. take 500mg tylenol at 8am, take 400mg motrin at 12pm, take 500mg tylenol at 4pm, take 400mg motrin at 8pm, etc.). If prescribed narcotic medications (Norco, Percocet, or Roxicodone), use for breakthrough pain and attempt to wean off medications as soon as possible. Do not take more than 4000mg tylenol in 24 hours.     Take colace 100mg up to twice a day or Miralax 17g daily until you achieve a bowel movement. If you do not have a bowel movement for 3 days after surgery, take magnesium citrate (over the counter) daily.    FOLLOW UP: Call [phone] for follow up appointment with Dr. Arambula in 10-14 days if one has not already been made    SPECIAL INSTRUCTIONS:  After you leave the hospital, call your doctor if any of the following occurs:   Pain or symptoms that worsen   Other new symptoms   Signs of infection, including fever and chills   Nausea and/or vomiting that you can't control with the medications you were given   Pain that you can't control with the medications you've been given   Excessive tenderness or swelling   Changes in bowel or sexual

## 2025-02-28 NOTE — ANESTHESIA PRE PROCEDURE
for Wheezing 1/6/25 4/6/25  Brian Hull MD   budesonide-formoterol (BREYNA) 160-4.5 MCG/ACT AERO Inhale 2 puffs into the lungs 2 times daily 1/6/25 7/5/25  Brian Hull MD   umeclidinium bromide (INCRUSE ELLIPTA) 62.5 MCG/ACT inhaler Inhale 1 puff into the lungs daily 1/6/25 4/6/25  Brian Hull MD Handicap Placard MISC by Does not apply route Diagnosis COPD   Expiration 5 years 10/15/24   Brian Hull MD   cetirizine (ZYRTEC) 10 MG tablet Take 1 tablet by mouth daily 10/15/24   Brian Hull MD   predniSONE (DELTASONE) 10 MG tablet 4 tablet once daily for 3 days then 3 tablet once daily for 3 days then 2 tablet once daily for 3 days then 1 tablet once daily for 3 days then discontinue 10/15/24   Brian Hull MD   albuterol sulfate HFA (VENTOLIN HFA) 108 (90 Base) MCG/ACT inhaler inhale 2 puffs by mouth and INTO THE LUNGS every 6 hours if needed for wheezing 3/8/24   Brian Hull MD   acetaminophen (TYLENOL) 500 MG tablet Take 2 tablets by mouth every 6 hours as needed for Pain    ProviderNaheed MD   amLODIPine (NORVASC) 10 MG tablet take 1 tablet by mouth once daily 8/19/19   Karely Maguire PA-C Handicap Placard MISC by Does not apply route For 5 years 7/31/19   Brian Hull MD   traMADol (ULTRAM) 50 MG tablet Take 1 tablet by mouth as needed for Pain.    ProviderNaheed MD   ALPRAZolam (XANAX) 0.5 MG tablet Take 1 tablet by mouth 3 times daily as needed for Sleep.    ProviderNaheed MD   benazepril (LOTENSIN) 10 MG tablet take 1 tablet by mouth once daily  Patient taking differently: No sig reported 12/14/18   Karely Maguire PA-C   Immune Globulin, Human, 10 GM/100ML SOLN IV solution Infuse 60 g intravenously every 21 days    ProviderNaheed MD       Current medications:    Current Facility-Administered Medications   Medication Dose Route Frequency Provider Last Rate Last Admin    [START ON 3/1/2025] lidocaine PF 1 % injection 1 mL  1 mL IntraDERmal Once PRN

## 2025-02-28 NOTE — DISCHARGE INSTRUCTIONS
Patient Discharge Instructions  Surgery Patient Discharge Instructions    RESUME ACTIVITY:     WOUND CARE:   OK to remove the outer dressing in 24 hours. Skin glue used. Do not peel off, allow to fall off naturally; if glue has not fallen off within 10-14 days, OK to gently remove.     BATHING:  Ok to shower 24 hours after surgery. Do not submerge surgical incisions in water (baths, pools, hot tubs, lakes) until cleared by surgeon at post operative follow up visit.    DRIVING: No driving for while on pain medication    RETURN TO WORK:  No lifting more than 10 pounds  The above restrictions are in effect for 6 week(s)    WALKING:    Yes    LIFTING: Avoid lifting objects heavier than 5-10 lbs for 6 weeks.    DIET:   Ok to resume regular diet.     MEDICATIONS: Take medications as prescribed. For adequate pain control, take tylenol and motrin in alternating cycles (e.g. take 500mg tylenol at 8am, take 400mg motrin at 12pm, take 500mg tylenol at 4pm, take 400mg motrin at 8pm, etc.). If prescribed narcotic medications (Norco, Percocet, or Roxicodone), use for breakthrough pain and attempt to wean off medications as soon as possible. Do not take more than 4000mg tylenol in 24 hours.     Take colace 100mg up to twice a day or Miralax 17g daily until you achieve a bowel movement. If you do not have a bowel movement for 3 days after surgery, take magnesium citrate (over the counter) daily.    FOLLOW UP: Call [phone] for follow up appointment with Dr. Arambula in 10-14 days if one has not already been made    SPECIAL INSTRUCTIONS:  After you leave the hospital, call your doctor if any of the following occurs:   Pain or symptoms that worsen   Other new symptoms   Signs of infection, including fever and chills   Nausea and/or vomiting that you can't control with the medications you were given   Pain that you can't control with the medications you've been given   Excessive tenderness or swelling   Changes in bowel or sexual  function   Dizziness or lightheadedness   Rash or hives       Watch for signs of infection:    Excessive warmth or bright redness around your incisions    Leakage of bloody or cloudy fluid from you incisions    Fever over 100.5

## 2025-02-28 NOTE — OP NOTE
Operative Note      Patient: Wilfred Collins  YOB: 1955  MRN: [id number]    Date of Procedure: 2/28/2025    Pre-Op Diagnosis Codes:      * Right inguinal hernia [K40.90]    Post-Op Diagnosis: Same       Procedure(s):  OPEN HERNIA INGUINAL REPAIR Right    Surgeon(s):  Tiffanie Arambula DO    Assistant:   First Assistant: Alba Gomes    Anesthesia: Epidural    Estimated Blood Loss (mL): Minimal    Complications: None    Specimens:   * No specimens in log *    Implants:  Implant Name Type Inv. Item Serial No.  Lot No. LRB No. Used Action   MESH SUSSY W3XL4.75IN L MALATHI PRECUT FLAP STYL PARTIALLY ABSRB - [id number]  MESH SUSSY W3XL4.75IN L MALATHI PRECUT FLAP STYL PARTIALLY ABSRB  [employer]  SURGICAL-WD [id number] Right 1 Implanted         Drains: * No LDAs found *    Findings:  Infection Present At Time Of Surgery (PATOS) (choose all levels that have infection present):  No infection present  Other Findings: large direct and indirect right inguinal hernia    Detailed Description of Procedure:   Patient is a 69-year-old male with an acute history of right groin discomfort and swelling that has been getting larger.  CT abdomen pelvis demonstrated a right inguinal hernia containing a portion of bladder.  Decision made to bring patient to the operating room for an open right inguinal hernia repair as patient is unable to tolerate general anesthesia. This procedure, including risks, benefits, alternatives and complications have been fully reviewed with the patient and informed consent was obtained.  Risks discussed include infection, bleeding, injury to surrounding structures, need for more procedures, chronic pain, scarring, risks of anesthesia, including myocardial infarction, stroke, fatal arrhythmias.  Patient understands and all questions were answered appropriately.     Patient was brought to the operating room and underwent an epidural with anesthesia.  Patient was then placed in a supine  position with the arms out.  Patient had received 2 g of Ancef for perioperative antibiotics.  SCDs were placed.  Hair was removed with clippers.  A timeout was performed to confirm the patient, procedure, allergies and all the concerns.  The right groin was then prepped with Hibiclens and draped in the usual sterile fashion.  The right ASIS and pubic tubercle were identified and a oblique incision was made with a #15 blade at the level of the inguinal ligament.  0.5% Marcaine with epinephrine was used for local anesthesia.  Further dissection was carried down using Bovie electrocautery.  The Courtney's fascia was divided and hemostasis was achieved.  The external oblique was identified and completely dissected free of the fatty tissue.  Additional local anesthesia was injected into the external oblique.  An incision was made with the #15 blade and the external oblique was opened inferiorly and superiorly with Metzenbaum scissors.  Care was taken to avoid injuring the ilioinguinal ligament which was identified and protected.  A very large direct and indirect inguinal hernia was identified containing bladder.  The hernia sac was identified and completely dissected free from the cord structures ensuring to protect the vas deferens and blood supply.  Once the cord structures were completely identified a Penrose drain was used to provide retraction.  The hernia sac again was dissected free using blunt dissection as well as electrocautery to separate from the cord structures.  Care was taken to avoid injuring the bladder wall.  As the bladder wall was adhered to the peritoneum, the hernia sac was not divided.  The hernia sac however was open to identify the intra-abdominal contents and there was no evidence of small or large bowel within the hernia sac but there was bladder.  The hernia sac opening was then sutured with an 0 silk suture and gently placed back into the intra-abdominal cavity.  The direct hernia defect was

## 2025-02-28 NOTE — H&P
acute findings elsewhere in the abdomen or pelvis.  3. Right inguinal hernia containing fat and a portion of the bladder.          Assessment: right inguinal hernia     Plan: Due to patient's medical comorbidities, we will plan for open repair of right inguinal hernia with mesh.  Due to patient's deficiency and requirement for IV antibiotics as well as arrangements to move his IVIG transfusions around his right hip arthroplasty in 2021, we will ask his immunologist Dr. Nguyen if he will require similar arrangements for the hernia repair with mesh.  I discussed in detail open right inguinal hernia repair and the use of mesh and decreasing hernia recurrence by more than 50%.  Additionally discussed recovery time and expectations postoperatively. This procedure, including risks, benefits, alternatives and complications have been fully reviewed with the patient and informed consent was obtained.  Risks discussed include infection, bleeding, injury to surrounding structures, need for more procedures, chronic pain, scarring, risks of anesthesia, including myocardial infarction, stroke, fatal arrhythmias.  Patient understands and all questions were answered appropriately.      Will need \"clearances\" from PCP, Pulm and Immunology prior to hernia repair.      A total of 38 minutes were encompassed in the preparation for the patient encounter, history and physical examination by myself, review of relevant testing and communication with patient/patient's other providers as well as development of assessment plan.         Electronically signed by Tiffanie Arambula DO on 2/3/2025 at 10:35 AM

## 2025-02-28 NOTE — ANESTHESIA PROCEDURE NOTES
Epidural Block    Patient location during procedure: pre-op  Start time: 2/28/2025 10:03 AM  End time: 2/28/2025 10:12 AM  Reason for block: procedure for pain  Staffing  Performed: anesthesiologist   Anesthesiologist: Zack Trivedi MD  Performed by: Zack Trivedi MD  Authorized by: Zack Trivedi MD    Epidural  Patient position: sitting  Patient monitoring: cardiac monitor, continuous pulse ox, capnometry and frequent blood pressure checks  Approach: midline  Location: L3-4  Injection technique: KELTON saline  Provider prep: mask and sterile gloves  Needle  Needle type: Tuohy   Needle gauge: 18 G  Catheter type: multi-orifice  Catheter size: 20 G  Catheter at skin depth: 16 cmCatheter Secured: tegaderm and tape  Assessment  Sensory level: T6  Hemodynamics: stable  Attempts: 1  Outcomes: uncomplicated and patient tolerated procedure well  Additional Notes  2cc of 0.75 bupivicaine with dextrose and epi wash via 25 g spinal needle, combined spinal epidural technique, epidural catheter in but only as a backup plan, test dosed  Preanesthetic Checklist  Completed: patient identified, IV checked, site marked, risks and benefits discussed, surgical/procedural consents, equipment checked, pre-op evaluation, timeout performed, anesthesia consent given, oxygen available, monitors applied/VS acknowledged, fire risk safety assessment completed and verbalized and blood product R/B/A discussed and consented

## 2025-02-28 NOTE — ANESTHESIA POSTPROCEDURE EVALUATION
Department of Anesthesiology  Postprocedure Note    Patient: Wilfred Collins  MRN: [id number]  YOB: 1955  Date of evaluation: 2/28/2025    Procedure Summary       Date: 02/28/25 Room / Location: [address]    Anesthesia Start: 0955 Anesthesia Stop: 1200    Procedure: OPEN HERNIA INGUINAL REPAIR (Right: Groin) Diagnosis:       Right inguinal hernia      (Right inguinal hernia [K40.90])    Surgeons: Tiffanie Arambula DO Responsible Provider: Zack Trivedi MD    Anesthesia Type: Spinal, Epidural ASA Status: 3            Anesthesia Type: Spinal, Epidural    Darryl Phase I: Darryl Score: 9    Darryl Phase II: Darryl Score: 10    Anesthesia Post Evaluation    Patient location during evaluation: PACU  Patient participation: complete - patient participated  Level of consciousness: awake  Airway patency: patent  Nausea & Vomiting: no nausea  Cardiovascular status: blood pressure returned to baseline  Respiratory status: acceptable  Hydration status: euvolemic  Comments: Multimodal analgesia pain management as indicated by procedure  Multimodal analgesia pain management approach  Pain management: adequate    No notable events documented.

## 2025-03-20 ENCOUNTER — TELEPHONE (OUTPATIENT)
Dept: PULMONOLOGY | Age: 70
End: 2025-03-20

## 2025-03-20 RX ORDER — PREDNISONE 10 MG/1
TABLET ORAL
Qty: 30 TABLET | Refills: 0 | Status: SHIPPED | OUTPATIENT
Start: 2025-03-20

## 2025-03-20 NOTE — TELEPHONE ENCOUNTER
Wife, CHRIS called office, with Wilfred by her side.  Pt recently finished Levaquin and Pred burst (last wk)  now he feels like he is having coughing flare ups - he is using his nebulizer twice a day, and it's helping.  However he continues to have them and feels like he can't get in under control.  He is SOB and coughing \"dark white\" phlegm  Was difficult for Chris & Wilfred to describe, however is asking if he can get a refill- please advise        Also mentioned cost of inhalers- Incruse and Alexus have gone up, advised them to call insurance to see what Rx's are preferred.     Advair was recommended by pharmacy, but they will check with insurance and call back.

## 2025-03-24 ENCOUNTER — HOSPITAL ENCOUNTER (OUTPATIENT)
Dept: SURGERY | Age: 70
Discharge: HOME | End: 2025-03-24
Payer: COMMERCIAL

## 2025-03-24 VITALS
OXYGEN SATURATION: 95 % | DIASTOLIC BLOOD PRESSURE: 86 MMHG | HEART RATE: 91 BPM | SYSTOLIC BLOOD PRESSURE: 144 MMHG | RESPIRATION RATE: 20 BRPM

## 2025-03-24 PROCEDURE — 99211 OFF/OP EST MAY X REQ PHY/QHP: CPT | Performed by: STUDENT IN AN ORGANIZED HEALTH CARE EDUCATION/TRAINING PROGRAM

## 2025-03-24 PROCEDURE — 99024 POSTOP FOLLOW-UP VISIT: CPT | Performed by: STUDENT IN AN ORGANIZED HEALTH CARE EDUCATION/TRAINING PROGRAM

## 2025-03-24 NOTE — PROGRESS NOTES
[facility]  Progress Note        NAME:  Wilfred Collins  MRN: [id number]   YOB: 1955   Date: 3/24/2025   Age: 69 y.o.  Gender: male     There is no height or weight on file to calculate BMI.     Chief Complaint:   Postop    History of Present Illness:   patient is a 69-year-old male who returns to the office following open right inguinal hernia repair with mesh on 2/28/2025.  Patient states that overall he has been doing well after surgery.  Does still have some scrotal pain as well as pain in the lower abdomen above his groin incision.  States that whenever he stretches back he will feel some pulling sensation in the right lower abdomen.  He otherwise has been having normal daily bowel movements without issues and has been continuing to take his Colace although now he is taking it once a day as opposed to twice a day given that he was having some loose stool.  States that he is voiding well.  Continues to have right testicular/scrotal pain but states that all of the pain has been much improved since surgery.  Swelling has additionally significantly gone down.  Denies any fevers, chills, shortness of breath or chest pain, nausea or vomiting.    Current Outpatient Medications on File Prior to Encounter   Medication Sig Dispense Refill    predniSONE (DELTASONE) 10 MG tablet 4 tablet once daily for 3 days then 3 tablet once daily for 3 days then 2 tablet once daily for 3 days then 1 tablet once daily for 3 days 30 tablet 0    omeprazole (PRILOSEC) 20 MG delayed release capsule Take 1 capsule by mouth Daily      sildenafil (VIAGRA) 100 MG tablet Take 1 tablet by mouth as needed for Erectile Dysfunction      tiZANidine (ZANAFLEX) 4 MG capsule 1 capsule 3 times daily as needed      albuterol (PROVENTIL) (2.5 MG/3ML) 0.083% nebulizer solution Take 3 mLs by nebulization every 4 hours as needed for Wheezing 360 mL 3    albuterol sulfate HFA (PROAIR HFA) 108 (90 Base) MCG/ACT inhaler

## 2025-04-17 RX ORDER — FLUTICASONE FUROATE AND VILANTEROL 200; 25 UG/1; UG/1
1 POWDER RESPIRATORY (INHALATION)
Qty: 3 EACH | Refills: 3 | Status: SHIPPED | OUTPATIENT
Start: 2025-04-17 | End: 2025-07-16

## 2025-04-17 RX ORDER — UMECLIDINIUM 62.5 UG/1
1 AEROSOL, POWDER ORAL DAILY
Qty: 3 EACH | Refills: 3 | Status: SHIPPED | OUTPATIENT
Start: 2025-04-17 | End: 2025-07-16

## 2025-04-17 NOTE — TELEPHONE ENCOUNTER
Did you sign these while at [place] office?   Rx defaulted to print.       Routed to Abigail - if these printed there, will you please have him sign Rx's and mail them to address on this encounter?

## 2025-04-17 NOTE — TELEPHONE ENCOUNTER
Maddy following up with inhaler assistance for Wilfred .  3 mo with 3 refills -Paper Rx needs to be mailed     Prescription Assistance [address] [place] [address]     [address] [place]    [address]     Please sign pending Rx WHILE IN OFFICE   Writer will mail.

## 2025-04-28 ENCOUNTER — HOSPITAL ENCOUNTER (OUTPATIENT)
Dept: SURGERY | Age: 70
Discharge: HOME | End: 2025-04-28
Payer: COMMERCIAL

## 2025-04-28 VITALS
OXYGEN SATURATION: 98 % | SYSTOLIC BLOOD PRESSURE: 151 MMHG | DIASTOLIC BLOOD PRESSURE: 83 MMHG | HEART RATE: 75 BPM | RESPIRATION RATE: 20 BRPM

## 2025-04-28 PROCEDURE — 99211 OFF/OP EST MAY X REQ PHY/QHP: CPT | Performed by: STUDENT IN AN ORGANIZED HEALTH CARE EDUCATION/TRAINING PROGRAM

## 2025-04-28 PROCEDURE — 99024 POSTOP FOLLOW-UP VISIT: CPT | Performed by: STUDENT IN AN ORGANIZED HEALTH CARE EDUCATION/TRAINING PROGRAM

## 2025-04-28 NOTE — PROGRESS NOTES
replacement.    Other Rash     Alcohol prep pad       Past Medical History:   Diagnosis Date    Allergic rhinitis     Anxiety     Aseptic meningitis     reaction to first infusion in 2012-hospitalized but no problem since then    Asthma     Avascular necrosis of bone of hip, left (HCC) 04/2018    CAD (coronary artery disease)     mild on cath, no stents    Common variable immunodeficiency (HCC) 12/4/2013    COPD (chronic obstructive pulmonary disease) (HCC)     Deviated nasal septum     Dyspnea     with exertion    HTN (hypertension)     Hyperglycemia     Hypoxia     Immune deficiency disorder 2012    IGIV every 3 weeks with prednisone    Obesity     On supplemental oxygen therapy     PRECIOUS (obstructive sleep apnea) 2012    CPAP nightly    Osteoporosis     Reflux     Respiratory failure (HCC) 2014    Oxygen on    Severe persistent asthma (HCC)     Spondylosis of cervical spine     TIA (transient ischemic attack) 2005    Tobacco use     stopped 2004    Under care of team 04/15/2021    pulmonology-Dr Hull[facility]-last visit march 2021    Under care of team 04/15/2021    infectious disease-Dr Chavez-[facility]-last visit march 2021    Under care of team 04/15/2021    immunology-Dr Rivers-last visit mar 2021    Vitamin D deficiency 3/6/2018    Wears glasses     Wellness examination 04/15/2021    pcp-Dr Vazquez-last visit apr 2021       Past Surgical History:   Procedure Laterality Date    CARDIAC CATHETERIZATION  2005    no stents    COLONOSCOPY  2003    ENDOSCOPY, COLON, DIAGNOSTIC      FINGER SURGERY Left 1997    reattachment, index finger    HERNIA REPAIR Right 2/28/2025    OPEN HERNIA INGUINAL REPAIR performed by Tiffanie Arambula DO at [facility]    IR PORT PLACEMENT > 5 YEARS  6/23/2021    IR PORT PLACEMENT EQUAL OR GREATER THAN 5 YEARS 6/23/2021 [facility] SPECIAL PROCEDURES    NASAL ENDOSCOPY      AK REVJ TOT HIP ARTHRP [facility] W/WO AGRFT/ALGRFT Left 05/22/2018    HIP TOTAL ARTHROPLASTY ANTERIOR APPROACH

## 2025-05-07 ENCOUNTER — TELEPHONE (OUTPATIENT)
Dept: PULMONOLOGY | Age: 70
End: 2025-05-07

## 2025-05-07 RX ORDER — LEVOFLOXACIN 500 MG/1
500 TABLET, FILM COATED ORAL DAILY
Qty: 7 TABLET | Refills: 0 | Status: SHIPPED | OUTPATIENT
Start: 2025-05-07 | End: 2025-05-14

## 2025-05-07 NOTE — TELEPHONE ENCOUNTER
Maddy called office, Wilfred is coughing green, would like Leviquin sent to [facility] pharmacy.     Also, requesting ANY sample - he has not received his Rx from the assistance program as of yet.

## 2025-05-08 NOTE — TELEPHONE ENCOUNTER
Called Maddy to inform- she was already aware of called in AB and after our conversation - later that day, Rufus medication came.  Pt doesn't need samples.

## 2025-05-28 NOTE — PROGRESS NOTES
Text msg sent to r/s apt for 5/28 due to no show   transfers and ambulation. Pt to benefit from continued physical therapy for further improvements in strength, ROM, pain, and functional mobility. []? No change. []? Other:     Problems at evaluation on 1/03:    [x]? ? ? Back Pain:                         []?? ? Cervical Pain:        [x]? ? ? ROM:                     [x]? ? ? Strength:    [x]? ? ? Function: Oswestry = 14/45 (28% impairment), LEFS = 38/64 (41% impairment)  [x]? ? Postural Deviations  []? ? Gait Deviations  []? ? Other:                       STG: (to be met in 10 treatments)  1. ? Pain: Pt to decrease pain in B hips to no greater than 3/10 at worst to improve quality of life-MET, 1-2/10  2. ? ROM: Pt to improve L hip ER to 45 degrees to equal the opposite side and decrease hip pain- Progress made, 34 °   3. ? Strength: Pt to increase R hip strength to 4/5 and L hip strength to 3+/5 to increase hip strength for gait and amb-MET  4. ? Function: Pt to report 50% improvement with ease of transfers/amb to return to PLOF-MET, reports 50% improvement  5. Independent with Home Exercise Programs-MET  6. Demonstrate Knowledge of fall prevention-MET  LTG: (to be met in 16 treatments)  7. ? Pain: Pt to decrease pain in B hips to no greater than 2/10 at worst to improve quality of life. 8. ? Strength: Pt to increase R hip strength to 4+/5 and L hip strength to 4/5 to increase hip strength for gait and amb.  9. ? Function: Pt to have no tenderness to B hip flexors with palpation to demonstrate resolution of symptoms  10.  Pt to improve LEFS score to no greater than 20% impairment to decrease pts perception of disability    Treatment Plan:  [x] Therapeutic Exercise   96697  [] Iontophoresis: 4 mg/mL Dexamethasone Sodium Phosphate  mAmin  04921   [x] Therapeutic Activity  67229 [] Vasopneumatic cold with compression  98808    [] Gait Training   78110 [] Ultrasound   [id number]   [] Neuromuscular Re-education  83626 [] Electrical Stimulation Unattended  [id number]   [] Manual Therapy  [id number] [] Electrical Stimulation Attended  [id number]   [x] Instruction in HEP  [x] Lumbar/Cervical Traction  [id number]   [] Aquatic Therapy   [id number] [] Cold/hotpack    [] Massage   [id number]      [] Dry Needling, 1 or 2 muscles  56003   [] Biofeedback, first 15 minutes   65846  [] Biofeedback, additional 15 minutes   77059 [] Dry Needling, 3 or more muscles  52849       Patient Status:     [x] Continue per initial plan of care. [] Additional visits necessary. [] Other:    Electronically signed by Brenda Jaramillo PT on 2/4/2020 at 10:05 AM      If you have any questions or concerns, please don't hesitate to call. Thank you for your referral.    Physician Signature:________________________________Date:__________________  By signing above or cosigning this note, I have reviewed this plan of care and certify a need for medically necessary rehabilitation services.      *PLEASE SIGN ABOVE AND FAX BACK ALL PAGES*

## 2025-06-02 ENCOUNTER — HOSPITAL ENCOUNTER (OUTPATIENT)
Dept: SURGERY | Age: 70
Discharge: HOME | End: 2025-06-02
Payer: COMMERCIAL

## 2025-06-02 VITALS
HEART RATE: 81 BPM | SYSTOLIC BLOOD PRESSURE: 120 MMHG | OXYGEN SATURATION: 94 % | DIASTOLIC BLOOD PRESSURE: 67 MMHG | RESPIRATION RATE: 18 BRPM

## 2025-06-02 PROCEDURE — 99213 OFFICE O/P EST LOW 20 MIN: CPT | Performed by: STUDENT IN AN ORGANIZED HEALTH CARE EDUCATION/TRAINING PROGRAM

## 2025-06-02 PROCEDURE — 99211 OFF/OP EST MAY X REQ PHY/QHP: CPT | Performed by: STUDENT IN AN ORGANIZED HEALTH CARE EDUCATION/TRAINING PROGRAM

## 2025-06-02 NOTE — PROGRESS NOTES
[facility]  Progress Note        NAME:  Wilfred Collins  MRN: [id number]   YOB: 1955   Date: 2025   Age: 69 y.o.  Gender: male     There is no height or weight on file to calculate BMI.     Chief Complaint: postop right inguinal hernia    History of Present Illness:  Patient is a 69-year-old male who returns to the clinic for follow-up after undergoing open right inguinal hernia repair with mesh at [facility] on 2025. Patient initially followed up in March where he continued to have swelling, pain and discomfort in the right groin.  Last follow-up was 2025.  Patient states that he continues to do well and that sharp burning pain has almost completely resolved.  He does still feel a little lump in the lower abdomen along with pain every so often.  He states that he is now able to wear jeans without there being too much discomfort.  He is voiding well although the stream is a little bit weaker than right after surgery but denies any dysuria or hematuria.  Having normal bowel function that is for the most part soft without hematochezia or melena.  Patient states that he is due for a colonoscopy this year but his prior gastroenterologist does not take his current insurance.  Denies any changes in bowel habits.  States that his last colonoscopy was more than 10 years ago where several polyps were removed that were all benign.  Denies any family history of colon or rectal cancer; mother had several cancers and eventually  of lung cancer, has a sister with breast cancer.    Current Outpatient Medications on File Prior to Encounter   Medication Sig Dispense Refill    fluticasone furoate-vilanterol (BREO ELLIPTA) 200-25 MCG/ACT AEPB inhaler Inhale 1 puff into the lungs daily 3 each 3    umeclidinium bromide (INCRUSE ELLIPTA) 62.5 MCG/ACT inhaler Inhale 1 puff into the lungs daily 3 each 3    predniSONE (DELTASONE) 10 MG tablet 4 tablet once daily for 3 days

## 2025-06-05 ENCOUNTER — RESULTS FOLLOW-UP (OUTPATIENT)
Dept: PULMONOLOGY | Age: 70
End: 2025-06-05

## 2025-06-05 ENCOUNTER — HOSPITAL ENCOUNTER (OUTPATIENT)
Age: 70
Setting detail: SPECIMEN
Discharge: HOME | End: 2025-06-05
Payer: COMMERCIAL

## 2025-06-05 LAB
ALT SERPL-CCNC: 28 U/L (ref 10–50)
BASOPHILS # BLD: 0.04 K/UL (ref 0–0.2)
BASOPHILS NFR BLD: 1 % (ref 0–2)
CREAT SERPL-MCNC: 0.8 MG/DL (ref 0.7–1.2)
EOSINOPHIL # BLD: 0.11 K/UL (ref 0–0.44)
EOSINOPHILS RELATIVE PERCENT: 1 % (ref 1–4)
ERYTHROCYTE [DISTWIDTH] IN BLOOD BY AUTOMATED COUNT: 12.9 % (ref 11.8–14.4)
GFR, ESTIMATED: >90 ML/MIN/1.73M2
HCT VFR BLD AUTO: 41.5 % (ref 40.7–50.3)
HGB BLD-MCNC: 14.2 G/DL (ref 13–17)
IGG SERPL-MCNC: 882 MG/DL (ref 700–1600)
IMM GRANULOCYTES # BLD AUTO: 0.03 K/UL (ref 0–0.3)
IMM GRANULOCYTES NFR BLD: 0 %
LYMPHOCYTES NFR BLD: 1.66 K/UL (ref 1.1–3.7)
LYMPHOCYTES RELATIVE PERCENT: 19 % (ref 24–43)
MCH RBC QN AUTO: 30.1 PG (ref 25.2–33.5)
MCHC RBC AUTO-ENTMCNC: 34.2 G/DL (ref 28.4–34.8)
MCV RBC AUTO: 88.1 FL (ref 82.6–102.9)
MONOCYTES NFR BLD: 0.77 K/UL (ref 0.1–1.2)
MONOCYTES NFR BLD: 9 % (ref 3–12)
NEUTROPHILS NFR BLD: 70 % (ref 36–65)
NEUTS SEG NFR BLD: 6.14 K/UL (ref 1.5–8.1)
NRBC BLD-RTO: 0 PER 100 WBC
PLATELET # BLD AUTO: 217 K/UL (ref 138–453)
PMV BLD AUTO: 11 FL (ref 8.1–13.5)
RBC # BLD AUTO: 4.71 M/UL (ref 4.21–5.77)
WBC OTHER # BLD: 8.8 K/UL (ref 3.5–11.3)

## 2025-06-05 PROCEDURE — 84460 ALANINE AMINO (ALT) (SGPT): CPT

## 2025-06-05 PROCEDURE — 85025 COMPLETE CBC W/AUTO DIFF WBC: CPT

## 2025-06-05 PROCEDURE — 82784 ASSAY IGA/IGD/IGG/IGM EACH: CPT

## 2025-06-05 PROCEDURE — 82565 ASSAY OF CREATININE: CPT

## 2025-06-11 RX ORDER — PREDNISONE 10 MG/1
TABLET ORAL
Qty: 30 TABLET | Refills: 29 | OUTPATIENT
Start: 2025-06-11

## 2025-06-12 ENCOUNTER — HOSPITAL ENCOUNTER (OUTPATIENT)
Dept: GENERAL RADIOLOGY | Age: 70
Discharge: HOME | End: 2025-06-14
Payer: COMMERCIAL

## 2025-06-12 ENCOUNTER — TELEPHONE (OUTPATIENT)
Dept: PULMONOLOGY | Age: 70
End: 2025-06-12

## 2025-06-12 DIAGNOSIS — J44.1 COPD EXACERBATION (HCC): ICD-10-CM

## 2025-06-12 DIAGNOSIS — J44.1 COPD EXACERBATION (HCC): Primary | ICD-10-CM

## 2025-06-12 PROCEDURE — 71046 X-RAY EXAM CHEST 2 VIEWS: CPT

## 2025-06-12 RX ORDER — PREDNISONE 10 MG/1
TABLET ORAL
Qty: 30 TABLET | Refills: 0 | Status: SHIPPED | OUTPATIENT
Start: 2025-06-12

## 2025-06-12 NOTE — TELEPHONE ENCOUNTER
Received a message that Chris called office requesting writer call her back.     Wilfred has been coughing non stop 6-8 wks   DRY cough, however at times can cough up yellow phlegm .    Chris is requesting a CXR order - feels it's bronchial- he completed Levaquin in May - is currently taking prednisone 20 mg,  using his nebulizer 3-4 x day and Robitussin OTC - CHRIS also had Rx filled for herself  when she was sick for for kailyn, and Wilfred was taking that.    Please advise.      Informed Chris writer will be out of the office Friday, routing message to Dr Hull and to .

## 2025-06-13 ENCOUNTER — RESULTS FOLLOW-UP (OUTPATIENT)
Dept: PULMONOLOGY | Age: 70
End: 2025-06-13

## 2025-08-07 ENCOUNTER — OFFICE VISIT (OUTPATIENT)
Age: 70
End: 2025-08-07

## 2025-08-07 VITALS
TEMPERATURE: 97.8 F | DIASTOLIC BLOOD PRESSURE: 72 MMHG | WEIGHT: 255 LBS | OXYGEN SATURATION: 93 % | HEIGHT: 68 IN | BODY MASS INDEX: 38.65 KG/M2 | HEART RATE: 84 BPM | RESPIRATION RATE: 18 BRPM | SYSTOLIC BLOOD PRESSURE: 118 MMHG

## 2025-08-07 DIAGNOSIS — S61.213A LACERATION OF LEFT MIDDLE FINGER WITHOUT FOREIGN BODY WITHOUT DAMAGE TO NAIL, INITIAL ENCOUNTER: Primary | ICD-10-CM

## 2025-08-12 ENCOUNTER — OFFICE VISIT (OUTPATIENT)
Dept: PULMONOLOGY | Age: 70
End: 2025-08-12
Payer: COMMERCIAL

## 2025-08-12 VITALS
HEART RATE: 88 BPM | BODY MASS INDEX: 39.1 KG/M2 | DIASTOLIC BLOOD PRESSURE: 72 MMHG | RESPIRATION RATE: 12 BRPM | HEIGHT: 68 IN | WEIGHT: 258 LBS | OXYGEN SATURATION: 97 % | SYSTOLIC BLOOD PRESSURE: 123 MMHG

## 2025-08-12 DIAGNOSIS — J96.11 CHRONIC RESPIRATORY FAILURE WITH HYPOXIA (HCC): ICD-10-CM

## 2025-08-12 DIAGNOSIS — J44.9 CHRONIC OBSTRUCTIVE PULMONARY DISEASE, UNSPECIFIED COPD TYPE (HCC): Primary | ICD-10-CM

## 2025-08-12 DIAGNOSIS — G47.33 OSA ON CPAP: ICD-10-CM

## 2025-08-12 DIAGNOSIS — D83.9 CVID (COMMON VARIABLE IMMUNODEFICIENCY) (HCC): ICD-10-CM

## 2025-08-12 DIAGNOSIS — Z87.891 HISTORY OF SMOKING AT LEAST 1 PACK PER DAY FOR AT LEAST 30 YEARS: ICD-10-CM

## 2025-08-12 DIAGNOSIS — J45.50 SEVERE PERSISTENT ASTHMA WITHOUT COMPLICATION (HCC): ICD-10-CM

## 2025-08-12 PROCEDURE — 1159F MED LIST DOCD IN RCRD: CPT | Performed by: INTERNAL MEDICINE

## 2025-08-12 PROCEDURE — 3074F SYST BP LT 130 MM HG: CPT | Performed by: INTERNAL MEDICINE

## 2025-08-12 PROCEDURE — 99214 OFFICE O/P EST MOD 30 MIN: CPT | Performed by: INTERNAL MEDICINE

## 2025-08-12 PROCEDURE — 1123F ACP DISCUSS/DSCN MKR DOCD: CPT | Performed by: INTERNAL MEDICINE

## 2025-08-12 PROCEDURE — 3078F DIAST BP <80 MM HG: CPT | Performed by: INTERNAL MEDICINE

## 2025-08-12 RX ORDER — PREDNISONE 10 MG/1
TABLET ORAL
Qty: 30 TABLET | Refills: 0 | Status: SHIPPED | OUTPATIENT
Start: 2025-08-12

## 2025-08-12 RX ORDER — LEVOFLOXACIN 500 MG/1
500 TABLET, FILM COATED ORAL DAILY
Qty: 10 TABLET | Refills: 0 | Status: SHIPPED | OUTPATIENT
Start: 2025-08-12 | End: 2025-08-22

## 2025-09-04 ENCOUNTER — HOSPITAL ENCOUNTER (OUTPATIENT)
Age: 70
Setting detail: SPECIMEN
Discharge: HOME | End: 2025-09-04
Payer: COMMERCIAL

## 2025-09-04 LAB
ALT SERPL-CCNC: 33 U/L (ref 10–50)
BASOPHILS # BLD: 0.05 K/UL (ref 0–0.2)
BASOPHILS NFR BLD: 1 % (ref 0–2)
CREAT SERPL-MCNC: 0.9 MG/DL (ref 0.7–1.2)
EOSINOPHIL # BLD: 0.17 K/UL (ref 0–0.44)
EOSINOPHILS RELATIVE PERCENT: 2 % (ref 0–4)
ERYTHROCYTE [DISTWIDTH] IN BLOOD BY AUTOMATED COUNT: 12.9 % (ref 11.5–14.9)
GFR, ESTIMATED: >90 ML/MIN/1.73M2
HCT VFR BLD AUTO: 43.7 % (ref 41–53)
HGB BLD-MCNC: 14.8 G/DL (ref 13.5–17.5)
IGG SERPL-MCNC: 902 MG/DL (ref 700–1600)
IMM GRANULOCYTES # BLD AUTO: 0.04 K/UL (ref 0–0.3)
IMM GRANULOCYTES NFR BLD: 1 %
LYMPHOCYTES NFR BLD: 2.14 K/UL (ref 1.1–3.7)
LYMPHOCYTES RELATIVE PERCENT: 28 % (ref 24–44)
MCH RBC QN AUTO: 29.8 PG (ref 26–34)
MCHC RBC AUTO-ENTMCNC: 33.9 G/DL (ref 31–37)
MCV RBC AUTO: 88.1 FL (ref 80–100)
MONOCYTES NFR BLD: 0.6 K/UL (ref 0.1–1.2)
MONOCYTES NFR BLD: 8 % (ref 3–12)
NEUTROPHILS NFR BLD: 60 % (ref 36–66)
NEUTS SEG NFR BLD: 4.67 K/UL (ref 1.5–8.1)
NRBC BLD-RTO: 0 PER 100 WBC
PLATELET # BLD AUTO: 245 K/UL (ref 150–450)
PMV BLD AUTO: 11.3 FL (ref 8–13.5)
RBC # BLD AUTO: 4.96 M/UL (ref 4.21–5.77)
WBC OTHER # BLD: 7.7 K/UL (ref 3.5–11)

## 2025-09-04 PROCEDURE — 85025 COMPLETE CBC W/AUTO DIFF WBC: CPT

## 2025-09-04 PROCEDURE — 82784 ASSAY IGA/IGD/IGG/IGM EACH: CPT

## 2025-09-04 PROCEDURE — 84460 ALANINE AMINO (ALT) (SGPT): CPT

## 2025-09-04 PROCEDURE — 82565 ASSAY OF CREATININE: CPT

## (undated) DEVICE — GOWN,AURORA,NONRNF,XL,30/CS: Brand: MEDLINE

## (undated) DEVICE — STANDARD HYPODERMIC NEEDLE,POLYPROPYLENE HUB: Brand: MONOJECT

## (undated) DEVICE — LIMB HOLDER, WRIST/ANKLE: Brand: DEROYAL

## (undated) DEVICE — BLADE ES L6IN ELASTOMERIC COAT EXT DURABLE BEND UPTO 90DEG

## (undated) DEVICE — DRESSING FOAM W4XL10IN AG SIL ADH ANTIMIC POSTOP OPTIFOAM

## (undated) DEVICE — Z DISCONTINUED CATHETER BALLOON DIL BILI 0.035 IN 5 FRX180 CM MAXFORC [M00567410] [STRYKER CORP]

## (undated) DEVICE — ZIPPERED TOGA, 2X LARGE: Brand: FLYTE

## (undated) DEVICE — ADHESIVE SKIN CLSR 0.7ML TOP DERMBND ADV

## (undated) DEVICE — CHLORAPREP 26ML ORANGE

## (undated) DEVICE — 4-PORT MANIFOLD: Brand: NEPTUNE 2

## (undated) DEVICE — 3M™ COBAN™ NL STERILE NON-LATEX SELF-ADHERENT WRAP, 2086S, 6 IN X 5 YD (15 CM X 4,5 M), 12 ROLLS/CASE: Brand: 3M™ COBAN™

## (undated) DEVICE — SUTURE STRATAFIX SPRL SZ 1 L14IN ABSRB VLT L48CM CTX 1/2 SXPD2B405

## (undated) DEVICE — SUTURE VCRL SZ 0 L27IN ABSRB UD L36MM CT-1 1/2 CIR J260H

## (undated) DEVICE — RENTAL TABLE ATTACHMENT

## (undated) DEVICE — SUTURE STRATAFIX SPRL SZ 2-0 L9IN ABSRB VLT MH L36MM 1/2 SXPD2B408

## (undated) DEVICE — SYRINGE, LUER LOCK, 10ML: Brand: MEDLINE

## (undated) DEVICE — SUTURE STRATAFIX SPRL SZ 3-0 L5IN ABSRB UD FS L26MM 3/8 CIR SXMP2B411

## (undated) DEVICE — TOTAL TRAY, 16FR 10ML SIL FOLEY, URN: Brand: MEDLINE

## (undated) DEVICE — BIPOLAR SEALER 23-112-1 AQM 6.0: Brand: AQUAMANTYS ®

## (undated) DEVICE — SVMMC ORTH SPL DRP PK

## (undated) DEVICE — GLOVE ORANGE PI 7   MSG9070

## (undated) DEVICE — SUTURE PROL SZ 2-0 L30IN NONABSORBABLE BLU L26MM SH 1/2 CIR 8833H

## (undated) DEVICE — COVER LT HNDL BLU PLAS

## (undated) DEVICE — BANDAGE COBAN 6 IN WND 6INX5YD FOAM

## (undated) DEVICE — SUTURE VCRL SZ 1 L36IN ABSRB UD L36MM CT-1 1/2 CIR J947H

## (undated) DEVICE — NEEDLE ECHOGENIC 20GA L4IN INSUL W/ 30DEG BVL EXTN SET

## (undated) DEVICE — STERILE PATIENT PROTECTIVE PAD FOR IMP® KNEE POSITIONERS & COHESIVE WRAP (10 / CASE): Brand: DE MAYO KNEE POSITIONER®

## (undated) DEVICE — STAZ MINOR LAPAROTOMY: Brand: MEDLINE INDUSTRIES, INC.

## (undated) DEVICE — HYPODERMIC SAFETY NEEDLE: Brand: MAGELLAN

## (undated) DEVICE — CANNULA IV 18GA L15IN BLNT FILL LUERLOCK HUB MJCT

## (undated) DEVICE — SUTURE VICRYL + SZ 3-0 L27IN ABSRB UD L26MM SH 1/2 CIR VCP416H

## (undated) DEVICE — SPONGE LAP W18XL18IN WHT COT 4 PLY FLD STRUNG RADPQ DISP ST

## (undated) DEVICE — SET CBL SL SM DIA2MM VIT BEAD DALL-M: Type: IMPLANTABLE DEVICE | Site: KNEE | Status: NON-FUNCTIONAL

## (undated) DEVICE — SUTURE ABSRB BRAID COAT UD CP NO 2 27IN VCRL J195H

## (undated) DEVICE — Z DISCONTINUED USE 2423037 APPLICATOR MEDICATED 3 CC CHLORHEXIDINE GLUC 2% CHLORAPREP

## (undated) DEVICE — STERILE PVP: Brand: MEDLINE INDUSTRIES, INC.

## (undated) DEVICE — 450 ML BOTTLE OF 0.05% CHLORHEXIDINE GLUCONATE IN 99.95% STERILE WATER FOR IRRIGATION, USP AND APPLICATOR.: Brand: IRRISEPT ANTIMICROBIAL WOUND LAVAGE

## (undated) DEVICE — APPLICATOR MEDICATED 26 CC SOLUTION HI LT ORNG CHLORAPREP

## (undated) DEVICE — SYRINGE IRRIG 60ML SFT PLIABLE BLB EZ TO GRP 1 HND USE W/

## (undated) DEVICE — SKIN PREP TRAY W/CHG: Brand: MEDLINE INDUSTRIES, INC.

## (undated) DEVICE — ZIPPERED TOGA, X-LARGE: Brand: FLYTE

## (undated) DEVICE — GLOVE SURG SZ 65 CRM LTX FREE POLYISOPRENE POLYMER BEAD ANTI

## (undated) DEVICE — OSCILLATING TIP SAW CARTRIDGE: Brand: PRECISION FALCON

## (undated) DEVICE — SUTURE PERMA-HAND SZ 2-0 L30IN NONABSORBABLE BLK L26MM SH K833H

## (undated) DEVICE — GLOVE ORANGE PI 7 1/2   MSG9075

## (undated) DEVICE — GLOVE SURG SZ 6 CRM LTX FREE POLYISOPRENE POLYMER BEAD ANTI

## (undated) DEVICE — 6619 2 PTNT ISO SYS INCISE AREA&LT;(&GT;&&LT;)&GT;P: Brand: STERI-DRAPE™ IOBAN™ 2

## (undated) DEVICE — DRAIN SURG PENROSE 0.5X18 IN CLOSED WND DRAINAGE PREM SIL

## (undated) DEVICE — SUTURE VICRYL + SZ 2-0 L54IN ABSRB VLT TIE POLYGLACTIN 910 VCP286G

## (undated) DEVICE — 3 ML SYRINGE LUER-LOCK TIP: Brand: MONOJECT

## (undated) DEVICE — LIMB HOLDER, QUICK RELEASE, 60" STRAP: Brand: MEDLINE

## (undated) DEVICE — GLOVE ORANGE PI 8   MSG9080

## (undated) DEVICE — NEEDLE SPNL L3.5IN DIA25GA QNCKE TYP BVL SPINOCAN

## (undated) DEVICE — OPTIFOAM GENTLE AG,POST-OP STRIP,3.5X10: Brand: MEDLINE

## (undated) DEVICE — TOWEL,OR,DSP,ST,NATURAL,DLX,4/PK,20PK/CS: Brand: MEDLINE

## (undated) DEVICE — GLOVE ORANGE PI 8 1/2   MSG9085

## (undated) DEVICE — PROTECTOR ULN NRV PUR FOAM HK LOOP STRP ANATOMICALLY

## (undated) DEVICE — PACK PROCEDURE SURG SVMMC HIP FX